# Patient Record
Sex: FEMALE | Race: WHITE | NOT HISPANIC OR LATINO | Employment: FULL TIME | URBAN - METROPOLITAN AREA
[De-identification: names, ages, dates, MRNs, and addresses within clinical notes are randomized per-mention and may not be internally consistent; named-entity substitution may affect disease eponyms.]

---

## 2017-02-14 ENCOUNTER — TELEPHONE (OUTPATIENT)
Dept: ORTHOPEDICS | Facility: CLINIC | Age: 57
End: 2017-02-14

## 2017-02-14 NOTE — TELEPHONE ENCOUNTER
Spoke to pt and she was given appointment with  and she was placed on the waiting list. The appointment slip was mailed to her home.

## 2017-02-14 NOTE — TELEPHONE ENCOUNTER
----- Message from Paige Mak sent at 2/14/2017 10:38 AM CST -----  Contact: self@ home   Pt is calling to get a sooner appt with dr. beard she stated that she has left knee pain with swelling pt do not want to see a PA. i called triage and [facility] staff no answer.

## 2017-03-31 ENCOUNTER — TELEPHONE (OUTPATIENT)
Dept: ORTHOPEDICS | Facility: CLINIC | Age: 57
End: 2017-03-31

## 2017-03-31 DIAGNOSIS — M25.562 LEFT KNEE PAIN, UNSPECIFIED CHRONICITY: Primary | ICD-10-CM

## 2017-04-03 ENCOUNTER — HOSPITAL ENCOUNTER (OUTPATIENT)
Dept: RADIOLOGY | Facility: HOSPITAL | Age: 57
Discharge: HOME | End: 2017-04-03
Attending: ORTHOPAEDIC SURGERY
Payer: COMMERCIAL

## 2017-04-03 ENCOUNTER — OFFICE VISIT (OUTPATIENT)
Dept: ORTHOPEDICS | Facility: CLINIC | Age: 57
End: 2017-04-03
Payer: COMMERCIAL

## 2017-04-03 VITALS — WEIGHT: 198.31 LBS | BODY MASS INDEX: 33.86 KG/M2 | HEIGHT: 64 IN

## 2017-04-03 DIAGNOSIS — M25.562 CHRONIC PAIN OF LEFT KNEE: Primary | ICD-10-CM

## 2017-04-03 DIAGNOSIS — T84.89XA POSTOPERATIVE STIFFNESS OF TOTAL KNEE REPLACEMENT, INITIAL ENCOUNTER: ICD-10-CM

## 2017-04-03 DIAGNOSIS — T84.84XA PAIN DUE TO TOTAL LEFT KNEE REPLACEMENT, INITIAL ENCOUNTER: ICD-10-CM

## 2017-04-03 DIAGNOSIS — M25.562 LEFT KNEE PAIN, UNSPECIFIED CHRONICITY: ICD-10-CM

## 2017-04-03 DIAGNOSIS — Z96.652 PAIN DUE TO TOTAL LEFT KNEE REPLACEMENT, INITIAL ENCOUNTER: ICD-10-CM

## 2017-04-03 DIAGNOSIS — G89.29 CHRONIC PAIN OF LEFT KNEE: Primary | ICD-10-CM

## 2017-04-03 DIAGNOSIS — M25.669 POSTOPERATIVE STIFFNESS OF TOTAL KNEE REPLACEMENT, INITIAL ENCOUNTER: ICD-10-CM

## 2017-04-03 DIAGNOSIS — Z96.659 POSTOPERATIVE STIFFNESS OF TOTAL KNEE REPLACEMENT, INITIAL ENCOUNTER: ICD-10-CM

## 2017-04-03 PROCEDURE — 73562 X-RAY EXAM OF KNEE 3: CPT | Mod: 26,LT,, | Performed by: RADIOLOGY

## 2017-04-03 PROCEDURE — 99214 OFFICE O/P EST MOD 30 MIN: CPT | Mod: S$GLB,,, | Performed by: ORTHOPAEDIC SURGERY

## 2017-04-03 PROCEDURE — 73560 X-RAY EXAM OF KNEE 1 OR 2: CPT | Mod: 26,59,RT, | Performed by: RADIOLOGY

## 2017-04-03 PROCEDURE — 73560 X-RAY EXAM OF KNEE 1 OR 2: CPT | Mod: TC,59,RT

## 2017-04-03 PROCEDURE — 99999 PR PBB SHADOW E&M-EST. PATIENT-LVL III: CPT | Mod: PBBFAC,,, | Performed by: ORTHOPAEDIC SURGERY

## 2017-04-03 PROCEDURE — 1160F RVW MEDS BY RX/DR IN RCRD: CPT | Mod: S$GLB,,, | Performed by: ORTHOPAEDIC SURGERY

## 2017-04-03 RX ORDER — ROSUVASTATIN CALCIUM 20 MG/1
20 TABLET, COATED ORAL DAILY
Status: ON HOLD | COMMUNITY
End: 2017-07-11 | Stop reason: SDUPTHER

## 2017-04-03 RX ORDER — FLUTICASONE PROPIONATE 50 MCG
SPRAY, SUSPENSION (ML) NASAL
COMMUNITY
Start: 2017-03-13 | End: 2017-11-27

## 2017-04-03 RX ORDER — FLUOXETINE HYDROCHLORIDE 20 MG/1
CAPSULE ORAL
COMMUNITY
Start: 2017-03-13 | End: 2017-04-03 | Stop reason: SDUPTHER

## 2017-04-03 NOTE — PROGRESS NOTES
Subjective:      Patient ID: Traci Freire is a 56 y.o. female.    Chief Complaint: Pain of the Left Knee    HPI   Traci Freire is a 56 year old female with h/o L TKA at [facility] 2013 with revision L TKA by Dr. Estevez 2014, breast cancer s/p B mastectomy  here with a 1.5 year history of left knee pain. 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  The pain is severe The pain is located in the global aspect of the knee. There is is not radiation.  The pain is described as severe. It is aggravated by sitting, standing and walking.  It is not alleviated by rest. There is not numbness or tingling of the lower extremity.  There is back pain.  She  has tried medications or injections. They help minimally.  She does have difficulty getting in or out of a car, getting dressed, or going up or down stairs.  The patient does not use an assistive device.    Pt did well after revision TKA in .  Pain started 1.5 years ago and worsening.    Past Medical History:   Diagnosis Date    Cancer breast    Right- Bilateral mastectomy- May 2005- had breast reconstruction- mother  of breast cancer    GERD (gastroesophageal reflux disease)     not troubled any more after the Nissen's fundoplication -     HLD (hyperlipidemia)     Hypertension     diagnosed age late 40's     IBS (irritable bowel syndrome)     Lyme disease     arthritis of hands. Felt like flu- age early 30's- got it  after going to [place]    Sleep apnea      Past Surgical History:   Procedure Laterality Date    ABDOMINAL SURGERY      3 c sections    baldder suspension-      BREAST SURGERY      CYSTOSCOPY      avoids greens . ? Interstitial cystitis    HYSTERECTOMY      followed by removal of ovaries  from scar tissue    JOINT REPLAC[place]      [place]     KNEE SURGERY Left 2014    TKR    tonsils      age 19 years     Social History      Occupational History    Not on file.     Social History Main Topics    Smoking status: Former Smoker     Quit date: 3/26/2004    Smokeless tobacco: Never Used      Comment: quit 2004- smoked for 10 years- 1 ppd    Alcohol use No    Drug use: No    Sexual activity: Not on file      Review of Systems   Constitution: Negative for fever.   HENT: Negative for headaches.    Eyes: Negative for vision loss in left eye and vision loss in right eye.   Cardiovascular: Negative for chest pain.   Respiratory: Negative for cough and shortness of breath.    Endocrine: Negative for polyuria.   Hematologic/Lymphatic: Does not bruise/bleed easily.   Skin: Negative for rash.   Musculoskeletal: Positive for joint pain and joint swelling.   Gastrointestinal: Negative for abdominal pain, nausea and vomiting.   Genitourinary: Negative for dysuria.   Neurological: Negative for numbness.   Psychiatric/Behavioral: Negative for hallucinations.   Allergic/Immunologic: Negative for hives.         Objective:    Right Knee Exam     Tenderness   The patient is experiencing tenderness in the medial joint line.    Range of Motion   Extension: 0   Flexion: 110     Tests   Lachman:  Anterior - negative    Posterior - negative  Drawer:       Anterior - negative    Posterior - negative  Varus: negative  Valgus: negative  Patellar Apprehension: negative    Other   Erythema: absent  Scars: absent  Sensation: normal  Pulse: present  Swelling: none  Other tests: no effusion present      Left Knee Exam     Tenderness   The patient is experiencing tenderness in the pes anserinus, medial joint line and lateral joint line.    Range of Motion   Extension: 0   Flexion: 30     Tests   Lachman:  Anterior - negative    Posterior - negative  Drawer:       Anterior - negative     Posterior - negative  Varus: negative  Valgus: negative  Patellar Apprehension: negative    Other   Erythema: absent  Scars: present  Sensation: normal  Pulse: present  Swelling:  moderate               Assessment:       No diagnosis found.      Plan:       ***

## 2017-04-03 NOTE — MR AVS SNAPSHOT
[facility]  [address]  [facility] [address]  Phone: [phone]                  Traci Freire   4/3/2017 9:00 AM   Office Visit    Description:  Female : 1960   Provider:  Chris Estevez MD   Department:  [facility]           Reason for Visit     Left Knee - Pain           Diagnoses this Visit        Comments    Chronic pain of left knee    -  Primary     Pain due to total left knee replacement, initial encounter         Postoperative stiffness of total knee replacement, initial encounter                To Do List           Future Appointments        Provider Department Dept Phone    4/3/2017 11:15 AM LAB, SAME DAY [facility] [phone]    2017 9:15 AM [facility] CT1 LIMIT 400 LBS [facility] [phone]      Goals (5 Years of Data)     None      [facility]     [facility] Nurse Care Line -  Assistance  Unless otherwise directed by your provider, please contact [facility], our nurse care line that is available for  assistance.     Registered nurses in the [facility] provide: appointment scheduling, clinical advisement, health education, and other advisory services.  Call: [phone] (toll free)               Medications           Message regarding Medications     Verify the changes and/or additions to your medication regime listed below are the same as discussed with your clinician today.  If any of these changes or additions are incorrect, please notify your healthcare provider.        STOP taking these medications     docusate sodium (COLACE) 100 MG capsule Take 1 capsule (100 mg total) by mouth 2 (two) times daily.    hydrocodone-acetaminophen 10-325mg (NORCO)  mg Tab Take 1 tablet by mouth daily as needed.    oxycodone-acetaminophen (PERCOCET) 5-325 mg per tablet Take 1 tablet by mouth every 4 (four) hours as needed for Pain.    fluoxetine (PROZAC) 20 MG capsule           "  Verify that the below list of medications is an accurate representation of the medications you are currently taking.  If none reported, the list may be blank. If incorrect, please contact your healthcare provider. Carry this list with you in case of emergency.           Current Medications     fluoxetine (PROZAC) 20 MG tablet Take 20 mg by mouth every evening.     fluticasone (FLONASE) 50 mcg/actuation nasal spray     ibuprofen (ADVIL,MOTRIN) 200 MG tablet Take 600 mg by mouth every evening. Takes 3 of the 200 mg otc tablets    irbesartan (AVAPRO) 300 MG tablet Take 300 mg by mouth every evening.    rosuvastatin (CRESTOR) 20 MG tablet Take 20 mg by mouth once daily.    zolpidem (AMBIEN) 10 mg Tab Take 5 mg by mouth every evening.     rosuvastatin (CRESTOR) 20 MG tablet Take 20 mg by mouth every evening.     warfarin (COUMADIN) 2.5 MG tablet Take 1 tablet (2.5 mg total) by mouth Daily.           Clinical Reference Information           Your Vitals Were     Height Weight Last Period BMI       5' 3.78" (1.62 m) 89.9 kg (198 lb 4.9 oz) 05/09/1992 34.27 kg/m2       Allergies as of 4/3/2017     No Known Allergies      Immunizations Administered on Date of Encounter - 4/3/2017     None      Orders Placed During Today's Visit     Future Labs/Procedures Expected by Expires    C-reactive protein  4/3/2017 4/3/2018    CT Lower Extremity Without Contrast Left  4/3/2017 4/3/2018    Sedimentation rate, manual  4/3/2017 4/3/2018      [facility] Sign-Up     Activating your [facility] account is as easy as 1-2-3!     1) Visit [email or web address], select Sign Up Now, enter this activation code and your date of birth, then select Next.  [email or web address]  Expires: 5/18/2017 10:56 AM      2) Create a username and password to use when you visit [facility] in the future and select a security question in case you lose your password and select Next.    3) Enter your e-mail address and click Sign Up!    Additional Information  If you have " questions, please e-mail [email or web address] or call [phone] to talk to our [facility] staff. Remember, [facility] is NOT to be used for urgent needs. For medical emergencies, dial 911.         Language Assistance Services     ATTENTION: Language assistance services are available, free of charge. Please call [phone].      ATENCIÓN: Si habla español, tiene a bowser disposición servicios gratuitos de asistencia lingüística. Llame al [phone].     CHÚ Ý: N?u b?n nói Ti?ng Vi?t, có các d?ch v? h? tr? ngôn ng? mi?n phí dành cho b?n. G?i s? [phone].         [facility] complies with applicable Federal civil rights laws and does not discriminate on the basis of race, color, national origin, age, disability, or sex.

## 2017-04-03 NOTE — LETTER
April 3, 2017      Ryan Luo MD  [address] [address]           [facility]  [address]  [place] [address]  Phone: [phone]          Patient: Traci Freire   MR Number: [id number]   YOB: 1960   Date of Visit: 4/3/2017       Dear Dr. Ryan Luo:    Thank you for referring Traci Freire to me for evaluation. Attached you will find relevant portions of my assessment and plan of care.    If you have questions, please do not hesitate to call me. I look forward to following Traci Freire along with you.    Sincerely,    Chris Estevez MD    Enclosure  CC:  No Recipients    If you would like to receive this communication electronically, please contact [email or web address] or [phone] to request more information on [employer] access.    For providers and/or their staff who would like to refer a patient to [facility], please contact us through our one-stop-shop provider referral line, [facility], at [phone].    If you feel you have received this communication in error or would no longer like to receive these types of communications, please e-mail [email or web address]

## 2017-04-03 NOTE — PROGRESS NOTES
Subjective:      Patient ID: Traci Freire is a 56 y.o. female.    Chief Complaint: Pain of the Left Knee    HPI  Traci Freire is a 56 year old female with h/o L TKA at OSH 2013 with revision L TKA by Dr. Estevez 2014, breast cancer s/p B mastectomy  here with a 1.5 year history of left knee pain. 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  The pain is severe The pain is located in the global aspect of the knee. There is not radiation.  The pain is described as severe. It is aggravated by sitting, standing and walking.  It is not alleviated by rest. There is not numbness or tingling of the lower extremity.  There is back pain.  She  has tried medications or injections. They help minimally.  She does have difficulty getting in or out of a car, getting dressed, or going up or down stairs.  The patient does not use an assistive device.    Pt did well after revision TKA in .  Pain started 1.5 years ago and worsening.      Past Medical History:   Diagnosis Date    Cancer breast    Right- Bilateral mastectomy- May 2005- had breast reconstruction- mother  of breast cancer    GERD (gastroesophageal reflux disease)     not troubled any more after the Nissen's fundoplication -     HLD (hyperlipidemia)     Hypertension     diagnosed age late 40's     IBS (irritable bowel syndrome)     Lyme disease     arthritis of hands. Felt like flu- age early 30's- got it  after going to [place]    Sleep apnea      Past Surgical History:   Procedure Laterality Date    ABDOMINAL SURGERY      3 c sections    baldder suspension-      BREAST SURGERY      CYSTOSCOPY      avoids greens . ? Interstitial cystitis    HYSTERECTOMY      followed by removal of ovaries  from scar tissue    JOINT REPLACEMENT      [facility] -     KNEE SURGERY Left 2014    TKR    tonsils      age 19 years     Family History   Problem  Relation Age of Onset    Cancer Mother       of breast cancer, also had gynecological cancer- had breast cancer that spread to the brain    Heart disease Father      in his 60's-  in his 70's     Social History     Social History    Marital status:      Spouse name: N/A    Number of children: N/A    Years of education: N/A     Occupational History    Not on file.     Social History Main Topics    Smoking status: Former Smoker     Quit date: 3/26/2004    Smokeless tobacco: Never Used      Comment: quit - smoked for 10 years- 1 ppd    Alcohol use No    Drug use: No    Sexual activity: Not on file     Other Topics Concern    Not on file     Social History Narrative     Current Outpatient Prescriptions on File Prior to Visit   Medication Sig Dispense Refill    fluoxetine (PROZAC) 20 MG tablet Take 20 mg by mouth every evening.       ibuprofen (ADVIL,MOTRIN) 200 MG tablet Take 600 mg by mouth every evening. Takes 3 of the 200 mg otc tablets      irbesartan (AVAPRO) 300 MG tablet Take 300 mg by mouth every evening.      zolpidem (AMBIEN) 10 mg Tab Take 5 mg by mouth every evening.       rosuvastatin (CRESTOR) 20 MG tablet Take 20 mg by mouth every evening.       warfarin (COUMADIN) 2.5 MG tablet Take 1 tablet (2.5 mg total) by mouth Daily. 90 tablet 0    [DISCONTINUED] docusate sodium (COLACE) 100 MG capsule Take 1 capsule (100 mg total) by mouth 2 (two) times daily. 60 capsule 0    [DISCONTINUED] hydrocodone-acetaminophen 10-325mg (NORCO)  mg Tab Take 1 tablet by mouth daily as needed. 90 tablet 0    [DISCONTINUED] oxycodone-acetaminophen (PERCOCET) 5-325 mg per tablet Take 1 tablet by mouth every 4 (four) hours as needed for Pain. 90 tablet 0     No current facility-administered medications on file prior to visit.      Review of patient's allergies indicates:  No Known Allergies    Review of Systems   Constitution: Negative for chills, fever and night sweats.   HENT:  "Negative for headaches and hearing loss.    Eyes: Negative for blurred vision and double vision.   Cardiovascular: Negative for chest pain, claudication and leg swelling.   Respiratory: Negative for shortness of breath.    Endocrine: Negative for polydipsia, polyphagia and polyuria.   Hematologic/Lymphatic: Negative for adenopathy and bleeding problem. Does not bruise/bleed easily.   Skin: Negative for poor wound healing.   Musculoskeletal: Positive for joint pain.   Gastrointestinal: Negative for diarrhea and heartburn.   Genitourinary: Negative for bladder incontinence.   Neurological: Negative for focal weakness, numbness, paresthesias and sensory change.   Psychiatric/Behavioral: The patient is not nervous/anxious.    Allergic/Immunologic: Negative for persistent infections.         Objective:      Body mass index is 34.27 kg/(m^2).  Vitals:    04/03/17 0931   Weight: 89.9 kg (198 lb 4.9 oz)   Height: 5' 3.78" (1.62 m)         General    Constitutional: She is oriented to person, place, and time. She appears well-developed and well-nourished.   HENT:   Head: Normocephalic and atraumatic.   Eyes: EOM are normal.   Cardiovascular: Normal rate.    Pulmonary/Chest: Effort normal.   Neurological: She is alert and oriented to person, place, and time.   Psychiatric: She has a normal mood and affect. Her behavior is normal.     General Musculoskeletal Exam   Gait: abnormal and antalgic       Right Knee Exam     Inspection   Erythema: absent  Scars: absent  Swelling: absent  Effusion: effusion  Deformity: deformity  Bruising: absent    Tenderness   The patient is experiencing no tenderness.         Range of Motion   Extension: 0   Flexion: 130     Tests   Ligament Examination Lachman: normal (-1 to 2mm)   MCL - Valgus: normal (0 to 2mm)  LCL - Varus: normal  Patella   Passive Patellar Tilt: neutral    Other   Sensation: normal    Left Knee Exam     Inspection   Erythema: absent  Scars: present  Swelling: " present  Effusion: absent  Deformity: deformity  Bruising: absent    Tenderness   The patient tender to palpation of the medial retinaculum and lateral retinaculum.    Range of Motion   Extension: 0   Flexion: 50     Tests   Stability Lachman: normal (-1 to 2mm)   MCL - Valgus: normal (0 to 2mm)  LCL - Varus: normal (0 to 2mm)  Patella   Passive Patellar Tilt: neutral    Other   Sensation: normal    Muscle Strength   Right Lower Extremity   Hip Abduction: 5/5   Quadriceps:  5/5   Hamstrin/5   Left Lower Extremity   Hip Abduction: 5/5   Quadriceps:  5/5   Hamstrin/5     Reflexes     Left Side  Quadriceps:  2+    Right Side   Quadriceps:  2+    Vascular Exam     Right Pulses  Dorsalis Pedis:      2+          Left Pulses  Dorsalis Pedis:      2+          Edema  Right Lower Leg: absent  Left Lower Leg: absent      Radiographs taken today were reviewed by me.  There is a prosthetic replacement of the left knee(s).  The prosthesis is well positioned.  Copmapred to prior in  there is heterotopic ossification and patella baja.         Assessment:       Encounter Diagnoses   Name Primary?    Chronic pain of left knee Yes    Pain due to total left knee replacement, initial encounter     Postoperative stiffness of total knee replacement, initial encounter           Plan:       Traci was seen today for pain.    Diagnoses and all orders for this visit:    Chronic pain of left knee  -     C-reactive protein; Future  -     Sedimentation rate, manual; Future  -     CT Lower Extremity Without Contrast Left; Future    Pain due to total left knee replacement, initial encounter  -     C-reactive protein; Future  -     Sedimentation rate, manual; Future  -     CT Lower Extremity Without Contrast Left; Future    Postoperative stiffness of total knee replacement, initial encounter  -     C-reactive protein; Future  -     Sedimentation rate, manual; Future  -     CT Lower Extremity Without Contrast Left; Future        She  had a bone scan done by Dr. Luo.  I would like to see the report for this.  +ESR/CRP today.  CT scan.  F/U after CT scan

## 2017-04-04 ENCOUNTER — TELEPHONE (OUTPATIENT)
Dept: ORTHOPEDICS | Facility: CLINIC | Age: 57
End: 2017-04-04

## 2017-04-04 NOTE — TELEPHONE ENCOUNTER
----- Message from Chris Estevez MD sent at 4/4/2017  9:15 AM CDT -----  Please call pt.  Labs are fine.  No infection.

## 2017-04-07 ENCOUNTER — TELEPHONE (OUTPATIENT)
Dept: ORTHOPEDICS | Facility: CLINIC | Age: 57
End: 2017-04-07

## 2017-04-07 ENCOUNTER — HOSPITAL ENCOUNTER (OUTPATIENT)
Dept: RADIOLOGY | Facility: HOSPITAL | Age: 57
Discharge: HOME | End: 2017-04-07
Attending: ORTHOPAEDIC SURGERY
Payer: COMMERCIAL

## 2017-04-07 DIAGNOSIS — M25.669 POSTOPERATIVE STIFFNESS OF TOTAL KNEE REPLACEMENT, INITIAL ENCOUNTER: ICD-10-CM

## 2017-04-07 DIAGNOSIS — T84.84XA PAIN DUE TO TOTAL LEFT KNEE REPLACEMENT, INITIAL ENCOUNTER: ICD-10-CM

## 2017-04-07 DIAGNOSIS — T84.89XA POSTOPERATIVE STIFFNESS OF TOTAL KNEE REPLACEMENT, INITIAL ENCOUNTER: ICD-10-CM

## 2017-04-07 DIAGNOSIS — G89.29 CHRONIC PAIN OF LEFT KNEE: ICD-10-CM

## 2017-04-07 DIAGNOSIS — Z96.659 POSTOPERATIVE STIFFNESS OF TOTAL KNEE REPLACEMENT, INITIAL ENCOUNTER: ICD-10-CM

## 2017-04-07 DIAGNOSIS — M25.562 CHRONIC PAIN OF LEFT KNEE: ICD-10-CM

## 2017-04-07 DIAGNOSIS — Z96.652 PAIN DUE TO TOTAL LEFT KNEE REPLACEMENT, INITIAL ENCOUNTER: ICD-10-CM

## 2017-04-07 PROCEDURE — 73700 CT LOWER EXTREMITY W/O DYE: CPT | Mod: TC,LT

## 2017-04-07 PROCEDURE — 73700 CT LOWER EXTREMITY W/O DYE: CPT | Mod: 26,LT,, | Performed by: RADIOLOGY

## 2017-04-12 ENCOUNTER — TELEPHONE (OUTPATIENT)
Dept: ORTHOPEDICS | Facility: CLINIC | Age: 57
End: 2017-04-12

## 2017-04-12 NOTE — TELEPHONE ENCOUNTER
Spoke to pt and she was notified of her results and she will  a copy of the bone scan results and she will bring it to her appointment.

## 2017-04-12 NOTE — TELEPHONE ENCOUNTER
----- Message from Chris Estevez MD sent at 4/7/2017 11:22 AM CDT -----  Please schedule f/u.  I reviewed the CT scan.  Did we get the results of the bone scan that she had done through Dr. Luo?

## 2017-05-01 ENCOUNTER — OFFICE VISIT (OUTPATIENT)
Dept: ORTHOPEDICS | Facility: CLINIC | Age: 57
End: 2017-05-01
Payer: COMMERCIAL

## 2017-05-01 VITALS — BODY MASS INDEX: 35.08 KG/M2 | WEIGHT: 198 LBS | HEIGHT: 63 IN

## 2017-05-01 DIAGNOSIS — G89.29 CHRONIC PAIN OF LEFT KNEE: Primary | ICD-10-CM

## 2017-05-01 DIAGNOSIS — M25.562 CHRONIC PAIN OF LEFT KNEE: Primary | ICD-10-CM

## 2017-05-01 DIAGNOSIS — T84.89XA POSTOPERATIVE STIFFNESS OF TOTAL KNEE REPLACEMENT, INITIAL ENCOUNTER: ICD-10-CM

## 2017-05-01 DIAGNOSIS — M25.669 POSTOPERATIVE STIFFNESS OF TOTAL KNEE REPLACEMENT, INITIAL ENCOUNTER: ICD-10-CM

## 2017-05-01 DIAGNOSIS — T84.84XA PAIN DUE TO TOTAL LEFT KNEE REPLACEMENT, INITIAL ENCOUNTER: ICD-10-CM

## 2017-05-01 DIAGNOSIS — Z96.652 PAIN DUE TO TOTAL LEFT KNEE REPLACEMENT, INITIAL ENCOUNTER: ICD-10-CM

## 2017-05-01 DIAGNOSIS — Z96.659 POSTOPERATIVE STIFFNESS OF TOTAL KNEE REPLACEMENT, INITIAL ENCOUNTER: ICD-10-CM

## 2017-05-01 PROCEDURE — 99213 OFFICE O/P EST LOW 20 MIN: CPT | Mod: S$GLB,,, | Performed by: ORTHOPAEDIC SURGERY

## 2017-05-01 PROCEDURE — 1160F RVW MEDS BY RX/DR IN RCRD: CPT | Mod: S$GLB,,, | Performed by: ORTHOPAEDIC SURGERY

## 2017-05-01 PROCEDURE — 99999 PR PBB SHADOW E&M-EST. PATIENT-LVL III: CPT | Mod: PBBFAC,,, | Performed by: ORTHOPAEDIC SURGERY

## 2017-05-01 NOTE — MR AVS SNAPSHOT
[facility]  [address]  [facility] [address]  Phone: [phone]                  Traci Freire   2017 1:30 PM   Office Visit    Description:  Female : 1960   Provider:  Chris Estevez MD   Department:  [facility]           Reason for Visit     Left Knee - Pain           Diagnoses this Visit        Comments    Chronic pain of left knee    -  Primary     Pain due to total left knee replacement, initial encounter         Postoperative stiffness of total knee replacement, initial encounter                To Do List           Goals (5 Years of Data)     None      Follow-Up and Disposition     Return in about 2 months (around 2017).      [facility]     [facility] On Call Nurse Care Line -  Assistance  Unless otherwise directed by your provider, please contact [facility], our nurse care line that is available for  assistance.     Registered nurses in the [facility] provide: appointment scheduling, clinical advisement, health education, and other advisory services.  Call: [phone] (toll free)               Medications           Message regarding Medications     Verify the changes and/or additions to your medication regime listed below are the same as discussed with your clinician today.  If any of these changes or additions are incorrect, please notify your healthcare provider.             Verify that the below list of medications is an accurate representation of the medications you are currently taking.  If none reported, the list may be blank. If incorrect, please contact your healthcare provider. Carry this list with you in case of emergency.           Current Medications     fluoxetine (PROZAC) 20 MG tablet Take 20 mg by mouth every evening.     fluticasone (FLONASE) 50 mcg/actuation nasal spray     ibuprofen (ADVIL,MOTRIN) 200 MG tablet Take 600 mg by mouth every evening. Takes 3 of the 200 mg otc tablets    irbesartan  "(AVAPRO) 300 MG tablet Take 300 mg by mouth every evening.    rosuvastatin (CRESTOR) 20 MG tablet Take 20 mg by mouth every evening.     rosuvastatin (CRESTOR) 20 MG tablet Take 20 mg by mouth once daily.    zolpidem (AMBIEN) 10 mg Tab Take 5 mg by mouth every evening.     warfarin (COUMADIN) 2.5 MG tablet Take 1 tablet (2.5 mg total) by mouth Daily.           Clinical Reference Information           Your Vitals Were     Height Weight Last Period BMI       5' 3" (1.6 m) 89.8 kg (198 lb) 05/09/1992 35.07 kg/m2       Allergies as of 5/1/2017     No Known Allergies      Immunizations Administered on Date of Encounter - 5/1/2017     None      Orders Placed During Today's Visit      Normal Orders This Visit    Ambulatory referral to Pain Clinic     Ambulatory Referral to Physical/Occupational Therapy       [facility] Sign-Up     Activating your [facility] account is as easy as 1-2-3!     1) Visit [email or web address], select Sign Up Now, enter this activation code and your date of birth, then select Next.  [email or web address]  Expires: 5/18/2017 10:56 AM      2) Create a username and password to use when you visit [facility] in the future and select a security question in case you lose your password and select Next.    3) Enter your e-mail address and click Sign Up!    Additional Information  If you have questions, please e-mail [email or web address] or call [phone] to talk to our [facility] staff. Remember, [facility] is NOT to be used for urgent needs. For medical emergencies, dial 911.         Language Assistance Services     ATTENTION: Language assistance services are available, free of charge. Please call [phone].      ATENCIÓN: Si habla edy, tiene a bowser disposición servicios gratuitos de asistencia lingüística. Llame al [phone].     CHÚ Ý: N?u b?n nói Ti?ng Vi?t, có các d?ch v? h? tr? ngôn ng? mi?n phí dành cho b?n. G?i s? [phone].         [facility] complies with applicable Federal " civil rights laws and does not discriminate on the basis of race, color, national origin, age, disability, or sex.

## 2017-05-01 NOTE — PROGRESS NOTES
"Subjective:      Patient ID: Traci Freire is a 56 y.o. female.    Chief Complaint: Pain of the Left Knee    HPI  Traci Freire has left knee pain.  The pain is unchanged. The pain is located in the global aspect of the knee.  There is not radiation.  SHe does have some tibial pain.  There is associated stiffness.   There is not catching and locking. The pain is described as achy. The pain is aggravated by standing and walking.  It is alleviated by rest.  There is not associated back pain.  Her history, medications and problem list were reviewed.    Review of Systems   Constitution: Negative for chills, fever and night sweats.   HENT: Negative for headaches and hearing loss.    Eyes: Negative for blurred vision and double vision.   Cardiovascular: Negative for chest pain, claudication and leg swelling.   Respiratory: Negative for shortness of breath.    Endocrine: Negative for polydipsia, polyphagia and polyuria.   Hematologic/Lymphatic: Negative for adenopathy and bleeding problem. Does not bruise/bleed easily.   Skin: Negative for poor wound healing.   Musculoskeletal: Positive for joint pain.   Gastrointestinal: Negative for diarrhea and heartburn.   Genitourinary: Negative for bladder incontinence.   Neurological: Negative for focal weakness, numbness, paresthesias and sensory change.   Psychiatric/Behavioral: The patient is not nervous/anxious.    Allergic/Immunologic: Negative for persistent infections.         Objective:      Body mass index is 35.07 kg/(m^2).  Vitals:    05/01/17 1342   Weight: 89.8 kg (198 lb)   Height: 5' 3" (1.6 m)           General    Constitutional: She is oriented to person, place, and time. She appears well-developed and well-nourished.   HENT:   Head: Normocephalic and atraumatic.   Eyes: EOM are normal.   Cardiovascular: Normal rate.    Pulmonary/Chest: Effort normal.   Neurological: She is alert and oriented to person, place, and time.   Psychiatric: She has a normal mood and affect. " Her behavior is normal.     General Musculoskeletal Exam   Gait: abnormal and antalgic       Right Knee Exam     Inspection   Erythema: absent  Scars: absent  Swelling: absent  Effusion: effusion  Deformity: deformity  Bruising: absent    Tenderness   The patient is experiencing no tenderness.         Range of Motion   Extension: 0   Flexion: 130     Tests   Ligament Examination Lachman: normal (-1 to 2mm)   MCL - Valgus: normal (0 to 2mm)  LCL - Varus: normal  Patella   Passive Patellar Tilt: neutral    Other   Sensation: normal    Left Knee Exam     Inspection   Erythema: absent  Scars: present  Swelling: absent  Effusion: absent  Deformity: deformity  Bruising: absent    Tenderness   The patient tender to palpation of the no tenderness, patella and patellar tendon (tibia at tip of stem).    Range of Motion   Extension: 0   Flexion: 60     Tests   Stability Lachman: normal (-1 to 2mm)   MCL - Valgus: normal (0 to 2mm)  LCL - Varus: normal (0 to 2mm)  Patella   Passive Patellar Tilt: neutral    Other   Sensation: normal    Muscle Strength   Right Lower Extremity   Hip Abduction: 5/5   Quadriceps:  5/5   Hamstrin/5   Left Lower Extremity   Hip Abduction: 5/5   Quadriceps:  5/5   Hamstrin/5     Reflexes     Left Side  Quadriceps:  2+    Right Side   Quadriceps:  2+    Vascular Exam     Right Pulses  Dorsalis Pedis:      2+          Left Pulses  Dorsalis Pedis:      2+          Edema  Right Lower Leg: absent  Left Lower Leg: absent    I reviewed CT--No rotational malalignment or lucency.    I reviewed one scan reprt and compared to plain xray,  Uptake at tip of stem likely a stress reaction.            Assessment:       Encounter Diagnoses   Name Primary?    Chronic pain of left knee Yes    Pain due to total left knee replacement, initial encounter     Postoperative stiffness of total knee replacement, initial encounter           Plan:       Traci was seen today for pain.    Diagnoses and all orders for  this visit:    Chronic pain of left knee  -     Ambulatory Referral to Physical/Occupational Therapy  -     Ambulatory referral to Pain Clinic    Pain due to total left knee replacement, initial encounter  -     Ambulatory Referral to Physical/Occupational Therapy  -     Ambulatory referral to Pain Clinic    Postoperative stiffness of total knee replacement, initial encounter  -     Ambulatory Referral to Physical/Occupational Therapy  -     Ambulatory referral to Pain Clinic      Options discussed.  I explained that revision surgery is not the best choice at present as the result will likely not be much better.  PT for quad strengthening.  RFA left knee

## 2017-05-08 ENCOUNTER — HOSPITAL ENCOUNTER (OUTPATIENT)
Dept: RADIOLOGY | Facility: OTHER | Age: 57
Discharge: HOME | End: 2017-05-08
Attending: ANESTHESIOLOGY
Payer: COMMERCIAL

## 2017-05-08 ENCOUNTER — OFFICE VISIT (OUTPATIENT)
Dept: PAIN MEDICINE | Facility: CLINIC | Age: 57
End: 2017-05-08
Attending: ANESTHESIOLOGY
Payer: COMMERCIAL

## 2017-05-08 VITALS
WEIGHT: 194 LBS | HEIGHT: 63 IN | BODY MASS INDEX: 34.38 KG/M2 | RESPIRATION RATE: 18 BRPM | DIASTOLIC BLOOD PRESSURE: 100 MMHG | HEART RATE: 85 BPM | SYSTOLIC BLOOD PRESSURE: 155 MMHG | TEMPERATURE: 99 F

## 2017-05-08 DIAGNOSIS — M47.816 FACET ARTHROPATHY, LUMBAR: ICD-10-CM

## 2017-05-08 DIAGNOSIS — M25.562 CHRONIC PAIN OF LEFT KNEE: Primary | ICD-10-CM

## 2017-05-08 DIAGNOSIS — G89.29 CHRONIC PAIN OF LEFT KNEE: Primary | ICD-10-CM

## 2017-05-08 PROCEDURE — 72114 X-RAY EXAM L-S SPINE BENDING: CPT | Mod: TC

## 2017-05-08 PROCEDURE — 72114 X-RAY EXAM L-S SPINE BENDING: CPT | Mod: 26,,, | Performed by: RADIOLOGY

## 2017-05-08 PROCEDURE — 99999 PR PBB SHADOW E&M-EST. PATIENT-LVL III: CPT | Mod: 25,PBBFAC,, | Performed by: ANESTHESIOLOGY

## 2017-05-08 PROCEDURE — 99244 OFF/OP CNSLTJ NEW/EST MOD 40: CPT | Mod: S$GLB,,, | Performed by: ANESTHESIOLOGY

## 2017-05-08 NOTE — MR AVS SNAPSHOT
[facility]  [address]  [place] [address]  Phone: [phone]  Fax: [phone]                  Traci Freire   2017 9:00 AM   Office Visit    Description:  Female : 1960   Provider:  Faith Castrejon MD   Department:  [facility]           Reason for Visit     Knee Pain           Diagnoses this Visit        Comments    Facet arthropathy, lumbar                To Do List           Future Appointments        Provider Department Dept Phone    2017 9:15 AM Faith Castrejon MD [facility] [phone]    7/10/2017 11:00 AM Chris Estevez MD [facility] [phone]      Goals (5 Years of Data)     None      [facility]     [facility] Nurse Care Line -  Assistance  Unless otherwise directed by your provider, please contact [facility], our nurse care line that is available for  assistance.     Registered nurses in the [facility] Center provide: appointment scheduling, clinical advisement, health education, and other advisory services.  Call: [phone] (toll free)               Medications           Message regarding Medications     Verify the changes and/or additions to your medication regime listed below are the same as discussed with your clinician today.  If any of these changes or additions are incorrect, please notify your healthcare provider.             Verify that the below list of medications is an accurate representation of the medications you are currently taking.  If none reported, the list may be blank. If incorrect, please contact your healthcare provider. Carry this list with you in case of emergency.           Current Medications     fluoxetine (PROZAC) 20 MG tablet Take 20 mg by mouth every evening.     fluticasone (FLONASE) 50 mcg/actuation nasal spray     ibuprofen (ADVIL,MOTRIN) 200 MG tablet Take 600 mg by mouth every evening. Takes 3 of the 200 mg otc tablets     "irbesartan (AVAPRO) 300 MG tablet Take 300 mg by mouth every evening.    rosuvastatin (CRESTOR) 20 MG tablet Take 20 mg by mouth every evening.     rosuvastatin (CRESTOR) 20 MG tablet Take 20 mg by mouth once daily.    warfarin (COUMADIN) 2.5 MG tablet Take 1 tablet (2.5 mg total) by mouth Daily.    zolpidem (AMBIEN) 10 mg Tab Take 5 mg by mouth every evening.            Clinical Reference Information           Your Vitals Were     BP Pulse Temp Resp Height Weight    155/100 (BP Location: Left arm) 85 98.5 °F (36.9 °C) (Oral) 18 5' 3" (1.6 m) 88 kg (194 lb 0.1 oz)    Last Period BMI             05/09/1992 34.37 kg/m2         Blood Pressure          Most Recent Value    BP  (!)  155/100      Allergies as of 5/8/2017     No Known Allergies      Immunizations Administered on Date of Encounter - 5/8/2017     None      Orders Placed During Today's Visit     Future Labs/Procedures Expected by Expires    X-Ray Lumbar Complete With Flex And Ext  5/8/2017 5/8/2018      [facility] Sign-Up     Activating your [facility] account is as easy as 1-2-3!     1) Visit [email or web address], select Sign Up Now, enter this activation code and your date of birth, then select Next.  [email or web address]  Expires: 5/18/2017 10:56 AM      2) Create a username and password to use when you visit [facility] in the future and select a security question in case you lose your password and select Next.    3) Enter your e-mail address and click Sign Up!    Additional Information  If you have questions, please e-mail [email or web address] or call [phone] to talk to our [facility] staff. Remember, [facility] is NOT to be used for urgent needs. For medical emergencies, dial 911.         Language Assistance Services     ATTENTION: Language assistance services are available, free of charge. Please call [phone].      ATENCIÓN: Si habla español, tiene a bowser disposición servicios gratuitos de asistencia lingüística. Llame al [phone].     KARINA Ý: N?u b?n " nói Ti?ng Vi?t, có các d?ch v? h? tr? ngôn ng? mi?n phí dành cho b?n. G?i s? [phone].         [facility] complies with applicable Federal civil rights laws and does not discriminate on the basis of race, color, national origin, age, disability, or sex.

## 2017-05-08 NOTE — LETTER
May 8, 2017      Chris Estevez MD  [address]  [facility] [address]           [facility]  [address]  [facility] [address]  Phone: [phone]  Fax: [phone]          Patient: Traci Freire   MR Number: [id number]   YOB: 1960   Date of Visit: 5/8/2017       Dear Dr. Chris Estevez:    Thank you for referring Traci Freire to me for evaluation. Attached you will find relevant portions of my assessment and plan of care.    If you have questions, please do not hesitate to call me. I look forward to following Traci Freire along with you.    Sincerely,    Faith Castrejon MD    Enclosure  CC:  No Recipients    If you would like to receive this communication electronically, please contact [email or web address] or [phone] to request more information on [employer] [facility] access.    For providers and/or their staff who would like to refer a patient to [facility], please contact us through our one-stop-shop provider referral line, [facility], at [phone].    If you feel you have received this communication in error or would no longer like to receive these types of communications, please e-mail [email or web address]

## 2017-05-08 NOTE — PROGRESS NOTES
Chronic Pain - New Consult    Referring Physician: Chris Estevez MD    Chief Complaint:   Chief Complaint   Patient presents with    Knee Pain     left        SUBJECTIVE: Disclaimer: This note has been generated using voice-recognition software. There may be typographical errors that have been missed during proof-reading    Initial encounter:    Traci Freire presents to the clinic for the evaluation of left knee pain. The pain started years ago insidiously and symptoms have been worsening.    Brief history: Patient has had left knee replacement with revision in the past and continues to have decreased range of motion occasional swelling and pain in the left knee.  Of note the patient has also had a history of lower back pain and radiculopathy associated with disc herniations.  She states that she has had epidural steroid injections in the lumbar spine years ago which helped her temporarily but denies any radicular symptoms.  She has been having some left leg weakness in the quadriceps on the left.  In addition to her knee pain she describes pain in the hips bilaterally and lower back    Pain Description:    The current worst pain is located in the left lateral knee area and radiates to the anterior shin.      At BEST  3/10     At WORST  10/10 on the WORST day.      On average pain is rated as 7/10.     Today the pain is rated as 6/10    The pain is described as aching, sharp, shooting and tingling      Symptoms interfere with daily activity, sleeping and work.     Exacerbating factors: Sitting, Standing, Laying, Bending, Walking, Night Time, Morning, Extension, Lifting and Getting out of bed/chair.      Mitigating factors medications.     Patient denies urinary incontinence and bowel incontinence.  Patient denies any suicidal or homicidal ideations    Pain Medications:  Current:  Ibuprofen occasionally with relief    Tried in Past:  NSAIDs -Never  TCA -Never  SNRI -Never  Anti-convulsants -Never  Muscle  Relaxants -Never  [id number] with relief    Physical Therapy/Home Exercise: no  -but she is scheduled to go soon     report:  Reviewed and consistent with medication use as prescribed.    Pain Procedures: epidurals in the past for lumbar radiculopathy, but no records available for review, no recent injections    Chiropractor -never  Acupuncture - never  TENS unit -never  Spinal decompression -never  Joint replacement -left knee replacement with revision.    Imaging:   Xray left knee 4/3/2017  3 views    Left knee arthroplasty identified.  The position alignment is satisfactory and unchanged as compared to the previous study.  Mild periosteal thickening noted adjacent to the distal tip of the prosthesis in the proximal tibia.  Mild DJD and joint space narrowing involving the right knee identified.  No fracture or bone destruction.    CT lumbar spine 2017  Axial images were obtained through the left knee and displayed at soft tissue and bone windows.  Sagittal and coronal reconstructions are provided.    The patient has a large primarily metallic knee joint prosthesis in place.  The positioning of the metallic femoral and tibial components and the plastic patellar components appear within normal limits.  Lucency surrounding the prosthesis consistent with osteomyelitis or loosening is not seen.  Other osseous abnormalities are not identified.  No free fragments in the joint are identified.    A joint effusion is not seen.  There is atrophy of the quadriceps musculature.  The calf and hamstring musculature appears within normal limits.   Impression    Status post left knee joint replacement with intact prosthesis identified.  Atrophy of the quadriceps musculature.               Past Medical History:   Diagnosis Date    Cancer breast    Right- Bilateral mastectomy- May 2005- had breast reconstruction- mother  of breast cancer    GERD (gastroesophageal reflux disease)     not troubled any more  after the Nissen's fundoplication -     HLD (hyperlipidemia)     Hypertension     diagnosed age late 40's     IBS (irritable bowel syndrome)     Lyme disease     arthritis of hands. Felt like flu- age early 30's- got it  after going to [place]    Sleep apnea      Past Surgical History:   Procedure Laterality Date    ABDOMINAL SURGERY      3 c sections    baldder suspension-      BREAST SURGERY      CYSTOSCOPY      avoids greens . ? Interstitial cystitis    HYSTERECTOMY      followed by removal of ovaries  from scar tissue    JOINT REPLACEMENT      Left total knee replacement-[facility] -     KNEE SURGERY Left 2014    TKR    tonsils      age 19 years     Social History     Social History    Marital status:      Spouse name: N/A    Number of children: N/A    Years of education: N/A     Occupational History    Not on file.     Social History Main Topics    Smoking status: Former Smoker     Quit date: 3/26/2004    Smokeless tobacco: Never Used      Comment: quit - smoked for 10 years- 1 ppd    Alcohol use No    Drug use: No    Sexual activity: Not on file     Other Topics Concern    Not on file     Social History Narrative     Family History   Problem Relation Age of Onset    Cancer Mother       of breast cancer, also had gynecological cancer- had breast cancer that spread to the brain    Heart disease Father      in his 60's-  in his 70's       Review of patient's allergies indicates:  No Known Allergies    Current Outpatient Prescriptions   Medication Sig    fluoxetine (PROZAC) 20 MG tablet Take 20 mg by mouth every evening.     fluticasone (FLONASE) 50 mcg/actuation nasal spray     ibuprofen (ADVIL,MOTRIN) 200 MG tablet Take 600 mg by mouth every evening. Takes 3 of the 200 mg otc tablets    irbesartan (AVAPRO) 300 MG tablet Take 300 mg by mouth every evening.    rosuvastatin (CRESTOR) 20 MG tablet Take 20 mg by mouth every  "evening.     rosuvastatin (CRESTOR) 20 MG tablet Take 20 mg by mouth once daily.    warfarin (COUMADIN) 2.5 MG tablet Take 1 tablet (2.5 mg total) by mouth Daily.    zolpidem (AMBIEN) 10 mg Tab Take 5 mg by mouth every evening.      No current facility-administered medications for this visit.        REVIEW OF SYSTEMS:    GENERAL:  No weight loss, malaise or fevers.  HEENT:   No recent changes in vision or hearing  NECK:  Negative for lumps, no difficulty with swallowing.  RESPIRATORY:  Negative for cough, wheezing or shortness of breath, patient denies any recent URI.  CARDIOVASCULAR:  Negative for chest pain, leg swelling or palpitations.  GI:  Negative for abdominal discomfort, blood in stools or black stools or change in bowel habits.  MUSCULOSKELETAL:  See HPI.  SKIN:  Negative for lesions, rash, and itching.  PSYCH:  No mood disorder or recent psychosocial stressors.  Patients sleep is disturbed secondary to pain.  HEMATOLOGY/LYMPHOLOGY:  Negative for prolonged bleeding, bruising easily or swollen nodes.  Patient is not currently taking any anti-coagulants  ENDO: No history of diabetes or thyroid dysfunction  NEURO:   No history of headaches, syncope, paralysis, seizures or tremors.  All other reviewed and negative other than HPI.    OBJECTIVE:    BP (!) 155/100 (BP Location: Left arm)  Pulse 85  Temp 98.5 °F (36.9 °C) (Oral)   Resp 18  Ht 5' 3" (1.6 m)  Wt 88 kg (194 lb 0.1 oz)  LMP 05/09/1992  BMI 34.37 kg/m2    PHYSICAL EXAMINATION:    GENERAL: Well appearing, in no acute distress, alert and oriented x3.  PSYCH:  Mood and affect appropriate.  SKIN: Skin color, texture, turgor normal, no rashes or lesions.  HEAD/FACE:  Normocephalic, atraumatic. Cranial nerves grossly intact.  CV: RRR with palpation of the radial artery.  PULM: No evidence of respiratory difficulty, symmetric chest rise.  BACK: Straight leg raising in the sitting and supine positions is negative to radicular pain. There is pain with " palpation over the facet joints of the lumbar spine bilaterally. There is decreased range of motion with extension to 15 degrees, and facet loading maneuvers cause reproducible pain.    EXTREMITIES: Peripheral joint ROM is full and pain free without obvious instability or laxity in all four extremities. No deformities, edema, or skin discoloration. Good capillary refill.  MUSCULOSKELETAL: Asymmetry between the left and right knee with well-healed scar over the left knee from previous surgery.  There is pain to palpation over the lateral joint line and pain with range of motion, no evidence of infection or fluid under the knee.  There is  pain with palpation over the sacroiliac joints bilaterally.  There is no pain to palpation over the greater trochanteric bursa bilaterally.  FABERs test is positive.  FADIRs test is negative.   Bilateral lower extremity strength is normal and symmetric.  No atrophy or tone abnormalities are noted.  NEURO: Bilateral lower extremity coordination and muscle stretch reflexes are physiologic and symmetric.  Plantar response are downgoing. No clonus.  No loss of sensation is noted.  GAIT: Antalgic, ambulates without assistance         ASSESSMENT: 56 y.o. year old female with pain, consistent with     Encounter Diagnoses   Name Primary?    Facet arthropathy, lumbar     Chronic pain of left knee Yes       PLAN:     I will prescribe Pennsaid sample to the patient, in the future if this is helpful we can give her a regular prescription for this medication.    Flexion extension x-rays of the lumbar spine to rule out significant degenerative disc change or neuroforaminal stenosis which may warrant obtaining an MRI and EMG secondary to her quadriceps atrophy.  There is a possibility that the quadriceps atrophy could be secondary to decreased use associated with knee pain, if there is no instability on the x-rays with flexion-extension core strengthening exercises for the lumbar spine along  with range of motion exercises for the knee strengthening exercises for the quadriceps would be warranted.    I will schedule the patient for a left knee geniculate nerve block under ultrasound depending on response the patient may benefit from a cold radiofrequency ablation of the knee.    In the future the patient may be a candidate for DRG stimulation versus spinal cord stimulation    Greater than 25 minutes spent in total in todays visit with the patient, with more than half that time direct face to face counseling and education with the patient today. We discussed the disease process, prognosis, treatment plan, and risks and benefits.     Frank Cat, CA-3   I was present and supervising all critical portions of the procedure     Faith Castrejon  05/08/2017

## 2017-05-10 ENCOUNTER — TELEPHONE (OUTPATIENT)
Dept: PAIN MEDICINE | Facility: CLINIC | Age: 57
End: 2017-05-10

## 2017-05-11 ENCOUNTER — TELEPHONE (OUTPATIENT)
Dept: PAIN MEDICINE | Facility: CLINIC | Age: 57
End: 2017-05-11

## 2017-05-11 NOTE — TELEPHONE ENCOUNTER
----- Message from Andra Nava sent at 5/11/2017 10:39 AM CDT -----  _ x 1st Request  _  2nd Request  _  3rd Request        Who: celeste parham. Daughter     Why: returning phone call to discuss test results    What Number to Call Back:[phone]    When to Expect a call back: (Before the end of the day)   -- if the call is after 12:00, the call back will be tomorrow.

## 2017-05-11 NOTE — TELEPHONE ENCOUNTER
Patient's daughter was contacted and informed that her mother's x ray showed arthritis in her joints in the low back. Patient's daughter state that she would inform her mother of this

## 2017-05-22 ENCOUNTER — PROCEDURE VISIT (OUTPATIENT)
Dept: PAIN MEDICINE | Facility: CLINIC | Age: 57
End: 2017-05-22
Attending: ANESTHESIOLOGY
Payer: COMMERCIAL

## 2017-05-22 VITALS — TEMPERATURE: 99 F | BODY MASS INDEX: 34.38 KG/M2 | WEIGHT: 194 LBS | RESPIRATION RATE: 18 BRPM | HEIGHT: 63 IN

## 2017-05-22 DIAGNOSIS — M47.816 FACET ARTHROPATHY, LUMBAR: ICD-10-CM

## 2017-05-22 DIAGNOSIS — M25.569 KNEE PAIN, UNSPECIFIED CHRONICITY, UNSPECIFIED LATERALITY: Primary | ICD-10-CM

## 2017-05-22 PROCEDURE — 76942 ECHO GUIDE FOR BIOPSY: CPT | Mod: S$GLB,,, | Performed by: ANESTHESIOLOGY

## 2017-05-22 PROCEDURE — 64450 NJX AA&/STRD OTHER PN/BRANCH: CPT | Mod: LT,S$GLB,, | Performed by: ANESTHESIOLOGY

## 2017-05-22 PROCEDURE — 99213 OFFICE O/P EST LOW 20 MIN: CPT | Mod: 25,S$GLB,, | Performed by: ANESTHESIOLOGY

## 2017-05-22 RX ORDER — BUPIVACAINE HYDROCHLORIDE 2.5 MG/ML
9 INJECTION, SOLUTION EPIDURAL; INFILTRATION; INTRACAUDAL ONCE
Status: COMPLETED | OUTPATIENT
Start: 2017-05-22 | End: 2017-05-22

## 2017-05-22 RX ORDER — METHYLPREDNISOLONE ACETATE 40 MG/ML
40 INJECTION, SUSPENSION INTRA-ARTICULAR; INTRALESIONAL; INTRAMUSCULAR; SOFT TISSUE ONCE
Status: COMPLETED | OUTPATIENT
Start: 2017-05-22 | End: 2017-05-22

## 2017-05-22 RX ADMIN — METHYLPREDNISOLONE ACETATE 40 MG: 40 INJECTION, SUSPENSION INTRA-ARTICULAR; INTRALESIONAL; INTRAMUSCULAR; SOFT TISSUE at 09:05

## 2017-05-22 RX ADMIN — BUPIVACAINE HYDROCHLORIDE 22.5 MG: 2.5 INJECTION, SOLUTION EPIDURAL; INFILTRATION; INTRACAUDAL at 09:05

## 2017-05-22 NOTE — PROGRESS NOTES
"HPI  Since previous encounter the patient had a x-ray of the lower back which does show significant facet arthropathy from L3-S1 bilaterally no evidence of instability.  The patient does continue to report lower back pain which appears to be facet mediated in nature.  She comes today without any other health changes for left knee geniculate nerve block for diagnostic/therapeutic purposes.    PMHx, PSHx, Allergies, Medications reviewed in epic    ROS negative except pain complaints in HPI    OBJECTIVE:    Temp 98.8 °F (37.1 °C) (Oral)   Resp 18   Ht 5' 3" (1.6 m)   Wt 88 kg (194 lb 0.1 oz)   LMP 05/09/1992   BMI 34.37 kg/m²     PHYSICAL EXAMINATION:    GENERAL: Well appearing, in no acute distress, alert and oriented x3.  PSYCH:  Mood and affect appropriate.  SKIN: Skin color, texture, turgor normal, no rashes or lesions.  CV: RRR with palpation of the radial artery.  PULM: No evidence of respiratory difficulty, symmetric chest rise. Clear to auscultation.  NEURO: Cranial nerves grossly intact.    Xray lumbar spine 5/8/2017  Procedure: Lumbar spine series.  This is interpreted without the benefit of similar prior studies for comparison.    Findings: AP, lateral, flexion and extension, obliqueand coned down lateral radiographs of the lumbar spine reveal 5 non-rib bearing lumbar vertebral bodies with normal vertebral body heights and pedicles.  There is no significant loss of disc height.  There is no malalignment, spondylolysis or spondylolisthesis.  No instability on flexion and extension views.  Facet arthropathy is identified from L3-S1.  This is most prominent at L4-L5 and L5-S1.  The surrounding soft tissues are normal.   Impression      The predominant finding on today's exam is lower lumbar facet arthropathy.          Plan:    05/22/2017  Procedure Note:  left Geniculate nerve block under ultrasound     1) suprapatellar geniculate nerve block  2) medial superior epicondyle geniculate nerve block  3) " lateral superior epicondyle geniculate nerve block  4) medial tibial metaphysis geniculate nerve block  5) ultrasound guidance for needle placement    Pre-Op Diagnosis:  left Knee pain     Post-Op Diagnosis: Same     Surgeon: Faith Castrejon M.D.     Assistant: None     EBL: None     Complications: None     Specimens: None     Description of procedure:   After written consent was obtained, patient placed in supine position and a time out was performed. The area over the medial and lateral aspect of the superior epi-condyle of the femur, the superior patellar portion of the femur, and the medial tibial metaphysis were prepped with chlorhexidine. A sterile ultrasound cover was applied, and the probe was placed over these 4 areas in order to identify the medial aspect of the bone at these 4 separate areas. A 22 gauge needle was then advanced out of plane under ultrasound until os was contacted, and then the needle was withdrawn approximately 1 mm and after negative aspiration and no paresthesias there was a mixture of 9 mL of 0.25% bupivacaine +1 mL of 40 mg per mL Depo-Medrol prepared. 2.5 mL of the mixture was injected into each of these 4 areas for a total volume of 9mL. Dispersion of the medication into the area of the nerve being visualized directly using the ultrasound probe, confirming that the medication was deposited in the correct area. Needle was withdrawn and a sterile band-aid applied to the skin.     Patient tolerated the procedure well, and was reporting improvement of pain symptoms after the injection. She was discharged from the clinic in stable condition.     Follow-up in 2 weeks depending on response the patient may require cooled radiofrequency ablation.    If her knees feeling better in her lower back is the issue we can schedule the patient for medial branch nerve blocks to be followed by radio frequency ablation if diagnostic    Faith Castrejon  05/22/2017

## 2017-06-05 ENCOUNTER — OFFICE VISIT (OUTPATIENT)
Dept: PAIN MEDICINE | Facility: CLINIC | Age: 57
End: 2017-06-05
Payer: COMMERCIAL

## 2017-06-05 VITALS
DIASTOLIC BLOOD PRESSURE: 87 MMHG | SYSTOLIC BLOOD PRESSURE: 146 MMHG | HEIGHT: 62 IN | TEMPERATURE: 98 F | BODY MASS INDEX: 35.62 KG/M2 | WEIGHT: 193.56 LBS | RESPIRATION RATE: 18 BRPM | HEART RATE: 79 BPM

## 2017-06-05 DIAGNOSIS — M25.562 CHRONIC PAIN OF LEFT KNEE: ICD-10-CM

## 2017-06-05 DIAGNOSIS — M47.816 FACET ARTHROPATHY, LUMBAR: ICD-10-CM

## 2017-06-05 DIAGNOSIS — M47.816 FACET DEGENERATION OF LUMBAR REGION: ICD-10-CM

## 2017-06-05 DIAGNOSIS — G89.29 CHRONIC PAIN OF LEFT KNEE: ICD-10-CM

## 2017-06-05 DIAGNOSIS — M25.569 KNEE PAIN, UNSPECIFIED CHRONICITY, UNSPECIFIED LATERALITY: Primary | ICD-10-CM

## 2017-06-05 PROCEDURE — 99999 PR PBB SHADOW E&M-EST. PATIENT-LVL III: CPT | Mod: PBBFAC,,, | Performed by: NURSE PRACTITIONER

## 2017-06-05 PROCEDURE — 99213 OFFICE O/P EST LOW 20 MIN: CPT | Mod: S$GLB,,, | Performed by: NURSE PRACTITIONER

## 2017-06-05 NOTE — PROGRESS NOTES
Chronic Pain - Established Visit    Referring Physician: No ref. provider found    Chief Complaint:   Chief Complaint   Patient presents with    Knee Pain     2 week follow up        SUBJECTIVE: Disclaimer: This note has been generated using voice-recognition software. There may be typographical errors that have been missed during proof-reading    Interval History 6/5/2017:  The patient returns today for left knee and lower back pain.  She is s/p left genicular nerve block under ultrasound on 5/22/17 with 75% relief for two days.  She reports that after the procedure she went home and cleaned her house and noticed that she had minimal pain.  After that time, her pain began to return.  She continues to report lower back pain.  The pain is worse with standing and bending.  She describes it as aching in nature.  She denies any radiation down her legs.  Her pain today is 5/10.  The patient denies any bowel or bladder incontinence or signs of saddle paresthesia.  The patient denies any major medical changes since last office visit.    Interval History 5/22/2017:  Since previous encounter the patient had a x-ray of the lower back which does show significant facet arthropathy from L3-S1 bilaterally no evidence of instability.  The patient does continue to report lower back pain which appears to be facet mediated in nature.  She comes today without any other health changes for left knee geniculate nerve block for diagnostic/therapeutic purposes.    Initial encounter:    Traci Freire presents to the clinic for the evaluation of left knee pain. The pain started years ago insidiously and symptoms have been worsening.    Brief history: Patient has had left knee replacement with revision in the past and continues to have decreased range of motion occasional swelling and pain in the left knee.  Of note the patient has also had a history of lower back pain and radiculopathy associated with disc herniations.  She states that she  has had epidural steroid injections in the lumbar spine years ago which helped her temporarily but denies any radicular symptoms.  She has been having some left leg weakness in the quadriceps on the left.  In addition to her knee pain she describes pain in the hips bilaterally and lower back    Pain Description:    The current worst pain is located in the left lateral knee area and radiates to the anterior shin.      At BEST  3/10     At WORST  10/10 on the WORST day.      On average pain is rated as 7/10.     Today the pain is rated as 6/10    The pain is described as aching, sharp, shooting and tingling      Symptoms interfere with daily activity, sleeping and work.     Exacerbating factors: Sitting, Standing, Laying, Bending, Walking, Night Time, Morning, Extension, Lifting and Getting out of bed/chair.      Mitigating factors medications.     Patient denies urinary incontinence and bowel incontinence.  Patient denies any suicidal or homicidal ideations    Pain Medications:  Current:  Ibuprofen occasionally with relief    Tried in Past:  NSAIDs - Ibuprofen  TCA -Never  SNRI -Never  Anti-convulsants -Never  Muscle Relaxants -Never  [id number] with relief    Physical Therapy/Home Exercise: yes     report:  Reviewed and consistent with medication use as prescribed.    Pain Procedures: epidurals in the past for lumbar radiculopathy, but no records available for review  5/2/17 4 point genicular nerve ninfa- significant benefit for 2 days    Chiropractor -never  Acupuncture - never  TENS unit -never  Spinal decompression -never  Joint replacement -left knee replacement with revision    Imaging:     Lumbar XRAYs 5/8/17    Xray left knee 4/3/2017  3 views    Left knee arthroplasty identified.  The position alignment is satisfactory and unchanged as compared to the previous study.  Mild periosteal thickening noted adjacent to the distal tip of the prosthesis in the proximal tibia.  Mild DJD and joint space  narrowing involving the right knee identified.  No fracture or bone destruction.    CT lumbar spine 2017  Axial images were obtained through the left knee and displayed at soft tissue and bone windows.  Sagittal and coronal reconstructions are provided.    The patient has a large primarily metallic knee joint prosthesis in place.  The positioning of the metallic femoral and tibial components and the plastic patellar components appear within normal limits.  Lucency surrounding the prosthesis consistent with osteomyelitis or loosening is not seen.  Other osseous abnormalities are not identified.  No free fragments in the joint are identified.    A joint effusion is not seen.  There is atrophy of the quadriceps musculature.  The calf and hamstring musculature appears within normal limits.   Impression    Status post left knee joint replacement with intact prosthesis identified.  Atrophy of the quadriceps musculature.               Past Medical History:   Diagnosis Date    Cancer breast    Right- Bilateral mastectomy- May 2005- had breast reconstruction- mother  of breast cancer    GERD (gastroesophageal reflux disease)     not troubled any more after the Nissen's fundoplication -     HLD (hyperlipidemia)     Hypertension     diagnosed age late 40's     IBS (irritable bowel syndrome)     Lyme disease     arthritis of hands. Felt like flu- age early 30's- got it  after going to [place]    Sleep apnea      Past Surgical History:   Procedure Laterality Date    ABDOMINAL SURGERY      3 c sections    baldder suspension-      BREAST SURGERY      CYSTOSCOPY      avoids greens . ? Interstitial cystitis    HYSTERECTOMY      followed by removal of ovaries  from scar tissue    JOINT REPLACEMENT      [facility] total knee [facility] -     KNEE SURGERY Left 2014    TKR    tonsils      age 19 years     Social History     Social History    Marital status:       Spouse name: N/A    Number of children: N/A    Years of education: N/A     Occupational History    Not on file.     Social History Main Topics    Smoking status: Former Smoker     Quit date: 3/26/2004    Smokeless tobacco: Never Used      Comment: quit - smoked for 10 years- 1 ppd    Alcohol use No    Drug use: No    Sexual activity: Not on file     Other Topics Concern    Not on file     Social History Narrative    No narrative on file     Family History   Problem Relation Age of Onset    Cancer Mother       of breast cancer, also had gynecological cancer- had breast cancer that spread to the brain    Heart disease Father      in his 60's-  in his 70's       Review of patient's allergies indicates:  No Known Allergies    Current Outpatient Prescriptions   Medication Sig    fluoxetine (PROZAC) 20 MG tablet Take 20 mg by mouth every evening.     fluticasone (FLONASE) 50 mcg/actuation nasal spray     ibuprofen (ADVIL,MOTRIN) 200 MG tablet Take 600 mg by mouth every evening. Takes 3 of the 200 mg otc tablets    irbesartan (AVAPRO) 300 MG tablet Take 300 mg by mouth every evening.    rosuvastatin (CRESTOR) 20 MG tablet Take 20 mg by mouth every evening.     rosuvastatin (CRESTOR) 20 MG tablet Take 20 mg by mouth once daily.    warfarin (COUMADIN) 2.5 MG tablet Take 1 tablet (2.5 mg total) by mouth Daily.    zolpidem (AMBIEN) 10 mg Tab Take 5 mg by mouth every evening.      No current facility-administered medications for this visit.        REVIEW OF SYSTEMS:    GENERAL:  No weight loss, malaise or fevers.  HEENT:   No recent changes in vision or hearing  NECK:  Negative for lumps, no difficulty with swallowing.  RESPIRATORY:  Negative for cough, wheezing or shortness of breath, patient denies any recent URI.  CARDIOVASCULAR:  Negative for chest pain, leg swelling or palpitations.  GI:  Negative for black stools or change in bowel habits.  IBS and GERD.  MUSCULOSKELETAL:  See  "HPI.  SKIN:  Negative for lesions, rash, and itching.  PSYCH:  No mood disorder or recent psychosocial stressors.  Patient's sleep is disturbed secondary to pain.  HEMATOLOGY/LYMPHOLOGY:  Negative for prolonged bleeding, bruising easily or swollen nodes.  Patient is not currently taking any anti-coagulants  ENDO: No history of diabetes or thyroid dysfunction  NEURO:   No history of headaches, syncope, paralysis, seizures or tremors.  All other reviewed and negative other than HPI.    OBJECTIVE:    BP (!) 146/87   Pulse 79   Temp 98.1 °F (36.7 °C) (Oral)   Resp 18   Ht 5' 2" (1.575 m)   Wt 87.8 kg (193 lb 9 oz)   LMP 05/09/1992   BMI 35.40 kg/m²     PHYSICAL EXAMINATION:    GENERAL: Well appearing, in no acute distress, alert and oriented x3.  PSYCH:  Mood and affect appropriate.  SKIN: Skin color, texture, turgor normal, no rashes or lesions.  HEAD/FACE:  Normocephalic, atraumatic. Cranial nerves grossly intact.  CV: RRR with palpation of the radial artery.  PULM: No evidence of respiratory difficulty, symmetric chest rise.  BACK: Straight leg raising in the sitting and supine positions is negative to radicular pain. There is pain with palpation over the facet joints of the lumbar spine bilaterally. There is decreased range of motion with extension to 15 degrees, and facet loading maneuvers cause reproducible pain.    EXTREMITIES: Peripheral joint ROM is full and pain free without obvious instability or laxity in all four extremities. No deformities, edema, or skin discoloration. Good capillary refill.  MUSCULOSKELETAL: Asymmetry between the left and right knee with well-healed scar over the left knee from previous surgery.  There is pain to palpation over the lateral joint line and pain with range of motion, no evidence of infection.  Limited flexion of left knee, full ROM on extension.  There is  pain with palpation over the sacroiliac joints bilaterally.  There is no pain to palpation over the greater " trochanteric bursa bilaterally.  FABERs test is positive bilaterally.  FADIRs test is negative.   Bilateral lower extremity strength is normal and symmetric.  No atrophy or tone abnormalities are noted.  NEURO: Bilateral lower extremity coordination and muscle stretch reflexes are physiologic and symmetric.  Plantar response are downgoing. No clonus.  No loss of sensation is noted.  GAIT: Antalgic, ambulates without assistance.      ASSESSMENT: 56 y.o. year old female with pain, consistent with     Encounter Diagnoses   Name Primary?    Knee pain, unspecified chronicity, unspecified laterality Yes    Facet arthropathy, lumbar     Chronic pain of left knee     Facet degeneration of lumbar region        PLAN:     - Previous imaging was reviewed and discussed with the patient today.    - She is s/p left genicular nerve blocks with significant benefit for 2 days.  Will schedule for cooled RFA of affected nerves.  The procedure, risks, benefits and options were discussed with patient. There are no contraindications to the procedure. The patient expressed understanding and agreed to proceed.  Consent obtained today.    - Will schedule 2 weeks later, bilateral L3,4,5 medial branch blocks.  The patient will call with relief and if diagnostic, we can schedule radiofrequency ablation of affected nerves, one side followed by the other 2 weeks apart.    - In the future the patient may be a candidate for DRG stimulation versus spinal cord stimulation.    - Can continue OTC Ibuprofen as needed.    - The patient will continue a home exercise routine to help with pain and strengthening.      - RTC after completion of procedures.    - Dr. Castrejon was consulted on the patient and agrees with this plan.      The above plan and management options were discussed at length with patient. Patient is in agreement with the above and verbalized understanding.     Jenna Romero  06/05/2017

## 2017-06-20 ENCOUNTER — HOSPITAL ENCOUNTER (OUTPATIENT)
Facility: OTHER | Age: 57
Discharge: HOME | End: 2017-06-20
Attending: ANESTHESIOLOGY | Admitting: ANESTHESIOLOGY
Payer: COMMERCIAL

## 2017-06-20 VITALS
RESPIRATION RATE: 18 BRPM | HEART RATE: 77 BPM | DIASTOLIC BLOOD PRESSURE: 66 MMHG | BODY MASS INDEX: 34.96 KG/M2 | OXYGEN SATURATION: 96 % | WEIGHT: 190 LBS | HEIGHT: 62 IN | TEMPERATURE: 98 F | SYSTOLIC BLOOD PRESSURE: 140 MMHG

## 2017-06-20 DIAGNOSIS — M25.562 CHRONIC PAIN OF LEFT KNEE: Primary | ICD-10-CM

## 2017-06-20 DIAGNOSIS — G89.29 CHRONIC PAIN OF LEFT KNEE: Primary | ICD-10-CM

## 2017-06-20 PROCEDURE — 25000003 PHARM REV CODE 250: Performed by: ANESTHESIOLOGY

## 2017-06-20 PROCEDURE — 64640 INJECTION TREATMENT OF NERVE: CPT | Performed by: ANESTHESIOLOGY

## 2017-06-20 PROCEDURE — A4649 SURGICAL SUPPLIES: HCPCS | Performed by: ANESTHESIOLOGY

## 2017-06-20 PROCEDURE — 63600175 PHARM REV CODE 636 W HCPCS: Performed by: ANESTHESIOLOGY

## 2017-06-20 RX ORDER — FENTANYL CITRATE 50 UG/ML
INJECTION, SOLUTION INTRAMUSCULAR; INTRAVENOUS
Status: DISCONTINUED | OUTPATIENT
Start: 2017-06-20 | End: 2017-06-20 | Stop reason: HOSPADM

## 2017-06-20 RX ORDER — SODIUM CHLORIDE 9 MG/ML
INJECTION, SOLUTION INTRAVENOUS CONTINUOUS
Status: DISCONTINUED | OUTPATIENT
Start: 2017-06-20 | End: 2017-06-20 | Stop reason: HOSPADM

## 2017-06-20 RX ORDER — BUPIVACAINE HYDROCHLORIDE 2.5 MG/ML
INJECTION, SOLUTION EPIDURAL; INFILTRATION; INTRACAUDAL
Status: DISCONTINUED | OUTPATIENT
Start: 2017-06-20 | End: 2017-06-20 | Stop reason: HOSPADM

## 2017-06-20 RX ORDER — MIDAZOLAM HYDROCHLORIDE 1 MG/ML
INJECTION INTRAMUSCULAR; INTRAVENOUS
Status: DISCONTINUED | OUTPATIENT
Start: 2017-06-20 | End: 2017-06-20 | Stop reason: HOSPADM

## 2017-06-20 RX ORDER — LIDOCAINE HYDROCHLORIDE 10 MG/ML
INJECTION INFILTRATION; PERINEURAL
Status: DISCONTINUED | OUTPATIENT
Start: 2017-06-20 | End: 2017-06-20 | Stop reason: HOSPADM

## 2017-06-20 NOTE — OP NOTE
Procedure Note:    Left  Geniculate nerve cooled radiofrequency ablation under fluoroscopy     1) suprapatellar geniculate nerve cooled radiofrequency ablation  2) medial superior epicondyle geniculate nerve cooled radiofrequency ablation  3) lateral superior epicondyle geniculate nerve cooled radiofrequency ablation  4) medial tibial metaphysis geniculate nerve cooled radiofrequency ablation  5) xray guidance for needle placement  6) Conscious sedation provided by MD                                Surgeon: Faith Castrejon M.D.    Assistant: None    Pre-Op Diagnosis:  Left  Knee pain, s/p left knee replacement    Post-Op Diagnosis: Same    EBL: None    Complications: None    Specimens: None    Description of procedure:    After written consent was obtained, patient placed in supine position.  The area over the medial and lateral aspect of the superior epi-condyle of the femur and the medial tibial metaphysis and superior patellar region were prepped with chlorhexidine.  The area was draped in the usual sterile fashion.  Approximately 4 mL total 1% lidocaine was infiltrated into the skin overlying the 4 predetermined entry points. A 22 gauge 10mm active tip needle was then advanced under fluoroscopy in the AP and lateral views into the positions of the geniculate nerves at these levels. This was followed by motor testing at each of the nerves to ensure that there was no dorsiflexion of the foot.  After negative aspiration and no paresthesias there was injection of 2.5 mL of 0.25% bupivacaine into each of these  4 areas for a total volume of 10  mL of 0.25% bupivacaine. This was followed by cooled thermal lesioning at 80 degrees celsius for 150 seconds at each site.   Needle was withdrawn and a sterile band-aid applied to the skin.    Conscious sedation provided by M.D    The patient was monitored with continuous pulse oximetry, EKG, and intermittent blood pressure monitors.  The patient was hemodynamically stable  throughout the entire process was responsive to voice, and breathing spontaneously.  Supplemental O2 was provided at 2L/min via nasal cannula.  Patient was comfortable for the duration of the procedure.    There was a total of 2mg IV Midazolam and 100mcg fentanyl given for the procedure    Patient tolerated the procedure well, and was reporting improvement of pain symptoms after the injection.  She was discharged from the clinic in stable condition.

## 2017-06-20 NOTE — H&P (VIEW-ONLY)
Chronic Pain - Established Visit    Referring Physician: No ref. provider found    Chief Complaint:   Chief Complaint   Patient presents with    Knee Pain     2 week follow up        SUBJECTIVE: Disclaimer: This note has been generated using voice-recognition software. There may be typographical errors that have been missed during proof-reading    Interval History 6/5/2017:  The patient returns today for left knee and lower back pain.  She is s/p left genicular nerve block under ultrasound on 5/22/17 with 75% relief for two days.  She reports that after the procedure she went home and cleaned her house and noticed that she had minimal pain.  After that time, her pain began to return.  She continues to report lower back pain.  The pain is worse with standing and bending.  She describes it as aching in nature.  She denies any radiation down her legs.  Her pain today is 5/10.  The patient denies any bowel or bladder incontinence or signs of saddle paresthesia.  The patient denies any major medical changes since last office visit.    Interval History 5/22/2017:  Since previous encounter the patient had a x-ray of the lower back which does show significant facet arthropathy from L3-S1 bilaterally no evidence of instability.  The patient does continue to report lower back pain which appears to be facet mediated in nature.  She comes today without any other health changes for left knee geniculate nerve block for diagnostic/therapeutic purposes.    Initial encounter:    Traci Freire presents to the clinic for the evaluation of left knee pain. The pain started years ago insidiously and symptoms have been worsening.    Brief history: Patient has had left knee replacement with revision in the past and continues to have decreased range of motion occasional swelling and pain in the left knee.  Of note the patient has also had a history of lower back pain and radiculopathy associated with disc herniations.  She states that she  has had epidural steroid injections in the lumbar spine years ago which helped her temporarily but denies any radicular symptoms.  She has been having some left leg weakness in the quadriceps on the left.  In addition to her knee pain she describes pain in the hips bilaterally and lower back    Pain Description:    The current worst pain is located in the left lateral knee area and radiates to the anterior shin.      At BEST  3/10     At WORST  10/10 on the WORST day.      On average pain is rated as 7/10.     Today the pain is rated as 6/10    The pain is described as aching, sharp, shooting and tingling      Symptoms interfere with daily activity, sleeping and work.     Exacerbating factors: Sitting, Standing, Laying, Bending, Walking, Night Time, Morning, Extension, Lifting and Getting out of bed/chair.      Mitigating factors medications.     Patient denies urinary incontinence and bowel incontinence.  Patient denies any suicidal or homicidal ideations    Pain Medications:  Current:  Ibuprofen occasionally with relief    Tried in Past:  NSAIDs - Ibuprofen  TCA -Never  SNRI -Never  Anti-convulsants -Never  Muscle Relaxants -Never  [id number] with relief    Physical Therapy/Home Exercise: yes     report:  Reviewed and consistent with medication use as prescribed.    Pain Procedures: epidurals in the past for lumbar radiculopathy, but no records available for review  5/2/17 4 point genicular nerve innfa- significant benefit for 2 days    Chiropractor -never  Acupuncture - never  TENS unit -never  Spinal decompression -never  Joint replacement -left knee replacement with revision    Imaging:     Lumbar XRAYs 5/8/17    Xray left knee 4/3/2017  3 views    Left knee arthroplasty identified.  The position alignment is satisfactory and unchanged as compared to the previous study.  Mild periosteal thickening noted adjacent to the distal tip of the prosthesis in the proximal tibia.  Mild DJD and joint space  narrowing involving the right knee identified.  No fracture or bone destruction.    CT lumbar spine 2017  Axial images were obtained through the left knee and displayed at soft tissue and bone windows.  Sagittal and coronal reconstructions are provided.    The patient has a large primarily metallic knee joint prosthesis in place.  The positioning of the metallic femoral and tibial components and the plastic patellar components appear within normal limits.  Lucency surrounding the prosthesis consistent with osteomyelitis or loosening is not seen.  Other osseous abnormalities are not identified.  No free fragments in the joint are identified.    A joint effusion is not seen.  There is atrophy of the quadriceps musculature.  The calf and hamstring musculature appears within normal limits.   Impression    Status post left knee joint replacement with intact prosthesis identified.  Atrophy of the quadriceps musculature.               Past Medical History:   Diagnosis Date    Cancer breast    Right- Bilateral mastectomy- May 2005- had breast reconstruction- mother  of breast cancer    GERD (gastroesophageal reflux disease)     not troubled any more after the Nissen's fundoplication -     HLD (hyperlipidemia)     Hypertension     diagnosed age late 40's     IBS (irritable bowel syndrome)     Lyme disease     arthritis of hands. Felt like flu- age early 30's- got it  after going to [place]    Sleep apnea      Past Surgical History:   Procedure Laterality Date    ABDOMINAL SURGERY      3 c sections    baldder suspension-      BREAST SURGERY      CYSTOSCOPY      avoids greens . ? Interstitial cystitis    HYSTERECTOMY      followed by removal of ovaries  from scar tissue    JOINT REPLACEMENT      [facility] total knee [facility] -     KNEE SURGERY Left 2014    TKR    tonsils      age 19 years     Social History     Social History    Marital status:       Spouse name: N/A    Number of children: N/A    Years of education: N/A     Occupational History    Not on file.     Social History Main Topics    Smoking status: Former Smoker     Quit date: 3/26/2004    Smokeless tobacco: Never Used      Comment: quit - smoked for 10 years- 1 ppd    Alcohol use No    Drug use: No    Sexual activity: Not on file     Other Topics Concern    Not on file     Social History Narrative    No narrative on file     Family History   Problem Relation Age of Onset    Cancer Mother       of breast cancer, also had gynecological cancer- had breast cancer that spread to the brain    Heart disease Father      in his 60's-  in his 70's       Review of patient's allergies indicates:  No Known Allergies    Current Outpatient Prescriptions   Medication Sig    fluoxetine (PROZAC) 20 MG tablet Take 20 mg by mouth every evening.     fluticasone (FLONASE) 50 mcg/actuation nasal spray     ibuprofen (ADVIL,MOTRIN) 200 MG tablet Take 600 mg by mouth every evening. Takes 3 of the 200 mg otc tablets    irbesartan (AVAPRO) 300 MG tablet Take 300 mg by mouth every evening.    rosuvastatin (CRESTOR) 20 MG tablet Take 20 mg by mouth every evening.     rosuvastatin (CRESTOR) 20 MG tablet Take 20 mg by mouth once daily.    warfarin (COUMADIN) 2.5 MG tablet Take 1 tablet (2.5 mg total) by mouth Daily.    zolpidem (AMBIEN) 10 mg Tab Take 5 mg by mouth every evening.      No current facility-administered medications for this visit.        REVIEW OF SYSTEMS:    GENERAL:  No weight loss, malaise or fevers.  HEENT:   No recent changes in vision or hearing  NECK:  Negative for lumps, no difficulty with swallowing.  RESPIRATORY:  Negative for cough, wheezing or shortness of breath, patient denies any recent URI.  CARDIOVASCULAR:  Negative for chest pain, leg swelling or palpitations.  GI:  Negative for black stools or change in bowel habits.  IBS and GERD.  MUSCULOSKELETAL:  See  "HPI.  SKIN:  Negative for lesions, rash, and itching.  PSYCH:  No mood disorder or recent psychosocial stressors.  Patient's sleep is disturbed secondary to pain.  HEMATOLOGY/LYMPHOLOGY:  Negative for prolonged bleeding, bruising easily or swollen nodes.  Patient is not currently taking any anti-coagulants  ENDO: No history of diabetes or thyroid dysfunction  NEURO:   No history of headaches, syncope, paralysis, seizures or tremors.  All other reviewed and negative other than HPI.    OBJECTIVE:    BP (!) 146/87   Pulse 79   Temp 98.1 °F (36.7 °C) (Oral)   Resp 18   Ht 5' 2" (1.575 m)   Wt 87.8 kg (193 lb 9 oz)   LMP 05/09/1992   BMI 35.40 kg/m²     PHYSICAL EXAMINATION:    GENERAL: Well appearing, in no acute distress, alert and oriented x3.  PSYCH:  Mood and affect appropriate.  SKIN: Skin color, texture, turgor normal, no rashes or lesions.  HEAD/FACE:  Normocephalic, atraumatic. Cranial nerves grossly intact.  CV: RRR with palpation of the radial artery.  PULM: No evidence of respiratory difficulty, symmetric chest rise.  BACK: Straight leg raising in the sitting and supine positions is negative to radicular pain. There is pain with palpation over the facet joints of the lumbar spine bilaterally. There is decreased range of motion with extension to 15 degrees, and facet loading maneuvers cause reproducible pain.    EXTREMITIES: Peripheral joint ROM is full and pain free without obvious instability or laxity in all four extremities. No deformities, edema, or skin discoloration. Good capillary refill.  MUSCULOSKELETAL: Asymmetry between the left and right knee with well-healed scar over the left knee from previous surgery.  There is pain to palpation over the lateral joint line and pain with range of motion, no evidence of infection.  Limited flexion of left knee, full ROM on extension.  There is  pain with palpation over the sacroiliac joints bilaterally.  There is no pain to palpation over the greater " trochanteric bursa bilaterally.  FABERs test is positive bilaterally.  FADIRs test is negative.   Bilateral lower extremity strength is normal and symmetric.  No atrophy or tone abnormalities are noted.  NEURO: Bilateral lower extremity coordination and muscle stretch reflexes are physiologic and symmetric.  Plantar response are downgoing. No clonus.  No loss of sensation is noted.  GAIT: Antalgic, ambulates without assistance.      ASSESSMENT: 56 y.o. year old female with pain, consistent with     Encounter Diagnoses   Name Primary?    Knee pain, unspecified chronicity, unspecified laterality Yes    Facet arthropathy, lumbar     Chronic pain of left knee     Facet degeneration of lumbar region        PLAN:     - Previous imaging was reviewed and discussed with the patient today.    - She is s/p left genicular nerve blocks with significant benefit for 2 days.  Will schedule for cooled RFA of affected nerves.  The procedure, risks, benefits and options were discussed with patient. There are no contraindications to the procedure. The patient expressed understanding and agreed to proceed.  Consent obtained today.    - Will schedule 2 weeks later, bilateral L3,4,5 medial branch blocks.  The patient will call with relief and if diagnostic, we can schedule radiofrequency ablation of affected nerves, one side followed by the other 2 weeks apart.    - In the future the patient may be a candidate for DRG stimulation versus spinal cord stimulation.    - Can continue OTC Ibuprofen as needed.    - The patient will continue a home exercise routine to help with pain and strengthening.      - RTC after completion of procedures.    - Dr. Castrejon was consulted on the patient and agrees with this plan.      The above plan and management options were discussed at length with patient. Patient is in agreement with the above and verbalized understanding.     Jenna Romero  06/05/2017

## 2017-06-20 NOTE — OP NOTE
Discharge Note  Short Stay      SUMMARY     Admit Date: 6/20/2017    Attending Physician: Faith Castrejon      Discharge Physician: Faith Castrejon      Discharge Date: 6/20/2017 8:53 AM     Procedure Note:    Left  Geniculate nerve cooled radiofrequency ablation under fluoroscopy     1) suprapatellar geniculate nerve cooled radiofrequency ablation  2) medial superior epicondyle geniculate nerve cooled radiofrequency ablation  3) lateral superior epicondyle geniculate nerve cooled radiofrequency ablation  4) medial tibial metaphysis geniculate nerve cooled radiofrequency ablation  5) xray guidance for needle placement  6) Conscious sedation provided by MD    Disposition: Home or self care    Patient Instructions:   Current Discharge Medication List      CONTINUE these medications which have NOT CHANGED    Details   fluoxetine (PROZAC) 20 MG tablet Take 20 mg by mouth every evening.       ibuprofen (ADVIL,MOTRIN) 200 MG tablet Take 600 mg by mouth every evening. Takes 3 of the 200 mg otc tablets      irbesartan (AVAPRO) 300 MG tablet Take 300 mg by mouth every evening.      !! rosuvastatin (CRESTOR) 20 MG tablet Take 20 mg by mouth every evening.       zolpidem (AMBIEN) 10 mg Tab Take 5 mg by mouth every evening.       fluticasone (FLONASE) 50 mcg/actuation nasal spray       !! rosuvastatin (CRESTOR) 20 MG tablet Take 20 mg by mouth once daily.       !! - Potential duplicate medications found. Please discuss with provider.          Resume home diet and activity

## 2017-06-20 NOTE — PLAN OF CARE
PATIENT TOLERATED PROCEDURE WELL. PT COMPLAINS OF 0 /10 PAIN. ASSISTED PATIENT UP FOR FIRST TIME. STEADY ON FEET AND DISCHARGE INSTRUCTIONS GIVEN.

## 2017-06-20 NOTE — DISCHARGE SUMMARY
Discharge Note  Short Stay      SUMMARY     Admit Date: 6/20/2017    Attending Physician: Faith Castrejon      Discharge Physician: Faith Castrejon      Discharge Date: 6/20/2017 8:52 AM     Procedure Note:    Left  Geniculate nerve cooled radiofrequency ablation under fluoroscopy     1) suprapatellar geniculate nerve cooled radiofrequency ablation  2) medial superior epicondyle geniculate nerve cooled radiofrequency ablation  3) lateral superior epicondyle geniculate nerve cooled radiofrequency ablation  4) medial tibial metaphysis geniculate nerve cooled radiofrequency ablation  5) xray guidance for needle placement  6) Conscious sedation provided by MD    Disposition: Home or self care    Patient Instructions:   Current Discharge Medication List      CONTINUE these medications which have NOT CHANGED    Details   fluoxetine (PROZAC) 20 MG tablet Take 20 mg by mouth every evening.       ibuprofen (ADVIL,MOTRIN) 200 MG tablet Take 600 mg by mouth every evening. Takes 3 of the 200 mg otc tablets      irbesartan (AVAPRO) 300 MG tablet Take 300 mg by mouth every evening.      !! rosuvastatin (CRESTOR) 20 MG tablet Take 20 mg by mouth every evening.       zolpidem (AMBIEN) 10 mg Tab Take 5 mg by mouth every evening.       fluticasone (FLONASE) 50 mcg/actuation nasal spray       !! rosuvastatin (CRESTOR) 20 MG tablet Take 20 mg by mouth once daily.       !! - Potential duplicate medications found. Please discuss with provider.          Resume home diet and activity

## 2017-06-20 NOTE — DISCHARGE INSTRUCTIONS

## 2017-06-29 ENCOUNTER — TELEPHONE (OUTPATIENT)
Dept: PAIN MEDICINE | Facility: CLINIC | Age: 57
End: 2017-06-29

## 2017-06-29 NOTE — TELEPHONE ENCOUNTER
----- Message from Romina Rivera sent at 6/29/2017 12:46 PM CDT -----  Contact: self [phone]  States that she needs to reschedule her procedure that has scheduled on 07/05/17. Please call back at [phone]//thank you acc

## 2017-07-11 ENCOUNTER — HOSPITAL ENCOUNTER (OUTPATIENT)
Facility: OTHER | Age: 57
Discharge: HOME | End: 2017-07-11
Attending: ANESTHESIOLOGY | Admitting: ANESTHESIOLOGY
Payer: COMMERCIAL

## 2017-07-11 VITALS
DIASTOLIC BLOOD PRESSURE: 72 MMHG | TEMPERATURE: 99 F | OXYGEN SATURATION: 98 % | RESPIRATION RATE: 18 BRPM | HEIGHT: 62 IN | HEART RATE: 78 BPM | SYSTOLIC BLOOD PRESSURE: 148 MMHG | WEIGHT: 185 LBS | BODY MASS INDEX: 34.04 KG/M2

## 2017-07-11 DIAGNOSIS — M47.816 FACET ARTHROPATHY, LUMBAR: Primary | ICD-10-CM

## 2017-07-11 PROCEDURE — 64493 INJ PARAVERT F JNT L/S 1 LEV: CPT | Mod: 50,,, | Performed by: ANESTHESIOLOGY

## 2017-07-11 PROCEDURE — 25000003 PHARM REV CODE 250: Performed by: ANESTHESIOLOGY

## 2017-07-11 PROCEDURE — 64494 INJ PARAVERT F JNT L/S 2 LEV: CPT | Mod: 50 | Performed by: ANESTHESIOLOGY

## 2017-07-11 PROCEDURE — 64494 INJ PARAVERT F JNT L/S 2 LEV: CPT | Mod: 50,,, | Performed by: ANESTHESIOLOGY

## 2017-07-11 PROCEDURE — 64495 INJ PARAVERT F JNT L/S 3 LEV: CPT | Mod: 50,,, | Performed by: ANESTHESIOLOGY

## 2017-07-11 PROCEDURE — 64493 INJ PARAVERT F JNT L/S 1 LEV: CPT | Mod: 50 | Performed by: ANESTHESIOLOGY

## 2017-07-11 PROCEDURE — 64495 INJ PARAVERT F JNT L/S 3 LEV: CPT | Mod: 50 | Performed by: ANESTHESIOLOGY

## 2017-07-11 PROCEDURE — S0020 INJECTION, BUPIVICAINE HYDRO: HCPCS | Performed by: ANESTHESIOLOGY

## 2017-07-11 RX ORDER — BUPIVACAINE HYDROCHLORIDE 5 MG/ML
INJECTION, SOLUTION EPIDURAL; INTRACAUDAL
Status: DISCONTINUED | OUTPATIENT
Start: 2017-07-11 | End: 2017-07-11 | Stop reason: HOSPADM

## 2017-07-11 RX ORDER — LIDOCAINE HYDROCHLORIDE 10 MG/ML
INJECTION INFILTRATION; PERINEURAL
Status: DISCONTINUED | OUTPATIENT
Start: 2017-07-11 | End: 2017-07-11 | Stop reason: HOSPADM

## 2017-07-11 NOTE — DISCHARGE SUMMARY
Discharge Note  Short Stay      SUMMARY     Admit Date: 7/11/2017    Attending Physician: Faith Castrejon      Discharge Physician: Faith Castrejon      Discharge Date: 7/11/2017 9:42 AM     PROCEDURE:  Bilateral medial branch block at the transverse processes at the level of L4, L5, Sacral ala    REASON FOR PROCEDURE: Facet arthropathy of the lumbar spine    Disposition: Home or self care    Patient Instructions:   Current Discharge Medication List      CONTINUE these medications which have NOT CHANGED    Details   fluoxetine (PROZAC) 20 MG tablet Take 20 mg by mouth every evening.       irbesartan (AVAPRO) 300 MG tablet Take 300 mg by mouth every evening.      rosuvastatin (CRESTOR) 20 MG tablet Take 20 mg by mouth every evening.       zolpidem (AMBIEN) 10 mg Tab Take 5 mg by mouth every evening.       fluticasone (FLONASE) 50 mcg/actuation nasal spray       ibuprofen (ADVIL,MOTRIN) 200 MG tablet Take 600 mg by mouth every evening. Takes 3 of the 200 mg otc tablets             Resume home diet and activity

## 2017-07-11 NOTE — DISCHARGE INSTRUCTIONS

## 2017-07-11 NOTE — OP NOTE
lUMBAR Medial Branch Block Under Fluoroscopy  Time-out taken to identify patient and procedure side prior to starting the procedure.            07/11/2017                                                         PROCEDURE:  Bilateral medial branch block at the transverse processes at the level of L4, L5, Sacral ala    REASON FOR PROCEDURE: Facet arthropathy of the lumbar spine    PHYSICIAN: Faith Castrejon MD  ASSISTANTS: None    MEDICATIONS INJECTED: 0.5% bupivicane, 1mL at each level    LOCAL ANESTHETIC USED: Xylocaine 1% 10ml    SEDATION MEDICATIONS: None    ESTIMATED BLOOD LOSS:  None.    COMPLICATIONS:  None.    TECHNIQUE: Laying in a prone position, the patient was prepped and draped in the usual sterile fashion using ChloraPrep and fenestrated drape.  The level was determined under fluoroscopic guidance.  Local anesthetic was given by going down to the hub of the 27-gauge 1.25in needle and raising a wheel.  A 22-gauge 3.5inch needle was introduced to the anatomic local of the medial branch at each of the above levels using fluoroscopy in the AP, oblique, and lateral views.  After negative aspiration, medication was injected slowly. The patient tolerated the procedure well.       The patient was monitored after the procedure.  Patient was given post procedure and discharge instructions to follow at home.  We will see the patient back in two weeks or the patient may call to inform of status. The patient was discharged in a stable condition

## 2017-07-12 ENCOUNTER — TELEPHONE (OUTPATIENT)
Dept: PAIN MEDICINE | Facility: CLINIC | Age: 57
End: 2017-07-12

## 2017-07-12 NOTE — TELEPHONE ENCOUNTER
----- Message from Ammy Best sent at 7/12/2017 10:54 AM CDT -----  Pain diary    10 am - 3 pm  2  Bed time   10  This morning 4      Please call pt [phone]      Spoke with pt, got 80% pain relief from MBB yesterday  message sent to schedulers for to setup RFA

## 2017-07-25 ENCOUNTER — HOSPITAL ENCOUNTER (OUTPATIENT)
Facility: OTHER | Age: 57
Discharge: HOME | End: 2017-07-25
Attending: ANESTHESIOLOGY | Admitting: ANESTHESIOLOGY
Payer: COMMERCIAL

## 2017-07-25 ENCOUNTER — SURGERY (OUTPATIENT)
Age: 57
End: 2017-07-25

## 2017-07-25 VITALS
TEMPERATURE: 99 F | WEIGHT: 190 LBS | HEIGHT: 62 IN | OXYGEN SATURATION: 95 % | HEART RATE: 89 BPM | DIASTOLIC BLOOD PRESSURE: 89 MMHG | SYSTOLIC BLOOD PRESSURE: 168 MMHG | BODY MASS INDEX: 34.96 KG/M2 | RESPIRATION RATE: 18 BRPM

## 2017-07-25 DIAGNOSIS — M47.816 FACET ARTHROPATHY, LUMBAR: Primary | ICD-10-CM

## 2017-07-25 PROCEDURE — 99152 MOD SED SAME PHYS/QHP 5/>YRS: CPT | Mod: ,,, | Performed by: ANESTHESIOLOGY

## 2017-07-25 PROCEDURE — 25000003 PHARM REV CODE 250: Performed by: ANESTHESIOLOGY

## 2017-07-25 PROCEDURE — 64636 DESTROY L/S FACET JNT ADDL: CPT | Mod: LT,,, | Performed by: ANESTHESIOLOGY

## 2017-07-25 PROCEDURE — 64636 DESTROY L/S FACET JNT ADDL: CPT | Performed by: ANESTHESIOLOGY

## 2017-07-25 PROCEDURE — 64635 DESTROY LUMB/SAC FACET JNT: CPT | Performed by: ANESTHESIOLOGY

## 2017-07-25 PROCEDURE — 63600175 PHARM REV CODE 636 W HCPCS: Performed by: ANESTHESIOLOGY

## 2017-07-25 PROCEDURE — 64635 DESTROY LUMB/SAC FACET JNT: CPT | Mod: LT,,, | Performed by: ANESTHESIOLOGY

## 2017-07-25 RX ORDER — SODIUM CHLORIDE 9 MG/ML
INJECTION, SOLUTION INTRAVENOUS CONTINUOUS
Status: DISCONTINUED | OUTPATIENT
Start: 2017-07-25 | End: 2017-07-25 | Stop reason: HOSPADM

## 2017-07-25 RX ORDER — LIDOCAINE HYDROCHLORIDE 10 MG/ML
INJECTION INFILTRATION; PERINEURAL
Status: DISCONTINUED | OUTPATIENT
Start: 2017-07-25 | End: 2017-07-25 | Stop reason: HOSPADM

## 2017-07-25 RX ORDER — MIDAZOLAM HYDROCHLORIDE 1 MG/ML
INJECTION INTRAMUSCULAR; INTRAVENOUS
Status: DISCONTINUED | OUTPATIENT
Start: 2017-07-25 | End: 2017-07-25 | Stop reason: HOSPADM

## 2017-07-25 RX ORDER — BUPIVACAINE HYDROCHLORIDE 2.5 MG/ML
INJECTION, SOLUTION EPIDURAL; INFILTRATION; INTRACAUDAL
Status: DISCONTINUED | OUTPATIENT
Start: 2017-07-25 | End: 2017-07-25 | Stop reason: HOSPADM

## 2017-07-25 RX ORDER — FENTANYL CITRATE 50 UG/ML
INJECTION, SOLUTION INTRAMUSCULAR; INTRAVENOUS
Status: DISCONTINUED | OUTPATIENT
Start: 2017-07-25 | End: 2017-07-25 | Stop reason: HOSPADM

## 2017-07-25 RX ADMIN — BUPIVACAINE HYDROCHLORIDE 10 ML: 2.5 INJECTION, SOLUTION EPIDURAL; INFILTRATION; INTRACAUDAL; PERINEURAL at 09:07

## 2017-07-25 RX ADMIN — FENTANYL CITRATE 50 MCG: 50 INJECTION, SOLUTION INTRAMUSCULAR; INTRAVENOUS at 10:07

## 2017-07-25 RX ADMIN — MIDAZOLAM HYDROCHLORIDE 1 MG: 1 INJECTION, SOLUTION INTRAMUSCULAR; INTRAVENOUS at 10:07

## 2017-07-25 RX ADMIN — LIDOCAINE HYDROCHLORIDE 10 ML: 10 INJECTION, SOLUTION INFILTRATION; PERINEURAL at 09:07

## 2017-07-25 RX ADMIN — SODIUM CHLORIDE: 0.9 INJECTION, SOLUTION INTRAVENOUS at 09:07

## 2017-07-25 NOTE — DISCHARGE INSTRUCTIONS
Home Care Instructions Pain Management:    1. DIET:   You may resume your normal diet today.   2. BATHING:   You may shower with luke warm water. No tub baths or anything that will soak injection sites under water for 24 hours.  3. DRESSING:   You may remove your bandage today.   4. ACTIVITY LEVEL:   You may resume your normal activities 24 hrs after your procedure. Nothing strenuous today.  5. MEDICATIONS:   You may resume your normal medications today. To restart blood thinners, ask your doctor.  6. SPECIAL INSTRUCTIONS:   No heat to the injection site for 24 hrs including, bath or shower, heating pad, moist heat, or hot tubs.    Use ice pack to injection site for any pain or discomfort.  Apply ice packs for 20 minute intervals as needed.     If you have received any sedatives by mouth today you may not drive for 12 hours.    If you have received any sedation through your IV, you may not drive for 24 hrs.     PLEASE CALL YOUR DOCTOR IF:  1. Redness or swelling around the injection site.  2. Fever of 101 degrees or more  3. Drainage (pus) from the injection site.  4. For any continuous bleeding (some dried blood over the incision is normal.)    FOR EMERGENCIES:   If any unusual problems or difficulties occur during clinic hours, call [phone] or [phone]. Procedural Sedation  Procedural sedation is medicine to ease discomfort, pain, and anxiety during a procedure. The medicine is often given through an intravenous (IV) line in your arm or hand. In some cases, the medicine may be taken by mouth or inhaled. While you are under sedation, you will likely be awake. But you may not remember anything afterward.  Why procedural sedation is used  Sedation is used for many types of procedures. The goal is to reduce pain, anxiety, and stressful memories of a procedure. It can also help your health care provider treat you. For example, having a broken bone fixed may be easier if you feel relaxed.  Procedural sedation is used  only for short, basic procedures. It is not used for complex surgeries. Some procedures that use this type of sedation include:  · Dental surgery  · Breast biopsy, to take a sample of breast tissue  · Endoscopy, to look at gastrointestinal problems  · Bronchoscopy, to check for lung problems  · Bone or joint realignment, to fix a broken bone or dislocated joint  · Minor foot or skin surgery  · Electrical cardioversion, to restore a normal heart rhythm  · Lumbar puncture, to assess neurological disease  Risks of procedural sedation  Procedural sedation has some risks and possible side effects, such as:  · Headache  · Nausea and vomiting  · Unpleasant memory of the procedure  · Lowered rate of breathing  · Changes in heart rate and blood pressure (rare)  · Inhalation of stomach contents into your lungs (rare)  Side effects will likely go away shortly after the procedure. Your health care team will watch your heart rate and breathing during your sedation. This is to help prevent problems.  Your own risks may vary based on your age and your overall health. They also depend on the type of sedation you are given. Talk with your health care provider about the risks that apply most to you.  Getting ready for procedural sedation  Talk with your health care provider how to get ready for your procedure. Tell him or her about all the medicines you take. This includes over-the-counter medicines such as ibuprofen. It also includes vitamins, herbs, and other supplements. You may need to stop taking some medicines before the procedure, such as blood thinners and aspirin. If you smoke, you may need to stop. Talk with your health care provider if you need help to stop smoking.  Tell your health care provider if you:  · Have had any problems in the past with sedation or anesthesia  · Have had any recent changes in your health, such as an infection or fever  · Are pregnant or think you may be pregnant  Also, make sure to:  · Ask a  family member or friend to take you home after the procedure. You cannot drive on the day you receive sedation.  · Not eat or drink after midnight the night before your procedure, if advised.  · Follow all other instructions from your health care provider.  During your procedural sedation  You may have your procedure in a hospital or a medical clinic. Sedation is done by a trained health care provider. In general, you can expect the following:  · You will be given medicine through an IV line in your arm or hand. Or you may receive a shot. The medicine may also be given by mouth. Or you may inhale it through a mask.  · If you receive medicine through an IV, you may feel the effects very quickly. You will start to feel relaxed and drowsy.  · During the procedure, your heart rate, breathing, and blood pressure will be closely watched. Your breathing and blood pressure may decrease a little. But you will likely not need help with your breathing. You may receive a little extra oxygen through a mask.  · You will probably be awake the entire time. If you do fall asleep, you should be easy to wake up, if needed. You should feel little or no pain.  · When your procedure is over, the sedative medicine will be stopped.  After your procedural sedation  You will begin to feel more awake and aware. But you will likely be drowsy for a while afterward. You will be closely watched as you become more alert. You may have a faint memory of the procedure. Or you may not remember it at all.  You should be able to return home within an hour or two after your procedure. Plan to have someone stay with you for a few hours. Side effects such as headache and nausea may go away quickly. Tell your health care provider if they continue.  Dont drive or make any important decisions for at least 24 hours. Be sure to follow all after-care instructions.      When to call your health care provider  Have someone call your health care provider right  away if you have any of these:  · Drowsiness that gets worse  · Weakness or dizziness that gets worse  · Repeated vomiting  · You cant be awakened   Date Last Reviewed: 2/6/2015  © 3924-5296 [employer] [address] [place] [address], [place] [address]. All rights reserved. This information is not intended as a substitute for professional medical care. Always follow your healthcare professional's instructions.

## 2017-07-25 NOTE — OP NOTE
Lumbar Medial nerve branch block radiofrequency ablation Under Fluoroscopy     Time-out taken to identify patient and procedure side prior to starting the procedure.     07/25/2017    PROCEDURE: Left radiofrequency ablation of the the medial branch nerves at the   transverse process of  L4, L5 and sacral ala    2)Conscious sedation provided by MD     REASON FOR PROCEDURE: Facet arthropathy, lumbar     PHYSICIAN: Faith Castrejon MD     Assistants:   Jeremi Bocanegra MD, PGY-5, Pain Fellow  I was present and supervising all critical portions of the procedure      MEDICATIONS INJECTED: 0.25% Bupivicaine total 6mL     LOCAL ANESTHETIC USED: Xylocaine 1% 1mL / site     ESTIMATED BLOOD LOSS: None.     COMPLICATIONS: None.     Interval history: Patient reports that he had complete relief of pain for the day of the procedure, we will proceed with the RFA     TECHNIQUE: Laying in a prone position, the patient was prepped and draped in the usual sterile fashion using ChloraPrep and fenestrated drape. The level was determined under fluoroscopic guidance. Local anesthetic was given by going down to the hub of the 27-gauge 1.25in needle and raising a wheel. A 18-gauge 10mm curved active tip needle was introduced to the anatomic local of the medial branch at each of the above levels using fluoroscopy. Then sensory and motor testing was performed to confirm that the needle tips were in the correct location. Then after negative aspiration, 1 mL of 0.25% bupivacaine was injected into each level. This was followed by thermal lesioning at 80 degrees celsius for 90 seconds.     Conscious sedation provided by M.D   The patient was monitored with continuous pulse oximetry, EKG, and intermittent blood pressure monitors. The patient was hemodynamically stable throughout the entire process was responsive to voice, and breathing spontaneously. Supplemental O2 was provided at 2L/min via nasal cannula. Patient was comfortable for the duration of  the procedure.     There was a total of 2mg IV Midazolam and 100mcg Fentanyl titrated for the procedure    The patient tolerated the procedure well. Was able to move their leg at the knee and ankle at the conclusion of the procedure    The patient was monitored after the procedure. Patient was given post procedure and discharge instructions to follow at home. We will see the patient back in two weeks or the patient may call to inform of status. The patient was discharged in a stable condition

## 2017-07-25 NOTE — H&P
"HPI    Mrs. Freire presents for scheduled Left  Lumbar RFA; Previously she had 80% relief of pain from diagnostic medial branch block    PMHx, PSHx, Allergies, Medications reviewed in epic    ROS negative except pain complaints in HPI    OBJECTIVE:    BP (!) 193/90   Pulse 85   Temp 98.7 °F (37.1 °C) (Oral)   Resp 18   Ht 5' 2" (1.575 m)   Wt 86.2 kg (190 lb)   LMP 05/09/1992   SpO2 97%   Breastfeeding? No   BMI 34.75 kg/m²     PHYSICAL EXAMINATION:    GENERAL: Well appearing, in no acute distress, alert and oriented x3.  PSYCH:  Mood and affect appropriate.  SKIN: Skin color, texture, turgor normal, no rashes or lesions.  CV: RRR with palpation of the radial artery.  PULM: No evidence of respiratory difficulty, symmetric chest rise. Clear to auscultation.  NEURO: Cranial nerves grossly intact.    Plan:    Proceed with procedure as planned    Jeremi Bocanegra  07/25/2017    "

## 2017-07-25 NOTE — DISCHARGE SUMMARY
Discharge Note  Short Stay      SUMMARY     Admit Date: 7/25/2017    Attending Physician: Faith Castrejon      Discharge Physician: Faith Castrejon      Discharge Date: 7/25/2017 11:14 AM     PROCEDURE: Left radiofrequency ablation of the the medial branch nerves at the   transverse process of  L4, L5 and sacral ala    2)Conscious sedation provided by MD     REASON FOR PROCEDURE: Facet arthropathy, lumbar     Disposition: Home or self care    Patient Instructions:   Discharge Medication List as of 7/25/2017 10:34 AM      CONTINUE these medications which have NOT CHANGED    Details   fluoxetine (PROZAC) 20 MG tablet Take 20 mg by mouth every evening. , Until Discontinued, Historical Med      fluticasone (FLONASE) 50 mcg/actuation nasal spray Starting 3/13/2017, Until Discontinued, Historical Med      ibuprofen (ADVIL,MOTRIN) 200 MG tablet Take 600 mg by mouth every evening. Takes 3 of the 200 mg otc tablets, Until Discontinued, Historical Med      irbesartan (AVAPRO) 300 MG tablet Take 300 mg by mouth every evening., Until Discontinued, Historical Med      rosuvastatin (CRESTOR) 20 MG tablet Take 20 mg by mouth every evening. , Until Discontinued, Historical Med      zolpidem (AMBIEN) 10 mg Tab Take 5 mg by mouth every evening. , Until Discontinued, Historical Med             Resume home diet and activity

## 2017-08-08 ENCOUNTER — SURGERY (OUTPATIENT)
Age: 57
End: 2017-08-08

## 2017-08-08 ENCOUNTER — HOSPITAL ENCOUNTER (OUTPATIENT)
Facility: OTHER | Age: 57
Discharge: HOME | End: 2017-08-08
Attending: ANESTHESIOLOGY | Admitting: ANESTHESIOLOGY
Payer: COMMERCIAL

## 2017-08-08 VITALS
BODY MASS INDEX: 34.96 KG/M2 | WEIGHT: 190 LBS | TEMPERATURE: 99 F | OXYGEN SATURATION: 99 % | DIASTOLIC BLOOD PRESSURE: 91 MMHG | HEART RATE: 87 BPM | HEIGHT: 62 IN | SYSTOLIC BLOOD PRESSURE: 195 MMHG | RESPIRATION RATE: 16 BRPM

## 2017-08-08 DIAGNOSIS — M47.816 FACET ARTHROPATHY, LUMBAR: Primary | ICD-10-CM

## 2017-08-08 PROCEDURE — 64635 DESTROY LUMB/SAC FACET JNT: CPT | Performed by: ANESTHESIOLOGY

## 2017-08-08 PROCEDURE — 25000003 PHARM REV CODE 250: Performed by: ANESTHESIOLOGY

## 2017-08-08 PROCEDURE — 64635 DESTROY LUMB/SAC FACET JNT: CPT | Mod: RT,,, | Performed by: ANESTHESIOLOGY

## 2017-08-08 PROCEDURE — 64636 DESTROY L/S FACET JNT ADDL: CPT | Mod: RT,,, | Performed by: ANESTHESIOLOGY

## 2017-08-08 PROCEDURE — 99152 MOD SED SAME PHYS/QHP 5/>YRS: CPT | Mod: ,,, | Performed by: ANESTHESIOLOGY

## 2017-08-08 PROCEDURE — 64636 DESTROY L/S FACET JNT ADDL: CPT | Performed by: ANESTHESIOLOGY

## 2017-08-08 PROCEDURE — 63600175 PHARM REV CODE 636 W HCPCS: Performed by: ANESTHESIOLOGY

## 2017-08-08 RX ORDER — BUPIVACAINE HYDROCHLORIDE 2.5 MG/ML
INJECTION, SOLUTION EPIDURAL; INFILTRATION; INTRACAUDAL
Status: DISCONTINUED | OUTPATIENT
Start: 2017-08-08 | End: 2017-08-08 | Stop reason: HOSPADM

## 2017-08-08 RX ORDER — SODIUM CHLORIDE 9 MG/ML
INJECTION, SOLUTION INTRAVENOUS CONTINUOUS
Status: DISCONTINUED | OUTPATIENT
Start: 2017-08-08 | End: 2017-08-08 | Stop reason: HOSPADM

## 2017-08-08 RX ORDER — FENTANYL CITRATE 50 UG/ML
INJECTION, SOLUTION INTRAMUSCULAR; INTRAVENOUS
Status: DISCONTINUED | OUTPATIENT
Start: 2017-08-08 | End: 2017-08-08 | Stop reason: HOSPADM

## 2017-08-08 RX ORDER — LIDOCAINE HYDROCHLORIDE 10 MG/ML
INJECTION INFILTRATION; PERINEURAL
Status: DISCONTINUED | OUTPATIENT
Start: 2017-08-08 | End: 2017-08-08 | Stop reason: HOSPADM

## 2017-08-08 RX ORDER — MIDAZOLAM HYDROCHLORIDE 1 MG/ML
INJECTION INTRAMUSCULAR; INTRAVENOUS
Status: DISCONTINUED | OUTPATIENT
Start: 2017-08-08 | End: 2017-08-08 | Stop reason: HOSPADM

## 2017-08-08 RX ORDER — SODIUM CHLORIDE 9 MG/ML
INJECTION, SOLUTION INTRAVENOUS CONTINUOUS
Status: CANCELLED | OUTPATIENT
Start: 2017-08-08

## 2017-08-08 RX ADMIN — FENTANYL CITRATE 50 MCG: 50 INJECTION, SOLUTION INTRAMUSCULAR; INTRAVENOUS at 11:08

## 2017-08-08 RX ADMIN — MIDAZOLAM HYDROCHLORIDE 1 MG: 1 INJECTION, SOLUTION INTRAMUSCULAR; INTRAVENOUS at 11:08

## 2017-08-08 RX ADMIN — LIDOCAINE HYDROCHLORIDE 10 ML: 10 INJECTION, SOLUTION INFILTRATION; PERINEURAL at 11:08

## 2017-08-08 RX ADMIN — SODIUM CHLORIDE: 0.9 INJECTION, SOLUTION INTRAVENOUS at 11:08

## 2017-08-08 RX ADMIN — BUPIVACAINE HYDROCHLORIDE 10 ML: 2.5 INJECTION, SOLUTION EPIDURAL; INFILTRATION; INTRACAUDAL; PERINEURAL at 11:08

## 2017-08-08 NOTE — DISCHARGE INSTRUCTIONS
Procedural Sedation  Procedural sedation is medicine to ease discomfort, pain, and anxiety during a procedure. The medicine is often given through an intravenous (IV) line in your arm or hand. In some cases, the medicine may be taken by mouth or inhaled. While you are under sedation, you will likely be awake. But you may not remember anything afterward.  Why procedural sedation is used  Sedation is used for many types of procedures. The goal is to reduce pain, anxiety, and stressful memories of a procedure. It can also help your health care provider treat you. For example, having a broken bone fixed may be easier if you feel relaxed.  Procedural sedation is used only for short, basic procedures. It is not used for complex surgeries. Some procedures that use this type of sedation include:  · Dental surgery  · Breast biopsy, to take a sample of breast tissue  · Endoscopy, to look at gastrointestinal problems  · Bronchoscopy, to check for lung problems  · Bone or joint realignment, to fix a broken bone or dislocated joint  · Minor foot or skin surgery  · Electrical cardioversion, to restore a normal heart rhythm  · Lumbar puncture, to assess neurological disease  Risks of procedural sedation  Procedural sedation has some risks and possible side effects, such as:  · Headache  · Nausea and vomiting  · Unpleasant memory of the procedure  · Lowered rate of breathing  · Changes in heart rate and blood pressure (rare)  · Inhalation of stomach contents into your lungs (rare)  Side effects will likely go away shortly after the procedure. Your health care team will watch your heart rate and breathing during your sedation. This is to help prevent problems.  Your own risks may vary based on your age and your overall health. They also depend on the type of sedation you are given. Talk with your health care provider about the risks that apply most to you.  Getting ready for procedural sedation  Talk with your health care provider  how to get ready for your procedure. Tell him or her about all the medicines you take. This includes over-the-counter medicines such as ibuprofen. It also includes vitamins, herbs, and other supplements. You may need to stop taking some medicines before the procedure, such as blood thinners and aspirin. If you smoke, you may need to stop. Talk with your health care provider if you need help to stop smoking.  Tell your health care provider if you:  · Have had any problems in the past with sedation or anesthesia  · Have had any recent changes in your health, such as an infection or fever  · Are pregnant or think you may be pregnant  Also, make sure to:  · Ask a family member or friend to take you home after the procedure. You cannot drive on the day you receive sedation.  · Not eat or drink after midnight the night before your procedure, if advised.  · Follow all other instructions from your health care provider.  During your procedural sedation  You may have your procedure in a hospital or a medical clinic. Sedation is done by a trained health care provider. In general, you can expect the following:  · You will be given medicine through an IV line in your arm or hand. Or you may receive a shot. The medicine may also be given by mouth. Or you may inhale it through a mask.  · If you receive medicine through an IV, you may feel the effects very quickly. You will start to feel relaxed and drowsy.  · During the procedure, your heart rate, breathing, and blood pressure will be closely watched. Your breathing and blood pressure may decrease a little. But you will likely not need help with your breathing. You may receive a little extra oxygen through a mask.  · You will probably be awake the entire time. If you do fall asleep, you should be easy to wake up, if needed. You should feel little or no pain.  · When your procedure is over, the sedative medicine will be stopped.  After your procedural sedation  You will begin to  feel more awake and aware. But you will likely be drowsy for a while afterward. You will be closely watched as you become more alert. You may have a faint memory of the procedure. Or you may not remember it at all.  You should be able to return home within an hour or two after your procedure. Plan to have someone stay with you for a few hours. Side effects such as headache and nausea may go away quickly. Tell your health care provider if they continue.  Dont drive or make any important decisions for at least 24 hours. Be sure to follow all after-care instructions.      When to call your health care provider  Have someone call your health care provider right away if you have any of these:  · Drowsiness that gets worse  · Weakness or dizziness that gets worse  · Repeated vomiting  · You cant be awakened   Date Last Reviewed: 2/6/2015  © 9709-7429 [employer]. [address] [place] [address]. All rights reserved. This information is not intended as a substitute for professional medical care. Always follow your healthcare professional's instructions.         Home Care Instructions Pain Management:    1. DIET:   You may resume your normal diet today.   2. BATHING:   You may shower with luke warm water.  3. DRESSING:   You may remove your bandage today.   4. ACTIVITY LEVEL:   You may resume your normal activities 24 hrs after your procedure.  5. MEDICATIONS:   You may resume your normal medications today.   6. SPECIAL INSTRUCTIONS:   No heat to the injection site for 24 hrs including, bath or shower, heating pad, moist heat, or hot tubs.    Use ice pack to injection site for any pain or discomfort.  Apply ice packs for 20 minute intervals as needed.   If you have received any sedatives by mouth today you may not drive for 12 hours.    If you have received any sedation through your IV, you may not drive for 24 hrs.     PLEASE CALL YOUR DOCTOR IF:  1. Redness or swelling around the injection  site.  2. Fever of 101 degrees  3. Drainage (pus) from the injection site.  4. For any continuous bleeding (some dried blood over the incision is normal.)    FOR EMERGENCIES:   If any unusual problems or difficulties occur during clinic hours, call [phone] or [phone].

## 2017-08-08 NOTE — OP NOTE
Lumbar Medial nerve branch block radiofrequency ablation Under Fluoroscopy     Time-out taken to identify patient and procedure side prior to starting the procedure.     08/08/2017    PROCEDURE: Right radiofrequency ablation of the the medial branch nerves at the   transverse process of  L4, L5 and sacral ala    2)Conscious sedation provided by MD     REASON FOR PROCEDURE: Facet arthritis, degenerative, lumbar spine [M47.896]     PHYSICIAN: Faith Castrejon MD     Assistants:  Arpan Posadas MD PGY-3  I was present and supervising all critical portions of the procedure      MEDICATIONS INJECTED: 0.25% Bupivicaine total 6mL     LOCAL ANESTHETIC USED: Xylocaine 1% 1mL / site     ESTIMATED BLOOD LOSS: None.     COMPLICATIONS: None.     Interval history: Patient reports that he had complete relief of pain for the day of the procedure, we will proceed with the RFA     TECHNIQUE: Laying in a prone position, the patient was prepped and draped in the usual sterile fashion using ChloraPrep and fenestrated drape. The level was determined under fluoroscopic guidance. Local anesthetic was given by going down to the hub of the 27-gauge 1.25in needle and raising a wheel. A 18-gauge 10mm curved active tip needle was introduced to the anatomic local of the medial branch at each of the above levels using fluoroscopy. Then sensory and motor testing was performed to confirm that the needle tips were in the correct location. Then after negative aspiration, 1 mL of 0.25% bupivacaine was injected into each level. This was followed by thermal lesioning at 80 degrees celsius for 90 seconds.     Conscious sedation provided by M.D   The patient was monitored with continuous pulse oximetry, EKG, and intermittent blood pressure monitors. The patient was hemodynamically stable throughout the entire process was responsive to voice, and breathing spontaneously. Supplemental O2 was provided at 2L/min via nasal cannula. Patient was comfortable  for the duration of the procedure. (See nurse documentation and case log for sedation time)    There was a total of 2mg IV Midazolam and 100mcg Fentanyl titrated for the procedure    The patient tolerated the procedure well. Was able to move their leg at the knee and ankle at the conclusion of the procedure    The patient was monitored after the procedure. Patient was given post procedure and discharge instructions to follow at home. We will see the patient back in two weeks or the patient may call to inform of status. The patient was discharged in a stable condition

## 2017-08-08 NOTE — DISCHARGE SUMMARY
Discharge Note  Short Stay      SUMMARY     Admit Date: 8/8/2017    Attending Physician: Faith Castrejon    Discharge Diagnosis: Facet arthritis, degenerative, lumbar spine [M47.896]    Discharge Physician: Faith Castrejon      Discharge Date: 8/8/2017 11:46 AM     PROCEDURE: Right radiofrequency ablation of the the medial branch nerves at the   transverse process of  L4, L5 and sacral ala    2)Conscious sedation provided by MD     REASON FOR PROCEDURE: Facet arthritis, degenerative, lumbar spine [M47.896]    Disposition: Home or self care    Patient Instructions:   Current Discharge Medication List      CONTINUE these medications which have NOT CHANGED    Details   fluoxetine (PROZAC) 20 MG tablet Take 20 mg by mouth every evening.       fluticasone (FLONASE) 50 mcg/actuation nasal spray       ibuprofen (ADVIL,MOTRIN) 200 MG tablet Take 600 mg by mouth every evening. Takes 3 of the 200 mg otc tablets      irbesartan (AVAPRO) 300 MG tablet Take 300 mg by mouth every evening.      rosuvastatin (CRESTOR) 20 MG tablet Take 20 mg by mouth every evening.       zolpidem (AMBIEN) 10 mg Tab Take 5 mg by mouth every evening.              Resume home diet and activity

## 2017-08-21 ENCOUNTER — OFFICE VISIT (OUTPATIENT)
Dept: PAIN MEDICINE | Facility: CLINIC | Age: 57
End: 2017-08-21
Payer: COMMERCIAL

## 2017-08-21 VITALS
TEMPERATURE: 99 F | HEIGHT: 62 IN | BODY MASS INDEX: 35.78 KG/M2 | DIASTOLIC BLOOD PRESSURE: 93 MMHG | WEIGHT: 194.44 LBS | HEART RATE: 90 BPM | SYSTOLIC BLOOD PRESSURE: 144 MMHG

## 2017-08-21 DIAGNOSIS — M47.816 FACET ARTHROPATHY, LUMBAR: ICD-10-CM

## 2017-08-21 DIAGNOSIS — M25.562 CHRONIC PAIN OF LEFT KNEE: ICD-10-CM

## 2017-08-21 DIAGNOSIS — M53.3 SACROILIAC JOINT PAIN: Primary | ICD-10-CM

## 2017-08-21 DIAGNOSIS — M25.569 KNEE PAIN, UNSPECIFIED CHRONICITY, UNSPECIFIED LATERALITY: ICD-10-CM

## 2017-08-21 DIAGNOSIS — G89.29 CHRONIC PAIN OF LEFT KNEE: ICD-10-CM

## 2017-08-21 PROCEDURE — 3077F SYST BP >= 140 MM HG: CPT | Mod: S$GLB,,, | Performed by: NURSE PRACTITIONER

## 2017-08-21 PROCEDURE — 99999 PR PBB SHADOW E&M-EST. PATIENT-LVL III: CPT | Mod: PBBFAC,,, | Performed by: NURSE PRACTITIONER

## 2017-08-21 PROCEDURE — 3080F DIAST BP >= 90 MM HG: CPT | Mod: S$GLB,,, | Performed by: NURSE PRACTITIONER

## 2017-08-21 PROCEDURE — 99213 OFFICE O/P EST LOW 20 MIN: CPT | Mod: S$GLB,,, | Performed by: NURSE PRACTITIONER

## 2017-08-21 PROCEDURE — 3008F BODY MASS INDEX DOCD: CPT | Mod: S$GLB,,, | Performed by: NURSE PRACTITIONER

## 2017-08-21 RX ORDER — FLUOXETINE HYDROCHLORIDE 20 MG/1
20 CAPSULE ORAL DAILY
COMMUNITY
Start: 2017-08-02 | End: 2018-08-11 | Stop reason: SDUPTHER

## 2017-08-21 NOTE — PROGRESS NOTES
Chronic Pain - Established Visit    Referring Physician: No ref. provider found    Chief Complaint:   Chief Complaint   Patient presents with    Low-back Pain        SUBJECTIVE: Disclaimer: This note has been generated using voice-recognition software. There may be typographical errors that have been missed during proof-reading    Interval History 8/21/2017:  The patient returns today for follow up.  She has a history of left knee and lower back pain.  She is s/p left genicular cooled RFA with 90% relief.  She is also s/p left then right L3,4,5 RFA on 8/8/17 with 75% relief.  Her biggest complaint today is back and buttock pain, lower down than previous procedures.  The pain is worse when she sits and stands for long periods of time.  She denies any radiation into her legs.  Her pain today is 2/10.    Interval History 6/5/2017:  The patient returns today for left knee and lower back pain.  She is s/p left genicular nerve block under ultrasound on 5/22/17 with 75% relief for two days.  She reports that after the procedure she went home and cleaned her house and noticed that she had minimal pain.  After that time, her pain began to return.  She continues to report lower back pain.  The pain is worse with standing and bending.  She describes it as aching in nature.  She denies any radiation down her legs.  Her pain today is 5/10.  The patient denies any bowel or bladder incontinence or signs of saddle paresthesia.  The patient denies any major medical changes since last office visit.    Interval History 5/22/2017:  Since previous encounter the patient had a x-ray of the lower back which does show significant facet arthropathy from L3-S1 bilaterally no evidence of instability.  The patient does continue to report lower back pain which appears to be facet mediated in nature.  She comes today without any other health changes for left knee geniculate nerve block for diagnostic/therapeutic purposes.    Initial  encounter:    Traci Freire presents to the clinic for the evaluation of left knee pain. The pain started years ago insidiously and symptoms have been worsening.    Brief history: Patient has had left knee replacement with revision in the past and continues to have decreased range of motion occasional swelling and pain in the left knee.  Of note the patient has also had a history of lower back pain and radiculopathy associated with disc herniations.  She states that she has had epidural steroid injections in the lumbar spine years ago which helped her temporarily but denies any radicular symptoms.  She has been having some left leg weakness in the quadriceps on the left.  In addition to her knee pain she describes pain in the hips bilaterally and lower back    Pain Description:    The current worst pain is located in the left lateral knee area and radiates to the anterior shin.      At BEST  3/10     At WORST  10/10 on the WORST day.      On average pain is rated as 7/10.     Today the pain is rated as 6/10    The pain is described as aching, sharp, shooting and tingling      Symptoms interfere with daily activity, sleeping and work.     Exacerbating factors: Sitting, Standing, Laying, Bending, Walking, Night Time, Morning, Extension, Lifting and Getting out of bed/chair.      Mitigating factors medications.     Patient denies urinary incontinence and bowel incontinence.  Patient denies any suicidal or homicidal ideations    Pain Medications:  Current:  Ibuprofen occasionally with relief    Tried in Past:  NSAIDs - Ibuprofen  TCA -Never  SNRI -Never  Anti-convulsants -Never  Muscle Relaxants -Never  [id number] with relief    Physical Therapy/Home Exercise: yes     report:  Reviewed and consistent with medication use as prescribed.    Pain Procedures: epidurals in the past for lumbar radiculopathy, but no records available for review  5/2/17 4 point genicular nerve ninfa- significant benefit for 2  days    Chiropractor -never  Acupuncture - never  TENS unit -never  Spinal decompression -never  Joint replacement -left knee replacement with revision    Imaging:     Lumbar XRAYs 17    Xray left knee 4/3/2017  3 views    Left knee arthroplasty identified.  The position alignment is satisfactory and unchanged as compared to the previous study.  Mild periosteal thickening noted adjacent to the distal tip of the prosthesis in the proximal tibia.  Mild DJD and joint space narrowing involving the right knee identified.  No fracture or bone destruction.    CT lumbar spine 2017  Axial images were obtained through the left knee and displayed at soft tissue and bone windows.  Sagittal and coronal reconstructions are provided.    The patient has a large primarily metallic knee joint prosthesis in place.  The positioning of the metallic femoral and tibial components and the plastic patellar components appear within normal limits.  Lucency surrounding the prosthesis consistent with osteomyelitis or loosening is not seen.  Other osseous abnormalities are not identified.  No free fragments in the joint are identified.    A joint effusion is not seen.  There is atrophy of the quadriceps musculature.  The calf and hamstring musculature appears within normal limits.   Impression    Status post left knee joint replacement with intact prosthesis identified.  Atrophy of the quadriceps musculature.               Past Medical History:   Diagnosis Date    Arthritis     Cancer breast    Right- Bilateral mastectomy- May 2005- had breast reconstruction- mother  of breast cancer    GERD (gastroesophageal reflux disease)     not troubled any more after the Nissen's fundoplication -     HLD (hyperlipidemia)     Hypertension     diagnosed age late 40's     IBS (irritable bowel syndrome)     Lyme disease     arthritis of hands. Felt like flu- age early 30's- got it  after going to [place]    Sleep apnea      Past  Surgical History:   Procedure Laterality Date    ABDOMINAL SURGERY      3 c sections    baldder suspension-      BREAST SURGERY      CYSTOSCOPY  2006    avoids greens . ? Interstitial cystitis    HYSTERECTOMY      followed by removal of ovaries  from scar tissue    JOINT REPLACEMENT      [facility] -     KNEE SURGERY Left 2014    TKR    tonsils      age 19 years     Social History     Social History    Marital status:      Spouse name: N/A    Number of children: N/A    Years of education: N/A     Occupational History    Not on file.     Social History Main Topics    Smoking status: Former Smoker     Quit date: 3/26/2004    Smokeless tobacco: Never Used      Comment: quit - smoked for 10 years- 1 ppd    Alcohol use No    Drug use: No    Sexual activity: Not on file     Other Topics Concern    Not on file     Social History Narrative    No narrative on file     Family History   Problem Relation Age of Onset    Cancer Mother       of breast cancer, also had gynecological cancer- had breast cancer that spread to the brain    Heart disease Father      in his 60's-  in his 70's       Review of patient's allergies indicates:  No Known Allergies    Current Outpatient Prescriptions   Medication Sig    fluoxetine (PROZAC) 20 MG capsule     fluoxetine (PROZAC) 20 MG tablet Take 20 mg by mouth every evening.     fluticasone (FLONASE) 50 mcg/actuation nasal spray     ibuprofen (ADVIL,MOTRIN) 200 MG tablet Take 600 mg by mouth every evening. Takes 3 of the 200 mg otc tablets    irbesartan (AVAPRO) 300 MG tablet Take 300 mg by mouth every evening.    rosuvastatin (CRESTOR) 20 MG tablet Take 20 mg by mouth every evening.     zolpidem (AMBIEN) 10 mg Tab Take 5 mg by mouth every evening.      No current facility-administered medications for this visit.        REVIEW OF SYSTEMS:    GENERAL:  No weight loss, malaise or  "fevers.  HEENT:   No recent changes in vision or hearing  NECK:  Negative for lumps, no difficulty with swallowing.  RESPIRATORY:  Negative for cough, wheezing or shortness of breath, patient denies any recent URI.  CARDIOVASCULAR:  Negative for chest pain, leg swelling or palpitations.  GI:  Negative for black stools or change in bowel habits.  IBS and GERD.  MUSCULOSKELETAL:  See HPI.  SKIN:  Negative for lesions, rash, and itching.  PSYCH:  No mood disorder or recent psychosocial stressors.  Patient's sleep is disturbed secondary to pain.  HEMATOLOGY/LYMPHOLOGY:  Negative for prolonged bleeding, bruising easily or swollen nodes.  Patient is not currently taking any anti-coagulants  ENDO: No history of diabetes or thyroid dysfunction  NEURO:   No history of headaches, syncope, paralysis, seizures or tremors.  All other reviewed and negative other than HPI.    OBJECTIVE:    BP (!) 144/93   Pulse 90   Temp 98.7 °F (37.1 °C)   Ht 5' 2" (1.575 m)   Wt 88.2 kg (194 lb 7.1 oz)   LMP 05/09/1992   BMI 35.56 kg/m²     PHYSICAL EXAMINATION:    GENERAL: Well appearing, in no acute distress, alert and oriented x3.  PSYCH:  Mood and affect appropriate.  SKIN: Skin color, texture, turgor normal, no rashes or lesions.  HEAD/FACE:  Normocephalic, atraumatic. Cranial nerves grossly intact.  CV: RRR with palpation of the radial artery.  PULM: No evidence of respiratory difficulty, symmetric chest rise.  BACK: Straight leg raising in the sitting and supine positions is negative to radicular pain. There is no pain with palpation over the facet joints of the lumbar spine.  There is decreased range of motion with extension to 15 degrees, and facet loading maneuvers cause reproducible pain.    EXTREMITIES: Peripheral joint ROM is full and pain free without obvious instability or laxity in all four extremities. No deformities, edema, or skin discoloration. Good capillary refill.  MUSCULOSKELETAL: Asymmetry between the left and " right knee with well-healed scar over the left knee from previous surgery.   Limited flexion of left knee, full ROM on extension.  There is  pain with palpation over the sacroiliac joints bilaterally.  There is no pain to palpation over the greater trochanteric bursa bilaterally.  FABERs test is positive bilaterally.  FADIRs test is negative.   Bilateral lower extremity strength is normal and symmetric.  No atrophy or tone abnormalities are noted.  NEURO: Bilateral lower extremity coordination and muscle stretch reflexes are physiologic and symmetric.  Plantar response are downgoing. No clonus.  No loss of sensation is noted.  GAIT: Antalgic, ambulates without assistance.      ASSESSMENT: 56 y.o. year old female with pain, consistent with     Encounter Diagnoses   Name Primary?    Sacroiliac joint pain Yes    Facet arthropathy, lumbar     Chronic pain of left knee     Knee pain, unspecified chronicity, unspecified laterality        PLAN:     - Previous imaging was reviewed and discussed with the patient today.    - Will schedule for bilateral SI joint injections.  The procedure, risks, benefits and options were discussed with patient. There are no contraindications to the procedure. The patient expressed understanding and agreed to proceed.  Consent obtained today.    - In the future the patient may be a candidate for DRG stimulation versus spinal cord stimulation.    - Can continue OTC Ibuprofen as needed.    - The patient will continue a home exercise routine to help with pain and strengthening.      - RTC 2 weeks after injection.    - Dr. Castrejon was consulted on the patient and agrees with this plan.      The above plan and management options were discussed at length with patient. Patient is in agreement with the above and verbalized understanding.     Jenna Romero  08/21/2017

## 2017-11-27 ENCOUNTER — OFFICE VISIT (OUTPATIENT)
Dept: SURGERY | Facility: CLINIC | Age: 57
End: 2017-11-27
Payer: COMMERCIAL

## 2017-11-27 VITALS
WEIGHT: 194 LBS | SYSTOLIC BLOOD PRESSURE: 126 MMHG | HEIGHT: 62 IN | BODY MASS INDEX: 35.7 KG/M2 | DIASTOLIC BLOOD PRESSURE: 90 MMHG

## 2017-11-27 DIAGNOSIS — E66.9 OBESITY, UNSPECIFIED CLASSIFICATION, UNSPECIFIED OBESITY TYPE, UNSPECIFIED WHETHER SERIOUS COMORBIDITY PRESENT: Primary | ICD-10-CM

## 2017-11-27 DIAGNOSIS — K43.2 RECURRENT INCISIONAL HERNIA: ICD-10-CM

## 2017-11-27 DIAGNOSIS — R10.84 GENERALIZED ABDOMINAL PAIN: ICD-10-CM

## 2017-11-27 PROCEDURE — 99204 OFFICE O/P NEW MOD 45 MIN: CPT | Mod: ,,, | Performed by: SURGERY

## 2017-11-27 RX ORDER — TERBINAFINE HYDROCHLORIDE 250 MG/1
TABLET ORAL
COMMUNITY
Start: 2017-11-25 | End: 2019-01-21

## 2017-11-27 NOTE — PROGRESS NOTES
Subjective:       Patient ID: Traci Freire is a 56 y.o. female.    Chief Complaint: Other (Eval poss recurrent Incisional Hernia)      HPI:  Patient presents with complaints of abdominal pain as well as a recurrent incisional hernia. Patient's abdominal complaints a fairly nonspecific they include crampy pains, occasional constipation, occasional loose stools. Occasional nausea vomiting. No loss of weight. Patient does have some discomfort around her recurrent hernia. Most of her complaints did not involve year the hernia. It is difficult to pin patient down exactly the nature of the complaints. Patient relates to me that these complains of been going on for years. She has seen the gastroenterologist in the past. Had a colonoscopy 3 years ago which was negative. She feels like she has a lot of intra-abdominal adhesions which is causing her problems.    Past Medical History:   Diagnosis Date    Arthritis     Cancer breast    Right- Bilateral mastectomy- May 2005- had breast reconstruction- mother  of breast cancer    HLD (hyperlipidemia)     Hypertension     diagnosed age late 40's     IBS (irritable bowel syndrome)     Lyme disease     arthritis of hands. Felt like flu- age early 30's- got it  after going to [place]    Sleep apnea      Past Surgical History:   Procedure Laterality Date    ABDOMINAL SURGERY      BLADDER SUSPENSION      BREAST SURGERY       SECTION      CYSTOSCOPY      avoids greens . ? Interstitial cystitis    HYSTERECTOMY      followed by removal of ovaries  from scar tissue    INCISIONAL HERNIA REPAIR Right 2008    RLQ    JOINT REPLACEMENT      [facility] -     KNEE SURGERY Left     TKR    MODIFIED RADICAL MASTECTOMY W/ AXILLARY LYMPH NODE DISSECTION Right 05/10/2005    w/free flap    NISSEN FUNDOPLICATION  2000    SIMPLE MASTECTOMY Left 05/10/2005    w/free flap    TONSILLECTOMY       Review  of patient's allergies indicates:  No Known Allergies  Medication List with Changes/Refills   Current Medications    FLUOXETINE (PROZAC) 20 MG CAPSULE        IRBESARTAN (AVAPRO) 300 MG TABLET    Take 300 mg by mouth every evening.    ROSUVASTATIN (CRESTOR) 20 MG TABLET    Take 20 mg by mouth every evening.     TERBINAFINE HCL (LAMISIL) 250 MG TABLET        ZOLPIDEM (AMBIEN) 10 MG TAB    Take 5 mg by mouth every evening.    Discontinued Medications    FLUOXETINE (PROZAC) 20 MG TABLET    Take 20 mg by mouth every evening.     FLUTICASONE (FLONASE) 50 MCG/ACTUATION NASAL SPRAY        IBUPROFEN (ADVIL,MOTRIN) 200 MG TABLET    Take 600 mg by mouth every evening. Takes 3 of the 200 mg otc tablets     Family History   Problem Relation Age of Onset    Cancer Mother       of breast cancer, also had gynecological cancer- had breast cancer that spread to the brain    Heart disease Father      in his 60's-  in his 70's     Social History     Social History    Marital status:      Spouse name: N/A    Number of children: N/A    Years of education: N/A     Social History Main Topics    Smoking status: Former Smoker     Quit date: 3/26/2004    Smokeless tobacco: Never Used      Comment: quit - smoked for 10 years- 1 ppd    Alcohol use No    Drug use: No    Sexual activity: Not Asked     Other Topics Concern    None     Social History Narrative    None         Review of Systems   Constitutional: Negative for appetite change, chills, fever and unexpected weight change.   HENT: Negative for hearing loss, rhinorrhea, sore throat and voice change.    Eyes: Negative for photophobia and visual disturbance.   Respiratory: Negative for cough, choking and shortness of breath.    Cardiovascular: Negative for chest pain, palpitations and leg swelling.   Gastrointestinal: Positive for abdominal pain and constipation. Negative for blood in stool, diarrhea, nausea and vomiting.   Endocrine: Negative for cold  intolerance, heat intolerance, polydipsia and polyuria.   Musculoskeletal: Negative for arthralgias, back pain, joint swelling and neck stiffness.   Skin: Negative for color change, pallor and rash.   Neurological: Negative for dizziness, seizures, syncope and headaches.   Hematological: Negative for adenopathy. Does not bruise/bleed easily.   Psychiatric/Behavioral: Negative for agitation, behavioral problems and confusion.       Objective:      Physical Exam   Constitutional: She appears well-developed and well-nourished.  Non-toxic appearance. No distress.   HENT:   Head: Normocephalic and atraumatic. Head is without abrasion and without laceration.   Right Ear: External ear normal.   Left Ear: External ear normal.   Nose: Nose normal.   Mouth/Throat: Oropharynx is clear and moist.   Eyes: EOM are normal. Pupils are equal, round, and reactive to light.   Neck: Trachea normal. No tracheal deviation and normal range of motion present. No thyroid mass and no thyromegaly present.   Cardiovascular: Normal rate and regular rhythm.    Pulmonary/Chest: Effort normal. No accessory muscle usage. No tachypnea. No respiratory distress.   Abdominal: Soft. Normal appearance and bowel sounds are normal. She exhibits no distension and no mass. There is no hepatosplenomegaly. There is no tenderness. There is no tenderness at McBurney's point and negative Pineda's sign. A hernia is present.       #1 rectus diathesis is present #2 recurrent chronically incarcerated nontender incisional hernia has shown #3 patient has significant truncal obesity with a large fairly tense abdomen.   Lymphadenopathy:     She has no cervical adenopathy.     She has no axillary adenopathy.        Right: No inguinal adenopathy present.        Left: No inguinal adenopathy present.   Neurological: She is alert. Coordination and gait normal.   Skin: Skin is warm and intact.   Psychiatric: She has a normal mood and affect. Her speech is normal and behavior  is normal.       Assessment/Plan:   Obesity, unspecified classification, unspecified obesity type, unspecified whether serious comorbidity present    Recurrent incisional hernia  -     CT Abdomen Pelvis W Wo Contrast    Generalized abdominal pain  -     CT Abdomen Pelvis W Wo Contrast      Although patient does have a recurrent hernia I'm not sure that all her symptoms are attributable to that. We can get a CT scan of the abdomen and pelvis to get a better idea where we  density patient back for further discussion. I'm not sure group patient's assessment that she has a lot of intra-abdominal adhesions she had fairly minimal intra-abdominal surgery. We'll see patient back after CT scan is done.

## 2017-12-11 ENCOUNTER — OFFICE VISIT (OUTPATIENT)
Dept: SURGERY | Facility: CLINIC | Age: 57
End: 2017-12-11
Payer: COMMERCIAL

## 2017-12-11 VITALS
DIASTOLIC BLOOD PRESSURE: 88 MMHG | HEIGHT: 62 IN | SYSTOLIC BLOOD PRESSURE: 126 MMHG | WEIGHT: 194 LBS | BODY MASS INDEX: 35.7 KG/M2

## 2017-12-11 DIAGNOSIS — K43.2 RECURRENT INCISIONAL HERNIA: ICD-10-CM

## 2017-12-11 DIAGNOSIS — E66.9 OBESITY, UNSPECIFIED CLASSIFICATION, UNSPECIFIED OBESITY TYPE, UNSPECIFIED WHETHER SERIOUS COMORBIDITY PRESENT: Primary | ICD-10-CM

## 2017-12-11 DIAGNOSIS — R10.84 GENERALIZED ABDOMINAL PAIN: ICD-10-CM

## 2017-12-11 PROCEDURE — 99213 OFFICE O/P EST LOW 20 MIN: CPT | Mod: ,,, | Performed by: SURGERY

## 2017-12-11 NOTE — PROGRESS NOTES
Patient comes in to discuss the results of the CT scan and overall plans. CT scan showed no abnormality except for known ventral hernia. Had a long discussion with the patient about the fact that I'm not quite sure that her symptoms are secondary to the hernia. I'm also very reluctant to operate on her due to the fact that her abdomen is so tight secondary to obesity and previous abdominal wall surgeries. I'm quite fearful that I may make her worse and she is right now. Patient is understands the situation and agrees to hold off on surgical intervention.    Follow-up when necessary

## 2018-08-11 PROBLEM — R07.81 RIB PAIN ON LEFT SIDE: Status: ACTIVE | Noted: 2018-08-11

## 2018-08-11 PROBLEM — Z90.13 H/O BILATERAL MASTECTOMY: Status: ACTIVE | Noted: 2018-08-11

## 2018-08-11 PROBLEM — E65 BUFFALO HUMP: Status: ACTIVE | Noted: 2018-08-11

## 2018-08-11 PROBLEM — T84.093A FAILED TOTAL LEFT KNEE REPLACEMENT: Status: ACTIVE | Noted: 2018-08-11

## 2018-08-11 PROBLEM — G47.00 INSOMNIA: Status: ACTIVE | Noted: 2018-08-11

## 2019-01-27 PROBLEM — G47.33 OSA (OBSTRUCTIVE SLEEP APNEA): Status: ACTIVE | Noted: 2019-01-27

## 2019-01-27 PROBLEM — F32.A DEPRESSION: Status: ACTIVE | Noted: 2019-01-27

## 2019-05-23 DIAGNOSIS — M25.562 LEFT KNEE PAIN, UNSPECIFIED CHRONICITY: Primary | ICD-10-CM

## 2019-05-29 ENCOUNTER — OFFICE VISIT (OUTPATIENT)
Dept: ORTHOPEDICS | Facility: CLINIC | Age: 59
End: 2019-05-29
Payer: COMMERCIAL

## 2019-05-29 ENCOUNTER — HOSPITAL ENCOUNTER (OUTPATIENT)
Dept: RADIOLOGY | Facility: HOSPITAL | Age: 59
Discharge: HOME | End: 2019-05-29
Attending: ORTHOPAEDIC SURGERY
Payer: COMMERCIAL

## 2019-05-29 VITALS — BODY MASS INDEX: 32.91 KG/M2 | WEIGHT: 185.75 LBS | HEIGHT: 63 IN

## 2019-05-29 DIAGNOSIS — M25.552 PAIN OF LEFT HIP JOINT: ICD-10-CM

## 2019-05-29 DIAGNOSIS — G89.29 CHRONIC PAIN OF LEFT KNEE: ICD-10-CM

## 2019-05-29 DIAGNOSIS — M25.562 LEFT KNEE PAIN, UNSPECIFIED CHRONICITY: ICD-10-CM

## 2019-05-29 DIAGNOSIS — M70.62 TROCHANTERIC BURSITIS OF LEFT HIP: Primary | ICD-10-CM

## 2019-05-29 DIAGNOSIS — M25.552 PAIN OF LEFT HIP JOINT: Primary | ICD-10-CM

## 2019-05-29 DIAGNOSIS — M25.562 CHRONIC PAIN OF LEFT KNEE: ICD-10-CM

## 2019-05-29 DIAGNOSIS — Z96.652 S/P REVISION OF TOTAL KNEE, LEFT: ICD-10-CM

## 2019-05-29 PROCEDURE — 73562 X-RAY EXAM OF KNEE 3: CPT | Mod: TC,LT

## 2019-05-29 PROCEDURE — 20610 PR DRAIN/INJECT LARGE JOINT/BURSA: ICD-10-PCS | Mod: LT,S$GLB,, | Performed by: ORTHOPAEDIC SURGERY

## 2019-05-29 PROCEDURE — 73560 X-RAY EXAM OF KNEE 1 OR 2: CPT | Mod: TC,LT

## 2019-05-29 PROCEDURE — 73502 X-RAY EXAM HIP UNI 2-3 VIEWS: CPT | Mod: 26,LT,, | Performed by: RADIOLOGY

## 2019-05-29 PROCEDURE — 99213 PR OFFICE/OUTPT VISIT, EST, LEVL III, 20-29 MIN: ICD-10-PCS | Mod: 25,S$GLB,, | Performed by: ORTHOPAEDIC SURGERY

## 2019-05-29 PROCEDURE — 20610 DRAIN/INJ JOINT/BURSA W/O US: CPT | Mod: LT,S$GLB,, | Performed by: ORTHOPAEDIC SURGERY

## 2019-05-29 PROCEDURE — 73560 XR KNEE ORTHO LEFT: ICD-10-PCS | Mod: 26,RT,, | Performed by: RADIOLOGY

## 2019-05-29 PROCEDURE — 73502 X-RAY EXAM HIP UNI 2-3 VIEWS: CPT | Mod: TC,LT

## 2019-05-29 PROCEDURE — 73562 X-RAY EXAM OF KNEE 3: CPT | Mod: 26,LT,, | Performed by: RADIOLOGY

## 2019-05-29 PROCEDURE — 99213 OFFICE O/P EST LOW 20 MIN: CPT | Mod: 25,S$GLB,, | Performed by: ORTHOPAEDIC SURGERY

## 2019-05-29 PROCEDURE — 73560 X-RAY EXAM OF KNEE 1 OR 2: CPT | Mod: 26,RT,, | Performed by: RADIOLOGY

## 2019-05-29 PROCEDURE — 73502 XR HIP 2 VIEW LEFT: ICD-10-PCS | Mod: 26,LT,, | Performed by: RADIOLOGY

## 2019-05-29 PROCEDURE — 99999 PR PBB SHADOW E&M-EST. PATIENT-LVL III: CPT | Mod: PBBFAC,,, | Performed by: ORTHOPAEDIC SURGERY

## 2019-05-29 PROCEDURE — 73562 XR KNEE ORTHO LEFT: ICD-10-PCS | Mod: 26,LT,, | Performed by: RADIOLOGY

## 2019-05-29 PROCEDURE — 99999 PR PBB SHADOW E&M-EST. PATIENT-LVL III: ICD-10-PCS | Mod: PBBFAC,,, | Performed by: ORTHOPAEDIC SURGERY

## 2019-05-29 RX ORDER — TRIAMCINOLONE ACETONIDE 40 MG/ML
40 INJECTION, SUSPENSION INTRA-ARTICULAR; INTRAMUSCULAR
Status: COMPLETED | OUTPATIENT
Start: 2019-05-29 | End: 2019-05-29

## 2019-05-29 RX ADMIN — TRIAMCINOLONE ACETONIDE 40 MG: 40 INJECTION, SUSPENSION INTRA-ARTICULAR; INTRAMUSCULAR at 11:05
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
## 2019-07-08 ENCOUNTER — OFFICE VISIT (OUTPATIENT)
Dept: ORTHOPEDICS | Facility: CLINIC | Age: 59
End: 2019-07-08
Payer: COMMERCIAL

## 2019-07-08 VITALS — BODY MASS INDEX: 33.42 KG/M2 | HEIGHT: 63 IN | WEIGHT: 188.63 LBS

## 2019-07-08 DIAGNOSIS — Z96.652 S/P REVISION OF TOTAL KNEE, LEFT: ICD-10-CM

## 2019-07-08 DIAGNOSIS — G89.29 CHRONIC PAIN OF LEFT KNEE: Primary | ICD-10-CM

## 2019-07-08 DIAGNOSIS — M25.562 CHRONIC PAIN OF LEFT KNEE: Primary | ICD-10-CM

## 2019-07-08 DIAGNOSIS — M70.62 TROCHANTERIC BURSITIS OF LEFT HIP: ICD-10-CM

## 2019-07-08 PROCEDURE — 99999 PR PBB SHADOW E&M-EST. PATIENT-LVL III: CPT | Mod: PBBFAC,,, | Performed by: ORTHOPAEDIC SURGERY

## 2019-07-08 PROCEDURE — 99213 PR OFFICE/OUTPT VISIT, EST, LEVL III, 20-29 MIN: ICD-10-PCS | Mod: S$GLB,,, | Performed by: ORTHOPAEDIC SURGERY

## 2019-07-08 PROCEDURE — 99999 PR PBB SHADOW E&M-EST. PATIENT-LVL III: ICD-10-PCS | Mod: PBBFAC,,, | Performed by: ORTHOPAEDIC SURGERY

## 2019-07-08 PROCEDURE — 99213 OFFICE O/P EST LOW 20 MIN: CPT | Mod: S$GLB,,, | Performed by: ORTHOPAEDIC SURGERY

## 2019-07-08 RX ORDER — MELOXICAM 15 MG/1
15 TABLET ORAL DAILY
Qty: 30 TABLET | Refills: 1 | Status: SHIPPED | OUTPATIENT
Start: 2019-07-08 | End: 2019-08-07

## 2019-07-08 NOTE — PROGRESS NOTES
"Subjective:      Patient ID: Traci Freire is a 58 y.o. female.    Chief Complaint: Pain of the Left Hip and Pain of the Left Knee  Hip doing well since injection  HPI  Traci Freire has left knee pain.  The pain is unchanged. The pain is located in the mid third of the leg, medially.  There  is not radiation.   There is not associated stiffness.   There is not catching and locking. The pain is described as achy. The pain is aggravated by activity.  It is alleviated by OTC NSAIDS.  There is not associated back pain.  Her history, medications and problem list were reviewed.    Review of Systems   Constitution: Negative for chills, fever and night sweats.   HENT: Negative for hearing loss.    Eyes: Negative for blurred vision and double vision.   Cardiovascular: Negative for chest pain, claudication and leg swelling.   Respiratory: Negative for shortness of breath.    Endocrine: Negative for polydipsia, polyphagia and polyuria.   Hematologic/Lymphatic: Negative for adenopathy and bleeding problem. Does not bruise/bleed easily.   Skin: Negative for poor wound healing.   Musculoskeletal: Positive for joint pain.   Gastrointestinal: Negative for diarrhea and heartburn.   Genitourinary: Negative for bladder incontinence.   Neurological: Negative for focal weakness, headaches, numbness, paresthesias and sensory change.   Psychiatric/Behavioral: The patient is not nervous/anxious.    Allergic/Immunologic: Negative for persistent infections.         Objective:      Body mass index is 33.41 kg/m².  Vitals:    07/08/19 0909   Weight: 85.6 kg (188 lb 9.7 oz)   Height: 5' 3" (1.6 m)           General    Constitutional: She is oriented to person, place, and time. She appears well-developed and well-nourished.   HENT:   Head: Normocephalic and atraumatic.   Eyes: EOM are normal.   Cardiovascular: Normal rate.    Pulmonary/Chest: Effort normal.   Neurological: She is alert and oriented to person, place, and time.   Psychiatric: She " has a normal mood and affect. Her behavior is normal.     General Musculoskeletal Exam   Gait: abnormal   Pelvic Obliquity: none      Right Knee Exam     Inspection   Alignment:  normal  Erythema: absent  Scars: absent  Swelling: absent  Effusion: absent  Deformity: absent  Bruising: absent    Tenderness   The patient is experiencing no tenderness.     Range of Motion   Extension: 0   Flexion: 130     Tests   Ligament Examination Lachman: normal (-1 to 2mm)   MCL - Valgus: normal (0 to 2mm)  LCL - Varus: normal  Patella   Passive Patellar Tilt: neutral    Other   Sensation: normal    Left Knee Exam     Inspection   Alignment:  normal  Erythema: absent  Scars: present  Swelling: absent  Effusion: absent  Deformity: absent  Bruising: absent    Tenderness   Left knee tenderness location: Mid medial leg.    Range of Motion   Extension: 0   Flexion: 80     Tests   Stability Lachman: normal (-1 to 2mm)   MCL - Valgus: normal (0 to 2mm)  LCL - Varus: normal (0 to 2mm)  Patella   Passive Patellar Tilt: neutral    Other   Sensation: normal    Right Hip Exam     Inspection   Scars: absent  Swelling: absent  Bruising: absent  No deformity of hip.  Quadriceps Atrophy:  Negative  Erythema: absent    Range of Motion   Abduction: 25   Adduction: 20   Extension: 0   Flexion: 100   External rotation: 30   Internal rotation: 25     Tests   Pain w/ forced internal rotation (ADELSO): absent  Stinchfield test: negative    Other   Sensation: normal  Left Hip Exam     Inspection   Scars: absent  Swelling: absent  No deformity of hip.  Quadriceps Atrophy:  negative  Erythema: absent  Bruising: absent    Range of Motion   Abduction: 25   Adduction: 20   Extension: 0   Flexion: 100   External rotation: 30   Internal rotation: 25     Tests   Pain w/ forced internal rotation (ADELSO): absent  Stinchfield test: negative    Other   Sensation: normal      Back (L-Spine & T-Spine) / Neck (C-Spine) Exam   Back exam is normal.      Muscle Strength    Right Lower Extremity   Hip Abduction: 5/5   Hip Adduction: 5/5   Hip Flexion: 5/5   Quadriceps:  5/5   Hamstrin/5 and 1/5   Ankle Dorsiflexion:  5/5   Left Lower Extremity   Hip Abduction: 5/5   Hip Adduction: 5/5   Hip Flexion: 5/5   Quadriceps:  5/5   Hamstrin/5   Ankle Dorsiflexion:  5/5     Reflexes     Left Side  Quadriceps:  2+    Right Side   Quadriceps:  2+    Vascular Exam     Right Pulses  Dorsalis Pedis:      2+          Left Pulses  Dorsalis Pedis:      2+          Capillary Refill  Right Hand: normal capillary refill  Left Hand: normal capillary refill    Edema  Right Upper Leg: absent  Right Lower Leg: absent  Left Upper Leg: absent  Left Lower Leg: absent              Assessment:       Encounter Diagnoses   Name Primary?    Chronic pain of left knee Yes    S/P revision of total knee, left     Trochanteric bursitis of left hip           Plan:       Traci was seen today for pain and pain.    Diagnoses and all orders for this visit:    Chronic pain of left knee    S/P revision of total knee, left    Trochanteric bursitis of left hip    Other orders  -     meloxicam (MOBIC) 15 MG tablet; Take 1 tablet (15 mg total) by mouth once daily.        Traci TAEMZ Freire was given a prescription for Meloxicam.  The patient was given instructions on how to take the medication and side effects were discussed.  She will stop OTC NSAIDS.  F/U in 6 weeks

## 2019-08-26 ENCOUNTER — OFFICE VISIT (OUTPATIENT)
Dept: ORTHOPEDICS | Facility: CLINIC | Age: 59
End: 2019-08-26
Payer: COMMERCIAL

## 2019-08-26 ENCOUNTER — LAB VISIT (OUTPATIENT)
Dept: LAB | Facility: HOSPITAL | Age: 59
End: 2019-08-26
Attending: ORTHOPAEDIC SURGERY
Payer: COMMERCIAL

## 2019-08-26 VITALS
BODY MASS INDEX: 33.38 KG/M2 | HEIGHT: 63 IN | DIASTOLIC BLOOD PRESSURE: 94 MMHG | HEART RATE: 83 BPM | WEIGHT: 188.38 LBS | SYSTOLIC BLOOD PRESSURE: 158 MMHG

## 2019-08-26 DIAGNOSIS — Z96.652 S/P REVISION OF TOTAL KNEE, LEFT: ICD-10-CM

## 2019-08-26 DIAGNOSIS — M25.562 CHRONIC PAIN OF LEFT KNEE: ICD-10-CM

## 2019-08-26 DIAGNOSIS — M25.562 CHRONIC PAIN OF LEFT KNEE: Primary | ICD-10-CM

## 2019-08-26 DIAGNOSIS — G89.29 CHRONIC PAIN OF LEFT KNEE: Primary | ICD-10-CM

## 2019-08-26 DIAGNOSIS — G89.29 CHRONIC PAIN OF LEFT KNEE: ICD-10-CM

## 2019-08-26 LAB
CRP SERPL-MCNC: 0.5 MG/L (ref 0–8.2)
ERYTHROCYTE [SEDIMENTATION RATE] IN BLOOD BY WESTERGREN METHOD: 18 MM/HR (ref 0–36)

## 2019-08-26 PROCEDURE — 85652 RBC SED RATE AUTOMATED: CPT

## 2019-08-26 PROCEDURE — 99213 PR OFFICE/OUTPT VISIT, EST, LEVL III, 20-29 MIN: ICD-10-PCS | Mod: S$GLB,,, | Performed by: ORTHOPAEDIC SURGERY

## 2019-08-26 PROCEDURE — 86140 C-REACTIVE PROTEIN: CPT

## 2019-08-26 PROCEDURE — 99999 PR PBB SHADOW E&M-EST. PATIENT-LVL III: CPT | Mod: PBBFAC,,, | Performed by: ORTHOPAEDIC SURGERY

## 2019-08-26 PROCEDURE — 99999 PR PBB SHADOW E&M-EST. PATIENT-LVL III: ICD-10-PCS | Mod: PBBFAC,,, | Performed by: ORTHOPAEDIC SURGERY

## 2019-08-26 PROCEDURE — 36415 COLL VENOUS BLD VENIPUNCTURE: CPT

## 2019-08-26 PROCEDURE — 99213 OFFICE O/P EST LOW 20 MIN: CPT | Mod: S$GLB,,, | Performed by: ORTHOPAEDIC SURGERY

## 2019-08-26 NOTE — PROGRESS NOTES
"Subjective:      Patient ID: Traci Freire is a 58 y.o. female.    Chief Complaint: Pain of the Left Knee    HPI  Traci Freire has left knee pain.  The pain is unchanged. No help with NSAID. The pain is located in the lateral aspect and shin.   There  is radiation.    There is associated stiffness.   There is not catching and locking. The pain is described as achy. The pain is aggravated by standing, walking, and prolonged sitting.  It is alleviated by nothing consistently.  There is associated back pain.  Her history, medications and problem list were reviewed.    Review of Systems   Constitution: Negative for chills, fever and night sweats.   HENT: Negative for hearing loss.    Eyes: Negative for blurred vision and double vision.   Cardiovascular: Negative for chest pain, claudication and leg swelling.   Respiratory: Negative for shortness of breath.    Endocrine: Negative for polydipsia, polyphagia and polyuria.   Hematologic/Lymphatic: Negative for adenopathy and bleeding problem. Does not bruise/bleed easily.   Skin: Negative for poor wound healing.   Musculoskeletal: Positive for joint pain.   Gastrointestinal: Negative for diarrhea and heartburn.   Genitourinary: Negative for bladder incontinence.   Neurological: Negative for focal weakness, headaches, numbness, paresthesias and sensory change.   Psychiatric/Behavioral: The patient is not nervous/anxious.    Allergic/Immunologic: Negative for persistent infections.         Objective:      Body mass index is 33.91 kg/m².  Vitals:    08/26/19 0940   BP: (!) 158/94   BP Location: Left arm   Patient Position: Sitting   BP Method: Medium (Automatic)   Pulse: 83   Weight: 85.5 kg (188 lb 6.1 oz)   Height: 5' 2.5" (1.588 m)           General    Constitutional: She is oriented to person, place, and time. She appears well-developed and well-nourished.   HENT:   Head: Normocephalic and atraumatic.   Eyes: EOM are normal.   Cardiovascular: Normal rate.  "   Pulmonary/Chest: Effort normal.   Neurological: She is alert and oriented to person, place, and time.   Psychiatric: She has a normal mood and affect. Her behavior is normal.     General Musculoskeletal Exam   Gait: normal       Right Knee Exam     Inspection   Erythema: absent  Scars: absent  Swelling: absent  Effusion: absent  Deformity: absent  Bruising: absent    Tenderness   The patient is experiencing no tenderness.     Range of Motion   Extension: 0   Flexion: 130     Tests   Ligament Examination Lachman: normal (-1 to 2mm)   MCL - Valgus: normal (0 to 2mm)  LCL - Varus: normal  Patella   Passive Patellar Tilt: neutral    Other   Sensation: normal    Left Knee Exam     Inspection   Erythema: absent  Scars: present  Swelling: absent  Effusion: absent  Deformity: absent  Bruising: absent    Tenderness   The patient tender to palpation of the medial retinaculum.    Range of Motion   Extension: 0   Flexion: 80     Tests   Stability Lachman: normal (-1 to 2mm)   MCL - Valgus: normal (0 to 2mm)  LCL - Varus: normal (0 to 2mm)  Patella   Passive Patellar Tilt: neutral    Other   Sensation: normal    Muscle Strength   Right Lower Extremity   Hip Abduction: 5/5   Quadriceps:  5/5   Hamstrin/5   Left Lower Extremity   Hip Abduction: 5/5   Quadriceps:  5/5   Hamstrin/5     Reflexes     Left Side  Quadriceps:  2+    Right Side   Quadriceps:  2+    Vascular Exam     Right Pulses  Dorsalis Pedis:      2+          Left Pulses  Dorsalis Pedis:      2+          Edema  Right Lower Leg: absent  Left Lower Leg: absent              Assessment:       Encounter Diagnoses   Name Primary?    Chronic pain of left knee Yes    S/P revision of total knee, left           Plan:       Traci was seen today for pain.    Diagnoses and all orders for this visit:    Chronic pain of left knee  -     C-reactive protein; Future  -     Sedimentation rate; Future  -     CT Knee Without Contrast Left; Future    S/P revision of total  knee, left  -     C-reactive protein; Future  -     Sedimentation rate; Future  -     CT Knee Without Contrast Left; Future        CRP/ESR.  Will call with results.  Possible CT if labs are ok.

## 2019-08-27 ENCOUNTER — TELEPHONE (OUTPATIENT)
Dept: ORTHOPEDICS | Facility: CLINIC | Age: 59
End: 2019-08-27

## 2019-08-27 NOTE — TELEPHONE ENCOUNTER
Lm for pt to call back in regards to her lab results.    ----- Message from Chris Estevez MD sent at 8/26/2019  4:21 PM CDT -----  Please call pt.  Labs are fine.  I ordered CT

## 2019-08-27 NOTE — TELEPHONE ENCOUNTER
Spoke with pt, notified her of her results and scheduled CT scan. Pt verbalized understanding and had no further questions.    ----- Message from Chris Estevez MD sent at 8/26/2019  4:21 PM CDT -----  Please call pt.  Labs are fine.  I ordered CT

## 2019-09-03 ENCOUNTER — HOSPITAL ENCOUNTER (OUTPATIENT)
Dept: RADIOLOGY | Facility: HOSPITAL | Age: 59
Discharge: HOME | End: 2019-09-03
Attending: ORTHOPAEDIC SURGERY
Payer: COMMERCIAL

## 2019-09-03 ENCOUNTER — TELEPHONE (OUTPATIENT)
Dept: ORTHOPEDICS | Facility: CLINIC | Age: 59
End: 2019-09-03

## 2019-09-03 DIAGNOSIS — M25.562 CHRONIC PAIN OF LEFT KNEE: ICD-10-CM

## 2019-09-03 DIAGNOSIS — Z96.652 S/P REVISION OF TOTAL KNEE, LEFT: ICD-10-CM

## 2019-09-03 DIAGNOSIS — G89.29 CHRONIC PAIN OF LEFT KNEE: ICD-10-CM

## 2019-09-03 PROCEDURE — 73700 CT KNEE WITHOUT CONTRAST LEFT: ICD-10-PCS | Mod: 26,LT,, | Performed by: RADIOLOGY

## 2019-09-03 PROCEDURE — 73700 CT LOWER EXTREMITY W/O DYE: CPT | Mod: TC,LT

## 2019-09-03 PROCEDURE — 73700 CT LOWER EXTREMITY W/O DYE: CPT | Mod: 26,LT,, | Performed by: RADIOLOGY

## 2019-09-03 NOTE — TELEPHONE ENCOUNTER
I spoke with patient I made her an appointment for 10- 16 at 2:16 but she wants an earlier appointment I told patient I will call her back first thing in the morning when Noemi gets back so she see if she can make her an earlier appointment. Pt. Was fine with that.               ----- Message from Charlee Nelson sent at 9/3/2019  4:09 PM CDT -----  Contact: Self  Pt returning missed call from Bibi regarding CT results @ [phone]

## 2019-09-03 NOTE — TELEPHONE ENCOUNTER
Pt. Didn't answer I left a message stating that her ct results are in and to give me a call back                 ----- Message from Chris Estevez MD sent at 9/3/2019 11:14 AM CDT -----  Please call pt.  CT looked OK.  Schedule to discuss fi=urther options.  No need to overbook.

## 2019-09-04 ENCOUNTER — TELEPHONE (OUTPATIENT)
Dept: ORTHOPEDICS | Facility: CLINIC | Age: 59
End: 2019-09-04

## 2019-09-04 NOTE — TELEPHONE ENCOUNTER
I called Pt. Back to discuss Pt. Appointment she didn't answer I left a message letting her know that the schedule is filled so her appointment is scheduled for 10-16 at 2:15 and I also put her on the waiting list.

## 2019-09-12 ENCOUNTER — SSC ENCOUNTER (OUTPATIENT)
Dept: ADMINISTRATIVE | Facility: OTHER | Age: 59
End: 2019-09-12

## 2019-09-12 NOTE — PROGRESS NOTES
Patient's information has been sent to the low/moderate [facility] team for assessment.     Reason for referral: Chronic pain; Pain in left knee      Please contact [facility] with any questions (ext. [phone]).     Thank you,    Tiffany Noble    Outpatient Case Management

## 2019-09-16 ENCOUNTER — OUTPATIENT CASE MANAGEMENT (OUTPATIENT)
Dept: ADMINISTRATIVE | Facility: OTHER | Age: 59
End: 2019-09-16

## 2019-09-16 NOTE — LETTER
September 17, 2019    Traci Freire  [address] [place] [id number]             [facility] [address] [place] [address] Dear:Traci Freire  I am writing from the Outpatient Complex Care Management Department at [facility].  I received a referral from [facility] to contact you  regarding any needs you may have. I have attempted to contact you by phone two times unsuccessfully.  Please contact the Outpatient Complex Care Management Department at [phone] you would like to discuss your needs.      Sincerely,         Ewa Lazo LMSW

## 2019-12-16 ENCOUNTER — OFFICE VISIT (OUTPATIENT)
Dept: FAMILY MEDICINE | Facility: CLINIC | Age: 59
End: 2019-12-16
Payer: COMMERCIAL

## 2019-12-16 VITALS
WEIGHT: 188 LBS | DIASTOLIC BLOOD PRESSURE: 82 MMHG | SYSTOLIC BLOOD PRESSURE: 148 MMHG | HEIGHT: 62 IN | HEART RATE: 84 BPM | BODY MASS INDEX: 34.6 KG/M2

## 2019-12-16 DIAGNOSIS — Z90.13 H/O BILATERAL MASTECTOMY: ICD-10-CM

## 2019-12-16 DIAGNOSIS — I10 HYPERTENSION, UNSPECIFIED TYPE: ICD-10-CM

## 2019-12-16 DIAGNOSIS — R60.9 EDEMA, UNSPECIFIED TYPE: ICD-10-CM

## 2019-12-16 DIAGNOSIS — G47.33 OSA (OBSTRUCTIVE SLEEP APNEA): ICD-10-CM

## 2019-12-16 DIAGNOSIS — R60.9 SOFT TISSUE SWELLING OF BACK: ICD-10-CM

## 2019-12-16 DIAGNOSIS — F32.A DEPRESSION, UNSPECIFIED DEPRESSION TYPE: ICD-10-CM

## 2019-12-16 DIAGNOSIS — F41.9 ANXIETY: Primary | ICD-10-CM

## 2019-12-16 DIAGNOSIS — E78.5 HYPERLIPIDEMIA, UNSPECIFIED HYPERLIPIDEMIA TYPE: ICD-10-CM

## 2019-12-16 PROCEDURE — 99204 OFFICE O/P NEW MOD 45 MIN: CPT | Mod: S$GLB,,, | Performed by: NURSE PRACTITIONER

## 2019-12-16 PROCEDURE — 99204 PR OFFICE/OUTPT VISIT, NEW, LEVL IV, 45-59 MIN: ICD-10-PCS | Mod: S$GLB,,, | Performed by: NURSE PRACTITIONER

## 2019-12-16 RX ORDER — ZOLPIDEM TARTRATE 10 MG/1
10 TABLET ORAL NIGHTLY
Qty: 30 TABLET | Refills: 5 | Status: SHIPPED | OUTPATIENT
Start: 2019-12-16 | End: 2020-06-23 | Stop reason: SDUPTHER

## 2019-12-16 NOTE — PROGRESS NOTES
SUBJECTIVE:    Patient ID: Traci Freire is a 59 y.o. female.    Chief Complaint: Establish Care (brought bottles// SW) and Injections (pt wants FLU shot// SW)    59 year old female presents to establish care. Treated for hypertension, hyperlidpidemia and insomnia. Followed by ortho dr. beard for arthritis primarily in knee. Had 2 knee replacements in the past but now feels like nothing else they can do. Has pain daily. 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 Had bilateral mastectomy. Uses abdominal tissue for reconstruction. Patient reports not being happy with outcome. Would like to see surgeon to discuss options. Has lump to back of neck that feels like it is growing. Occasionally has pain at site.       Lab Visit on 08/26/2019   Component Date Value Ref Range Status    CRP 08/26/2019 0.5  0.0 - 8.2 mg/L Final    Sed Rate 08/26/2019 18  0 - 36 mm/Hr Final   Orders Only on 07/11/2019   Component Date Value Ref Range Status    Cholesterol 07/11/2019 188  <200 mg/dL Final    HDL 07/11/2019 50* >50 mg/dL Final    Triglycerides 07/11/2019 171* <150 mg/dL Final    LDL Cholesterol 07/11/2019 109* mg/dL (calc) Final    Hdl/Cholesterol Ratio 07/11/2019 3.8  <5.0 (calc) Final    Non HDL Chol. (LDL+VLDL) 07/11/2019 138* <130 mg/dL (calc) Final    Glucose 07/11/2019 95  65 - 99 mg/dL Final    BUN, Bld 07/11/2019 19  7 - 25 mg/dL Final    Creatinine 07/11/2019 0.62  0.50 - 1.05 mg/dL Final    eGFR if non African American 07/11/2019 99  > OR = 60 mL/min/1.73m2 Final    eGFR if  07/11/2019 115  > OR = 60 mL/min/1.73m2 Final    BUN/Creatinine Ratio 07/11/2019 NOT APPLICABLE  6 - 22 (calc) Final    Sodium 07/11/2019 143  135 - 146 mmol/L Final    Potassium 07/11/2019 4.1  3.5 - 5.3 mmol/L Final    Chloride 07/11/2019 109  98 - 110 mmol/L Final    CO2 07/11/2019 26  20 - 32 mmol/L Final    Calcium 07/11/2019 9.6  8.6 - 10.4 mg/dL Final    Total Protein 07/11/2019 7.2   6.1 - 8.1 g/dL Final    Albumin 2019 4.3  3.6 - 5.1 g/dL Final    Globulin, Total 2019 2.9  1.9 - 3.7 g/dL (calc) Final    Albumin/Globulin Ratio 2019 1.5  1.0 - 2.5 (calc) Final    Total Bilirubin 2019 0.4  0.2 - 1.2 mg/dL Final    Alkaline Phosphatase 2019 81  33 - 130 U/L Final    AST 2019 20  10 - 35 U/L Final    ALT 2019 21  6 - 29 U/L Final    WBC 2019 9.5  3.8 - 10.8 Thousand/uL Final    RBC 2019 4.34  3.80 - 5.10 Million/uL Final    Hemoglobin 2019 12.9  11.7 - 15.5 g/dL Final    Hematocrit 2019 38.8  35.0 - 45.0 % Final    Mean Corpuscular Volume 2019 89.4  80.0 - 100.0 fL Final    Mean Corpuscular Hemoglobin 2019 29.7  27.0 - 33.0 pg Final    Mean Corpuscular Hemoglobin Conc 2019 33.2  32.0 - 36.0 g/dL Final    RDW 2019 12.9  11.0 - 15.0 % Final    Platelets 2019 309  140 - 400 Thousand/uL Final    MPV 2019 9.7  7.5 - 12.5 fL Final    Neutrophils Absolute 2019 4,114  1,500 - 7,800 cells/uL Final    Lymph # 2019 4,304* 850 - 3,900 cells/uL Final    Mono # 2019 675  200 - 950 cells/uL Final    Eos # 2019 361  15 - 500 cells/uL Final    Baso # 2019 48  0 - 200 cells/uL Final    Neutrophils Relative 2019 43.3  % Final    Lymph% 2019 45.3  % Final    Mono% 2019 7.1  % Final    Eosinophil% 2019 3.8  % Final    Basophil% 2019 0.5  % Final       Past Medical History:   Diagnosis Date    Anxiety     Arthritis     Cancer breast    Right- Bilateral mastectomy- May 2005- had breast reconstruction- mother  of breast cancer    HLD (hyperlipidemia)     Hypertension     diagnosed age late 40's     IBS (irritable bowel syndrome)     Lyme disease     arthritis of hands. Felt like flu- age early 30's- got it  after going to [place]    Sleep apnea      Past Surgical History:   Procedure Laterality Date    ABDOMINAL SURGERY       BLADDER SUSPENSION      BREAST SURGERY       SECTION      CYSTOSCOPY      avoids greens . ? Interstitial cystitis    HYSTERECTOMY      followed by removal of ovaries  from scar tissue    INCISIONAL HERNIA REPAIR Right 2008    RLQ    JOINT REPLACEMENT      [facility] -     KNEE SURGERY Left     TKR    MODIFIED RADICAL MASTECTOMY W/ AXILLARY LYMPH NODE DISSECTION Right 05/10/2005    w/free flap    NISSEN FUNDOPLICATION  2000    SIMPLE MASTECTOMY Left 05/10/2005    w/free flap    TONSILLECTOMY       Family History   Problem Relation Age of Onset    Cancer Mother          of breast cancer, also had gynecological cancer- had breast cancer that spread to the brain    Heart disease Father         in his 60's-  in his 70's       Marital Status:   Alcohol History:  reports that she does not drink alcohol.  Tobacco History:  reports that she quit smoking about 15 years ago. She has never used smokeless tobacco.  Drug History:  reports that she does not use drugs.    Review of patient's allergies indicates:   Allergen Reactions    Keflex [cephalexin]     Macrobid [nitrofurantoin monohyd/m-cryst]        Current Outpatient Medications:     FLUoxetine 20 MG capsule, Take 1 capsule (20 mg total) by mouth once daily., Disp: 30 capsule, Rfl: 5    fluticasone propionate (FLONASE) 50 mcg/actuation nasal spray, 1 spray (50 mcg total) by Each Nare route 2 (two) times daily., Disp: , Rfl:     irbesartan (AVAPRO) 300 MG tablet, Take 1 tablet (300 mg total) by mouth every evening., Disp: 30 tablet, Rfl: 5    rosuvastatin (CRESTOR) 20 MG tablet, Take 1 tablet (20 mg total) by mouth every evening., Disp: 30 tablet, Rfl: 5    zolpidem (AMBIEN) 10 mg Tab, Take 1 tablet (10 mg total) by mouth every evening., Disp: 30 tablet, Rfl: 5    Review of Systems   Constitutional: Negative for chills, fever and unexpected weight change.  "  HENT: Negative for ear pain, rhinorrhea and sore throat.    Eyes: Negative for pain and visual disturbance.   Respiratory: Negative for cough and shortness of breath.    Cardiovascular: Negative for chest pain, palpitations and leg swelling.   Gastrointestinal: Negative for abdominal pain, diarrhea, nausea and vomiting.   Genitourinary: Negative for difficulty urinating, hematuria and vaginal bleeding.   Musculoskeletal: Negative for arthralgias.        Knee pain r>l   Skin: Negative for rash.        Lump to back of neck   Neurological: Negative for dizziness, weakness and headaches.   Psychiatric/Behavioral: Negative for agitation and sleep disturbance. The patient is not nervous/anxious.           Objective:      Vitals:    12/16/19 1102 12/16/19 1103   BP: (!) 158/94 (!) 148/82   Pulse: 84    Weight: 85.3 kg (188 lb)    Height: 5' 1.75" (1.568 m)      Body mass index is 34.66 kg/m².  Physical Exam   Constitutional: She is oriented to person, place, and time. She appears well-developed and well-nourished.   HENT:   Left Ear: External ear normal.   Mouth/Throat: Oropharynx is clear and moist.   Neck: Normal range of motion. Neck supple. No JVD present.   Cardiovascular: Normal rate and regular rhythm.   No murmur heard.  Pulmonary/Chest: Effort normal and breath sounds normal.   Abdominal: Soft. Bowel sounds are normal.   Musculoskeletal: Normal range of motion. She exhibits no edema or deformity.   Lymphadenopathy:     She has no cervical adenopathy.   Neurological: She is alert and oriented to person, place, and time. Gait normal.   Skin: Skin is warm, dry and intact. No rash noted.        Psychiatric: She has a normal mood and affect. Her speech is normal and behavior is normal.         Assessment:       1. Anxiety    2. Depression, unspecified depression type    3. H/O bilateral mastectomy    4. Hyperlipidemia, unspecified hyperlipidemia type    5. Edema, unspecified type    6. FRANCIE (obstructive sleep apnea)  "   7. Soft tissue swelling of back    8. Hypertension, unspecified type         Plan:       Anxiety  -     zolpidem (AMBIEN) 10 mg Tab; Take 1 tablet (10 mg total) by mouth every evening.  Dispense: 30 tablet; Refill: 5  -     CBC auto differential; Future; Expected date: 12/16/2019  -     Comprehensive metabolic panel; Future; Expected date: 12/16/2019  -     Lipid panel; Future; Expected date: 12/16/2019  -     TSH w/reflex to FT4; Future; Expected date: 12/16/2019    Depression, unspecified depression type  -     CBC auto differential; Future; Expected date: 12/16/2019  -     TSH w/reflex to FT4; Future; Expected date: 12/16/2019    H/O bilateral mastectomy  -     Ambulatory Referral to Plastic Surgery  -     Comprehensive metabolic panel; Future; Expected date: 12/16/2019    Hyperlipidemia, unspecified hyperlipidemia type  -     CBC auto differential; Future; Expected date: 12/16/2019  -     Comprehensive metabolic panel; Future; Expected date: 12/16/2019  -     Lipid panel; Future; Expected date: 12/16/2019    Edema, unspecified type  -     CBC auto differential; Future; Expected date: 12/16/2019  -     Comprehensive metabolic panel; Future; Expected date: 12/16/2019    FRANCIE (obstructive sleep apnea)    Soft tissue swelling of back  -     US Soft Tissue Misc; Future; Expected date: 12/16/2019    Hypertension, unspecified type  -     Microalbumin/creatinine urine ratio; Future; Expected date: 12/16/2019  -     Urinalysis, Reflex to Urine Culture Urine, Clean Catch; Future; Expected date: 12/16/2019      Follow up in about 3 months (around 3/16/2020) for Follow up, medication management.

## 2020-01-06 ENCOUNTER — HOSPITAL ENCOUNTER (OUTPATIENT)
Dept: RADIOLOGY | Facility: HOSPITAL | Age: 60
Discharge: HOME | End: 2020-01-06
Attending: NURSE PRACTITIONER
Payer: COMMERCIAL

## 2020-01-06 DIAGNOSIS — R60.9 SOFT TISSUE SWELLING OF BACK: ICD-10-CM

## 2020-01-06 PROCEDURE — 76705 ECHO EXAM OF ABDOMEN: CPT | Mod: TC,PO

## 2020-01-16 ENCOUNTER — TELEPHONE (OUTPATIENT)
Dept: FAMILY MEDICINE | Facility: CLINIC | Age: 60
End: 2020-01-16

## 2020-01-16 NOTE — TELEPHONE ENCOUNTER
LMOR to call me back so that I can remind her that she is due for fasting lab. I already spoke to her once today so on voicemail I told her this was regarding something else.

## 2020-01-16 NOTE — TELEPHONE ENCOUNTER
LMOR again to call Cinthya NIMESH back and that it wasn't urgent, but I was off tomorrow and trying to reach her by the end of the day. Told her if she called tomorrow to ask for Evelyne.

## 2020-02-15 RX ORDER — FLUOXETINE HYDROCHLORIDE 20 MG/1
CAPSULE ORAL
Refills: 0 | OUTPATIENT
Start: 2020-02-15

## 2020-02-17 DIAGNOSIS — I10 ESSENTIAL HYPERTENSION: ICD-10-CM

## 2020-02-17 DIAGNOSIS — F41.9 ANXIETY: ICD-10-CM

## 2020-02-17 DIAGNOSIS — E78.5 HYPERLIPIDEMIA, UNSPECIFIED HYPERLIPIDEMIA TYPE: Primary | ICD-10-CM

## 2020-02-17 RX ORDER — IRBESARTAN 300 MG/1
300 TABLET ORAL NIGHTLY
Qty: 90 TABLET | Refills: 1 | Status: SHIPPED | OUTPATIENT
Start: 2020-02-17 | End: 2020-06-15 | Stop reason: SDUPTHER

## 2020-02-17 RX ORDER — FLUOXETINE HYDROCHLORIDE 20 MG/1
20 CAPSULE ORAL DAILY
Qty: 90 CAPSULE | Refills: 1 | Status: SHIPPED | OUTPATIENT
Start: 2020-02-17 | End: 2020-06-15 | Stop reason: SDUPTHER

## 2020-02-17 RX ORDER — ROSUVASTATIN CALCIUM 20 MG/1
20 TABLET, COATED ORAL NIGHTLY
Qty: 90 TABLET | Refills: 1 | Status: SHIPPED | OUTPATIENT
Start: 2020-02-17 | End: 2020-06-15 | Stop reason: SDUPTHER

## 2020-03-06 RX ORDER — LOSARTAN POTASSIUM 100 MG/1
TABLET ORAL
Refills: 0 | OUTPATIENT
Start: 2020-03-06

## 2020-03-16 NOTE — TELEPHONE ENCOUNTER
----- Message from Traci Rodriguez sent at 2/17/2020  9:27 AM CST -----  Refill for Crestor, Avapro and Fluoxetine. [facility] on [facility]. Pt #[phone]   1

## 2020-06-03 ENCOUNTER — OFFICE VISIT (OUTPATIENT)
Dept: PODIATRY | Facility: CLINIC | Age: 60
End: 2020-06-03
Payer: COMMERCIAL

## 2020-06-03 ENCOUNTER — LAB VISIT (OUTPATIENT)
Dept: LAB | Facility: HOSPITAL | Age: 60
End: 2020-06-03
Attending: PODIATRIST
Payer: COMMERCIAL

## 2020-06-03 VITALS
SYSTOLIC BLOOD PRESSURE: 148 MMHG | BODY MASS INDEX: 34.66 KG/M2 | HEIGHT: 62 IN | DIASTOLIC BLOOD PRESSURE: 84 MMHG | HEART RATE: 73 BPM | TEMPERATURE: 99 F

## 2020-06-03 DIAGNOSIS — M79.675 PAIN IN LEFT TOE(S): ICD-10-CM

## 2020-06-03 DIAGNOSIS — M79.671 FOOT PAIN, BILATERAL: ICD-10-CM

## 2020-06-03 DIAGNOSIS — M79.672 FOOT PAIN, BILATERAL: ICD-10-CM

## 2020-06-03 DIAGNOSIS — B35.1 ONYCHOMYCOSIS DUE TO DERMATOPHYTE: ICD-10-CM

## 2020-06-03 DIAGNOSIS — B35.3 TINEA PEDIS OF BOTH FEET: ICD-10-CM

## 2020-06-03 DIAGNOSIS — B35.1 ONYCHOMYCOSIS DUE TO DERMATOPHYTE: Primary | ICD-10-CM

## 2020-06-03 DIAGNOSIS — M79.674 PAIN IN RIGHT TOE(S): ICD-10-CM

## 2020-06-03 LAB
ALBUMIN SERPL BCP-MCNC: 3.9 G/DL (ref 3.5–5.2)
ALP SERPL-CCNC: 73 U/L (ref 55–135)
ALT SERPL W/O P-5'-P-CCNC: 24 U/L (ref 10–44)
AST SERPL-CCNC: 22 U/L (ref 10–40)
BILIRUB DIRECT SERPL-MCNC: <0.1 MG/DL (ref 0.1–0.3)
BILIRUB SERPL-MCNC: 0.6 MG/DL (ref 0.1–1)
PROT SERPL-MCNC: 7.1 G/DL (ref 6–8.4)

## 2020-06-03 PROCEDURE — 80076 HEPATIC FUNCTION PANEL: CPT

## 2020-06-03 PROCEDURE — 99203 OFFICE O/P NEW LOW 30 MIN: CPT | Mod: S$GLB,,, | Performed by: PODIATRIST

## 2020-06-03 PROCEDURE — 99203 PR OFFICE/OUTPT VISIT, NEW, LEVL III, 30-44 MIN: ICD-10-PCS | Mod: S$GLB,,, | Performed by: PODIATRIST

## 2020-06-03 PROCEDURE — 36415 COLL VENOUS BLD VENIPUNCTURE: CPT

## 2020-06-03 RX ORDER — TERBINAFINE HYDROCHLORIDE 250 MG/1
250 TABLET ORAL DAILY
Qty: 90 TABLET | Refills: 0 | Status: SHIPPED | OUTPATIENT
Start: 2020-06-03 | End: 2020-11-25

## 2020-06-03 NOTE — PROGRESS NOTES
[address] [address]  [place] [address]  Phone: [phone]   [phone]    Patient's PCP:Juliet Medina NP  Referring Provider: No ref. provider found    Subjective:      Chief Complaint:: Nail Problem (bilateral)    RADHA Freire is a 59 y.o. female who presents with a complaint of toenail fungus. Current symptoms include painful 1st toenails.  .Treatment to date have included peroxide soaks. Patients rates pain 3/10 on pain scale.  Patient also wishes to purchase a pair of over-the-counter inserts for her foot pain that is generalized to her entire feet bilateral.  Patient presents to the office with a high blood pressure. Patient takes BP medicine and has an appointment upcoming for this.    Vitals:    20 1452   BP: (!) 148/84   Pulse: 73   Temp: 98.5 °F (36.9 °C)     Shoe Size: 8.5    Past Surgical History:   Procedure Laterality Date    ABDOMINAL SURGERY      BLADDER SUSPENSION      BREAST SURGERY       SECTION      CYSTOSCOPY      avoids greens . ? Interstitial cystitis    HYSTERECTOMY      followed by removal of ovaries  from scar tissue    INCISIONAL HERNIA REPAIR Right 2008    RLQ    JOINT REPLACEMENT      [facility] knee replacement[facility] -     KNEE SURGERY Left     TKR    MODIFIED RADICAL MASTECTOMY W/ AXILLARY LYMPH NODE DISSECTION Right 05/10/2005    w/free flap    NISSEN FUNDOPLICATION  2000    SIMPLE MASTECTOMY Left 05/10/2005    w/free flap    TONSILLECTOMY       Past Medical History:   Diagnosis Date    Anxiety     Arthritis     Cancer breast    Right- Bilateral mastectomy- May 2005- had breast reconstruction- mother  of breast cancer    HLD (hyperlipidemia)     Hypertension     diagnosed age late 40's     IBS (irritable bowel syndrome)     Lyme disease     arthritis of hands. Felt like flu- age early 30's- got it  after going to [place]    Sleep apnea      Family History   Problem  Relation Age of Onset    Cancer Mother          of breast cancer, also had gynecological cancer- had breast cancer that spread to the brain    Heart disease Father         in his 60's-  in his 70's        Social History:   Marital Status:   Alcohol History:  reports that she does not drink alcohol.  Tobacco History:  reports that she quit smoking about 16 years ago. She has never used smokeless tobacco.  Drug History:  reports that she does not use drugs.    Review of patient's allergies indicates:   Allergen Reactions    Keflex [cephalexin]     Macrobid [nitrofurantoin monohyd/m-cryst]        Current Outpatient Medications   Medication Sig Dispense Refill    FLUoxetine 20 MG capsule Take 1 capsule (20 mg total) by mouth once daily. 90 capsule 1    fluticasone propionate (FLONASE) 50 mcg/actuation nasal spray 1 spray (50 mcg total) by Each Nare route 2 (two) times daily. (Patient taking differently: 1 spray by Each Nostril route as needed. )      irbesartan (AVAPRO) 300 MG tablet Take 1 tablet (300 mg total) by mouth every evening. 90 tablet 1    rosuvastatin (CRESTOR) 20 MG tablet Take 1 tablet (20 mg total) by mouth every evening. 90 tablet 1    zolpidem (AMBIEN) 10 mg Tab Take 1 tablet (10 mg total) by mouth every evening. 30 tablet 5    terbinafine HCL (LAMISIL) 250 mg tablet Take 1 tablet (250 mg total) by mouth once daily. 1 pill by mouth x 90 days. Avoid alcohol intake while taking medication 90 tablet 0     No current facility-administered medications for this visit.        Review of Systems      Objective:        Physical Exam:   Foot Exam  Physical Exam       Dry scale with superficial flakes over an erythematous base bilateral feet in moccasin distribution without ulceration, drainage, pus, tracking, fluctuance, malodor, or cardinal signs infection.    Toenails 1st, 2nd, 3rd, 4th, 5th  bilateral are hypertrophic, dystrophic, discolored tanish brown with tan, gray crumbly  subungual debris.  Tender to distal nail plate pressure, without periungual skin abnormality of each.    Otherwise, Skin is normal age and health appropriate color, turgor, texture, and temperature bilateral lower extremities without ulceration, hyperpigmentation, discoloration, masses nodules or cords palpated.  No ecchymosis, erythema, edema, or cardinal signs of infection bilateral lower extremities.    Adequate joint range of motion without pain, limitation, nor crepitation Bilateral feet and ankle joints. Muscle strength is 5/5 in all groups bilaterally.     Protective sensation intact by 10 g monofilament all ten toes/both feet.  Pain sensation intact bilateral feet and legs.    DP and PT pulses 2/4 bilateral, capillary refill 3 seconds all toes/distal feet, all toes/both feet warm to touch.   Negative lower extremity edema bilateral.      Imaging:            Assessment:       1. Onychomycosis due to dermatophyte    2. Foot pain, bilateral    3. Pain in right toe(s)    4. Pain in left toe(s)      Plan:   Onychomycosis due to dermatophyte  -     Hepatic function panel; Future; Expected date: 06/03/2020  -     Hepatic function panel; Future; Expected date: 07/03/2020    Foot pain, bilateral  -     CROSS  FOR HOME USE    Pain in right toe(s)    Pain in left toe(s)    Other orders  -     terbinafine HCL (LAMISIL) 250 mg tablet; Take 1 tablet (250 mg total) by mouth once daily. 1 pill by mouth x 90 days. Avoid alcohol intake while taking medication  Dispense: 90 tablet; Refill: 0      Follow up in about 4 months (around 10/3/2020), or if symptoms worsen or fail to improve.    Procedures - None    Counseling:     I provided patient education verbally regarding:   Patient diagnosis, treatment options, as well as alternatives, risks, and benefits.     This note was created using Dragon voice recognition software that occasionally misinterpreted phrases or words.       Fungal infection of toenails explained.  Treatment options including no treatment, periodic debridement, topical medications, oral medications, and removal of the nail were discussed, as well as success rates and risks of recurrence. We agreed on topical medication and oral medication  Prescribed patient professional [facility] pharmacy antifungal nail and foot cream.  Prescribed patient Lamisil.  Patient will get liver function tests to beginning Lamisil and again at 1 month into taking lamisil.

## 2020-06-03 NOTE — PATIENT INSTRUCTIONS
Nail Fungal Infection  A nail fungal infection changes the way fingernails and toenails look. They may thicken, discolor, change shape, or split. This condition is hard to treat because nails grow slowly and have limited blood supply. The infection often comes back after treatment.  There are 2 types of medicines used to treat this condition:  · Topical anti-fungal medicines. These are applied to the surface of the skin and nail area. These medicines are not very effective because they cant get deep into the nail.  · Oral antifungal medicines. These medicines work better because they go into the nail from the inside out. But the infection may still come back. It may take 9 to 12 months for your nail to look normal again. This means you are cured. You can repeat treatment if needed. Most people take these medicines without any problems. It is rare to stop therapy because of side effects. But your healthcare provider may give you some monitoring tests. Talk about possible side effects with your provider before starting treatment.  If medicines fail, the nail can be removed surgically or chemically. These methods physically remove the fungus from the body. This helps medical treatment be more effective.  Home care  · Use medicines exactly as directed for as long as directed. Treating a fungal infection can take longer than other kinds of infections.  · Smoking is a risk factor for fungal infection. This is one more reason to quit.  · Wear absorbent socks, and shoes that let your feet breathe. Sweaty feet increase your risk of fungal infection. They also make an existing infection harder to treat.  · Use footwear when in damp public places like swimming pools, gyms, and shower rooms. This will help you avoid the fungus that grows there.  · Don't share nail clippers or scissors with others.  Follow-up care  Follow up with your healthcare provider, or as advised.  When to seek medical advice  Call your healthcare  provider right away if any of these occur:  · Skin by the nail becomes red, swollen, painful, or drains pus (a creamy yellow or white liquid)  · Side effects from oral anti-fungal medicines  Date Last Reviewed: 8/1/2016  © 1294-4895 [employer]. [address] [place] [address]. All rights reserved. This information is not intended as a substitute for professional medical care. Always follow your healthcare professional's instructions.

## 2020-06-04 ENCOUNTER — TELEPHONE (OUTPATIENT)
Dept: FAMILY MEDICINE | Facility: CLINIC | Age: 60
End: 2020-06-04

## 2020-06-04 NOTE — TELEPHONE ENCOUNTER
Spoke to pt regarding her appt on 6/15. Offered virtual visit. Pt stated she would rather come into the office. Advised pt to wear mask/ call upon arrival

## 2020-06-08 ENCOUNTER — TELEPHONE (OUTPATIENT)
Dept: PODIATRY | Facility: CLINIC | Age: 60
End: 2020-06-08

## 2020-06-08 NOTE — TELEPHONE ENCOUNTER
----- Message from Lisa Goodwin DPM sent at 6/7/2020  1:32 PM CDT -----  Please call patient and let her know she can begin taking the lamisil. Remind her she needs to repeat the blood work in 1 month.   Thank you

## 2020-06-15 ENCOUNTER — OFFICE VISIT (OUTPATIENT)
Dept: FAMILY MEDICINE | Facility: CLINIC | Age: 60
End: 2020-06-15
Payer: COMMERCIAL

## 2020-06-15 VITALS
DIASTOLIC BLOOD PRESSURE: 88 MMHG | HEIGHT: 62 IN | WEIGHT: 186 LBS | BODY MASS INDEX: 34.23 KG/M2 | SYSTOLIC BLOOD PRESSURE: 152 MMHG | TEMPERATURE: 98 F | HEART RATE: 88 BPM

## 2020-06-15 DIAGNOSIS — M25.561 CHRONIC ARTHRALGIAS OF KNEES AND HIPS: ICD-10-CM

## 2020-06-15 DIAGNOSIS — R94.31 NONSPECIFIC ST-T CHANGES: ICD-10-CM

## 2020-06-15 DIAGNOSIS — G47.33 OSA (OBSTRUCTIVE SLEEP APNEA): ICD-10-CM

## 2020-06-15 DIAGNOSIS — M25.562 CHRONIC ARTHRALGIAS OF KNEES AND HIPS: ICD-10-CM

## 2020-06-15 DIAGNOSIS — M25.552 CHRONIC ARTHRALGIAS OF KNEES AND HIPS: ICD-10-CM

## 2020-06-15 DIAGNOSIS — R60.9 EDEMA, UNSPECIFIED TYPE: ICD-10-CM

## 2020-06-15 DIAGNOSIS — G89.29 CHRONIC ARTHRALGIAS OF KNEES AND HIPS: ICD-10-CM

## 2020-06-15 DIAGNOSIS — R06.00 DYSPNEA: ICD-10-CM

## 2020-06-15 DIAGNOSIS — F41.9 ANXIETY: ICD-10-CM

## 2020-06-15 DIAGNOSIS — Z79.899 HIGH RISK MEDICATION USE: Primary | ICD-10-CM

## 2020-06-15 DIAGNOSIS — R06.00 DYSPNEA, UNSPECIFIED TYPE: ICD-10-CM

## 2020-06-15 DIAGNOSIS — E78.5 HYPERLIPIDEMIA, UNSPECIFIED HYPERLIPIDEMIA TYPE: ICD-10-CM

## 2020-06-15 DIAGNOSIS — I10 ESSENTIAL HYPERTENSION: ICD-10-CM

## 2020-06-15 DIAGNOSIS — M25.551 CHRONIC ARTHRALGIAS OF KNEES AND HIPS: ICD-10-CM

## 2020-06-15 DIAGNOSIS — R53.83 FATIGUE, UNSPECIFIED TYPE: ICD-10-CM

## 2020-06-15 DIAGNOSIS — R07.9 CHEST PAIN, UNSPECIFIED TYPE: ICD-10-CM

## 2020-06-15 LAB — EKG 12-LEAD: NORMAL

## 2020-06-15 PROCEDURE — 93000 ELECTROCARDIOGRAM COMPLETE: CPT | Mod: S$GLB,,, | Performed by: NURSE PRACTITIONER

## 2020-06-15 PROCEDURE — 99214 OFFICE O/P EST MOD 30 MIN: CPT | Mod: 25,S$GLB,, | Performed by: NURSE PRACTITIONER

## 2020-06-15 PROCEDURE — 99214 PR OFFICE/OUTPT VISIT, EST, LEVL IV, 30-39 MIN: ICD-10-PCS | Mod: 25,S$GLB,, | Performed by: NURSE PRACTITIONER

## 2020-06-15 PROCEDURE — 93000 POCT EKG 12-LEAD: ICD-10-PCS | Mod: S$GLB,,, | Performed by: NURSE PRACTITIONER

## 2020-06-15 RX ORDER — FLUOXETINE HYDROCHLORIDE 20 MG/1
20 CAPSULE ORAL DAILY
Qty: 90 CAPSULE | Refills: 1 | Status: SHIPPED | OUTPATIENT
Start: 2020-06-15 | End: 2020-11-25 | Stop reason: SDUPTHER

## 2020-06-15 RX ORDER — AMLODIPINE BESYLATE 5 MG/1
5 TABLET ORAL DAILY
Qty: 30 TABLET | Refills: 11 | Status: SHIPPED | OUTPATIENT
Start: 2020-06-15 | End: 2021-05-26 | Stop reason: SDUPTHER

## 2020-06-15 RX ORDER — ZOLPIDEM TARTRATE 10 MG/1
10 TABLET ORAL NIGHTLY
Qty: 90 TABLET | Refills: 1 | Status: CANCELLED | OUTPATIENT
Start: 2020-06-15

## 2020-06-15 RX ORDER — IRBESARTAN 300 MG/1
300 TABLET ORAL NIGHTLY
Qty: 90 TABLET | Refills: 1 | Status: SHIPPED | OUTPATIENT
Start: 2020-06-15 | End: 2020-11-25 | Stop reason: SDUPTHER

## 2020-06-15 RX ORDER — ROSUVASTATIN CALCIUM 20 MG/1
20 TABLET, COATED ORAL NIGHTLY
Qty: 90 TABLET | Refills: 1 | Status: SHIPPED | OUTPATIENT
Start: 2020-06-15 | End: 2020-11-25 | Stop reason: SDUPTHER

## 2020-06-15 NOTE — PROGRESS NOTES
SUBJECTIVE:    Patient ID: Traci Freire is a 59 y.o. female.    Chief Complaint: Follow-up (6mth, no bottles went over meds verbally, has some things to go over// SW)    59  Year old female Presents for regular checkup. C/oing of generalized arthralgias especially to bilateral knees with left>right, admits to overusing ibuprofen- taking multiple times a day 5+ days a week. Hx of left knee replacement about 5-6yrs ago, was seeing Dr Estevez- reports that he does not want to go in and remove scar tissue. Does admit to stressors. Daughter recently diagnosed with breast cancer. Currently receiving chemo.   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 Reports that she saw a cardiologist and had a stress test several years ago >10 years. Denies chest pain, palpitations, and cough. Reports shortness of breath occurs without relation to activity. Resolves after several minutes. Does not sleep well. Has diagnosed sleep apnea but does not 'like machine'          Office Visit on 06/15/2020   Component Date Value Ref Range Status    EKG 12-Lead 06/15/2020 68bpm   Final   Lab Visit on 2020   Component Date Value Ref Range Status    Total Protein 2020 7.1  6.0 - 8.4 g/dL Final    Albumin 2020 3.9  3.5 - 5.2 g/dL Final    Total Bilirubin 2020 0.6  0.1 - 1.0 mg/dL Final    Bilirubin, Direct 2020 <0.1* 0.1 - 0.3 mg/dL Final    AST 2020 22  10 - 40 U/L Final    ALT 2020 24  10 - 44 U/L Final    Alkaline Phosphatase 2020 73  55 - 135 U/L Final       Past Medical History:   Diagnosis Date    Anxiety     Arthritis     Cancer breast    Right- Bilateral mastectomy- May 2005- had breast reconstruction- mother  of breast cancer    HLD (hyperlipidemia)     Hypertension     diagnosed age late 40's     IBS (irritable bowel syndrome)     Lyme disease     arthritis of hands. Felt like flu- age early 30's-  got it  after going to [place]    Sleep apnea      Past Surgical History:   Procedure Laterality Date    ABDOMINAL SURGERY      BLADDER SUSPENSION      BREAST SURGERY       SECTION      CYSTOSCOPY      avoids greens . ? Interstitial cystitis    HYSTERECTOMY      followed by removal of ovaries  from scar tissue    INCISIONAL HERNIA REPAIR Right 2008    RLQ    JOINT REPLACEMENT      [facility] -     KNEE SURGERY Left 2014    TKR    MODIFIED RADICAL MASTECTOMY W/ AXILLARY LYMPH NODE DISSECTION Right 05/10/2005    w/free flap    NISSEN FUNDOPLICATION  2000    SIMPLE MASTECTOMY Left 05/10/2005    w/free flap    TONSILLECTOMY       Family History   Problem Relation Age of Onset    Cancer Mother          of breast cancer, also had gynecological cancer- had breast cancer that spread to the brain    Heart disease Father         in his 60's-  in his 70's       Marital Status:   Alcohol History:  reports no history of alcohol use.  Tobacco History:  reports that she quit smoking about 16 years ago. She has never used smokeless tobacco.  Drug History:  reports no history of drug use.    Review of patient's allergies indicates:   Allergen Reactions    Keflex [cephalexin]     Macrobid [nitrofurantoin monohyd/m-cryst]        Current Outpatient Medications:     FLUoxetine 20 MG capsule, Take 1 capsule (20 mg total) by mouth once daily., Disp: 90 capsule, Rfl: 1    irbesartan (AVAPRO) 300 MG tablet, Take 1 tablet (300 mg total) by mouth every evening., Disp: 90 tablet, Rfl: 1    rosuvastatin (CRESTOR) 20 MG tablet, Take 1 tablet (20 mg total) by mouth every evening., Disp: 90 tablet, Rfl: 1    terbinafine HCL (LAMISIL) 250 mg tablet, Take 1 tablet (250 mg total) by mouth once daily. 1 pill by mouth x 90 days. Avoid alcohol intake while taking medication, Disp: 90 tablet, Rfl: 0    zolpidem (AMBIEN) 10 mg Tab, Take 1  "tablet (10 mg total) by mouth every evening., Disp: 30 tablet, Rfl: 5    amLODIPine (NORVASC) 5 MG tablet, Take 1 tablet (5 mg total) by mouth once daily., Disp: 30 tablet, Rfl: 11    Review of Systems   Constitutional: Negative for chills, fever and unexpected weight change.   HENT: Negative for ear pain, rhinorrhea and sore throat.    Eyes: Negative for pain and visual disturbance.   Respiratory: Positive for shortness of breath. Negative for cough and wheezing.    Cardiovascular: Negative for chest pain, palpitations and leg swelling.   Gastrointestinal: Positive for abdominal pain (with constipation) and constipation (occasional, resolves on its own). Negative for diarrhea, nausea and vomiting.   Genitourinary: Negative for difficulty urinating, hematuria and vaginal bleeding.   Musculoskeletal: Positive for arthralgias (multiple joints, especially bilat knees L>R).        Knee pain r>l   Skin: Negative for rash.        Lump to back of neck   Neurological: Positive for light-headedness. Negative for dizziness, weakness and headaches.   Psychiatric/Behavioral: Positive for sleep disturbance. Negative for agitation. The patient is not nervous/anxious.           Objective:      Vitals:    06/15/20 0909 06/15/20 0911   BP: (!) 160/80 (!) 152/88   Pulse: 88    Temp: 98.4 °F (36.9 °C)    Weight: 84.4 kg (186 lb)    Height: 5' 1.75" (1.568 m)      Body mass index is 34.3 kg/m².  Physical Exam  Constitutional:       Appearance: She is well-developed.      Comments: obese   HENT:      Head: Normocephalic.      Right Ear: External ear normal.      Left Ear: External ear normal.   Eyes:      Conjunctiva/sclera: Conjunctivae normal.      Pupils: Pupils are equal, round, and reactive to light.   Neck:      Musculoskeletal: Normal range of motion and neck supple.      Vascular: No JVD.   Cardiovascular:      Rate and Rhythm: Normal rate and regular rhythm.      Heart sounds: No murmur.   Pulmonary:      Effort: Pulmonary " effort is normal.      Breath sounds: Normal breath sounds.      Comments: No shortness of breath observed. Pulse ox 100  Abdominal:      General: Bowel sounds are normal.      Palpations: Abdomen is soft.      Tenderness: There is no abdominal tenderness.      Hernia: A hernia (ventral hernia, soft and reducible) is present. Hernia is present in the ventral area.   Musculoskeletal:         General: No deformity.      Comments: Decreased ROM to left knee, crepitus with flexion of right knee   Lymphadenopathy:      Cervical: No cervical adenopathy.   Skin:     General: Skin is warm and dry.      Findings: No rash.   Neurological:      Mental Status: She is alert and oriented to person, place, and time.      Gait: Gait normal.   Psychiatric:         Mood and Affect: Mood is anxious.         Speech: Speech normal.         Behavior: Behavior normal.           Assessment:       1. High risk medication use    2. Anxiety    3. Essential hypertension    4. Hyperlipidemia, unspecified hyperlipidemia type    5. Edema, unspecified type    6. FRANCIE (obstructive sleep apnea)    7. Chronic arthralgias of knees and hips    8. Dyspnea, unspecified type    9. Dyspnea    10. Fatigue, unspecified type    11. Chest pain, unspecified type    12. Nonspecific ST-T changes         Plan:       High risk medication use  -     Microalbumin/creatinine urine ratio; Future; Expected date: 06/15/2020  -     Urinalysis, Reflex to Urine Culture Urine, Clean Catch; Future; Expected date: 06/15/2020  -     ETHEL Screen w/Reflex; Future; Expected date: 06/15/2020  -     Rheumatoid factor; Future; Expected date: 06/15/2020  -     POCT EKG 12-LEAD (NOT FOR [facility] USE)    Anxiety  -     FLUoxetine 20 MG capsule; Take 1 capsule (20 mg total) by mouth once daily.  Dispense: 90 capsule; Refill: 1  -     TSH w/reflex to FT4; Future; Expected date: 06/15/2020  -     POCT EKG 12-LEAD (NOT FOR [facility] USE)    Essential hypertension  -     irbesartan (AVAPRO) 300  MG tablet; Take 1 tablet (300 mg total) by mouth every evening.  Dispense: 90 tablet; Refill: 1  -     CBC auto differential; Future; Expected date: 06/15/2020  -     Comprehensive metabolic panel; Future; Expected date: 06/15/2020  -     amLODIPine (NORVASC) 5 MG tablet; Take 1 tablet (5 mg total) by mouth once daily.  Dispense: 30 tablet; Refill: 11  -     Nuclear Stress Test; Future    Hyperlipidemia, unspecified hyperlipidemia type  -     rosuvastatin (CRESTOR) 20 MG tablet; Take 1 tablet (20 mg total) by mouth every evening.  Dispense: 90 tablet; Refill: 1  -     Lipid Panel; Future; Expected date: 06/15/2020    Edema, unspecified type    FRANCIE (obstructive sleep apnea)    Chronic arthralgias of knees and hips  -     ETHEL Screen w/Reflex; Future; Expected date: 06/15/2020  -     Rheumatoid factor; Future; Expected date: 06/15/2020  -     Sedimentation rate; Future; Expected date: 06/15/2020    Dyspnea, unspecified type  -     POCT EKG 12-LEAD (NOT FOR [facility] USE)    Dyspnea  -     POCT EKG 12-LEAD (NOT FOR [facility] USE)  -     NM Myocardial Perfusion Spect Multi Pharmacologic; Future; Expected date: 06/15/2020  -     Nuclear Stress Test; Future    Fatigue, unspecified type  -     Vitamin B12; Future; Expected date: 06/15/2020    Chest pain, unspecified type  -     NM Myocardial Perfusion Spect Multi Pharmacologic; Future; Expected date: 06/15/2020  -     Nuclear Stress Test; Future    Nonspecific ST-T changes  -     NM Myocardial Perfusion Spect Multi Pharmacologic; Future; Expected date: 06/15/2020  -     Nuclear Stress Test; Future      Follow up in about 2 weeks (around 6/29/2020) for Follow up, medication management.

## 2020-06-17 LAB
ALBUMIN SERPL-MCNC: 4.4 G/DL (ref 3.6–5.1)
ALBUMIN/CREAT UR: 39 MCG/MG CREAT
ALBUMIN/GLOB SERPL: 1.7 (CALC) (ref 1–2.5)
ALP SERPL-CCNC: 80 U/L (ref 37–153)
ALT SERPL-CCNC: 18 U/L (ref 6–29)
ANA PAT SER IF-IMP: ABNORMAL
ANA SER QL IF: POSITIVE
ANA TITR SER IF: ABNORMAL TITER
APPEARANCE UR: ABNORMAL
AST SERPL-CCNC: 18 U/L (ref 10–35)
BACTERIA #/AREA URNS HPF: ABNORMAL /HPF
BACTERIA UR CULT: ABNORMAL
BACTERIA UR CULT: NORMAL
BASOPHILS # BLD AUTO: 41 CELLS/UL (ref 0–200)
BASOPHILS NFR BLD AUTO: 0.6 %
BILIRUB SERPL-MCNC: 0.4 MG/DL (ref 0.2–1.2)
BILIRUB UR QL STRIP: NEGATIVE
BUN SERPL-MCNC: 12 MG/DL (ref 7–25)
BUN/CREAT SERPL: NORMAL (CALC) (ref 6–22)
CALCIUM SERPL-MCNC: 9.3 MG/DL (ref 8.6–10.4)
CHLORIDE SERPL-SCNC: 107 MMOL/L (ref 98–110)
CHOLEST SERPL-MCNC: 181 MG/DL
CHOLEST/HDLC SERPL: 3.3 (CALC)
CO2 SERPL-SCNC: 29 MMOL/L (ref 20–32)
COLOR UR: YELLOW
CREAT SERPL-MCNC: 0.6 MG/DL (ref 0.5–1.05)
CREAT UR-MCNC: 196 MG/DL (ref 20–275)
EOSINOPHIL # BLD AUTO: 231 CELLS/UL (ref 15–500)
EOSINOPHIL NFR BLD AUTO: 3.4 %
ERYTHROCYTE [DISTWIDTH] IN BLOOD BY AUTOMATED COUNT: 13 % (ref 11–15)
ERYTHROCYTE [SEDIMENTATION RATE] IN BLOOD BY WESTERGREN METHOD: 2 MM/H
GFRSERPLBLD MDRD-ARVRAT: 100 ML/MIN/1.73M2
GLOBULIN SER CALC-MCNC: 2.6 G/DL (CALC) (ref 1.9–3.7)
GLUCOSE SERPL-MCNC: 93 MG/DL (ref 65–99)
GLUCOSE UR QL STRIP: NEGATIVE
HCT VFR BLD AUTO: 42 % (ref 35–45)
HDLC SERPL-MCNC: 55 MG/DL
HGB BLD-MCNC: 13.4 G/DL (ref 11.7–15.5)
HGB UR QL STRIP: ABNORMAL
HYALINE CASTS #/AREA URNS LPF: ABNORMAL /LPF
KETONES UR QL STRIP: NEGATIVE
LDLC SERPL CALC-MCNC: 101 MG/DL (CALC)
LEUKOCYTE ESTERASE UR QL STRIP: ABNORMAL
LYMPHOCYTES # BLD AUTO: 2727 CELLS/UL (ref 850–3900)
LYMPHOCYTES NFR BLD AUTO: 40.1 %
MCH RBC QN AUTO: 29.2 PG (ref 27–33)
MCHC RBC AUTO-ENTMCNC: 31.9 G/DL (ref 32–36)
MCV RBC AUTO: 91.5 FL (ref 80–100)
MICROALBUMIN UR-MCNC: 7.6 MG/DL
MONOCYTES # BLD AUTO: 422 CELLS/UL (ref 200–950)
MONOCYTES NFR BLD AUTO: 6.2 %
NEUTROPHILS # BLD AUTO: 3380 CELLS/UL (ref 1500–7800)
NEUTROPHILS NFR BLD AUTO: 49.7 %
NITRITE UR QL STRIP: NEGATIVE
NONHDLC SERPL-MCNC: 126 MG/DL (CALC)
PH UR STRIP: 5.5 [PH] (ref 5–8)
PLATELET # BLD AUTO: 274 THOUSAND/UL (ref 140–400)
PMV BLD REES-ECKER: 9.9 FL (ref 7.5–12.5)
POTASSIUM SERPL-SCNC: 4.2 MMOL/L (ref 3.5–5.3)
PROT SERPL-MCNC: 7 G/DL (ref 6.1–8.1)
PROT UR QL STRIP: ABNORMAL
RBC # BLD AUTO: 4.59 MILLION/UL (ref 3.8–5.1)
RBC #/AREA URNS HPF: ABNORMAL /HPF
RHEUMATOID FACT SERPL-ACNC: <14 IU/ML
SERVICE CMNT-IMP: ABNORMAL
SODIUM SERPL-SCNC: 142 MMOL/L (ref 135–146)
SP GR UR STRIP: 1.03 (ref 1–1.03)
SQUAMOUS #/AREA URNS HPF: ABNORMAL /HPF
TRIGL SERPL-MCNC: 152 MG/DL
TSH SERPL-ACNC: 2.48 MIU/L (ref 0.4–4.5)
WBC # BLD AUTO: 6.8 THOUSAND/UL (ref 3.8–10.8)
WBC #/AREA URNS HPF: ABNORMAL /HPF

## 2020-06-18 ENCOUNTER — TELEPHONE (OUTPATIENT)
Dept: FAMILY MEDICINE | Facility: CLINIC | Age: 60
End: 2020-06-18

## 2020-06-18 DIAGNOSIS — R76.8 POSITIVE ANA (ANTINUCLEAR ANTIBODY): Primary | ICD-10-CM

## 2020-06-18 NOTE — TELEPHONE ENCOUNTER
Positive santos. Would like her to rheumatology for further eval. The rest of her labs are normal. We will discuss further at upcoming ov.

## 2020-06-19 ENCOUNTER — TELEPHONE (OUTPATIENT)
Dept: CARDIOLOGY | Facility: HOSPITAL | Age: 60
End: 2020-06-19

## 2020-06-22 ENCOUNTER — HOSPITAL ENCOUNTER (OUTPATIENT)
Dept: RADIOLOGY | Facility: HOSPITAL | Age: 60
Discharge: HOME | End: 2020-06-22
Attending: NURSE PRACTITIONER
Payer: COMMERCIAL

## 2020-06-22 ENCOUNTER — CLINICAL SUPPORT (OUTPATIENT)
Dept: CARDIOLOGY | Facility: HOSPITAL | Age: 60
End: 2020-06-22
Attending: NURSE PRACTITIONER
Payer: COMMERCIAL

## 2020-06-22 DIAGNOSIS — R94.31 NONSPECIFIC ST-T CHANGES: ICD-10-CM

## 2020-06-22 DIAGNOSIS — R07.9 CHEST PAIN, UNSPECIFIED TYPE: ICD-10-CM

## 2020-06-22 DIAGNOSIS — I10 ESSENTIAL HYPERTENSION: ICD-10-CM

## 2020-06-22 DIAGNOSIS — R06.00 DYSPNEA: ICD-10-CM

## 2020-06-22 LAB
CV PHARM DOSE: 0.4 MG
CV STRESS BASE HR: 83 BPM
DIASTOLIC BLOOD PRESSURE: 95 MMHG
OHS CV CPX 85 PERCENT MAX PREDICTED HEART RATE MALE: 131
OHS CV CPX MAX PREDICTED HEART RATE: 154
OHS CV CPX PATIENT IS FEMALE: 1
OHS CV CPX PATIENT IS MALE: 0
OHS CV CPX PEAK DIASTOLIC BLOOD PRESSURE: 85 MMHG
OHS CV CPX PEAK HEAR RATE: 110 BPM
OHS CV CPX PEAK RATE PRESSURE PRODUCT: NORMAL
OHS CV CPX PEAK SYSTOLIC BLOOD PRESSURE: 166 MMHG
OHS CV CPX PERCENT MAX PREDICTED HEART RATE ACHIEVED: 71
OHS CV CPX RATE PRESSURE PRODUCT PRESENTING: NORMAL
SYSTOLIC BLOOD PRESSURE: 157 MMHG

## 2020-06-22 PROCEDURE — 93017 CV STRESS TEST TRACING ONLY: CPT

## 2020-06-22 PROCEDURE — A9502 TC99M TETROFOSMIN: HCPCS

## 2020-06-22 PROCEDURE — 63600175 PHARM REV CODE 636 W HCPCS: Performed by: NURSE PRACTITIONER

## 2020-06-22 RX ORDER — REGADENOSON 0.08 MG/ML
0.4 INJECTION, SOLUTION INTRAVENOUS ONCE
Status: COMPLETED | OUTPATIENT
Start: 2020-06-22 | End: 2020-06-22

## 2020-06-22 RX ADMIN — REGADENOSON 0.4 MG: 0.08 INJECTION, SOLUTION INTRAVENOUS at 09:06

## 2020-06-23 DIAGNOSIS — F41.9 ANXIETY: ICD-10-CM

## 2020-06-23 RX ORDER — ZOLPIDEM TARTRATE 10 MG/1
10 TABLET ORAL NIGHTLY
Qty: 30 TABLET | Refills: 5 | Status: SHIPPED | OUTPATIENT
Start: 2020-06-25 | End: 2020-11-25 | Stop reason: SDUPTHER

## 2020-06-23 RX ORDER — ZOLPIDEM TARTRATE 10 MG/1
10 TABLET ORAL NIGHTLY
Qty: 30 TABLET | Refills: 5 | Status: CANCELLED | OUTPATIENT
Start: 2020-06-23

## 2020-06-23 NOTE — TELEPHONE ENCOUNTER
----- Message from Cinthya Becerra MA sent at 6/23/2020 11:08 AM CDT -----  VM @ 9:16a - Pt left a message stating she was recently in to see Juliet, however the Rx for her Ambien was not sent it.  She is requesting the Rx be sent to [facility] on [address].    Pt - [phone]

## 2020-07-01 ENCOUNTER — TELEPHONE (OUTPATIENT)
Dept: FAMILY MEDICINE | Facility: CLINIC | Age: 60
End: 2020-07-01

## 2020-07-09 ENCOUNTER — OFFICE VISIT (OUTPATIENT)
Dept: FAMILY MEDICINE | Facility: CLINIC | Age: 60
End: 2020-07-09
Payer: COMMERCIAL

## 2020-07-09 VITALS
SYSTOLIC BLOOD PRESSURE: 138 MMHG | HEART RATE: 80 BPM | DIASTOLIC BLOOD PRESSURE: 76 MMHG | TEMPERATURE: 99 F | BODY MASS INDEX: 34.41 KG/M2 | WEIGHT: 187 LBS | HEIGHT: 62 IN

## 2020-07-09 DIAGNOSIS — I10 ESSENTIAL HYPERTENSION: ICD-10-CM

## 2020-07-09 DIAGNOSIS — Z90.13 H/O BILATERAL MASTECTOMY: ICD-10-CM

## 2020-07-09 DIAGNOSIS — E78.5 HYPERLIPIDEMIA, UNSPECIFIED HYPERLIPIDEMIA TYPE: ICD-10-CM

## 2020-07-09 DIAGNOSIS — F32.A DEPRESSION, UNSPECIFIED DEPRESSION TYPE: ICD-10-CM

## 2020-07-09 DIAGNOSIS — Z85.3 HX OF BREAST CANCER: ICD-10-CM

## 2020-07-09 DIAGNOSIS — R76.8 POSITIVE ANA (ANTINUCLEAR ANTIBODY): Primary | ICD-10-CM

## 2020-07-09 PROCEDURE — 99214 PR OFFICE/OUTPT VISIT, EST, LEVL IV, 30-39 MIN: ICD-10-PCS | Mod: S$GLB,,, | Performed by: NURSE PRACTITIONER

## 2020-07-09 PROCEDURE — 99214 OFFICE O/P EST MOD 30 MIN: CPT | Mod: S$GLB,,, | Performed by: NURSE PRACTITIONER

## 2020-07-09 NOTE — PROGRESS NOTES
SUBJECTIVE:    Patient ID: Traci Freire is a 59 y.o. female.    Chief Complaint: Anxiety (no bottles, went over meds verbally// SW)    59 year old female presents for check up. Would like to discuss recent labs. Has upcoming appointment with rheumatology due to recently positive santos. Reports that still has all over joint aches. Under lots of stress. Sleeping better. Did have recent stress test which was negative for heart disease. Blood pressure has been <130/90      Clinical Support on 06/22/2020   Component Date Value Ref Range Status    Systolic blood pressure 06/22/2020 157.0  mmHg Final    Diastolic blood pressure 06/22/2020 95.0  mmHg Final    HR at rest 06/22/2020 83.0  bpm Final    dose 06/22/2020 0.4  mg Final    Peak Systolic BP 06/22/2020 166.0  mmHg Final    Peak Diatolic BP 06/22/2020 85.0  mmHg Final    Peak HR 06/22/2020 110.0  bpm Final    85% Max Predicted HR 06/22/2020 131   Final    % Max HR Achieved 06/22/2020 71   Final    RPP 06/22/2020 13,031   Final    Peak RPP 06/22/2020 18,260   Final    Max Predicted HR 06/22/2020 154   Final    OHS CV CPX PATIENT IS MALE 06/22/2020 0   Final    OHS CV CPX PATIENT IS FEMALE 06/22/2020 1   Final   Office Visit on 06/15/2020   Component Date Value Ref Range Status    EKG 12-Lead 06/15/2020 68bpm   Final    WBC 06/15/2020 6.8  3.8 - 10.8 Thousand/uL Final    RBC 06/15/2020 4.59  3.80 - 5.10 Million/uL Final    Hemoglobin 06/15/2020 13.4  11.7 - 15.5 g/dL Final    Hematocrit 06/15/2020 42.0  35.0 - 45.0 % Final    Mean Corpuscular Volume 06/15/2020 91.5  80.0 - 100.0 fL Final    Mean Corpuscular Hemoglobin 06/15/2020 29.2  27.0 - 33.0 pg Final    Mean Corpuscular Hemoglobin Conc 06/15/2020 31.9* 32.0 - 36.0 g/dL Final    RDW 06/15/2020 13.0  11.0 - 15.0 % Final    Platelets 06/15/2020 274  140 - 400 Thousand/uL Final    MPV 06/15/2020 9.9  7.5 - 12.5 fL Final    Neutrophils Absolute 06/15/2020 3,380  1,500 - 7,800 cells/uL Final     Lymph # 06/15/2020 2,727  850 - 3,900 cells/uL Final    Mono # 06/15/2020 422  200 - 950 cells/uL Final    Eos # 06/15/2020 231  15 - 500 cells/uL Final    Baso # 06/15/2020 41  0 - 200 cells/uL Final    Neutrophils Relative 06/15/2020 49.7  % Final    Lymph% 06/15/2020 40.1  % Final    Mono% 06/15/2020 6.2  % Final    Eosinophil% 06/15/2020 3.4  % Final    Basophil% 06/15/2020 0.6  % Final    Glucose 06/15/2020 93  65 - 99 mg/dL Final    BUN, Bld 06/15/2020 12  7 - 25 mg/dL Final    Creatinine 06/15/2020 0.60  0.50 - 1.05 mg/dL Final    eGFR if non African American 06/15/2020 100  > OR = 60 mL/min/1.73m2 Final    eGFR if  06/15/2020 116  > OR = 60 mL/min/1.73m2 Final    BUN/Creatinine Ratio 06/15/2020 NOT APPLICABLE  6 - 22 (calc) Final    Sodium 06/15/2020 142  135 - 146 mmol/L Final    Potassium 06/15/2020 4.2  3.5 - 5.3 mmol/L Final    Chloride 06/15/2020 107  98 - 110 mmol/L Final    CO2 06/15/2020 29  20 - 32 mmol/L Final    Calcium 06/15/2020 9.3  8.6 - 10.4 mg/dL Final    Total Protein 06/15/2020 7.0  6.1 - 8.1 g/dL Final    Albumin 06/15/2020 4.4  3.6 - 5.1 g/dL Final    Globulin, Total 06/15/2020 2.6  1.9 - 3.7 g/dL (calc) Final    Albumin/Globulin Ratio 06/15/2020 1.7  1.0 - 2.5 (calc) Final    Total Bilirubin 06/15/2020 0.4  0.2 - 1.2 mg/dL Final    Alkaline Phosphatase 06/15/2020 80  37 - 153 U/L Final    AST 06/15/2020 18  10 - 35 U/L Final    ALT 06/15/2020 18  6 - 29 U/L Final    Cholesterol 06/15/2020 181  <200 mg/dL Final    HDL 06/15/2020 55  > OR = 50 mg/dL Final    Triglycerides 06/15/2020 152* <150 mg/dL Final    LDL Cholesterol 06/15/2020 101* mg/dL (calc) Final    Hdl/Cholesterol Ratio 06/15/2020 3.3  <5.0 (calc) Final    Non HDL Chol. (LDL+VLDL) 06/15/2020 126  <130 mg/dL (calc) Final    TSH w/reflex to FT4 06/15/2020 2.48  0.40 - 4.50 mIU/L Final    ETHEL 06/15/2020 POSITIVE* NEGATIVE Final    ETHEL Titer 06/15/2020 1:80* titer Final    ETHEL  Pattern 06/15/2020 Nuclear, Dense Fine Speckled*  Final    Rheumatoid Factor 06/15/2020 <14  <14 IU/mL Final    Creatinine, Random Ur 06/15/2020 196  20 - 275 mg/dL Final    Microalb, Ur 06/15/2020 7.6  See Note: mg/dL Final    Microalb Creat Ratio 06/15/2020 39* <30 mcg/mg creat Final    Sed Rate 06/15/2020 2  < OR = 30 mm/h Final    Color, UA 06/15/2020 YELLOW  YELLOW Final    Appearance, UA 06/15/2020 CLOUDY* CLEAR Final    Specific [place], UA 06/15/2020 1.030  1.001 - 1.035 Final    pH, UA 06/15/2020 5.5  5.0 - 8.0 Final    Glucose, UA 06/15/2020 NEGATIVE  NEGATIVE Final    Bilirubin, UA 06/15/2020 NEGATIVE  NEGATIVE Final    Ketones, UA 06/15/2020 NEGATIVE  NEGATIVE Final    Occult Blood UA 06/15/2020 TRACE* NEGATIVE Final    Protein, UA 06/15/2020 1+* NEGATIVE Final    Nitrite, UA 06/15/2020 NEGATIVE  NEGATIVE Final    Leukocytes, UA 06/15/2020 1+* NEGATIVE Final    WBC Casts, UA 06/15/2020 40-60* < OR = 5 /HPF Final    RBC Casts, UA 06/15/2020 3-10* < OR = 2 /HPF Final    Squam Epithel, UA 06/15/2020 0-5  < OR = 5 /HPF Final    Bacteria, UA 06/15/2020 FEW* NONE SEEN /HPF Final    Hyaline Casts, UA 06/15/2020 0-1* NONE SEEN /LPF Final    Comments: 06/15/2020 FEW MUCOUS THREADS   Final    Reflexive Urine Culture 06/15/2020 CULTURE INDICATED - RESULTS TO FOLLOW   Final    Urine Culture, Routine 06/15/2020    Final   Lab Visit on 06/03/2020   Component Date Value Ref Range Status    Total Protein 06/03/2020 7.1  6.0 - 8.4 g/dL Final    Albumin 06/03/2020 3.9  3.5 - 5.2 g/dL Final    Total Bilirubin 06/03/2020 0.6  0.1 - 1.0 mg/dL Final    Bilirubin, Direct 06/03/2020 <0.1* 0.1 - 0.3 mg/dL Final    AST 06/03/2020 22  10 - 40 U/L Final    ALT 06/03/2020 24  10 - 44 U/L Final    Alkaline Phosphatase 06/03/2020 73  55 - 135 U/L Final       Past Medical History:   Diagnosis Date    Anxiety     Arthritis     Cancer breast    Right- Bilateral mastectomy- May 2005- had breast  reconstruction- mother  of breast cancer    HLD (hyperlipidemia)     Hypertension     diagnosed age late 40's     IBS (irritable bowel syndrome)     Lyme disease     arthritis of hands. Felt like flu- age early 30's- got it  after going to [place]    Sleep apnea      Past Surgical History:   Procedure Laterality Date    ABDOMINAL SURGERY      BLADDER SUSPENSION      BREAST SURGERY       SECTION      CYSTOSCOPY      avoids greens . ? Interstitial cystitis    HYSTERECTOMY      followed by removal of ovaries 1996 from scar tissue    INCISIONAL HERNIA REPAIR Right 2008    RLQ    JOINT REPLACEMENT      Left total knee replacement-[facility] -     KNEE SURGERY Left 2014    TKR    MODIFIED RADICAL MASTECTOMY W/ AXILLARY LYMPH NODE DISSECTION Right 05/10/2005    w/free flap    NISSEN FUNDOPLICATION  2000    SIMPLE MASTECTOMY Left 05/10/2005    w/free flap    TONSILLECTOMY       Family History   Problem Relation Age of Onset    Cancer Mother          of breast cancer, also had gynecological cancer- had breast cancer that spread to the brain    Heart disease Father         in his 60's-  in his 70's       Marital Status:   Alcohol History:  reports no history of alcohol use.  Tobacco History:  reports that she quit smoking about 16 years ago. She has never used smokeless tobacco.  Drug History:  reports no history of drug use.    Review of patient's allergies indicates:   Allergen Reactions    Keflex [cephalexin]     Macrobid [nitrofurantoin monohyd/m-cryst]        Current Outpatient Medications:     amLODIPine (NORVASC) 5 MG tablet, Take 1 tablet (5 mg total) by mouth once daily., Disp: 30 tablet, Rfl: 11    FLUoxetine 20 MG capsule, Take 1 capsule (20 mg total) by mouth once daily., Disp: 90 capsule, Rfl: 1    irbesartan (AVAPRO) 300 MG tablet, Take 1 tablet (300 mg total) by mouth every evening., Disp: 90 tablet, Rfl: 1     "rosuvastatin (CRESTOR) 20 MG tablet, Take 1 tablet (20 mg total) by mouth every evening., Disp: 90 tablet, Rfl: 1    terbinafine HCL (LAMISIL) 250 mg tablet, Take 1 tablet (250 mg total) by mouth once daily. 1 pill by mouth x 90 days. Avoid alcohol intake while taking medication, Disp: 90 tablet, Rfl: 0    zolpidem (AMBIEN) 10 mg Tab, Take 1 tablet (10 mg total) by mouth every evening., Disp: 30 tablet, Rfl: 5    Review of Systems   Constitutional: Negative for chills, fever and unexpected weight change.   HENT: Negative for ear pain, rhinorrhea and sore throat.    Respiratory: Negative for cough and shortness of breath.    Cardiovascular: Negative for chest pain, palpitations and leg swelling.   Gastrointestinal: Negative for abdominal pain, diarrhea, nausea and vomiting.   Genitourinary: Negative for difficulty urinating, hematuria and vaginal bleeding.   Musculoskeletal: Negative for arthralgias.   Skin: Negative for rash.   Neurological: Negative for dizziness, weakness and headaches.   Psychiatric/Behavioral: Negative for agitation and sleep disturbance. The patient is not nervous/anxious.           Objective:      Vitals:    07/09/20 1009   BP: 138/76   Pulse: 80   Temp: 99.3 °F (37.4 °C)   Weight: 84.8 kg (187 lb)   Height: 5' 1.75" (1.568 m)     Body mass index is 34.48 kg/m².  Physical Exam  Constitutional:       Appearance: She is well-developed.   HENT:      Right Ear: External ear normal.      Left Ear: External ear normal.   Neck:      Musculoskeletal: Normal range of motion and neck supple.      Vascular: No JVD.   Cardiovascular:      Rate and Rhythm: Normal rate and regular rhythm.      Heart sounds: No murmur.   Pulmonary:      Effort: Pulmonary effort is normal.      Breath sounds: Normal breath sounds.   Abdominal:      General: Bowel sounds are normal.      Palpations: Abdomen is soft.   Musculoskeletal: Normal range of motion.         General: No deformity.   Lymphadenopathy:      Cervical: No " cervical adenopathy.   Skin:     General: Skin is warm and dry.      Findings: No rash.   Neurological:      Mental Status: She is alert and oriented to person, place, and time.      Gait: Gait normal.   Psychiatric:         Speech: Speech normal.         Behavior: Behavior normal.           Assessment:       1. Positive ETHEL (antinuclear antibody)    2. Essential hypertension    3. Hyperlipidemia, unspecified hyperlipidemia type    4. Depression, unspecified depression type    5. H/O bilateral mastectomy    6. Hx of breast cancer         Plan:       Positive ETHEL (antinuclear antibody)    Essential hypertension    Hyperlipidemia, unspecified hyperlipidemia type    Depression, unspecified depression type    H/O bilateral mastectomy    Hx of breast cancer      Follow up in about 3 months (around 10/9/2020).

## 2020-07-15 ENCOUNTER — TELEPHONE (OUTPATIENT)
Dept: FAMILY MEDICINE | Facility: CLINIC | Age: 60
End: 2020-07-15

## 2020-07-15 NOTE — TELEPHONE ENCOUNTER
From overdue results-B12 wasn't done. Looks like the B12 order went over after patient had labs drawn on 6/15. Do you still want her to have it drawn?

## 2020-07-16 NOTE — TELEPHONE ENCOUNTER
Spoke to patient that lab is due to check B12 and she does not need to be fasting. She said her and Juliet discussed this at visit. She will come soon.

## 2020-07-21 LAB — VIT B12 SERPL-MCNC: 191 PG/ML (ref 200–1100)

## 2020-07-27 ENCOUNTER — PATIENT MESSAGE (OUTPATIENT)
Dept: FAMILY MEDICINE | Facility: CLINIC | Age: 60
End: 2020-07-27

## 2020-07-28 ENCOUNTER — PATIENT MESSAGE (OUTPATIENT)
Dept: FAMILY MEDICINE | Facility: CLINIC | Age: 60
End: 2020-07-28

## 2020-11-11 ENCOUNTER — OFFICE VISIT (OUTPATIENT)
Dept: RHEUMATOLOGY | Facility: CLINIC | Age: 60
End: 2020-11-11
Payer: COMMERCIAL

## 2020-11-11 VITALS
BODY MASS INDEX: 34.78 KG/M2 | DIASTOLIC BLOOD PRESSURE: 83 MMHG | SYSTOLIC BLOOD PRESSURE: 157 MMHG | WEIGHT: 188.63 LBS | TEMPERATURE: 98 F

## 2020-11-11 DIAGNOSIS — R76.8 POSITIVE ANA (ANTINUCLEAR ANTIBODY): ICD-10-CM

## 2020-11-11 PROCEDURE — 99203 OFFICE O/P NEW LOW 30 MIN: CPT | Mod: S$GLB,,, | Performed by: INTERNAL MEDICINE

## 2020-11-11 PROCEDURE — 99203 PR OFFICE/OUTPT VISIT, NEW, LEVL III, 30-44 MIN: ICD-10-PCS | Mod: S$GLB,,, | Performed by: INTERNAL MEDICINE

## 2020-11-11 RX ORDER — DIFLUNISAL 500 MG/1
TABLET, FILM COATED ORAL
Qty: 90 TABLET | Refills: 0 | Status: SHIPPED | OUTPATIENT
Start: 2020-11-11 | End: 2021-06-15

## 2020-11-11 NOTE — LETTER
November 11, 2020      Juliet Medina NP  [address] [facility]  [address]  [place] [address]           [facility] - Rheumatology  [address]  [address]  [place] [address]  Phone: [phone]  Fax: [phone]          Patient: Traci Freire   MR Number: [id number]   YOB: 1960   Date of Visit: 11/11/2020       Dear Juliet Medina:    Thank you for referring Traci Freire to me for evaluation. Attached you will find relevant portions of my assessment and plan of care.    If you have questions, please do not hesitate to call me. I look forward to following Traci Freire along with you.    Sincerely,    Mathieu Leonardo MD    Enclosure  CC:  No Recipients    If you would like to receive this communication electronically, please contact [email or web address] or [phone] to request more information on [employer] access.    For providers and/or their staff who would like to refer a patient to [facility], please contact us through our one-stop-shop provider referral line, [facility] , at [phone].    If you feel you have received this communication in error or would no longer like to receive these types of communications, please e-mail [email or web address]

## 2020-11-11 NOTE — PROGRESS NOTES
[facility] RHEUMATOLOGY        NEW PATIENT      Subjective:       Patient ID:   NAME: Traci Freire : 1960     59 y.o. female    Referring Doc: Juliet Medina NP  Other Physicians:    Chief Complaint:  Positive ETHEL      History of Present Illness:     New patient referred for pos ETHEL ( 1:80) done sec to generalized musculoskeletal pains for a few years  AM stiffness lasting 45 minutes  Neck pain,low back pain  ,r trochanter and knee pain  No history of serositis no history of renal or hepatic disorders.  No history of cytopenias.  No sicca symptoms mucosal ulcerations or Raynaud's.  No skin rashes        ROS:   GEN: no fevers night sweats or significant weight changes  +  Fatigue( works night shifts)  HEENT: no HA's, changes in vision , no mouth ulcers, no sicca symptoms, no scalp tenderness, jaw claudication  CV: no CP, SOB, PND, STERLING or orthopnea,no palpitations  PULM:no SOB, cough, hemoptysis, sputum or pleuritic pain  GI: no abdominal pain, nausea, vomiting, constipation, diarrhea, melanotic stools, BRBPR, or hematemesis, no dysphagia  : no hematuria, dysuria  NEURO:+ occ paresthesias in feet, No headaches, visual disturbances, muscle weakness  SKIN:  no rashes , erythema, bruising, or swelling, no Raynauds, no photosensitivity  MUSCULOSKELETAL:no joint swelling, no prolonged AM stiffness, + back pain relieved with rest  PSYCH:  +  Insomnia,   +depression, + anxiety    Medications:    Current Outpatient Medications:     amLODIPine (NORVASC) 5 MG tablet, Take 1 tablet (5 mg total) by mouth once daily., Disp: 30 tablet, Rfl: 11    FLUoxetine 20 MG capsule, Take 1 capsule (20 mg total) by mouth once daily., Disp: 90 capsule, Rfl: 1    irbesartan (AVAPRO) 300 MG tablet, Take 1 tablet (300 mg total) by mouth every evening., Disp: 90 tablet, Rfl: 1    rosuvastatin (CRESTOR) 20 MG tablet, Take 1 tablet (20 mg total) by mouth every evening., Disp: 90 tablet, Rfl: 1    terbinafine HCL (LAMISIL) 250 mg  tablet, Take 1 tablet (250 mg total) by mouth once daily. 1 pill by mouth x 90 days. Avoid alcohol intake while taking medication, Disp: 90 tablet, Rfl: 0    zolpidem (AMBIEN) 10 mg Tab, Take 1 tablet (10 mg total) by mouth every evening., Disp: 30 tablet, Rfl: 5  BC powders once a time  Ibuprofen 800 mg tid  FAMILY HISTORY: negative for Connective Tissue Disease      PAST MEDICAL HISTORY:  L CTS  HTN  Hypercholesterolemia  Breast Cancer 2005  PAST SURGICAL HISTORY:  Hysterectomy and oophorectomy  LTKR  Double mastectomy  R carpal tunnel release  Bladder suspension  Appendectomy  C/S X 3  Hiatal hernia repair  SOCIAL HISTORY:  Works night shifts at [employer]  Quit smoking 20 years ago ( 1/2 ppd for 5 years)  ETOH: no  ALLERGIES:  NKDA      Objective:     Vitals:  Blood pressure (!) 157/83, temperature 98.1 °F (36.7 °C), weight 85.5 kg (188 lb 9.6 oz), last menstrual period 05/09/1992.    Physical Examination:   GEN: no apparent distress, comfortable; AAOx3  SKIN: no rashes, no lesions, no sclerodactyly or induration, no Raynaud's, no periungual erythema  HEAD: normal  EYES: no pallor, no icterus,NECK: no masses, thyroid normal, trachea midline, no LAD/LN's, supple  CV:   S1 and S2 regular, no murmurs, gallop or rubs  CHEST: Normal respiratory effort;  normal breath sounds; no rubs, no wheezes, no crackles.   ABDOM: nontender and nondistended; soft; ; no rebound/guarding,no masses  MUSC/Skeletal: ROM normal; no crepitus; joints without synovitis, no deformities .  Slight tenderness 1st carpometacarpal joint and some DIP joints.  Widespread trigger points  EXTREM: no clubbing, cyanosis, edema, normal pulses.  NEURO: grossly intact; motorWNL; AAOx3; no tremors  PSYCH: normal mood, affect and behavior  LYMPH: normal cervical, supraclavicular            Labs:   @RESUFAST(WBC,HGB,HCT,MCV,PLT)  )@RESUFAST(NA,K,CL,CO2,GLU,BUN,Creatinine,Calcium,PROT,Albumin,Bilitot,Alkphos,AST,ALT,ETHEL,Sed  Rate,CRP,RF,CCP)      Radiology/Diagnostic Studies:    I have reviewed all available labs and XRay reports    Assessment/Plan:   59 y.o. female with osteoarthritis.  She is on ibuprofen and BC powders.  Advised her to stop both and try diflunisal 500 mg b.i.d. to t.i.d. p.c. p.r.n.  Symptoms of fibromyalgia as well.  Low titer positive ETHEL.  I do not think she has lupus or other connective tissue disease  PLAN:  Labs  Diflunisal as above.  Consider using Neurontin on her next visit if no significant relief just with diflunisal      Discussion:     I have explained all of the above in detail and the patient understands all of the current recommendation(s). I have answered all of their questions to the best of my ability and to their complete satisfaction.      I have reviewed the risks and benefits of the medication in detail with patient, who understands and wishes to proceed. Printed information regarding the disease and/or medication was also provided.        RTC        Electronically signed by Mathieu Leonardo MD          Answers for HPI/ROS submitted by the patient on 11/11/2020   fever: No  eye redness: No  mouth sores: No  headaches: No  shortness of breath: No  chest pain: No  trouble swallowing: No  diarrhea: No  constipation: No  unexpected weight change: No  genital sore: No  dysuria: No  During the last 3 days, have you had a skin rash?: No  Bruises or bleeds easily: No  cough: No

## 2020-11-12 ENCOUNTER — LAB VISIT (OUTPATIENT)
Dept: LAB | Facility: HOSPITAL | Age: 60
End: 2020-11-12
Attending: PODIATRIST
Payer: COMMERCIAL

## 2020-11-12 DIAGNOSIS — B35.1 ONYCHOMYCOSIS DUE TO DERMATOPHYTE: ICD-10-CM

## 2020-11-12 LAB
ALBUMIN SERPL BCP-MCNC: 3.8 G/DL (ref 3.5–5.2)
ALP SERPL-CCNC: 71 U/L (ref 55–135)
ALT SERPL W/O P-5'-P-CCNC: 22 U/L (ref 10–44)
AST SERPL-CCNC: 21 U/L (ref 10–40)
BILIRUB DIRECT SERPL-MCNC: <0.1 MG/DL (ref 0.1–0.3)
BILIRUB SERPL-MCNC: 0.6 MG/DL (ref 0.1–1)
PROT SERPL-MCNC: 6.5 G/DL (ref 6–8.4)

## 2020-11-12 PROCEDURE — 80076 HEPATIC FUNCTION PANEL: CPT

## 2020-11-12 PROCEDURE — 36415 COLL VENOUS BLD VENIPUNCTURE: CPT

## 2020-11-25 ENCOUNTER — OFFICE VISIT (OUTPATIENT)
Dept: FAMILY MEDICINE | Facility: CLINIC | Age: 60
End: 2020-11-25
Payer: COMMERCIAL

## 2020-11-25 VITALS
SYSTOLIC BLOOD PRESSURE: 138 MMHG | DIASTOLIC BLOOD PRESSURE: 88 MMHG | WEIGHT: 187 LBS | HEIGHT: 61 IN | HEART RATE: 72 BPM | BODY MASS INDEX: 35.3 KG/M2

## 2020-11-25 DIAGNOSIS — E78.5 HYPERLIPIDEMIA, UNSPECIFIED HYPERLIPIDEMIA TYPE: ICD-10-CM

## 2020-11-25 DIAGNOSIS — I10 ESSENTIAL HYPERTENSION: ICD-10-CM

## 2020-11-25 DIAGNOSIS — G89.29 CHRONIC ARTHRALGIAS OF KNEES AND HIPS: ICD-10-CM

## 2020-11-25 DIAGNOSIS — M25.561 CHRONIC ARTHRALGIAS OF KNEES AND HIPS: ICD-10-CM

## 2020-11-25 DIAGNOSIS — F32.A DEPRESSION, UNSPECIFIED DEPRESSION TYPE: ICD-10-CM

## 2020-11-25 DIAGNOSIS — Z79.899 HIGH RISK MEDICATION USE: Primary | ICD-10-CM

## 2020-11-25 DIAGNOSIS — M25.551 CHRONIC ARTHRALGIAS OF KNEES AND HIPS: ICD-10-CM

## 2020-11-25 DIAGNOSIS — F41.9 ANXIETY: ICD-10-CM

## 2020-11-25 DIAGNOSIS — Z85.3 HX OF BREAST CANCER: ICD-10-CM

## 2020-11-25 DIAGNOSIS — M17.0 PRIMARY OSTEOARTHRITIS OF BOTH KNEES: ICD-10-CM

## 2020-11-25 DIAGNOSIS — M25.562 CHRONIC ARTHRALGIAS OF KNEES AND HIPS: ICD-10-CM

## 2020-11-25 DIAGNOSIS — M25.552 CHRONIC ARTHRALGIAS OF KNEES AND HIPS: ICD-10-CM

## 2020-11-25 DIAGNOSIS — G47.33 OSA (OBSTRUCTIVE SLEEP APNEA): ICD-10-CM

## 2020-11-25 PROCEDURE — 99214 OFFICE O/P EST MOD 30 MIN: CPT | Mod: S$GLB,,, | Performed by: NURSE PRACTITIONER

## 2020-11-25 PROCEDURE — 99214 PR OFFICE/OUTPT VISIT, EST, LEVL IV, 30-39 MIN: ICD-10-PCS | Mod: S$GLB,,, | Performed by: NURSE PRACTITIONER

## 2020-11-25 RX ORDER — FLUOXETINE HYDROCHLORIDE 20 MG/1
20 CAPSULE ORAL DAILY
Qty: 90 CAPSULE | Refills: 1 | Status: SHIPPED | OUTPATIENT
Start: 2020-11-25 | End: 2021-05-26 | Stop reason: SDUPTHER

## 2020-11-25 RX ORDER — IRBESARTAN 300 MG/1
300 TABLET ORAL NIGHTLY
Qty: 90 TABLET | Refills: 1 | Status: SHIPPED | OUTPATIENT
Start: 2020-11-25 | End: 2021-05-26 | Stop reason: SDUPTHER

## 2020-11-25 RX ORDER — ROSUVASTATIN CALCIUM 20 MG/1
20 TABLET, COATED ORAL NIGHTLY
Qty: 90 TABLET | Refills: 1 | Status: SHIPPED | OUTPATIENT
Start: 2020-11-25 | End: 2021-05-26 | Stop reason: SDUPTHER

## 2020-11-25 RX ORDER — ZOLPIDEM TARTRATE 10 MG/1
10 TABLET ORAL NIGHTLY
Qty: 30 TABLET | Refills: 5 | Status: SHIPPED | OUTPATIENT
Start: 2020-11-25 | End: 2021-05-26 | Stop reason: SDUPTHER

## 2020-11-25 NOTE — PROGRESS NOTES
SUBJECTIVE:    Patient ID: Traci Freire is a 59 y.o. female.    Chief Complaint: Medication Refill (brought bottles, needs refills, DJ)    59 year old female presents for check up.  Still working at [employer].  Refills recently started on dolobid by Dr. reynolds.  This has helped with the pain tremendously. Due for labs. Taking meds as prescribed. Stress is manageable.       Lab Visit on 11/12/2020   Component Date Value Ref Range Status    Total Protein 11/12/2020 6.5  6.0 - 8.4 g/dL Final    Albumin 11/12/2020 3.8  3.5 - 5.2 g/dL Final    Total Bilirubin 11/12/2020 0.6  0.1 - 1.0 mg/dL Final    Bilirubin, Direct 11/12/2020 <0.1* 0.1 - 0.3 mg/dL Final    AST 11/12/2020 21  10 - 40 U/L Final    ALT 11/12/2020 22  10 - 44 U/L Final    Alkaline Phosphatase 11/12/2020 71  55 - 135 U/L Final   Clinical Support on 06/22/2020   Component Date Value Ref Range Status    Systolic blood pressure 06/22/2020 157.0  mmHg Final    Diastolic blood pressure 06/22/2020 95.0  mmHg Final    HR at rest 06/22/2020 83.0  bpm Final    dose 06/22/2020 0.4  mg Final    Peak Systolic BP 06/22/2020 166.0  mmHg Final    Peak Diatolic BP 06/22/2020 85.0  mmHg Final    Peak HR 06/22/2020 110.0  bpm Final    85% Max Predicted HR 06/22/2020 131   Final    % Max HR Achieved 06/22/2020 71   Final    RPP 06/22/2020 13,031   Final    Peak RPP 06/22/2020 18,260   Final    Max Predicted HR 06/22/2020 154   Final    OHS CV CPX PATIENT IS MALE 06/22/2020 0   Final    OHS CV CPX PATIENT IS FEMALE 06/22/2020 1   Final   Office Visit on 06/15/2020   Component Date Value Ref Range Status    EKG 12-Lead 06/15/2020 68bpm   Final    WBC 06/15/2020 6.8  3.8 - 10.8 Thousand/uL Final    RBC 06/15/2020 4.59  3.80 - 5.10 Million/uL Final    Hemoglobin 06/15/2020 13.4  11.7 - 15.5 g/dL Final    Hematocrit 06/15/2020 42.0  35.0 - 45.0 % Final    MCV 06/15/2020 91.5  80.0 - 100.0 fL Final    MCH 06/15/2020 29.2  27.0 - 33.0 pg Final    MCHC  06/15/2020 31.9* 32.0 - 36.0 g/dL Final    RDW 06/15/2020 13.0  11.0 - 15.0 % Final    Platelets 06/15/2020 274  140 - 400 Thousand/uL Final    MPV 06/15/2020 9.9  7.5 - 12.5 fL Final    Neutrophils Absolute 06/15/2020 3,380  1,500 - 7,800 cells/uL Final    Lymph # 06/15/2020 2,727  850 - 3,900 cells/uL Final    Mono # 06/15/2020 422  200 - 950 cells/uL Final    Eos # 06/15/2020 231  15 - 500 cells/uL Final    Baso # 06/15/2020 41  0 - 200 cells/uL Final    Neutrophils Relative 06/15/2020 49.7  % Final    Lymph % 06/15/2020 40.1  % Final    Mono % 06/15/2020 6.2  % Final    Eosinophil % 06/15/2020 3.4  % Final    Basophil % 06/15/2020 0.6  % Final    Glucose 06/15/2020 93  65 - 99 mg/dL Final    BUN 06/15/2020 12  7 - 25 mg/dL Final    Creatinine 06/15/2020 0.60  0.50 - 1.05 mg/dL Final    eGFR if non African American 06/15/2020 100  > OR = 60 mL/min/1.73m2 Final    eGFR if  06/15/2020 116  > OR = 60 mL/min/1.73m2 Final    BUN/Creatinine Ratio 06/15/2020 NOT APPLICABLE  6 - 22 (calc) Final    Sodium 06/15/2020 142  135 - 146 mmol/L Final    Potassium 06/15/2020 4.2  3.5 - 5.3 mmol/L Final    Chloride 06/15/2020 107  98 - 110 mmol/L Final    CO2 06/15/2020 29  20 - 32 mmol/L Final    Calcium 06/15/2020 9.3  8.6 - 10.4 mg/dL Final    Total Protein 06/15/2020 7.0  6.1 - 8.1 g/dL Final    Albumin 06/15/2020 4.4  3.6 - 5.1 g/dL Final    Globulin, Total 06/15/2020 2.6  1.9 - 3.7 g/dL (calc) Final    Albumin/Globulin Ratio 06/15/2020 1.7  1.0 - 2.5 (calc) Final    Total Bilirubin 06/15/2020 0.4  0.2 - 1.2 mg/dL Final    Alkaline Phosphatase 06/15/2020 80  37 - 153 U/L Final    AST 06/15/2020 18  10 - 35 U/L Final    ALT 06/15/2020 18  6 - 29 U/L Final    Cholesterol 06/15/2020 181  <200 mg/dL Final    HDL 06/15/2020 55  > OR = 50 mg/dL Final    Triglycerides 06/15/2020 152* <150 mg/dL Final    LDL Cholesterol 06/15/2020 101* mg/dL (calc) Final    HDL/Cholesterol Ratio  06/15/2020 3.3  <5.0 (calc) Final    Non HDL Chol. (LDL+VLDL) 06/15/2020 126  <130 mg/dL (calc) Final    TSH w/reflex to FT4 06/15/2020 2.48  0.40 - 4.50 mIU/L Final    ETHEL 06/15/2020 POSITIVE* NEGATIVE Final    ETHEL Titer 06/15/2020 1:80* titer Final    ETHEL Pattern 06/15/2020 Nuclear, Dense Fine Speckled*  Final    Rheumatoid Factor 06/15/2020 <14  <14 IU/mL Final    Vitamin B-12 07/20/2020 191* 200 - 1,100 pg/mL Final    Creatinine, Urine 06/15/2020 196  20 - 275 mg/dL Final    Microalb, Ur 06/15/2020 7.6  See Note: mg/dL Final    Microalb/Creat Ratio 06/15/2020 39* <30 mcg/mg creat Final    Sed Rate 06/15/2020 2  < OR = 30 mm/h Final    Color, UA 06/15/2020 YELLOW  YELLOW Final    Appearance, UA 06/15/2020 CLOUDY* CLEAR Final    Specific [place], UA 06/15/2020 1.030  1.001 - 1.035 Final    pH, UA 06/15/2020 5.5  5.0 - 8.0 Final    Glucose, UA 06/15/2020 NEGATIVE  NEGATIVE Final    Bilirubin, UA 06/15/2020 NEGATIVE  NEGATIVE Final    Ketones, UA 06/15/2020 NEGATIVE  NEGATIVE Final    Occult Blood UA 06/15/2020 TRACE* NEGATIVE Final    Protein, UA 06/15/2020 1+* NEGATIVE Final    Nitrite, UA 06/15/2020 NEGATIVE  NEGATIVE Final    Leukocytes, UA 06/15/2020 1+* NEGATIVE Final    WBC Casts, UA 06/15/2020 40-60* < OR = 5 /HPF Final    RBC Casts, UA 06/15/2020 3-10* < OR = 2 /HPF Final    Squam Epithel, UA 06/15/2020 0-5  < OR = 5 /HPF Final    Bacteria, UA 06/15/2020 FEW* NONE SEEN /HPF Final    Hyaline Casts, UA 06/15/2020 0-1* NONE SEEN /LPF Final    Comments: 06/15/2020 FEW MUCOUS THREADS   Final    Reflexive Urine Culture 06/15/2020 CULTURE INDICATED - RESULTS TO FOLLOW   Final    Urine Culture, Routine 06/15/2020    Final   Lab Visit on 06/03/2020   Component Date Value Ref Range Status    Total Protein 06/03/2020 7.1  6.0 - 8.4 g/dL Final    Albumin 06/03/2020 3.9  3.5 - 5.2 g/dL Final    Total Bilirubin 06/03/2020 0.6  0.1 - 1.0 mg/dL Final    Bilirubin, Direct 06/03/2020 <0.1*  0.1 - 0.3 mg/dL Final    AST 2020 22  10 - 40 U/L Final    ALT 2020 24  10 - 44 U/L Final    Alkaline Phosphatase 2020 73  55 - 135 U/L Final       Past Medical History:   Diagnosis Date    Anxiety     Arthritis     Cancer breast    Right- Bilateral mastectomy- May 2005- had breast reconstruction- mother  of breast cancer    HLD (hyperlipidemia)     Hypertension     diagnosed age late 40's     IBS (irritable bowel syndrome)     Lyme disease     arthritis of hands. Felt like flu- age early 30's- got it  after going to [place]    Sleep apnea      Social History     Socioeconomic History    Marital status:      Spouse name: Not on file    Number of children: Not on file    Years of education: Not on file    Highest education level: Not on file   Occupational History    Not on file   Social Needs    Financial resource strain: Not on file    Food insecurity     Worry: Not on file     Inability: Not on file    Transportation needs     Medical: Not on file     Non-medical: Not on file   Tobacco Use    Smoking status: Former Smoker     Quit date: 3/26/2004     Years since quittin.6    Smokeless tobacco: Never Used    Tobacco comment: quit - smoked for 10 years- 1 ppd   Substance and Sexual Activity    Alcohol use: No    Drug use: No    Sexual activity: Never   Lifestyle    Physical activity     Days per week: Not on file     Minutes per session: Not on file    Stress: Not at all   Relationships    Social connections     Talks on phone: Not on file     Gets together: Not on file     Attends Zoroastrianism service: Not on file     Active member of club or organization: Not on file     Attends meetings of clubs or organizations: Not on file     Relationship status: Not on file   Other Topics Concern    Not on file   Social History Narrative    Not on file     Past Surgical History:   Procedure Laterality Date    ABDOMINAL SURGERY      BLADDER SUSPENSION       BREAST SURGERY       SECTION      CYSTOSCOPY      avoids greens . ? Interstitial cystitis    HYSTERECTOMY      followed by removal of ovaries  from scar tissue    INCISIONAL HERNIA REPAIR Right 2008    RLQ    JOINT REPLACEMENT      [facility] -     KNEE SURGERY Left 2014    TKR    MODIFIED RADICAL MASTECTOMY W/ AXILLARY LYMPH NODE DISSECTION Right 05/10/2005    w/free flap    NISSEN FUNDOPLICATION  2000    SIMPLE MASTECTOMY Left 05/10/2005    w/free flap    TONSILLECTOMY       Family History   Problem Relation Age of Onset    Cancer Mother          of breast cancer, also had gynecological cancer- had breast cancer that spread to the brain    Heart disease Father         in his 60's-  in his 70's       Review of patient's allergies indicates:   Allergen Reactions    Keflex [cephalexin]     Macrobid [nitrofurantoin monohyd/m-cryst]        Current Outpatient Medications:     diflunisaL (DOLOBID) 500 mg Tab, One po bid-tid pc, Disp: 90 tablet, Rfl: 0    FLUoxetine 20 MG capsule, Take 1 capsule (20 mg total) by mouth once daily., Disp: 90 capsule, Rfl: 1    irbesartan (AVAPRO) 300 MG tablet, Take 1 tablet (300 mg total) by mouth every evening., Disp: 90 tablet, Rfl: 1    rosuvastatin (CRESTOR) 20 MG tablet, Take 1 tablet (20 mg total) by mouth every evening., Disp: 90 tablet, Rfl: 1    zolpidem (AMBIEN) 10 mg Tab, Take 1 tablet (10 mg total) by mouth every evening., Disp: 30 tablet, Rfl: 5    amLODIPine (NORVASC) 5 MG tablet, Take 1 tablet (5 mg total) by mouth once daily., Disp: 30 tablet, Rfl: 11    Review of Systems   Constitutional: Negative for chills, fever and unexpected weight change.   HENT: Negative for ear pain, rhinorrhea and sore throat.    Eyes: Negative for pain and visual disturbance.   Respiratory: Negative for cough and shortness of breath.    Cardiovascular: Negative for chest pain, palpitations  "and leg swelling.   Gastrointestinal: Negative for abdominal pain, diarrhea, nausea and vomiting.   Genitourinary: Negative for difficulty urinating, hematuria and vaginal bleeding.   Musculoskeletal: Negative for arthralgias.        Knee pain r>l   Skin: Negative for rash.        Lump to back of neck   Neurological: Negative for dizziness, weakness and headaches.   Psychiatric/Behavioral: Negative for agitation and sleep disturbance. The patient is not nervous/anxious.           Objective:      Vitals:    11/25/20 1137   BP: 138/88   Pulse: 72   Weight: 84.8 kg (187 lb)   Height: 5' 1" (1.549 m)     Physical Exam  Constitutional:       Appearance: She is well-developed.   HENT:      Left Ear: External ear normal.   Neck:      Musculoskeletal: Normal range of motion and neck supple.      Vascular: No JVD.   Cardiovascular:      Rate and Rhythm: Normal rate and regular rhythm.      Heart sounds: No murmur.   Pulmonary:      Effort: Pulmonary effort is normal.      Breath sounds: Normal breath sounds.   Abdominal:      General: Bowel sounds are normal.      Palpations: Abdomen is soft.   Musculoskeletal: Normal range of motion.         General: No deformity.      Comments: Crepitant to knees bilaterally   Lymphadenopathy:      Cervical: No cervical adenopathy.   Skin:     General: Skin is warm and dry.      Findings: No rash.          Neurological:      Mental Status: She is alert and oriented to person, place, and time.      Gait: Gait normal.   Psychiatric:         Speech: Speech normal.         Behavior: Behavior normal.           Assessment:       1. High risk medication use    2. Anxiety    3. Essential hypertension    4. Hyperlipidemia, unspecified hyperlipidemia type    5. Depression, unspecified depression type    6. Chronic arthralgias of knees and hips    7. Hx of breast cancer    8. FRANCIE (obstructive sleep apnea)    9. Primary osteoarthritis of both knees         Plan:       High risk medication use  -     " CBC Auto Differential; Future; Expected date: 11/25/2020  -     Comprehensive Metabolic Panel; Future; Expected date: 11/25/2020  -     Lipid Panel; Future; Expected date: 11/25/2020  -     TSH w/reflex to FT4; Future; Expected date: 11/25/2020  -     Microalbumin/Creatinine Ratio, Urine; Future; Expected date: 11/25/2020  -     Urinalysis, Reflex to Urine Culture Urine, Clean Catch; Future; Expected date: 11/25/2020    Anxiety  -     zolpidem (AMBIEN) 10 mg Tab; Take 1 tablet (10 mg total) by mouth every evening.  Dispense: 30 tablet; Refill: 5  -     FLUoxetine 20 MG capsule; Take 1 capsule (20 mg total) by mouth once daily.  Dispense: 90 capsule; Refill: 1    Essential hypertension  -     irbesartan (AVAPRO) 300 MG tablet; Take 1 tablet (300 mg total) by mouth every evening.  Dispense: 90 tablet; Refill: 1  -     CBC Auto Differential; Future; Expected date: 11/25/2020  -     Comprehensive Metabolic Panel; Future; Expected date: 11/25/2020  -     TSH w/reflex to FT4; Future; Expected date: 11/25/2020  -     Microalbumin/Creatinine Ratio, Urine; Future; Expected date: 11/25/2020  -     Urinalysis, Reflex to Urine Culture Urine, Clean Catch; Future; Expected date: 11/25/2020    Hyperlipidemia, unspecified hyperlipidemia type  -     rosuvastatin (CRESTOR) 20 MG tablet; Take 1 tablet (20 mg total) by mouth every evening.  Dispense: 90 tablet; Refill: 1  -     Lipid Panel; Future; Expected date: 11/25/2020    Depression, unspecified depression type    Chronic arthralgias of knees and hips  -     Comprehensive Metabolic Panel; Future; Expected date: 11/25/2020  -     TSH w/reflex to FT4; Future; Expected date: 11/25/2020  -     Microalbumin/Creatinine Ratio, Urine; Future; Expected date: 11/25/2020  -     Urinalysis, Reflex to Urine Culture Urine, Clean Catch; Future; Expected date: 11/25/2020    Hx of breast cancer    FRANCIE (obstructive sleep apnea)    Primary osteoarthritis of both knees      Follow up in about 3  months (around 2/25/2021) for medication management.        11/26/2020 Juliet Medina

## 2021-01-11 ENCOUNTER — OFFICE VISIT (OUTPATIENT)
Dept: FAMILY MEDICINE | Facility: CLINIC | Age: 61
End: 2021-01-11
Payer: COMMERCIAL

## 2021-01-11 VITALS
DIASTOLIC BLOOD PRESSURE: 84 MMHG | SYSTOLIC BLOOD PRESSURE: 136 MMHG | WEIGHT: 189 LBS | HEART RATE: 80 BPM | HEIGHT: 61 IN | BODY MASS INDEX: 35.68 KG/M2

## 2021-01-11 DIAGNOSIS — R39.9 UTI SYMPTOMS: Primary | ICD-10-CM

## 2021-01-11 LAB
BILIRUB UR QL STRIP: POSITIVE
GLUCOSE UR QL STRIP: NEGATIVE
KETONES UR QL STRIP: NEGATIVE
LEUKOCYTE ESTERASE UR QL STRIP: NEGATIVE
PH, POC UA: 6
POC BLOOD, URINE: POSITIVE
POC NITRATES, URINE: NEGATIVE
PROT UR QL STRIP: POSITIVE
SP GR UR STRIP: 1.03 (ref 1–1.03)
UROBILINOGEN UR STRIP-ACNC: ABNORMAL (ref 0.1–1.1)

## 2021-01-11 PROCEDURE — 81003 URINALYSIS AUTO W/O SCOPE: CPT | Mod: QW,S$GLB,, | Performed by: NURSE PRACTITIONER

## 2021-01-11 PROCEDURE — 99212 PR OFFICE/OUTPT VISIT, EST, LEVL II, 10-19 MIN: ICD-10-PCS | Mod: 25,S$GLB,, | Performed by: NURSE PRACTITIONER

## 2021-01-11 PROCEDURE — 99212 OFFICE O/P EST SF 10 MIN: CPT | Mod: 25,S$GLB,, | Performed by: NURSE PRACTITIONER

## 2021-01-11 PROCEDURE — 81003 POCT URINALYSIS, DIPSTICK, AUTOMATED, W/O SCOPE: ICD-10-PCS | Mod: QW,S$GLB,, | Performed by: NURSE PRACTITIONER

## 2021-01-11 RX ORDER — CIPROFLOXACIN 500 MG/1
500 TABLET ORAL EVERY 12 HOURS
Qty: 10 TABLET | Refills: 0 | Status: SHIPPED | OUTPATIENT
Start: 2021-01-11 | End: 2021-01-16

## 2021-01-12 LAB
ALBUMIN SERPL-MCNC: 4.1 G/DL (ref 3.6–5.1)
ALBUMIN/GLOB SERPL: 1.5 (CALC) (ref 1–2.5)
ALP SERPL-CCNC: 79 U/L (ref 37–153)
ALT SERPL-CCNC: 28 U/L (ref 6–29)
AST SERPL-CCNC: 30 U/L (ref 10–35)
BASOPHILS # BLD AUTO: 28 CELLS/UL (ref 0–200)
BASOPHILS NFR BLD AUTO: 0.5 %
BILIRUB SERPL-MCNC: 0.5 MG/DL (ref 0.2–1.2)
BUN SERPL-MCNC: 15 MG/DL (ref 7–25)
BUN/CREAT SERPL: ABNORMAL (CALC) (ref 6–22)
CALCIUM SERPL-MCNC: 9.3 MG/DL (ref 8.6–10.4)
CHLORIDE SERPL-SCNC: 106 MMOL/L (ref 98–110)
CHOLEST SERPL-MCNC: 170 MG/DL
CHOLEST/HDLC SERPL: 3.8 (CALC)
CO2 SERPL-SCNC: 24 MMOL/L (ref 20–32)
CREAT SERPL-MCNC: 0.63 MG/DL (ref 0.5–0.99)
EOSINOPHIL # BLD AUTO: 218 CELLS/UL (ref 15–500)
EOSINOPHIL NFR BLD AUTO: 3.9 %
ERYTHROCYTE [DISTWIDTH] IN BLOOD BY AUTOMATED COUNT: 12.5 % (ref 11–15)
GFRSERPLBLD MDRD-ARVRAT: 97 ML/MIN/1.73M2
GLOBULIN SER CALC-MCNC: 2.8 G/DL (CALC) (ref 1.9–3.7)
GLUCOSE SERPL-MCNC: 103 MG/DL (ref 65–99)
HCT VFR BLD AUTO: 40.6 % (ref 35–45)
HDLC SERPL-MCNC: 45 MG/DL
HGB BLD-MCNC: 13.2 G/DL (ref 11.7–15.5)
LDLC SERPL CALC-MCNC: 99 MG/DL (CALC)
LYMPHOCYTES # BLD AUTO: 980 CELLS/UL (ref 850–3900)
LYMPHOCYTES NFR BLD AUTO: 17.5 %
MCH RBC QN AUTO: 29 PG (ref 27–33)
MCHC RBC AUTO-ENTMCNC: 32.5 G/DL (ref 32–36)
MCV RBC AUTO: 89.2 FL (ref 80–100)
MONOCYTES # BLD AUTO: 633 CELLS/UL (ref 200–950)
MONOCYTES NFR BLD AUTO: 11.3 %
NEUTROPHILS # BLD AUTO: 3741 CELLS/UL (ref 1500–7800)
NEUTROPHILS NFR BLD AUTO: 66.8 %
NONHDLC SERPL-MCNC: 125 MG/DL (CALC)
PLATELET # BLD AUTO: 246 THOUSAND/UL (ref 140–400)
PMV BLD REES-ECKER: 10.2 FL (ref 7.5–12.5)
POTASSIUM SERPL-SCNC: 4.2 MMOL/L (ref 3.5–5.3)
PROT SERPL-MCNC: 6.9 G/DL (ref 6.1–8.1)
RBC # BLD AUTO: 4.55 MILLION/UL (ref 3.8–5.1)
SODIUM SERPL-SCNC: 141 MMOL/L (ref 135–146)
TRIGL SERPL-MCNC: 154 MG/DL
TSH SERPL-ACNC: 2.5 MIU/L (ref 0.4–4.5)
WBC # BLD AUTO: 5.6 THOUSAND/UL (ref 3.8–10.8)

## 2021-01-13 LAB
ALBUMIN/CREAT UR: 27 MCG/MG CREAT
BACTERIA UR CULT: NORMAL
CREAT UR-MCNC: 198 MG/DL (ref 20–275)
MICROALBUMIN UR-MCNC: 5.3 MG/DL

## 2021-01-14 ENCOUNTER — PATIENT MESSAGE (OUTPATIENT)
Dept: FAMILY MEDICINE | Facility: CLINIC | Age: 61
End: 2021-01-14

## 2021-01-20 ENCOUNTER — HOSPITAL ENCOUNTER (OUTPATIENT)
Facility: HOSPITAL | Age: 61
Discharge: HOME | End: 2021-01-22
Attending: EMERGENCY MEDICINE | Admitting: INTERNAL MEDICINE
Payer: COMMERCIAL

## 2021-01-20 ENCOUNTER — PATIENT MESSAGE (OUTPATIENT)
Dept: FAMILY MEDICINE | Facility: CLINIC | Age: 61
End: 2021-01-20

## 2021-01-20 DIAGNOSIS — U07.1 COVID-19: ICD-10-CM

## 2021-01-20 DIAGNOSIS — Z20.822 SUSPECTED COVID-19 VIRUS INFECTION: Primary | ICD-10-CM

## 2021-01-20 DIAGNOSIS — R11.2 NAUSEA & VOMITING: ICD-10-CM

## 2021-01-20 DIAGNOSIS — R07.9 CHEST PAIN: ICD-10-CM

## 2021-01-20 LAB
ALBUMIN SERPL BCP-MCNC: 3.3 G/DL (ref 3.5–5.2)
ALP SERPL-CCNC: 83 U/L (ref 55–135)
ALT SERPL W/O P-5'-P-CCNC: 29 U/L (ref 10–44)
ANION GAP SERPL CALC-SCNC: 13 MMOL/L (ref 8–16)
AST SERPL-CCNC: 36 U/L (ref 10–40)
BASOPHILS # BLD AUTO: 0.01 K/UL (ref 0–0.2)
BASOPHILS NFR BLD: 0.1 % (ref 0–1.9)
BILIRUB SERPL-MCNC: 0.8 MG/DL (ref 0.1–1)
BUN SERPL-MCNC: 13 MG/DL (ref 6–20)
CALCIUM SERPL-MCNC: 9 MG/DL (ref 8.7–10.5)
CHLORIDE SERPL-SCNC: 105 MMOL/L (ref 95–110)
CO2 SERPL-SCNC: 22 MMOL/L (ref 23–29)
CREAT SERPL-MCNC: 0.6 MG/DL (ref 0.5–1.4)
DIFFERENTIAL METHOD: ABNORMAL
EOSINOPHIL # BLD AUTO: 0 K/UL (ref 0–0.5)
EOSINOPHIL NFR BLD: 0.1 % (ref 0–8)
ERYTHROCYTE [DISTWIDTH] IN BLOOD BY AUTOMATED COUNT: 12.5 % (ref 11.5–14.5)
EST. GFR  (AFRICAN AMERICAN): >60 ML/MIN/1.73 M^2
EST. GFR  (NON AFRICAN AMERICAN): >60 ML/MIN/1.73 M^2
GLUCOSE SERPL-MCNC: 132 MG/DL (ref 70–110)
HCT VFR BLD AUTO: 39.1 % (ref 37–48.5)
HGB BLD-MCNC: 12.8 G/DL (ref 12–16)
IMM GRANULOCYTES # BLD AUTO: 0.04 K/UL (ref 0–0.04)
IMM GRANULOCYTES NFR BLD AUTO: 0.4 % (ref 0–0.5)
LACTATE SERPL-SCNC: 1.3 MMOL/L (ref 0.5–1.9)
LIPASE SERPL-CCNC: 19 U/L (ref 4–60)
LYMPHOCYTES # BLD AUTO: 1.2 K/UL (ref 1–4.8)
LYMPHOCYTES NFR BLD: 11.9 % (ref 18–48)
MCH RBC QN AUTO: 29.2 PG (ref 27–31)
MCHC RBC AUTO-ENTMCNC: 32.7 G/DL (ref 32–36)
MCV RBC AUTO: 89 FL (ref 82–98)
MONOCYTES # BLD AUTO: 0.5 K/UL (ref 0.3–1)
MONOCYTES NFR BLD: 5 % (ref 4–15)
NEUTROPHILS # BLD AUTO: 8.5 K/UL (ref 1.8–7.7)
NEUTROPHILS NFR BLD: 82.5 % (ref 38–73)
NRBC BLD-RTO: 0 /100 WBC
PLATELET # BLD AUTO: 301 K/UL (ref 150–350)
PMV BLD AUTO: 9.2 FL (ref 9.2–12.9)
POTASSIUM SERPL-SCNC: 3.3 MMOL/L (ref 3.5–5.1)
PROCALCITONIN SERPL IA-MCNC: <0.05 NG/ML (ref 0–0.5)
PROT SERPL-MCNC: 7.9 G/DL (ref 6–8.4)
RBC # BLD AUTO: 4.39 M/UL (ref 4–5.4)
SARS-COV-2 RDRP RESP QL NAA+PROBE: POSITIVE
SODIUM SERPL-SCNC: 140 MMOL/L (ref 136–145)
WBC # BLD AUTO: 10.35 K/UL (ref 3.9–12.7)

## 2021-01-20 PROCEDURE — 93005 ELECTROCARDIOGRAM TRACING: CPT | Performed by: INTERNAL MEDICINE

## 2021-01-20 PROCEDURE — 84145 PROCALCITONIN (PCT): CPT

## 2021-01-20 PROCEDURE — 96361 HYDRATE IV INFUSION ADD-ON: CPT

## 2021-01-20 PROCEDURE — 82728 ASSAY OF FERRITIN: CPT

## 2021-01-20 PROCEDURE — 25000003 PHARM REV CODE 250: Performed by: STUDENT IN AN ORGANIZED HEALTH CARE EDUCATION/TRAINING PROGRAM

## 2021-01-20 PROCEDURE — 63600175 PHARM REV CODE 636 W HCPCS: Performed by: STUDENT IN AN ORGANIZED HEALTH CARE EDUCATION/TRAINING PROGRAM

## 2021-01-20 PROCEDURE — 85379 FIBRIN DEGRADATION QUANT: CPT

## 2021-01-20 PROCEDURE — 83880 ASSAY OF NATRIURETIC PEPTIDE: CPT

## 2021-01-20 PROCEDURE — 83605 ASSAY OF LACTIC ACID: CPT

## 2021-01-20 PROCEDURE — 86140 C-REACTIVE PROTEIN: CPT

## 2021-01-20 PROCEDURE — 83690 ASSAY OF LIPASE: CPT

## 2021-01-20 PROCEDURE — 93010 ELECTROCARDIOGRAM REPORT: CPT | Mod: ,,, | Performed by: INTERNAL MEDICINE

## 2021-01-20 PROCEDURE — 80053 COMPREHEN METABOLIC PANEL: CPT

## 2021-01-20 PROCEDURE — 83615 LACTATE (LD) (LDH) ENZYME: CPT

## 2021-01-20 PROCEDURE — 82550 ASSAY OF CK (CPK): CPT

## 2021-01-20 PROCEDURE — 93010 EKG 12-LEAD: ICD-10-PCS | Mod: ,,, | Performed by: INTERNAL MEDICINE

## 2021-01-20 PROCEDURE — 85025 COMPLETE CBC W/AUTO DIFF WBC: CPT

## 2021-01-20 PROCEDURE — 63600175 PHARM REV CODE 636 W HCPCS: Performed by: EMERGENCY MEDICINE

## 2021-01-20 PROCEDURE — 99285 EMERGENCY DEPT VISIT HI MDM: CPT | Mod: 25

## 2021-01-20 PROCEDURE — 96375 TX/PRO/DX INJ NEW DRUG ADDON: CPT

## 2021-01-20 PROCEDURE — 84484 ASSAY OF TROPONIN QUANT: CPT

## 2021-01-20 PROCEDURE — U0002 COVID-19 LAB TEST NON-CDC: HCPCS

## 2021-01-20 PROCEDURE — 96374 THER/PROPH/DIAG INJ IV PUSH: CPT

## 2021-01-20 RX ORDER — ONDANSETRON 2 MG/ML
4 INJECTION INTRAMUSCULAR; INTRAVENOUS
Status: COMPLETED | OUTPATIENT
Start: 2021-01-20 | End: 2021-01-20

## 2021-01-20 RX ORDER — DEXAMETHASONE SODIUM PHOSPHATE 4 MG/ML
6 INJECTION, SOLUTION INTRA-ARTICULAR; INTRALESIONAL; INTRAMUSCULAR; INTRAVENOUS; SOFT TISSUE
Status: COMPLETED | OUTPATIENT
Start: 2021-01-20 | End: 2021-01-20

## 2021-01-20 RX ADMIN — ONDANSETRON 4 MG: 2 INJECTION INTRAMUSCULAR; INTRAVENOUS at 10:01

## 2021-01-20 RX ADMIN — DEXAMETHASONE SODIUM PHOSPHATE 6 MG: 4 INJECTION, SOLUTION INTRAMUSCULAR; INTRAVENOUS at 10:01

## 2021-01-20 RX ADMIN — SODIUM CHLORIDE 500 ML: 0.9 INJECTION, SOLUTION INTRAVENOUS at 10:01

## 2021-01-21 PROBLEM — Z20.822 SUSPECTED COVID-19 VIRUS INFECTION: Status: ACTIVE | Noted: 2021-01-21

## 2021-01-21 LAB
BACTERIA #/AREA URNS HPF: ABNORMAL /HPF
BILIRUB UR QL STRIP: NEGATIVE
BNP SERPL-MCNC: 165 PG/ML (ref 0–99)
CK SERPL-CCNC: 78 U/L (ref 20–180)
CLARITY UR: CLEAR
COLOR UR: YELLOW
CRP SERPL-MCNC: 4.9 MG/DL
D DIMER PPP IA.FEU-MCNC: 0.79 UG/ML FEU
FERRITIN SERPL-MCNC: 244 NG/ML (ref 20–300)
GLUCOSE UR QL STRIP: NEGATIVE
HGB UR QL STRIP: ABNORMAL
HYALINE CASTS #/AREA URNS LPF: 13 /LPF
KETONES UR QL STRIP: ABNORMAL
LDH SERPL L TO P-CCNC: 279 U/L (ref 110–260)
LEUKOCYTE ESTERASE UR QL STRIP: ABNORMAL
MICROSCOPIC COMMENT: ABNORMAL
NITRITE UR QL STRIP: NEGATIVE
PH UR STRIP: 7 [PH] (ref 5–8)
PROT UR QL STRIP: ABNORMAL
RBC #/AREA URNS HPF: 6 /HPF (ref 0–4)
SP GR UR STRIP: 1.02 (ref 1–1.03)
SQUAMOUS #/AREA URNS HPF: 6 /HPF
TROPONIN I SERPL DL<=0.01 NG/ML-MCNC: <0.03 NG/ML
TROPONIN I SERPL DL<=0.01 NG/ML-MCNC: <0.03 NG/ML
URN SPEC COLLECT METH UR: ABNORMAL
UROBILINOGEN UR STRIP-ACNC: NEGATIVE EU/DL
WBC #/AREA URNS HPF: 11 /HPF (ref 0–5)

## 2021-01-21 PROCEDURE — G0378 HOSPITAL OBSERVATION PER HR: HCPCS

## 2021-01-21 PROCEDURE — 25000242 PHARM REV CODE 250 ALT 637 W/ HCPCS: Performed by: INTERNAL MEDICINE

## 2021-01-21 PROCEDURE — 99900035 HC TECH TIME PER 15 MIN (STAT)

## 2021-01-21 PROCEDURE — 94761 N-INVAS EAR/PLS OXIMETRY MLT: CPT

## 2021-01-21 PROCEDURE — 96372 THER/PROPH/DIAG INJ SC/IM: CPT

## 2021-01-21 PROCEDURE — 99900031 HC PATIENT EDUCATION (STAT)

## 2021-01-21 PROCEDURE — 81001 URINALYSIS AUTO W/SCOPE: CPT

## 2021-01-21 PROCEDURE — 87186 SC STD MICRODIL/AGAR DIL: CPT

## 2021-01-21 PROCEDURE — 96375 TX/PRO/DX INJ NEW DRUG ADDON: CPT

## 2021-01-21 PROCEDURE — 96376 TX/PRO/DX INJ SAME DRUG ADON: CPT

## 2021-01-21 PROCEDURE — 36415 COLL VENOUS BLD VENIPUNCTURE: CPT

## 2021-01-21 PROCEDURE — 87077 CULTURE AEROBIC IDENTIFY: CPT

## 2021-01-21 PROCEDURE — 94640 AIRWAY INHALATION TREATMENT: CPT

## 2021-01-21 PROCEDURE — 27000221 HC OXYGEN, UP TO 24 HOURS

## 2021-01-21 PROCEDURE — 87086 URINE CULTURE/COLONY COUNT: CPT

## 2021-01-21 PROCEDURE — 84484 ASSAY OF TROPONIN QUANT: CPT

## 2021-01-21 PROCEDURE — C9113 INJ PANTOPRAZOLE SODIUM, VIA: HCPCS | Performed by: INTERNAL MEDICINE

## 2021-01-21 PROCEDURE — 25000003 PHARM REV CODE 250: Performed by: INTERNAL MEDICINE

## 2021-01-21 PROCEDURE — 94799 UNLISTED PULMONARY SVC/PX: CPT

## 2021-01-21 PROCEDURE — 63600175 PHARM REV CODE 636 W HCPCS: Performed by: STUDENT IN AN ORGANIZED HEALTH CARE EDUCATION/TRAINING PROGRAM

## 2021-01-21 PROCEDURE — 63600175 PHARM REV CODE 636 W HCPCS: Performed by: INTERNAL MEDICINE

## 2021-01-21 PROCEDURE — 25000003 PHARM REV CODE 250: Performed by: STUDENT IN AN ORGANIZED HEALTH CARE EDUCATION/TRAINING PROGRAM

## 2021-01-21 RX ORDER — ONDANSETRON 4 MG/1
8 TABLET, ORALLY DISINTEGRATING ORAL EVERY 8 HOURS PRN
Status: DISCONTINUED | OUTPATIENT
Start: 2021-01-21 | End: 2021-01-22 | Stop reason: HOSPADM

## 2021-01-21 RX ORDER — ZOLPIDEM TARTRATE 5 MG/1
10 TABLET ORAL NIGHTLY PRN
Status: DISCONTINUED | OUTPATIENT
Start: 2021-01-21 | End: 2021-01-22 | Stop reason: HOSPADM

## 2021-01-21 RX ORDER — ENOXAPARIN SODIUM 100 MG/ML
40 INJECTION SUBCUTANEOUS EVERY 24 HOURS
Status: DISCONTINUED | OUTPATIENT
Start: 2021-01-21 | End: 2021-01-22 | Stop reason: HOSPADM

## 2021-01-21 RX ORDER — DEXAMETHASONE SODIUM PHOSPHATE 4 MG/ML
4 INJECTION, SOLUTION INTRA-ARTICULAR; INTRALESIONAL; INTRAMUSCULAR; INTRAVENOUS; SOFT TISSUE EVERY 24 HOURS
Status: DISCONTINUED | OUTPATIENT
Start: 2021-01-21 | End: 2021-01-22 | Stop reason: HOSPADM

## 2021-01-21 RX ORDER — SODIUM CHLORIDE 0.9 % (FLUSH) 0.9 %
10 SYRINGE (ML) INJECTION
Status: DISCONTINUED | OUTPATIENT
Start: 2021-01-21 | End: 2021-01-22 | Stop reason: HOSPADM

## 2021-01-21 RX ORDER — ONDANSETRON 2 MG/ML
4 INJECTION INTRAMUSCULAR; INTRAVENOUS
Status: COMPLETED | OUTPATIENT
Start: 2021-01-21 | End: 2021-01-21

## 2021-01-21 RX ORDER — AMLODIPINE BESYLATE 5 MG/1
5 TABLET ORAL DAILY
Status: DISCONTINUED | OUTPATIENT
Start: 2021-01-22 | End: 2021-01-22 | Stop reason: HOSPADM

## 2021-01-21 RX ORDER — GLUCAGON 1 MG
1 KIT INJECTION
Status: DISCONTINUED | OUTPATIENT
Start: 2021-01-21 | End: 2021-01-22 | Stop reason: HOSPADM

## 2021-01-21 RX ORDER — ACETAMINOPHEN 325 MG/1
650 TABLET ORAL EVERY 4 HOURS PRN
Status: DISCONTINUED | OUTPATIENT
Start: 2021-01-21 | End: 2021-01-22 | Stop reason: HOSPADM

## 2021-01-21 RX ORDER — IRBESARTAN 150 MG/1
300 TABLET ORAL DAILY
Status: DISCONTINUED | OUTPATIENT
Start: 2021-01-22 | End: 2021-01-22 | Stop reason: HOSPADM

## 2021-01-21 RX ORDER — PANTOPRAZOLE SODIUM 40 MG/10ML
40 INJECTION, POWDER, LYOPHILIZED, FOR SOLUTION INTRAVENOUS DAILY
Status: DISCONTINUED | OUTPATIENT
Start: 2021-01-21 | End: 2021-01-22 | Stop reason: HOSPADM

## 2021-01-21 RX ORDER — ALBUTEROL SULFATE 90 UG/1
2 AEROSOL, METERED RESPIRATORY (INHALATION)
Status: DISCONTINUED | OUTPATIENT
Start: 2021-01-22 | End: 2021-01-22 | Stop reason: HOSPADM

## 2021-01-21 RX ORDER — IBUPROFEN 200 MG
24 TABLET ORAL
Status: DISCONTINUED | OUTPATIENT
Start: 2021-01-21 | End: 2021-01-22 | Stop reason: HOSPADM

## 2021-01-21 RX ORDER — ALBUTEROL SULFATE 90 UG/1
2 AEROSOL, METERED RESPIRATORY (INHALATION) EVERY 8 HOURS
Status: DISCONTINUED | OUTPATIENT
Start: 2021-01-21 | End: 2021-01-21

## 2021-01-21 RX ORDER — ROSUVASTATIN CALCIUM 20 MG/1
20 TABLET, COATED ORAL NIGHTLY
Status: DISCONTINUED | OUTPATIENT
Start: 2021-01-21 | End: 2021-01-22 | Stop reason: HOSPADM

## 2021-01-21 RX ORDER — FLUOXETINE HYDROCHLORIDE 20 MG/1
20 CAPSULE ORAL DAILY
Status: DISCONTINUED | OUTPATIENT
Start: 2021-01-22 | End: 2021-01-22 | Stop reason: HOSPADM

## 2021-01-21 RX ORDER — IBUPROFEN 200 MG
16 TABLET ORAL
Status: DISCONTINUED | OUTPATIENT
Start: 2021-01-21 | End: 2021-01-22 | Stop reason: HOSPADM

## 2021-01-21 RX ADMIN — DEXAMETHASONE SODIUM PHOSPHATE 4 MG: 4 INJECTION, SOLUTION INTRA-ARTICULAR; INTRALESIONAL; INTRAMUSCULAR; INTRAVENOUS; SOFT TISSUE at 08:01

## 2021-01-21 RX ADMIN — ENOXAPARIN SODIUM 40 MG: 40 INJECTION SUBCUTANEOUS at 04:01

## 2021-01-21 RX ADMIN — ACETAMINOPHEN 650 MG: 325 TABLET ORAL at 04:01

## 2021-01-21 RX ADMIN — ZOLPIDEM TARTRATE 10 MG: 5 TABLET, COATED ORAL at 08:01

## 2021-01-21 RX ADMIN — PANTOPRAZOLE SODIUM 40 MG: 40 INJECTION, POWDER, FOR SOLUTION INTRAVENOUS at 08:01

## 2021-01-21 RX ADMIN — ROSUVASTATIN CALCIUM 20 MG: 20 TABLET, FILM COATED ORAL at 08:01

## 2021-01-21 RX ADMIN — POTASSIUM BICARBONATE 20 MEQ: 391 TABLET, EFFERVESCENT ORAL at 12:01

## 2021-01-21 RX ADMIN — ALBUTEROL SULFATE 2 PUFF: 90 AEROSOL, METERED RESPIRATORY (INHALATION) at 07:01

## 2021-01-21 RX ADMIN — ONDANSETRON 4 MG: 2 INJECTION INTRAMUSCULAR; INTRAVENOUS at 12:01

## 2021-01-22 VITALS
HEIGHT: 61 IN | WEIGHT: 183.19 LBS | OXYGEN SATURATION: 95 % | BODY MASS INDEX: 34.58 KG/M2 | SYSTOLIC BLOOD PRESSURE: 131 MMHG | DIASTOLIC BLOOD PRESSURE: 76 MMHG | HEART RATE: 78 BPM | TEMPERATURE: 99 F | RESPIRATION RATE: 20 BRPM

## 2021-01-22 LAB
ALBUMIN SERPL BCP-MCNC: 2.7 G/DL (ref 3.5–5.2)
ALP SERPL-CCNC: 65 U/L (ref 55–135)
ALT SERPL W/O P-5'-P-CCNC: 33 U/L (ref 10–44)
ANION GAP SERPL CALC-SCNC: 9 MMOL/L (ref 8–16)
AST SERPL-CCNC: 32 U/L (ref 10–40)
BASOPHILS # BLD AUTO: 0.01 K/UL (ref 0–0.2)
BASOPHILS NFR BLD: 0.1 % (ref 0–1.9)
BILIRUB SERPL-MCNC: 0.6 MG/DL (ref 0.1–1)
BUN SERPL-MCNC: 21 MG/DL (ref 6–20)
CALCIUM SERPL-MCNC: 8.5 MG/DL (ref 8.7–10.5)
CHLORIDE SERPL-SCNC: 109 MMOL/L (ref 95–110)
CO2 SERPL-SCNC: 24 MMOL/L (ref 23–29)
CREAT SERPL-MCNC: 0.7 MG/DL (ref 0.5–1.4)
DIFFERENTIAL METHOD: ABNORMAL
EOSINOPHIL # BLD AUTO: 0 K/UL (ref 0–0.5)
EOSINOPHIL NFR BLD: 0.2 % (ref 0–8)
ERYTHROCYTE [DISTWIDTH] IN BLOOD BY AUTOMATED COUNT: 12.4 % (ref 11.5–14.5)
EST. GFR  (AFRICAN AMERICAN): >60 ML/MIN/1.73 M^2
EST. GFR  (NON AFRICAN AMERICAN): >60 ML/MIN/1.73 M^2
GLUCOSE SERPL-MCNC: 112 MG/DL (ref 70–110)
HCT VFR BLD AUTO: 37.1 % (ref 37–48.5)
HGB BLD-MCNC: 11.9 G/DL (ref 12–16)
IMM GRANULOCYTES # BLD AUTO: 0.06 K/UL (ref 0–0.04)
IMM GRANULOCYTES NFR BLD AUTO: 0.6 % (ref 0–0.5)
LYMPHOCYTES # BLD AUTO: 1.9 K/UL (ref 1–4.8)
LYMPHOCYTES NFR BLD: 17.5 % (ref 18–48)
MAGNESIUM SERPL-MCNC: 2.2 MG/DL (ref 1.6–2.6)
MCH RBC QN AUTO: 29.1 PG (ref 27–31)
MCHC RBC AUTO-ENTMCNC: 32.1 G/DL (ref 32–36)
MCV RBC AUTO: 91 FL (ref 82–98)
MONOCYTES # BLD AUTO: 0.7 K/UL (ref 0.3–1)
MONOCYTES NFR BLD: 6.2 % (ref 4–15)
NEUTROPHILS # BLD AUTO: 8.1 K/UL (ref 1.8–7.7)
NEUTROPHILS NFR BLD: 75.4 % (ref 38–73)
NRBC BLD-RTO: 0 /100 WBC
PLATELET # BLD AUTO: 350 K/UL (ref 150–350)
PMV BLD AUTO: 9.7 FL (ref 9.2–12.9)
POTASSIUM SERPL-SCNC: 3.3 MMOL/L (ref 3.5–5.1)
PROT SERPL-MCNC: 6.7 G/DL (ref 6–8.4)
RBC # BLD AUTO: 4.09 M/UL (ref 4–5.4)
SODIUM SERPL-SCNC: 142 MMOL/L (ref 136–145)
TROPONIN I SERPL DL<=0.01 NG/ML-MCNC: <0.03 NG/ML
WBC # BLD AUTO: 10.74 K/UL (ref 3.9–12.7)

## 2021-01-22 PROCEDURE — 36415 COLL VENOUS BLD VENIPUNCTURE: CPT

## 2021-01-22 PROCEDURE — 80053 COMPREHEN METABOLIC PANEL: CPT

## 2021-01-22 PROCEDURE — C9113 INJ PANTOPRAZOLE SODIUM, VIA: HCPCS | Performed by: INTERNAL MEDICINE

## 2021-01-22 PROCEDURE — 83735 ASSAY OF MAGNESIUM: CPT

## 2021-01-22 PROCEDURE — 99900035 HC TECH TIME PER 15 MIN (STAT)

## 2021-01-22 PROCEDURE — 85025 COMPLETE CBC W/AUTO DIFF WBC: CPT

## 2021-01-22 PROCEDURE — 96376 TX/PRO/DX INJ SAME DRUG ADON: CPT

## 2021-01-22 PROCEDURE — 25000003 PHARM REV CODE 250: Performed by: INTERNAL MEDICINE

## 2021-01-22 PROCEDURE — 94799 UNLISTED PULMONARY SVC/PX: CPT

## 2021-01-22 PROCEDURE — G0378 HOSPITAL OBSERVATION PER HR: HCPCS

## 2021-01-22 PROCEDURE — 63600175 PHARM REV CODE 636 W HCPCS: Performed by: INTERNAL MEDICINE

## 2021-01-22 PROCEDURE — 94640 AIRWAY INHALATION TREATMENT: CPT

## 2021-01-22 PROCEDURE — 94761 N-INVAS EAR/PLS OXIMETRY MLT: CPT

## 2021-01-22 RX ORDER — LANOLIN ALCOHOL/MO/W.PET/CERES
800 CREAM (GRAM) TOPICAL
Status: DISCONTINUED | OUTPATIENT
Start: 2021-01-22 | End: 2021-01-22 | Stop reason: HOSPADM

## 2021-01-22 RX ORDER — POTASSIUM CHLORIDE 20 MEQ/1
40 TABLET, EXTENDED RELEASE ORAL
Status: DISCONTINUED | OUTPATIENT
Start: 2021-01-22 | End: 2021-01-22 | Stop reason: HOSPADM

## 2021-01-22 RX ORDER — CALCIUM CHLORIDE IN 0.9 % NACL 1 G/100 ML
1 INTRAVENOUS SOLUTION, PIGGYBACK (ML) INTRAVENOUS
Status: DISCONTINUED | OUTPATIENT
Start: 2021-01-22 | End: 2021-01-22 | Stop reason: HOSPADM

## 2021-01-22 RX ORDER — MAGNESIUM SULFATE HEPTAHYDRATE 40 MG/ML
2 INJECTION, SOLUTION INTRAVENOUS
Status: DISCONTINUED | OUTPATIENT
Start: 2021-01-22 | End: 2021-01-22 | Stop reason: HOSPADM

## 2021-01-22 RX ORDER — DEXAMETHASONE 6 MG/1
6 TABLET ORAL
Qty: 6 TABLET | Refills: 0 | Status: SHIPPED | OUTPATIENT
Start: 2021-01-22 | End: 2021-02-01

## 2021-01-22 RX ORDER — POTASSIUM CHLORIDE 20 MEQ/1
20 TABLET, EXTENDED RELEASE ORAL
Status: DISCONTINUED | OUTPATIENT
Start: 2021-01-22 | End: 2021-01-22 | Stop reason: HOSPADM

## 2021-01-22 RX ORDER — POTASSIUM CHLORIDE 7.45 MG/ML
20 INJECTION INTRAVENOUS
Status: DISCONTINUED | OUTPATIENT
Start: 2021-01-22 | End: 2021-01-22 | Stop reason: HOSPADM

## 2021-01-22 RX ORDER — POTASSIUM CHLORIDE 7.45 MG/ML
40 INJECTION INTRAVENOUS
Status: DISCONTINUED | OUTPATIENT
Start: 2021-01-22 | End: 2021-01-22 | Stop reason: HOSPADM

## 2021-01-22 RX ORDER — MAGNESIUM SULFATE 1 G/100ML
1 INJECTION INTRAVENOUS
Status: DISCONTINUED | OUTPATIENT
Start: 2021-01-22 | End: 2021-01-22 | Stop reason: HOSPADM

## 2021-01-22 RX ORDER — MAGNESIUM SULFATE HEPTAHYDRATE 40 MG/ML
4 INJECTION, SOLUTION INTRAVENOUS
Status: DISCONTINUED | OUTPATIENT
Start: 2021-01-22 | End: 2021-01-22 | Stop reason: HOSPADM

## 2021-01-22 RX ADMIN — ALBUTEROL SULFATE 2 PUFF: 90 AEROSOL, METERED RESPIRATORY (INHALATION) at 01:01

## 2021-01-22 RX ADMIN — POTASSIUM CHLORIDE 40 MEQ: 20 TABLET, EXTENDED RELEASE ORAL at 09:01

## 2021-01-22 RX ADMIN — FLUOXETINE 20 MG: 20 CAPSULE ORAL at 09:01

## 2021-01-22 RX ADMIN — ALBUTEROL SULFATE 2 PUFF: 90 AEROSOL, METERED RESPIRATORY (INHALATION) at 08:01

## 2021-01-22 RX ADMIN — PANTOPRAZOLE SODIUM 40 MG: 40 INJECTION, POWDER, FOR SOLUTION INTRAVENOUS at 09:01

## 2021-01-22 RX ADMIN — IRBESARTAN 300 MG: 150 TABLET ORAL at 09:01

## 2021-01-22 RX ADMIN — DEXAMETHASONE SODIUM PHOSPHATE 4 MG: 4 INJECTION, SOLUTION INTRA-ARTICULAR; INTRALESIONAL; INTRAMUSCULAR; INTRAVENOUS; SOFT TISSUE at 09:01

## 2021-01-23 DIAGNOSIS — U07.1 COVID-19 VIRUS DETECTED: ICD-10-CM

## 2021-01-23 LAB — BACTERIA UR CULT: ABNORMAL

## 2021-05-26 ENCOUNTER — OFFICE VISIT (OUTPATIENT)
Dept: FAMILY MEDICINE | Facility: CLINIC | Age: 61
End: 2021-05-26
Payer: COMMERCIAL

## 2021-05-26 VITALS
WEIGHT: 186 LBS | OXYGEN SATURATION: 97 % | SYSTOLIC BLOOD PRESSURE: 134 MMHG | HEIGHT: 61 IN | DIASTOLIC BLOOD PRESSURE: 84 MMHG | BODY MASS INDEX: 35.12 KG/M2 | HEART RATE: 65 BPM

## 2021-05-26 DIAGNOSIS — Z90.13 H/O BILATERAL MASTECTOMY: ICD-10-CM

## 2021-05-26 DIAGNOSIS — Z20.822 EXPOSURE TO COVID-19 VIRUS: ICD-10-CM

## 2021-05-26 DIAGNOSIS — M25.551 CHRONIC ARTHRALGIAS OF KNEES AND HIPS: ICD-10-CM

## 2021-05-26 DIAGNOSIS — M25.562 CHRONIC ARTHRALGIAS OF KNEES AND HIPS: ICD-10-CM

## 2021-05-26 DIAGNOSIS — M25.552 CHRONIC ARTHRALGIAS OF KNEES AND HIPS: ICD-10-CM

## 2021-05-26 DIAGNOSIS — I10 ESSENTIAL HYPERTENSION: ICD-10-CM

## 2021-05-26 DIAGNOSIS — Z85.3 HX OF BREAST CANCER: ICD-10-CM

## 2021-05-26 DIAGNOSIS — F41.9 ANXIETY: ICD-10-CM

## 2021-05-26 DIAGNOSIS — M25.561 CHRONIC ARTHRALGIAS OF KNEES AND HIPS: ICD-10-CM

## 2021-05-26 DIAGNOSIS — F32.A DEPRESSION, UNSPECIFIED DEPRESSION TYPE: ICD-10-CM

## 2021-05-26 DIAGNOSIS — E78.5 HYPERLIPIDEMIA, UNSPECIFIED HYPERLIPIDEMIA TYPE: ICD-10-CM

## 2021-05-26 DIAGNOSIS — Z79.899 HIGH RISK MEDICATION USE: Primary | ICD-10-CM

## 2021-05-26 DIAGNOSIS — G89.29 CHRONIC ARTHRALGIAS OF KNEES AND HIPS: ICD-10-CM

## 2021-05-26 PROCEDURE — 99214 PR OFFICE/OUTPT VISIT, EST, LEVL IV, 30-39 MIN: ICD-10-PCS | Mod: S$GLB,,, | Performed by: NURSE PRACTITIONER

## 2021-05-26 PROCEDURE — 99214 OFFICE O/P EST MOD 30 MIN: CPT | Mod: S$GLB,,, | Performed by: NURSE PRACTITIONER

## 2021-05-26 RX ORDER — ZOLPIDEM TARTRATE 10 MG/1
10 TABLET ORAL NIGHTLY
Qty: 30 TABLET | Refills: 5 | Status: SHIPPED | OUTPATIENT
Start: 2021-05-26 | End: 2021-11-02 | Stop reason: SDUPTHER

## 2021-05-26 RX ORDER — IRBESARTAN 300 MG/1
300 TABLET ORAL NIGHTLY
Qty: 90 TABLET | Refills: 1 | Status: SHIPPED | OUTPATIENT
Start: 2021-05-26 | End: 2022-02-14 | Stop reason: SDUPTHER

## 2021-05-26 RX ORDER — FLUOXETINE HYDROCHLORIDE 20 MG/1
20 CAPSULE ORAL DAILY
Qty: 90 CAPSULE | Refills: 1 | Status: SHIPPED | OUTPATIENT
Start: 2021-05-26 | End: 2022-01-03 | Stop reason: SDUPTHER

## 2021-05-26 RX ORDER — AMLODIPINE BESYLATE 5 MG/1
5 TABLET ORAL DAILY
Qty: 30 TABLET | Refills: 11 | Status: SHIPPED | OUTPATIENT
Start: 2021-05-26 | End: 2021-06-15

## 2021-05-26 RX ORDER — ROSUVASTATIN CALCIUM 20 MG/1
20 TABLET, COATED ORAL NIGHTLY
Qty: 90 TABLET | Refills: 1 | Status: SHIPPED | OUTPATIENT
Start: 2021-05-26 | End: 2022-02-14 | Stop reason: SDUPTHER

## 2021-05-27 LAB
ALBUMIN SERPL-MCNC: 4.1 G/DL (ref 3.6–5.1)
ALBUMIN/GLOB SERPL: 1.6 (CALC) (ref 1–2.5)
ALP SERPL-CCNC: 77 U/L (ref 37–153)
ALT SERPL-CCNC: 16 U/L (ref 6–29)
AST SERPL-CCNC: 17 U/L (ref 10–35)
BILIRUB SERPL-MCNC: 0.5 MG/DL (ref 0.2–1.2)
BUN SERPL-MCNC: 21 MG/DL (ref 7–25)
BUN/CREAT SERPL: NORMAL (CALC) (ref 6–22)
CALCIUM SERPL-MCNC: 9.5 MG/DL (ref 8.6–10.4)
CHLORIDE SERPL-SCNC: 109 MMOL/L (ref 98–110)
CHOLEST SERPL-MCNC: 176 MG/DL
CHOLEST/HDLC SERPL: 3.3 (CALC)
CO2 SERPL-SCNC: 27 MMOL/L (ref 20–32)
CREAT SERPL-MCNC: 0.6 MG/DL (ref 0.5–0.99)
GLOBULIN SER CALC-MCNC: 2.5 G/DL (CALC) (ref 1.9–3.7)
GLUCOSE SERPL-MCNC: 97 MG/DL (ref 65–99)
HDLC SERPL-MCNC: 53 MG/DL
LDLC SERPL CALC-MCNC: 98 MG/DL (CALC)
NONHDLC SERPL-MCNC: 123 MG/DL (CALC)
POTASSIUM SERPL-SCNC: 4.3 MMOL/L (ref 3.5–5.3)
PROT SERPL-MCNC: 6.6 G/DL (ref 6.1–8.1)
SARS-COV-2 IGG SERPL IA-ACNC: 2.49 INDEX
SODIUM SERPL-SCNC: 143 MMOL/L (ref 135–146)
TRIGL SERPL-MCNC: 153 MG/DL

## 2021-06-03 ENCOUNTER — TELEPHONE (OUTPATIENT)
Dept: FAMILY MEDICINE | Facility: CLINIC | Age: 61
End: 2021-06-03

## 2021-06-15 ENCOUNTER — OFFICE VISIT (OUTPATIENT)
Dept: SURGERY | Facility: CLINIC | Age: 61
End: 2021-06-15
Payer: COMMERCIAL

## 2021-06-15 VITALS
DIASTOLIC BLOOD PRESSURE: 85 MMHG | TEMPERATURE: 97 F | SYSTOLIC BLOOD PRESSURE: 159 MMHG | HEIGHT: 61 IN | HEART RATE: 83 BPM | WEIGHT: 186 LBS | BODY MASS INDEX: 35.12 KG/M2

## 2021-06-15 DIAGNOSIS — K43.2 INCISIONAL HERNIA, WITHOUT OBSTRUCTION OR GANGRENE: Primary | ICD-10-CM

## 2021-06-15 DIAGNOSIS — R13.19 ESOPHAGEAL DYSPHAGIA: ICD-10-CM

## 2021-06-15 PROCEDURE — 99214 OFFICE O/P EST MOD 30 MIN: CPT | Mod: S$GLB,,, | Performed by: SURGERY

## 2021-06-15 PROCEDURE — 99214 PR OFFICE/OUTPT VISIT, EST, LEVL IV, 30-39 MIN: ICD-10-PCS | Mod: S$GLB,,, | Performed by: SURGERY

## 2021-06-16 ENCOUNTER — TELEPHONE (OUTPATIENT)
Dept: SURGERY | Facility: CLINIC | Age: 61
End: 2021-06-16

## 2021-06-21 ENCOUNTER — HOSPITAL ENCOUNTER (OUTPATIENT)
Dept: RADIOLOGY | Facility: HOSPITAL | Age: 61
Discharge: HOME | End: 2021-06-21
Attending: SURGERY
Payer: COMMERCIAL

## 2021-06-21 DIAGNOSIS — K43.2 INCISIONAL HERNIA, WITHOUT OBSTRUCTION OR GANGRENE: ICD-10-CM

## 2021-06-21 PROCEDURE — 74177 CT ABD & PELVIS W/CONTRAST: CPT | Mod: TC

## 2021-06-21 PROCEDURE — 25500020 PHARM REV CODE 255: Performed by: SURGERY

## 2021-06-21 RX ADMIN — IOHEXOL 100 ML: 350 INJECTION, SOLUTION INTRAVENOUS at 07:06

## 2021-08-12 ENCOUNTER — OFFICE VISIT (OUTPATIENT)
Dept: SURGERY | Facility: CLINIC | Age: 61
End: 2021-08-12
Payer: COMMERCIAL

## 2021-08-12 VITALS
WEIGHT: 186 LBS | BODY MASS INDEX: 35.12 KG/M2 | TEMPERATURE: 98 F | HEART RATE: 85 BPM | DIASTOLIC BLOOD PRESSURE: 91 MMHG | HEIGHT: 61 IN | SYSTOLIC BLOOD PRESSURE: 160 MMHG

## 2021-08-12 DIAGNOSIS — K43.2 INCISIONAL HERNIA, WITHOUT OBSTRUCTION OR GANGRENE: Primary | ICD-10-CM

## 2021-08-12 PROCEDURE — 99213 PR OFFICE/OUTPT VISIT, EST, LEVL III, 20-29 MIN: ICD-10-PCS | Mod: S$GLB,,, | Performed by: SURGERY

## 2021-08-12 PROCEDURE — 99213 OFFICE O/P EST LOW 20 MIN: CPT | Mod: S$GLB,,, | Performed by: SURGERY

## 2021-09-21 ENCOUNTER — TELEPHONE (OUTPATIENT)
Dept: SURGERY | Facility: CLINIC | Age: 61
End: 2021-09-21

## 2021-11-02 ENCOUNTER — HOSPITAL ENCOUNTER (OUTPATIENT)
Dept: PREADMISSION TESTING | Facility: HOSPITAL | Age: 61
Discharge: HOME | End: 2021-11-02
Attending: SURGERY
Payer: COMMERCIAL

## 2021-11-02 ENCOUNTER — HOSPITAL ENCOUNTER (OUTPATIENT)
Dept: RADIOLOGY | Facility: HOSPITAL | Age: 61
Discharge: HOME | End: 2021-11-02
Attending: SURGERY
Payer: COMMERCIAL

## 2021-11-02 ENCOUNTER — PATIENT MESSAGE (OUTPATIENT)
Dept: FAMILY MEDICINE | Facility: CLINIC | Age: 61
End: 2021-11-02
Payer: COMMERCIAL

## 2021-11-02 ENCOUNTER — LAB VISIT (OUTPATIENT)
Dept: PRIMARY CARE CLINIC | Facility: CLINIC | Age: 61
End: 2021-11-02
Payer: COMMERCIAL

## 2021-11-02 VITALS — HEIGHT: 61 IN | WEIGHT: 186 LBS | BODY MASS INDEX: 35.12 KG/M2

## 2021-11-02 DIAGNOSIS — F41.9 ANXIETY: ICD-10-CM

## 2021-11-02 DIAGNOSIS — K43.2 INCISIONAL HERNIA, WITHOUT OBSTRUCTION OR GANGRENE: ICD-10-CM

## 2021-11-02 DIAGNOSIS — Z01.818 PREOP TESTING: ICD-10-CM

## 2021-11-02 PROCEDURE — 71046 X-RAY EXAM CHEST 2 VIEWS: CPT | Mod: 26,,, | Performed by: RADIOLOGY

## 2021-11-02 PROCEDURE — 93010 ELECTROCARDIOGRAM REPORT: CPT | Mod: ,,, | Performed by: INTERNAL MEDICINE

## 2021-11-02 PROCEDURE — 99900104 DSU ONLY-NO CHARGE-EA ADD'L HR (STAT)

## 2021-11-02 PROCEDURE — U0005 INFEC AGEN DETEC AMPLI PROBE: HCPCS | Performed by: SURGERY

## 2021-11-02 PROCEDURE — U0003 INFECTIOUS AGENT DETECTION BY NUCLEIC ACID (DNA OR RNA); SEVERE ACUTE RESPIRATORY SYNDROME CORONAVIRUS 2 (SARS-COV-2) (CORONAVIRUS DISEASE [COVID-19]), AMPLIFIED PROBE TECHNIQUE, MAKING USE OF HIGH THROUGHPUT TECHNOLOGIES AS DESCRIBED BY CMS-2020-01-R: HCPCS | Performed by: SURGERY

## 2021-11-02 PROCEDURE — 93005 ELECTROCARDIOGRAM TRACING: CPT

## 2021-11-02 PROCEDURE — 71046 XR CHEST PA AND LATERAL: ICD-10-PCS | Mod: 26,,, | Performed by: RADIOLOGY

## 2021-11-02 PROCEDURE — 99900103 DSU ONLY-NO CHARGE-INITIAL HR (STAT)

## 2021-11-02 PROCEDURE — 93010 EKG 12-LEAD: ICD-10-PCS | Mod: ,,, | Performed by: INTERNAL MEDICINE

## 2021-11-02 PROCEDURE — 71046 X-RAY EXAM CHEST 2 VIEWS: CPT | Mod: TC,FY

## 2021-11-02 RX ORDER — ZOLPIDEM TARTRATE 10 MG/1
10 TABLET ORAL NIGHTLY
Qty: 30 TABLET | Refills: 5 | Status: SHIPPED | OUTPATIENT
Start: 2021-11-02 | End: 2022-02-14 | Stop reason: SDUPTHER

## 2021-11-02 RX ORDER — IBUPROFEN 200 MG
200 TABLET ORAL EVERY 6 HOURS PRN
Status: ON HOLD | COMMUNITY
End: 2023-08-10 | Stop reason: HOSPADM

## 2021-11-03 ENCOUNTER — ANESTHESIA EVENT (OUTPATIENT)
Dept: SURGERY | Facility: HOSPITAL | Age: 61
End: 2021-11-03
Payer: COMMERCIAL

## 2021-11-03 LAB
SARS-COV-2 RNA RESP QL NAA+PROBE: NOT DETECTED
SARS-COV-2- CYCLE NUMBER: NORMAL

## 2021-11-04 RX ORDER — SODIUM CHLORIDE 0.9 % (FLUSH) 0.9 %
10 SYRINGE (ML) INJECTION
Status: CANCELLED | OUTPATIENT
Start: 2021-11-04

## 2021-11-05 ENCOUNTER — ANESTHESIA (OUTPATIENT)
Dept: SURGERY | Facility: HOSPITAL | Age: 61
End: 2021-11-05
Payer: COMMERCIAL

## 2021-11-05 ENCOUNTER — HOSPITAL ENCOUNTER (OUTPATIENT)
Facility: HOSPITAL | Age: 61
Discharge: HOME | End: 2021-11-05
Attending: SURGERY | Admitting: SURGERY
Payer: COMMERCIAL

## 2021-11-05 DIAGNOSIS — K43.2 INCISIONAL HERNIA, WITHOUT OBSTRUCTION OR GANGRENE: Primary | ICD-10-CM

## 2021-11-05 PROCEDURE — 49655 PR LAP, INCISIONAL HERNIA REPAIR,INCARCERATED: CPT | Mod: ,,, | Performed by: SURGERY

## 2021-11-05 PROCEDURE — D9220A PRA ANESTHESIA: ICD-10-PCS | Mod: CRNA,,, | Performed by: ANESTHESIOLOGY

## 2021-11-05 PROCEDURE — 71000015 HC POSTOP RECOV 1ST HR: Performed by: SURGERY

## 2021-11-05 PROCEDURE — 49655 PR LAP, INCISIONAL HERNIA REPAIR,INCARCERATED: ICD-10-PCS | Mod: ,,, | Performed by: SURGERY

## 2021-11-05 PROCEDURE — 99900104 DSU ONLY-NO CHARGE-EA ADD'L HR (STAT): Performed by: SURGERY

## 2021-11-05 PROCEDURE — 27201423 OPTIME MED/SURG SUP & DEVICES STERILE SUPPLY: Performed by: SURGERY

## 2021-11-05 PROCEDURE — 36000710: Performed by: SURGERY

## 2021-11-05 PROCEDURE — C9290 INJ, BUPIVACAINE LIPOSOME: HCPCS | Performed by: SURGERY

## 2021-11-05 PROCEDURE — 37000009 HC ANESTHESIA EA ADD 15 MINS: Performed by: SURGERY

## 2021-11-05 PROCEDURE — 63600175 PHARM REV CODE 636 W HCPCS: Performed by: NURSE ANESTHETIST, CERTIFIED REGISTERED

## 2021-11-05 PROCEDURE — D9220A PRA ANESTHESIA: Mod: CRNA,,, | Performed by: ANESTHESIOLOGY

## 2021-11-05 PROCEDURE — 37000008 HC ANESTHESIA 1ST 15 MINUTES: Performed by: SURGERY

## 2021-11-05 PROCEDURE — 36000711: Performed by: SURGERY

## 2021-11-05 PROCEDURE — 71000016 HC POSTOP RECOV ADDL HR: Performed by: SURGERY

## 2021-11-05 PROCEDURE — 63600175 PHARM REV CODE 636 W HCPCS: Performed by: SURGERY

## 2021-11-05 PROCEDURE — 25000003 PHARM REV CODE 250: Performed by: NURSE ANESTHETIST, CERTIFIED REGISTERED

## 2021-11-05 PROCEDURE — 71000039 HC RECOVERY, EACH ADD'L HOUR: Performed by: SURGERY

## 2021-11-05 PROCEDURE — 25000003 PHARM REV CODE 250: Performed by: SURGERY

## 2021-11-05 PROCEDURE — D9220A PRA ANESTHESIA: Mod: ANES,,, | Performed by: NURSE ANESTHETIST, CERTIFIED REGISTERED

## 2021-11-05 PROCEDURE — D9220A PRA ANESTHESIA: ICD-10-PCS | Mod: ANES,,, | Performed by: NURSE ANESTHETIST, CERTIFIED REGISTERED

## 2021-11-05 PROCEDURE — 25000003 PHARM REV CODE 250: Performed by: ANESTHESIOLOGY

## 2021-11-05 PROCEDURE — C1781 MESH (IMPLANTABLE): HCPCS | Performed by: SURGERY

## 2021-11-05 PROCEDURE — 71000033 HC RECOVERY, INTIAL HOUR: Performed by: SURGERY

## 2021-11-05 PROCEDURE — 99900103 DSU ONLY-NO CHARGE-INITIAL HR (STAT): Performed by: SURGERY

## 2021-11-05 DEVICE — MESH PARIETENE COMP RND 12CM: Type: IMPLANTABLE DEVICE | Site: ABDOMEN | Status: FUNCTIONAL

## 2021-11-05 RX ORDER — DEXAMETHASONE SODIUM PHOSPHATE 4 MG/ML
INJECTION, SOLUTION INTRA-ARTICULAR; INTRALESIONAL; INTRAMUSCULAR; INTRAVENOUS; SOFT TISSUE
Status: DISCONTINUED | OUTPATIENT
Start: 2021-11-05 | End: 2021-11-05

## 2021-11-05 RX ORDER — SUCCINYLCHOLINE CHLORIDE 20 MG/ML
INJECTION INTRAMUSCULAR; INTRAVENOUS
Status: DISCONTINUED | OUTPATIENT
Start: 2021-11-05 | End: 2021-11-05

## 2021-11-05 RX ORDER — SCOLOPAMINE TRANSDERMAL SYSTEM 1 MG/1
1 PATCH, EXTENDED RELEASE TRANSDERMAL ONCE
Status: DISCONTINUED | OUTPATIENT
Start: 2021-11-05 | End: 2021-11-05 | Stop reason: HOSPADM

## 2021-11-05 RX ORDER — BUPIVACAINE HYDROCHLORIDE AND EPINEPHRINE 2.5; 5 MG/ML; UG/ML
INJECTION, SOLUTION EPIDURAL; INFILTRATION; INTRACAUDAL; PERINEURAL
Status: DISCONTINUED | OUTPATIENT
Start: 2021-11-05 | End: 2021-11-05 | Stop reason: HOSPADM

## 2021-11-05 RX ORDER — LIDOCAINE HYDROCHLORIDE 20 MG/ML
INJECTION INTRAVENOUS
Status: DISCONTINUED | OUTPATIENT
Start: 2021-11-05 | End: 2021-11-05

## 2021-11-05 RX ORDER — PHENYLEPHRINE HYDROCHLORIDE 10 MG/ML
INJECTION INTRAVENOUS
Status: DISCONTINUED | OUTPATIENT
Start: 2021-11-05 | End: 2021-11-05

## 2021-11-05 RX ORDER — FENTANYL CITRATE 50 UG/ML
INJECTION, SOLUTION INTRAMUSCULAR; INTRAVENOUS
Status: DISCONTINUED | OUTPATIENT
Start: 2021-11-05 | End: 2021-11-05

## 2021-11-05 RX ORDER — KETOROLAC TROMETHAMINE 30 MG/ML
INJECTION, SOLUTION INTRAMUSCULAR; INTRAVENOUS
Status: DISCONTINUED | OUTPATIENT
Start: 2021-11-05 | End: 2021-11-05

## 2021-11-05 RX ORDER — ACETAMINOPHEN 10 MG/ML
INJECTION, SOLUTION INTRAVENOUS
Status: DISCONTINUED | OUTPATIENT
Start: 2021-11-05 | End: 2021-11-05

## 2021-11-05 RX ORDER — DIPHENHYDRAMINE HYDROCHLORIDE 50 MG/ML
INJECTION INTRAMUSCULAR; INTRAVENOUS
Status: DISCONTINUED | OUTPATIENT
Start: 2021-11-05 | End: 2021-11-05

## 2021-11-05 RX ORDER — HYDROCODONE BITARTRATE AND ACETAMINOPHEN 5; 325 MG/1; MG/1
1 TABLET ORAL EVERY 6 HOURS PRN
Qty: 25 TABLET | Refills: 0 | Status: SHIPPED | OUTPATIENT
Start: 2021-11-05 | End: 2022-02-14

## 2021-11-05 RX ORDER — FENTANYL CITRATE 50 UG/ML
25 INJECTION, SOLUTION INTRAMUSCULAR; INTRAVENOUS EVERY 5 MIN PRN
Status: DISCONTINUED | OUTPATIENT
Start: 2021-11-05 | End: 2021-11-05 | Stop reason: HOSPADM

## 2021-11-05 RX ORDER — PROPOFOL 10 MG/ML
VIAL (ML) INTRAVENOUS
Status: DISCONTINUED | OUTPATIENT
Start: 2021-11-05 | End: 2021-11-05

## 2021-11-05 RX ORDER — MIDAZOLAM HYDROCHLORIDE 1 MG/ML
INJECTION, SOLUTION INTRAMUSCULAR; INTRAVENOUS
Status: DISCONTINUED | OUTPATIENT
Start: 2021-11-05 | End: 2021-11-05

## 2021-11-05 RX ORDER — LIDOCAINE HYDROCHLORIDE 10 MG/ML
1 INJECTION, SOLUTION EPIDURAL; INFILTRATION; INTRACAUDAL; PERINEURAL ONCE
Status: DISCONTINUED | OUTPATIENT
Start: 2021-11-05 | End: 2021-11-05 | Stop reason: HOSPADM

## 2021-11-05 RX ORDER — OXYCODONE HYDROCHLORIDE 5 MG/1
5 TABLET ORAL
Status: DISCONTINUED | OUTPATIENT
Start: 2021-11-05 | End: 2021-11-05 | Stop reason: HOSPADM

## 2021-11-05 RX ORDER — CLINDAMYCIN PHOSPHATE 900 MG/50ML
900 INJECTION, SOLUTION INTRAVENOUS
Status: COMPLETED | OUTPATIENT
Start: 2021-11-05 | End: 2021-11-05

## 2021-11-05 RX ORDER — EPHEDRINE SULFATE 50 MG/ML
INJECTION, SOLUTION INTRAVENOUS
Status: DISCONTINUED | OUTPATIENT
Start: 2021-11-05 | End: 2021-11-05

## 2021-11-05 RX ORDER — PROMETHAZINE HYDROCHLORIDE 25 MG/ML
INJECTION, SOLUTION INTRAMUSCULAR; INTRAVENOUS
Status: DISCONTINUED | OUTPATIENT
Start: 2021-11-05 | End: 2021-11-05

## 2021-11-05 RX ORDER — ONDANSETRON 2 MG/ML
INJECTION INTRAMUSCULAR; INTRAVENOUS
Status: DISCONTINUED | OUTPATIENT
Start: 2021-11-05 | End: 2021-11-05

## 2021-11-05 RX ORDER — METOCLOPRAMIDE HYDROCHLORIDE 5 MG/ML
10 INJECTION INTRAMUSCULAR; INTRAVENOUS EVERY 10 MIN PRN
Status: DISCONTINUED | OUTPATIENT
Start: 2021-11-05 | End: 2021-11-05 | Stop reason: HOSPADM

## 2021-11-05 RX ORDER — ROCURONIUM BROMIDE 10 MG/ML
INJECTION, SOLUTION INTRAVENOUS
Status: DISCONTINUED | OUTPATIENT
Start: 2021-11-05 | End: 2021-11-05

## 2021-11-05 RX ADMIN — DIPHENHYDRAMINE HYDROCHLORIDE 6.25 MG: 50 INJECTION INTRAMUSCULAR; INTRAVENOUS at 07:11

## 2021-11-05 RX ADMIN — PHENYLEPHRINE HYDROCHLORIDE 200 MCG: 10 INJECTION INTRAVENOUS at 09:11

## 2021-11-05 RX ADMIN — PHENYLEPHRINE HYDROCHLORIDE 100 MCG: 10 INJECTION INTRAVENOUS at 08:11

## 2021-11-05 RX ADMIN — ONDANSETRON 4 MG: 2 INJECTION, SOLUTION INTRAMUSCULAR; INTRAVENOUS at 10:11

## 2021-11-05 RX ADMIN — PROPOFOL 160 MG: 10 INJECTION, EMULSION INTRAVENOUS at 07:11

## 2021-11-05 RX ADMIN — ROCURONIUM BROMIDE 5 MG: 10 INJECTION, SOLUTION INTRAVENOUS at 07:11

## 2021-11-05 RX ADMIN — KETOROLAC TROMETHAMINE 30 MG: 30 INJECTION, SOLUTION INTRAMUSCULAR; INTRAVENOUS at 10:11

## 2021-11-05 RX ADMIN — PHENYLEPHRINE HYDROCHLORIDE 200 MCG: 10 INJECTION INTRAVENOUS at 07:11

## 2021-11-05 RX ADMIN — SODIUM CHLORIDE, SODIUM GLUCONATE, SODIUM ACETATE, POTASSIUM CHLORIDE, MAGNESIUM CHLORIDE, SODIUM PHOSPHATE, DIBASIC, AND POTASSIUM PHOSPHATE: .53; .5; .37; .037; .03; .012; .00082 INJECTION, SOLUTION INTRAVENOUS at 06:11

## 2021-11-05 RX ADMIN — SCOPALAMINE 1 PATCH: 1 PATCH, EXTENDED RELEASE TRANSDERMAL at 06:11

## 2021-11-05 RX ADMIN — CLINDAMYCIN PHOSPHATE 900 MG: 18 INJECTION, SOLUTION INTRAVENOUS at 07:11

## 2021-11-05 RX ADMIN — ACETAMINOPHEN 1000 MG: 10 INJECTION, SOLUTION INTRAVENOUS at 08:11

## 2021-11-05 RX ADMIN — ROCURONIUM BROMIDE 20 MG: 10 INJECTION, SOLUTION INTRAVENOUS at 09:11

## 2021-11-05 RX ADMIN — SUCCINYLCHOLINE CHLORIDE 120 MG: 20 INJECTION, SOLUTION INTRAMUSCULAR; INTRAVENOUS; PARENTERAL at 07:11

## 2021-11-05 RX ADMIN — PROMETHAZINE HYDROCHLORIDE 6.25 MG: 25 INJECTION INTRAMUSCULAR; INTRAVENOUS at 07:11

## 2021-11-05 RX ADMIN — SUGAMMADEX 200 MG: 100 INJECTION, SOLUTION INTRAVENOUS at 10:11

## 2021-11-05 RX ADMIN — ONDANSETRON 4 MG: 2 INJECTION, SOLUTION INTRAMUSCULAR; INTRAVENOUS at 07:11

## 2021-11-05 RX ADMIN — MIDAZOLAM 2 MG: 1 INJECTION INTRAMUSCULAR; INTRAVENOUS at 07:11

## 2021-11-05 RX ADMIN — FENTANYL CITRATE 100 MCG: 50 INJECTION, SOLUTION INTRAMUSCULAR; INTRAVENOUS at 07:11

## 2021-11-05 RX ADMIN — EPHEDRINE SULFATE 15 MG: 50 INJECTION, SOLUTION INTRAMUSCULAR; INTRAVENOUS; SUBCUTANEOUS at 09:11

## 2021-11-05 RX ADMIN — LIDOCAINE HYDROCHLORIDE 75 MG: 20 INJECTION, SOLUTION INTRAVENOUS at 07:11

## 2021-11-05 RX ADMIN — ROCURONIUM BROMIDE 45 MG: 10 INJECTION, SOLUTION INTRAVENOUS at 07:11

## 2021-11-05 RX ADMIN — DEXAMETHASONE SODIUM PHOSPHATE 4 MG: 4 INJECTION, SOLUTION INTRA-ARTICULAR; INTRALESIONAL; INTRAMUSCULAR; INTRAVENOUS; SOFT TISSUE at 07:11

## 2021-11-05 RX ADMIN — PHENYLEPHRINE HYDROCHLORIDE 200 MCG: 10 INJECTION INTRAVENOUS at 08:11

## 2021-11-05 RX ADMIN — OXYCODONE 5 MG: 5 TABLET ORAL at 11:11

## 2021-11-08 VITALS
RESPIRATION RATE: 18 BRPM | TEMPERATURE: 98 F | DIASTOLIC BLOOD PRESSURE: 84 MMHG | WEIGHT: 189.25 LBS | HEIGHT: 61 IN | SYSTOLIC BLOOD PRESSURE: 177 MMHG | OXYGEN SATURATION: 98 % | BODY MASS INDEX: 35.73 KG/M2 | HEART RATE: 96 BPM

## 2021-11-23 ENCOUNTER — OFFICE VISIT (OUTPATIENT)
Dept: SURGERY | Facility: CLINIC | Age: 61
End: 2021-11-23
Payer: COMMERCIAL

## 2021-11-23 VITALS — DIASTOLIC BLOOD PRESSURE: 93 MMHG | TEMPERATURE: 97 F | SYSTOLIC BLOOD PRESSURE: 176 MMHG

## 2021-11-23 DIAGNOSIS — K43.2 INCISIONAL HERNIA, WITHOUT OBSTRUCTION OR GANGRENE: Primary | ICD-10-CM

## 2021-11-23 PROCEDURE — 99024 POSTOP FOLLOW-UP VISIT: CPT | Mod: S$GLB,,, | Performed by: SURGERY

## 2021-11-23 PROCEDURE — 99024 PR POST-OP FOLLOW-UP VISIT: ICD-10-PCS | Mod: S$GLB,,, | Performed by: SURGERY

## 2021-12-15 DIAGNOSIS — M25.519 SHOULDER PAIN, UNSPECIFIED CHRONICITY, UNSPECIFIED LATERALITY: Primary | ICD-10-CM

## 2021-12-20 ENCOUNTER — HOSPITAL ENCOUNTER (OUTPATIENT)
Dept: RADIOLOGY | Facility: HOSPITAL | Age: 61
Discharge: HOME | End: 2021-12-20
Attending: ORTHOPAEDIC SURGERY
Payer: COMMERCIAL

## 2021-12-20 ENCOUNTER — OFFICE VISIT (OUTPATIENT)
Dept: ORTHOPEDICS | Facility: CLINIC | Age: 61
End: 2021-12-20
Payer: COMMERCIAL

## 2021-12-20 VITALS — WEIGHT: 189 LBS | BODY MASS INDEX: 35.68 KG/M2 | HEIGHT: 61 IN

## 2021-12-20 DIAGNOSIS — M25.519 SHOULDER PAIN, UNSPECIFIED CHRONICITY, UNSPECIFIED LATERALITY: ICD-10-CM

## 2021-12-20 DIAGNOSIS — M75.31 CALCIFIC TENDINITIS OF RIGHT SHOULDER REGION: Primary | ICD-10-CM

## 2021-12-20 PROCEDURE — 99203 OFFICE O/P NEW LOW 30 MIN: CPT | Mod: 25,S$GLB,, | Performed by: ORTHOPAEDIC SURGERY

## 2021-12-20 PROCEDURE — 73030 X-RAY EXAM OF SHOULDER: CPT | Mod: TC,PN,RT

## 2021-12-20 PROCEDURE — 73030 XR SHOULDER TRAUMA 3 VIEW RIGHT: ICD-10-PCS | Mod: 26,RT,, | Performed by: RADIOLOGY

## 2021-12-20 PROCEDURE — 99999 PR PBB SHADOW E&M-EST. PATIENT-LVL III: ICD-10-PCS | Mod: PBBFAC,,, | Performed by: ORTHOPAEDIC SURGERY

## 2021-12-20 PROCEDURE — 20610 DRAIN/INJ JOINT/BURSA W/O US: CPT | Mod: RT,S$GLB,, | Performed by: ORTHOPAEDIC SURGERY

## 2021-12-20 PROCEDURE — 99999 PR PBB SHADOW E&M-EST. PATIENT-LVL III: CPT | Mod: PBBFAC,,, | Performed by: ORTHOPAEDIC SURGERY

## 2021-12-20 PROCEDURE — 73030 X-RAY EXAM OF SHOULDER: CPT | Mod: 26,RT,, | Performed by: RADIOLOGY

## 2021-12-20 PROCEDURE — 20610 LARGE JOINT ASPIRATION/INJECTION: R SUBACROMIAL BURSA: ICD-10-PCS | Mod: RT,S$GLB,, | Performed by: ORTHOPAEDIC SURGERY

## 2021-12-20 PROCEDURE — 99203 PR OFFICE/OUTPT VISIT, NEW, LEVL III, 30-44 MIN: ICD-10-PCS | Mod: 25,S$GLB,, | Performed by: ORTHOPAEDIC SURGERY

## 2021-12-20 RX ORDER — TRIAMCINOLONE ACETONIDE 40 MG/ML
40 INJECTION, SUSPENSION INTRA-ARTICULAR; INTRAMUSCULAR
Status: DISCONTINUED | OUTPATIENT
Start: 2021-12-20 | End: 2021-12-20 | Stop reason: HOSPADM

## 2021-12-20 RX ADMIN — TRIAMCINOLONE ACETONIDE 40 MG: 40 INJECTION, SUSPENSION INTRA-ARTICULAR; INTRAMUSCULAR at 02:12

## 2022-01-03 ENCOUNTER — PATIENT MESSAGE (OUTPATIENT)
Dept: FAMILY MEDICINE | Facility: CLINIC | Age: 62
End: 2022-01-03
Payer: COMMERCIAL

## 2022-01-03 DIAGNOSIS — F41.9 ANXIETY: ICD-10-CM

## 2022-01-03 RX ORDER — FLUOXETINE HYDROCHLORIDE 20 MG/1
20 CAPSULE ORAL DAILY
Qty: 30 CAPSULE | Refills: 0 | Status: SHIPPED | OUTPATIENT
Start: 2022-01-03 | End: 2022-02-07 | Stop reason: SDUPTHER

## 2022-02-07 DIAGNOSIS — F41.9 ANXIETY: ICD-10-CM

## 2022-02-07 RX ORDER — FLUOXETINE HYDROCHLORIDE 20 MG/1
20 CAPSULE ORAL DAILY
Qty: 30 CAPSULE | Refills: 0 | Status: SHIPPED | OUTPATIENT
Start: 2022-02-07 | End: 2022-02-14 | Stop reason: SDUPTHER

## 2022-02-14 ENCOUNTER — OFFICE VISIT (OUTPATIENT)
Dept: FAMILY MEDICINE | Facility: CLINIC | Age: 62
End: 2022-02-14
Payer: COMMERCIAL

## 2022-02-14 VITALS
SYSTOLIC BLOOD PRESSURE: 138 MMHG | WEIGHT: 198 LBS | HEIGHT: 61 IN | BODY MASS INDEX: 37.38 KG/M2 | HEART RATE: 84 BPM | DIASTOLIC BLOOD PRESSURE: 86 MMHG

## 2022-02-14 DIAGNOSIS — Z85.3 HX OF BREAST CANCER: ICD-10-CM

## 2022-02-14 DIAGNOSIS — R76.8 POSITIVE ANA (ANTINUCLEAR ANTIBODY): ICD-10-CM

## 2022-02-14 DIAGNOSIS — D64.9 ANEMIA, UNSPECIFIED TYPE: ICD-10-CM

## 2022-02-14 DIAGNOSIS — E78.5 HYPERLIPIDEMIA, UNSPECIFIED HYPERLIPIDEMIA TYPE: ICD-10-CM

## 2022-02-14 DIAGNOSIS — G89.29 CHRONIC ARTHRALGIAS OF KNEES AND HIPS: ICD-10-CM

## 2022-02-14 DIAGNOSIS — M25.551 CHRONIC ARTHRALGIAS OF KNEES AND HIPS: ICD-10-CM

## 2022-02-14 DIAGNOSIS — Z79.899 HIGH RISK MEDICATION USE: Primary | ICD-10-CM

## 2022-02-14 DIAGNOSIS — G47.00 INSOMNIA, UNSPECIFIED TYPE: ICD-10-CM

## 2022-02-14 DIAGNOSIS — Z90.13 H/O BILATERAL MASTECTOMY: ICD-10-CM

## 2022-02-14 DIAGNOSIS — M25.562 CHRONIC ARTHRALGIAS OF KNEES AND HIPS: ICD-10-CM

## 2022-02-14 DIAGNOSIS — R53.83 FATIGUE, UNSPECIFIED TYPE: ICD-10-CM

## 2022-02-14 DIAGNOSIS — F41.9 ANXIETY: ICD-10-CM

## 2022-02-14 DIAGNOSIS — M25.552 CHRONIC ARTHRALGIAS OF KNEES AND HIPS: ICD-10-CM

## 2022-02-14 DIAGNOSIS — M25.561 CHRONIC ARTHRALGIAS OF KNEES AND HIPS: ICD-10-CM

## 2022-02-14 DIAGNOSIS — I10 ESSENTIAL HYPERTENSION: ICD-10-CM

## 2022-02-14 DIAGNOSIS — F32.A DEPRESSION, UNSPECIFIED DEPRESSION TYPE: ICD-10-CM

## 2022-02-14 DIAGNOSIS — G47.33 OSA (OBSTRUCTIVE SLEEP APNEA): ICD-10-CM

## 2022-02-14 PROCEDURE — 99214 PR OFFICE/OUTPT VISIT, EST, LEVL IV, 30-39 MIN: ICD-10-PCS | Mod: S$GLB,,, | Performed by: NURSE PRACTITIONER

## 2022-02-14 PROCEDURE — 99214 OFFICE O/P EST MOD 30 MIN: CPT | Mod: S$GLB,,, | Performed by: NURSE PRACTITIONER

## 2022-02-14 RX ORDER — ROSUVASTATIN CALCIUM 20 MG/1
20 TABLET, COATED ORAL NIGHTLY
Qty: 90 TABLET | Refills: 1 | Status: SHIPPED | OUTPATIENT
Start: 2022-02-14 | End: 2022-11-15 | Stop reason: SDUPTHER

## 2022-02-14 RX ORDER — FLUOXETINE HYDROCHLORIDE 20 MG/1
20 CAPSULE ORAL DAILY
Qty: 90 CAPSULE | Refills: 1 | Status: SHIPPED | OUTPATIENT
Start: 2022-02-14 | End: 2022-12-06 | Stop reason: SDUPTHER

## 2022-02-14 RX ORDER — IRBESARTAN 300 MG/1
300 TABLET ORAL NIGHTLY
Qty: 90 TABLET | Refills: 1 | Status: SHIPPED | OUTPATIENT
Start: 2022-02-14 | End: 2022-11-21 | Stop reason: SDUPTHER

## 2022-02-14 RX ORDER — ZOLPIDEM TARTRATE 10 MG/1
10 TABLET ORAL NIGHTLY
Qty: 30 TABLET | Refills: 5 | Status: SHIPPED | OUTPATIENT
Start: 2022-02-14 | End: 2022-05-02 | Stop reason: SDUPTHER

## 2022-02-15 LAB
ALBUMIN/CREAT UR: 16 MCG/MG CREAT
APPEARANCE UR: CLEAR
BACTERIA #/AREA URNS HPF: ABNORMAL /HPF
BACTERIA UR CULT: ABNORMAL
BILIRUB UR QL STRIP: NEGATIVE
COLOR UR: YELLOW
CREAT UR-MCNC: 238 MG/DL (ref 20–275)
GLUCOSE UR QL STRIP: NEGATIVE
HGB UR QL STRIP: NEGATIVE
HYALINE CASTS #/AREA URNS LPF: ABNORMAL /LPF
KETONES UR QL STRIP: NEGATIVE
LEUKOCYTE ESTERASE UR QL STRIP: NEGATIVE
MICROALBUMIN UR-MCNC: 3.7 MG/DL
NITRITE UR QL STRIP: NEGATIVE
PH UR STRIP: 5.5 [PH] (ref 5–8)
PROT UR QL STRIP: ABNORMAL
RBC #/AREA URNS HPF: ABNORMAL /HPF
SP GR UR STRIP: 1.03 (ref 1–1.03)
SQUAMOUS #/AREA URNS HPF: ABNORMAL /HPF
WBC #/AREA URNS HPF: ABNORMAL /HPF

## 2022-02-16 ENCOUNTER — TELEPHONE (OUTPATIENT)
Dept: FAMILY MEDICINE | Facility: CLINIC | Age: 62
End: 2022-02-16
Payer: COMMERCIAL

## 2022-02-16 DIAGNOSIS — R76.8 POSITIVE ANA (ANTINUCLEAR ANTIBODY): Primary | ICD-10-CM

## 2022-02-16 LAB
ALBUMIN SERPL-MCNC: 4 G/DL (ref 3.6–5.1)
ALBUMIN/GLOB SERPL: 1.6 (CALC) (ref 1–2.5)
ALP SERPL-CCNC: 81 U/L (ref 37–153)
ALT SERPL-CCNC: 21 U/L (ref 6–29)
ANA PAT SER IF-IMP: ABNORMAL
ANA SER QL IF: POSITIVE
ANA TITR SER IF: ABNORMAL TITER
AST SERPL-CCNC: 20 U/L (ref 10–35)
BASOPHILS # BLD AUTO: 53 CELLS/UL (ref 0–200)
BASOPHILS NFR BLD AUTO: 0.7 %
BILIRUB SERPL-MCNC: 0.6 MG/DL (ref 0.2–1.2)
BUN SERPL-MCNC: 18 MG/DL (ref 7–25)
BUN/CREAT SERPL: NORMAL (CALC) (ref 6–22)
CALCIUM SERPL-MCNC: 9.2 MG/DL (ref 8.6–10.4)
CHLORIDE SERPL-SCNC: 107 MMOL/L (ref 98–110)
CHOLEST SERPL-MCNC: 165 MG/DL
CHOLEST/HDLC SERPL: 3.2 (CALC)
CO2 SERPL-SCNC: 24 MMOL/L (ref 20–32)
CREAT SERPL-MCNC: 0.56 MG/DL (ref 0.5–0.99)
EOSINOPHIL # BLD AUTO: 293 CELLS/UL (ref 15–500)
EOSINOPHIL NFR BLD AUTO: 3.9 %
ERYTHROCYTE [DISTWIDTH] IN BLOOD BY AUTOMATED COUNT: 12.7 % (ref 11–15)
ERYTHROCYTE [SEDIMENTATION RATE] IN BLOOD BY WESTERGREN METHOD: 9 MM/H
FERRITIN SERPL-MCNC: 11 NG/ML (ref 16–288)
GLOBULIN SER CALC-MCNC: 2.5 G/DL (CALC) (ref 1.9–3.7)
GLUCOSE SERPL-MCNC: 79 MG/DL (ref 65–99)
HCT VFR BLD AUTO: 35 % (ref 35–45)
HDLC SERPL-MCNC: 52 MG/DL
HGB BLD-MCNC: 11.1 G/DL (ref 11.7–15.5)
LDLC SERPL CALC-MCNC: 86 MG/DL (CALC)
LYMPHOCYTES # BLD AUTO: 3458 CELLS/UL (ref 850–3900)
LYMPHOCYTES NFR BLD AUTO: 46.1 %
MCH RBC QN AUTO: 28.4 PG (ref 27–33)
MCHC RBC AUTO-ENTMCNC: 31.7 G/DL (ref 32–36)
MCV RBC AUTO: 89.5 FL (ref 80–100)
MONOCYTES # BLD AUTO: 488 CELLS/UL (ref 200–950)
MONOCYTES NFR BLD AUTO: 6.5 %
NEUTROPHILS # BLD AUTO: 3210 CELLS/UL (ref 1500–7800)
NEUTROPHILS NFR BLD AUTO: 42.8 %
NONHDLC SERPL-MCNC: 113 MG/DL (CALC)
PLATELET # BLD AUTO: 280 THOUSAND/UL (ref 140–400)
PMV BLD REES-ECKER: 10.1 FL (ref 7.5–12.5)
POTASSIUM SERPL-SCNC: 3.9 MMOL/L (ref 3.5–5.3)
PROT SERPL-MCNC: 6.5 G/DL (ref 6.1–8.1)
RBC # BLD AUTO: 3.91 MILLION/UL (ref 3.8–5.1)
RHEUMATOID FACT SERPL-ACNC: <14 IU/ML
SODIUM SERPL-SCNC: 143 MMOL/L (ref 135–146)
TRIGL SERPL-MCNC: 167 MG/DL
TSH SERPL-ACNC: 2.59 MIU/L (ref 0.4–4.5)
VIT B12 SERPL-MCNC: 185 PG/ML (ref 200–1100)
WBC # BLD AUTO: 7.5 THOUSAND/UL (ref 3.8–10.8)

## 2022-02-17 RX ORDER — ASCORBIC ACID 500 MG
500 TABLET ORAL DAILY
COMMUNITY
End: 2023-08-09

## 2022-02-17 NOTE — PROGRESS NOTES
SUBJECTIVE:    Patient ID: Traci Freire is a 61 y.o. female.    Chief Complaint: Follow-up (Did not bring bottles. ac)    61 year old female presents for check up. Treated for depression, htn, hyperlipidemia, oa, and insomnia. Taking meds as prescribed. Recently started back to work. Had hernia repair in 11/21. Saw dr. rush recently for shoulder pain. Received injection. Did get some relief.  Has not seen Dr. reynolds recently.   Due for labs. Not feeling great. Feels tired all the time. Sleeps ok at home. Had covid a the end of January. Fully recovered.       Office Visit on 02/14/2022   Component Date Value Ref Range Status    Ferritin 02/14/2022 11* 16 - 288 ng/mL Final    ETHEL 02/14/2022 POSITIVE* NEGATIVE Final    ETHEL Titer 02/14/2022 1:40* titer Final    ETHEL Pattern 02/14/2022 Nuclear, Homogeneous*  Final    Rheumatoid Factor 02/14/2022 <14  <14 IU/mL Final    WBC 02/14/2022 7.5  3.8 - 10.8 Thousand/uL Final    RBC 02/14/2022 3.91  3.80 - 5.10 Million/uL Final    Hemoglobin 02/14/2022 11.1* 11.7 - 15.5 g/dL Final    Hematocrit 02/14/2022 35.0  35.0 - 45.0 % Final    MCV 02/14/2022 89.5  80.0 - 100.0 fL Final    MCH 02/14/2022 28.4  27.0 - 33.0 pg Final    MCHC 02/14/2022 31.7* 32.0 - 36.0 g/dL Final    RDW 02/14/2022 12.7  11.0 - 15.0 % Final    Platelets 02/14/2022 280  140 - 400 Thousand/uL Final    MPV 02/14/2022 10.1  7.5 - 12.5 fL Final    Neutrophils, Abs 02/14/2022 3,210  1,500 - 7,800 cells/uL Final    Lymph # 02/14/2022 3,458  850 - 3,900 cells/uL Final    Mono # 02/14/2022 488  200 - 950 cells/uL Final    Eos # 02/14/2022 293  15 - 500 cells/uL Final    Baso # 02/14/2022 53  0 - 200 cells/uL Final    Neutrophils Relative 02/14/2022 42.8  % Final    Lymph % 02/14/2022 46.1  % Final    Mono % 02/14/2022 6.5  % Final    Eosinophil % 02/14/2022 3.9  % Final    Basophil % 02/14/2022 0.7  % Final    Glucose 02/14/2022 79  65 - 99 mg/dL Final    BUN 02/14/2022 18  7 - 25 mg/dL  Final    Creatinine 02/14/2022 0.56  0.50 - 0.99 mg/dL Final    eGFR if non African American 02/14/2022 101  > OR = 60 mL/min/1.73m2 Final    eGFR if  02/14/2022 117  > OR = 60 mL/min/1.73m2 Final    BUN/Creatinine Ratio 02/14/2022 NOT APPLICABLE  6 - 22 (calc) Final    Sodium 02/14/2022 143  135 - 146 mmol/L Final    Potassium 02/14/2022 3.9  3.5 - 5.3 mmol/L Final    Chloride 02/14/2022 107  98 - 110 mmol/L Final    CO2 02/14/2022 24  20 - 32 mmol/L Final    Calcium 02/14/2022 9.2  8.6 - 10.4 mg/dL Final    Total Protein 02/14/2022 6.5  6.1 - 8.1 g/dL Final    Albumin 02/14/2022 4.0  3.6 - 5.1 g/dL Final    Globulin, Total 02/14/2022 2.5  1.9 - 3.7 g/dL (calc) Final    Albumin/Globulin Ratio 02/14/2022 1.6  1.0 - 2.5 (calc) Final    Total Bilirubin 02/14/2022 0.6  0.2 - 1.2 mg/dL Final    Alkaline Phosphatase 02/14/2022 81  37 - 153 U/L Final    AST 02/14/2022 20  10 - 35 U/L Final    ALT 02/14/2022 21  6 - 29 U/L Final    Cholesterol 02/14/2022 165  <200 mg/dL Final    HDL 02/14/2022 52  > OR = 50 mg/dL Final    Triglycerides 02/14/2022 167* <150 mg/dL Final    LDL Cholesterol 02/14/2022 86  mg/dL (calc) Final    HDL/Cholesterol Ratio 02/14/2022 3.2  <5.0 (calc) Final    Non HDL Chol. (LDL+VLDL) 02/14/2022 113  <130 mg/dL (calc) Final    TSH w/reflex to FT4 02/14/2022 2.59  0.40 - 4.50 mIU/L Final    Creatinine, Urine 02/14/2022 238  20 - 275 mg/dL Final    Microalb, Ur 02/14/2022 3.7  See Note: mg/dL Final    Microalb/Creat Ratio 02/14/2022 16  <30 mcg/mg creat Final    Color, UA 02/14/2022 YELLOW  YELLOW Final    Appearance, UA 02/14/2022 CLEAR  CLEAR Final    Specific Gravity, UA 02/14/2022 1.031  1.001 - 1.035 Final    pH, UA 02/14/2022 5.5  5.0 - 8.0 Final    Glucose, UA 02/14/2022 NEGATIVE  NEGATIVE Final    Bilirubin, UA 02/14/2022 NEGATIVE  NEGATIVE Final    Ketones, UA 02/14/2022 NEGATIVE  NEGATIVE Final    Occult Blood UA 02/14/2022 NEGATIVE  NEGATIVE  Final    Protein, UA 02/14/2022 TRACE* NEGATIVE Final    Nitrite, UA 02/14/2022 NEGATIVE  NEGATIVE Final    Leukocytes, UA 02/14/2022 NEGATIVE  NEGATIVE Final    WBC Casts, UA 02/14/2022 NONE SEEN  < OR = 5 /HPF Final    RBC Casts, UA 02/14/2022 0-2  < OR = 2 /HPF Final    Squam Epithel, UA 02/14/2022 0-5  < OR = 5 /HPF Final    Bacteria, UA 02/14/2022 NONE SEEN  NONE SEEN /HPF Final    Hyaline Casts, UA 02/14/2022 NONE SEEN  NONE SEEN /LPF Final    Reflexive Urine Culture 02/14/2022    Final    Vitamin B-12 02/14/2022 185* 200 - 1,100 pg/mL Final    Sed Rate 02/14/2022 9  < OR = 30 mm/h Final   Lab Visit on 11/02/2021   Component Date Value Ref Range Status    SARS-CoV2 (COVID-19) Qualitative P* 11/02/2021 Not Detected  Not Detected Final    SARS-COV-2- Cycle Number 11/02/2021 N/A   Final   Lab Visit on 11/02/2021   Component Date Value Ref Range Status    Sodium 11/02/2021 142  136 - 145 mmol/L Final    Potassium 11/02/2021 4.0  3.5 - 5.1 mmol/L Final    Chloride 11/02/2021 109  95 - 110 mmol/L Final    CO2 11/02/2021 22* 23 - 29 mmol/L Final    Glucose 11/02/2021 110  70 - 110 mg/dL Final    BUN 11/02/2021 15  6 - 20 mg/dL Final    Creatinine 11/02/2021 0.7  0.5 - 1.4 mg/dL Final    Calcium 11/02/2021 9.1  8.7 - 10.5 mg/dL Final    Anion Gap 11/02/2021 11  8 - 16 mmol/L Final    eGFR if African American 11/02/2021 >60  >60 mL/min/1.73 m^2 Final    eGFR if non African American 11/02/2021 >60  >60 mL/min/1.73 m^2 Final    WBC 11/02/2021 6.19  3.90 - 12.70 K/uL Final    RBC 11/02/2021 4.33  4.00 - 5.40 M/uL Final    Hemoglobin 11/02/2021 12.9  12.0 - 16.0 g/dL Final    Hematocrit 11/02/2021 40.5  37.0 - 48.5 % Final    MCV 11/02/2021 94  82 - 98 fL Final    MCH 11/02/2021 29.8  27.0 - 31.0 pg Final    MCHC 11/02/2021 31.9* 32.0 - 36.0 g/dL Final    RDW 11/02/2021 12.6  11.5 - 14.5 % Final    Platelets 11/02/2021 256  150 - 450 K/uL Final    MPV 11/02/2021 9.7  9.2 - 12.9 fL Final     Immature Granulocytes 2021 0.2  0.0 - 0.5 % Final    Gran # (ANC) 2021 3.0  1.8 - 7.7 K/uL Final    Immature Grans (Abs) 2021 0.01  0.00 - 0.04 K/uL Final    Lymph # 2021 2.5  1.0 - 4.8 K/uL Final    Mono # 2021 0.5  0.3 - 1.0 K/uL Final    Eos # 2021 0.3  0.0 - 0.5 K/uL Final    Baso # 2021 0.04  0.00 - 0.20 K/uL Final    nRBC 2021 0  0 /100 WBC Final    Gran % 2021 47.9  38.0 - 73.0 % Final    Lymph % 2021 39.7  18.0 - 48.0 % Final    Mono % 2021 7.4  4.0 - 15.0 % Final    Eosinophil % 2021 4.2  0.0 - 8.0 % Final    Basophil % 2021 0.6  0.0 - 1.9 % Final    Differential Method 2021 Automated   Final       Past Medical History:   Diagnosis Date    Anxiety     Arthritis     Cancer breast    Right- Bilateral mastectomy- May 2005- had breast reconstruction- mother  of breast cancer    HLD (hyperlipidemia)     Hypertension     diagnosed age late 40's     IBS (irritable bowel syndrome)     Lyme disease     arthritis of hands. Felt like flu- age early 30's- got it  after going to [place]    Sleep apnea     NO MACHINE     Social History     Socioeconomic History    Marital status:    Tobacco Use    Smoking status: Former Smoker     Quit date: 3/26/2004     Years since quittin.9    Smokeless tobacco: Never Used    Tobacco comment: quit - smoked for 10 years- 1 ppd   Substance and Sexual Activity    Alcohol use: No    Drug use: No    Sexual activity: Never     Past Surgical History:   Procedure Laterality Date    ABDOMINAL SURGERY      BLADDER SUSPENSION      BREAST SURGERY       SECTION      CYSTOSCOPY      avoids greens . ? Interstitial cystitis    HYSTERECTOMY      followed by removal of ovaries  from scar tissue    INCISIONAL HERNIA REPAIR Right 2008    RLQ    JOINT REPLACEMENT      [facility] -      KNEE SURGERY Left     TKR    MODIFIED RADICAL MASTECTOMY W/ AXILLARY LYMPH NODE DISSECTION Right 05/10/2005    w/free flap    NISSEN FUNDOPLICATION      ROBOT-ASSISTED LAPAROSCOPIC REPAIR OF VENTRAL HERNIA N/A 2021    Procedure: ROBOTIC REPAIR, HERNIA, VENTRAL;  Surgeon: John Johnson III, MD;  Location: [facility];  Service: General;  Laterality: N/A;    SIMPLE MASTECTOMY Left 05/10/2005    w/free flap    TONSILLECTOMY       Family History   Problem Relation Age of Onset    Cancer Mother          of breast cancer, also had gynecological cancer- had breast cancer that spread to the brain    Heart disease Father         in his 60's-  in his 70's       Review of patient's allergies indicates:   Allergen Reactions    Keflex [cephalexin]     Macrobid [nitrofurantoin monohyd/m-cryst]        Current Outpatient Medications:     FLUoxetine 20 MG capsule, Take 1 capsule (20 mg total) by mouth once daily., Disp: 90 capsule, Rfl: 1    ibuprofen (ADVIL,MOTRIN) 200 MG tablet, Take 200 mg by mouth every 6 (six) hours as needed for Pain., Disp: , Rfl:     irbesartan (AVAPRO) 300 MG tablet, Take 1 tablet (300 mg total) by mouth every evening., Disp: 90 tablet, Rfl: 1    rosuvastatin (CRESTOR) 20 MG tablet, Take 1 tablet (20 mg total) by mouth every evening., Disp: 90 tablet, Rfl: 1    zolpidem (AMBIEN) 10 mg Tab, Take 1 tablet (10 mg total) by mouth every evening., Disp: 30 tablet, Rfl: 5    Review of Systems   Constitutional: Negative for chills, fever and unexpected weight change.   HENT: Negative for ear pain, rhinorrhea and sore throat.    Eyes: Negative for pain and visual disturbance.   Respiratory: Negative for cough and shortness of breath.    Cardiovascular: Negative for chest pain, palpitations and leg swelling.   Gastrointestinal: Negative for abdominal pain, diarrhea, nausea and vomiting.   Genitourinary: Negative for difficulty urinating, hematuria and vaginal bleeding.  "  Musculoskeletal: Positive for arthralgias.   Skin: Negative for rash.   Neurological: Negative for dizziness, weakness and headaches.   Psychiatric/Behavioral: Positive for sleep disturbance. Negative for agitation. The patient is not nervous/anxious.           Objective:      Vitals:    02/14/22 1221   BP: 138/86   Pulse: 84   Weight: 89.8 kg (198 lb)   Height: 5' 1" (1.549 m)     Physical Exam  Vitals and nursing note reviewed.   Constitutional:       General: She is not in acute distress.     Appearance: She is well-developed and well-nourished. She is obese. She is not ill-appearing.   HENT:      Head: Normocephalic.      Right Ear: External ear normal.      Left Ear: External ear normal.      Mouth/Throat:      Mouth: Oropharynx is clear and moist.   Eyes:      Conjunctiva/sclera: Conjunctivae normal.      Pupils: Pupils are equal, round, and reactive to light.   Neck:      Vascular: No JVD.   Cardiovascular:      Rate and Rhythm: Normal rate and regular rhythm.      Heart sounds: No murmur heard.      Pulmonary:      Effort: Pulmonary effort is normal.      Breath sounds: Normal breath sounds.   Abdominal:      General: Bowel sounds are normal.      Palpations: Abdomen is soft.   Musculoskeletal:         General: No deformity or edema.      Right shoulder: Decreased range of motion.      Cervical back: Normal range of motion and neck supple.   Lymphadenopathy:      Cervical: No cervical adenopathy.   Skin:     General: Skin is warm, dry and intact.      Findings: No rash.   Neurological:      Mental Status: She is alert and oriented to person, place, and time.      Gait: Gait normal.   Psychiatric:         Mood and Affect: Mood and affect normal.         Speech: Speech normal.         Behavior: Behavior normal.           Assessment:       1. High risk medication use    2. Anxiety    3. Essential hypertension    4. Hyperlipidemia, unspecified hyperlipidemia type    5. Depression, unspecified depression type  "   6. FRANCIE (obstructive sleep apnea)    7. Anemia, unspecified type    8. Hx of breast cancer    9. Insomnia, unspecified type    10. H/O bilateral mastectomy    11. Chronic arthralgias of knees and hips    12. Positive ETHEL (antinuclear antibody)    13. Fatigue, unspecified type         Plan:       High risk medication use  -     Ferritin; Future; Expected date: 02/14/2022  -     ETHEL Screen w/Reflex; Future; Expected date: 02/14/2022  -     Rheumatoid Factor; Future; Expected date: 02/14/2022  -     Sedimentation rate; Future; Expected date: 02/14/2022  -     CBC Auto Differential; Future; Expected date: 02/14/2022  -     Comprehensive Metabolic Panel; Future; Expected date: 02/14/2022  -     Lipid Panel; Future; Expected date: 02/14/2022  -     TSH w/reflex to FT4; Future; Expected date: 02/14/2022  -     Microalbumin/Creatinine Ratio, Urine; Future; Expected date: 02/14/2022  -     Urinalysis, Reflex to Urine Culture Urine, Clean Catch; Future; Expected date: 02/14/2022    Anxiety  -     FLUoxetine 20 MG capsule; Take 1 capsule (20 mg total) by mouth once daily.  Dispense: 90 capsule; Refill: 1  -     zolpidem (AMBIEN) 10 mg Tab; Take 1 tablet (10 mg total) by mouth every evening.  Dispense: 30 tablet; Refill: 5  -     CBC Auto Differential; Future; Expected date: 02/14/2022  -     Comprehensive Metabolic Panel; Future; Expected date: 02/14/2022  -     Lipid Panel; Future; Expected date: 02/14/2022  -     Microalbumin/Creatinine Ratio, Urine; Future; Expected date: 02/14/2022  -     Urinalysis, Reflex to Urine Culture Urine, Clean Catch; Future; Expected date: 02/14/2022    Essential hypertension  -     irbesartan (AVAPRO) 300 MG tablet; Take 1 tablet (300 mg total) by mouth every evening.  Dispense: 90 tablet; Refill: 1  -     CBC Auto Differential; Future; Expected date: 02/14/2022  -     Comprehensive Metabolic Panel; Future; Expected date: 02/14/2022  -     Lipid Panel; Future; Expected date: 02/14/2022  -      TSH w/reflex to FT4; Future; Expected date: 02/14/2022  -     Microalbumin/Creatinine Ratio, Urine; Future; Expected date: 02/14/2022  -     Urinalysis, Reflex to Urine Culture Urine, Clean Catch; Future; Expected date: 02/14/2022    Hyperlipidemia, unspecified hyperlipidemia type  -     rosuvastatin (CRESTOR) 20 MG tablet; Take 1 tablet (20 mg total) by mouth every evening.  Dispense: 90 tablet; Refill: 1  -     Lipid Panel; Future; Expected date: 02/14/2022    Depression, unspecified depression type    FRANCIE (obstructive sleep apnea)    Anemia, unspecified type  -     Ferritin; Future; Expected date: 02/14/2022  -     CBC Auto Differential; Future; Expected date: 02/14/2022  -     Vitamin B12; Future; Expected date: 02/14/2022    Hx of breast cancer  -     CBC Auto Differential; Future; Expected date: 02/14/2022  -     Comprehensive Metabolic Panel; Future; Expected date: 02/14/2022  -     Lipid Panel; Future; Expected date: 02/14/2022    Insomnia, unspecified type    H/O bilateral mastectomy    Chronic arthralgias of knees and hips    Positive ETHEL (antinuclear antibody)    Fatigue, unspecified type    Other orders  -     Sedimentation Rate      Follow up in about 3 months (around 5/14/2022), or if symptoms worsen or fail to improve, for medication management.        2/17/2022 Juliet Medina

## 2022-02-17 NOTE — TELEPHONE ENCOUNTER
Spoke to patient with results verbatim per Juliet.  Verbalized understanding on all, including to start the 3 below OTC supplements, added to med list. Gave her Dr Leonardo name and number. She will call for appt. Remind me created. Referral pended.

## 2022-02-17 NOTE — TELEPHONE ENCOUNTER
Juliet Medina, NP   2/17/2022  3:03 AM CST Back to Top        Iron level is low. Start slow fe 50mg. Take with vitamin c 500mg daily. Evie is positive. I would like her to folow up with dr. Tariq. B12 is low. Start otc b12 daily. Rest of labs are normal

## 2022-03-17 ENCOUNTER — HOSPITAL ENCOUNTER (EMERGENCY)
Facility: HOSPITAL | Age: 62
Discharge: HOME | End: 2022-03-17
Attending: EMERGENCY MEDICINE
Payer: COMMERCIAL

## 2022-03-17 VITALS
SYSTOLIC BLOOD PRESSURE: 179 MMHG | BODY MASS INDEX: 35.87 KG/M2 | HEIGHT: 61 IN | HEART RATE: 85 BPM | RESPIRATION RATE: 19 BRPM | OXYGEN SATURATION: 96 % | TEMPERATURE: 99 F | DIASTOLIC BLOOD PRESSURE: 81 MMHG | WEIGHT: 190 LBS

## 2022-03-17 DIAGNOSIS — N20.0 KIDNEY STONE: Primary | ICD-10-CM

## 2022-03-17 LAB
ALBUMIN SERPL BCP-MCNC: 4.5 G/DL (ref 3.5–5.2)
ALP SERPL-CCNC: 82 U/L (ref 55–135)
ALT SERPL W/O P-5'-P-CCNC: 29 U/L (ref 10–44)
ANION GAP SERPL CALC-SCNC: 9 MMOL/L (ref 8–16)
AST SERPL-CCNC: 31 U/L (ref 10–40)
BACTERIA #/AREA URNS HPF: NEGATIVE /HPF
BASOPHILS # BLD AUTO: 0.03 K/UL (ref 0–0.2)
BASOPHILS NFR BLD: 0.3 % (ref 0–1.9)
BILIRUB SERPL-MCNC: 0.9 MG/DL (ref 0.1–1)
BILIRUB UR QL STRIP: NEGATIVE
BUN SERPL-MCNC: 21 MG/DL (ref 8–23)
CALCIUM SERPL-MCNC: 9.4 MG/DL (ref 8.7–10.5)
CHLORIDE SERPL-SCNC: 106 MMOL/L (ref 95–110)
CLARITY UR: CLEAR
CO2 SERPL-SCNC: 23 MMOL/L (ref 23–29)
COLOR UR: YELLOW
CREAT SERPL-MCNC: 1 MG/DL (ref 0.5–1.4)
DIFFERENTIAL METHOD: ABNORMAL
EOSINOPHIL # BLD AUTO: 0 K/UL (ref 0–0.5)
EOSINOPHIL NFR BLD: 0.1 % (ref 0–8)
ERYTHROCYTE [DISTWIDTH] IN BLOOD BY AUTOMATED COUNT: 13.2 % (ref 11.5–14.5)
EST. GFR  (AFRICAN AMERICAN): >60 ML/MIN/1.73 M^2
EST. GFR  (NON AFRICAN AMERICAN): >60 ML/MIN/1.73 M^2
GLUCOSE SERPL-MCNC: 131 MG/DL (ref 70–110)
GLUCOSE UR QL STRIP: NEGATIVE
HCT VFR BLD AUTO: 42 % (ref 37–48.5)
HGB BLD-MCNC: 13.2 G/DL (ref 12–16)
HGB UR QL STRIP: ABNORMAL
HYALINE CASTS #/AREA URNS LPF: 1 /LPF
IMM GRANULOCYTES # BLD AUTO: 0.04 K/UL (ref 0–0.04)
IMM GRANULOCYTES NFR BLD AUTO: 0.3 % (ref 0–0.5)
KETONES UR QL STRIP: ABNORMAL
LEUKOCYTE ESTERASE UR QL STRIP: NEGATIVE
LYMPHOCYTES # BLD AUTO: 1.1 K/UL (ref 1–4.8)
LYMPHOCYTES NFR BLD: 9.2 % (ref 18–48)
MCH RBC QN AUTO: 28.3 PG (ref 27–31)
MCHC RBC AUTO-ENTMCNC: 31.4 G/DL (ref 32–36)
MCV RBC AUTO: 90 FL (ref 82–98)
MICROSCOPIC COMMENT: NORMAL
MONOCYTES # BLD AUTO: 0.6 K/UL (ref 0.3–1)
MONOCYTES NFR BLD: 5.2 % (ref 4–15)
NEUTROPHILS # BLD AUTO: 10 K/UL (ref 1.8–7.7)
NEUTROPHILS NFR BLD: 84.9 % (ref 38–73)
NITRITE UR QL STRIP: NEGATIVE
NRBC BLD-RTO: 0 /100 WBC
PH UR STRIP: 7 [PH] (ref 5–8)
PLATELET # BLD AUTO: 247 K/UL (ref 150–450)
PMV BLD AUTO: 9.9 FL (ref 9.2–12.9)
POTASSIUM SERPL-SCNC: 3.9 MMOL/L (ref 3.5–5.1)
PROT SERPL-MCNC: 8.1 G/DL (ref 6–8.4)
PROT UR QL STRIP: ABNORMAL
RBC # BLD AUTO: 4.66 M/UL (ref 4–5.4)
RBC #/AREA URNS HPF: 2 /HPF (ref 0–4)
SODIUM SERPL-SCNC: 138 MMOL/L (ref 136–145)
SP GR UR STRIP: 1.01 (ref 1–1.03)
SQUAMOUS #/AREA URNS HPF: 2 /HPF
URN SPEC COLLECT METH UR: ABNORMAL
UROBILINOGEN UR STRIP-ACNC: NEGATIVE EU/DL
WBC # BLD AUTO: 11.75 K/UL (ref 3.9–12.7)
WBC #/AREA URNS HPF: 1 /HPF (ref 0–5)

## 2022-03-17 PROCEDURE — 80053 COMPREHEN METABOLIC PANEL: CPT | Performed by: NURSE PRACTITIONER

## 2022-03-17 PROCEDURE — 85025 COMPLETE CBC W/AUTO DIFF WBC: CPT | Performed by: NURSE PRACTITIONER

## 2022-03-17 PROCEDURE — 81001 URINALYSIS AUTO W/SCOPE: CPT | Performed by: NURSE PRACTITIONER

## 2022-03-17 PROCEDURE — 63600175 PHARM REV CODE 636 W HCPCS: Performed by: EMERGENCY MEDICINE

## 2022-03-17 PROCEDURE — 25000003 PHARM REV CODE 250: Performed by: EMERGENCY MEDICINE

## 2022-03-17 PROCEDURE — 96374 THER/PROPH/DIAG INJ IV PUSH: CPT

## 2022-03-17 PROCEDURE — 96375 TX/PRO/DX INJ NEW DRUG ADDON: CPT

## 2022-03-17 PROCEDURE — 99284 EMERGENCY DEPT VISIT MOD MDM: CPT | Mod: 25

## 2022-03-17 RX ORDER — HYDROCODONE BITARTRATE AND ACETAMINOPHEN 5; 325 MG/1; MG/1
1 TABLET ORAL EVERY 8 HOURS PRN
Qty: 9 TABLET | Refills: 0 | Status: SHIPPED | OUTPATIENT
Start: 2022-03-17 | End: 2022-12-06

## 2022-03-17 RX ORDER — ONDANSETRON 4 MG/1
4 TABLET, ORALLY DISINTEGRATING ORAL EVERY 6 HOURS PRN
Qty: 12 TABLET | Refills: 0 | Status: SHIPPED | OUTPATIENT
Start: 2022-03-17 | End: 2023-08-09

## 2022-03-17 RX ORDER — HYDROCODONE BITARTRATE AND ACETAMINOPHEN 5; 325 MG/1; MG/1
1 TABLET ORAL
Status: COMPLETED | OUTPATIENT
Start: 2022-03-17 | End: 2022-03-17

## 2022-03-17 RX ORDER — MORPHINE SULFATE 2 MG/ML
2 INJECTION, SOLUTION INTRAMUSCULAR; INTRAVENOUS
Status: COMPLETED | OUTPATIENT
Start: 2022-03-17 | End: 2022-03-17

## 2022-03-17 RX ORDER — ONDANSETRON 2 MG/ML
4 INJECTION INTRAMUSCULAR; INTRAVENOUS ONCE
Status: COMPLETED | OUTPATIENT
Start: 2022-03-17 | End: 2022-03-17

## 2022-03-17 RX ADMIN — HYDROCODONE BITARTRATE AND ACETAMINOPHEN 1 TABLET: 5; 325 TABLET ORAL at 05:03

## 2022-03-17 RX ADMIN — ONDANSETRON 4 MG: 2 INJECTION INTRAMUSCULAR; INTRAVENOUS at 02:03

## 2022-03-17 RX ADMIN — MORPHINE SULFATE 2 MG: 2 INJECTION, SOLUTION INTRAMUSCULAR; INTRAVENOUS at 02:03

## 2022-03-17 NOTE — ED PROVIDER NOTES
Encounter Date: 3/17/2022       History     Chief Complaint   Patient presents with    Flank Pain     LEFT , SUDDEN ONSET THIS AM    Nausea     61-year-old female presents complaining of left flank pain patient reports pain started suddenly this morning patient denies trauma patient reports associated nausea patient describes pain as sharp and nonradiating patient has history of kidney stones in the past patient denies fever cough or shortness of breath patient denies any recent change in bowel movement or weakness or numbness        Review of patient's allergies indicates:   Allergen Reactions    Keflex [cephalexin]     Macrobid [nitrofurantoin monohyd/m-cryst]      Past Medical History:   Diagnosis Date    Anxiety     Arthritis     Cancer breast    Right- Bilateral mastectomy- May 2005- had breast reconstruction- mother  of breast cancer    HLD (hyperlipidemia)     Hypertension     diagnosed age late 40's     IBS (irritable bowel syndrome)     Kidney stone     Lyme disease     arthritis of hands. Felt like flu- age early 30's- got it  after going to [place]    Sleep apnea     NO MACHINE     Past Surgical History:   Procedure Laterality Date    ABDOMINAL SURGERY      BLADDER SUSPENSION      BREAST SURGERY       SECTION      CYSTOSCOPY      avoids greens . ? Interstitial cystitis    HYSTERECTOMY      followed by removal of ovaries 1996 from scar tissue    INCISIONAL HERNIA REPAIR Right 2008    RLQ    JOINT REPLACEMENT      [facility] total [facility] -     KNEE SURGERY Left     TKR    MODIFIED RADICAL MASTECTOMY W/ AXILLARY LYMPH NODE DISSECTION Right 05/10/2005    w/free flap    NISSEN FUNDOPLICATION      ROBOT-ASSISTED LAPAROSCOPIC REPAIR OF VENTRAL HERNIA N/A 2021    Procedure: ROBOTIC REPAIR, HERNIA, VENTRAL;  Surgeon: John Johnson III, MD;  Location: [facility];  Service: General;  Laterality: N/A;    SIMPLE  MASTECTOMY Left 05/10/2005    w/free flap    TONSILLECTOMY       Family History   Problem Relation Age of Onset    Cancer Mother          of breast cancer, also had gynecological cancer- had breast cancer that spread to the brain    Heart disease Father         in his 60's-  in his 70's     Social History     Tobacco Use    Smoking status: Former Smoker     Quit date: 3/26/2004     Years since quittin.9    Smokeless tobacco: Never Used    Tobacco comment: quit - smoked for 10 years- 1 ppd   Substance Use Topics    Alcohol use: No    Drug use: No     Review of Systems   Constitutional: Negative for fever.   HENT: Negative for congestion, rhinorrhea, sore throat and trouble swallowing.    Eyes: Negative for visual disturbance.   Respiratory: Negative for cough, chest tightness, shortness of breath and wheezing.    Cardiovascular: Negative for chest pain, palpitations and leg swelling.   Gastrointestinal: Negative for abdominal distention, abdominal pain, constipation, diarrhea, nausea and vomiting.   Genitourinary: Positive for flank pain. Negative for difficulty urinating, dysuria and frequency.   Musculoskeletal: Negative for arthralgias, back pain, joint swelling and neck pain.   Skin: Negative for color change and rash.   Neurological: Negative for dizziness, syncope, speech difficulty, weakness, numbness and headaches.   All other systems reviewed and are negative.      Physical Exam     Initial Vitals [22 1342]   BP Pulse Resp Temp SpO2   (!) 203/97 109 20 98.2 °F (36.8 °C) 97 %      MAP       --         Physical Exam    Nursing note and vitals reviewed.  Constitutional: She appears well-developed and well-nourished. She is not diaphoretic. No distress.   HENT:   Head: Normocephalic and atraumatic.   Right Ear: External ear normal.   Left Ear: External ear normal.   Nose: Nose normal.   Mouth/Throat: Oropharynx is clear and moist. No oropharyngeal exudate.   Eyes: Conjunctivae and  EOM are normal. Pupils are equal, round, and reactive to light. Right eye exhibits no discharge. Left eye exhibits no discharge. No scleral icterus.   Neck: Neck supple. No thyromegaly present. No tracheal deviation present. No JVD present.   Normal range of motion.  Cardiovascular: Normal rate, regular rhythm, normal heart sounds and intact distal pulses. Exam reveals no gallop and no friction rub.    No murmur heard.  Pulmonary/Chest: Breath sounds normal. No stridor. No respiratory distress. She has no wheezes. She has no rhonchi. She has no rales. She exhibits no tenderness.   Abdominal: Abdomen is soft. Bowel sounds are normal. She exhibits no distension and no mass. There is no abdominal tenderness. There is no rebound and no guarding.   Musculoskeletal:         General: No tenderness or edema. Normal range of motion.      Cervical back: Normal range of motion and neck supple.     Lymphadenopathy:     She has no cervical adenopathy.   Neurological: She is alert and oriented to person, place, and time. She has normal strength. No cranial nerve deficit or sensory deficit.   Skin: Skin is warm and dry. No rash and no abscess noted. No erythema. No pallor.         ED Course   Procedures  Labs Reviewed   CBC W/ AUTO DIFFERENTIAL - Abnormal; Notable for the following components:       Result Value    MCHC 31.4 (*)     Gran # (ANC) 10.0 (*)     Gran % 84.9 (*)     Lymph % 9.2 (*)     All other components within normal limits   COMPREHENSIVE METABOLIC PANEL - Abnormal; Notable for the following components:    Glucose 131 (*)     All other components within normal limits   URINALYSIS, REFLEX TO URINE CULTURE - Abnormal; Notable for the following components:    Protein, UA 1+ (*)     Ketones, UA 1+ (*)     Occult Blood UA Trace (*)     All other components within normal limits    Narrative:     In and Out Cath as needed it patient unable to void  Specimen Source->Urine   URINALYSIS MICROSCOPIC    Narrative:     In and  Out Cath as needed it patient unable to void  Specimen Source->Urine          Imaging Results          CT Renal Stone Study ABD Pelvis WO (Final result)  Result time 03/17/22 15:33:05    Final result by Nirmal Watson MD (03/17/22 15:33:05)                 Narrative:    CMS MANDATED QUALITY DATA-CT RADIATION DOSE-[id number]  All CT scans at this facility use dose modulation, iterative reconstruction, and or weight-based dosing when appropriate to reduce radiation dose to as low as reasonably achievable.    HISTORY: Flank pain, kidney stone suspected    FINDINGS: Noncontrast axial images were obtained. Nonenhanced study is tailored for the detection of urolithiasis, and is insensitive for abnormalities of the solid organs, vasculature and hollow viscera.  Comparison to multiple prior exams.    CT ABDOMEN: Linear subsegmental atelectasis involves the lung bases, which are otherwise clear. There is diffuse hepatic hypoattenuation reflecting hepatic steatosis, with the unenhanced liver and gallbladder otherwise unremarkable. No calcified gallstones. The unenhanced spleen, pancreas, and adrenal glands are unremarkable.    There is prominent left perinephric edema and sheetlike low-density fluid, with mild left-sided hydronephrosis, secondary to a 2 mm left ureteral calculus image 110 series 3. No additional renal or ureteral calculi, with no right-sided hydroureteronephrosis. Scattered calcified plaque involves normal caliber abdominal aorta and iliac arteries.    There is a small sliding-type hiatal hernia, with no bowel obstruction, ascites, or intraperitoneal free air. There are postoperative changes of left paramedian infraumbilical ventral abdominal hernia repair with hernia mesh, with stranding in the anterior abdominal wall subcutaneous fat, and two circumscribed mixed attenuation areas suggesting fat necrosis of fat-containing hernias seen on previous CT.    CT PELVIS: There are no distal ureteral or bladder calculi,  with several calcified pelvic phleboliths. The unenhanced sigmoid colon, rectum and urinary bladder are unremarkable, with the uterus surgically absent. There is right lower quadrant hernia repair mesh, with fat-containing bilateral inguinal hernias without complicating features.    The extraperitoneal soft tissues are unremarkable. There are no acute fractures or destructive osseous lesions, with anterolisthesis of L4 upon L5 secondary to facet arthropathy, and intervertebral disc space narrowing and facet arthropathy in the spine.    IMPRESSION:  1. A 2 mm mid left ureteral calculus, with mild left-sided hydronephrosis and perinephric edema.  2. Additional observations as described.    Electronically signed by:  Nirmal Watson MD  3/17/2022 3:33 PM CDT Workstation: [id number]                               Medications   ondansetron injection 4 mg (4 mg Intravenous Given 3/17/22 1801)   morphine injection 2 mg (2 mg Intravenous Given 3/17/22 1432)   HYDROcodone-acetaminophen 5-325 mg per tablet 1 tablet (1 tablet Oral Given 3/17/22 2012)     Medical Decision Making:   Initial Assessment:   Emergent evaluation of a 61-year-old female presenting with left flank pain differential diagnosis includes kidney stone, musculoskeletal pain, back pain, infection            Attending Attestation:             Attending ED Notes:   Patient found to have kidney stone, no sign of obstruction noted patient will be started on a course of pain medication and nausea medication patient is to follow up with Urology in the next 2-3 days and given urine strainer patient is advised to return immediately to the emergency department for any worsening or any further concerns.               Clinical Impression:   Final diagnoses:  [N20.0] Kidney stone (Primary)          ED Disposition Condition    Discharge Stable        ED Prescriptions     Medication Sig Dispense Start Date End Date Auth. Provider    HYDROcodone-acetaminophen (NORCO) 5-325 mg  per tablet Take 1 tablet by mouth every 8 (eight) hours as needed for Pain. 9 tablet 3/17/2022  Hardy García MD    ondansetron (ZOFRAN-ODT) 4 MG TbDL Take 1 tablet (4 mg total) by mouth every 6 (six) hours as needed. 12 tablet 3/17/2022  Hardy García MD        Follow-up Information     Follow up With Specialties Details Why Contact Info Additional Information    [facility] - Emergency Dept Emergency Medicine  If symptoms worsen [address] [place] [id number] [phone] 1st floor    Jarrod Mendoza MD Urology Schedule an appointment as soon as possible for a visit in 2 days  [address] [place] [address] DR  [address]  [facility] [address]  [phone]              Hardy García MD  03/17/22 8778

## 2022-04-19 ENCOUNTER — TELEPHONE (OUTPATIENT)
Dept: FAMILY MEDICINE | Facility: CLINIC | Age: 62
End: 2022-04-19

## 2022-04-19 NOTE — TELEPHONE ENCOUNTER
----- Message from [facility], RT sent at 2/17/2022 10:40 AM CST -----  Regarding: Did she see rheumatology  Juliet Medina NP   2/17/2022  3:03 AM CST Back to Top      Iron level is low. Start slow fe 50mg. Take with vitamin c 500mg daily. Evie is positive. I would like her to folow up with dr. Tariq. B12 is low. Start otc b12 daily. Rest of labs are normal

## 2022-04-21 ENCOUNTER — TELEPHONE (OUTPATIENT)
Dept: FAMILY MEDICINE | Facility: CLINIC | Age: 62
End: 2022-04-21

## 2022-04-21 NOTE — TELEPHONE ENCOUNTER
Left patient another message and sent portal message. This makes 3 attempts. Can I jose daniel reminder complete?

## 2022-04-21 NOTE — TELEPHONE ENCOUNTER
----- Message from [facility], RT sent at 2/17/2022 10:40 AM CST -----  Regarding: Did she see rheumatology  Juliet Medina NP   2/17/2022  3:03 AM CST Back to Top      Iron level is low. Start slow fe 50mg. Take with vitamin c 500mg daily. Evie is positive. I would like her to folow up with dr. Tariq. B12 is low. Start otc b12 daily. Rest of labs are normal

## 2022-04-28 ENCOUNTER — PATIENT MESSAGE (OUTPATIENT)
Dept: FAMILY MEDICINE | Facility: CLINIC | Age: 62
End: 2022-04-28

## 2022-05-01 ENCOUNTER — PATIENT MESSAGE (OUTPATIENT)
Dept: FAMILY MEDICINE | Facility: CLINIC | Age: 62
End: 2022-05-01

## 2022-05-01 DIAGNOSIS — F41.9 ANXIETY: ICD-10-CM

## 2022-05-02 RX ORDER — ZOLPIDEM TARTRATE 10 MG/1
10 TABLET ORAL NIGHTLY
Qty: 30 TABLET | Refills: 0 | Status: SHIPPED | OUTPATIENT
Start: 2022-05-02 | End: 2022-05-17 | Stop reason: SDUPTHER

## 2022-05-04 ENCOUNTER — PATIENT MESSAGE (OUTPATIENT)
Dept: FAMILY MEDICINE | Facility: CLINIC | Age: 62
End: 2022-05-04

## 2022-05-17 ENCOUNTER — TELEPHONE (OUTPATIENT)
Dept: ORTHOPEDICS | Facility: CLINIC | Age: 62
End: 2022-05-17
Payer: COMMERCIAL

## 2022-05-17 ENCOUNTER — OFFICE VISIT (OUTPATIENT)
Dept: FAMILY MEDICINE | Facility: CLINIC | Age: 62
End: 2022-05-17
Payer: COMMERCIAL

## 2022-05-17 VITALS
BODY MASS INDEX: 36.44 KG/M2 | WEIGHT: 193 LBS | HEIGHT: 61 IN | DIASTOLIC BLOOD PRESSURE: 82 MMHG | SYSTOLIC BLOOD PRESSURE: 138 MMHG | HEART RATE: 76 BPM

## 2022-05-17 DIAGNOSIS — G47.33 OSA (OBSTRUCTIVE SLEEP APNEA): ICD-10-CM

## 2022-05-17 DIAGNOSIS — F41.9 ANXIETY: ICD-10-CM

## 2022-05-17 DIAGNOSIS — Z90.13 H/O BILATERAL MASTECTOMY: ICD-10-CM

## 2022-05-17 DIAGNOSIS — I10 ESSENTIAL HYPERTENSION: ICD-10-CM

## 2022-05-17 DIAGNOSIS — I10 PRIMARY HYPERTENSION: Primary | ICD-10-CM

## 2022-05-17 DIAGNOSIS — E78.5 HYPERLIPIDEMIA, UNSPECIFIED HYPERLIPIDEMIA TYPE: ICD-10-CM

## 2022-05-17 DIAGNOSIS — Z85.3 HX OF BREAST CANCER: ICD-10-CM

## 2022-05-17 DIAGNOSIS — R60.9 EDEMA, UNSPECIFIED TYPE: ICD-10-CM

## 2022-05-17 DIAGNOSIS — M17.0 PRIMARY OSTEOARTHRITIS OF BOTH KNEES: ICD-10-CM

## 2022-05-17 DIAGNOSIS — F32.A DEPRESSION, UNSPECIFIED DEPRESSION TYPE: ICD-10-CM

## 2022-05-17 DIAGNOSIS — G47.00 INSOMNIA, UNSPECIFIED TYPE: ICD-10-CM

## 2022-05-17 DIAGNOSIS — R76.8 POSITIVE ANA (ANTINUCLEAR ANTIBODY): ICD-10-CM

## 2022-05-17 PROCEDURE — 99214 OFFICE O/P EST MOD 30 MIN: CPT | Mod: S$GLB,,, | Performed by: NURSE PRACTITIONER

## 2022-05-17 PROCEDURE — 99214 PR OFFICE/OUTPT VISIT, EST, LEVL IV, 30-39 MIN: ICD-10-PCS | Mod: S$GLB,,, | Performed by: NURSE PRACTITIONER

## 2022-05-17 RX ORDER — HYDROCHLOROTHIAZIDE 25 MG/1
25 TABLET ORAL DAILY
Qty: 30 TABLET | Refills: 11 | Status: SHIPPED | OUTPATIENT
Start: 2022-05-17 | End: 2022-11-15 | Stop reason: SDUPTHER

## 2022-05-17 RX ORDER — ZOLPIDEM TARTRATE 10 MG/1
10 TABLET ORAL NIGHTLY
Qty: 30 TABLET | Refills: 0 | Status: SHIPPED | OUTPATIENT
Start: 2022-05-17 | End: 2022-07-07 | Stop reason: SDUPTHER

## 2022-05-17 NOTE — PROGRESS NOTES
SUBJECTIVE:    Patient ID: Traci Freire is a 61 y.o. female.    Chief Complaint: Hypertension (Did not bring bottles//bs) and Hyperlipidemia    61 year old female presents for check up. Treated for depression, htn, hyperlipidemia, oa, and insomnia and history of breast cancer. Taking meds as prescribed. Recently started back to work.  Saw dr. rush last year  for shoulder pain. Received injection. Pain did resolve. Has been having increased knee pain. Plans to see dr. Rush.  Has not seen Dr. Tariq. Last santos was positive. Last labs were in march.       Admission on 03/17/2022, Discharged on 03/17/2022   Component Date Value Ref Range Status    WBC 03/17/2022 11.75  3.90 - 12.70 K/uL Final    RBC 03/17/2022 4.66  4.00 - 5.40 M/uL Final    Hemoglobin 03/17/2022 13.2  12.0 - 16.0 g/dL Final    Hematocrit 03/17/2022 42.0  37.0 - 48.5 % Final    MCV 03/17/2022 90  82 - 98 fL Final    MCH 03/17/2022 28.3  27.0 - 31.0 pg Final    MCHC 03/17/2022 31.4 (A) 32.0 - 36.0 g/dL Final    RDW 03/17/2022 13.2  11.5 - 14.5 % Final    Platelets 03/17/2022 247  150 - 450 K/uL Final    MPV 03/17/2022 9.9  9.2 - 12.9 fL Final    Immature Granulocytes 03/17/2022 0.3  0.0 - 0.5 % Final    Gran # (ANC) 03/17/2022 10.0 (A) 1.8 - 7.7 K/uL Final    Immature Grans (Abs) 03/17/2022 0.04  0.00 - 0.04 K/uL Final    Lymph # 03/17/2022 1.1  1.0 - 4.8 K/uL Final    Mono # 03/17/2022 0.6  0.3 - 1.0 K/uL Final    Eos # 03/17/2022 0.0  0.0 - 0.5 K/uL Final    Baso # 03/17/2022 0.03  0.00 - 0.20 K/uL Final    nRBC 03/17/2022 0  0 /100 WBC Final    Gran % 03/17/2022 84.9 (A) 38.0 - 73.0 % Final    Lymph % 03/17/2022 9.2 (A) 18.0 - 48.0 % Final    Mono % 03/17/2022 5.2  4.0 - 15.0 % Final    Eosinophil % 03/17/2022 0.1  0.0 - 8.0 % Final    Basophil % 03/17/2022 0.3  0.0 - 1.9 % Final    Differential Method 03/17/2022 Automated   Final    Sodium 03/17/2022 138  136 - 145 mmol/L Final    Potassium 03/17/2022 3.9  3.5 - 5.1  mmol/L Final    Chloride 03/17/2022 106  95 - 110 mmol/L Final    CO2 03/17/2022 23  23 - 29 mmol/L Final    Glucose 03/17/2022 131 (A) 70 - 110 mg/dL Final    BUN 03/17/2022 21  8 - 23 mg/dL Final    Creatinine 03/17/2022 1.0  0.5 - 1.4 mg/dL Final    Calcium 03/17/2022 9.4  8.7 - 10.5 mg/dL Final    Total Protein 03/17/2022 8.1  6.0 - 8.4 g/dL Final    Albumin 03/17/2022 4.5  3.5 - 5.2 g/dL Final    Total Bilirubin 03/17/2022 0.9  0.1 - 1.0 mg/dL Final    Alkaline Phosphatase 03/17/2022 82  55 - 135 U/L Final    AST 03/17/2022 31  10 - 40 U/L Final    ALT 03/17/2022 29  10 - 44 U/L Final    Anion Gap 03/17/2022 9  8 - 16 mmol/L Final    eGFR if African American 03/17/2022 >60.0  >60 mL/min/1.73 m^2 Final    eGFR if non African American 03/17/2022 >60.0  >60 mL/min/1.73 m^2 Final    Specimen UA 03/17/2022 Urine, Clean Catch   Final    Color, UA 03/17/2022 Yellow  Yellow, Straw, Raysa Final    Appearance, UA 03/17/2022 Clear  Clear Final    pH, UA 03/17/2022 7.0  5.0 - 8.0 Final    Specific Gravity, UA 03/17/2022 1.015  1.005 - 1.030 Final    Protein, UA 03/17/2022 1+ (A) Negative Final    Glucose, UA 03/17/2022 Negative  Negative Final    Ketones, UA 03/17/2022 1+ (A) Negative Final    Bilirubin (UA) 03/17/2022 Negative  Negative Final    Occult Blood UA 03/17/2022 Trace (A) Negative Final    Nitrite, UA 03/17/2022 Negative  Negative Final    Urobilinogen, UA 03/17/2022 Negative  Negative EU/dL Final    Leukocytes, UA 03/17/2022 Negative  Negative Final    RBC, UA 03/17/2022 2  0 - 4 /hpf Final    WBC, UA 03/17/2022 1  0 - 5 /hpf Final    Bacteria 03/17/2022 Negative  None-Occ /hpf Final    Squam Epithel, UA 03/17/2022 2  /hpf Final    Hyaline Casts, UA 03/17/2022 1  0-1/lpf /lpf Final    Microscopic Comment 03/17/2022 SEE COMMENT   Final   Office Visit on 02/14/2022   Component Date Value Ref Range Status    Ferritin 02/14/2022 11 (A) 16 - 288 ng/mL Final    ETHEL 02/14/2022  POSITIVE (A) NEGATIVE Final    ETHEL Titer 02/14/2022 1:40 (A) titer Final    ETHEL Pattern 02/14/2022 Nuclear, Homogeneous (A)  Final    Rheumatoid Factor 02/14/2022 <14  <14 IU/mL Final    WBC 02/14/2022 7.5  3.8 - 10.8 Thousand/uL Final    RBC 02/14/2022 3.91  3.80 - 5.10 Million/uL Final    Hemoglobin 02/14/2022 11.1 (A) 11.7 - 15.5 g/dL Final    Hematocrit 02/14/2022 35.0  35.0 - 45.0 % Final    MCV 02/14/2022 89.5  80.0 - 100.0 fL Final    MCH 02/14/2022 28.4  27.0 - 33.0 pg Final    MCHC 02/14/2022 31.7 (A) 32.0 - 36.0 g/dL Final    RDW 02/14/2022 12.7  11.0 - 15.0 % Final    Platelets 02/14/2022 280  140 - 400 Thousand/uL Final    MPV 02/14/2022 10.1  7.5 - 12.5 fL Final    Neutrophils, Abs 02/14/2022 3,210  1,500 - 7,800 cells/uL Final    Lymph # 02/14/2022 3,458  850 - 3,900 cells/uL Final    Mono # 02/14/2022 488  200 - 950 cells/uL Final    Eos # 02/14/2022 293  15 - 500 cells/uL Final    Baso # 02/14/2022 53  0 - 200 cells/uL Final    Neutrophils Relative 02/14/2022 42.8  % Final    Lymph % 02/14/2022 46.1  % Final    Mono % 02/14/2022 6.5  % Final    Eosinophil % 02/14/2022 3.9  % Final    Basophil % 02/14/2022 0.7  % Final    Glucose 02/14/2022 79  65 - 99 mg/dL Final    BUN 02/14/2022 18  7 - 25 mg/dL Final    Creatinine 02/14/2022 0.56  0.50 - 0.99 mg/dL Final    eGFR if non African American 02/14/2022 101  > OR = 60 mL/min/1.73m2 Final    eGFR if  02/14/2022 117  > OR = 60 mL/min/1.73m2 Final    BUN/Creatinine Ratio 02/14/2022 NOT APPLICABLE  6 - 22 (calc) Final    Sodium 02/14/2022 143  135 - 146 mmol/L Final    Potassium 02/14/2022 3.9  3.5 - 5.3 mmol/L Final    Chloride 02/14/2022 107  98 - 110 mmol/L Final    CO2 02/14/2022 24  20 - 32 mmol/L Final    Calcium 02/14/2022 9.2  8.6 - 10.4 mg/dL Final    Total Protein 02/14/2022 6.5  6.1 - 8.1 g/dL Final    Albumin 02/14/2022 4.0  3.6 - 5.1 g/dL Final    Globulin, Total 02/14/2022 2.5  1.9 - 3.7 g/dL  (calc) Final    Albumin/Globulin Ratio 02/14/2022 1.6  1.0 - 2.5 (calc) Final    Total Bilirubin 02/14/2022 0.6  0.2 - 1.2 mg/dL Final    Alkaline Phosphatase 02/14/2022 81  37 - 153 U/L Final    AST 02/14/2022 20  10 - 35 U/L Final    ALT 02/14/2022 21  6 - 29 U/L Final    Cholesterol 02/14/2022 165  <200 mg/dL Final    HDL 02/14/2022 52  > OR = 50 mg/dL Final    Triglycerides 02/14/2022 167 (A) <150 mg/dL Final    LDL Cholesterol 02/14/2022 86  mg/dL (calc) Final    HDL/Cholesterol Ratio 02/14/2022 3.2  <5.0 (calc) Final    Non HDL Chol. (LDL+VLDL) 02/14/2022 113  <130 mg/dL (calc) Final    TSH w/reflex to FT4 02/14/2022 2.59  0.40 - 4.50 mIU/L Final    Creatinine, Urine 02/14/2022 238  20 - 275 mg/dL Final    Microalb, Ur 02/14/2022 3.7  See Note: mg/dL Final    Microalb/Creat Ratio 02/14/2022 16  <30 mcg/mg creat Final    Color, UA 02/14/2022 YELLOW  YELLOW Final    Appearance, UA 02/14/2022 CLEAR  CLEAR Final    Specific Gravity, UA 02/14/2022 1.031  1.001 - 1.035 Final    pH, UA 02/14/2022 5.5  5.0 - 8.0 Final    Glucose, UA 02/14/2022 NEGATIVE  NEGATIVE Final    Bilirubin, UA 02/14/2022 NEGATIVE  NEGATIVE Final    Ketones, UA 02/14/2022 NEGATIVE  NEGATIVE Final    Occult Blood UA 02/14/2022 NEGATIVE  NEGATIVE Final    Protein, UA 02/14/2022 TRACE (A) NEGATIVE Final    Nitrite, UA 02/14/2022 NEGATIVE  NEGATIVE Final    Leukocytes, UA 02/14/2022 NEGATIVE  NEGATIVE Final    WBC Casts, UA 02/14/2022 NONE SEEN  < OR = 5 /HPF Final    RBC Casts, UA 02/14/2022 0-2  < OR = 2 /HPF Final    Squam Epithel, UA 02/14/2022 0-5  < OR = 5 /HPF Final    Bacteria, UA 02/14/2022 NONE SEEN  NONE SEEN /HPF Final    Hyaline Casts, UA 02/14/2022 NONE SEEN  NONE SEEN /LPF Final    Reflexive Urine Culture 02/14/2022    Final    Vitamin B-12 02/14/2022 185 (A) 200 - 1,100 pg/mL Final    Sed Rate 02/14/2022 9  < OR = 30 mm/h Final       Past Medical History:   Diagnosis Date    Anxiety     Arthritis      Cancer breast    Right- Bilateral mastectomy- May 2005- had breast reconstruction- mother  of breast cancer    HLD (hyperlipidemia)     Hypertension     diagnosed age late 40's     IBS (irritable bowel syndrome)     Kidney stone     Lyme disease     arthritis of hands. Felt like flu- age early 30's- got it  after going to [place]    Sleep apnea     NO MACHINE     Social History     Socioeconomic History    Marital status:    Tobacco Use    Smoking status: Former Smoker     Quit date: 3/26/2004     Years since quittin.1    Smokeless tobacco: Never Used    Tobacco comment: quit - smoked for 10 years- 1 ppd   Substance and Sexual Activity    Alcohol use: No    Drug use: No    Sexual activity: Never     Past Surgical History:   Procedure Laterality Date    ABDOMINAL SURGERY      BLADDER SUSPENSION      BREAST SURGERY       SECTION      CYSTOSCOPY      avoids greens . ? Interstitial cystitis    HYSTERECTOMY      followed by removal of ovaries  from scar tissue    INCISIONAL HERNIA REPAIR Right 2008    RLQ    JOINT REPLACEMENT      [facility] -     KNEE SURGERY Left 2014    TKR    MODIFIED RADICAL MASTECTOMY W/ AXILLARY LYMPH NODE DISSECTION Right 05/10/2005    w/free flap    NISSEN FUNDOPLICATION      ROBOT-ASSISTED LAPAROSCOPIC REPAIR OF VENTRAL HERNIA N/A 2021    Procedure: ROBOTIC REPAIR, HERNIA, VENTRAL;  Surgeon: John Johnson III, MD;  Location: [facility];  Service: General;  Laterality: N/A;    SIMPLE MASTECTOMY Left 05/10/2005    w/free flap    TONSILLECTOMY       Family History   Problem Relation Age of Onset    Cancer Mother          of breast cancer, also had gynecological cancer- had breast cancer that spread to the brain    Heart disease Father         in his 60's-  in his 70's       Review of patient's allergies indicates:   Allergen Reactions    Keflex  [cephalexin]     Macrobid [nitrofurantoin monohyd/m-cryst]        Current Outpatient Medications:     ascorbic acid, vitamin C, (VITAMIN C) 500 MG tablet, Take 500 mg by mouth once daily., Disp: , Rfl:     ferrous sulfate, dried (SLOW FE) 160 mg (50 mg iron) TbSR, Take 160 mg by mouth once daily., Disp: , Rfl:     FLUoxetine 20 MG capsule, Take 1 capsule (20 mg total) by mouth once daily., Disp: 90 capsule, Rfl: 1    hydroCHLOROthiazide (HYDRODIURIL) 25 MG tablet, Take 1 tablet (25 mg total) by mouth once daily., Disp: 30 tablet, Rfl: 11    HYDROcodone-acetaminophen (NORCO) 5-325 mg per tablet, Take 1 tablet by mouth every 8 (eight) hours as needed for Pain., Disp: 9 tablet, Rfl: 0    ibuprofen (ADVIL,MOTRIN) 200 MG tablet, Take 200 mg by mouth every 6 (six) hours as needed for Pain., Disp: , Rfl:     irbesartan (AVAPRO) 300 MG tablet, Take 1 tablet (300 mg total) by mouth every evening., Disp: 90 tablet, Rfl: 1    mecobalamin (B12 ACTIVE ORAL), Take 1 tablet by mouth Daily., Disp: , Rfl:     ondansetron (ZOFRAN-ODT) 4 MG TbDL, Take 1 tablet (4 mg total) by mouth every 6 (six) hours as needed., Disp: 12 tablet, Rfl: 0    rosuvastatin (CRESTOR) 20 MG tablet, Take 1 tablet (20 mg total) by mouth every evening., Disp: 90 tablet, Rfl: 1    zolpidem (AMBIEN) 10 mg Tab, Take 1 tablet (10 mg total) by mouth every evening., Disp: 30 tablet, Rfl: 0    Review of Systems   Constitutional: Negative for chills, fever and unexpected weight change.   HENT: Negative for ear pain, rhinorrhea and sore throat.    Eyes: Negative for pain and visual disturbance.   Respiratory: Negative for cough and shortness of breath.    Cardiovascular: Negative for chest pain, palpitations and leg swelling.   Gastrointestinal: Negative for abdominal pain, diarrhea, nausea and vomiting.   Genitourinary: Negative for difficulty urinating, hematuria and vaginal bleeding.   Musculoskeletal: Negative for arthralgias.   Skin: Negative for rash.  "  Neurological: Negative for dizziness, weakness and headaches.   Psychiatric/Behavioral: Negative for agitation and sleep disturbance. The patient is not nervous/anxious.           Objective:      Vitals:    22 0854   BP: 138/82   Pulse: 76   Weight: 87.5 kg (193 lb)   Height: 5' 1" (1.549 m)     Physical Exam  Constitutional:       General: She is not in acute distress.     Appearance: Normal appearance. She is well-developed. She is not ill-appearing.   HENT:      Right Ear: External ear normal.      Left Ear: External ear normal.   Eyes:      Conjunctiva/sclera: Conjunctivae normal.      Pupils: Pupils are equal, round, and reactive to light.   Neck:      Vascular: No JVD.   Cardiovascular:      Rate and Rhythm: Normal rate and regular rhythm.      Heart sounds: No murmur heard.     Comments: Varicose veins  Pulmonary:      Effort: Pulmonary effort is normal.      Breath sounds: Normal breath sounds.   Abdominal:      General: Bowel sounds are normal.      Palpations: Abdomen is soft.   Musculoskeletal:         General: No deformity. Normal range of motion.      Cervical back: Normal range of motion and neck supple.      Right knee: Effusion and crepitus present. No LCL laxity, MCL laxity, ACL laxity or PCL laxity.      Left knee: Crepitus present. PCL laxity present. No LCL laxity, MCL laxity or ACL laxity.     Right lower le+ Pitting Edema present.      Left lower le+ Pitting Edema present.   Lymphadenopathy:      Cervical: No cervical adenopathy.   Skin:     General: Skin is warm and dry.      Findings: No rash.   Neurological:      Mental Status: She is alert and oriented to person, place, and time.      Gait: Gait normal.   Psychiatric:         Speech: Speech normal.         Behavior: Behavior normal.           Assessment:       1. Primary hypertension    2. Anxiety    3. Hyperlipidemia, unspecified hyperlipidemia type    4. Hx of breast cancer    5. Edema, unspecified type    6. Insomnia, " unspecified type    7. H/O bilateral mastectomy    8. Depression, unspecified depression type    9. FRANCIE (obstructive sleep apnea)    10. Positive ETHEL (antinuclear antibody)    11. Essential hypertension    12. Primary osteoarthritis of both knees         Plan:       Primary hypertension    Anxiety  -     zolpidem (AMBIEN) 10 mg Tab; Take 1 tablet (10 mg total) by mouth every evening.  Dispense: 30 tablet; Refill: 0    Hyperlipidemia, unspecified hyperlipidemia type    Hx of breast cancer    Edema, unspecified type  -     hydroCHLOROthiazide (HYDRODIURIL) 25 MG tablet; Take 1 tablet (25 mg total) by mouth once daily.  Dispense: 30 tablet; Refill: 11    Insomnia, unspecified type    H/O bilateral mastectomy    Depression, unspecified depression type    FRANCIE (obstructive sleep apnea)    Positive ETHEL (antinuclear antibody)    Essential hypertension    Primary osteoarthritis of both knees  -     Ambulatory referral/consult to Orthopedics; Future; Expected date: 05/24/2022      Follow up in about 6 months (around 11/17/2022), or if symptoms worsen or fail to improve, for medication management.        5/17/2022 Juliet Medina

## 2022-06-15 DIAGNOSIS — M25.561 RIGHT KNEE PAIN, UNSPECIFIED CHRONICITY: Primary | ICD-10-CM

## 2022-07-07 ENCOUNTER — PATIENT MESSAGE (OUTPATIENT)
Dept: FAMILY MEDICINE | Facility: CLINIC | Age: 62
End: 2022-07-07

## 2022-07-07 DIAGNOSIS — F41.9 ANXIETY: ICD-10-CM

## 2022-07-07 RX ORDER — ZOLPIDEM TARTRATE 10 MG/1
10 TABLET ORAL NIGHTLY
Qty: 30 TABLET | Refills: 2 | Status: SHIPPED | OUTPATIENT
Start: 2022-07-07 | End: 2022-10-04 | Stop reason: SDUPTHER

## 2022-07-07 NOTE — LETTER
[facility]  [address]  [place] [address]  Phone: [phone]  Fax: [phone] July 7, 2022    Traci Freire  [address] [place] [address] [place] [id number]      To Whom It May Concern:    Traci Freire is unable to participate in jury duty due to her current medical conditions she is under my care for.    If you have any questions or concerns, please feel free to call my office.    Sincerely,      Electronically Signed By: Juliet Medina NP

## 2022-07-27 ENCOUNTER — PATIENT MESSAGE (OUTPATIENT)
Dept: FAMILY MEDICINE | Facility: CLINIC | Age: 62
End: 2022-07-27

## 2022-08-25 DIAGNOSIS — R52 PAIN: Primary | ICD-10-CM

## 2022-08-29 ENCOUNTER — OFFICE VISIT (OUTPATIENT)
Dept: ORTHOPEDICS | Facility: CLINIC | Age: 62
End: 2022-08-29
Payer: COMMERCIAL

## 2022-08-29 ENCOUNTER — HOSPITAL ENCOUNTER (OUTPATIENT)
Dept: RADIOLOGY | Facility: HOSPITAL | Age: 62
Discharge: HOME | End: 2022-08-29
Attending: ORTHOPAEDIC SURGERY
Payer: COMMERCIAL

## 2022-08-29 VITALS — WEIGHT: 186.38 LBS | HEIGHT: 61 IN | BODY MASS INDEX: 35.19 KG/M2

## 2022-08-29 DIAGNOSIS — M24.662 ARTHROFIBROSIS OF KNEE JOINT, LEFT: ICD-10-CM

## 2022-08-29 DIAGNOSIS — R52 PAIN: ICD-10-CM

## 2022-08-29 DIAGNOSIS — Z96.652 HISTORY OF REVISION OF TOTAL REPLACEMENT OF LEFT KNEE JOINT: ICD-10-CM

## 2022-08-29 DIAGNOSIS — M17.11 PRIMARY OSTEOARTHRITIS OF RIGHT KNEE: Primary | ICD-10-CM

## 2022-08-29 PROCEDURE — 73562 X-RAY EXAM OF KNEE 3: CPT | Mod: TC,RT

## 2022-08-29 PROCEDURE — 99999 PR PBB SHADOW E&M-EST. PATIENT-LVL III: CPT | Mod: PBBFAC,,, | Performed by: ORTHOPAEDIC SURGERY

## 2022-08-29 PROCEDURE — 99204 PR OFFICE/OUTPT VISIT, NEW, LEVL IV, 45-59 MIN: ICD-10-PCS | Mod: S$GLB,,, | Performed by: ORTHOPAEDIC SURGERY

## 2022-08-29 PROCEDURE — 99204 OFFICE O/P NEW MOD 45 MIN: CPT | Mod: S$GLB,,, | Performed by: ORTHOPAEDIC SURGERY

## 2022-08-29 PROCEDURE — 73562 X-RAY EXAM OF KNEE 3: CPT | Mod: 26,RT,, | Performed by: RADIOLOGY

## 2022-08-29 PROCEDURE — 99999 PR PBB SHADOW E&M-EST. PATIENT-LVL III: ICD-10-PCS | Mod: PBBFAC,,, | Performed by: ORTHOPAEDIC SURGERY

## 2022-08-29 PROCEDURE — 73562 XR KNEE ORTHO RIGHT: ICD-10-PCS | Mod: 26,RT,, | Performed by: RADIOLOGY

## 2022-08-29 PROCEDURE — 73560 X-RAY EXAM OF KNEE 1 OR 2: CPT | Mod: TC,LT

## 2022-08-29 PROCEDURE — 73560 X-RAY EXAM OF KNEE 1 OR 2: CPT | Mod: 26,LT,, | Performed by: RADIOLOGY

## 2022-08-29 PROCEDURE — 73560 XR KNEE ORTHO RIGHT: ICD-10-PCS | Mod: 26,LT,, | Performed by: RADIOLOGY

## 2022-08-29 RX ORDER — MELOXICAM 15 MG/1
15 TABLET ORAL DAILY
Qty: 30 TABLET | Refills: 1 | Status: SHIPPED | OUTPATIENT
Start: 2022-08-29 | End: 2022-09-28

## 2022-08-29 NOTE — PROGRESS NOTES
Subjective:      Patient ID: Traci Freire is a 61 y.o. female.    Chief Complaint: Pain of the Left Knee and Pain of the Right Knee    HPI  Traci Freire is a 61 year old female here with a several year history of bilateral knee pain. Right is worse than left.  Exactech revision by me 8 years ago. 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 There was not a history of recent trauma.  The pain is moderate to severe . The right knee pain is located in the medial, anterior aspect of the knee. There is not radiation . There is catching or locking.   The pain is described as achy. The patient has not had prior surgery. It is aggravated by sitting, standing, and walking.  It is alleviated by rest. There is not numbness or tingling of the lower extremity. The left pain is located in the global aspect of the knee. There is not radiation. tThere is not catching or locking.   The pain is described as achy.  It is aggravated by walking.  It is alleviated by rest. There is not numbness or tingling of the lower extremity. There is back pain.  She  has not tried medications or injections.  She does have difficulty getting in or out of a car, getting dressed, or going up or down stairs.  The patient does not use an assistive device.    Past Medical History:   Diagnosis Date    Anxiety     Arthritis     Cancer breast    Right- Bilateral mastectomy- May 2005- had breast reconstruction- mother  of breast cancer    HLD (hyperlipidemia)     Hypertension     diagnosed age late 40's     IBS (irritable bowel syndrome)     Kidney stone     Lyme disease     arthritis of hands. Felt like flu- age early 30's- got it  after going to [place]    Sleep apnea     NO MACHINE     Past Surgical History:   Procedure Laterality Date    ABDOMINAL SURGERY      BLADDER SUSPENSION      BREAST SURGERY       SECTION      CYSTOSCOPY      avoids greens . ? Interstitial cystitis    HYSTERECTOMY      followed by removal of ovaries  from scar  tissue    INCISIONAL HERNIA REPAIR Right 2008    RLQ    JOINT REPLACEMENT      Left total knee [facility] -     KNEE SURGERY Left 2014    TKR    MODIFIED RADICAL MASTECTOMY W/ AXILLARY LYMPH NODE DISSECTION Right 05/10/2005    w/free flap    NISSEN FUNDOPLICATION      ROBOT-ASSISTED LAPAROSCOPIC REPAIR OF VENTRAL HERNIA N/A 2021    Procedure: ROBOTIC REPAIR, HERNIA, VENTRAL;  Surgeon: John Johnson III, MD;  Location: [facility] OR;  Service: General;  Laterality: N/A;    SIMPLE MASTECTOMY Left 05/10/2005    w/free flap    TONSILLECTOMY       Family History   Problem Relation Age of Onset    Cancer Mother          of breast cancer, also had gynecological cancer- had breast cancer that spread to the brain    Heart disease Father         in his 60's-  in his 70's     Social History     Socioeconomic History    Marital status:    Tobacco Use    Smoking status: Former     Types: Cigarettes     Quit date: 3/26/2004     Years since quittin.4    Smokeless tobacco: Never    Tobacco comments:     quit - smoked for 10 years- 1 ppd   Substance and Sexual Activity    Alcohol use: No    Drug use: No    Sexual activity: Never     Current Outpatient Medications on File Prior to Visit   Medication Sig Dispense Refill    ascorbic acid, vitamin C, (VITAMIN C) 500 MG tablet Take 500 mg by mouth once daily.      ferrous sulfate, dried (SLOW FE) 160 mg (50 mg iron) TbSR Take 160 mg by mouth once daily.      FLUoxetine 20 MG capsule Take 1 capsule (20 mg total) by mouth once daily. 90 capsule 1    hydroCHLOROthiazide (HYDRODIURIL) 25 MG tablet Take 1 tablet (25 mg total) by mouth once daily. 30 tablet 11    HYDROcodone-acetaminophen (NORCO) 5-325 mg per tablet Take 1 tablet by mouth every 8 (eight) hours as needed for Pain. 9 tablet 0    ibuprofen (ADVIL,MOTRIN) 200 MG tablet Take 200 mg by mouth every 6 (six) hours as needed for Pain.      irbesartan (AVAPRO) 300  "MG tablet Take 1 tablet (300 mg total) by mouth every evening. 90 tablet 1    mecobalamin (B12 ACTIVE ORAL) Take 1 tablet by mouth Daily.      ondansetron (ZOFRAN-ODT) 4 MG TbDL Take 1 tablet (4 mg total) by mouth every 6 (six) hours as needed. 12 tablet 0    rosuvastatin (CRESTOR) 20 MG tablet Take 1 tablet (20 mg total) by mouth every evening. 90 tablet 1    zolpidem (AMBIEN) 10 mg Tab Take 1 tablet (10 mg total) by mouth every evening. 30 tablet 2     No current facility-administered medications on file prior to visit.     Review of patient's allergies indicates:   Allergen Reactions    Keflex [cephalexin]     Macrobid [nitrofurantoin monohyd/m-cryst]        Review of Systems   Constitutional: Negative for chills, fever and night sweats.   HENT:  Negative for hearing loss.    Eyes:  Negative for blurred vision and double vision.   Cardiovascular:  Negative for chest pain, claudication and leg swelling.   Respiratory:  Negative for shortness of breath.    Endocrine: Negative for polydipsia, polyphagia and polyuria.   Hematologic/Lymphatic: Negative for adenopathy and bleeding problem. Does not bruise/bleed easily.   Skin:  Negative for poor wound healing.   Gastrointestinal:  Negative for diarrhea and heartburn.   Genitourinary:  Negative for bladder incontinence.   Neurological:  Negative for focal weakness, headaches, numbness, paresthesias and sensory change.   Psychiatric/Behavioral:  The patient is not nervous/anxious.    Allergic/Immunologic: Negative for persistent infections.       Objective:      Body mass index is 35.22 kg/m².  Vitals:    08/29/22 1005   Weight: 84.5 kg (186 lb 6.4 oz)   Height: 5' 1" (1.549 m)         General    Constitutional: She is oriented to person, place, and time. She appears well-developed and well-nourished.   HENT:   Head: Normocephalic and atraumatic.   Eyes: EOM are normal.   Cardiovascular:  Normal rate.            Pulmonary/Chest: Effort normal.   Neurological: She is " alert and oriented to person, place, and time.   Psychiatric: She has a normal mood and affect. Her behavior is normal.     General Musculoskeletal Exam   Gait: normal       Right Knee Exam     Inspection   Erythema: absent  Scars: absent  Swelling: absent  Effusion: absent  Deformity: present (varus)  Bruising: absent    Tenderness   The patient is tender to palpation of the medial joint line and patella.    Range of Motion   Extension:  0   Flexion:  130     Tests   Ligament Examination   Lachman: normal (-1 to 2mm)   MCL - Valgus: normal (0 to 2mm)  LCL - Varus: normal  Patella   Passive Patellar Tilt: neutral    Other   Sensation: normal    Left Knee Exam     Inspection   Erythema: absent  Scars: present  Swelling: present  Effusion: absent  Deformity: absent  Bruising: absent    Tenderness   The patient tender to palpation of the medial retinaculum and lateral joint line.    Range of Motion   Extension:  0   Flexion:  30     Tests   Stability   Lachman: normal (-1 to 2mm)   MCL - Valgus: normal (0 to 2mm)  LCL - Varus: normal (0 to 2mm)  Patella   Passive Patellar Tilt: neutral    Other   Sensation: normal    Muscle Strength   Right Lower Extremity   Hip Abduction: 5/5   Quadriceps:  5/5   Hamstrin/5   Left Lower Extremity   Hip Abduction: 5/5   Quadriceps:  5/5   Hamstrin/5     Reflexes     Left Side  Quadriceps:  2+    Right Side   Quadriceps:  2+    Vascular Exam     Right Pulses  Dorsalis Pedis:      2+          Left Pulses  Dorsalis Pedis:      2+          Edema  Right Lower Leg: absent  Left Lower Leg: absent    Radiographs taken today and reviewed by me demonstrate moderate arthritic change of the right KNEE(s).There  is not bone destruction.  There is not a fracture. The medial compartment is most involved.  There is a varus deformity.  The changes are tricompartmental.    Radiographs taken today were reviewed by me.  There is a prosthetic replacement of the left knee(s).  The prosthesis is  well positioned.  There is not evidence of bone loss, osteolysis, or loosening. There is not a fracture.         Assessment:       Encounter Diagnoses   Name Primary?    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      I discussed the Exactech recall with the patient.  She is aware of the reasons behind the recall and the possible risks associated with it.      Traci Freire was given a prescription for Meloxicam.  The patient was given instructions on how to take the medication and side effects were discussed.      F/U in 6 weeks with xrays of the LEFT knee

## 2022-10-14 DIAGNOSIS — R52 PAIN: Primary | ICD-10-CM

## 2022-10-17 ENCOUNTER — OFFICE VISIT (OUTPATIENT)
Dept: ORTHOPEDICS | Facility: CLINIC | Age: 62
End: 2022-10-17
Payer: COMMERCIAL

## 2022-10-17 ENCOUNTER — HOSPITAL ENCOUNTER (OUTPATIENT)
Dept: RADIOLOGY | Facility: HOSPITAL | Age: 62
Discharge: HOME | End: 2022-10-17
Attending: ORTHOPAEDIC SURGERY
Payer: COMMERCIAL

## 2022-10-17 VITALS — HEIGHT: 61 IN | WEIGHT: 185.5 LBS | BODY MASS INDEX: 35.02 KG/M2

## 2022-10-17 DIAGNOSIS — Z96.652 PRESENCE OF LEFT ARTIFICIAL KNEE JOINT: ICD-10-CM

## 2022-10-17 DIAGNOSIS — Z96.652 STATUS POST TOTAL LEFT KNEE REPLACEMENT: ICD-10-CM

## 2022-10-17 DIAGNOSIS — M17.10 ARTHRITIS OF KNEE: Primary | ICD-10-CM

## 2022-10-17 DIAGNOSIS — R52 PAIN: ICD-10-CM

## 2022-10-17 DIAGNOSIS — M25.562 CHRONIC PAIN OF LEFT KNEE: ICD-10-CM

## 2022-10-17 DIAGNOSIS — G89.29 CHRONIC PAIN OF LEFT KNEE: ICD-10-CM

## 2022-10-17 PROCEDURE — 99214 PR OFFICE/OUTPT VISIT, EST, LEVL IV, 30-39 MIN: ICD-10-PCS | Mod: S$GLB,,, | Performed by: ORTHOPAEDIC SURGERY

## 2022-10-17 PROCEDURE — 73560 XR KNEE ORTHO LEFT: ICD-10-PCS | Mod: 26,RT,, | Performed by: RADIOLOGY

## 2022-10-17 PROCEDURE — 73560 X-RAY EXAM OF KNEE 1 OR 2: CPT | Mod: TC,RT

## 2022-10-17 PROCEDURE — 73562 X-RAY EXAM OF KNEE 3: CPT | Mod: 26,LT,, | Performed by: RADIOLOGY

## 2022-10-17 PROCEDURE — 73562 XR KNEE ORTHO LEFT: ICD-10-PCS | Mod: 26,LT,, | Performed by: RADIOLOGY

## 2022-10-17 PROCEDURE — 99999 PR PBB SHADOW E&M-EST. PATIENT-LVL III: ICD-10-PCS | Mod: PBBFAC,,, | Performed by: ORTHOPAEDIC SURGERY

## 2022-10-17 PROCEDURE — 99214 OFFICE O/P EST MOD 30 MIN: CPT | Mod: S$GLB,,, | Performed by: ORTHOPAEDIC SURGERY

## 2022-10-17 PROCEDURE — 99999 PR PBB SHADOW E&M-EST. PATIENT-LVL III: CPT | Mod: PBBFAC,,, | Performed by: ORTHOPAEDIC SURGERY

## 2022-10-17 PROCEDURE — 73560 X-RAY EXAM OF KNEE 1 OR 2: CPT | Mod: 26,RT,, | Performed by: RADIOLOGY

## 2022-10-17 NOTE — PROGRESS NOTES
"Subjective:      Patient ID: Traci Freire is a 61 y.o. female.    Chief Complaint: Pain of the Left Knee and Pain of the Right Knee    HPI  Traci Freire has bilateral knee pain.  Left worse than right today. The pain is unchanged. No help with the NSAIDS or activity modification.  Her history, medications and problem list were reviewed.    Review of Systems   Constitutional: Negative for chills, fever and night sweats.   HENT:  Negative for hearing loss.    Eyes:  Negative for blurred vision and double vision.   Cardiovascular:  Negative for chest pain, claudication and leg swelling.   Respiratory:  Negative for shortness of breath.    Endocrine: Negative for polydipsia, polyphagia and polyuria.   Hematologic/Lymphatic: Negative for adenopathy and bleeding problem. Does not bruise/bleed easily.   Skin:  Negative for poor wound healing.   Gastrointestinal:  Negative for diarrhea and heartburn.   Genitourinary:  Negative for bladder incontinence.   Neurological:  Negative for focal weakness, headaches, numbness, paresthesias and sensory change.   Psychiatric/Behavioral:  The patient is not nervous/anxious.    Allergic/Immunologic: Negative for persistent infections.       Objective:      Body mass index is 35.05 kg/m².  Vitals:    10/17/22 1054   Weight: 84.1 kg (185 lb 8.3 oz)   Height: 5' 1" (1.549 m)           General    Constitutional: She is oriented to person, place, and time. She appears well-developed and well-nourished.   HENT:   Head: Normocephalic and atraumatic.   Eyes: EOM are normal.   Cardiovascular:  Normal rate.            Pulmonary/Chest: Effort normal.   Neurological: She is alert and oriented to person, place, and time.   Psychiatric: She has a normal mood and affect. Her behavior is normal.     General Musculoskeletal Exam   Gait: normal       Right Knee Exam     Inspection   Erythema: absent  Scars: absent  Swelling: absent  Effusion: absent  Deformity: present (varus)  Bruising: " absent    Tenderness   The patient is tender to palpation of the medial joint line.    Crepitus   The patient has crepitus of the patella.    Range of Motion   Extension:  0   Flexion:  120     Tests   Ligament Examination   Lachman: normal (-1 to 2mm)   MCL - Valgus: normal (0 to 2mm)  LCL - Varus: normal  Patella   Passive Patellar Tilt: neutral    Other   Sensation: normal    Left Knee Exam     Inspection   Erythema: absent  Scars: present  Swelling: absent  Effusion: absent  Deformity: absent  Bruising: absent    Tenderness   The patient tender to palpation of the patella.    Range of Motion   Extension:  0   Flexion:  70     Tests   Stability   Lachman: normal (-1 to 2mm)   MCL - Valgus: normal (0 to 2mm)  LCL - Varus: normal (0 to 2mm)  Patella   Passive Patellar Tilt: neutral    Other   Sensation: normal    Muscle Strength   Right Lower Extremity   Hip Abduction: 5/5   Quadriceps:  5/5   Hamstrin/5   Left Lower Extremity   Hip Abduction: 5/5   Quadriceps:  5/5   Hamstrin/5     Vascular Exam     Right Pulses  Dorsalis Pedis:      2+          Left Pulses  Dorsalis Pedis:      2+          Edema  Right Lower Leg: absent  Left Lower Leg: absent    Radiographs taken today were reviewed by me.  There is a prosthetic replacement of the left knee(s).  The prosthesis is well positioned.  There is not evidence of bone loss, osteolysis, or loosening. There is not a fracture.  No change from prior    Kellgren-Kenneth 3 arthritis right knee      Assessment:       Encounter Diagnoses   Name Primary?    Arthritis of knee Yes    Chronic pain of left knee     Status post total left knee replacement     Presence of left artificial knee joint           Plan:       Traci was seen today for pain and pain.    Diagnoses and all orders for this visit:    Arthritis of knee  -     C-Reactive Protein; Future  -     Sedimentation rate; Future  -     Prior authorization Order    Chronic pain of left knee  -     C-Reactive  Protein; Future  -     Sedimentation rate; Future    Status post total left knee replacement  -     C-Reactive Protein; Future  -     Sedimentation rate; Future    Presence of left artificial knee joint  -     C-Reactive Protein; Future  -     Sedimentation rate; Future  -     CT Knee Without Contrast Left; Future    Other orders  -     sodium hyaluronate (EUFLEXXA) 10 mg/mL(mw 2.4 -3.6 million) injection 20 mg    ESR/CRP for left knee, along with CT scan    Treatment options have been discussed.  We have decided to proceed with right knee Euflexxa injections.  The risk of pseudoseptic reactions were discussed, as was the minimal risk of infection.  Traci Freire will return for her first injection.

## 2022-10-18 ENCOUNTER — TELEPHONE (OUTPATIENT)
Dept: ORTHOPEDICS | Facility: CLINIC | Age: 62
End: 2022-10-18
Payer: COMMERCIAL

## 2022-10-18 NOTE — TELEPHONE ENCOUNTER
Staff called the pt and received the vm staff left a detailed vm for pt to return staffs call to receive her lab results       ----- Message from Chris Estevez MD sent at 10/18/2022  7:12 AM CDT -----  Please call pt.  Labs are fine.  No infextion.  We will call her after the CT scan.

## 2022-10-20 ENCOUNTER — HOSPITAL ENCOUNTER (OUTPATIENT)
Dept: RADIOLOGY | Facility: HOSPITAL | Age: 62
Discharge: HOME | End: 2022-10-20
Attending: ORTHOPAEDIC SURGERY
Payer: COMMERCIAL

## 2022-10-20 DIAGNOSIS — Z96.652 PRESENCE OF LEFT ARTIFICIAL KNEE JOINT: ICD-10-CM

## 2022-10-20 PROCEDURE — 73700 CT LOWER EXTREMITY W/O DYE: CPT | Mod: TC,LT

## 2022-11-13 ENCOUNTER — PATIENT MESSAGE (OUTPATIENT)
Dept: ORTHOPEDICS | Facility: CLINIC | Age: 62
End: 2022-11-13
Payer: COMMERCIAL

## 2022-11-15 DIAGNOSIS — E78.5 HYPERLIPIDEMIA, UNSPECIFIED HYPERLIPIDEMIA TYPE: ICD-10-CM

## 2022-11-15 DIAGNOSIS — R60.9 EDEMA, UNSPECIFIED TYPE: ICD-10-CM

## 2022-11-15 RX ORDER — ROSUVASTATIN CALCIUM 20 MG/1
20 TABLET, COATED ORAL NIGHTLY
Qty: 30 TABLET | Refills: 0 | Status: SHIPPED | OUTPATIENT
Start: 2022-11-15 | End: 2022-12-06 | Stop reason: SDUPTHER

## 2022-11-15 RX ORDER — HYDROCHLOROTHIAZIDE 25 MG/1
25 TABLET ORAL DAILY
Qty: 30 TABLET | Refills: 0 | Status: SHIPPED | OUTPATIENT
Start: 2022-11-15 | End: 2022-12-06 | Stop reason: SDUPTHER

## 2022-11-21 ENCOUNTER — OFFICE VISIT (OUTPATIENT)
Dept: ORTHOPEDICS | Facility: CLINIC | Age: 62
End: 2022-11-21
Payer: COMMERCIAL

## 2022-11-21 DIAGNOSIS — M17.11 PRIMARY OSTEOARTHRITIS OF RIGHT KNEE: Primary | ICD-10-CM

## 2022-11-21 DIAGNOSIS — Z96.652 HISTORY OF REVISION OF TOTAL REPLACEMENT OF LEFT KNEE JOINT: Primary | ICD-10-CM

## 2022-11-21 DIAGNOSIS — I10 ESSENTIAL HYPERTENSION: ICD-10-CM

## 2022-11-21 DIAGNOSIS — Z96.652 PRESENCE OF LEFT ARTIFICIAL KNEE JOINT: ICD-10-CM

## 2022-11-21 DIAGNOSIS — M24.662 ARTHROFIBROSIS OF KNEE JOINT, LEFT: ICD-10-CM

## 2022-11-21 PROCEDURE — 99999 PR PBB SHADOW E&M-EST. PATIENT-LVL II: ICD-10-PCS | Mod: PBBFAC,,, | Performed by: ORTHOPAEDIC SURGERY

## 2022-11-21 PROCEDURE — 99999 PR PBB SHADOW E&M-EST. PATIENT-LVL III: ICD-10-PCS | Mod: PBBFAC,,, | Performed by: NURSE PRACTITIONER

## 2022-11-21 PROCEDURE — 99499 UNLISTED E&M SERVICE: CPT | Mod: S$GLB,,, | Performed by: NURSE PRACTITIONER

## 2022-11-21 PROCEDURE — 99499 NO LOS: ICD-10-PCS | Mod: S$GLB,,, | Performed by: NURSE PRACTITIONER

## 2022-11-21 PROCEDURE — 99999 PR PBB SHADOW E&M-EST. PATIENT-LVL II: CPT | Mod: PBBFAC,,, | Performed by: ORTHOPAEDIC SURGERY

## 2022-11-21 PROCEDURE — 99999 PR PBB SHADOW E&M-EST. PATIENT-LVL III: CPT | Mod: PBBFAC,,, | Performed by: NURSE PRACTITIONER

## 2022-11-21 PROCEDURE — 99213 PR OFFICE/OUTPT VISIT, EST, LEVL III, 20-29 MIN: ICD-10-PCS | Mod: 25,S$GLB,, | Performed by: ORTHOPAEDIC SURGERY

## 2022-11-21 PROCEDURE — 20610 DRAIN/INJ JOINT/BURSA W/O US: CPT | Mod: RT,S$GLB,, | Performed by: NURSE PRACTITIONER

## 2022-11-21 PROCEDURE — 20610 PR DRAIN/INJECT LARGE JOINT/BURSA: ICD-10-PCS | Mod: RT,S$GLB,, | Performed by: NURSE PRACTITIONER

## 2022-11-21 PROCEDURE — 99213 OFFICE O/P EST LOW 20 MIN: CPT | Mod: 25,S$GLB,, | Performed by: ORTHOPAEDIC SURGERY

## 2022-11-21 RX ORDER — IRBESARTAN 300 MG/1
300 TABLET ORAL NIGHTLY
Qty: 90 TABLET | Refills: 1 | Status: SHIPPED | OUTPATIENT
Start: 2022-11-21 | End: 2022-12-06 | Stop reason: SDUPTHER

## 2022-11-21 NOTE — PROGRESS NOTES
Subjective:      Patient ID: Traci Freire is a 61 y.o. female.    Chief Complaint: Pain of the Left Knee    HPI  Traci Freire has left knee pain.  The pain has worsened. The pain is located in the global aspect of the knee.  There  is  radiation.  The pain radiates to the hip.  There is associated stiffness.   There is not catching and locking. The pain is described as achy. The pain is aggravated by activity.  It is alleviated by nothing consistently.   Her history, medications and problem list were reviewed.    Review of Systems   Constitutional: Negative for chills, fever and night sweats.   HENT:  Negative for hearing loss.    Eyes:  Negative for blurred vision and double vision.   Cardiovascular:  Negative for chest pain, claudication and leg swelling.   Respiratory:  Negative for shortness of breath.    Endocrine: Negative for polydipsia, polyphagia and polyuria.   Hematologic/Lymphatic: Negative for adenopathy and bleeding problem. Does not bruise/bleed easily.   Skin:  Negative for poor wound healing.   Gastrointestinal:  Negative for diarrhea and heartburn.   Genitourinary:  Negative for bladder incontinence.   Neurological:  Negative for focal weakness, headaches, numbness, paresthesias and sensory change.   Psychiatric/Behavioral:  The patient is not nervous/anxious.    Allergic/Immunologic: Negative for persistent infections.       Objective:      There is no height or weight on file to calculate BMI.  There were no vitals filed for this visit.        General    Constitutional: She is oriented to person, place, and time. She appears well-developed and well-nourished.   HENT:   Head: Normocephalic and atraumatic.   Eyes: EOM are normal.   Cardiovascular:  Normal rate.            Pulmonary/Chest: Effort normal.   Neurological: She is alert and oriented to person, place, and time.   Psychiatric: She has a normal mood and affect. Her behavior is normal.     General Musculoskeletal Exam   Gait: abnormal          Left Knee Exam     Inspection   Erythema: absent  Scars: present  Swelling: present  Effusion: absent  Deformity: absent  Bruising: absent    Tenderness   The patient tender to palpation of the medial retinaculum and lateral retinaculum.    Range of Motion   Extension:  0   Flexion:  80     Tests   Stability   Lachman: normal (-1 to 2mm)   MCL - Valgus: normal (0 to 2mm)  LCL - Varus: normal (0 to 2mm)  Patella   Passive Patellar Tilt: neutral    Other   Sensation: normal    Muscle Strength   Left Lower Extremity   Hip Abduction: 5/5   Quadriceps:  5/5   Hamstrin/5     Vascular Exam       Edema  Left Lower Leg: absent    CT reviewed.  Chronic changes.  Not c/w acute process        Assessment:       Encounter Diagnoses   Name Primary?    History of revision of total replacement of left knee joint Yes    Arthrofibrosis of knee joint, left     Presence of left artificial knee joint           Plan:       Traci was seen today for pain.    Diagnoses and all orders for this visit:    History of revision of total replacement of left knee joint    Arthrofibrosis of knee joint, left    Presence of left artificial knee joint  -     MRI Knee Without Contrast Left; Future    Options discussed.  Will get MARS MRI to better evaluate soft tissue and look for a more acute process.

## 2022-11-21 NOTE — PROGRESS NOTES
62 y/o female with PSH left knee arthroplasty and revision presents for right knee injection of Euflexxa #1. Pt was previously seen by Dr. Estevez on 10/17/22 for bilateral knee pain and was approved for right knee Euflexxa injections. Pt will f/u with Dr. Estevez for CT scan results regarding her left knee pain.     Traci Freire presents to clinic today for the first right knee Euflexxa injection.      Exam demonstrates the no effusion in the  right knee, and the skin is intact.    Radiographs show degenerative changes of knee    Diagnosis: primary osteoarthritis right knee    After time out was performed and patient ID, side, and site were verified, the  right  knee was sterilly prepped in the standard fashion.  A 22-gauge needle was introduced into right knee joint from an mary kate-lateral site without complication and knee was then injected with 2 ml of Euflexxa.  Sterile dressing was applied.  The patient was instructed to resume activities as tolerated and to call with any problems.     Patient will return next week for the second injection.    JAYSHREE Paris

## 2022-11-21 NOTE — PROGRESS NOTES
Pt seen with PA student. Injection given by PA student Neri Nickerson. Dr. Estevez also came to the room to discuss CT scan results. Will get a MARS MRI for further evaluation.

## 2022-11-28 ENCOUNTER — OFFICE VISIT (OUTPATIENT)
Dept: ORTHOPEDICS | Facility: CLINIC | Age: 62
End: 2022-11-28
Payer: COMMERCIAL

## 2022-11-28 VITALS — WEIGHT: 185 LBS | BODY MASS INDEX: 34.93 KG/M2 | HEIGHT: 61 IN

## 2022-11-28 DIAGNOSIS — M17.11 PRIMARY OSTEOARTHRITIS OF RIGHT KNEE: Primary | ICD-10-CM

## 2022-11-28 PROCEDURE — 20610 DRAIN/INJ JOINT/BURSA W/O US: CPT | Mod: RT,S$GLB,, | Performed by: PHYSICIAN ASSISTANT

## 2022-11-28 PROCEDURE — 99499 UNLISTED E&M SERVICE: CPT | Mod: S$GLB,,, | Performed by: PHYSICIAN ASSISTANT

## 2022-11-28 PROCEDURE — 99999 PR PBB SHADOW E&M-EST. PATIENT-LVL III: ICD-10-PCS | Mod: PBBFAC,,, | Performed by: PHYSICIAN ASSISTANT

## 2022-11-28 PROCEDURE — 20610 PR DRAIN/INJECT LARGE JOINT/BURSA: ICD-10-PCS | Mod: RT,S$GLB,, | Performed by: PHYSICIAN ASSISTANT

## 2022-11-28 PROCEDURE — 99499 NO LOS: ICD-10-PCS | Mod: S$GLB,,, | Performed by: PHYSICIAN ASSISTANT

## 2022-11-28 PROCEDURE — 99999 PR PBB SHADOW E&M-EST. PATIENT-LVL III: CPT | Mod: PBBFAC,,, | Performed by: PHYSICIAN ASSISTANT

## 2022-11-28 NOTE — PROGRESS NOTES
Traci Freire presents to clinic today for the second right knee Euflexxa injection.      Exam demonstrates the no effusion in the  right knee, and the skin is intact.    Radiographs show degenerative changes of knee    Diagnosis: primary osteoarthritis right knee    After time out was performed and patient ID, side, and site were verified, the  right  knee was sterilly prepped in the standard fashion.  A 22-gauge needle was introduced into right knee joint from an mary kate-lateral site without complication and knee was then injected with 2 ml of Euflexxa.  Sterile dressing was applied.  The patient was instructed to resume activities as tolerated and to call with any problems.     Patient will return next week for the third injection.

## 2022-12-05 ENCOUNTER — OFFICE VISIT (OUTPATIENT)
Dept: ORTHOPEDICS | Facility: CLINIC | Age: 62
End: 2022-12-05
Payer: COMMERCIAL

## 2022-12-05 VITALS — HEIGHT: 61 IN | BODY MASS INDEX: 34.93 KG/M2 | WEIGHT: 185 LBS

## 2022-12-05 DIAGNOSIS — M17.11 PRIMARY OSTEOARTHRITIS OF RIGHT KNEE: Primary | ICD-10-CM

## 2022-12-05 PROCEDURE — 99999 PR PBB SHADOW E&M-EST. PATIENT-LVL III: CPT | Mod: PBBFAC,,, | Performed by: NURSE PRACTITIONER

## 2022-12-05 PROCEDURE — 99499 NO LOS: ICD-10-PCS | Mod: S$GLB,,, | Performed by: NURSE PRACTITIONER

## 2022-12-05 PROCEDURE — 99499 UNLISTED E&M SERVICE: CPT | Mod: S$GLB,,, | Performed by: NURSE PRACTITIONER

## 2022-12-05 PROCEDURE — 99999 PR PBB SHADOW E&M-EST. PATIENT-LVL III: ICD-10-PCS | Mod: PBBFAC,,, | Performed by: NURSE PRACTITIONER

## 2022-12-05 PROCEDURE — 20610 PR DRAIN/INJECT LARGE JOINT/BURSA: ICD-10-PCS | Mod: RT,S$GLB,, | Performed by: NURSE PRACTITIONER

## 2022-12-05 PROCEDURE — 20610 DRAIN/INJ JOINT/BURSA W/O US: CPT | Mod: RT,S$GLB,, | Performed by: NURSE PRACTITIONER

## 2022-12-05 NOTE — PROGRESS NOTES
Traci Freire presents to clinic today for the third right knee Euflexxa injection.      Exam demonstrates the no effusion in the  right knee, and the skin is intact.    Radiographs show degenerative changes of knee    Diagnosis: primary osteoarthritis right knee    After time out was performed and patient ID, side, and site were verified, the  right  knee was sterilly prepped in the standard fashion.  A 22-gauge needle was introduced into right knee joint from an mary kate-lateral site without complication and knee was then injected with 2 ml of Euflexxa.  Sterile dressing was applied.  The patient was instructed to resume activities as tolerated and to call with any problems.     Patient will return  as needed

## 2022-12-06 ENCOUNTER — HOSPITAL ENCOUNTER (OUTPATIENT)
Dept: RADIOLOGY | Facility: HOSPITAL | Age: 62
Discharge: HOME | End: 2022-12-06
Attending: ORTHOPAEDIC SURGERY
Payer: COMMERCIAL

## 2022-12-06 ENCOUNTER — TELEPHONE (OUTPATIENT)
Dept: ORTHOPEDICS | Facility: CLINIC | Age: 62
End: 2022-12-06
Payer: COMMERCIAL

## 2022-12-06 ENCOUNTER — OFFICE VISIT (OUTPATIENT)
Dept: FAMILY MEDICINE | Facility: CLINIC | Age: 62
End: 2022-12-06
Payer: COMMERCIAL

## 2022-12-06 VITALS
DIASTOLIC BLOOD PRESSURE: 64 MMHG | HEART RATE: 80 BPM | WEIGHT: 190 LBS | SYSTOLIC BLOOD PRESSURE: 126 MMHG | BODY MASS INDEX: 35.87 KG/M2 | HEIGHT: 61 IN

## 2022-12-06 DIAGNOSIS — Z90.13 H/O BILATERAL MASTECTOMY: ICD-10-CM

## 2022-12-06 DIAGNOSIS — E66.9 OBESITY, UNSPECIFIED CLASSIFICATION, UNSPECIFIED OBESITY TYPE, UNSPECIFIED WHETHER SERIOUS COMORBIDITY PRESENT: ICD-10-CM

## 2022-12-06 DIAGNOSIS — Z96.652 PRESENCE OF LEFT ARTIFICIAL KNEE JOINT: ICD-10-CM

## 2022-12-06 DIAGNOSIS — Z23 NEED FOR INFLUENZA VACCINATION: Primary | ICD-10-CM

## 2022-12-06 DIAGNOSIS — R60.9 EDEMA, UNSPECIFIED TYPE: ICD-10-CM

## 2022-12-06 DIAGNOSIS — F41.9 ANXIETY: ICD-10-CM

## 2022-12-06 DIAGNOSIS — R39.9 UTI SYMPTOMS: ICD-10-CM

## 2022-12-06 DIAGNOSIS — I10 ESSENTIAL HYPERTENSION: ICD-10-CM

## 2022-12-06 DIAGNOSIS — I10 PRIMARY HYPERTENSION: ICD-10-CM

## 2022-12-06 DIAGNOSIS — E78.5 HYPERLIPIDEMIA, UNSPECIFIED HYPERLIPIDEMIA TYPE: ICD-10-CM

## 2022-12-06 DIAGNOSIS — Z85.3 HX OF BREAST CANCER: ICD-10-CM

## 2022-12-06 DIAGNOSIS — G47.33 OSA (OBSTRUCTIVE SLEEP APNEA): ICD-10-CM

## 2022-12-06 LAB
BILIRUB UR QL STRIP: NEGATIVE
GLUCOSE UR QL STRIP: NEGATIVE
KETONES UR QL STRIP: NEGATIVE
LEUKOCYTE ESTERASE UR QL STRIP: NEGATIVE
PH, POC UA: 6
POC BLOOD, URINE: NEGATIVE
POC NITRATES, URINE: NEGATIVE
PROT UR QL STRIP: NEGATIVE
SP GR UR STRIP: 1.03 (ref 1–1.03)
UROBILINOGEN UR STRIP-ACNC: ABNORMAL (ref 0.1–1.1)

## 2022-12-06 PROCEDURE — 81003 URINALYSIS AUTO W/O SCOPE: CPT | Mod: QW,S$GLB,, | Performed by: NURSE PRACTITIONER

## 2022-12-06 PROCEDURE — 73721 MRI JNT OF LWR EXTRE W/O DYE: CPT | Mod: TC,PO,LT

## 2022-12-06 PROCEDURE — 90682 FLU VACCINE - QUADRIVALENT (RECOMBINANT) PRESERVATIVE FREE: ICD-10-PCS | Mod: S$GLB,,, | Performed by: NURSE PRACTITIONER

## 2022-12-06 PROCEDURE — 73721 MRI JNT OF LWR EXTRE W/O DYE: CPT | Mod: 26,LT,, | Performed by: RADIOLOGY

## 2022-12-06 PROCEDURE — 73721 MRI KNEE WITHOUT CONTRAST LEFT: ICD-10-PCS | Mod: 26,LT,, | Performed by: RADIOLOGY

## 2022-12-06 PROCEDURE — 99214 OFFICE O/P EST MOD 30 MIN: CPT | Mod: 25,S$GLB,, | Performed by: NURSE PRACTITIONER

## 2022-12-06 PROCEDURE — 81003 POCT URINALYSIS, DIPSTICK, AUTOMATED, W/O SCOPE: ICD-10-PCS | Mod: QW,S$GLB,, | Performed by: NURSE PRACTITIONER

## 2022-12-06 PROCEDURE — 90682 RIV4 VACC RECOMBINANT DNA IM: CPT | Mod: S$GLB,,, | Performed by: NURSE PRACTITIONER

## 2022-12-06 PROCEDURE — 99214 PR OFFICE/OUTPT VISIT, EST, LEVL IV, 30-39 MIN: ICD-10-PCS | Mod: 25,S$GLB,, | Performed by: NURSE PRACTITIONER

## 2022-12-06 PROCEDURE — 90471 IMMUNIZATION ADMIN: CPT | Mod: S$GLB,,, | Performed by: NURSE PRACTITIONER

## 2022-12-06 PROCEDURE — 90471 FLU VACCINE - QUADRIVALENT (RECOMBINANT) PRESERVATIVE FREE: ICD-10-PCS | Mod: S$GLB,,, | Performed by: NURSE PRACTITIONER

## 2022-12-06 RX ORDER — IRBESARTAN 300 MG/1
300 TABLET ORAL NIGHTLY
Qty: 90 TABLET | Refills: 1 | Status: SHIPPED | OUTPATIENT
Start: 2022-12-06 | End: 2023-06-15 | Stop reason: SDUPTHER

## 2022-12-06 RX ORDER — PHENTERMINE HYDROCHLORIDE 37.5 MG/1
37.5 TABLET ORAL
Qty: 30 TABLET | Refills: 0 | Status: SHIPPED | OUTPATIENT
Start: 2022-12-06 | End: 2023-01-05

## 2022-12-06 RX ORDER — ROSUVASTATIN CALCIUM 20 MG/1
20 TABLET, COATED ORAL NIGHTLY
Qty: 90 TABLET | Refills: 1 | Status: SHIPPED | OUTPATIENT
Start: 2022-12-06 | End: 2023-07-25 | Stop reason: SDUPTHER

## 2022-12-06 RX ORDER — HYDROCHLOROTHIAZIDE 25 MG/1
25 TABLET ORAL DAILY
Qty: 90 TABLET | Refills: 1 | Status: SHIPPED | OUTPATIENT
Start: 2022-12-06 | End: 2023-05-17 | Stop reason: SDUPTHER

## 2022-12-06 RX ORDER — DOXYCYCLINE HYCLATE 100 MG
100 TABLET ORAL 2 TIMES DAILY
Qty: 10 TABLET | Refills: 0 | Status: SHIPPED | OUTPATIENT
Start: 2022-12-06 | End: 2023-08-09

## 2022-12-06 RX ORDER — FLUOXETINE HYDROCHLORIDE 20 MG/1
20 CAPSULE ORAL DAILY
Qty: 90 CAPSULE | Refills: 1 | Status: SHIPPED | OUTPATIENT
Start: 2022-12-06 | End: 2023-07-09 | Stop reason: SDUPTHER

## 2022-12-06 RX ORDER — ZOLPIDEM TARTRATE 10 MG/1
10 TABLET ORAL NIGHTLY
Qty: 30 TABLET | Refills: 5 | Status: SHIPPED | OUTPATIENT
Start: 2022-12-06 | End: 2022-12-30 | Stop reason: SDUPTHER

## 2022-12-06 NOTE — TELEPHONE ENCOUNTER
Staff called and left a msg for the pt to call staff back in office         ----- Message from Chris Estevez MD sent at 12/6/2022 10:16 AM CST -----  Please call pt.  The MRI did not really show anything.  Would like to see her again in about 4-6 weeks to discuss these findings and see how much the Euflexxa helped her.  GC

## 2022-12-06 NOTE — PROGRESS NOTES
SUBJECTIVE:    Patient ID: Traci Freire is a 62 y.o. female.    Chief Complaint: Medication Management (Went over meds verbally, burning and pressure in vaginal area, Flu wants// SW)    62 year old female presents for check up. Treated for depression, htn, hyperlipidemia, oa, and insomnia and history of breast cancer. Taking meds as prescribed. Working at [employer]. Seeing ortho regularly. Receiving Euflexxa injections. Pain has improved some. Plans to travel with daughter soon. Sleeping ok. Last labs in October. Has been experiencing urinary frequency and burning. No fever. No back pain. Would like to discuss weight loss. Has tried cutting back with minimal improvement      Office Visit on 2022   Component Date Value Ref Range Status    POC Blood, Urine 2022 Negative  Negative Final    POC Bilirubin, Urine 2022 Negative  Negative Final    POC Urobilinogen, Urine 2022 -  0.1 - 1.1 Final    POC Ketones, Urine 2022 Negative  Negative Final    POC Protein, Urine 2022 Negative  Negative Final    POC Nitrates, Urine 2022 Negative  Negative Final    POC Glucose, Urine 2022 Negative  Negative Final    pH, UA 2022 6.0   Final    POC Specific Gravity, Urine 2022 1.030 (A)  1.003 - 1.029 Final    POC Leukocytes, Urine 2022 Negative  Negative Final    Urine Culture, Routine 2022  (A)   Final   Lab Visit on 10/17/2022   Component Date Value Ref Range Status    CRP 10/17/2022 0.5  0.0 - 8.2 mg/L Final    Sed Rate 10/17/2022 9  0 - 36 mm/Hr Final       Past Medical History:   Diagnosis Date    Anxiety     Arthritis     Cancer breast    Right- Bilateral mastectomy- May 2005- had breast reconstruction- mother  of breast cancer    HLD (hyperlipidemia)     Hypertension     diagnosed age late 40's     IBS (irritable bowel syndrome)     Kidney stone     Lyme disease     arthritis of hands. Felt like flu- age early 30's- got it  after going to [place]     Sleep apnea     NO MACHINE     Social History     Socioeconomic History    Marital status:    Tobacco Use    Smoking status: Former     Types: Cigarettes     Quit date: 3/26/2004     Years since quittin.7    Smokeless tobacco: Never    Tobacco comments:     quit 2004- smoked for 10 years- 1 ppd   Substance and Sexual Activity    Alcohol use: No    Drug use: No    Sexual activity: Never     Past Surgical History:   Procedure Laterality Date    ABDOMINAL SURGERY      BLADDER SUSPENSION      BREAST SURGERY       SECTION      CYSTOSCOPY      avoids greens . ? Interstitial cystitis    HYSTERECTOMY      followed by removal of ovaries  from scar tissue    INCISIONAL HERNIA REPAIR Right 2008    RLQ    JOINT REPLACEMENT      Left total knee replacement-[facility] -     KNEE SURGERY Left 2014    TKR    MODIFIED RADICAL MASTECTOMY W/ AXILLARY LYMPH NODE DISSECTION Right 05/10/2005    w/free flap    NISSEN FUNDOPLICATION      ROBOT-ASSISTED LAPAROSCOPIC REPAIR OF VENTRAL HERNIA N/A 2021    Procedure: ROBOTIC REPAIR, HERNIA, VENTRAL;  Surgeon: John Johnson III, MD;  Location: [facility];  Service: General;  Laterality: N/A;    SIMPLE MASTECTOMY Left 05/10/2005    w/free flap    TONSILLECTOMY       Family History   Problem Relation Age of Onset    Cancer Mother          of breast cancer, also had gynecological cancer- had breast cancer that spread to the brain    Heart disease Father         in his 60's-  in his 70's       Review of patient's allergies indicates:   Allergen Reactions    Keflex [cephalexin]     Macrobid [nitrofurantoin monohyd/m-cryst]     Bactrim [sulfamethoxazole-trimethoprim] Nausea And Vomiting       Current Outpatient Medications:     ascorbic acid, vitamin C, (VITAMIN C) 500 MG tablet, Take 500 mg by mouth once daily., Disp: , Rfl:     ferrous sulfate, dried (SLOW FE) 160 mg (50 mg iron) TbSR, Take 160 mg by mouth once daily.,  Disp: , Rfl:     ibuprofen (ADVIL,MOTRIN) 200 MG tablet, Take 200 mg by mouth every 6 (six) hours as needed for Pain., Disp: , Rfl:     mecobalamin (B12 ACTIVE ORAL), Take 1 tablet by mouth Daily., Disp: , Rfl:     ondansetron (ZOFRAN-ODT) 4 MG TbDL, Take 1 tablet (4 mg total) by mouth every 6 (six) hours as needed., Disp: 12 tablet, Rfl: 0    doxycycline (VIBRA-TABS) 100 MG tablet, Take 1 tablet (100 mg total) by mouth 2 (two) times daily., Disp: 10 tablet, Rfl: 0    FLUoxetine 20 MG capsule, Take 1 capsule (20 mg total) by mouth once daily., Disp: 90 capsule, Rfl: 1    hydroCHLOROthiazide (HYDRODIURIL) 25 MG tablet, Take 1 tablet (25 mg total) by mouth once daily., Disp: 90 tablet, Rfl: 1    irbesartan (AVAPRO) 300 MG tablet, Take 1 tablet (300 mg total) by mouth every evening., Disp: 90 tablet, Rfl: 1    phentermine (ADIPEX-P) 37.5 mg tablet, Take 1 tablet (37.5 mg total) by mouth before breakfast., Disp: 30 tablet, Rfl: 0    rosuvastatin (CRESTOR) 20 MG tablet, Take 1 tablet (20 mg total) by mouth every evening., Disp: 90 tablet, Rfl: 1    zolpidem (AMBIEN) 10 mg Tab, Take 1 tablet (10 mg total) by mouth every evening., Disp: 30 tablet, Rfl: 5    Current Facility-Administered Medications:     sodium hyaluronate (EUFLEXXA) 10 mg/mL(mw 2.4 -3.6 million) injection 20 mg, 20 mg, Intra-articular, Weekly, Chris Estevez MD, 20 mg at 12/05/22 1703    Review of Systems   Constitutional:  Negative for chills, fever and unexpected weight change.   HENT:  Negative for ear pain, rhinorrhea and sore throat.    Eyes:  Negative for pain and visual disturbance.   Respiratory:  Negative for cough and shortness of breath.    Cardiovascular:  Negative for chest pain, palpitations and leg swelling.   Gastrointestinal:  Negative for abdominal pain, diarrhea, nausea and vomiting.   Genitourinary:  Positive for flank pain. Negative for difficulty urinating, hematuria and vaginal bleeding.   Musculoskeletal:  Negative for  "arthralgias.   Skin:  Negative for rash.   Neurological:  Negative for dizziness, weakness and headaches.   Psychiatric/Behavioral:  Negative for agitation and sleep disturbance. The patient is not nervous/anxious.         Objective:      Vitals:    12/06/22 1043   BP: 126/64   Pulse: 80   Weight: 86.2 kg (190 lb)   Height: 5' 1" (1.549 m)     Physical Exam  Vitals and nursing note reviewed.   Constitutional:       General: She is not in acute distress.     Appearance: Normal appearance. She is well-developed.   HENT:      Head: Normocephalic and atraumatic.      Right Ear: External ear normal.      Left Ear: External ear normal.   Cardiovascular:      Rate and Rhythm: Normal rate and regular rhythm.      Pulses:           Dorsalis pedis pulses are 2+ on the right side.        Posterior tibial pulses are 2+ on the right side.      Heart sounds: Normal heart sounds.   Pulmonary:      Effort: Pulmonary effort is normal.      Breath sounds: Normal breath sounds.   Abdominal:      General: Bowel sounds are normal.      Palpations: Abdomen is soft.      Tenderness: There is no right CVA tenderness or left CVA tenderness.   Musculoskeletal:         General: No deformity. Normal range of motion.      Cervical back: Normal range of motion and neck supple.      Right foot: Normal range of motion. No deformity.   Feet:      Right foot:      Protective Sensation: 5 sites tested.  5 sites sensed.      Left foot:      Protective Sensation: 5 sites tested.  5 sites sensed.      Skin integrity: No skin breakdown.   Lymphadenopathy:      Cervical: No cervical adenopathy.   Skin:     General: Skin is warm and dry.   Neurological:      Mental Status: She is alert and oriented to person, place, and time.   Psychiatric:         Behavior: Behavior normal.         Assessment:       1. Need for influenza vaccination    2. Edema, unspecified type    3. Essential hypertension    4. Hyperlipidemia, unspecified hyperlipidemia type    5. " Anxiety    6. UTI symptoms    7. Hx of breast cancer    8. Primary hypertension    9. H/O bilateral mastectomy    10. FRANCIE (obstructive sleep apnea)    11. Obesity, unspecified classification, unspecified obesity type, unspecified whether serious comorbidity present           Plan:       Need for influenza vaccination  -     Influenza - Quadrivalent (Recombinant) (PF)    Edema, unspecified type  -     hydroCHLOROthiazide (HYDRODIURIL) 25 MG tablet; Take 1 tablet (25 mg total) by mouth once daily.  Dispense: 90 tablet; Refill: 1    Essential hypertension  -     irbesartan (AVAPRO) 300 MG tablet; Take 1 tablet (300 mg total) by mouth every evening.  Dispense: 90 tablet; Refill: 1    Hyperlipidemia, unspecified hyperlipidemia type  -     rosuvastatin (CRESTOR) 20 MG tablet; Take 1 tablet (20 mg total) by mouth every evening.  Dispense: 90 tablet; Refill: 1    Anxiety  -     FLUoxetine 20 MG capsule; Take 1 capsule (20 mg total) by mouth once daily.  Dispense: 90 capsule; Refill: 1  -     zolpidem (AMBIEN) 10 mg Tab; Take 1 tablet (10 mg total) by mouth every evening.  Dispense: 30 tablet; Refill: 5    UTI symptoms  -     POCT Urinalysis, Dipstick, Automated, W/O Scope  -     Urine culture; Future; Expected date: 12/06/2022    Hx of breast cancer    Primary hypertension    H/O bilateral mastectomy    FRANCIE (obstructive sleep apnea)    Obesity, unspecified classification, unspecified obesity type, unspecified whether serious comorbidity present  Comments:  phetermine per dr. moreira    Other orders  -     doxycycline (VIBRA-TABS) 100 MG tablet; Take 1 tablet (100 mg total) by mouth 2 (two) times daily.  Dispense: 10 tablet; Refill: 0      Follow up in about 4 weeks (around 1/3/2023), or if symptoms worsen or fail to improve, for medication management.        12/21/2022 Juliet Medina

## 2022-12-08 LAB — BACTERIA UR CULT: ABNORMAL

## 2022-12-09 ENCOUNTER — TELEPHONE (OUTPATIENT)
Dept: FAMILY MEDICINE | Facility: CLINIC | Age: 62
End: 2022-12-09

## 2022-12-09 NOTE — TELEPHONE ENCOUNTER
She was placed on doxycycline per Juliet.  Tetracycline was not tested on culture.  She has allergies to options of Bactrim and Macrobid.  If she is indeed still having urinary symptoms I would recommend starting Bactrim Ds 1 po BID for 5 days based on her reported side effect (not allergy) of nausea and vomiting since all of the other options are IV and would require hospital admission. She should take medication with food twice daily.

## 2022-12-12 ENCOUNTER — TELEPHONE (OUTPATIENT)
Dept: FAMILY MEDICINE | Facility: CLINIC | Age: 62
End: 2022-12-12

## 2022-12-12 NOTE — TELEPHONE ENCOUNTER
Spoke with pt in regards to message sent. Verbalized per Kamar that pt was placed on doxycycline per Juliet.  Tetracycline was not tested on culture.  She has allergies to options of Bactrim and Macrobid.  If she is indeed still having urinary symptoms Kamar would recommend starting Bactrim Ds 1 po BID for 5 days based on her reported side effect (not allergy) of nausea and vomiting since all of the other options are IV and would require hospital admission. She should take medication with food twice daily. Pt acknowledge understanding. Pt states that she is not having any symptoms of a UTI.

## 2022-12-12 NOTE — TELEPHONE ENCOUNTER
Spoke with pt in already in regards to recent Urine culture results. Please see telephone encounter for 12/09/2022.

## 2022-12-12 NOTE — TELEPHONE ENCOUNTER
----- Message from Jemima Cui MA sent at 12/12/2022  8:24 AM CST -----    ----- Message -----  From: Madeleine Mak  Sent: 12/12/2022   7:38 AM CST  To: Juliet Medina Staff     12/9 2:58 pm- pt is calling daryn back about her ua culture, she works [employer]   [phone]

## 2022-12-13 ENCOUNTER — PATIENT MESSAGE (OUTPATIENT)
Dept: ORTHOPEDICS | Facility: CLINIC | Age: 62
End: 2022-12-13
Payer: COMMERCIAL

## 2022-12-15 NOTE — TELEPHONE ENCOUNTER
----- Message from Juliet Medina NP sent at 1/15/2020 11:09 PM CST -----  No evidence of any masses.   Called to mother as Jamar Dash message not responded to but was read  Lvm for mother to call back for appt and respond to [facility] message if she has any other questions

## 2022-12-30 ENCOUNTER — PATIENT MESSAGE (OUTPATIENT)
Dept: FAMILY MEDICINE | Facility: CLINIC | Age: 62
End: 2022-12-30

## 2022-12-30 DIAGNOSIS — F41.9 ANXIETY: ICD-10-CM

## 2022-12-30 RX ORDER — ZOLPIDEM TARTRATE 5 MG/1
10 TABLET ORAL NIGHTLY
Qty: 60 TABLET | Refills: 0 | Status: SHIPPED | OUTPATIENT
Start: 2022-12-30 | End: 2023-01-26 | Stop reason: SDUPTHER

## 2023-01-27 ENCOUNTER — PATIENT MESSAGE (OUTPATIENT)
Dept: FAMILY MEDICINE | Facility: CLINIC | Age: 63
End: 2023-01-27

## 2023-01-27 ENCOUNTER — TELEPHONE (OUTPATIENT)
Dept: FAMILY MEDICINE | Facility: CLINIC | Age: 63
End: 2023-01-27

## 2023-01-27 DIAGNOSIS — Z79.899 HIGH RISK MEDICATION USE: ICD-10-CM

## 2023-01-27 DIAGNOSIS — Z79.899 ENCOUNTER FOR LONG-TERM (CURRENT) USE OF OTHER MEDICATIONS: Primary | ICD-10-CM

## 2023-01-27 DIAGNOSIS — I10 ESSENTIAL HYPERTENSION: ICD-10-CM

## 2023-01-27 DIAGNOSIS — E78.5 HYPERLIPIDEMIA, UNSPECIFIED HYPERLIPIDEMIA TYPE: ICD-10-CM

## 2023-02-06 ENCOUNTER — OFFICE VISIT (OUTPATIENT)
Dept: FAMILY MEDICINE | Facility: CLINIC | Age: 63
End: 2023-02-06
Payer: COMMERCIAL

## 2023-02-06 VITALS
BODY MASS INDEX: 33.23 KG/M2 | HEART RATE: 90 BPM | WEIGHT: 176 LBS | OXYGEN SATURATION: 96 % | DIASTOLIC BLOOD PRESSURE: 60 MMHG | SYSTOLIC BLOOD PRESSURE: 120 MMHG | HEIGHT: 61 IN

## 2023-02-06 DIAGNOSIS — I10 PRIMARY HYPERTENSION: ICD-10-CM

## 2023-02-06 DIAGNOSIS — F41.9 ANXIETY: ICD-10-CM

## 2023-02-06 DIAGNOSIS — E66.9 OBESITY, UNSPECIFIED CLASSIFICATION, UNSPECIFIED OBESITY TYPE, UNSPECIFIED WHETHER SERIOUS COMORBIDITY PRESENT: ICD-10-CM

## 2023-02-06 DIAGNOSIS — E78.5 HYPERLIPIDEMIA, UNSPECIFIED HYPERLIPIDEMIA TYPE: ICD-10-CM

## 2023-02-06 DIAGNOSIS — Z90.13 H/O BILATERAL MASTECTOMY: Primary | ICD-10-CM

## 2023-02-06 PROCEDURE — 1160F PR REVIEW ALL MEDS BY PRESCRIBER/CLIN PHARMACIST DOCUMENTED: ICD-10-PCS | Mod: CPTII,S$GLB,, | Performed by: NURSE PRACTITIONER

## 2023-02-06 PROCEDURE — 99214 PR OFFICE/OUTPT VISIT, EST, LEVL IV, 30-39 MIN: ICD-10-PCS | Mod: S$GLB,,, | Performed by: NURSE PRACTITIONER

## 2023-02-06 PROCEDURE — 1159F MED LIST DOCD IN RCRD: CPT | Mod: CPTII,S$GLB,, | Performed by: NURSE PRACTITIONER

## 2023-02-06 PROCEDURE — 3078F DIAST BP <80 MM HG: CPT | Mod: CPTII,S$GLB,, | Performed by: NURSE PRACTITIONER

## 2023-02-06 PROCEDURE — 1159F PR MEDICATION LIST DOCUMENTED IN MEDICAL RECORD: ICD-10-PCS | Mod: CPTII,S$GLB,, | Performed by: NURSE PRACTITIONER

## 2023-02-06 PROCEDURE — 3078F PR MOST RECENT DIASTOLIC BLOOD PRESSURE < 80 MM HG: ICD-10-PCS | Mod: CPTII,S$GLB,, | Performed by: NURSE PRACTITIONER

## 2023-02-06 PROCEDURE — 3074F PR MOST RECENT SYSTOLIC BLOOD PRESSURE < 130 MM HG: ICD-10-PCS | Mod: CPTII,S$GLB,, | Performed by: NURSE PRACTITIONER

## 2023-02-06 PROCEDURE — 3008F PR BODY MASS INDEX (BMI) DOCUMENTED: ICD-10-PCS | Mod: CPTII,S$GLB,, | Performed by: NURSE PRACTITIONER

## 2023-02-06 PROCEDURE — 99214 OFFICE O/P EST MOD 30 MIN: CPT | Mod: S$GLB,,, | Performed by: NURSE PRACTITIONER

## 2023-02-06 PROCEDURE — 1160F RVW MEDS BY RX/DR IN RCRD: CPT | Mod: CPTII,S$GLB,, | Performed by: NURSE PRACTITIONER

## 2023-02-06 PROCEDURE — 3074F SYST BP LT 130 MM HG: CPT | Mod: CPTII,S$GLB,, | Performed by: NURSE PRACTITIONER

## 2023-02-06 PROCEDURE — 3008F BODY MASS INDEX DOCD: CPT | Mod: CPTII,S$GLB,, | Performed by: NURSE PRACTITIONER

## 2023-02-06 RX ORDER — PHENTERMINE HYDROCHLORIDE 37.5 MG/1
37.5 TABLET ORAL
Qty: 30 TABLET | Refills: 0 | Status: SHIPPED | OUTPATIENT
Start: 2023-02-06 | End: 2023-03-06 | Stop reason: SDUPTHER

## 2023-02-06 RX ORDER — ZOLPIDEM TARTRATE 10 MG/1
10 TABLET ORAL NIGHTLY
Qty: 90 TABLET | Refills: 1 | Status: SHIPPED | OUTPATIENT
Start: 2023-02-06 | End: 2023-05-17 | Stop reason: SDUPTHER

## 2023-02-07 NOTE — PROGRESS NOTES
SUBJECTIVE:    Patient ID: Traci Freire is a 62 y.o. female.    Chief Complaint: Follow-up (No bottles/No complaints/BG)    62 year old female presents for follow up. Treated for depression, htn, hyperlipidemia, oa, and insomnia and history of breast cancer. Taking meds as prescribed. At last visit was started on phentermine. Since starting has lost 14lbs. Feels good. No side effects. Decreased sweets. Cut out soft drinks. No palpitations. Needs refill on ambien. Sleeps well when takes. Leaving to travel next week.       Office Visit on 2022   Component Date Value Ref Range Status    POC Blood, Urine 2022 Negative  Negative Final    POC Bilirubin, Urine 2022 Negative  Negative Final    POC Urobilinogen, Urine 2022 -  0.1 - 1.1 Final    POC Ketones, Urine 2022 Negative  Negative Final    POC Protein, Urine 2022 Negative  Negative Final    POC Nitrates, Urine 2022 Negative  Negative Final    POC Glucose, Urine 2022 Negative  Negative Final    pH, UA 2022 6.0   Final    POC Specific Gravity, Urine 2022 1.030 (A)  1.003 - 1.029 Final    POC Leukocytes, Urine 2022 Negative  Negative Final    Urine Culture, Routine 2022  (A)   Final   Lab Visit on 10/17/2022   Component Date Value Ref Range Status    CRP 10/17/2022 0.5  0.0 - 8.2 mg/L Final    Sed Rate 10/17/2022 9  0 - 36 mm/Hr Final       Past Medical History:   Diagnosis Date    Anxiety     Arthritis     Cancer breast    Right- Bilateral mastectomy- May 2005- had breast reconstruction- mother  of breast cancer    HLD (hyperlipidemia)     Hypertension     diagnosed age late 40's     IBS (irritable bowel syndrome)     Kidney stone     Lyme disease     arthritis of hands. Felt like flu- age early 30's- got it  after going to [place]    Sleep apnea     NO MACHINE     Social History     Socioeconomic History    Marital status:    Tobacco Use    Smoking status: Former     Types:  Cigarettes     Quit date: 3/26/2004     Years since quittin.8    Smokeless tobacco: Never    Tobacco comments:     quit 2004- smoked for 10 years- 1 ppd   Substance and Sexual Activity    Alcohol use: No    Drug use: No    Sexual activity: Never     Past Surgical History:   Procedure Laterality Date    ABDOMINAL SURGERY      BLADDER SUSPENSION      BREAST SURGERY       SECTION      CYSTOSCOPY  2006    avoids greens . ? Interstitial cystitis    HYSTERECTOMY      followed by removal of ovaries  from scar tissue    INCISIONAL HERNIA REPAIR Right 2008    RLQ    JOINT REPLACEMENT      Left total knee replacement-[facility] -     KNEE SURGERY Left 2014    TKR    MODIFIED RADICAL MASTECTOMY W/ AXILLARY LYMPH NODE DISSECTION Right 05/10/2005    w/free flap    NISSEN FUNDOPLICATION      ROBOT-ASSISTED LAPAROSCOPIC REPAIR OF VENTRAL HERNIA N/A 2021    Procedure: ROBOTIC REPAIR, HERNIA, VENTRAL;  Surgeon: John Johnson III, MD;  Location: [facility];  Service: General;  Laterality: N/A;    SIMPLE MASTECTOMY Left 05/10/2005    w/free flap    TONSILLECTOMY       Family History   Problem Relation Age of Onset    Cancer Mother          of breast cancer, also had gynecological cancer- had breast cancer that spread to the brain    Heart disease Father         in his 60's-  in his 70's       Review of patient's allergies indicates:   Allergen Reactions    Keflex [cephalexin]     Macrobid [nitrofurantoin monohyd/m-cryst]     Bactrim [sulfamethoxazole-trimethoprim] Nausea And Vomiting       Current Outpatient Medications:     ascorbic acid, vitamin C, (VITAMIN C) 500 MG tablet, Take 500 mg by mouth once daily., Disp: , Rfl:     doxycycline (VIBRA-TABS) 100 MG tablet, Take 1 tablet (100 mg total) by mouth 2 (two) times daily., Disp: 10 tablet, Rfl: 0    ferrous sulfate, dried (SLOW FE) 160 mg (50 mg iron) TbSR, Take 160 mg by mouth once daily., Disp: , Rfl:      FLUoxetine 20 MG capsule, Take 1 capsule (20 mg total) by mouth once daily., Disp: 90 capsule, Rfl: 1    hydroCHLOROthiazide (HYDRODIURIL) 25 MG tablet, Take 1 tablet (25 mg total) by mouth once daily., Disp: 90 tablet, Rfl: 1    ibuprofen (ADVIL,MOTRIN) 200 MG tablet, Take 200 mg by mouth every 6 (six) hours as needed for Pain., Disp: , Rfl:     irbesartan (AVAPRO) 300 MG tablet, Take 1 tablet (300 mg total) by mouth every evening., Disp: 90 tablet, Rfl: 1    mecobalamin (B12 ACTIVE ORAL), Take 1 tablet by mouth Daily., Disp: , Rfl:     ondansetron (ZOFRAN-ODT) 4 MG TbDL, Take 1 tablet (4 mg total) by mouth every 6 (six) hours as needed., Disp: 12 tablet, Rfl: 0    phentermine (ADIPEX-P) 37.5 mg tablet, Take 1 tablet (37.5 mg total) by mouth before breakfast., Disp: 30 tablet, Rfl: 0    rosuvastatin (CRESTOR) 20 MG tablet, Take 1 tablet (20 mg total) by mouth every evening., Disp: 90 tablet, Rfl: 1    zolpidem (AMBIEN) 10 mg Tab, Take 1 tablet (10 mg total) by mouth every evening., Disp: 90 tablet, Rfl: 1    Current Facility-Administered Medications:     sodium hyaluronate (EUFLEXXA) 10 mg/mL(mw 2.4 -3.6 million) injection 20 mg, 20 mg, Intra-articular, Weekly, Chris Estevez MD, 20 mg at 12/05/22 170    Review of Systems   Constitutional:  Positive for activity change and appetite change. Negative for chills, fever and unexpected weight change.   HENT:  Negative for ear pain, rhinorrhea and sore throat.    Eyes:  Negative for pain and visual disturbance.   Respiratory:  Negative for cough and shortness of breath.    Cardiovascular:  Negative for chest pain, palpitations and leg swelling.   Gastrointestinal:  Negative for abdominal pain, diarrhea, nausea and vomiting.   Genitourinary:  Negative for difficulty urinating, hematuria and vaginal bleeding.   Musculoskeletal:  Negative for arthralgias.   Skin:  Negative for rash.   Neurological:  Negative for dizziness, weakness and headaches.  "  Psychiatric/Behavioral:  Negative for agitation and sleep disturbance. The patient is not nervous/anxious.         Objective:      Vitals:    02/06/23 1019   BP: 120/60   Pulse: 90   SpO2: 96%   Weight: 79.8 kg (176 lb)   Height: 5' 1" (1.549 m)     Physical Exam  Vitals and nursing note reviewed.   Constitutional:       General: She is not in acute distress.     Appearance: Normal appearance. She is well-developed. She is obese.   HENT:      Right Ear: External ear normal.      Left Ear: External ear normal.   Eyes:      Conjunctiva/sclera: Conjunctivae normal.      Pupils: Pupils are equal, round, and reactive to light.   Neck:      Vascular: No JVD.   Cardiovascular:      Rate and Rhythm: Normal rate and regular rhythm.      Heart sounds: No murmur heard.  Pulmonary:      Effort: Pulmonary effort is normal.      Breath sounds: Normal breath sounds.   Abdominal:      General: Bowel sounds are normal.      Palpations: Abdomen is soft.   Musculoskeletal:         General: No deformity. Normal range of motion.      Cervical back: Normal range of motion and neck supple.   Lymphadenopathy:      Cervical: No cervical adenopathy.   Skin:     General: Skin is warm and dry.      Findings: No rash.   Neurological:      Mental Status: She is alert and oriented to person, place, and time.      Gait: Gait normal.   Psychiatric:         Speech: Speech normal.         Behavior: Behavior normal.         Assessment:       1. H/O bilateral mastectomy    2. Anxiety    3. Primary hypertension    4. Obesity, unspecified classification, unspecified obesity type, unspecified whether serious comorbidity present    5. Hyperlipidemia, unspecified hyperlipidemia type           Plan:       H/O bilateral mastectomy    Anxiety  -     zolpidem (AMBIEN) 10 mg Tab; Take 1 tablet (10 mg total) by mouth every evening.  Dispense: 90 tablet; Refill: 1    Primary hypertension    Obesity, unspecified classification, unspecified obesity type, " unspecified whether serious comorbidity present  Comments:  phentermine per dr. moreira    Hyperlipidemia, unspecified hyperlipidemia type      Follow up in about 4 weeks (around 3/6/2023), or if symptoms worsen or fail to improve, for medication management.        2/6/2023 Juliet Medina

## 2023-02-22 NOTE — TELEPHONE ENCOUNTER
Final Anesthesia Post-op Assessment    Patient: Emery Phillips  Procedure(s) Performed: REPAIR OF LEFT INGUINAL HERNIA WITH MESH - LEFT  Anesthesia type: General    Vitals Value Taken Time   Temp 36.5 °C (97.7 °F) 02/22/23 0835   Pulse 67 02/22/23 0905   Resp 13 02/22/23 0905   SpO2 98 % 02/22/23 0905   /77 02/22/23 0905         Patient Location: PACU Phase 1  Post-op Vital Signs:stable  Level of Consciousness: awake and alert  Respiratory Status: spontaneous ventilation  Cardiovascular stable  Hydration: euvolemic  Pain Management: adequately controlled  Handoff: Handoff to receiving clinician was performed and questions were answered  Vomiting: none  Nausea: None  Airway Patency:patent  Post-op Assessment: no complications and patient tolerated procedure well with no complications  #404 Anesthesiology Smoking Abstinence    The patient is a current smoker (e.g. cigarette, cigar, pipe, e-cigarette/vaping/marijuana)? No ()      #424 Perioperative Temperature Management    Anesthesia start to Anesthesia end time was 60 minutes or longer? Yes (4255F) Anesthesia administered was General ( Inhalational, or TIVA) or Neuraxial Block Yes At least one body temperature greater than 95.8°F/35.5°C achieved within the 30 mins immediately prior to or the 15 minutes immediately following anesthesia end time () Yes      #477 Multimodal Pain Management    The Case is Emergent - Exclusion No Patient was administered multimodal pain management (2 or more, non-opioid, between 6 hours prior to anesthesia and discharge from PACU) ()      #430 ADULT prevention of Post-Operative Nausea & Vomiting (PONV) - Combination Therapy (18 years and older)    Patient received an inhalational anesthetic? Yes (4554F) Patient DOES NOT exhibits three or more risk factors for PONV ()      #463 PEDIATRIC Prevention of Post-Operative Nausea & Vomiting (PONV) - Combination Therapy (17 years and younger)    The inhalational  Left mess that I was checking on status of appt with Dr Leonardo. She had one yesterday but it was canceled. Updated remind me   anesthetic was only used for induction? No Patient is NOT 3 - 17 years old ()        [employer] Number:       No notable events documented.

## 2023-03-06 ENCOUNTER — OFFICE VISIT (OUTPATIENT)
Dept: FAMILY MEDICINE | Facility: CLINIC | Age: 63
End: 2023-03-06
Payer: COMMERCIAL

## 2023-03-06 VITALS
HEIGHT: 61 IN | BODY MASS INDEX: 33.23 KG/M2 | SYSTOLIC BLOOD PRESSURE: 100 MMHG | DIASTOLIC BLOOD PRESSURE: 62 MMHG | HEART RATE: 80 BPM | WEIGHT: 176 LBS

## 2023-03-06 DIAGNOSIS — Z85.3 HX OF BREAST CANCER: ICD-10-CM

## 2023-03-06 DIAGNOSIS — I10 PRIMARY HYPERTENSION: Primary | ICD-10-CM

## 2023-03-06 DIAGNOSIS — E78.5 HYPERLIPIDEMIA, UNSPECIFIED HYPERLIPIDEMIA TYPE: ICD-10-CM

## 2023-03-06 DIAGNOSIS — Z90.13 H/O BILATERAL MASTECTOMY: ICD-10-CM

## 2023-03-06 DIAGNOSIS — F32.A DEPRESSION, UNSPECIFIED DEPRESSION TYPE: ICD-10-CM

## 2023-03-06 DIAGNOSIS — G47.33 OSA (OBSTRUCTIVE SLEEP APNEA): ICD-10-CM

## 2023-03-06 DIAGNOSIS — G47.00 INSOMNIA, UNSPECIFIED TYPE: ICD-10-CM

## 2023-03-06 DIAGNOSIS — E66.9 OBESITY, UNSPECIFIED CLASSIFICATION, UNSPECIFIED OBESITY TYPE, UNSPECIFIED WHETHER SERIOUS COMORBIDITY PRESENT: ICD-10-CM

## 2023-03-06 PROCEDURE — 3078F DIAST BP <80 MM HG: CPT | Mod: CPTII,S$GLB,, | Performed by: NURSE PRACTITIONER

## 2023-03-06 PROCEDURE — 3074F PR MOST RECENT SYSTOLIC BLOOD PRESSURE < 130 MM HG: ICD-10-PCS | Mod: CPTII,S$GLB,, | Performed by: NURSE PRACTITIONER

## 2023-03-06 PROCEDURE — 99214 OFFICE O/P EST MOD 30 MIN: CPT | Mod: S$GLB,,, | Performed by: NURSE PRACTITIONER

## 2023-03-06 PROCEDURE — 1159F MED LIST DOCD IN RCRD: CPT | Mod: CPTII,S$GLB,, | Performed by: NURSE PRACTITIONER

## 2023-03-06 PROCEDURE — 3078F PR MOST RECENT DIASTOLIC BLOOD PRESSURE < 80 MM HG: ICD-10-PCS | Mod: CPTII,S$GLB,, | Performed by: NURSE PRACTITIONER

## 2023-03-06 PROCEDURE — 3008F BODY MASS INDEX DOCD: CPT | Mod: CPTII,S$GLB,, | Performed by: NURSE PRACTITIONER

## 2023-03-06 PROCEDURE — 1160F RVW MEDS BY RX/DR IN RCRD: CPT | Mod: CPTII,S$GLB,, | Performed by: NURSE PRACTITIONER

## 2023-03-06 PROCEDURE — 3008F PR BODY MASS INDEX (BMI) DOCUMENTED: ICD-10-PCS | Mod: CPTII,S$GLB,, | Performed by: NURSE PRACTITIONER

## 2023-03-06 PROCEDURE — 1160F PR REVIEW ALL MEDS BY PRESCRIBER/CLIN PHARMACIST DOCUMENTED: ICD-10-PCS | Mod: CPTII,S$GLB,, | Performed by: NURSE PRACTITIONER

## 2023-03-06 PROCEDURE — 3074F SYST BP LT 130 MM HG: CPT | Mod: CPTII,S$GLB,, | Performed by: NURSE PRACTITIONER

## 2023-03-06 PROCEDURE — 99214 PR OFFICE/OUTPT VISIT, EST, LEVL IV, 30-39 MIN: ICD-10-PCS | Mod: S$GLB,,, | Performed by: NURSE PRACTITIONER

## 2023-03-06 PROCEDURE — 4010F ACE/ARB THERAPY RXD/TAKEN: CPT | Mod: CPTII,S$GLB,, | Performed by: NURSE PRACTITIONER

## 2023-03-06 PROCEDURE — 1159F PR MEDICATION LIST DOCUMENTED IN MEDICAL RECORD: ICD-10-PCS | Mod: CPTII,S$GLB,, | Performed by: NURSE PRACTITIONER

## 2023-03-06 PROCEDURE — 4010F PR ACE/ARB THEARPY RXD/TAKEN: ICD-10-PCS | Mod: CPTII,S$GLB,, | Performed by: NURSE PRACTITIONER

## 2023-03-06 RX ORDER — PHENTERMINE HYDROCHLORIDE 37.5 MG/1
37.5 TABLET ORAL
Qty: 30 TABLET | Refills: 0 | Status: SHIPPED | OUTPATIENT
Start: 2023-03-06 | End: 2023-04-05

## 2023-03-06 NOTE — PROGRESS NOTES
SUBJECTIVE:    Patient ID: Traci Freire is a 62 y.o. female.    Chief Complaint: Weight Check (Went over meds verbally// SW)    62 year old female presents for follow up. Treated for depression, htn, hyperlipidemia, oa, and insomnia and history of breast cancer. Taking meds as prescribed. A2 months ago was started on phentermine. Since starting has lost 14lbs. Feels good. No side effects. Decreased sweets. Cut out soft drinks. However has not lost much since last visit. Due to recently traveling to [facility]. Had a great time. Able to walk without difficulty.       Office Visit on 2022   Component Date Value Ref Range Status    POC Blood, Urine 2022 Negative  Negative Final    POC Bilirubin, Urine 2022 Negative  Negative Final    POC Urobilinogen, Urine 2022 -  0.1 - 1.1 Final    POC Ketones, Urine 2022 Negative  Negative Final    POC Protein, Urine 2022 Negative  Negative Final    POC Nitrates, Urine 2022 Negative  Negative Final    POC Glucose, Urine 2022 Negative  Negative Final    pH, UA 2022 6.0   Final    POC Specific Gravity, Urine 2022 1.030 (A)  1.003 - 1.029 Final    POC Leukocytes, Urine 2022 Negative  Negative Final    Urine Culture, Routine 2022  (A)   Final   Lab Visit on 10/17/2022   Component Date Value Ref Range Status    CRP 10/17/2022 0.5  0.0 - 8.2 mg/L Final    Sed Rate 10/17/2022 9  0 - 36 mm/Hr Final       Past Medical History:   Diagnosis Date    Anxiety     Arthritis     Cancer breast    Right- Bilateral mastectomy- May 2005- had breast reconstruction- mother  of breast cancer    HLD (hyperlipidemia)     Hypertension     diagnosed age late 40's     IBS (irritable bowel syndrome)     Kidney stone     Lyme disease     arthritis of hands. Felt like flu- age early 30's- got it  after going to [place]    Sleep apnea     NO MACHINE     Social History     Socioeconomic History    Marital status:    Tobacco Use     Smoking status: Former     Types: Cigarettes     Quit date: 3/26/2004     Years since quittin.9    Smokeless tobacco: Never    Tobacco comments:     quit 2004- smoked for 10 years- 1 ppd   Substance and Sexual Activity    Alcohol use: No    Drug use: No    Sexual activity: Never     Past Surgical History:   Procedure Laterality Date    ABDOMINAL SURGERY      BLADDER SUSPENSION      BREAST SURGERY       SECTION      CYSTOSCOPY      avoids greens . ? Interstitial cystitis    HYSTERECTOMY      followed by removal of ovaries  from scar tissue    INCISIONAL HERNIA REPAIR Right 2008    RLQ    JOINT REPLACEMENT      Left total knee replacement-[facility] -     KNEE SURGERY Left 2014    TKR    MODIFIED RADICAL MASTECTOMY W/ AXILLARY LYMPH NODE DISSECTION Right 05/10/2005    w/free flap    NISSEN FUNDOPLICATION      ROBOT-ASSISTED LAPAROSCOPIC REPAIR OF VENTRAL HERNIA N/A 2021    Procedure: ROBOTIC REPAIR, HERNIA, VENTRAL;  Surgeon: John Johnson III, MD;  [facility];  Service: General;  Laterality: N/A;    SIMPLE MASTECTOMY Left 05/10/2005    w/free flap    TONSILLECTOMY       Family History   Problem Relation Age of Onset    Cancer Mother          of breast cancer, also had gynecological cancer- had breast cancer that spread to the brain    Heart disease Father         in his 60's-  in his 70's       Review of patient's allergies indicates:   Allergen Reactions    Keflex [cephalexin]     Macrobid [nitrofurantoin monohyd/m-cryst]     Bactrim [sulfamethoxazole-trimethoprim] Nausea And Vomiting       Current Outpatient Medications:     ascorbic acid, vitamin C, (VITAMIN C) 500 MG tablet, Take 500 mg by mouth once daily., Disp: , Rfl:     doxycycline (VIBRA-TABS) 100 MG tablet, Take 1 tablet (100 mg total) by mouth 2 (two) times daily., Disp: 10 tablet, Rfl: 0    ferrous sulfate, dried (SLOW FE) 160 mg (50 mg iron) TbSR, Take 160 mg by mouth  once daily., Disp: , Rfl:     FLUoxetine 20 MG capsule, Take 1 capsule (20 mg total) by mouth once daily., Disp: 90 capsule, Rfl: 1    hydroCHLOROthiazide (HYDRODIURIL) 25 MG tablet, Take 1 tablet (25 mg total) by mouth once daily., Disp: 90 tablet, Rfl: 1    ibuprofen (ADVIL,MOTRIN) 200 MG tablet, Take 200 mg by mouth every 6 (six) hours as needed for Pain., Disp: , Rfl:     irbesartan (AVAPRO) 300 MG tablet, Take 1 tablet (300 mg total) by mouth every evening., Disp: 90 tablet, Rfl: 1    mecobalamin (B12 ACTIVE ORAL), Take 1 tablet by mouth Daily., Disp: , Rfl:     ondansetron (ZOFRAN-ODT) 4 MG TbDL, Take 1 tablet (4 mg total) by mouth every 6 (six) hours as needed., Disp: 12 tablet, Rfl: 0    rosuvastatin (CRESTOR) 20 MG tablet, Take 1 tablet (20 mg total) by mouth every evening., Disp: 90 tablet, Rfl: 1    zolpidem (AMBIEN) 10 mg Tab, Take 1 tablet (10 mg total) by mouth every evening., Disp: 90 tablet, Rfl: 1    phentermine (ADIPEX-P) 37.5 mg tablet, Take 1 tablet (37.5 mg total) by mouth before breakfast., Disp: 30 tablet, Rfl: 0    Current Facility-Administered Medications:     sodium hyaluronate (EUFLEXXA) 10 mg/mL(mw 2.4 -3.6 million) injection 20 mg, 20 mg, Intra-articular, Weekly, Chris Estevez MD, 20 mg at 12/05/22 1703    Review of Systems   Constitutional:  Negative for chills, fever and unexpected weight change.   HENT:  Negative for ear pain, rhinorrhea and sore throat.    Eyes:  Negative for pain and visual disturbance.   Respiratory:  Negative for cough and shortness of breath.    Cardiovascular:  Negative for chest pain, palpitations and leg swelling.   Gastrointestinal:  Negative for abdominal pain, diarrhea, nausea and vomiting.   Genitourinary:  Negative for difficulty urinating, hematuria and vaginal bleeding.   Musculoskeletal:  Negative for arthralgias.   Skin:  Negative for rash.   Neurological:  Negative for dizziness, weakness and headaches.   Psychiatric/Behavioral:  Negative for  "agitation and sleep disturbance. The patient is not nervous/anxious.         Objective:      Vitals:    03/06/23 1247   BP: 100/62   Pulse: 80   Weight: 79.8 kg (176 lb)   Height: 5' 1" (1.549 m)     Physical Exam  Vitals and nursing note reviewed.   Constitutional:       General: She is not in acute distress.     Appearance: Normal appearance. She is well-developed. She is obese.   HENT:      Head: Normocephalic.      Right Ear: External ear normal.      Left Ear: External ear normal.   Neck:      Vascular: No JVD.   Cardiovascular:      Rate and Rhythm: Normal rate and regular rhythm.      Heart sounds: No murmur heard.  Pulmonary:      Effort: Pulmonary effort is normal.      Breath sounds: Normal breath sounds.   Abdominal:      General: Bowel sounds are normal.      Palpations: Abdomen is soft.   Musculoskeletal:         General: No deformity. Normal range of motion.      Cervical back: Normal range of motion and neck supple.   Lymphadenopathy:      Cervical: No cervical adenopathy.   Skin:     General: Skin is warm and dry.      Findings: No rash.   Neurological:      Mental Status: She is alert and oriented to person, place, and time.      Gait: Gait normal.   Psychiatric:         Speech: Speech normal.         Behavior: Behavior normal.         Assessment:       1. Primary hypertension    2. Hyperlipidemia, unspecified hyperlipidemia type    3. Hx of breast cancer    4. FRANCIE (obstructive sleep apnea)    5. H/O bilateral mastectomy    6. Depression, unspecified depression type    7. Insomnia, unspecified type    8. Obesity, unspecified classification, unspecified obesity type, unspecified whether serious comorbidity present         Plan:       Primary hypertension    Hyperlipidemia, unspecified hyperlipidemia type    Hx of breast cancer    FRANCIE (obstructive sleep apnea)    H/O bilateral mastectomy    Depression, unspecified depression type    Insomnia, unspecified type    Obesity, unspecified classification, " unspecified obesity type, unspecified whether serious comorbidity present  Comments:  phentermine per dr. moreira      Follow up in about 6 months (around 9/6/2023), or if symptoms worsen or fail to improve, for medication management.        3/7/2023 Juliet Medina

## 2023-03-07 ENCOUNTER — TELEPHONE (OUTPATIENT)
Dept: FAMILY MEDICINE | Facility: CLINIC | Age: 63
End: 2023-03-07

## 2023-03-07 LAB
ALBUMIN SERPL-MCNC: 3.9 G/DL (ref 3.6–5.1)
ALBUMIN/GLOB SERPL: 1.6 (CALC) (ref 1–2.5)
ALP SERPL-CCNC: 66 U/L (ref 37–153)
ALT SERPL-CCNC: 13 U/L (ref 6–29)
AST SERPL-CCNC: 15 U/L (ref 10–35)
BASOPHILS # BLD AUTO: 43 CELLS/UL (ref 0–200)
BASOPHILS NFR BLD AUTO: 0.6 %
BILIRUB SERPL-MCNC: 0.5 MG/DL (ref 0.2–1.2)
BUN SERPL-MCNC: 23 MG/DL (ref 7–25)
BUN/CREAT SERPL: NORMAL (CALC) (ref 6–22)
CALCIUM SERPL-MCNC: 9.2 MG/DL (ref 8.6–10.4)
CHLORIDE SERPL-SCNC: 108 MMOL/L (ref 98–110)
CHOLEST SERPL-MCNC: 147 MG/DL
CHOLEST/HDLC SERPL: 3.1 (CALC)
CO2 SERPL-SCNC: 24 MMOL/L (ref 20–32)
CREAT SERPL-MCNC: 0.76 MG/DL (ref 0.5–1.05)
EGFR: 89 ML/MIN/1.73M2
EOSINOPHIL # BLD AUTO: 461 CELLS/UL (ref 15–500)
EOSINOPHIL NFR BLD AUTO: 6.4 %
ERYTHROCYTE [DISTWIDTH] IN BLOOD BY AUTOMATED COUNT: 13.1 % (ref 11–15)
GLOBULIN SER CALC-MCNC: 2.4 G/DL (CALC) (ref 1.9–3.7)
GLUCOSE SERPL-MCNC: 88 MG/DL (ref 65–99)
HCT VFR BLD AUTO: 34.1 % (ref 35–45)
HDLC SERPL-MCNC: 48 MG/DL
HGB BLD-MCNC: 11.6 G/DL (ref 11.7–15.5)
LDLC SERPL CALC-MCNC: 79 MG/DL (CALC)
LYMPHOCYTES # BLD AUTO: 2678 CELLS/UL (ref 850–3900)
LYMPHOCYTES NFR BLD AUTO: 37.2 %
MCH RBC QN AUTO: 30.9 PG (ref 27–33)
MCHC RBC AUTO-ENTMCNC: 34 G/DL (ref 32–36)
MCV RBC AUTO: 90.9 FL (ref 80–100)
MONOCYTES # BLD AUTO: 475 CELLS/UL (ref 200–950)
MONOCYTES NFR BLD AUTO: 6.6 %
NEUTROPHILS # BLD AUTO: 3542 CELLS/UL (ref 1500–7800)
NEUTROPHILS NFR BLD AUTO: 49.2 %
NONHDLC SERPL-MCNC: 99 MG/DL (CALC)
PLATELET # BLD AUTO: 266 THOUSAND/UL (ref 140–400)
PMV BLD REES-ECKER: 10.2 FL (ref 7.5–12.5)
POTASSIUM SERPL-SCNC: 4.1 MMOL/L (ref 3.5–5.3)
PROT SERPL-MCNC: 6.3 G/DL (ref 6.1–8.1)
RBC # BLD AUTO: 3.75 MILLION/UL (ref 3.8–5.1)
SODIUM SERPL-SCNC: 143 MMOL/L (ref 135–146)
TRIGL SERPL-MCNC: 115 MG/DL
TSH SERPL-ACNC: 2.05 MIU/L (ref 0.4–4.5)
WBC # BLD AUTO: 7.2 THOUSAND/UL (ref 3.8–10.8)

## 2023-03-07 NOTE — TELEPHONE ENCOUNTER
----- Message from Juliet Medina NP sent at 3/7/2023 12:13 AM CST -----  Can you set up phentermine 37.5mg for dr. Meraz to call in

## 2023-05-17 DIAGNOSIS — R60.9 EDEMA, UNSPECIFIED TYPE: ICD-10-CM

## 2023-05-17 DIAGNOSIS — F41.9 ANXIETY: ICD-10-CM

## 2023-05-17 RX ORDER — HYDROCHLOROTHIAZIDE 25 MG/1
25 TABLET ORAL DAILY
Qty: 90 TABLET | Refills: 1 | Status: SHIPPED | OUTPATIENT
Start: 2023-05-17 | End: 2024-01-26 | Stop reason: SDUPTHER

## 2023-05-17 RX ORDER — ZOLPIDEM TARTRATE 10 MG/1
10 TABLET ORAL NIGHTLY
Qty: 90 TABLET | Refills: 1 | Status: SHIPPED | OUTPATIENT
Start: 2023-05-17 | End: 2023-11-27 | Stop reason: SDUPTHER

## 2023-06-15 DIAGNOSIS — I10 ESSENTIAL HYPERTENSION: ICD-10-CM

## 2023-06-15 RX ORDER — IRBESARTAN 300 MG/1
300 TABLET ORAL NIGHTLY
Qty: 90 TABLET | Refills: 3 | Status: SHIPPED | OUTPATIENT
Start: 2023-06-15

## 2023-07-09 DIAGNOSIS — F41.9 ANXIETY: ICD-10-CM

## 2023-07-10 RX ORDER — FLUOXETINE HYDROCHLORIDE 20 MG/1
20 CAPSULE ORAL DAILY
Qty: 90 CAPSULE | Refills: 1 | Status: SHIPPED | OUTPATIENT
Start: 2023-07-10 | End: 2024-02-09 | Stop reason: SDUPTHER

## 2023-07-10 NOTE — TELEPHONE ENCOUNTER
Pt is needing a refill on her fluoxetine. Last office visit 03/06/2023. Next office visit 09/12/2023.

## 2023-07-25 DIAGNOSIS — E78.5 HYPERLIPIDEMIA, UNSPECIFIED HYPERLIPIDEMIA TYPE: ICD-10-CM

## 2023-07-26 RX ORDER — ROSUVASTATIN CALCIUM 20 MG/1
20 TABLET, COATED ORAL NIGHTLY
Qty: 90 TABLET | Refills: 1 | Status: SHIPPED | OUTPATIENT
Start: 2023-07-26 | End: 2024-03-11 | Stop reason: SDUPTHER

## 2023-08-01 ENCOUNTER — PATIENT MESSAGE (OUTPATIENT)
Dept: FAMILY MEDICINE | Facility: CLINIC | Age: 63
End: 2023-08-01

## 2023-08-01 NOTE — TELEPHONE ENCOUNTER
Spoke with patient and offered an appt for today - states she cannot because she has her grandkids. Scheduled for Thursday.

## 2023-08-03 ENCOUNTER — OFFICE VISIT (OUTPATIENT)
Dept: FAMILY MEDICINE | Facility: CLINIC | Age: 63
End: 2023-08-03
Payer: COMMERCIAL

## 2023-08-03 VITALS
SYSTOLIC BLOOD PRESSURE: 110 MMHG | HEART RATE: 70 BPM | HEIGHT: 61 IN | DIASTOLIC BLOOD PRESSURE: 76 MMHG | BODY MASS INDEX: 33.23 KG/M2 | WEIGHT: 176 LBS

## 2023-08-03 DIAGNOSIS — F41.9 ANXIETY: ICD-10-CM

## 2023-08-03 DIAGNOSIS — M54.40 CHRONIC MIDLINE LOW BACK PAIN WITH SCIATICA, SCIATICA LATERALITY UNSPECIFIED: Primary | ICD-10-CM

## 2023-08-03 DIAGNOSIS — E78.5 HYPERLIPIDEMIA, UNSPECIFIED HYPERLIPIDEMIA TYPE: ICD-10-CM

## 2023-08-03 DIAGNOSIS — G47.00 INSOMNIA, UNSPECIFIED TYPE: ICD-10-CM

## 2023-08-03 DIAGNOSIS — K21.9 GASTROESOPHAGEAL REFLUX DISEASE, UNSPECIFIED WHETHER ESOPHAGITIS PRESENT: ICD-10-CM

## 2023-08-03 DIAGNOSIS — G89.29 CHRONIC MIDLINE LOW BACK PAIN WITH SCIATICA, SCIATICA LATERALITY UNSPECIFIED: Primary | ICD-10-CM

## 2023-08-03 DIAGNOSIS — I10 PRIMARY HYPERTENSION: ICD-10-CM

## 2023-08-03 PROCEDURE — 3074F SYST BP LT 130 MM HG: CPT | Mod: CPTII,S$GLB,, | Performed by: NURSE PRACTITIONER

## 2023-08-03 PROCEDURE — 99214 OFFICE O/P EST MOD 30 MIN: CPT | Mod: 25,S$GLB,, | Performed by: NURSE PRACTITIONER

## 2023-08-03 PROCEDURE — 99214 PR OFFICE/OUTPT VISIT, EST, LEVL IV, 30-39 MIN: ICD-10-PCS | Mod: 25,S$GLB,, | Performed by: NURSE PRACTITIONER

## 2023-08-03 PROCEDURE — 1160F RVW MEDS BY RX/DR IN RCRD: CPT | Mod: CPTII,S$GLB,, | Performed by: NURSE PRACTITIONER

## 2023-08-03 PROCEDURE — 96372 THER/PROPH/DIAG INJ SC/IM: CPT | Mod: S$GLB,,, | Performed by: NURSE PRACTITIONER

## 2023-08-03 PROCEDURE — 3078F DIAST BP <80 MM HG: CPT | Mod: CPTII,S$GLB,, | Performed by: NURSE PRACTITIONER

## 2023-08-03 PROCEDURE — 3008F BODY MASS INDEX DOCD: CPT | Mod: CPTII,S$GLB,, | Performed by: NURSE PRACTITIONER

## 2023-08-03 PROCEDURE — 3008F PR BODY MASS INDEX (BMI) DOCUMENTED: ICD-10-PCS | Mod: CPTII,S$GLB,, | Performed by: NURSE PRACTITIONER

## 2023-08-03 PROCEDURE — 3074F PR MOST RECENT SYSTOLIC BLOOD PRESSURE < 130 MM HG: ICD-10-PCS | Mod: CPTII,S$GLB,, | Performed by: NURSE PRACTITIONER

## 2023-08-03 PROCEDURE — 4010F PR ACE/ARB THEARPY RXD/TAKEN: ICD-10-PCS | Mod: CPTII,S$GLB,, | Performed by: NURSE PRACTITIONER

## 2023-08-03 PROCEDURE — 1160F PR REVIEW ALL MEDS BY PRESCRIBER/CLIN PHARMACIST DOCUMENTED: ICD-10-PCS | Mod: CPTII,S$GLB,, | Performed by: NURSE PRACTITIONER

## 2023-08-03 PROCEDURE — 96372 PR INJECTION,THERAP/PROPH/DIAG2ST, IM OR SUBCUT: ICD-10-PCS | Mod: S$GLB,,, | Performed by: NURSE PRACTITIONER

## 2023-08-03 PROCEDURE — 4010F ACE/ARB THERAPY RXD/TAKEN: CPT | Mod: CPTII,S$GLB,, | Performed by: NURSE PRACTITIONER

## 2023-08-03 PROCEDURE — 3078F PR MOST RECENT DIASTOLIC BLOOD PRESSURE < 80 MM HG: ICD-10-PCS | Mod: CPTII,S$GLB,, | Performed by: NURSE PRACTITIONER

## 2023-08-03 PROCEDURE — 1159F PR MEDICATION LIST DOCUMENTED IN MEDICAL RECORD: ICD-10-PCS | Mod: CPTII,S$GLB,, | Performed by: NURSE PRACTITIONER

## 2023-08-03 PROCEDURE — 1159F MED LIST DOCD IN RCRD: CPT | Mod: CPTII,S$GLB,, | Performed by: NURSE PRACTITIONER

## 2023-08-03 RX ORDER — KETOROLAC TROMETHAMINE 30 MG/ML
30 INJECTION, SOLUTION INTRAMUSCULAR; INTRAVENOUS
Status: COMPLETED | OUTPATIENT
Start: 2023-08-03 | End: 2023-08-03

## 2023-08-03 RX ORDER — METHYLPREDNISOLONE 4 MG/1
TABLET ORAL
Qty: 21 EACH | Refills: 0 | Status: SHIPPED | OUTPATIENT
Start: 2023-08-03 | End: 2023-08-09

## 2023-08-03 RX ORDER — TIZANIDINE 4 MG/1
4 TABLET ORAL EVERY 8 HOURS
Qty: 30 TABLET | Refills: 0 | Status: SHIPPED | OUTPATIENT
Start: 2023-08-03 | End: 2023-08-09

## 2023-08-03 RX ADMIN — KETOROLAC TROMETHAMINE 30 MG: 30 INJECTION, SOLUTION INTRAMUSCULAR; INTRAVENOUS at 02:08

## 2023-08-03 NOTE — PROGRESS NOTES
SUBJECTIVE:    Patient ID: Traci Freire is a 62 y.o. female.    Chief Complaint: Leg Swelling (Bilateral leg swelling for 2 weeks//no med bottles//tc)    62 year old female presents for urgent visit. Treated for depression, htn, hyperlipidemia, oa, and insomnia and history of breast cancer. Has been having increasing back pain that radiates to lower extremity. Worse after being on feet all day. Denies injury or trauma. No loss of bowel or bladder function. Has not taken anything. Also has noticed swelling at the end of the day        No visits with results within 6 Month(s) from this visit.   Latest known visit with results is:   Telephone on 01/27/2023   Component Date Value Ref Range Status    Cholesterol 03/06/2023 147  <200 mg/dL Final    HDL 03/06/2023 48 (L)  > OR = 50 mg/dL Final    Triglycerides 03/06/2023 115  <150 mg/dL Final    LDL Cholesterol 03/06/2023 79  mg/dL (calc) Final    HDL/Cholesterol Ratio 03/06/2023 3.1  <5.0 (calc) Final    Non HDL Chol. (LDL+VLDL) 03/06/2023 99  <130 mg/dL (calc) Final    TSH w/reflex to FT4 03/06/2023 2.05  0.40 - 4.50 mIU/L Final    Glucose 03/06/2023 88  65 - 99 mg/dL Final    BUN 03/06/2023 23  7 - 25 mg/dL Final    Creatinine 03/06/2023 0.76  0.50 - 1.05 mg/dL Final    eGFR 03/06/2023 89  > OR = 60 mL/min/1.73m2 Final    BUN/Creatinine Ratio 03/06/2023 NOT APPLICABLE  6 - 22 (calc) Final    Sodium 03/06/2023 143  135 - 146 mmol/L Final    Potassium 03/06/2023 4.1  3.5 - 5.3 mmol/L Final    Chloride 03/06/2023 108  98 - 110 mmol/L Final    CO2 03/06/2023 24  20 - 32 mmol/L Final    Calcium 03/06/2023 9.2  8.6 - 10.4 mg/dL Final    Total Protein 03/06/2023 6.3  6.1 - 8.1 g/dL Final    Albumin 03/06/2023 3.9  3.6 - 5.1 g/dL Final    Globulin, Total 03/06/2023 2.4  1.9 - 3.7 g/dL (calc) Final    Albumin/Globulin Ratio 03/06/2023 1.6  1.0 - 2.5 (calc) Final    Total Bilirubin 03/06/2023 0.5  0.2 - 1.2 mg/dL Final    Alkaline Phosphatase 03/06/2023 66  37 - 153 U/L Final     AST 2023 15  10 - 35 U/L Final    ALT 2023 13  6 - 29 U/L Final    WBC 2023 7.2  3.8 - 10.8 Thousand/uL Final    RBC 2023 3.75 (L)  3.80 - 5.10 Million/uL Final    Hemoglobin 2023 11.6 (L)  11.7 - 15.5 g/dL Final    Hematocrit 2023 34.1 (L)  35.0 - 45.0 % Final    MCV 2023 90.9  80.0 - 100.0 fL Final    MCH 2023 30.9  27.0 - 33.0 pg Final    MCHC 2023 34.0  32.0 - 36.0 g/dL Final    RDW 2023 13.1  11.0 - 15.0 % Final    Platelets 2023 266  140 - 400 Thousand/uL Final    MPV 2023 10.2  7.5 - 12.5 fL Final    Neutrophils, Abs 2023 3,542  1,500 - 7,800 cells/uL Final    Lymph # 2023 2,678  850 - 3,900 cells/uL Final    Mono # 2023 475  200 - 950 cells/uL Final    Eos # 2023 461  15 - 500 cells/uL Final    Baso # 2023 43  0 - 200 cells/uL Final    Neutrophils Relative 2023 49.2  % Final    Lymph % 2023 37.2  % Final    Mono % 2023 6.6  % Final    Eosinophil % 2023 6.4  % Final    Basophil % 2023 0.6  % Final       Past Medical History:   Diagnosis Date    Anxiety     Arthritis     Cancer breast    Right- Bilateral mastectomy- May 2005- had breast reconstruction- mother  of breast cancer    HLD (hyperlipidemia)     Hypertension     diagnosed age late 40's     IBS (irritable bowel syndrome)     Kidney stone     Lyme disease     arthritis of hands. Felt like flu- age early 30's- got it  after going to [place]    Sleep apnea     NO MACHINE     Social History     Socioeconomic History    Marital status:    Tobacco Use    Smoking status: Former     Current packs/day: 0.00     Types: Cigarettes     Quit date: 3/26/2004     Years since quittin.3    Smokeless tobacco: Never    Tobacco comments:     quit 2004- smoked for 10 years- 1 ppd   Substance and Sexual Activity    Alcohol use: No    Drug use: No    Sexual activity: Never     Social Determinants of Health     Stress: No  Stress Concern Present (2019)    [employer] [employer] - Occupational Stress Questionnaire     Feeling of Stress : Not at all     Past Surgical History:   Procedure Laterality Date    ABDOMINAL SURGERY      BLADDER SUSPENSION      BREAST SURGERY       SECTION      CYSTOSCOPY      avoids greens . ? Interstitial cystitis    ESOPHAGOGASTRODUODENOSCOPY N/A 8/10/2023    Procedure: EGD (ESOPHAGOGASTRODUODENOSCOPY);  Surgeon: Gerard Hernandez MD;  Location: [facility];  Service: Endoscopy;  Laterality: N/A;    HYSTERECTOMY      followed by removal of ovaries  from scar tissue    INCISIONAL HERNIA REPAIR Right 2008    RLQ    JOINT REPLACEMENT      Left total knee replacement-[facility] -     KNEE SURGERY Left     TKR    MODIFIED RADICAL MASTECTOMY W/ AXILLARY LYMPH NODE DISSECTION Right 05/10/2005    w/free flap    NISSEN FUNDOPLICATION      ROBOT-ASSISTED LAPAROSCOPIC REPAIR OF VENTRAL HERNIA N/A 2021    Procedure: ROBOTIC REPAIR, HERNIA, VENTRAL;  Surgeon: John Johnson III, MD;  Location: [facility];  Service: General;  Laterality: N/A;    SIMPLE MASTECTOMY Left 05/10/2005    w/free flap    TONSILLECTOMY       Family History   Problem Relation Age of Onset    Cancer Mother          of breast cancer, also had gynecological cancer- had breast cancer that spread to the brain    Heart disease Father         in his 60's-  in his 70's       Review of patient's allergies indicates:   Allergen Reactions    Keflex [cephalexin]     Macrobid [nitrofurantoin monohyd/m-cryst]     Bactrim [sulfamethoxazole-trimethoprim] Nausea And Vomiting       Current Outpatient Medications:     FLUoxetine 20 MG capsule, Take 1 capsule (20 mg total) by mouth once daily., Disp: 90 capsule, Rfl: 1    hydroCHLOROthiazide (HYDRODIURIL) 25 MG tablet, Take 1 tablet (25 mg total) by mouth once daily., Disp: 90 tablet, Rfl: 1    irbesartan (AVAPRO) 300 MG tablet,  "Take 1 tablet (300 mg total) by mouth every evening., Disp: 90 tablet, Rfl: 3    mecobalamin (B12 ACTIVE ORAL), Take 1 tablet by mouth Daily., Disp: , Rfl:     pantoprazole (PROTONIX) 40 MG tablet, Take 1 tablet (40 mg total) by mouth 2 (two) times daily., Disp: 180 tablet, Rfl: 0    rosuvastatin (CRESTOR) 20 MG tablet, Take 1 tablet (20 mg total) by mouth every evening., Disp: 90 tablet, Rfl: 1    traMADoL (ULTRAM) 50 mg tablet, Take 1 tablet (50 mg total) by mouth every 12 (twelve) hours as needed for Pain., Disp: 6 tablet, Rfl: 0    zolpidem (AMBIEN) 10 mg Tab, Take 1 tablet (10 mg total) by mouth every evening., Disp: 90 tablet, Rfl: 1    Current Facility-Administered Medications:     sodium hyaluronate (EUFLEXXA) 10 mg/mL(mw 2.4 -3.6 million) injection 20 mg, 20 mg, Intra-articular, Weekly, Chris Estevez MD, 20 mg at 12/05/22 1703    Review of Systems   Constitutional:  Negative for activity change, chills and fever.   Respiratory:  Negative for cough and shortness of breath.    Cardiovascular:  Negative for chest pain.   Gastrointestinal:  Negative for abdominal pain, diarrhea, nausea and vomiting.   Genitourinary:  Negative for difficulty urinating and flank pain.   Musculoskeletal:  Positive for back pain. Negative for gait problem.   Neurological:  Negative for dizziness and weakness.          Objective:      Vitals:    08/03/23 1153   BP: 110/76   Pulse: 70   Weight: 79.8 kg (176 lb)   Height: 5' 1" (1.549 m)     Physical Exam  Vitals and nursing note reviewed.   Constitutional:       General: She is not in acute distress.     Appearance: Normal appearance. She is well-developed.   Cardiovascular:      Rate and Rhythm: Normal rate and regular rhythm.      Heart sounds: No murmur heard.     No friction rub. No gallop.   Pulmonary:      Breath sounds: Normal breath sounds. No wheezing or rales.   Musculoskeletal:      Lumbar back: No spasms, tenderness or bony tenderness. Decreased range of motion. " Positive right straight leg raise test and positive left straight leg raise test.   Skin:     Findings: No rash.   Neurological:      Mental Status: She is alert and oriented to person, place, and time.      Sensory: No sensory deficit.      Gait: Gait normal.           Assessment:       1. Chronic midline low back pain with sciatica, sciatica laterality unspecified    2. Primary hypertension    3. Anxiety    4. Hyperlipidemia, unspecified hyperlipidemia type    5. Gastroesophageal reflux disease, unspecified whether esophagitis present    6. Insomnia, unspecified type         Plan:       Chronic midline low back pain with sciatica, sciatica laterality unspecified  Comments:  xray  Orders:  -     X-Ray Lumbar Spine Ap And Lateral; Future    Primary hypertension  Comments:  bp is well controlled    Anxiety    Hyperlipidemia, unspecified hyperlipidemia type  Comments:  crestor daily    Gastroesophageal reflux disease, unspecified whether esophagitis present  Comments:  protonix daily    Insomnia, unspecified type  Comments:  ambien daily    Other orders  -     Discontinue: methylPREDNISolone (MEDROL DOSEPACK) 4 mg tablet; use as directed (Patient taking differently: Take 4 mg by mouth once daily. use as directed)  Dispense: 21 each; Refill: 0  -     Discontinue: tiZANidine (ZANAFLEX) 4 MG tablet; Take 1 tablet (4 mg total) by mouth every 8 (eight) hours. for 10 days  Dispense: 30 tablet; Refill: 0  -     ketorolac injection 30 mg      Follow up in about 3 months (around 11/3/2023), or if symptoms worsen or fail to improve.        8/14/2023 Juliet Medina

## 2023-08-04 ENCOUNTER — HOSPITAL ENCOUNTER (OUTPATIENT)
Dept: RADIOLOGY | Facility: HOSPITAL | Age: 63
Discharge: HOME | End: 2023-08-04
Attending: NURSE PRACTITIONER
Payer: COMMERCIAL

## 2023-08-04 DIAGNOSIS — M54.40 CHRONIC MIDLINE LOW BACK PAIN WITH SCIATICA, SCIATICA LATERALITY UNSPECIFIED: ICD-10-CM

## 2023-08-04 DIAGNOSIS — G89.29 CHRONIC MIDLINE LOW BACK PAIN WITH SCIATICA, SCIATICA LATERALITY UNSPECIFIED: ICD-10-CM

## 2023-08-04 PROCEDURE — 72100 X-RAY EXAM L-S SPINE 2/3 VWS: CPT | Mod: TC

## 2023-08-09 ENCOUNTER — HOSPITAL ENCOUNTER (OUTPATIENT)
Facility: HOSPITAL | Age: 63
Discharge: HOME | End: 2023-08-11
Attending: EMERGENCY MEDICINE | Admitting: STUDENT IN AN ORGANIZED HEALTH CARE EDUCATION/TRAINING PROGRAM
Payer: COMMERCIAL

## 2023-08-09 ENCOUNTER — PATIENT MESSAGE (OUTPATIENT)
Dept: FAMILY MEDICINE | Facility: CLINIC | Age: 63
End: 2023-08-09

## 2023-08-09 DIAGNOSIS — M54.40 CHRONIC MIDLINE LOW BACK PAIN WITH SCIATICA, SCIATICA LATERALITY UNSPECIFIED: Primary | ICD-10-CM

## 2023-08-09 DIAGNOSIS — G89.29 CHRONIC MIDLINE LOW BACK PAIN WITH SCIATICA, SCIATICA LATERALITY UNSPECIFIED: Primary | ICD-10-CM

## 2023-08-09 DIAGNOSIS — K92.2 GASTROINTESTINAL HEMORRHAGE, UNSPECIFIED GASTROINTESTINAL HEMORRHAGE TYPE: Primary | ICD-10-CM

## 2023-08-09 DIAGNOSIS — R11.2 NAUSEA AND VOMITING: ICD-10-CM

## 2023-08-09 PROBLEM — K52.9 GASTROENTERITIS: Status: ACTIVE | Noted: 2023-08-09

## 2023-08-09 LAB
ABO + RH BLD: NORMAL
ALBUMIN SERPL BCP-MCNC: 4.5 G/DL (ref 3.5–5.2)
ALP SERPL-CCNC: 76 U/L (ref 55–135)
ALT SERPL W/O P-5'-P-CCNC: 23 U/L (ref 10–44)
AMPHET+METHAMPHET UR QL: NEGATIVE
ANION GAP SERPL CALC-SCNC: 10 MMOL/L (ref 8–16)
APTT PPP: 21.5 SEC (ref 21–32)
AST SERPL-CCNC: 21 U/L (ref 10–40)
BACTERIA #/AREA URNS HPF: NEGATIVE /HPF
BARBITURATES UR QL SCN>200 NG/ML: NEGATIVE
BASOPHILS # BLD AUTO: 0.04 K/UL (ref 0–0.2)
BASOPHILS NFR BLD: 0.3 % (ref 0–1.9)
BENZODIAZ UR QL SCN>200 NG/ML: NEGATIVE
BILIRUB SERPL-MCNC: 0.8 MG/DL (ref 0.1–1)
BILIRUB UR QL STRIP: NEGATIVE
BILIRUB UR QL STRIP: NEGATIVE
BLD GP AB SCN CELLS X3 SERPL QL: NORMAL
BNP SERPL-MCNC: 17 PG/ML (ref 0–99)
BUN SERPL-MCNC: 38 MG/DL (ref 8–23)
BZE UR QL SCN: NEGATIVE
CALCIUM SERPL-MCNC: 9.8 MG/DL (ref 8.7–10.5)
CANNABINOIDS UR QL SCN: NEGATIVE
CHLORIDE SERPL-SCNC: 108 MMOL/L (ref 95–110)
CK SERPL-CCNC: 75 U/L (ref 20–180)
CLARITY UR: CLEAR
CLARITY UR: CLEAR
CO2 SERPL-SCNC: 24 MMOL/L (ref 23–29)
COLOR UR: YELLOW
COLOR UR: YELLOW
CREAT SERPL-MCNC: 0.7 MG/DL (ref 0.5–1.4)
CREAT SERPL-MCNC: 0.7 MG/DL (ref 0.5–1.4)
CREAT UR-MCNC: 135 MG/DL (ref 15–325)
DIFFERENTIAL METHOD: ABNORMAL
EOSINOPHIL # BLD AUTO: 0 K/UL (ref 0–0.5)
EOSINOPHIL NFR BLD: 0.2 % (ref 0–8)
ERYTHROCYTE [DISTWIDTH] IN BLOOD BY AUTOMATED COUNT: 13.2 % (ref 11.5–14.5)
EST. GFR  (NO RACE VARIABLE): >60 ML/MIN/1.73 M^2
GLUCOSE SERPL-MCNC: 130 MG/DL (ref 70–110)
GLUCOSE UR QL STRIP: NEGATIVE
GLUCOSE UR QL STRIP: NEGATIVE
HCT VFR BLD AUTO: 45 % (ref 37–48.5)
HGB BLD-MCNC: 14.7 G/DL (ref 12–16)
HGB UR QL STRIP: ABNORMAL
HGB UR QL STRIP: ABNORMAL
HYALINE CASTS #/AREA URNS LPF: 4 /LPF
IMM GRANULOCYTES # BLD AUTO: 0.09 K/UL (ref 0–0.04)
IMM GRANULOCYTES NFR BLD AUTO: 0.6 % (ref 0–0.5)
INR PPP: 0.9 (ref 0.8–1.2)
KETONES UR QL STRIP: NEGATIVE
KETONES UR QL STRIP: NEGATIVE
LEUKOCYTE ESTERASE UR QL STRIP: ABNORMAL
LEUKOCYTE ESTERASE UR QL STRIP: ABNORMAL
LIPASE SERPL-CCNC: 32 U/L (ref 4–60)
LYMPHOCYTES # BLD AUTO: 1.1 K/UL (ref 1–4.8)
LYMPHOCYTES NFR BLD: 6.7 % (ref 18–48)
MAGNESIUM SERPL-MCNC: 2.1 MG/DL (ref 1.6–2.6)
MCH RBC QN AUTO: 30 PG (ref 27–31)
MCHC RBC AUTO-ENTMCNC: 32.7 G/DL (ref 32–36)
MCV RBC AUTO: 92 FL (ref 82–98)
MICROSCOPIC COMMENT: ABNORMAL
MONOCYTES # BLD AUTO: 0.8 K/UL (ref 0.3–1)
MONOCYTES NFR BLD: 4.9 % (ref 4–15)
NEUTROPHILS # BLD AUTO: 13.6 K/UL (ref 1.8–7.7)
NEUTROPHILS NFR BLD: 87.3 % (ref 38–73)
NITRITE UR QL STRIP: NEGATIVE
NITRITE UR QL STRIP: NEGATIVE
NRBC BLD-RTO: 0 /100 WBC
OB PNL STL: POSITIVE
OPIATES UR QL SCN: NEGATIVE
PCP UR QL SCN>25 NG/ML: NEGATIVE
PH UR STRIP: 6 [PH] (ref 5–8)
PH UR STRIP: 6 [PH] (ref 5–8)
PLATELET # BLD AUTO: 318 K/UL (ref 150–450)
PMV BLD AUTO: 9.4 FL (ref 9.2–12.9)
POTASSIUM SERPL-SCNC: 3.8 MMOL/L (ref 3.5–5.1)
PROT SERPL-MCNC: 8.4 G/DL (ref 6–8.4)
PROT UR QL STRIP: ABNORMAL
PROT UR QL STRIP: ABNORMAL
PROTHROMBIN TIME: 10.6 SEC (ref 9–12.5)
RBC # BLD AUTO: 4.9 M/UL (ref 4–5.4)
RBC #/AREA URNS HPF: 3 /HPF (ref 0–4)
SAMPLE: NORMAL
SARS-COV-2 RDRP RESP QL NAA+PROBE: NEGATIVE
SODIUM SERPL-SCNC: 142 MMOL/L (ref 136–145)
SP GR UR STRIP: >1.03 (ref 1–1.03)
SP GR UR STRIP: >1.03 (ref 1–1.03)
SPECIMEN OUTDATE: NORMAL
SQUAMOUS #/AREA URNS HPF: 2 /HPF
TOXICOLOGY INFORMATION: NORMAL
TROPONIN I SERPL HS-MCNC: 10.1 PG/ML (ref 0–14.9)
TROPONIN I SERPL HS-MCNC: 7.9 PG/ML (ref 0–14.9)
TSH SERPL DL<=0.005 MIU/L-ACNC: 2.6 UIU/ML (ref 0.34–5.6)
URN SPEC COLLECT METH UR: ABNORMAL
URN SPEC COLLECT METH UR: ABNORMAL
UROBILINOGEN UR STRIP-ACNC: NEGATIVE EU/DL
UROBILINOGEN UR STRIP-ACNC: NEGATIVE EU/DL
WBC # BLD AUTO: 15.61 K/UL (ref 3.9–12.7)
WBC #/AREA URNS HPF: 9 /HPF (ref 0–5)

## 2023-08-09 PROCEDURE — 63600175 PHARM REV CODE 636 W HCPCS: Performed by: EMERGENCY MEDICINE

## 2023-08-09 PROCEDURE — 96376 TX/PRO/DX INJ SAME DRUG ADON: CPT

## 2023-08-09 PROCEDURE — 25000003 PHARM REV CODE 250: Performed by: STUDENT IN AN ORGANIZED HEALTH CARE EDUCATION/TRAINING PROGRAM

## 2023-08-09 PROCEDURE — 80053 COMPREHEN METABOLIC PANEL: CPT | Performed by: EMERGENCY MEDICINE

## 2023-08-09 PROCEDURE — 83735 ASSAY OF MAGNESIUM: CPT | Performed by: EMERGENCY MEDICINE

## 2023-08-09 PROCEDURE — G0378 HOSPITAL OBSERVATION PER HR: HCPCS

## 2023-08-09 PROCEDURE — 93005 ELECTROCARDIOGRAM TRACING: CPT | Performed by: INTERNAL MEDICINE

## 2023-08-09 PROCEDURE — 85730 THROMBOPLASTIN TIME PARTIAL: CPT | Performed by: EMERGENCY MEDICINE

## 2023-08-09 PROCEDURE — 85610 PROTHROMBIN TIME: CPT | Performed by: EMERGENCY MEDICINE

## 2023-08-09 PROCEDURE — 84443 ASSAY THYROID STIM HORMONE: CPT | Performed by: EMERGENCY MEDICINE

## 2023-08-09 PROCEDURE — 85025 COMPLETE CBC W/AUTO DIFF WBC: CPT | Performed by: EMERGENCY MEDICINE

## 2023-08-09 PROCEDURE — 94761 N-INVAS EAR/PLS OXIMETRY MLT: CPT

## 2023-08-09 PROCEDURE — 82550 ASSAY OF CK (CPK): CPT | Performed by: EMERGENCY MEDICINE

## 2023-08-09 PROCEDURE — 83880 ASSAY OF NATRIURETIC PEPTIDE: CPT | Performed by: EMERGENCY MEDICINE

## 2023-08-09 PROCEDURE — 25000003 PHARM REV CODE 250: Performed by: EMERGENCY MEDICINE

## 2023-08-09 PROCEDURE — 93010 EKG 12-LEAD: ICD-10-PCS | Mod: ,,, | Performed by: INTERNAL MEDICINE

## 2023-08-09 PROCEDURE — 82272 OCCULT BLD FECES 1-3 TESTS: CPT | Performed by: EMERGENCY MEDICINE

## 2023-08-09 PROCEDURE — C9113 INJ PANTOPRAZOLE SODIUM, VIA: HCPCS | Performed by: EMERGENCY MEDICINE

## 2023-08-09 PROCEDURE — 96375 TX/PRO/DX INJ NEW DRUG ADDON: CPT

## 2023-08-09 PROCEDURE — 80307 DRUG TEST PRSMV CHEM ANLYZR: CPT | Performed by: EMERGENCY MEDICINE

## 2023-08-09 PROCEDURE — 81001 URINALYSIS AUTO W/SCOPE: CPT | Mod: 59 | Performed by: EMERGENCY MEDICINE

## 2023-08-09 PROCEDURE — 93010 ELECTROCARDIOGRAM REPORT: CPT | Mod: ,,, | Performed by: INTERNAL MEDICINE

## 2023-08-09 PROCEDURE — 86900 BLOOD TYPING SEROLOGIC ABO: CPT | Performed by: EMERGENCY MEDICINE

## 2023-08-09 PROCEDURE — 99285 EMERGENCY DEPT VISIT HI MDM: CPT | Mod: 25

## 2023-08-09 PROCEDURE — U0002 COVID-19 LAB TEST NON-CDC: HCPCS | Performed by: EMERGENCY MEDICINE

## 2023-08-09 PROCEDURE — 84484 ASSAY OF TROPONIN QUANT: CPT | Mod: 91 | Performed by: EMERGENCY MEDICINE

## 2023-08-09 PROCEDURE — 83690 ASSAY OF LIPASE: CPT | Performed by: EMERGENCY MEDICINE

## 2023-08-09 PROCEDURE — 96361 HYDRATE IV INFUSION ADD-ON: CPT

## 2023-08-09 PROCEDURE — 25500020 PHARM REV CODE 255: Performed by: EMERGENCY MEDICINE

## 2023-08-09 RX ORDER — PANTOPRAZOLE SODIUM 40 MG/10ML
40 INJECTION, POWDER, LYOPHILIZED, FOR SOLUTION INTRAVENOUS DAILY
Status: DISCONTINUED | OUTPATIENT
Start: 2023-08-10 | End: 2023-08-09

## 2023-08-09 RX ORDER — ZOLPIDEM TARTRATE 5 MG/1
5 TABLET ORAL NIGHTLY PRN
Status: DISCONTINUED | OUTPATIENT
Start: 2023-08-09 | End: 2023-08-11 | Stop reason: HOSPADM

## 2023-08-09 RX ORDER — ONDANSETRON 2 MG/ML
4 INJECTION INTRAMUSCULAR; INTRAVENOUS
Status: COMPLETED | OUTPATIENT
Start: 2023-08-09 | End: 2023-08-09

## 2023-08-09 RX ORDER — PROCHLORPERAZINE EDISYLATE 5 MG/ML
5 INJECTION INTRAMUSCULAR; INTRAVENOUS EVERY 6 HOURS PRN
Status: DISCONTINUED | OUTPATIENT
Start: 2023-08-09 | End: 2023-08-11 | Stop reason: HOSPADM

## 2023-08-09 RX ORDER — ONDANSETRON 2 MG/ML
4 INJECTION INTRAMUSCULAR; INTRAVENOUS EVERY 8 HOURS PRN
Status: DISCONTINUED | OUTPATIENT
Start: 2023-08-09 | End: 2023-08-11 | Stop reason: HOSPADM

## 2023-08-09 RX ORDER — SODIUM CHLORIDE 0.9 % (FLUSH) 0.9 %
10 SYRINGE (ML) INJECTION
Status: DISCONTINUED | OUTPATIENT
Start: 2023-08-09 | End: 2023-08-11 | Stop reason: HOSPADM

## 2023-08-09 RX ORDER — ACETAMINOPHEN 325 MG/1
650 TABLET ORAL EVERY 8 HOURS PRN
Status: DISCONTINUED | OUTPATIENT
Start: 2023-08-09 | End: 2023-08-11 | Stop reason: HOSPADM

## 2023-08-09 RX ORDER — ATORVASTATIN CALCIUM 40 MG/1
80 TABLET, FILM COATED ORAL DAILY
Status: DISCONTINUED | OUTPATIENT
Start: 2023-08-09 | End: 2023-08-11 | Stop reason: HOSPADM

## 2023-08-09 RX ORDER — ZOLPIDEM TARTRATE 5 MG/1
10 TABLET ORAL NIGHTLY
Status: DISCONTINUED | OUTPATIENT
Start: 2023-08-09 | End: 2023-08-09

## 2023-08-09 RX ORDER — TALC
6 POWDER (GRAM) TOPICAL NIGHTLY PRN
Status: DISCONTINUED | OUTPATIENT
Start: 2023-08-09 | End: 2023-08-11 | Stop reason: HOSPADM

## 2023-08-09 RX ORDER — HYDROCHLOROTHIAZIDE 25 MG/1
25 TABLET ORAL DAILY
Status: DISCONTINUED | OUTPATIENT
Start: 2023-08-10 | End: 2023-08-10

## 2023-08-09 RX ORDER — FLUOXETINE HYDROCHLORIDE 20 MG/1
20 CAPSULE ORAL DAILY
Status: DISCONTINUED | OUTPATIENT
Start: 2023-08-10 | End: 2023-08-10

## 2023-08-09 RX ORDER — MORPHINE SULFATE 2 MG/ML
2 INJECTION, SOLUTION INTRAMUSCULAR; INTRAVENOUS
Status: COMPLETED | OUTPATIENT
Start: 2023-08-09 | End: 2023-08-09

## 2023-08-09 RX ORDER — LOSARTAN POTASSIUM 50 MG/1
100 TABLET ORAL DAILY
Status: DISCONTINUED | OUTPATIENT
Start: 2023-08-10 | End: 2023-08-10

## 2023-08-09 RX ORDER — PANTOPRAZOLE SODIUM 40 MG/10ML
40 INJECTION, POWDER, LYOPHILIZED, FOR SOLUTION INTRAVENOUS DAILY
Status: DISCONTINUED | OUTPATIENT
Start: 2023-08-09 | End: 2023-08-10

## 2023-08-09 RX ADMIN — PANTOPRAZOLE SODIUM 40 MG: 40 INJECTION, POWDER, LYOPHILIZED, FOR SOLUTION INTRAVENOUS at 01:08

## 2023-08-09 RX ADMIN — SODIUM CHLORIDE 1000 ML: 0.9 INJECTION, SOLUTION INTRAVENOUS at 02:08

## 2023-08-09 RX ADMIN — ONDANSETRON 4 MG: 2 INJECTION INTRAMUSCULAR; INTRAVENOUS at 04:08

## 2023-08-09 RX ADMIN — ATORVASTATIN CALCIUM 80 MG: 40 TABLET, FILM COATED ORAL at 10:08

## 2023-08-09 RX ADMIN — MORPHINE SULFATE 2 MG: 2 INJECTION, SOLUTION INTRAMUSCULAR; INTRAVENOUS at 04:08

## 2023-08-09 RX ADMIN — ONDANSETRON 4 MG: 2 INJECTION INTRAMUSCULAR; INTRAVENOUS at 12:08

## 2023-08-09 RX ADMIN — IOHEXOL 100 ML: 350 INJECTION, SOLUTION INTRAVENOUS at 03:08

## 2023-08-09 RX ADMIN — SODIUM CHLORIDE 500 ML: 0.9 INJECTION, SOLUTION INTRAVENOUS at 12:08

## 2023-08-09 RX ADMIN — ZOLPIDEM TARTRATE 5 MG: 5 TABLET, COATED ORAL at 10:08

## 2023-08-09 NOTE — H&P
Formerly [facility] - Emergency Dept  Hospital Medicine  History & Physical    Patient Name: Traci Freire  MRN: [id number]  Patient Class: OP- Observation  Admission Date: 2023  Attending Physician: Dharmesh Olvera MD  Primary Care Provider: Juliet Medina NP         Patient information was obtained from patient and ER records.     Subjective:     Principal Problem:Upper GI bleed    Chief Complaint:   Chief Complaint   Patient presents with    Vomiting    Hematemesis     Onset of vomiting this morning at 0500 with bright red blood with emesis. Pt also reports nausea.         HPI: 62-year-old  female with history of hypertension and IBS presents to the ED on account of hematochezia.  Patient stated that yesterday shortly after having home stir herrera and home made pizza, she started to have multiple episodes of vomiting alongside diarrhea.  She describe the diarrhea as black in nature and she stated that after having multiple episodes of vomiting, she had a bright red episode.  She denies any fevers, abdominal pain, recent travels or contact with sick persons, chest pain or any bleeding from any orifices.  She denies being on any blood thinners.  She does not smoke cigarettes, drink alcohol or use any illicit medications.  On presentation to the ED, WBC elevated at 15.61 and drug screen negative.  Occult positive and abdominal CT showed diverticulosis with no acute finding.  Hemoglobin was stable at 14.7      Past Medical History:   Diagnosis Date    Anxiety     Arthritis     Cancer breast    Right- Bilateral mastectomy- May 2005- had breast reconstruction- mother  of breast cancer    HLD (hyperlipidemia)     Hypertension     diagnosed age late 40's     IBS (irritable bowel syndrome)     Kidney stone     Lyme disease     arthritis of hands. Felt like flu- age early 30's- got it  after going to [place]    Sleep apnea     NO MACHINE       Past Surgical History:   Procedure Laterality  Date    ABDOMINAL SURGERY      BLADDER SUSPENSION      BREAST SURGERY       SECTION      CYSTOSCOPY      avoids greens . ? Interstitial cystitis    HYSTERECTOMY      followed by removal of ovaries  from scar tissue    INCISIONAL HERNIA REPAIR Right 2008    RLQ    JOINT REPLACEMENT      [facility] -     KNEE SURGERY Left 2014    TKR    MODIFIED RADICAL MASTECTOMY W/ AXILLARY LYMPH NODE DISSECTION Right 05/10/2005    w/free flap    NISSEN FUNDOPLICATION      ROBOT-ASSISTED LAPAROSCOPIC REPAIR OF VENTRAL HERNIA N/A 2021    Procedure: ROBOTIC REPAIR, HERNIA, VENTRAL;  Surgeon: John Johnson III, MD;  Location: [facility];  Service: General;  Laterality: N/A;    SIMPLE MASTECTOMY Left 05/10/2005    w/free flap    TONSILLECTOMY         Review of patient's allergies indicates:   Allergen Reactions    Keflex [cephalexin]     Macrobid [nitrofurantoin monohyd/m-cryst]     Bactrim [sulfamethoxazole-trimethoprim] Nausea And Vomiting       Current Facility-Administered Medications on File Prior to Encounter   Medication    sodium hyaluronate (EUFLEXXA) 10 mg/mL(mw 2.4 -3.6 million) injection 20 mg     Current Outpatient Medications on File Prior to Encounter   Medication Sig    FLUoxetine 20 MG capsule Take 1 capsule (20 mg total) by mouth once daily.    hydroCHLOROthiazide (HYDRODIURIL) 25 MG tablet Take 1 tablet (25 mg total) by mouth once daily.    ibuprofen (ADVIL,MOTRIN) 200 MG tablet Take 200 mg by mouth every 6 (six) hours as needed for Pain.    irbesartan (AVAPRO) 300 MG tablet Take 1 tablet (300 mg total) by mouth every evening.    mecobalamin (B12 ACTIVE ORAL) Take 1 tablet by mouth Daily.    rosuvastatin (CRESTOR) 20 MG tablet Take 1 tablet (20 mg total) by mouth every evening.    zolpidem (AMBIEN) 10 mg Tab Take 1 tablet (10 mg total) by mouth every evening.    [DISCONTINUED] ascorbic acid, vitamin C,  (VITAMIN C) 500 MG tablet Take 500 mg by mouth once daily.    [DISCONTINUED] doxycycline (VIBRA-TABS) 100 MG tablet Take 1 tablet (100 mg total) by mouth 2 (two) times daily.    [DISCONTINUED] ferrous sulfate, dried (SLOW FE) 160 mg (50 mg iron) TbSR Take 160 mg by mouth once daily.    [DISCONTINUED] methylPREDNISolone (MEDROL DOSEPACK) 4 mg tablet use as directed (Patient taking differently: Take 4 mg by mouth once daily. use as directed)    [DISCONTINUED] ondansetron (ZOFRAN-ODT) 4 MG TbDL Take 1 tablet (4 mg total) by mouth every 6 (six) hours as needed. (Patient not taking: Reported on 8/3/2023)    [DISCONTINUED] tiZANidine (ZANAFLEX) 4 MG tablet Take 1 tablet (4 mg total) by mouth every 8 (eight) hours. for 10 days     Family History       Problem Relation (Age of Onset)    Cancer Mother    Heart disease Father          Tobacco Use    Smoking status: Former     Current packs/day: 0.00     Types: Cigarettes     Quit date: 3/26/2004     Years since quittin.3    Smokeless tobacco: Never    Tobacco comments:     quit - smoked for 10 years- 1 ppd   Substance and Sexual Activity    Alcohol use: No    Drug use: No    Sexual activity: Never     Review of Systems   Constitutional:  Negative for fatigue and fever.   Respiratory:  Negative for shortness of breath.    Cardiovascular:  Negative for chest pain and palpitations.   Gastrointestinal:  Positive for blood in stool, diarrhea, nausea and vomiting. Negative for abdominal pain.   Genitourinary:  Negative for hematuria.   Neurological:  Negative for weakness.   All other systems reviewed and are negative.    Objective:     Vital Signs (Most Recent):  Temp: 98.8 °F (37.1 °C) (23 1115)  Pulse: 84 (23 1300)  Resp: 18 (23 1617)  BP: (!) 169/78 (23 1300)  SpO2: 98 % (23 1300) Vital Signs (24h Range):  Temp:  [98.8 °F (37.1 °C)] 98.8 °F (37.1 °C)  Pulse:  [84-98] 84  Resp:  [16-18] 18  SpO2:  [96 %-98 %] 98 %  BP:  "(167-199)/(78-91) 169/78     Weight: 79.4 kg (175 lb)  Body mass index is 33.07 kg/m².     Physical Exam  Vitals and nursing note reviewed.   HENT:      Head: Normocephalic.      Nose: Nose normal.      Mouth/Throat:      Mouth: Mucous membranes are moist.   Eyes:      Pupils: Pupils are equal, round, and reactive to light.   Cardiovascular:      Rate and Rhythm: Normal rate.   Abdominal:      Palpations: Abdomen is soft.   Musculoskeletal:         General: Normal range of motion.      Cervical back: Normal range of motion.   Neurological:      General: No focal deficit present.      Mental Status: She is alert and oriented to person, place, and time. Mental status is at baseline.   Psychiatric:         Mood and Affect: Mood normal.              CRANIAL NERVES     CN III, IV, VI   Pupils are equal, round, and reactive to light.       Significant Labs: All pertinent labs within the past 24 hours have been reviewed.  CBC:   Recent Labs   Lab 08/09/23  1254   WBC 15.61*   HGB 14.7   HCT 45.0        CMP:   Recent Labs   Lab 08/09/23  1254      K 3.8      CO2 24   *   BUN 38*   CREATININE 0.7   CALCIUM 9.8   PROT 8.4   ALBUMIN 4.5   BILITOT 0.8   ALKPHOS 76   AST 21   ALT 23   ANIONGAP 10     Coagulation:   Recent Labs   Lab 08/09/23  1254   INR 0.9   APTT 21.5     Lactic Acid: No results for input(s): "LACTATE" in the last 48 hours.  Troponin:   Recent Labs   Lab 08/09/23  1254   TROPONINIHS 7.9     Urine Studies:   Recent Labs   Lab 08/09/23  1416   COLORU Yellow  Yellow   APPEARANCEUA Clear  Clear   PHUR 6.0  6.0   SPECGRAV >1.030*  >1.030*   PROTEINUA 1+*  1+*   GLUCUA Negative  Negative   KETONESU Negative  Negative   BILIRUBINUA Negative  Negative   OCCULTUA 1+*  1+*   NITRITE Negative  Negative   UROBILINOGEN Negative  Negative   LEUKOCYTESUR 1+*  1+*   RBCUA 3   WBCUA 9*   BACTERIA Negative   SQUAMEPITHEL 2   HYALINECASTS 4*       Significant Imaging: I have reviewed all " pertinent imaging results/findings within the past 24 hours.  Imaging Results              CT Abdomen Pelvis With Contrast (Final result)  Result time 08/09/23 15:46:34      Final result by Jaciel Horan MD (08/09/23 15:46:34)                   Narrative:    CMS MANDATED QUALITY DATA - CT RADIATION - 436    All CT scans at this facility utilize dose modulation, iterative reconstruction, and/or weight based dosing when appropriate to reduce radiation dose to as low as reasonably achievable.        Reason: GI bleed    TECHNIQUE: CT abdomen and pelvis with 100 mL Omnipaque 350.    COMPARISON: 3/17/2022    CT ABDOMEN:  Visualized lung bases are clear.    Tiny hypodensity in right hepatic lobe suggests a small cyst, and liver is otherwise unremarkable. Gallbladder, pancreas, spleen, and adrenals, and kidneys are normal. Mild aortoiliac calcifications are present.    Postsurgical changes of the proximal stomach suggest prior fundoplication. Large and small intestines are otherwise unremarkable with a few colonic diverticula noted. Appendix not identified but no secondary signs of appendicitis are evident. No free intraperitoneal gas.    Degenerative changes affect the spine.    CT PELVIS:  Ventral hernia repair affects the infraumbilical anterior abdominal wall along with postsurgical change suggesting prior right herniorrhaphy. Small to moderate right and small left fat-containing hernias are present.    Uterus has been removed. No adnexal mass or free pelvic fluid. Bladder is normal.    IMPRESSION:    1. Diverticulosis.  2. No acute findings throughout the abdomen and pelvis.  3. Bilateral fat-containing inguinal hernias.    Electronically signed by:  Jaciel Horan MD  8/9/2023 3:46 PM CDT Workstation: [id number]                                     X-Ray Chest AP Portable (Final result)  Result time 08/09/23 13:31:06      Final result by Blayne Martell Jr., MD (08/09/23 13:31:06)                   Narrative:     CHEST X-RAY SINGLE VIEW    HISTORY: Vomiting. Hematemesis.    PREVIOUS STUDIES: None available    FINDINGS:  The heart size and pulmonary vascularity are within the range of normal.  There is no significant hilar nor mediastinal process.  The aerated lungs are well expanded and clear.  The right and left CP angles are rather sharp.  The osseous structures show nothing unusual.    There are surgical clips projecting over the bilateral breasts and right axilla.    There is evidence of an elongated calcification near the insertion point of the interspinalis tendon attributed towards calcific tendinitis.    IMPRESSION:   No evidence of acute cardiopulmonary process      Electronically signed by:  Blayne Martell MD  8/9/2023 1:31 PM CDT Workstation: [id number]                                      Assessment/Plan:     * Upper GI bleed  Probably secondary to multiple episodes of emesis  IV Protonix  Hemoglobin stable  Gastroenterology consulted      Gastroenteritis  Abdominal CT showed no acute findings  C diff, stool cultures  Continue to monitor closely      HTN (hypertension)  Resume home blood pressure antihypertensive        VTE Risk Mitigation (From admission, onward)    None             On 08/09/2023, patient should be placed in hospital observation services under my care.        Dharmesh Olvera MD  Department of Hospital Medicine  [facility] - Emergency Dept

## 2023-08-09 NOTE — HPI
62-year-old  female with history of hypertension and IBS presents to the ED on account of hematochezia.  Patient stated that yesterday shortly after having home stir herrera and home made pizza, she started to have multiple episodes of vomiting alongside diarrhea.  She describe the diarrhea as black in nature and she stated that after having multiple episodes of vomiting, she had a bright red episode.  She denies any fevers, abdominal pain, recent travels or contact with sick persons, chest pain or any bleeding from any orifices.  She denies being on any blood thinners.  She does not smoke cigarettes, drink alcohol or use any illicit medications.  On presentation to the ED, WBC elevated at 15.61 and drug screen negative.  Occult positive and abdominal CT showed diverticulosis with no acute finding.  Hemoglobin was stable at 14.7

## 2023-08-09 NOTE — TELEPHONE ENCOUNTER
1.  severe bilateral facet joint osteoarthrosis.  2. multilevel lumbar disc degeneration and facet joint osteoarthrosis. Refer to dr. Newell for further eval

## 2023-08-09 NOTE — SUBJECTIVE & OBJECTIVE
Past Medical History:   Diagnosis Date    Anxiety     Arthritis     Cancer breast    Right- Bilateral mastectomy- May 2005- had breast reconstruction- mother  of breast cancer    HLD (hyperlipidemia)     Hypertension     diagnosed age late 40's     IBS (irritable bowel syndrome)     Kidney stone     Lyme disease     arthritis of hands. Felt like flu- age early 30's- got it  after going to [place]    Sleep apnea     NO MACHINE       Past Surgical History:   Procedure Laterality Date    ABDOMINAL SURGERY      BLADDER SUSPENSION      BREAST SURGERY       SECTION      CYSTOSCOPY      avoids greens . ? Interstitial cystitis    HYSTERECTOMY      followed by removal of ovaries  from scar tissue    INCISIONAL HERNIA REPAIR Right 2008    RLQ    JOINT REPLACEMENT      Left [facility] knee replacement[facility] -     KNEE SURGERY Left     TKR    MODIFIED RADICAL MASTECTOMY W/ AXILLARY LYMPH NODE DISSECTION Right 05/10/2005    w/free flap    NISSEN FUNDOPLICATION      ROBOT-ASSISTED LAPAROSCOPIC REPAIR OF VENTRAL HERNIA N/A 2021    Procedure: ROBOTIC REPAIR, HERNIA, VENTRAL;  Surgeon: John Johnson III, MD;  Location: [facility];  Service: General;  Laterality: N/A;    SIMPLE MASTECTOMY Left 05/10/2005    w/free flap    TONSILLECTOMY         Review of patient's allergies indicates:   Allergen Reactions    Keflex [cephalexin]     Macrobid [nitrofurantoin monohyd/m-cryst]     Bactrim [sulfamethoxazole-trimethoprim] Nausea And Vomiting       Current Facility-Administered Medications on File Prior to Encounter   Medication    sodium hyaluronate (EUFLEXXA) 10 mg/mL(mw 2.4 -3.6 million) injection 20 mg     Current Outpatient Medications on File Prior to Encounter   Medication Sig    FLUoxetine 20 MG capsule Take 1 capsule (20 mg total) by mouth once daily.    hydroCHLOROthiazide (HYDRODIURIL) 25 MG tablet Take 1 tablet (25 mg total) by mouth once daily.    ibuprofen  (ADVIL,MOTRIN) 200 MG tablet Take 200 mg by mouth every 6 (six) hours as needed for Pain.    irbesartan (AVAPRO) 300 MG tablet Take 1 tablet (300 mg total) by mouth every evening.    mecobalamin (B12 ACTIVE ORAL) Take 1 tablet by mouth Daily.    rosuvastatin (CRESTOR) 20 MG tablet Take 1 tablet (20 mg total) by mouth every evening.    zolpidem (AMBIEN) 10 mg Tab Take 1 tablet (10 mg total) by mouth every evening.    [DISCONTINUED] ascorbic acid, vitamin C, (VITAMIN C) 500 MG tablet Take 500 mg by mouth once daily.    [DISCONTINUED] doxycycline (VIBRA-TABS) 100 MG tablet Take 1 tablet (100 mg total) by mouth 2 (two) times daily.    [DISCONTINUED] ferrous sulfate, dried (SLOW FE) 160 mg (50 mg iron) TbSR Take 160 mg by mouth once daily.    [DISCONTINUED] methylPREDNISolone (MEDROL DOSEPACK) 4 mg tablet use as directed (Patient taking differently: Take 4 mg by mouth once daily. use as directed)    [DISCONTINUED] ondansetron (ZOFRAN-ODT) 4 MG TbDL Take 1 tablet (4 mg total) by mouth every 6 (six) hours as needed. (Patient not taking: Reported on 8/3/2023)    [DISCONTINUED] tiZANidine (ZANAFLEX) 4 MG tablet Take 1 tablet (4 mg total) by mouth every 8 (eight) hours. for 10 days     Family History       Problem Relation (Age of Onset)    Cancer Mother    Heart disease Father          Tobacco Use    Smoking status: Former     Current packs/day: 0.00     Types: Cigarettes     Quit date: 3/26/2004     Years since quittin.3    Smokeless tobacco: Never    Tobacco comments:     quit - smoked for 10 years- 1 ppd   Substance and Sexual Activity    Alcohol use: No    Drug use: No    Sexual activity: Never     Review of Systems   Constitutional:  Negative for fatigue and fever.   Respiratory:  Negative for shortness of breath.    Cardiovascular:  Negative for chest pain and palpitations.   Gastrointestinal:  Positive for blood in stool, diarrhea, nausea and vomiting. Negative for abdominal pain.   Genitourinary:  Negative  "for hematuria.   Neurological:  Negative for weakness.   All other systems reviewed and are negative.    Objective:     Vital Signs (Most Recent):  Temp: 98.8 °F (37.1 °C) (08/09/23 1115)  Pulse: 84 (08/09/23 1300)  Resp: 18 (08/09/23 1617)  BP: (!) 169/78 (08/09/23 1300)  SpO2: 98 % (08/09/23 1300) Vital Signs (24h Range):  Temp:  [98.8 °F (37.1 °C)] 98.8 °F (37.1 °C)  Pulse:  [84-98] 84  Resp:  [16-18] 18  SpO2:  [96 %-98 %] 98 %  BP: (167-199)/(78-91) 169/78     Weight: 79.4 kg (175 lb)  Body mass index is 33.07 kg/m².     Physical Exam  Vitals and nursing note reviewed.   HENT:      Head: Normocephalic.      Nose: Nose normal.      Mouth/Throat:      Mouth: Mucous membranes are moist.   Eyes:      Pupils: Pupils are equal, round, and reactive to light.   Cardiovascular:      Rate and Rhythm: Normal rate.   Abdominal:      Palpations: Abdomen is soft.   Musculoskeletal:         General: Normal range of motion.      Cervical back: Normal range of motion.   Neurological:      General: No focal deficit present.      Mental Status: She is alert and oriented to person, place, and time. Mental status is at baseline.   Psychiatric:         Mood and Affect: Mood normal.              CRANIAL NERVES     CN III, IV, VI   Pupils are equal, round, and reactive to light.       Significant Labs: All pertinent labs within the past 24 hours have been reviewed.  CBC:   Recent Labs   Lab 08/09/23  1254   WBC 15.61*   HGB 14.7   HCT 45.0        CMP:   Recent Labs   Lab 08/09/23  1254      K 3.8      CO2 24   *   BUN 38*   CREATININE 0.7   CALCIUM 9.8   PROT 8.4   ALBUMIN 4.5   BILITOT 0.8   ALKPHOS 76   AST 21   ALT 23   ANIONGAP 10     Coagulation:   Recent Labs   Lab 08/09/23  1254   INR 0.9   APTT 21.5     Lactic Acid: No results for input(s): "LACTATE" in the last 48 hours.  Troponin:   Recent Labs   Lab 08/09/23  1254   TROPONINIHS 7.9     Urine Studies:   Recent Labs   Lab 08/09/23  1416   COLORU " Yellow  Yellow   APPEARANCEUA Clear  Clear   PHUR 6.0  6.0   SPECGRAV >1.030*  >1.030*   PROTEINUA 1+*  1+*   GLUCUA Negative  Negative   KETONESU Negative  Negative   BILIRUBINUA Negative  Negative   OCCULTUA 1+*  1+*   NITRITE Negative  Negative   UROBILINOGEN Negative  Negative   LEUKOCYTESUR 1+*  1+*   RBCUA 3   WBCUA 9*   BACTERIA Negative   SQUAMEPITHEL 2   HYALINECASTS 4*       Significant Imaging: I have reviewed all pertinent imaging results/findings within the past 24 hours.  Imaging Results              CT Abdomen Pelvis With Contrast (Final result)  Result time 08/09/23 15:46:34      Final result by Jaciel Horan MD (08/09/23 15:46:34)                   Narrative:    CMS MANDATED QUALITY DATA - CT RADIATION - 436    All CT scans at this facility utilize dose modulation, iterative reconstruction, and/or weight based dosing when appropriate to reduce radiation dose to as low as reasonably achievable.        Reason: GI bleed    TECHNIQUE: CT abdomen and pelvis with 100 mL Omnipaque 350.    COMPARISON: 3/17/2022    CT ABDOMEN:  Visualized lung bases are clear.    Tiny hypodensity in right hepatic lobe suggests a small cyst, and liver is otherwise unremarkable. Gallbladder, pancreas, spleen, and adrenals, and kidneys are normal. Mild aortoiliac calcifications are present.    Postsurgical changes of the proximal stomach suggest prior fundoplication. Large and small intestines are otherwise unremarkable with a few colonic diverticula noted. Appendix not identified but no secondary signs of appendicitis are evident. No free intraperitoneal gas.    Degenerative changes affect the spine.    CT PELVIS:  Ventral hernia repair affects the infraumbilical anterior abdominal wall along with postsurgical change suggesting prior right herniorrhaphy. Small to moderate right and small left fat-containing hernias are present.    Uterus has been removed. No adnexal mass or free pelvic fluid. Bladder is  normal.    IMPRESSION:    1. Diverticulosis.  2. No acute findings throughout the abdomen and pelvis.  3. Bilateral fat-containing inguinal hernias.    Electronically signed by:  Jaciel Horan MD  8/9/2023 3:46 PM CDT Workstation: [id number]                                     X-Ray Chest AP Portable (Final result)  Result time 08/09/23 13:31:06      Final result by Blayne Martell Jr., MD (08/09/23 13:31:06)                   Narrative:    CHEST X-RAY SINGLE VIEW    HISTORY: Vomiting. Hematemesis.    PREVIOUS STUDIES: None available    FINDINGS:  The heart size and pulmonary vascularity are within the range of normal.  There is no significant hilar nor mediastinal process.  The aerated lungs are well expanded and clear.  The right and left CP angles are rather sharp.  The osseous structures show nothing unusual.    There are surgical clips projecting over the bilateral breasts and right axilla.    There is evidence of an elongated calcification near the insertion point of the interspinalis tendon attributed towards calcific tendinitis.    IMPRESSION:   No evidence of acute cardiopulmonary process      Electronically signed by:  Blayne Martell MD  8/9/2023 1:31 PM CDT Workstation: [id number]

## 2023-08-09 NOTE — CONSULTS
GASTROENTEROLOGY INPATIENT CONSULT NOTE  Patient Name: Traci Freire  Patient MRN: [id number]  Patient : 1960    Admit Date: 2023  Service date: 2023    Reason for Consult: UGIB    PCP: Juliet Medina NP    Chief Complaint   Patient presents with    Vomiting    Hematemesis     Onset of vomiting this morning at 0500 with bright red blood with emesis. Pt also reports nausea.        HPI: Patient is a 62 y.o. female with PMHx HTN, HLD, mastectomy, csxn/hysterectomy, ventral hernia repair, past tobacco use, Nissen fundoplication, diverticulosis, abd/inguinal hernias presents for evaluation of n/v w/ hematemesis and reported melena. Acute onset, intermittent, progressive on admission. Daily NSAIDs.     CHART REVIEW;   CT Abd  - diverticulosis; inguinal hernias; mild vascular dz; prior fundoplication; colon tics  Colon  - Nml  EGD  - Nml s/p 54 fr; Bx: GERD    Past Medical History:  Past Medical History:   Diagnosis Date    Anxiety     Arthritis     Cancer breast    Right- Bilateral mastectomy- May 2005- had breast reconstruction- mother  of breast cancer    HLD (hyperlipidemia)     Hypertension     diagnosed age late 40's     IBS (irritable bowel syndrome)     Kidney stone     Lyme disease     arthritis of hands. Felt like flu- age early 30's- got it  after going to [place]    Sleep apnea     NO MACHINE        Past Surgical History:  Past Surgical History:   Procedure Laterality Date    ABDOMINAL SURGERY      BLADDER SUSPENSION      BREAST SURGERY       SECTION      CYSTOSCOPY      avoids greens . ? Interstitial cystitis    HYSTERECTOMY      followed by removal of ovaries  from scar tissue    INCISIONAL HERNIA REPAIR Right 2008    RLQ    JOINT REPLACEMENT      Left [facility] knee replacement[facility] -     KNEE SURGERY Left 2014    TKR    MODIFIED RADICAL MASTECTOMY W/ AXILLARY LYMPH NODE DISSECTION Right 05/10/2005    w/free flap    NISSEN  FUNDOPLICATION      ROBOT-ASSISTED LAPAROSCOPIC REPAIR OF VENTRAL HERNIA N/A 2021    Procedure: ROBOTIC REPAIR, HERNIA, VENTRAL;  Surgeon: John Johnson III, MD;  Location: [facility];  Service: General;  Laterality: N/A;    SIMPLE MASTECTOMY Left 05/10/2005    w/free flap    TONSILLECTOMY          Home Medications:  (Not in a hospital admission)      Inpatient Medications:   pantoprazole  40 mg Intravenous Daily    sodium hyaluronate (EUFLEXXA)  20 mg Intra-articular Weekly     iohexoL    Review of patient's allergies indicates:   Allergen Reactions    Keflex [cephalexin]     Macrobid [nitrofurantoin monohyd/m-cryst]     Bactrim [sulfamethoxazole-trimethoprim] Nausea And Vomiting       Social History:   Social History     Occupational History    Not on file   Tobacco Use    Smoking status: Former     Current packs/day: 0.00     Types: Cigarettes     Quit date: 3/26/2004     Years since quittin.3    Smokeless tobacco: Never    Tobacco comments:     quit - smoked for 10 years- 1 ppd   Substance and Sexual Activity    Alcohol use: No    Drug use: No    Sexual activity: Never       Family History:   Family History   Problem Relation Age of Onset    Cancer Mother          of breast cancer, also had gynecological cancer- had breast cancer that spread to the brain    Heart disease Father         in his 60's-  in his 70's       Review of Systems:  A 10 point review of systems was performed and was normal, except as mentioned in the HPI, including constitutional, HEENT, heme, lymph, cardiovascular, respiratory, gastrointestinal, genitourinary, neurologic, endocrine, psychiatric and musculoskeletal.      OBJECTIVE:    Physical Exam:  24 Hour Vital Sign Ranges: Temp:  [98.8 °F (37.1 °C)] 98.8 °F (37.1 °C)  Pulse:  [84-98] 84  Resp:  [16-18] 18  SpO2:  [96 %-98 %] 98 %  BP: (167-199)/(78-91) 169/78  Most recent vitals: BP (!) 169/78 (BP Location: Right arm, Patient Position: Lying)   Pulse 84    "Temp 98.8 °F (37.1 °C) (Oral)   Resp 18   Ht 5' 1" (1.549 m)   Wt 79.4 kg (175 lb)   LMP 05/09/1992   SpO2 98%   BMI 33.07 kg/m²    GEN: well-developed, well-nourished, awake and alert, non-toxic appearing adult  HEENT: PERRL, sclera anicteric, oral mucosa pink and moist without lesion  NECK: trachea midline; Good ROM  CV: regular rate and rhythm, no murmurs or gallops  RESP: clear to auscultation bilaterally, no wheezes, rhonci or rales  ABD: soft, non-tender, non-distended, normal bowel sounds  EXT: no swelling or edema, 2+ pulses distally  SKIN: no rashes or jaundice  PSYCH: normal affect    Labs:   Recent Labs     08/09/23  1254   WBC 15.61*   MCV 92        Recent Labs     08/09/23  1254      K 3.8      CO2 24   BUN 38*   *     No results for input(s): "ALB" in the last 72 hours.    Invalid input(s): "ALKP", "SGOT", "SGPT", "TBIL", "DBIL", "TPRO"  Recent Labs     08/09/23  1254   INR 0.9         Radiology Review:  X-Ray Chest AP Portable   Final Result      CT Abdomen Pelvis With Contrast    (Results Pending)         IMPRESSION / RECOMMENDATIONS:  62 y.o. female with PMHx HTN, HLD, mastectomy, csxn/hysterectomy, ventral hernia repair, past tobacco use, Nissen fundoplication, diverticulosis, abd/inguinal hernias presents for evaluation of n/v w/ hematemesis and reported melena. Will need EGD to r/o source of UGIB.     -EGD tomorrow  -PPI IV  -Consider daily low dose PPI indefinitely for prophylaxis for PUD w/ regular NSAID use for chronic joint issues.     Thank you for this consult.    Herrera Nesbitt III  8/9/2023  3:15 PM        "

## 2023-08-09 NOTE — ED PROVIDER NOTES
Encounter Date: 8/9/2023       History     Chief Complaint   Patient presents with    Vomiting    Hematemesis     Onset of vomiting this morning at 0500 with bright red blood with emesis. Pt also reports nausea.      This is a 62-year-old female who presents complaining of nausea and vomiting.  The nausea and vomiting was followed by hematemesis after several episodes of vomiting.  She reports that she vomited quite a bit of bright red blood during her 4th episode and 5th episode of vomiting.  Emesis since then has only been slightly blood 10.  She reports it was bright red blood without clots.  She denies any associated epigastric pain or discomfort.  She denies any tearing or ripping sensation.  She reports that she was at work has a [employer] harleen, started feeling overheated then nauseated followed by vomiting.  Two days ago she cared for her grandson who had sinus congestion with vomiting.  She has a history of previous Nissen fundoplication secondary to GERD.  She has not had any recent EGDs.  She habitually takes significant amounts of NSAIDs.  She reports taking 2 BC powders daily as well as 9 200 mg ibuprofen daily due to chronic knee and back pain.  She was seen by her primary care provider last week and was started on a course of steroid medications for a flare up of lower back pain.  She noted that she had some black appearing stool several days after the steroid medication.  She also reports that within the past 2 weeks she rode a roller coaster while on vacation and had some epigastric pain and discomfort to the area where the roller coaster bar was striking her upper abdomen during the ride.  She has been having intermittent epigastric pain since then.  She did not have any GI bleeding however until this morning.  She denies chest pain or shortness of breath.  She denies syncope or near-syncope.  She denies fever or chills.  She has had a slight cough which has been nonproductive over the past 2  days.  She denies any headache or visual changes.  She denies any other problems or complaints.        Review of patient's allergies indicates:   Allergen Reactions    Keflex [cephalexin]     Macrobid [nitrofurantoin monohyd/m-cryst]     Bactrim [sulfamethoxazole-trimethoprim] Nausea And Vomiting     Past Medical History:   Diagnosis Date    Anxiety     Arthritis     Cancer breast    Right- Bilateral mastectomy- May 2005- had breast reconstruction- mother  of breast cancer    HLD (hyperlipidemia)     Hypertension     diagnosed age late 40's     IBS (irritable bowel syndrome)     Kidney stone     Lyme disease     arthritis of hands. Felt like flu- age early 30's- got it  after going to [place]    Sleep apnea     NO MACHINE     Past Surgical History:   Procedure Laterality Date    ABDOMINAL SURGERY      BLADDER SUSPENSION      BREAST SURGERY       SECTION      CYSTOSCOPY      avoids greens . ? Interstitial cystitis    HYSTERECTOMY      followed by removal of ovaries  from scar tissue    INCISIONAL HERNIA REPAIR Right 2008    RLQ    JOINT REPLACEMENT      [place]     KNEE SURGERY Left     TKR    MODIFIED RADICAL MASTECTOMY W/ AXILLARY LYMPH NODE DISSECTION Right 05/10/2005    w/free flap    NISSEN FUNDOPLICATION      ROBOT-ASSISTED LAPAROSCOPIC REPAIR OF VENTRAL HERNIA N/A 2021    Procedure: ROBOTIC REPAIR, HERNIA, VENTRAL;  Surgeon: John Johnson III, MD;  Location: [facility] OR;  Service: General;  Laterality: N/A;    SIMPLE MASTECTOMY Left 05/10/2005    w/free flap    TONSILLECTOMY       Family History   Problem Relation Age of Onset    Cancer Mother          of breast cancer, also had gynecological cancer- had breast cancer that spread to the brain    Heart disease Father         in his 60's-  in his 70's     Social History     Tobacco Use    Smoking status: Former     Current packs/day: 0.00     Types:  Cigarettes     Quit date: 3/26/2004     Years since quittin.3    Smokeless tobacco: Never    Tobacco comments:     quit 2004- smoked for 10 years- 1 ppd   Substance Use Topics    Alcohol use: No    Drug use: No     Review of Systems   Constitutional:  Positive for activity change, appetite change and fatigue. Negative for chills and fever.   HENT: Negative.  Negative for congestion, ear pain, rhinorrhea, sore throat and trouble swallowing.    Eyes: Negative.  Negative for photophobia, pain, redness and visual disturbance.   Respiratory:  Positive for cough. Negative for chest tightness, shortness of breath and wheezing.    Cardiovascular: Negative.  Negative for chest pain, palpitations and leg swelling.   Gastrointestinal:  Positive for abdominal pain, blood in stool, nausea and vomiting. Negative for abdominal distention, constipation and diarrhea.   Endocrine: Negative.    Genitourinary: Negative.  Negative for decreased urine volume, difficulty urinating, dysuria, flank pain, frequency, pelvic pain and urgency.   Musculoskeletal: Negative.  Negative for arthralgias, back pain, gait problem, myalgias, neck pain and neck stiffness.   Skin: Negative.  Negative for rash.   Neurological:  Positive for light-headedness. Negative for dizziness, syncope, facial asymmetry, speech difficulty, weakness, numbness and headaches.   Hematological:  Does not bruise/bleed easily.   Psychiatric/Behavioral: Negative.  Negative for confusion.    All other systems reviewed and are negative.      Physical Exam     Initial Vitals [23 1115]   BP Pulse Resp Temp SpO2   (!) 199/91 98 16 98.8 °F (37.1 °C) 96 %      MAP       --         Physical Exam    Nursing note and vitals reviewed.  Constitutional: She is not diaphoretic. She is active and cooperative.  Non-toxic appearance. She does not have a sickly appearance. She does not appear ill. No distress.   HENT:   Head: Normocephalic and atraumatic.   Right Ear: Tympanic  membrane normal.   Left Ear: Tympanic membrane normal.   Nose: Nose normal.   Mouth/Throat: Uvula is midline, oropharynx is clear and moist and mucous membranes are normal. No oral lesions. No uvula swelling. No oropharyngeal exudate, posterior oropharyngeal edema or posterior oropharyngeal erythema.   Eyes: Conjunctivae, EOM and lids are normal. Pupils are equal, round, and reactive to light. No scleral icterus.   Neck: Trachea normal and phonation normal. Neck supple. No thyroid mass and no thyromegaly present. No stridor present. No JVD present.   Normal range of motion.   Full passive range of motion without pain.     Cardiovascular:  Normal rate, regular rhythm, normal heart sounds, intact distal pulses and normal pulses.     Exam reveals no gallop and no friction rub.       No murmur heard.  Pulmonary/Chest: Effort normal and breath sounds normal. No accessory muscle usage or stridor. No tachypnea. No respiratory distress. She has no wheezes. She has no rhonchi. She has no rales.   Abdominal: Abdomen is soft. Bowel sounds are normal. She exhibits no distension, no pulsatile midline mass and no mass. There is abdominal tenderness in the epigastric area. No hernia.   No right CVA tenderness.  No left CVA tenderness. There is no rigidity, no rebound and no guarding. negative psoas sign and negative Rovsing's sign  Genitourinary: Rectum:      No rectal mass, anal fissure, tenderness, external hemorrhoid, internal hemorrhoid or abnormal anal tone.   : Acceptable.  Musculoskeletal:         General: No tenderness or edema. Normal range of motion.      Right hand: Normal. Normal range of motion. Normal strength. Normal sensation. Normal capillary refill. Normal pulse.      Left hand: Normal. Normal range of motion. Normal strength. Normal sensation. Normal capillary refill. Normal pulse.      Cervical back: Normal, full passive range of motion without pain, normal range of motion and neck supple. No  edema, erythema, rigidity or bony tenderness. No spinous process tenderness or muscular tenderness. Normal range of motion.      Thoracic back: Normal. No bony tenderness. Normal range of motion.      Lumbar back: Normal. No bony tenderness. Normal range of motion.      Right foot: Normal. Normal range of motion and normal capillary refill. No tenderness or bony tenderness. Normal pulse.      Left foot: Normal. Normal range of motion and normal capillary refill. No tenderness or bony tenderness. Normal pulse.      Comments: Pulses are 2+ throughout, cap refill is less than 2 sec throughout, extremities are nontender throughout with full range of motion. There is no spinal tenderness to palpation.     Neurological: She is alert and oriented to person, place, and time. She has normal strength. She displays normal reflexes. No cranial nerve deficit or sensory deficit. GCS score is 15. GCS eye subscore is 4. GCS verbal subscore is 5. GCS motor subscore is 6.   No focal deficits.   Skin: Skin is warm, dry and intact. Capillary refill takes less than 2 seconds. No ecchymosis, no petechiae and no rash noted. No erythema. No pallor.   Psychiatric: She has a normal mood and affect. Her speech is normal and behavior is normal. Judgment and thought content normal. Cognition and memory are normal.         ED Course   Procedures  Labs Reviewed   CBC W/ AUTO DIFFERENTIAL - Abnormal; Notable for the following components:       Result Value    WBC 15.61 (*)     Immature Granulocytes 0.6 (*)     Gran # (ANC) 13.6 (*)     Immature Grans (Abs) 0.09 (*)     Gran % 87.3 (*)     Lymph % 6.7 (*)     All other components within normal limits   COMPREHENSIVE METABOLIC PANEL - Abnormal; Notable for the following components:    Glucose 130 (*)     BUN 38 (*)     All other components within normal limits   URINALYSIS, REFLEX TO URINE CULTURE - Abnormal; Notable for the following components:    Specific Gravity, UA >1.030 (*)     Protein, UA 1+  (*)     Occult Blood UA 1+ (*)     Leukocytes, UA 1+ (*)     All other components within normal limits    Narrative:     Specimen Source->Urine   URINALYSIS, REFLEX TO URINE CULTURE - Abnormal; Notable for the following components:    Specific Gravity, UA >1.030 (*)     Protein, UA 1+ (*)     Occult Blood UA 1+ (*)     Leukocytes, UA 1+ (*)     All other components within normal limits    Narrative:     Specimen Source->Urine   OCCULT BLOOD X 1, STOOL - Abnormal; Notable for the following components:    Occult Blood Positive (*)     All other components within normal limits   URINALYSIS MICROSCOPIC - Abnormal; Notable for the following components:    WBC, UA 9 (*)     Hyaline Casts, UA 4 (*)     All other components within normal limits    Narrative:     Specimen Source->Urine   APTT   PROTIME-INR   LIPASE   DRUG SCREEN PANEL, URINE EMERGENCY    Narrative:     Specimen Source->Urine   CK   MAGNESIUM   TSH   TROPONIN I HIGH SENSITIVITY   B-TYPE NATRIURETIC PEPTIDE   SARS-COV-2 RNA AMPLIFICATION, QUAL   TROPONIN I HIGH SENSITIVITY   TYPE & SCREEN   ISTAT CREATININE   POCT CREATININE        ECG Results              EKG 12-lead (In process)  Result time 08/09/23 14:07:12      In process by Interface, Lab In [facility] (08/09/23 14:07:12)                   Narrative:    Test Reason : R11.2,    Vent. Rate : 084 BPM     Atrial Rate : 084 BPM     P-R Int : 174 ms          QRS Dur : 074 ms      QT Int : 388 ms       P-R-T Axes : 022 -03 028 degrees     QTc Int : 458 ms    Normal sinus rhythm  Nonspecific ST abnormality  Abnormal ECG  When compared with ECG of 02-NOV-2021 09:17,  No significant change was found    Referred By: [email or web address]   SELF           Confirmed By:                                   Imaging Results              CT Abdomen Pelvis With Contrast (Final result)  Result time 08/09/23 15:46:34      Final result by Jaciel Horan MD (08/09/23 15:46:34)                   Narrative:    CMS MANDATED QUALITY DATA - CT  RADIATION - 436    All CT scans at this facility utilize dose modulation, iterative reconstruction, and/or weight based dosing when appropriate to reduce radiation dose to as low as reasonably achievable.        Reason: GI bleed    TECHNIQUE: CT abdomen and pelvis with 100 mL Omnipaque 350.    COMPARISON: 3/17/2022    CT ABDOMEN:  Visualized lung bases are clear.    Tiny hypodensity in right hepatic lobe suggests a small cyst, and liver is otherwise unremarkable. Gallbladder, pancreas, spleen, and adrenals, and kidneys are normal. Mild aortoiliac calcifications are present.    Postsurgical changes of the proximal stomach suggest prior fundoplication. Large and small intestines are otherwise unremarkable with a few colonic diverticula noted. Appendix not identified but no secondary signs of appendicitis are evident. No free intraperitoneal gas.    Degenerative changes affect the spine.    CT PELVIS:  Ventral hernia repair affects the infraumbilical anterior abdominal wall along with postsurgical change suggesting prior right herniorrhaphy. Small to moderate right and small left fat-containing hernias are present.    Uterus has been removed. No adnexal mass or free pelvic fluid. Bladder is normal.    IMPRESSION:    1. Diverticulosis.  2. No acute findings throughout the abdomen and pelvis.  3. Bilateral fat-containing inguinal hernias.    Electronically signed by:  Jaciel Horan MD  8/9/2023 3:46 PM CDT Workstation: [id number]                                     X-Ray Chest AP Portable (Final result)  Result time 08/09/23 13:31:06      Final result by Blayne Martell Jr., MD (08/09/23 13:31:06)                   Narrative:    CHEST X-RAY SINGLE VIEW    HISTORY: Vomiting. Hematemesis.    PREVIOUS STUDIES: None available    FINDINGS:  The heart size and pulmonary vascularity are within the range of normal.  There is no significant hilar nor mediastinal process.  The aerated lungs are well expanded and clear.  The  right and left CP angles are rather sharp.  The osseous structures show nothing unusual.    There are surgical clips projecting over the bilateral breasts and right axilla.    There is evidence of an elongated calcification near the insertion point of the interspinalis tendon attributed towards calcific tendinitis.    IMPRESSION:   No evidence of acute cardiopulmonary process      Electronically signed by:  Blayne Martell MD  8/9/2023 1:31 PM CDT Workstation: [id number]                                     Medications   pantoprazole injection 40 mg (40 mg Intravenous Given 8/9/23 1311)   morphine injection 2 mg (has no administration in time range)   ondansetron injection 4 mg (has no administration in time range)   ondansetron injection 4 mg (4 mg Intravenous Given 8/9/23 1253)   sodium chloride 0.9% bolus 500 mL 500 mL (0 mLs Intravenous Stopped 8/9/23 1354)   sodium chloride 0.9% bolus 1,000 mL 1,000 mL (0 mLs Intravenous Stopped 8/9/23 1515)   iohexoL (OMNIPAQUE 350) injection 100 mL (100 mLs Intravenous Given 8/9/23 1524)     Medical Decision Making:   Clinical Tests:   Lab Tests: Ordered and Reviewed  Radiological Study: Ordered and Reviewed  Medical Tests: Reviewed and Ordered  ED Management:  Emergent evaluation of a 62-year-old female presenting for nausea and vomiting followed by hematemesis.  Also with melanotic appearing stool over the past several days.  Heavy NSAID use.  Also with some mild epigastric discomfort and recent history of mild mid epigastric blunt trauma during roller coaster ride.  Currently hemodynamically stable.  Stool for occult blood is positive.  No repeat episodes of hematemesis in ED.  History of GERD and Nissen fundoplication.  Have discussed the case in detail with Dr. Nesbitt --GI--plan to scope in a.m..  Symptomatic supportive care given in the emergency room.  PPIs given, gentle IV fluids given.  Will discuss with hospitalist for admission.  Patient is hemodynamically stable  and in no acute distress.                          Clinical Impression:   Final diagnoses:  [R11.2] Nausea and vomiting  [K92.2] Gastrointestinal hemorrhage, unspecified gastrointestinal hemorrhage type (Primary)        ED Disposition Condition    Admit Stable                Mendy Goetz MD  08/09/23 7171

## 2023-08-09 NOTE — ASSESSMENT & PLAN NOTE
Probably secondary to multiple episodes of emesis  IV Protonix  Hemoglobin stable  Gastroenterology consulted

## 2023-08-10 ENCOUNTER — ANESTHESIA EVENT (OUTPATIENT)
Dept: SURGERY | Facility: HOSPITAL | Age: 63
End: 2023-08-10
Payer: COMMERCIAL

## 2023-08-10 ENCOUNTER — ANESTHESIA (OUTPATIENT)
Dept: SURGERY | Facility: HOSPITAL | Age: 63
End: 2023-08-10
Payer: COMMERCIAL

## 2023-08-10 LAB
ANION GAP SERPL CALC-SCNC: 8 MMOL/L (ref 8–16)
BUN SERPL-MCNC: 19 MG/DL (ref 8–23)
CALCIUM SERPL-MCNC: 8.4 MG/DL (ref 8.7–10.5)
CHLORIDE SERPL-SCNC: 107 MMOL/L (ref 95–110)
CO2 SERPL-SCNC: 24 MMOL/L (ref 23–29)
CREAT SERPL-MCNC: 0.5 MG/DL (ref 0.5–1.4)
ERYTHROCYTE [DISTWIDTH] IN BLOOD BY AUTOMATED COUNT: 13.5 % (ref 11.5–14.5)
EST. GFR  (NO RACE VARIABLE): >60 ML/MIN/1.73 M^2
GLUCOSE SERPL-MCNC: 95 MG/DL (ref 70–110)
HCT VFR BLD AUTO: 36 % (ref 37–48.5)
HGB BLD-MCNC: 11.6 G/DL (ref 12–16)
MAGNESIUM SERPL-MCNC: 1.9 MG/DL (ref 1.6–2.6)
MCH RBC QN AUTO: 30 PG (ref 27–31)
MCHC RBC AUTO-ENTMCNC: 32.2 G/DL (ref 32–36)
MCV RBC AUTO: 93 FL (ref 82–98)
PLATELET # BLD AUTO: 249 K/UL (ref 150–450)
PMV BLD AUTO: 9.4 FL (ref 9.2–12.9)
POTASSIUM SERPL-SCNC: 3.8 MMOL/L (ref 3.5–5.1)
RBC # BLD AUTO: 3.87 M/UL (ref 4–5.4)
SODIUM SERPL-SCNC: 139 MMOL/L (ref 136–145)
WBC # BLD AUTO: 9.21 K/UL (ref 3.9–12.7)

## 2023-08-10 PROCEDURE — 85027 COMPLETE CBC AUTOMATED: CPT | Performed by: STUDENT IN AN ORGANIZED HEALTH CARE EDUCATION/TRAINING PROGRAM

## 2023-08-10 PROCEDURE — D9220A PRA ANESTHESIA: Mod: CRNA,,, | Performed by: NURSE ANESTHETIST, CERTIFIED REGISTERED

## 2023-08-10 PROCEDURE — C9113 INJ PANTOPRAZOLE SODIUM, VIA: HCPCS | Performed by: STUDENT IN AN ORGANIZED HEALTH CARE EDUCATION/TRAINING PROGRAM

## 2023-08-10 PROCEDURE — 80048 BASIC METABOLIC PNL TOTAL CA: CPT | Performed by: STUDENT IN AN ORGANIZED HEALTH CARE EDUCATION/TRAINING PROGRAM

## 2023-08-10 PROCEDURE — 36415 COLL VENOUS BLD VENIPUNCTURE: CPT | Performed by: STUDENT IN AN ORGANIZED HEALTH CARE EDUCATION/TRAINING PROGRAM

## 2023-08-10 PROCEDURE — 25000003 PHARM REV CODE 250: Performed by: NURSE ANESTHETIST, CERTIFIED REGISTERED

## 2023-08-10 PROCEDURE — 37000008 HC ANESTHESIA 1ST 15 MINUTES: Performed by: INTERNAL MEDICINE

## 2023-08-10 PROCEDURE — 63600175 PHARM REV CODE 636 W HCPCS: Performed by: STUDENT IN AN ORGANIZED HEALTH CARE EDUCATION/TRAINING PROGRAM

## 2023-08-10 PROCEDURE — D9220A PRA ANESTHESIA: ICD-10-PCS | Mod: ANES,,, | Performed by: ANESTHESIOLOGY

## 2023-08-10 PROCEDURE — 96365 THER/PROPH/DIAG IV INF INIT: CPT | Mod: 59

## 2023-08-10 PROCEDURE — 96376 TX/PRO/DX INJ SAME DRUG ADON: CPT | Mod: 59

## 2023-08-10 PROCEDURE — 25000003 PHARM REV CODE 250: Performed by: STUDENT IN AN ORGANIZED HEALTH CARE EDUCATION/TRAINING PROGRAM

## 2023-08-10 PROCEDURE — 43255 EGD CONTROL BLEEDING ANY: CPT | Performed by: INTERNAL MEDICINE

## 2023-08-10 PROCEDURE — 25000003 PHARM REV CODE 250: Performed by: INTERNAL MEDICINE

## 2023-08-10 PROCEDURE — 96366 THER/PROPH/DIAG IV INF ADDON: CPT | Mod: 59

## 2023-08-10 PROCEDURE — 43239 EGD BIOPSY SINGLE/MULTIPLE: CPT | Performed by: INTERNAL MEDICINE

## 2023-08-10 PROCEDURE — 37000009 HC ANESTHESIA EA ADD 15 MINS: Performed by: INTERNAL MEDICINE

## 2023-08-10 PROCEDURE — G0378 HOSPITAL OBSERVATION PER HR: HCPCS

## 2023-08-10 PROCEDURE — D9220A PRA ANESTHESIA: ICD-10-PCS | Mod: CRNA,,, | Performed by: NURSE ANESTHETIST, CERTIFIED REGISTERED

## 2023-08-10 PROCEDURE — 63600175 PHARM REV CODE 636 W HCPCS: Performed by: NURSE ANESTHETIST, CERTIFIED REGISTERED

## 2023-08-10 PROCEDURE — 27200043 HC FORCEPS, BIOPSY: Performed by: INTERNAL MEDICINE

## 2023-08-10 PROCEDURE — 27201038 HC PROBE, BI-POLAR: Performed by: INTERNAL MEDICINE

## 2023-08-10 PROCEDURE — D9220A PRA ANESTHESIA: Mod: ANES,,, | Performed by: ANESTHESIOLOGY

## 2023-08-10 PROCEDURE — 83735 ASSAY OF MAGNESIUM: CPT | Performed by: STUDENT IN AN ORGANIZED HEALTH CARE EDUCATION/TRAINING PROGRAM

## 2023-08-10 RX ORDER — FLUOXETINE HYDROCHLORIDE 20 MG/1
20 CAPSULE ORAL NIGHTLY
Status: DISCONTINUED | OUTPATIENT
Start: 2023-08-10 | End: 2023-08-11 | Stop reason: HOSPADM

## 2023-08-10 RX ORDER — PANTOPRAZOLE SODIUM 40 MG/1
40 TABLET, DELAYED RELEASE ORAL DAILY
Qty: 90 TABLET | Refills: 0 | Status: SHIPPED | OUTPATIENT
Start: 2023-08-10 | End: 2023-08-11 | Stop reason: SDUPTHER

## 2023-08-10 RX ORDER — PROPOFOL 10 MG/ML
VIAL (ML) INTRAVENOUS
Status: DISCONTINUED | OUTPATIENT
Start: 2023-08-10 | End: 2023-08-10

## 2023-08-10 RX ORDER — TRAMADOL HYDROCHLORIDE 50 MG/1
50 TABLET ORAL EVERY 12 HOURS PRN
Status: DISCONTINUED | OUTPATIENT
Start: 2023-08-10 | End: 2023-08-11 | Stop reason: HOSPADM

## 2023-08-10 RX ORDER — PANTOPRAZOLE SODIUM 40 MG/10ML
40 INJECTION, POWDER, LYOPHILIZED, FOR SOLUTION INTRAVENOUS 2 TIMES DAILY
Status: DISCONTINUED | OUTPATIENT
Start: 2023-08-10 | End: 2023-08-10

## 2023-08-10 RX ORDER — HYDROCHLOROTHIAZIDE 25 MG/1
25 TABLET ORAL NIGHTLY
Status: DISCONTINUED | OUTPATIENT
Start: 2023-08-10 | End: 2023-08-11 | Stop reason: HOSPADM

## 2023-08-10 RX ORDER — ACETAMINOPHEN 325 MG/1
650 TABLET ORAL EVERY 8 HOURS PRN
Status: DISCONTINUED | OUTPATIENT
Start: 2023-08-10 | End: 2023-08-11 | Stop reason: HOSPADM

## 2023-08-10 RX ORDER — LOSARTAN POTASSIUM 50 MG/1
100 TABLET ORAL NIGHTLY
Status: DISCONTINUED | OUTPATIENT
Start: 2023-08-10 | End: 2023-08-11 | Stop reason: HOSPADM

## 2023-08-10 RX ADMIN — SODIUM CHLORIDE: 0.9 INJECTION, SOLUTION INTRAVENOUS at 04:08

## 2023-08-10 RX ADMIN — PROPOFOL 25 MG: 10 INJECTION, EMULSION INTRAVENOUS at 05:08

## 2023-08-10 RX ADMIN — PROPOFOL 100 MG: 10 INJECTION, EMULSION INTRAVENOUS at 05:08

## 2023-08-10 RX ADMIN — LOSARTAN POTASSIUM 100 MG: 50 TABLET, FILM COATED ORAL at 08:08

## 2023-08-10 RX ADMIN — PANTOPRAZOLE SODIUM 40 MG: 40 INJECTION, POWDER, LYOPHILIZED, FOR SOLUTION INTRAVENOUS at 10:08

## 2023-08-10 RX ADMIN — SODIUM CHLORIDE 8 MG/HR: 9 INJECTION, SOLUTION INTRAVENOUS at 08:08

## 2023-08-10 RX ADMIN — ZOLPIDEM TARTRATE 5 MG: 5 TABLET, COATED ORAL at 10:08

## 2023-08-10 RX ADMIN — PROPOFOL 100 MG: 10 INJECTION, EMULSION INTRAVENOUS at 04:08

## 2023-08-10 RX ADMIN — FLUOXETINE 20 MG: 20 CAPSULE ORAL at 08:08

## 2023-08-10 RX ADMIN — HYDROCHLOROTHIAZIDE 25 MG: 25 TABLET ORAL at 08:08

## 2023-08-10 RX ADMIN — ATORVASTATIN CALCIUM 80 MG: 40 TABLET, FILM COATED ORAL at 08:08

## 2023-08-10 RX ADMIN — SODIUM CHLORIDE 8 MG/HR: 9 INJECTION, SOLUTION INTRAVENOUS at 11:08

## 2023-08-10 RX ADMIN — TRAMADOL HYDROCHLORIDE 50 MG: 50 TABLET, COATED ORAL at 10:08

## 2023-08-10 NOTE — ANESTHESIA POSTPROCEDURE EVALUATION
Anesthesia Post Evaluation    Patient: Traci Freire    Procedure(s) Performed: Procedure(s) (LRB):  EGD (ESOPHAGOGASTRODUODENOSCOPY) (N/A)    Final Anesthesia Type: general      Patient location during evaluation: GI PACU  Patient participation: Yes- Able to Participate  Level of consciousness: awake and alert and oriented  Post-procedure vital signs: reviewed and stable  Pain management: adequate  Airway patency: patent    PONV status at discharge: No PONV  Anesthetic complications: no      Cardiovascular status: blood pressure returned to baseline  Respiratory status: unassisted and spontaneous ventilation  Hydration status: euvolemic  Follow-up not needed.          Vitals Value Taken Time   /72 08/10/23 1730   Temp 37.2 °C (99 °F) 08/10/23 1503   Pulse 74 08/10/23 1730   Resp 16 08/10/23 1730   SpO2 97 % 08/10/23 1730         No case tracking events are documented in the log.      Pain/Denise Score: Pain Rating Prior to Med Admin: 6 (8/9/2023  4:17 PM)

## 2023-08-10 NOTE — ANESTHESIA PREPROCEDURE EVALUATION
08/10/2023  Traci Freire is a 62 y.o., female.    Patient Active Problem List   Diagnosis    Arthralgia of cervical spine    HTN (hypertension)    HLD (hyperlipidemia)    Obesity    Edema    Abnormal EKG    Hx of breast cancer    Dysphagia    Prediabetes    Anxiety    Failed total knee arthroplasty    Pre-op exam    Facet arthropathy, lumbar    Rib pain on left side    [place] hump    Insomnia    Failed total left knee replacement    H/O bilateral mastectomy    FRANCIE (obstructive sleep apnea)    Depression    BMI 33.0-33.9,adult    Suspected COVID-19 virus infection    Incisional hernia, without obstruction or gangrene    Upper GI bleed    Gastroenteritis       Past Surgical History:   Procedure Laterality Date    ABDOMINAL SURGERY      BLADDER SUSPENSION      BREAST SURGERY       SECTION      CYSTOSCOPY      avoids greens . ? Interstitial cystitis    HYSTERECTOMY      followed by removal of ovaries  from scar tissue    INCISIONAL HERNIA REPAIR Right 2008    RLQ    JOINT REPLACEMENT      Left total knee replacement-[facility] -     KNEE SURGERY Left 2014    TKR    MODIFIED RADICAL MASTECTOMY W/ AXILLARY LYMPH NODE DISSECTION Right 05/10/2005    w/free flap    NISSEN FUNDOPLICATION      ROBOT-ASSISTED LAPAROSCOPIC REPAIR OF VENTRAL HERNIA N/A 2021    Procedure: ROBOTIC REPAIR, HERNIA, VENTRAL;  Surgeon: John Johnson III, MD;  Location: [facility];  Service: General;  Laterality: N/A;    SIMPLE MASTECTOMY Left 05/10/2005    w/free flap    TONSILLECTOMY          Tobacco Use:  The patient  reports that she quit smoking about 19 years ago. Her smoking use included cigarettes. She has never used smokeless tobacco.     Results for orders placed or performed during the hospital encounter of 23   EKG 12-lead     Collection Time: 08/09/23 12:53 PM    Narrative    Test Reason : R11.2,    Vent. Rate : 084 BPM     Atrial Rate : 084 BPM     P-R Int : 174 ms          QRS Dur : 074 ms      QT Int : 388 ms       P-R-T Axes : 022 -03 028 degrees     QTc Int : 458 ms    Normal sinus rhythm  Nonspecific ST abnormality  Abnormal ECG  When compared with ECG of 02-NOV-2021 09:17,  No significant change was found    Referred By: [email or web address]   SELF           Confirmed By:         Imaging Results          CT Abdomen Pelvis With Contrast (Final result)  Result time 08/09/23 15:46:34    Final result by Jaciel Horan MD (08/09/23 15:46:34)                 Narrative:    CMS MANDATED QUALITY DATA - CT RADIATION - 436    All CT scans at this facility utilize dose modulation, iterative reconstruction, and/or weight based dosing when appropriate to reduce radiation dose to as low as reasonably achievable.        Reason: GI bleed    TECHNIQUE: CT abdomen and pelvis with 100 mL Omnipaque 350.    COMPARISON: 3/17/2022    CT ABDOMEN:  Visualized lung bases are clear.    Tiny hypodensity in right hepatic lobe suggests a small cyst, and liver is otherwise unremarkable. Gallbladder, pancreas, spleen, and adrenals, and kidneys are normal. Mild aortoiliac calcifications are present.    Postsurgical changes of the proximal stomach suggest prior fundoplication. Large and small intestines are otherwise unremarkable with a few colonic diverticula noted. Appendix not identified but no secondary signs of appendicitis are evident. No free intraperitoneal gas.    Degenerative changes affect the spine.    CT PELVIS:  Ventral hernia repair affects the infraumbilical anterior abdominal wall along with postsurgical change suggesting prior right herniorrhaphy. Small to moderate right and small left fat-containing hernias are present.    Uterus has been removed. No adnexal mass or free pelvic fluid. Bladder is normal.    IMPRESSION:    1. Diverticulosis.  2. No acute  findings throughout the abdomen and pelvis.  3. Bilateral fat-containing inguinal hernias.    Electronically signed by:  Jaciel Horan MD  8/9/2023 3:46 PM CDT Workstation: [id number]                             X-Ray Chest AP Portable (Final result)  Result time 08/09/23 13:31:06    Final result by Blayne Martell Jr., MD (08/09/23 13:31:06)                 Narrative:    CHEST X-RAY SINGLE VIEW    HISTORY: Vomiting. Hematemesis.    PREVIOUS STUDIES: None available    FINDINGS:  The heart size and pulmonary vascularity are within the range of normal.  There is no significant hilar nor mediastinal process.  The aerated lungs are well expanded and clear.  The right and left CP angles are rather sharp.  The osseous structures show nothing unusual.    There are surgical clips projecting over the bilateral breasts and right axilla.    There is evidence of an elongated calcification near the insertion point of the interspinalis tendon attributed towards calcific tendinitis.    IMPRESSION:   No evidence of acute cardiopulmonary process      Electronically signed by:  Blayne Martell MD  8/9/2023 1:31 PM CDT Workstation: [id number]                               Lab Results   Component Value Date    WBC 9.21 08/10/2023    HGB 11.6 (L) 08/10/2023    HCT 36.0 (L) 08/10/2023    MCV 93 08/10/2023     08/10/2023     BMP  Lab Results   Component Value Date     08/10/2023    K 3.8 08/10/2023     08/10/2023    CO2 24 08/10/2023    BUN 19 08/10/2023    CREATININE 0.5 08/10/2023    CALCIUM 8.4 (L) 08/10/2023    ANIONGAP 8 08/10/2023    GLU 95 08/10/2023     (H) 08/09/2023    GLU 88 03/06/2023       No results found for this or any previous visit.            Pre-op Assessment    I have reviewed the Patient Summary Reports.     I have reviewed the Nursing Notes. I have reviewed the NPO Status.   I have reviewed the Medications.     Review of Systems  Anesthesia Hx:  No problems with previous Anesthesia   Denies Family Hx of Anesthesia complications.   Denies Personal Hx of Anesthesia complications.   Social:  Non-Smoker    Hematology/Oncology:  Hematology Normal        EENT/Dental:EENT/Dental Normal   Cardiovascular:   Hypertension, well controlled hyperlipidemia    Pulmonary:   Sleep Apnea (does not use CPAP)    Education provided regarding risk of obstructive sleep apnea     Renal/:   renal calculi    Hepatic/GI:  Hepatic/GI Normal IBS.  History of Nissen fundoplication.   Musculoskeletal:   Arthritis     Neurological:  Neurology Normal    Endocrine:  Endocrine Normal    Psych:   anxiety depression      History of Lyme disease    Physical Exam  General: Well nourished    Airway:  Mallampati: II   Mouth Opening: Normal  TM Distance: Normal  Tongue: Normal  Neck ROM: Normal ROM    Dental:  Intact    Chest/Lungs:  Clear to auscultation, Normal Respiratory Rate    Heart:  Rate: Normal  Rhythm: Regular Rhythm        Anesthesia Plan  Type of Anesthesia, risks & benefits discussed:    Anesthesia Type: Gen Natural Airway  Intra-op Monitoring Plan: Standard ASA Monitors  Post Op Pain Control Plan:   (medical reason for not using multimodal pain management)  Induction:  IV  Informed Consent: Informed consent signed with the Patient and all parties understand the risks and agree with anesthesia plan.  All questions answered.   ASA Score: 3  Anesthesia Plan Notes: General with natural airway.    Propofol  Sleep apnea precautions, POM    Ready For Surgery From Anesthesia Perspective.     .

## 2023-08-10 NOTE — [employer] PATIENT INSTRUCTIONS
Discharge Summary/Instructions after an Endoscopic Procedure  Patient Name: Traci Freire  Patient MRN: [id number]  Patient YOB: 1960  Thursday, August 10, 2023  Gerard Hernandez MD  RESTRICTIONS:  During your procedure today, you received medications for sedation.  These   medications may affect your judgment, balance and coordination.  Therefore,   for 24 hours, you have the following restrictions:   - DO NOT drive a car, operate machinery, make legal/financial decisions,   sign important papers or drink alcohol.    ACTIVITY:  Today: no heavy lifting, straining or running due to procedural   sedation/anesthesia.  The following day: return to full activity including work.  DIET:  Eat and drink normally unless instructed otherwise.     TREATMENT FOR COMMON SIDE EFFECTS:  - Mild abdominal pain, nausea, belching, bloating or excessive gas:  rest,   eat lightly and use a heating pad.  - Sore Throat: treat with throat lozenges and/or gargle with warm salt   water.  - Because air was used during the procedure, expelling large amounts of air   from your rectum or belching is normal.  - If a bowel prep was taken, you may not have a bowel movement for 1-3 days.    This is normal.  SYMPTOMS TO WATCH FOR AND REPORT TO YOUR PHYSICIAN:  1. Abdominal pain or bloating, other than gas cramps.  2. Chest pain.  3. Back pain.  4. Signs of infection such as: chills or fever occurring within 24 hours   after the procedure.  5. Rectal bleeding, which would show as bright red, maroon, or black stools.   (A tablespoon of blood from the rectum is not serious, especially if   hemorrhoids are present.)  6. Vomiting.  7. Weakness or dizziness.  GO DIRECTLY TO THE NEAREST EMERGENCY ROOM IF YOU HAVE ANY OF THE FOLLOWING:      Difficulty breathing              Chills and/or fever over 101 F   Persistent vomiting and/or vomiting blood   Severe abdominal pain   Severe chest pain   Black, tarry stools   Bleeding- more than one  tablespoon   Any other symptom or condition that you feel may need urgent attention  Your doctor recommends these additional instructions:  If any biopsies were taken, your doctors clinic will contact you in 1 to 2   weeks with any results.  - Patient has a contact number available for emergencies.  The signs and   symptoms of potential delayed complications were discussed with the   patient.  Return to normal activities tomorrow.  Written discharge   instructions were provided to the patient.   - Resume previous diet.   - Continue present medications.   - Await pathology results.   - Repeat upper endoscopy in 2 months for surveillance.   - Return to GI clinic in 2 weeks.   - [facility].  For questions, problems or results please call your physician - Gerard Hernandez MD at Work:  [phone].  Formerly [facility], [facility], EMERGENCY ROOM PHONE NUMBER: [phone]  IF A COMPLICATION OR EMERGENCY SITUATION ARISES AND YOU ARE UNABLE TO REACH   YOUR PHYSICIAN - GO DIRECTLY TO THE EMERGENCY ROOM.  MD Gerard Lawrence MD  8/10/2023 5:34:45 PM  This report has been verified and signed electronically.  Dear patient,  As a result of recent federal legislation (The Federal Cures Act), you may   receive lab or pathology results from your procedure in your [facility]   account before your physician is able to contact you. Your physician or   their representative will relay the results to you with their   recommendations at their soonest availability.  Thank you,  [employer]ATION

## 2023-08-10 NOTE — TRANSFER OF CARE
"Anesthesia Transfer of Care Note    Patient: Traci Frerie    Procedure(s) Performed: Procedure(s) (LRB):  EGD (ESOPHAGOGASTRODUODENOSCOPY) (N/A)    Patient location: GI    Anesthesia Type: MAC and general    Post pain: adequate analgesia    Post assessment: no apparent anesthetic complications    Post vital signs: stable    Level of consciousness: awake and responds to stimulation    Nausea/Vomiting: no nausea/vomiting    Complications: none    Transfer of care protocol was followedComments: AAOx3, NAD, resp=unlabored, zero complications noted, handoff given / all questions answered       Last vitals:   Visit Vitals  BP (!) 140/61   Pulse 74   Temp 37.2 °C (99 °F) (Oral)   Resp 16   Ht 5' 1" (1.549 m)   Wt 79.4 kg (175 lb)   LMP 05/09/1992   SpO2 98%   Breastfeeding No   BMI 33.07 kg/m²     "

## 2023-08-10 NOTE — PLAN OF CARE
[facility]  Initial Discharge Assessment     Assessment completed at bedside with patient.  Plan is to discharge home with no needs expressed or identified, patient is fully independent with ADL's.     Primary Care Provider: Juliet Medina NP    Admission Diagnosis: Gastrointestinal hemorrhage, unspecified gastrointestinal hemorrhage type [K92.2]    Admission Date: 8/9/2023  Expected Discharge Date:     Transition of Care Barriers: None    Payor: BLUE CROSS BLUE SHIELD / Plan: BCBS ALL OUT OF STATE / Product Type: PPO /     Extended Emergency Contact Information  Primary Emergency Contact: MargotMolly pratt  Address: [id number] [place] [address] [facility]  Home Phone: [phone]  Mobile Phone: [phone]  Relation: Daughter  Secondary Emergency Contact: Faina House   [place]  Mobile Phone: [phone]  Relation: Daughter    Discharge Plan A: Home with family  Discharge Plan B: Home with family      [facility] [address] [place], [place] - [address] [facility]  [address] [facility] [address]  Phone: [phone] Fax: [phone]      Initial Assessment (most recent)       Adult Discharge Assessment - 08/10/23 1017          Discharge Assessment    Assessment Type Discharge Planning Assessment     Confirmed/corrected address, phone number and insurance Yes     Confirmed Demographics Correct on Facesheet     Source of Information patient     Does patient/caregiver understand observation status Yes     People in Home child(renetta), adult     Facility Arrived From: Home     Do you expect to return to your current living situation? Yes     Do you have help at home or someone to help you manage your care at home? No     Prior to hospitilization cognitive status: Alert/Oriented;No Deficits     Current cognitive status: Alert/Oriented;No Deficits     Equipment Currently Used at Home none     Readmission within 30 days? No     Patient currently being  followed by outpatient case management? No     Do you currently have service(s) that help you manage your care at home? No     Do you take prescription medications? Yes     Do you have prescription coverage? Yes     Coverage BCBS     Do you have any problems affording any of your prescribed medications? No     Is the patient taking medications as prescribed? yes     How do you get to doctors appointments? car, drives self     Are you on dialysis? No     Do you take coumadin? No     Discharge Plan A Home with family     Discharge Plan B Home with family     DME Needed Upon Discharge  none     Discharge Plan discussed with: Patient     Transition of Care Barriers None

## 2023-08-11 VITALS
RESPIRATION RATE: 16 BRPM | TEMPERATURE: 98 F | HEART RATE: 70 BPM | WEIGHT: 175 LBS | SYSTOLIC BLOOD PRESSURE: 109 MMHG | DIASTOLIC BLOOD PRESSURE: 63 MMHG | OXYGEN SATURATION: 96 % | HEIGHT: 61 IN | BODY MASS INDEX: 33.04 KG/M2

## 2023-08-11 LAB
ANION GAP SERPL CALC-SCNC: 6 MMOL/L (ref 8–16)
BUN SERPL-MCNC: 17 MG/DL (ref 8–23)
CALCIUM SERPL-MCNC: 8.5 MG/DL (ref 8.7–10.5)
CHLORIDE SERPL-SCNC: 108 MMOL/L (ref 95–110)
CO2 SERPL-SCNC: 25 MMOL/L (ref 23–29)
CREAT SERPL-MCNC: 0.6 MG/DL (ref 0.5–1.4)
ERYTHROCYTE [DISTWIDTH] IN BLOOD BY AUTOMATED COUNT: 13.2 % (ref 11.5–14.5)
EST. GFR  (NO RACE VARIABLE): >60 ML/MIN/1.73 M^2
GLUCOSE SERPL-MCNC: 90 MG/DL (ref 70–110)
HCT VFR BLD AUTO: 34.5 % (ref 37–48.5)
HGB BLD-MCNC: 11 G/DL (ref 12–16)
MCH RBC QN AUTO: 29.6 PG (ref 27–31)
MCHC RBC AUTO-ENTMCNC: 31.9 G/DL (ref 32–36)
MCV RBC AUTO: 93 FL (ref 82–98)
PLATELET # BLD AUTO: 228 K/UL (ref 150–450)
PMV BLD AUTO: 9.7 FL (ref 9.2–12.9)
POTASSIUM SERPL-SCNC: 3.4 MMOL/L (ref 3.5–5.1)
RBC # BLD AUTO: 3.71 M/UL (ref 4–5.4)
SODIUM SERPL-SCNC: 139 MMOL/L (ref 136–145)
WBC # BLD AUTO: 6.27 K/UL (ref 3.9–12.7)

## 2023-08-11 PROCEDURE — G0378 HOSPITAL OBSERVATION PER HR: HCPCS

## 2023-08-11 PROCEDURE — 80048 BASIC METABOLIC PNL TOTAL CA: CPT | Performed by: STUDENT IN AN ORGANIZED HEALTH CARE EDUCATION/TRAINING PROGRAM

## 2023-08-11 PROCEDURE — 36415 COLL VENOUS BLD VENIPUNCTURE: CPT | Performed by: STUDENT IN AN ORGANIZED HEALTH CARE EDUCATION/TRAINING PROGRAM

## 2023-08-11 PROCEDURE — 96366 THER/PROPH/DIAG IV INF ADDON: CPT

## 2023-08-11 PROCEDURE — 25000003 PHARM REV CODE 250: Performed by: STUDENT IN AN ORGANIZED HEALTH CARE EDUCATION/TRAINING PROGRAM

## 2023-08-11 PROCEDURE — C9113 INJ PANTOPRAZOLE SODIUM, VIA: HCPCS | Performed by: STUDENT IN AN ORGANIZED HEALTH CARE EDUCATION/TRAINING PROGRAM

## 2023-08-11 PROCEDURE — 63600175 PHARM REV CODE 636 W HCPCS: Performed by: STUDENT IN AN ORGANIZED HEALTH CARE EDUCATION/TRAINING PROGRAM

## 2023-08-11 PROCEDURE — 85027 COMPLETE CBC AUTOMATED: CPT | Performed by: STUDENT IN AN ORGANIZED HEALTH CARE EDUCATION/TRAINING PROGRAM

## 2023-08-11 RX ORDER — PANTOPRAZOLE SODIUM 40 MG/1
40 TABLET, DELAYED RELEASE ORAL 2 TIMES DAILY
Qty: 180 TABLET | Refills: 0 | Status: SHIPPED | OUTPATIENT
Start: 2023-08-11 | End: 2023-12-18

## 2023-08-11 RX ORDER — TRAMADOL HYDROCHLORIDE 50 MG/1
50 TABLET ORAL EVERY 12 HOURS PRN
Qty: 6 TABLET | Refills: 0 | Status: SHIPPED | OUTPATIENT
Start: 2023-08-11

## 2023-08-11 RX ORDER — POTASSIUM CHLORIDE 20 MEQ/1
40 TABLET, EXTENDED RELEASE ORAL ONCE
Status: COMPLETED | OUTPATIENT
Start: 2023-08-11 | End: 2023-08-11

## 2023-08-11 RX ADMIN — SODIUM CHLORIDE 8 MG/HR: 9 INJECTION, SOLUTION INTRAVENOUS at 09:08

## 2023-08-11 RX ADMIN — POTASSIUM CHLORIDE 40 MEQ: 1500 TABLET, EXTENDED RELEASE ORAL at 09:08

## 2023-08-11 RX ADMIN — SODIUM CHLORIDE 8 MG/HR: 9 INJECTION, SOLUTION INTRAVENOUS at 04:08

## 2023-08-11 NOTE — PROGRESS NOTES
[facility] Medicine    Progress Note    Patient Name: Traci Freire  MRN: [id number]  Patient Class: OP- Observation   Admission Date: 8/9/2023 11:14 AM  Length of Stay: 0  Attending Physician: Dharmesh Olvera MD  Primary Care Provider: Juliet Medina NP  Face-to-Face encounter date: 08/11/2023  Code status:  Chief Complaint: Vomiting and Hematemesis (Onset of vomiting this morning at 0500 with bright red blood with emesis. Pt also reports nausea. )        Subjective:    HPI:   62-year-old  female with history of hypertension and IBS presents to the ED on account of hematochezia.  Patient stated that yesterday shortly after having home stir herrera and home made pizza, she started to have multiple episodes of vomiting alongside diarrhea.  She describe the diarrhea as black in nature and she stated that after having multiple episodes of vomiting, she had a bright red episode.  She denies any fevers, abdominal pain, recent travels or contact with sick persons, chest pain or any bleeding from any orifices.  She denies being on any blood thinners.  She does not smoke cigarettes, drink alcohol or use any illicit medications.  On presentation to the ED, WBC elevated at 15.61 and drug screen negative.  Occult positive and abdominal CT showed diverticulosis with no acute finding.  Hemoglobin was stable at 14.7     Interval History:   8/10: Patient is doing well, for EGD today..No concerns/issues overnight reported by the patient or the nursing staff.    Review of Systems All other Review of Systems were found to be negative expect for that mentioned already in HPI.     Objective:     Vitals:    08/10/23 2217 08/10/23 2311 08/11/23 0300 08/11/23 0647   BP:  116/64 109/67 109/63   BP Location:  Right arm Left arm Left arm   Patient Position:  Lying Lying Lying   Pulse:  75 69 70   Resp: 17 18 16 16   Temp:  98.8 °F (37.1 °C) 98.2 °F (36.8 °C) 98.1 °F (36.7 °C)   TempSrc:  Oral Oral Oral   SpO2:  95%  97% 96%   Weight:       Height:            Vitals reviewed.  Constitutional: No distress.   HENT: NC  Head: Atraumatic.   Cardiovascular: Normal rate, regular rhythm and normal heart sounds.   Pulmonary/Chest: Effort normal. No wheezes.   Abdominal: Soft. Bowel sounds are normal. No distension and no mass. No tenderness  Neurological: Alert.   Skin: Skin is warm and dry.   Psych: Appropriate mood and affect    Following labs were Reviewed   CBC:  Recent Labs   Lab 08/11/23  0353   WBC 6.27   HGB 11.0*   HCT 34.5*        CMP:  Recent Labs   Lab 08/11/23  0353   CALCIUM 8.5*      K 3.4*   CO2 25      BUN 17   CREATININE 0.6       Micro Results  Microbiology Results (last 7 days)       Procedure Component Value Units Date/Time    Clostridium difficile EIA [[id number]]     Order Status: Canceled Specimen: Stool     Stool culture [[id number]]     Order Status: No result Specimen: Stool              Radiology Reports  CT Abdomen Pelvis With Contrast    Result Date: 8/9/2023  CMS MANDATED QUALITY DATA - CT RADIATION - 436 All CT scans at this facility utilize dose modulation, iterative reconstruction, and/or weight based dosing when appropriate to reduce radiation dose to as low as reasonably achievable. Reason: GI bleed TECHNIQUE: CT abdomen and pelvis with 100 mL Omnipaque 350. COMPARISON: 3/17/2022 CT ABDOMEN: Visualized lung bases are clear. Tiny hypodensity in right hepatic lobe suggests a small cyst, and liver is otherwise unremarkable. Gallbladder, pancreas, spleen, and adrenals, and kidneys are normal. Mild aortoiliac calcifications are present. Postsurgical changes of the proximal stomach suggest prior fundoplication. Large and small intestines are otherwise unremarkable with a few colonic diverticula noted. Appendix not identified but no secondary signs of appendicitis are evident. No free intraperitoneal gas. Degenerative changes affect the spine. CT PELVIS: Ventral hernia repair affects the  infraumbilical anterior abdominal wall along with postsurgical change suggesting prior right herniorrhaphy. Small to moderate right and small left fat-containing hernias are present. Uterus has been removed. No adnexal mass or free pelvic fluid. Bladder is normal. IMPRESSION: 1. Diverticulosis. 2. No acute findings throughout the abdomen and pelvis. 3. Bilateral fat-containing inguinal hernias. Electronically signed by:  Jaciel Horan MD  8/9/2023 3:46 PM CDT Workstation: [id number]    X-Ray Chest AP Portable    Result Date: 8/9/2023  CHEST X-RAY SINGLE VIEW HISTORY: Vomiting. Hematemesis. PREVIOUS STUDIES: None available FINDINGS: The heart size and pulmonary vascularity are within the range of normal. There is no significant hilar nor mediastinal process. The aerated lungs are well expanded and clear. The right and left CP angles are rather sharp. The osseous structures show nothing unusual. There are surgical clips projecting over the bilateral breasts and right axilla. There is evidence of an elongated calcification near the insertion point of the interspinalis tendon attributed towards calcific tendinitis. IMPRESSION:   No evidence of acute cardiopulmonary process Electronically signed by:  Blayne Martell MD  8/9/2023 1:31 PM CDT Workstation: [id number]    X-Ray Lumbar Spine Ap And Lateral    Result Date: 8/4/2023  Reason: Chronic midline low back pain with sciatica, sciatica laterality unspecified FINDINGS: Three views of lumbar spine show minor convex left thoracolumbar spine curvature. 4 mm anterolisthesis of L5 on S1 is evident, likely related to severe bilateral facet joint osteoarthrosis. L4-5 and L3-L4 facet joint osteoarthrosis also noted. Moderate to severe disc degeneration occurs at T12-L1 and L1-L2, with mild disc degeneration at L3-L4, L4-L5, and L5-S1. Sacroiliac joints are normal. Surgical clips project in pelvis. Arterial vascular calcifications are evident. IMPRESSION: 1. Grade 1  anterolisthesis of L5 on S1 due to severe bilateral facet joint osteoarthrosis. 2. Additional multilevel lumbar disc degeneration and facet joint osteoarthrosis. Electronically signed by:  Jaciel Horan MD  8/4/2023 8:39 AM CDT Workstation: [id number]       Meds  Scheduled Meds:   atorvastatin  80 mg Oral Daily    FLUoxetine  20 mg Oral QHS    hydroCHLOROthiazide  25 mg Oral QHS    losartan  100 mg Oral QHS     Continuous Infusions:   pantoprazole (PROTONIX) IV infusion 8 mg/hr (08/11/23 0934)     PRN Meds:.acetaminophen, acetaminophen, melatonin, ondansetron, prochlorperazine, sodium chloride 0.9%, traMADoL, zolpidem.    Active PT: No  Active OT: No  Active SLP: No  Assessment & Plan:   * Upper GI bleed  For EGD today  Gastroenterology consulted        Gastroenteritis  Abdominal CT showed no acute findings  Resolved        HTN (hypertension)  Resume home blood pressure antihypertensive          Discharge Planning:   Is the patient medically ready for discharge?: no    Reason for patient still in hospital (select all that apply): Patient trending condition and Treatment    Above encounter included review of the medical records, interviewing and examining the patient face-to-face, discussion with family and other health care providers, ordering and interpreting lab/test results and formulating a plan of care.     Medical Decision Making:      [_] Low Complexity  [_] Moderate Complexity  [x] High Complexity      Dharmesh Olvera MD  Department of Hospital Medicine   Critical access hospital

## 2023-08-11 NOTE — DISCHARGE SUMMARY
[facility] Medicine  Discharge Summary      Patient Name: Traci Freire  MRN: [id number]  NOEMI: [id number]  Patient Class: OP- Observation  Admission Date: 8/9/2023  Hospital Length of Stay: 0 days  Discharge Date and Time:  08/11/2023 9:52 AM  Attending Physician: Dharmesh Olvera MD   Discharging Provider: Dharmesh Olvera MD  Primary Care Provider: Juliet Medina NP    Primary Care Team: Networked reference to record PCT     HPI:   62-year-old  female with history of hypertension and IBS presents to the ED on account of hematochezia.  Patient stated that yesterday shortly after having home stir herrera and home made pizza, she started to have multiple episodes of vomiting alongside diarrhea.  She describe the diarrhea as black in nature and she stated that after having multiple episodes of vomiting, she had a bright red episode.  She denies any fevers, abdominal pain, recent travels or contact with sick persons, chest pain or any bleeding from any orifices.  She denies being on any blood thinners.  She does not smoke cigarettes, drink alcohol or use any illicit medications.  On presentation to the ED, WBC elevated at 15.61 and drug screen negative.  Occult positive and abdominal CT showed diverticulosis with no acute finding.  Hemoglobin was stable at 14.7      Procedure(s) (LRB):  EGD (ESOPHAGOGASTRODUODENOSCOPY) (N/A)      Hospital Course:    Patient was placed on IV Protonix GTT, she subsequently had EGD showed stenosis of the pylorus associated with superficial ulcer which was cauterized.  Was recommended patient stop all nsaids and EGD in 2 months for possible dilation of pylorus and duodenal sweep as well as schatzki  ring.     Physical examination on discharge:  Constitutional: No distress.   HENT: NC  Head: Atraumatic.   Cardiovascular: Normal rate, regular rhythm and normal heart sounds.   Pulmonary/Chest: Effort normal. No wheezes.   Abdominal: Soft. Bowel sounds are normal.  No distension and no mass. No tenderness  Neurological: Alert.   Skin: Skin is warm and dry.   Psych: Appropriate mood and affect    I have seen the patient on the day of discharge and reviewed the discharge instructions as outlined.    Goals of Care Treatment Preferences:  Code Status: Full Code      Consults:   Consults (From admission, onward)        Status Ordering Provider     Inpatient consult to Gastroenterology  Once        Provider:  Herrera Nesbitt III, MD Completed RAGAS, ANGIE M.          No new Assessment & Plan notes have been filed under this hospital service since the last note was generated.  Service: Hospital Medicine    Final Active Diagnoses:    Diagnosis Date Noted POA    PRINCIPAL PROBLEM:  Upper GI bleed [K92.2] 08/09/2023 Yes    Gastroenteritis [K52.9] 08/09/2023 Yes    HTN (hypertension) [I10] 04/30/2014 Yes      Problems Resolved During this Admission:       Discharged Condition: good    Disposition: Home or Self Care    Follow Up:   Follow-up Information     Juliet Medina NP Follow up in 1 week(s).    Specialty: Family Medicine  Contact information:  [address] [place] [id number] [phone]             egd Follow up in 2 month(s).                     Patient Instructions:      Activity as tolerated       Significant Diagnostic Studies: Labs:   BMP:   Recent Labs   Lab 08/09/23  1254 08/10/23  0342 08/11/23  0353   * 95 90    139 139   K 3.8 3.8 3.4*    107 108   CO2 24 24 25   BUN 38* 19 17   CREATININE 0.7 0.5 0.6   CALCIUM 9.8 8.4* 8.5*   MG 2.1 1.9  --    , CMP   Recent Labs   Lab 08/09/23  1254 08/10/23  0342 08/11/23  0353    139 139   K 3.8 3.8 3.4*    107 108   CO2 24 24 25   * 95 90   BUN 38* 19 17   CREATININE 0.7 0.5 0.6   CALCIUM 9.8 8.4* 8.5*   PROT 8.4  --   --    ALBUMIN 4.5  --   --    BILITOT 0.8  --   --    ALKPHOS 76  --   --    AST 21  --   --    ALT 23  --   --    ANIONGAP 10 8 6*   , CBC   Recent Labs  "  Lab 08/09/23  1254 08/10/23  0342 08/11/23  0353   WBC 15.61* 9.21 6.27   HGB 14.7 11.6* 11.0*   HCT 45.0 36.0* 34.5*    249 228    and INR   Lab Results   Component Value Date    INR 0.9 08/09/2023    INR 2.2 06/03/2014    INR 1.8 05/29/2014     Microbiology: Blood Culture No results found for: "LABBLOO" and Sputum Culture No results found for: "GSRESP", "RESPIRATORYC"  Radiology: X-Ray: CXR: X-Ray Chest 1 View (CXR): No results found for this visit on 08/09/23.    Pending Diagnostic Studies:     Procedure Component Value Units Date/Time    Specimen to Pathology - Surgery [[id number]] Collected: 08/10/23 1728    Order Status: Sent Lab Status: No result     Specimen: Tissue          Medications:  Reconciled Home Medications:      Medication List      START taking these medications    pantoprazole 40 MG tablet  Commonly known as: PROTONIX  Take 1 tablet (40 mg total) by mouth 2 (two) times daily.        CONTINUE taking these medications    B12 ACTIVE ORAL  Take 1 tablet by mouth Daily.     FLUoxetine 20 MG capsule  Take 1 capsule (20 mg total) by mouth once daily.     hydroCHLOROthiazide 25 MG tablet  Commonly known as: HYDRODIURIL  Take 1 tablet (25 mg total) by mouth once daily.     irbesartan 300 MG tablet  Commonly known as: AVAPRO  Take 1 tablet (300 mg total) by mouth every evening.     rosuvastatin 20 MG tablet  Commonly known as: CRESTOR  Take 1 tablet (20 mg total) by mouth every evening.     zolpidem 10 mg Tab  Commonly known as: AMBIEN  Take 1 tablet (10 mg total) by mouth every evening.        STOP taking these medications    ibuprofen 200 MG tablet  Commonly known as: ADVIL,MOTRIN            Indwelling Lines/Drains at time of discharge:   Lines/Drains/Airways     None                 Time spent on the discharge of patient: 35 minutes         Dharmesh Olvera MD  Department of Hospital Medicine  [facility]  "

## 2023-08-11 NOTE — PLAN OF CARE
Discharge orders and chart reviewed with no further post-acute discharge needs identified at this time.  At this time, patient is cleared for discharge from Case Management standpoint.        08/11/23 0950   Final Note   Assessment Type Final Discharge Note   Anticipated Discharge Disposition Home   Post-Acute Status   Post-Acute Authorization Other   Other Status No Post-Acute Service Needs   Discharge Delays None known at this time

## 2023-08-11 NOTE — PLAN OF CARE
Problem: Adult Inpatient Plan of Care  Goal: Plan of Care Review  Outcome: Ongoing, Progressing  Goal: Patient-Specific Goal (Individualized)  Outcome: Ongoing, Progressing  Goal: Absence of Hospital-Acquired Illness or Injury  Outcome: Ongoing, Progressing  Goal: Optimal Comfort and Wellbeing  Outcome: Ongoing, Progressing  Goal: Readiness for Transition of Care  Outcome: Ongoing, Progressing     Problem: Infection  Goal: Absence of Infection Signs and Symptoms  Outcome: Ongoing, Progressing     Problem: Fall Injury Risk  Goal: Absence of Fall and Fall-Related Injury  Outcome: Ongoing, Progressing     Problem: Anemia  Goal: Anemia Symptom Improvement  Outcome: Ongoing, Progressing

## 2023-08-14 ENCOUNTER — TELEPHONE (OUTPATIENT)
Dept: FAMILY MEDICINE | Facility: CLINIC | Age: 63
End: 2023-08-14

## 2023-08-14 NOTE — TELEPHONE ENCOUNTER
Spoke with patients daughter, states she is currently sleeping but she is actually going back to work tomorrow. No issues, states she's scheduled with GI for a f/u, she will have her give us a call back.

## 2023-08-14 NOTE — TELEPHONE ENCOUNTER
----- Message from Remedios Campbell LPN sent at 8/11/2023 12:40 PM CDT -----  Regarding: HFU  Call patient - needs post-hospital phone call within 2 business days and hospital follow up visit scheduled within 7-14 days.    Expected discharge- 8/11/23

## 2023-08-17 ENCOUNTER — PATIENT OUTREACH (OUTPATIENT)
Dept: FAMILY MEDICINE | Facility: CLINIC | Age: 63
End: 2023-08-17

## 2023-08-17 NOTE — PROGRESS NOTES
Discharge Information     Discharge Date:   8/11/23    Primary Discharge Diagnosis:  gastrointestinal hemorrhage      Discharge Summary:  Reviewed      Medication & Order Review     Were medication changes made or new medications added?   No    If so, has the patient filled the prescriptions?  No     Was Home Health ordered? No    If so, has Home Health contacted patient and/or initiated services?  No    Name of Home Health Agency? N/A    Durable Medical Equipment ordered?  No     If so, has the DME provider contacted patient and delivered equipment?  N/A    Follow Up               Any problems since discharge? No    How is the patient feeling since returning home?      Have you set up recommended follow up appointments?  (cardiology, surgery, etc.)    Schedule Hospital Follow-up appointment within 7-14 days (preferably 7).      Notes:  Multiple attempts were made to contact patient within the few two days of d/c however she works nights so it took a few days to get in touch with her. She states she is doing okay since d/c but she would like something sent in for pain. States she is still having some pain. She was only able to come in first thing in the morning due to this. She is scheduled next week and Juliet got a message with the pain complaint.     Jemima Cui

## 2023-08-17 NOTE — TELEPHONE ENCOUNTER
Spoke with patient who refused OV today, states she worked all last night and is unable to come in. She would like to come in early in the morning next week.

## 2023-08-18 RX ORDER — HYDROCODONE BITARTRATE AND ACETAMINOPHEN 10; 325 MG/1; MG/1
1 TABLET ORAL EVERY 6 HOURS PRN
Qty: 30 TABLET | Refills: 0 | Status: SHIPPED | OUTPATIENT
Start: 2023-08-18 | End: 2023-09-05 | Stop reason: SDUPTHER

## 2023-08-24 ENCOUNTER — OFFICE VISIT (OUTPATIENT)
Dept: FAMILY MEDICINE | Facility: CLINIC | Age: 63
End: 2023-08-24
Payer: COMMERCIAL

## 2023-08-24 VITALS
WEIGHT: 180 LBS | HEIGHT: 61 IN | HEART RATE: 80 BPM | DIASTOLIC BLOOD PRESSURE: 72 MMHG | SYSTOLIC BLOOD PRESSURE: 120 MMHG | BODY MASS INDEX: 33.99 KG/M2

## 2023-08-24 DIAGNOSIS — G47.00 INSOMNIA, UNSPECIFIED TYPE: ICD-10-CM

## 2023-08-24 DIAGNOSIS — K21.9 GASTROESOPHAGEAL REFLUX DISEASE, UNSPECIFIED WHETHER ESOPHAGITIS PRESENT: ICD-10-CM

## 2023-08-24 DIAGNOSIS — K92.2 UPPER GI BLEED: ICD-10-CM

## 2023-08-24 DIAGNOSIS — M47.816 FACET ARTHROPATHY, LUMBAR: ICD-10-CM

## 2023-08-24 DIAGNOSIS — Z85.3 HX OF BREAST CANCER: Primary | ICD-10-CM

## 2023-08-24 DIAGNOSIS — Z09 HOSPITAL DISCHARGE FOLLOW-UP: ICD-10-CM

## 2023-08-24 DIAGNOSIS — I10 PRIMARY HYPERTENSION: ICD-10-CM

## 2023-08-24 PROCEDURE — 3078F PR MOST RECENT DIASTOLIC BLOOD PRESSURE < 80 MM HG: ICD-10-PCS | Mod: CPTII,S$GLB,, | Performed by: NURSE PRACTITIONER

## 2023-08-24 PROCEDURE — 1159F PR MEDICATION LIST DOCUMENTED IN MEDICAL RECORD: ICD-10-PCS | Mod: CPTII,S$GLB,, | Performed by: NURSE PRACTITIONER

## 2023-08-24 PROCEDURE — 3074F PR MOST RECENT SYSTOLIC BLOOD PRESSURE < 130 MM HG: ICD-10-PCS | Mod: CPTII,S$GLB,, | Performed by: NURSE PRACTITIONER

## 2023-08-24 PROCEDURE — 1159F MED LIST DOCD IN RCRD: CPT | Mod: CPTII,S$GLB,, | Performed by: NURSE PRACTITIONER

## 2023-08-24 PROCEDURE — 99495 TRANSJ CARE MGMT MOD F2F 14D: CPT | Mod: S$GLB,,, | Performed by: NURSE PRACTITIONER

## 2023-08-24 PROCEDURE — 4010F ACE/ARB THERAPY RXD/TAKEN: CPT | Mod: CPTII,S$GLB,, | Performed by: NURSE PRACTITIONER

## 2023-08-24 PROCEDURE — 3008F PR BODY MASS INDEX (BMI) DOCUMENTED: ICD-10-PCS | Mod: CPTII,S$GLB,, | Performed by: NURSE PRACTITIONER

## 2023-08-24 PROCEDURE — 3074F SYST BP LT 130 MM HG: CPT | Mod: CPTII,S$GLB,, | Performed by: NURSE PRACTITIONER

## 2023-08-24 PROCEDURE — 3078F DIAST BP <80 MM HG: CPT | Mod: CPTII,S$GLB,, | Performed by: NURSE PRACTITIONER

## 2023-08-24 PROCEDURE — 99495 TCM SERVICES (MODERATE COMPLEXITY): ICD-10-PCS | Mod: S$GLB,,, | Performed by: NURSE PRACTITIONER

## 2023-08-24 PROCEDURE — 1160F PR REVIEW ALL MEDS BY PRESCRIBER/CLIN PHARMACIST DOCUMENTED: ICD-10-PCS | Mod: CPTII,S$GLB,, | Performed by: NURSE PRACTITIONER

## 2023-08-24 PROCEDURE — 3008F BODY MASS INDEX DOCD: CPT | Mod: CPTII,S$GLB,, | Performed by: NURSE PRACTITIONER

## 2023-08-24 PROCEDURE — 1160F RVW MEDS BY RX/DR IN RCRD: CPT | Mod: CPTII,S$GLB,, | Performed by: NURSE PRACTITIONER

## 2023-08-24 PROCEDURE — 4010F PR ACE/ARB THEARPY RXD/TAKEN: ICD-10-PCS | Mod: CPTII,S$GLB,, | Performed by: NURSE PRACTITIONER

## 2023-08-24 NOTE — PROGRESS NOTES
SUBJECTIVE:    Patient ID: Traci Freire is a 62 y.o. female.    Chief Complaint: Follow-up ([facility] hospital f/u for bleeding ulcers//no med bottles//tc)    62-year-old  female presents for hospital follow up. She has a  history of hypertension depression and insomnia and IBS.  She presented  to the ED on account of hematochezia.  Patient stated that yesterday shortly after having home stir herrera and home made pizza, she started to have multiple episodes of vomiting alongside diarrhea.  She describe the diarrhea as black in nature and she stated that after having multiple episodes of vomiting, she had a bright red episode.  She denies any fevers, abdominal pain, recent travels or contact with sick persons, chest pain or any bleeding from any orifices.  She denies being on any blood thinners.  She does not smoke cigarettes, drink alcohol or use any illicit medications.  On presentation to the ED, WBC elevated at 15.61 and drug screen negative.  Occult positive and abdominal CT showed diverticulosis with no acute finding.  Hemoglobin was stable at 14.7     EGD (ESOPHAGOGASTRODUODENOSCOPY) (N/A)       Hospital Course:    Patient was placed on IV Protonix GTT, she subsequently had EGD showed stenosis of the pylorus associated with superficial ulcer which was cauterized.  Was recommended patient stop all nsaids and EGD in 2 months for possible dilation of pylorus and duodenal sweep as well as schatzki  ring.    [facility] stomach issues have improved. Taking protonix as prescribed. Has upcoming gi appointment. She is also planning to see pain management tomorrow. Back pain has progressed since being off of nsaids. Pain is radiating down left side        Admit Date: 8/9/23   Discharge Date: 8/11/23  Discharge Facility: Hospital    Medication Reconciliation:  Medications changed/added/deleted. Medication list reconciled  New Prescriptions filled after discharge: yes  Discharge summary reviewed:  yes  Pending test results  at discharge reviewed:   yes  Follow up appointments scheduled:  yes              with Gastroenterology   Follow up labs/tests ordered:   yes  Home Health ordered on discharge:   no      How patient is feeling since discharge from the hospital?still in pain  Patient follow up phone call documented on separate encounter.      Admission on 08/09/2023, Discharged on 08/11/2023   Component Date Value Ref Range Status    WBC 08/09/2023 15.61 (H)  3.90 - 12.70 K/uL Final    RBC 08/09/2023 4.90  4.00 - 5.40 M/uL Final    Hemoglobin 08/09/2023 14.7  12.0 - 16.0 g/dL Final    Hematocrit 08/09/2023 45.0  37.0 - 48.5 % Final    MCV 08/09/2023 92  82 - 98 fL Final    MCH 08/09/2023 30.0  27.0 - 31.0 pg Final    MCHC 08/09/2023 32.7  32.0 - 36.0 g/dL Final    RDW 08/09/2023 13.2  11.5 - 14.5 % Final    Platelets 08/09/2023 318  150 - 450 K/uL Final    MPV 08/09/2023 9.4  9.2 - 12.9 fL Final    Immature Granulocytes 08/09/2023 0.6 (H)  0.0 - 0.5 % Final    Gran # (ANC) 08/09/2023 13.6 (H)  1.8 - 7.7 K/uL Final    Immature Grans (Abs) 08/09/2023 0.09 (H)  0.00 - 0.04 K/uL Final    Lymph # 08/09/2023 1.1  1.0 - 4.8 K/uL Final    Mono # 08/09/2023 0.8  0.3 - 1.0 K/uL Final    Eos # 08/09/2023 0.0  0.0 - 0.5 K/uL Final    Baso # 08/09/2023 0.04  0.00 - 0.20 K/uL Final    nRBC 08/09/2023 0  0 /100 WBC Final    Gran % 08/09/2023 87.3 (H)  38.0 - 73.0 % Final    Lymph % 08/09/2023 6.7 (L)  18.0 - 48.0 % Final    Mono % 08/09/2023 4.9  4.0 - 15.0 % Final    Eosinophil % 08/09/2023 0.2  0.0 - 8.0 % Final    Basophil % 08/09/2023 0.3  0.0 - 1.9 % Final    Differential Method 08/09/2023 Automated   Final    Sodium 08/09/2023 142  136 - 145 mmol/L Final    Potassium 08/09/2023 3.8  3.5 - 5.1 mmol/L Final    Chloride 08/09/2023 108  95 - 110 mmol/L Final    CO2 08/09/2023 24  23 - 29 mmol/L Final    Glucose 08/09/2023 130 (H)  70 - 110 mg/dL Final    BUN 08/09/2023 38 (H)  8 - 23 mg/dL Final    Creatinine 08/09/2023 0.7  0.5 - 1.4 mg/dL Final     Calcium 08/09/2023 9.8  8.7 - 10.5 mg/dL Final    Total Protein 08/09/2023 8.4  6.0 - 8.4 g/dL Final    Albumin 08/09/2023 4.5  3.5 - 5.2 g/dL Final    Total Bilirubin 08/09/2023 0.8  0.1 - 1.0 mg/dL Final    Alkaline Phosphatase 08/09/2023 76  55 - 135 U/L Final    AST 08/09/2023 21  10 - 40 U/L Final    ALT 08/09/2023 23  10 - 44 U/L Final    eGFR 08/09/2023 >60.0  >60 mL/min/1.73 m^2 Final    Anion Gap 08/09/2023 10  8 - 16 mmol/L Final    Group & Rh 08/09/2023 A POS   Final    Indirect Rain 08/09/2023 NEG   Final    Specimen Outdate 08/09/2023 08/12/2023 23:59   Final    aPTT 08/09/2023 21.5  21.0 - 32.0 sec Final    Prothrombin Time 08/09/2023 10.6  9.0 - 12.5 sec Final    INR 08/09/2023 0.9  0.8 - 1.2 Final    Specimen UA 08/09/2023 Urine, Clean Catch   Final    Color, UA 08/09/2023 Yellow  Yellow, Straw, Raysa Final    Appearance, UA 08/09/2023 Clear  Clear Final    pH, UA 08/09/2023 6.0  5.0 - 8.0 Final    Specific Gravity, UA 08/09/2023 >1.030 (A)  1.005 - 1.030 Final    Protein, UA 08/09/2023 1+ (A)  Negative Final    Glucose, UA 08/09/2023 Negative  Negative Final    Ketones, UA 08/09/2023 Negative  Negative Final    Bilirubin (UA) 08/09/2023 Negative  Negative Final    Occult Blood UA 08/09/2023 1+ (A)  Negative Final    Nitrite, UA 08/09/2023 Negative  Negative Final    Urobilinogen, UA 08/09/2023 Negative  Negative EU/dL Final    Leukocytes, UA 08/09/2023 1+ (A)  Negative Final    Lipase 08/09/2023 32  4 - 60 U/L Final    Benzodiazepines 08/09/2023 Negative  Negative Final    Cocaine (Metab.) 08/09/2023 Negative  Negative Final    Opiate Scrn, Ur 08/09/2023 Negative  Negative Final    Barbiturate Screen, Ur 08/09/2023 Negative  Negative Final    Amphetamine Screen, Ur 08/09/2023 Negative  Negative Final    THC 08/09/2023 Negative  Negative Final    Phencyclidine 08/09/2023 Negative  Negative Final    Creatinine, Urine 08/09/2023 135.0  15.0 - 325.0 mg/dL Final    Toxicology Information 08/09/2023  SEE COMMENT   Final    Specimen UA 08/09/2023 Urine, Clean Catch   Final    Color, UA 08/09/2023 Yellow  Yellow, Straw, Raysa Final    Appearance, UA 08/09/2023 Clear  Clear Final    pH, UA 08/09/2023 6.0  5.0 - 8.0 Final    Specific Gravity, UA 08/09/2023 >1.030 (A)  1.005 - 1.030 Final    Protein, UA 08/09/2023 1+ (A)  Negative Final    Glucose, UA 08/09/2023 Negative  Negative Final    Ketones, UA 08/09/2023 Negative  Negative Final    Bilirubin (UA) 08/09/2023 Negative  Negative Final    Occult Blood UA 08/09/2023 1+ (A)  Negative Final    Nitrite, UA 08/09/2023 Negative  Negative Final    Urobilinogen, UA 08/09/2023 Negative  Negative EU/dL Final    Leukocytes, UA 08/09/2023 1+ (A)  Negative Final    CPK 08/09/2023 75  20 - 180 U/L Final    Magnesium 08/09/2023 2.1  1.6 - 2.6 mg/dL Final    TSH 08/09/2023 2.600  0.340 - 5.600 uIU/mL Final    Troponin I High Sensitivity 08/09/2023 7.9  0.0 - 14.9 pg/mL Final    Troponin I High Sensitivity 08/09/2023 10.1  0.0 - 14.9 pg/mL Final    BNP 08/09/2023 17  0 - 99 pg/mL Final    POC Creatinine 08/09/2023 0.7  0.5 - 1.4 mg/dL Final    Sample 08/09/2023 unknown   Final    Occult Blood 08/09/2023 Positive (A)  Negative Final    SARS-CoV-2 RNA, Amplification, Qual 08/09/2023 Negative  Negative Final    RBC, UA 08/09/2023 3  0 - 4 /hpf Final    WBC, UA 08/09/2023 9 (H)  0 - 5 /hpf Final    Bacteria 08/09/2023 Negative  None-Occ /hpf Final    Squam Epithel, UA 08/09/2023 2  /hpf Final    Hyaline Casts, UA 08/09/2023 4 (A)  0-1/lpf /lpf Final    Microscopic Comment 08/09/2023 SEE COMMENT   Final    WBC 08/10/2023 9.21  3.90 - 12.70 K/uL Final    RBC 08/10/2023 3.87 (L)  4.00 - 5.40 M/uL Final    Hemoglobin 08/10/2023 11.6 (L)  12.0 - 16.0 g/dL Final    Hematocrit 08/10/2023 36.0 (L)  37.0 - 48.5 % Final    MCV 08/10/2023 93  82 - 98 fL Final    MCH 08/10/2023 30.0  27.0 - 31.0 pg Final    MCHC 08/10/2023 32.2  32.0 - 36.0 g/dL Final    RDW 08/10/2023 13.5  11.5 - 14.5 % Final     Platelets 08/10/2023 249  150 - 450 K/uL Final    MPV 08/10/2023 9.4  9.2 - 12.9 fL Final    Sodium 08/10/2023 139  136 - 145 mmol/L Final    Potassium 08/10/2023 3.8  3.5 - 5.1 mmol/L Final    Chloride 08/10/2023 107  95 - 110 mmol/L Final    CO2 08/10/2023 24  23 - 29 mmol/L Final    Glucose 08/10/2023 95  70 - 110 mg/dL Final    BUN 08/10/2023 19  8 - 23 mg/dL Final    Creatinine 08/10/2023 0.5  0.5 - 1.4 mg/dL Final    Calcium 08/10/2023 8.4 (L)  8.7 - 10.5 mg/dL Final    Anion Gap 08/10/2023 8  8 - 16 mmol/L Final    eGFR 08/10/2023 >60.0  >60 mL/min/1.73 m^2 Final    Magnesium 08/10/2023 1.9  1.6 - 2.6 mg/dL Final    WBC 2023 6.27  3.90 - 12.70 K/uL Final    RBC 2023 3.71 (L)  4.00 - 5.40 M/uL Final    Hemoglobin 2023 11.0 (L)  12.0 - 16.0 g/dL Final    Hematocrit 2023 34.5 (L)  37.0 - 48.5 % Final    MCV 2023 93  82 - 98 fL Final    MCH 2023 29.6  27.0 - 31.0 pg Final    MCHC 2023 31.9 (L)  32.0 - 36.0 g/dL Final    RDW 2023 13.2  11.5 - 14.5 % Final    Platelets 2023 228  150 - 450 K/uL Final    MPV 2023 9.7  9.2 - 12.9 fL Final    Sodium 2023 139  136 - 145 mmol/L Final    Potassium 2023 3.4 (L)  3.5 - 5.1 mmol/L Final    Chloride 2023 108  95 - 110 mmol/L Final    CO2 2023 25  23 - 29 mmol/L Final    Glucose 2023 90  70 - 110 mg/dL Final    BUN 2023 17  8 - 23 mg/dL Final    Creatinine 2023 0.6  0.5 - 1.4 mg/dL Final    Calcium 2023 8.5 (L)  8.7 - 10.5 mg/dL Final    Anion Gap 2023 6 (L)  8 - 16 mmol/L Final    eGFR 2023 >60.0  >60 mL/min/1.73 m^2 Final       Past Medical History:   Diagnosis Date    Anxiety     Arthritis     Cancer breast    Right- Bilateral mastectomy- May 2005- had breast reconstruction- mother  of breast cancer    HLD (hyperlipidemia)     Hypertension     diagnosed age late 40's     IBS (irritable bowel syndrome)     Kidney stone     Lyme disease     arthritis  of hands. Felt like flu- age early 30's- got it  after going to [place]    Sleep apnea     NO MACHINE     Past Surgical History:   Procedure Laterality Date    ABDOMINAL SURGERY      BLADDER SUSPENSION      BREAST SURGERY       SECTION      CYSTOSCOPY      avoids greens . ? Interstitial cystitis    ESOPHAGOGASTRODUODENOSCOPY N/A 8/10/2023    Procedure: EGD (ESOPHAGOGASTRODUODENOSCOPY);  Surgeon: Gerard Hernandez MD;  [facility]: [facility];  Service: Endoscopy;  Laterality: N/A;    HYSTERECTOMY      followed by removal of ovaries  from scar tissue    INCISIONAL HERNIA REPAIR Right 2008    RLQ    JOINT REPLACEMENT      Left total knee replacement-[facility] -     KNEE SURGERY Left     TKR    MODIFIED RADICAL MASTECTOMY W/ AXILLARY LYMPH NODE DISSECTION Right 05/10/2005    w/free flap    NISSEN FUNDOPLICATION      ROBOT-ASSISTED LAPAROSCOPIC REPAIR OF VENTRAL HERNIA N/A 2021    Procedure: ROBOTIC REPAIR, HERNIA, VENTRAL;  Surgeon: John Johnson III, MD;  Location: [facility];  Service: General;  Laterality: N/A;    SIMPLE MASTECTOMY Left 05/10/2005    w/free flap    TONSILLECTOMY       Family History   Problem Relation Age of Onset    Cancer Mother          of breast cancer, also had gynecological cancer- had breast cancer that spread to the brain    Heart disease Father         in his 60's-  in his 70's       Marital Status:   Alcohol History:  reports no history of alcohol use.  Tobacco History:  reports that she quit smoking about 19 years ago. Her smoking use included cigarettes. She has never used smokeless tobacco.  Drug History:  reports no history of drug use.    Review of patient's allergies indicates:   Allergen Reactions    Keflex [cephalexin]     Macrobid [nitrofurantoin monohyd/m-cryst]     Bactrim [sulfamethoxazole-trimethoprim] Nausea And Vomiting       Current Outpatient Medications:     FLUoxetine 20 MG capsule, Take 1  capsule (20 mg total) by mouth once daily., Disp: 90 capsule, Rfl: 1    hydroCHLOROthiazide (HYDRODIURIL) 25 MG tablet, Take 1 tablet (25 mg total) by mouth once daily., Disp: 90 tablet, Rfl: 1    HYDROcodone-acetaminophen (NORCO)  mg per tablet, Take 1 tablet by mouth every 6 (six) hours as needed for Pain., Disp: 30 tablet, Rfl: 0    irbesartan (AVAPRO) 300 MG tablet, Take 1 tablet (300 mg total) by mouth every evening., Disp: 90 tablet, Rfl: 3    mecobalamin (B12 ACTIVE ORAL), Take 1 tablet by mouth Daily., Disp: , Rfl:     pantoprazole (PROTONIX) 40 MG tablet, Take 1 tablet (40 mg total) by mouth 2 (two) times daily., Disp: 180 tablet, Rfl: 0    rosuvastatin (CRESTOR) 20 MG tablet, Take 1 tablet (20 mg total) by mouth every evening., Disp: 90 tablet, Rfl: 1    traMADoL (ULTRAM) 50 mg tablet, Take 1 tablet (50 mg total) by mouth every 12 (twelve) hours as needed for Pain. (Patient not taking: Reported on 8/24/2023), Disp: 6 tablet, Rfl: 0    zolpidem (AMBIEN) 10 mg Tab, Take 1 tablet (10 mg total) by mouth every evening., Disp: 90 tablet, Rfl: 1  No current facility-administered medications for this visit.    Review of Systems   Constitutional:  Negative for chills, fever and unexpected weight change.   HENT:  Negative for ear pain, rhinorrhea and sore throat.    Eyes:  Negative for pain and visual disturbance.   Respiratory:  Negative for cough and shortness of breath.    Cardiovascular:  Negative for chest pain, palpitations and leg swelling.   Gastrointestinal:  Negative for abdominal pain, diarrhea, nausea and vomiting.   Genitourinary:  Negative for difficulty urinating.   Musculoskeletal:  Positive for back pain. Negative for arthralgias.   Skin:  Negative for rash.   Neurological:  Negative for dizziness, weakness and headaches.   Psychiatric/Behavioral:  Negative for agitation and sleep disturbance. The patient is not nervous/anxious.         Objective:      Vitals:    08/24/23 0757   BP: 120/72  "  Pulse: 80   Weight: 81.6 kg (180 lb)   Height: 5' 1" (1.549 m)     Physical Exam  Constitutional:       General: She is not in acute distress.     Appearance: Normal appearance. She is well-developed. She is obese. She is not ill-appearing.   HENT:      Right Ear: External ear normal.      Left Ear: External ear normal.   Neck:      Vascular: No JVD.   Cardiovascular:      Rate and Rhythm: Normal rate and regular rhythm.      Heart sounds: No murmur heard.  Pulmonary:      Effort: Pulmonary effort is normal.      Breath sounds: Normal breath sounds.   Abdominal:      General: Bowel sounds are normal. There is no distension.      Palpations: Abdomen is soft.      Tenderness: There is no abdominal tenderness.   Musculoskeletal:         General: No deformity.      Cervical back: Normal range of motion and neck supple.      Lumbar back: Decreased range of motion. Positive left straight leg raise test. Negative right straight leg raise test.   Lymphadenopathy:      Cervical: No cervical adenopathy.   Skin:     General: Skin is warm and dry.      Findings: No rash.   Neurological:      Mental Status: She is alert and oriented to person, place, and time.      Gait: Gait normal.   Psychiatric:         Speech: Speech normal.         Behavior: Behavior normal.         Assessment:       1. Hx of breast cancer    2. Primary hypertension    3. Insomnia, unspecified type    4. Gastroesophageal reflux disease, unspecified whether esophagitis present    5. Hospital discharge follow-up    6. Upper GI bleed    7. Facet arthropathy, lumbar         Plan:       Hx of breast cancer    Primary hypertension  Comments:  bp is well controlled.     Insomnia, unspecified type  Comments:  ambien    Gastroesophageal reflux disease, unspecified whether esophagitis present    Hospital discharge follow-up  Comments:  discharge reviewed    Upper GI bleed  Comments:  see gi as scheduled    Facet arthropathy, lumbar  Comments:  follow up with marialuisa " as discussed      Follow up in about 4 weeks (around 9/21/2023), or if symptoms worsen or fail to improve, for medication management.

## 2023-08-31 ENCOUNTER — PATIENT MESSAGE (OUTPATIENT)
Dept: FAMILY MEDICINE | Facility: CLINIC | Age: 63
End: 2023-08-31

## 2023-09-05 RX ORDER — HYDROCODONE BITARTRATE AND ACETAMINOPHEN 10; 325 MG/1; MG/1
1 TABLET ORAL EVERY 6 HOURS PRN
Qty: 30 TABLET | Refills: 0 | Status: SHIPPED | OUTPATIENT
Start: 2023-09-05

## 2023-10-27 ENCOUNTER — TELEPHONE (OUTPATIENT)
Dept: ORTHOPEDICS | Facility: CLINIC | Age: 63
End: 2023-10-27

## 2023-10-27 ENCOUNTER — OFFICE VISIT (OUTPATIENT)
Dept: ORTHOPEDICS | Facility: CLINIC | Age: 63
End: 2023-10-27
Payer: COMMERCIAL

## 2023-10-27 VITALS — BODY MASS INDEX: 33.99 KG/M2 | WEIGHT: 180 LBS | HEIGHT: 61 IN

## 2023-10-27 DIAGNOSIS — G56.02 CARPAL TUNNEL SYNDROME OF LEFT WRIST: Primary | ICD-10-CM

## 2023-10-27 PROCEDURE — 3008F BODY MASS INDEX DOCD: CPT | Mod: CPTII,S$GLB,, | Performed by: PHYSICIAN ASSISTANT

## 2023-10-27 PROCEDURE — 99999 PR PBB SHADOW E&M-EST. PATIENT-LVL III: CPT | Mod: PBBFAC,,, | Performed by: PHYSICIAN ASSISTANT

## 2023-10-27 PROCEDURE — 1159F MED LIST DOCD IN RCRD: CPT | Mod: CPTII,S$GLB,, | Performed by: PHYSICIAN ASSISTANT

## 2023-10-27 PROCEDURE — 1159F PR MEDICATION LIST DOCUMENTED IN MEDICAL RECORD: ICD-10-PCS | Mod: CPTII,S$GLB,, | Performed by: PHYSICIAN ASSISTANT

## 2023-10-27 PROCEDURE — 99213 PR OFFICE/OUTPT VISIT, EST, LEVL III, 20-29 MIN: ICD-10-PCS | Mod: S$GLB,,, | Performed by: PHYSICIAN ASSISTANT

## 2023-10-27 PROCEDURE — 99213 OFFICE O/P EST LOW 20 MIN: CPT | Mod: S$GLB,,, | Performed by: PHYSICIAN ASSISTANT

## 2023-10-27 PROCEDURE — 4010F ACE/ARB THERAPY RXD/TAKEN: CPT | Mod: CPTII,S$GLB,, | Performed by: PHYSICIAN ASSISTANT

## 2023-10-27 PROCEDURE — 1160F RVW MEDS BY RX/DR IN RCRD: CPT | Mod: CPTII,S$GLB,, | Performed by: PHYSICIAN ASSISTANT

## 2023-10-27 PROCEDURE — 99999 PR PBB SHADOW E&M-EST. PATIENT-LVL III: ICD-10-PCS | Mod: PBBFAC,,, | Performed by: PHYSICIAN ASSISTANT

## 2023-10-27 PROCEDURE — 3008F PR BODY MASS INDEX (BMI) DOCUMENTED: ICD-10-PCS | Mod: CPTII,S$GLB,, | Performed by: PHYSICIAN ASSISTANT

## 2023-10-27 PROCEDURE — 1160F PR REVIEW ALL MEDS BY PRESCRIBER/CLIN PHARMACIST DOCUMENTED: ICD-10-PCS | Mod: CPTII,S$GLB,, | Performed by: PHYSICIAN ASSISTANT

## 2023-10-27 PROCEDURE — 4010F PR ACE/ARB THEARPY RXD/TAKEN: ICD-10-PCS | Mod: CPTII,S$GLB,, | Performed by: PHYSICIAN ASSISTANT

## 2023-10-27 NOTE — PROGRESS NOTES
10/27/2023    HPI:  Traci Freire is a 62 y.o. female, who presents to clinic today for evaluation of her left hand numbness, tingling, and burning.  States symptoms have been present for the past few years.  States her symptoms have been progressively worsening.  States she had a carpal tunnel release on the right side a few years ago for similar symptoms on that side.  Denies any acute injury.  Denies any paresthesias.  Denies any other complaints at this time.    PMHX:  Past Medical History:   Diagnosis Date    Anxiety     Arthritis     Cancer breast    Right- Bilateral mastectomy- May 2005- had breast reconstruction- mother  of breast cancer    HLD (hyperlipidemia)     Hypertension     diagnosed age late 40's     IBS (irritable bowel syndrome)     Kidney stone     Lyme disease     arthritis of hands. Felt like flu- age early 30s- got it  after going to [place]    Sleep apnea     NO MACHINE       PSHX:  Past Surgical History:   Procedure Laterality Date    ABDOMINAL SURGERY      BLADDER SUSPENSION      BREAST SURGERY       SECTION      CYSTOSCOPY      avoids greens . ? Interstitial cystitis    ESOPHAGOGASTRODUODENOSCOPY N/A 8/10/2023    Procedure: EGD (ESOPHAGOGASTRODUODENOSCOPY);  Surgeon: Gerard Hernandez MD;  Location: [facility];  Service: Endoscopy;  Laterality: N/A;    HYSTERECTOMY      followed by removal of ovaries  from scar tissue    INCISIONAL HERNIA REPAIR Right 2008    RLQ    JOINT REPLACEMENT      Left total knee replacement-[facility] -     KNEE SURGERY Left     TKR    MODIFIED RADICAL MASTECTOMY W/ AXILLARY LYMPH NODE DISSECTION Right 05/10/2005    w/free flap    NISSEN FUNDOPLICATION      ROBOT-ASSISTED LAPAROSCOPIC REPAIR OF VENTRAL HERNIA N/A 2021    Procedure: ROBOTIC REPAIR, HERNIA, VENTRAL;  Surgeon: John Johnson III, MD;  Location: [facility];  Service: General;  Laterality: N/A;    SIMPLE MASTECTOMY Left 05/10/2005     w/free flap    TONSILLECTOMY         FMHX:  Family History   Problem Relation Age of Onset    Cancer Mother          of breast cancer, also had gynecological cancer- had breast cancer that spread to the brain    Heart disease Father         in his 60's-  in his 70's       SOCHX:  Social History     Tobacco Use    Smoking status: Former     Current packs/day: 0.00     Types: Cigarettes     Quit date: 3/26/2004     Years since quittin.6    Smokeless tobacco: Never    Tobacco comments:     quit - smoked for 10 years- 1 ppd   Substance Use Topics    Alcohol use: No       ALLERGIES:  Keflex [cephalexin], Macrobid [nitrofurantoin monohyd/m-cryst], and Bactrim [sulfamethoxazole-trimethoprim]    CURRENT MEDICATIONS:  Current Outpatient Medications on File Prior to Visit   Medication Sig Dispense Refill    FLUoxetine 20 MG capsule Take 1 capsule (20 mg total) by mouth once daily. 90 capsule 1    hydroCHLOROthiazide (HYDRODIURIL) 25 MG tablet Take 1 tablet (25 mg total) by mouth once daily. 90 tablet 1    HYDROcodone-acetaminophen (NORCO)  mg per tablet Take 1 tablet by mouth every 6 (six) hours as needed for Pain. 30 tablet 0    irbesartan (AVAPRO) 300 MG tablet Take 1 tablet (300 mg total) by mouth every evening. 90 tablet 3    mecobalamin (B12 ACTIVE ORAL) Take 1 tablet by mouth Daily.      pantoprazole (PROTONIX) 40 MG tablet Take 1 tablet (40 mg total) by mouth 2 (two) times daily. 180 tablet 0    rosuvastatin (CRESTOR) 20 MG tablet Take 1 tablet (20 mg total) by mouth every evening. 90 tablet 1    traMADoL (ULTRAM) 50 mg tablet Take 1 tablet (50 mg total) by mouth every 12 (twelve) hours as needed for Pain. 6 tablet 0    zolpidem (AMBIEN) 10 mg Tab Take 1 tablet (10 mg total) by mouth every evening. 90 tablet 1     No current facility-administered medications on file prior to visit.       REVIEW OF SYSTEMS:  Review of Systems Complete; Negative, unless noted above.    GENERAL PHYSICAL EXAM:   Ht  "5' 1" (1.549 m)   Wt 81.6 kg (180 lb)   LMP 05/09/1992   BMI 34.01 kg/m²    GEN: well developed, well nourished, no acute distress   PULM: No wheezing, no respiratory distress   CV: RRR    ORTHO EXAM:   Examination of the left hand/wrist reveals no edema, erythema, ecchymosis, or skin breakdown.  Able make composite fist and fully extend all fingers.  Full intact range of motion of the left wrist.  No significant tenderness to palpation throughout the entirety of the left hand/wrist.  5/5 /intrinsic strength.  5/5 thenar strength.  5/5 hypothenar strength.  5/5 thumb adduction strength.  Positive carpal Tinel's test.  Positive Durkan's test.  Negative cubital Tinel's test.  Reduced sensation in the median nerve distribution.  Normal sensation in the radial and ulnar nerve distributions.  Capillary refill less than 2 seconds in all fingers.    RADIOLOGY:   None.    ASSESSMENT:   Left carpal tunnel syndrome    PLAN:  1. I discussed with Traci Freire the carpal tunnel syndrome pathology and treatment options in detail during today's visit.  After treatment options were discussed, we decided the best course of action this time is to perform an EMG nerve conduction study of the left upper extremity to evaluate the severity of her carpal tunnel syndrome.  We discussed the meantime we will continue with nighttime splinting via her carpal tunnel splint.  She verbally agreed with the treatment plan.      2. She was scheduled for an EMG nerve conduction study of the left upper extremity in clinic today.      3. I would like to have her follow up in clinic after the EMG to discuss the results.  She was instructed to contact the clinic for any problems or concerns in the interim.        "

## 2023-11-07 ENCOUNTER — OFFICE VISIT (OUTPATIENT)
Dept: FAMILY MEDICINE | Facility: CLINIC | Age: 63
End: 2023-11-07
Payer: COMMERCIAL

## 2023-11-07 VITALS
DIASTOLIC BLOOD PRESSURE: 74 MMHG | WEIGHT: 175 LBS | HEART RATE: 78 BPM | SYSTOLIC BLOOD PRESSURE: 132 MMHG | BODY MASS INDEX: 33.04 KG/M2 | HEIGHT: 61 IN

## 2023-11-07 DIAGNOSIS — G89.29 CHRONIC MIDLINE LOW BACK PAIN WITH SCIATICA, SCIATICA LATERALITY UNSPECIFIED: ICD-10-CM

## 2023-11-07 DIAGNOSIS — E78.5 HYPERLIPIDEMIA, UNSPECIFIED HYPERLIPIDEMIA TYPE: ICD-10-CM

## 2023-11-07 DIAGNOSIS — M54.40 CHRONIC MIDLINE LOW BACK PAIN WITH SCIATICA, SCIATICA LATERALITY UNSPECIFIED: ICD-10-CM

## 2023-11-07 DIAGNOSIS — F41.9 ANXIETY: ICD-10-CM

## 2023-11-07 DIAGNOSIS — I10 PRIMARY HYPERTENSION: ICD-10-CM

## 2023-11-07 DIAGNOSIS — Z85.3 HX OF BREAST CANCER: ICD-10-CM

## 2023-11-07 DIAGNOSIS — Z23 NEED FOR VACCINATION: Primary | ICD-10-CM

## 2023-11-07 DIAGNOSIS — G47.00 INSOMNIA, UNSPECIFIED TYPE: ICD-10-CM

## 2023-11-07 DIAGNOSIS — K21.9 GASTROESOPHAGEAL REFLUX DISEASE, UNSPECIFIED WHETHER ESOPHAGITIS PRESENT: ICD-10-CM

## 2023-11-07 PROCEDURE — 3008F PR BODY MASS INDEX (BMI) DOCUMENTED: ICD-10-PCS | Mod: CPTII,S$GLB,, | Performed by: NURSE PRACTITIONER

## 2023-11-07 PROCEDURE — 1159F MED LIST DOCD IN RCRD: CPT | Mod: CPTII,S$GLB,, | Performed by: NURSE PRACTITIONER

## 2023-11-07 PROCEDURE — 3078F PR MOST RECENT DIASTOLIC BLOOD PRESSURE < 80 MM HG: ICD-10-PCS | Mod: CPTII,S$GLB,, | Performed by: NURSE PRACTITIONER

## 2023-11-07 PROCEDURE — 90686 FLU VACCINE (QUAD) GREATER THAN OR EQUAL TO 3YO PRESERVATIVE FREE IM: ICD-10-PCS | Mod: S$GLB,,, | Performed by: NURSE PRACTITIONER

## 2023-11-07 PROCEDURE — 1159F PR MEDICATION LIST DOCUMENTED IN MEDICAL RECORD: ICD-10-PCS | Mod: CPTII,S$GLB,, | Performed by: NURSE PRACTITIONER

## 2023-11-07 PROCEDURE — 3078F DIAST BP <80 MM HG: CPT | Mod: CPTII,S$GLB,, | Performed by: NURSE PRACTITIONER

## 2023-11-07 PROCEDURE — 90686 IIV4 VACC NO PRSV 0.5 ML IM: CPT | Mod: S$GLB,,, | Performed by: NURSE PRACTITIONER

## 2023-11-07 PROCEDURE — 4010F ACE/ARB THERAPY RXD/TAKEN: CPT | Mod: CPTII,S$GLB,, | Performed by: NURSE PRACTITIONER

## 2023-11-07 PROCEDURE — 90471 FLU VACCINE (QUAD) GREATER THAN OR EQUAL TO 3YO PRESERVATIVE FREE IM: ICD-10-PCS | Mod: S$GLB,,, | Performed by: NURSE PRACTITIONER

## 2023-11-07 PROCEDURE — 99214 OFFICE O/P EST MOD 30 MIN: CPT | Mod: 25,S$GLB,, | Performed by: NURSE PRACTITIONER

## 2023-11-07 PROCEDURE — 90471 IMMUNIZATION ADMIN: CPT | Mod: S$GLB,,, | Performed by: NURSE PRACTITIONER

## 2023-11-07 PROCEDURE — 3075F PR MOST RECENT SYSTOLIC BLOOD PRESS GE 130-139MM HG: ICD-10-PCS | Mod: CPTII,S$GLB,, | Performed by: NURSE PRACTITIONER

## 2023-11-07 PROCEDURE — 3008F BODY MASS INDEX DOCD: CPT | Mod: CPTII,S$GLB,, | Performed by: NURSE PRACTITIONER

## 2023-11-07 PROCEDURE — 3075F SYST BP GE 130 - 139MM HG: CPT | Mod: CPTII,S$GLB,, | Performed by: NURSE PRACTITIONER

## 2023-11-07 PROCEDURE — 1160F RVW MEDS BY RX/DR IN RCRD: CPT | Mod: CPTII,S$GLB,, | Performed by: NURSE PRACTITIONER

## 2023-11-07 PROCEDURE — 4010F PR ACE/ARB THEARPY RXD/TAKEN: ICD-10-PCS | Mod: CPTII,S$GLB,, | Performed by: NURSE PRACTITIONER

## 2023-11-07 PROCEDURE — 99214 PR OFFICE/OUTPT VISIT, EST, LEVL IV, 30-39 MIN: ICD-10-PCS | Mod: 25,S$GLB,, | Performed by: NURSE PRACTITIONER

## 2023-11-07 PROCEDURE — 1160F PR REVIEW ALL MEDS BY PRESCRIBER/CLIN PHARMACIST DOCUMENTED: ICD-10-PCS | Mod: CPTII,S$GLB,, | Performed by: NURSE PRACTITIONER

## 2023-11-13 ENCOUNTER — OFFICE VISIT (OUTPATIENT)
Dept: PHYSICAL MEDICINE AND REHAB | Facility: CLINIC | Age: 63
End: 2023-11-13
Payer: COMMERCIAL

## 2023-11-13 VITALS — HEIGHT: 61 IN | BODY MASS INDEX: 33.05 KG/M2 | WEIGHT: 175.06 LBS

## 2023-11-13 DIAGNOSIS — G56.02 CARPAL TUNNEL SYNDROME OF LEFT WRIST: ICD-10-CM

## 2023-11-13 PROBLEM — K92.2 UPPER GI BLEED: Status: RESOLVED | Noted: 2023-08-09 | Resolved: 2023-11-13

## 2023-11-13 PROCEDURE — 99999 PR PBB SHADOW E&M-EST. PATIENT-LVL II: ICD-10-PCS | Mod: PBBFAC,,, | Performed by: PHYSICAL MEDICINE & REHABILITATION

## 2023-11-13 PROCEDURE — 95908 PR NERVE CONDUCTION STUDY; 3-4 STUDIES: ICD-10-PCS | Mod: S$GLB,,, | Performed by: PHYSICAL MEDICINE & REHABILITATION

## 2023-11-13 PROCEDURE — 95886 PR EMG COMPLETE, W/ NERVE CONDUCTION STUDIES, 5+ MUSCLES: ICD-10-PCS | Mod: S$GLB,,, | Performed by: PHYSICAL MEDICINE & REHABILITATION

## 2023-11-13 PROCEDURE — 99499 NO LOS: ICD-10-PCS | Mod: S$GLB,,, | Performed by: PHYSICAL MEDICINE & REHABILITATION

## 2023-11-13 PROCEDURE — 95908 NRV CNDJ TST 3-4 STUDIES: CPT | Mod: S$GLB,,, | Performed by: PHYSICAL MEDICINE & REHABILITATION

## 2023-11-13 PROCEDURE — 99999 PR PBB SHADOW E&M-EST. PATIENT-LVL II: CPT | Mod: PBBFAC,,, | Performed by: PHYSICAL MEDICINE & REHABILITATION

## 2023-11-13 PROCEDURE — 99499 UNLISTED E&M SERVICE: CPT | Mod: S$GLB,,, | Performed by: PHYSICAL MEDICINE & REHABILITATION

## 2023-11-13 PROCEDURE — 95886 MUSC TEST DONE W/N TEST COMP: CPT | Mod: S$GLB,,, | Performed by: PHYSICAL MEDICINE & REHABILITATION

## 2023-11-13 NOTE — PROGRESS NOTES
[facility]  [address]  [place] [address]             Full Name: Traci Freire YOB: 1960  Patient ID: [id number]  History: Left hand numbness. No neck pain.       Visit Date: 11/13/2023 9:28 AM  Age: 62 Years  Examining Physician: Violet Castellano DO  Referring Physician: Jai Magaña      Sensory NCS      Nerve / Sites Rec. Site Onset Lat Peak Lat NP Amp PP Amp Segments Distance Velocity     ms ms µV µV  cm m/s   L Ulnar - Digit V (Antidromic)      Wrist Dig V 2.85 3.63 19.8 22.7 Wrist - Dig V 14 49   L Radial - Anatomical snuff box (Forearm)      Forearm Wrist 1.75 2.35 41.8 49.1 Forearm - Wrist 10 57       Motor NCS      Nerve / Sites Muscle Latency Amplitude Amp % Duration Segments Distance Lat Diff Velocity     ms mV % ms  cm ms m/s   L Median - APB      Wrist APB 8.38 3.2 100 6.83 Wrist - APB 8        Elbow APB 10.67 3.1 96.5 7.69 Elbow - Wrist 18 2.29 79   L Ulnar - ADM      Wrist ADM 3.40 13.0 100 4.65 Wrist - ADM 8        B.Elbow ADM 7.06 12.0 91.8 5.17 B.Elbow - Wrist 22 3.67 60      A.Elbow ADM 9.04 8.8 67.6 5.00 A.Elbow - B.Elbow 10 1.98 51       EMG Summary Table     Spontaneous MUAP Recruitment   Muscle IA Fib PSW Fasc CRD Amp Dur. Poly Pattern   L. Deltoid N None None None None N N None N   L. Biceps brachii N None None None None N N None N   L. Triceps brachii N None None None None N N None N   L. Pronator teres N None None None None N N None N   L. Abductor pollicis brevis N None None None None N N None N       Summary    The motor conduction test was performed on 2 nerve(s). The results were normal in 1 nerve(s): L Ulnar - ADM. Results outside the specified normal range were found in 1 nerve(s), as follows:  In the L Median - APB study  the take off latency result was increased for Wrist stimulation  the peak amplitude result was reduced for Wrist stimulation    The sensory conduction test was performed on 3 nerve(s). The results were normal in 2 nerve(s): L Ulnar -  Digit V (Antidromic), L Radial - Anatomical snuff box (Forearm). Findings were unremarkable in 1 nerve(s): L Median - Digit III (Antidromic). There were no results outside the specified normal range.      The needle EMG study was normal in all 5 tested muscles: L. Deltoid, L. Biceps brachii, L. Triceps brachii, L. Pronator teres, L. Abductor pollicis brevis.       Impression:  Abnormal study.   Moderate/severe left carpal tunnel syndrome, without active denervation.   No electrophysiologic evidence of left cervical radiculopathy/plexopathy, left ulnar mononeuropathy, or peripheral neuropathy in the left upper extremity.     ------------------------------  Violet Castellano, DO

## 2023-11-24 ENCOUNTER — PATIENT MESSAGE (OUTPATIENT)
Dept: FAMILY MEDICINE | Facility: CLINIC | Age: 63
End: 2023-11-24

## 2023-11-24 DIAGNOSIS — F41.9 ANXIETY: ICD-10-CM

## 2023-11-27 ENCOUNTER — TELEPHONE (OUTPATIENT)
Dept: FAMILY MEDICINE | Facility: CLINIC | Age: 63
End: 2023-11-27

## 2023-11-27 RX ORDER — ZOLPIDEM TARTRATE 10 MG/1
10 TABLET ORAL NIGHTLY
Qty: 90 TABLET | Refills: 1 | Status: SHIPPED | OUTPATIENT
Start: 2023-11-27

## 2023-11-27 NOTE — TELEPHONE ENCOUNTER
----- Message from Tierney Payan sent at 11/27/2023  8:40 AM CST -----  Patient came by for blood work.. Reminder now she is out of Ambien.

## 2023-12-05 NOTE — PROGRESS NOTES
SUBJECTIVE:    Patient ID: Traci Freire is a 62 y.o. female.    Chief Complaint: No chief complaint on file.    62 year old female presents for check up. Treated for depression, htn, hyperlipidemia, oa, and insomnia and history of breast cancer. Taking meds as prescribed. Currently seeing dr. Newell for back pain. Has received injection in spine . Did get some relief. Taking ambien for sleep. Sleeps ok. Currently working nights  Due for labs.         Admission on 08/09/2023, Discharged on 08/11/2023   Component Date Value Ref Range Status    WBC 08/09/2023 15.61 (H)  3.90 - 12.70 K/uL Final    RBC 08/09/2023 4.90  4.00 - 5.40 M/uL Final    Hemoglobin 08/09/2023 14.7  12.0 - 16.0 g/dL Final    Hematocrit 08/09/2023 45.0  37.0 - 48.5 % Final    MCV 08/09/2023 92  82 - 98 fL Final    MCH 08/09/2023 30.0  27.0 - 31.0 pg Final    MCHC 08/09/2023 32.7  32.0 - 36.0 g/dL Final    RDW 08/09/2023 13.2  11.5 - 14.5 % Final    Platelets 08/09/2023 318  150 - 450 K/uL Final    MPV 08/09/2023 9.4  9.2 - 12.9 fL Final    Immature Granulocytes 08/09/2023 0.6 (H)  0.0 - 0.5 % Final    Gran # (ANC) 08/09/2023 13.6 (H)  1.8 - 7.7 K/uL Final    Immature Grans (Abs) 08/09/2023 0.09 (H)  0.00 - 0.04 K/uL Final    Lymph # 08/09/2023 1.1  1.0 - 4.8 K/uL Final    Mono # 08/09/2023 0.8  0.3 - 1.0 K/uL Final    Eos # 08/09/2023 0.0  0.0 - 0.5 K/uL Final    Baso # 08/09/2023 0.04  0.00 - 0.20 K/uL Final    nRBC 08/09/2023 0  0 /100 WBC Final    Gran % 08/09/2023 87.3 (H)  38.0 - 73.0 % Final    Lymph % 08/09/2023 6.7 (L)  18.0 - 48.0 % Final    Mono % 08/09/2023 4.9  4.0 - 15.0 % Final    Eosinophil % 08/09/2023 0.2  0.0 - 8.0 % Final    Basophil % 08/09/2023 0.3  0.0 - 1.9 % Final    Differential Method 08/09/2023 Automated   Final    Sodium 08/09/2023 142  136 - 145 mmol/L Final    Potassium 08/09/2023 3.8  3.5 - 5.1 mmol/L Final    Chloride 08/09/2023 108  95 - 110 mmol/L Final    CO2 08/09/2023 24  23 - 29 mmol/L Final    Glucose  08/09/2023 130 (H)  70 - 110 mg/dL Final    BUN 08/09/2023 38 (H)  8 - 23 mg/dL Final    Creatinine 08/09/2023 0.7  0.5 - 1.4 mg/dL Final    Calcium 08/09/2023 9.8  8.7 - 10.5 mg/dL Final    Total Protein 08/09/2023 8.4  6.0 - 8.4 g/dL Final    Albumin 08/09/2023 4.5  3.5 - 5.2 g/dL Final    Total Bilirubin 08/09/2023 0.8  0.1 - 1.0 mg/dL Final    Alkaline Phosphatase 08/09/2023 76  55 - 135 U/L Final    AST 08/09/2023 21  10 - 40 U/L Final    ALT 08/09/2023 23  10 - 44 U/L Final    eGFR 08/09/2023 >60.0  >60 mL/min/1.73 m^2 Final    Anion Gap 08/09/2023 10  8 - 16 mmol/L Final    Group & Rh 08/09/2023 A POS   Final    Indirect Rain 08/09/2023 NEG   Final    Specimen Outdate 08/09/2023 08/12/2023 23:59   Final    aPTT 08/09/2023 21.5  21.0 - 32.0 sec Final    Prothrombin Time 08/09/2023 10.6  9.0 - 12.5 sec Final    INR 08/09/2023 0.9  0.8 - 1.2 Final    Specimen UA 08/09/2023 Urine, Clean Catch   Final    Color, UA 08/09/2023 Yellow  Yellow, Straw, Raysa Final    Appearance, UA 08/09/2023 Clear  Clear Final    pH, UA 08/09/2023 6.0  5.0 - 8.0 Final    Specific Gravity, UA 08/09/2023 >1.030 (A)  1.005 - 1.030 Final    Protein, UA 08/09/2023 1+ (A)  Negative Final    Glucose, UA 08/09/2023 Negative  Negative Final    Ketones, UA 08/09/2023 Negative  Negative Final    Bilirubin (UA) 08/09/2023 Negative  Negative Final    Occult Blood UA 08/09/2023 1+ (A)  Negative Final    Nitrite, UA 08/09/2023 Negative  Negative Final    Urobilinogen, UA 08/09/2023 Negative  Negative EU/dL Final    Leukocytes, UA 08/09/2023 1+ (A)  Negative Final    Lipase 08/09/2023 32  4 - 60 U/L Final    Benzodiazepines 08/09/2023 Negative  Negative Final    Cocaine (Metab.) 08/09/2023 Negative  Negative Final    Opiate Scrn, Ur 08/09/2023 Negative  Negative Final    Barbiturate Screen, Ur 08/09/2023 Negative  Negative Final    Amphetamine Screen, Ur 08/09/2023 Negative  Negative Final    THC 08/09/2023 Negative  Negative Final    Phencyclidine  08/09/2023 Negative  Negative Final    Creatinine, Urine 08/09/2023 135.0  15.0 - 325.0 mg/dL Final    Toxicology Information 08/09/2023 SEE COMMENT   Final    Specimen UA 08/09/2023 Urine, Clean Catch   Final    Color, UA 08/09/2023 Yellow  Yellow, Straw, Raysa Final    Appearance, UA 08/09/2023 Clear  Clear Final    pH, UA 08/09/2023 6.0  5.0 - 8.0 Final    Specific Gravity, UA 08/09/2023 >1.030 (A)  1.005 - 1.030 Final    Protein, UA 08/09/2023 1+ (A)  Negative Final    Glucose, UA 08/09/2023 Negative  Negative Final    Ketones, UA 08/09/2023 Negative  Negative Final    Bilirubin (UA) 08/09/2023 Negative  Negative Final    Occult Blood UA 08/09/2023 1+ (A)  Negative Final    Nitrite, UA 08/09/2023 Negative  Negative Final    Urobilinogen, UA 08/09/2023 Negative  Negative EU/dL Final    Leukocytes, UA 08/09/2023 1+ (A)  Negative Final    CPK 08/09/2023 75  20 - 180 U/L Final    Magnesium 08/09/2023 2.1  1.6 - 2.6 mg/dL Final    TSH 08/09/2023 2.600  0.340 - 5.600 uIU/mL Final    Troponin I High Sensitivity 08/09/2023 7.9  0.0 - 14.9 pg/mL Final    Troponin I High Sensitivity 08/09/2023 10.1  0.0 - 14.9 pg/mL Final    BNP 08/09/2023 17  0 - 99 pg/mL Final    POC Creatinine 08/09/2023 0.7  0.5 - 1.4 mg/dL Final    Sample 08/09/2023 unknown   Final    Occult Blood 08/09/2023 Positive (A)  Negative Final    SARS-CoV-2 RNA, Amplification, Qual 08/09/2023 Negative  Negative Final    RBC, UA 08/09/2023 3  0 - 4 /hpf Final    WBC, UA 08/09/2023 9 (H)  0 - 5 /hpf Final    Bacteria 08/09/2023 Negative  None-Occ /hpf Final    Squam Epithel, UA 08/09/2023 2  /hpf Final    Hyaline Casts, UA 08/09/2023 4 (A)  0-1/lpf /lpf Final    Microscopic Comment 08/09/2023 SEE COMMENT   Final    WBC 08/10/2023 9.21  3.90 - 12.70 K/uL Final    RBC 08/10/2023 3.87 (L)  4.00 - 5.40 M/uL Final    Hemoglobin 08/10/2023 11.6 (L)  12.0 - 16.0 g/dL Final    Hematocrit 08/10/2023 36.0 (L)  37.0 - 48.5 % Final    MCV 08/10/2023 93  82 - 98 fL Final     MCH 08/10/2023 30.0  27.0 - 31.0 pg Final    MCHC 08/10/2023 32.2  32.0 - 36.0 g/dL Final    RDW 08/10/2023 13.5  11.5 - 14.5 % Final    Platelets 08/10/2023 249  150 - 450 K/uL Final    MPV 08/10/2023 9.4  9.2 - 12.9 fL Final    Sodium 08/10/2023 139  136 - 145 mmol/L Final    Potassium 08/10/2023 3.8  3.5 - 5.1 mmol/L Final    Chloride 08/10/2023 107  95 - 110 mmol/L Final    CO2 08/10/2023 24  23 - 29 mmol/L Final    Glucose 08/10/2023 95  70 - 110 mg/dL Final    BUN 08/10/2023 19  8 - 23 mg/dL Final    Creatinine 08/10/2023 0.5  0.5 - 1.4 mg/dL Final    Calcium 08/10/2023 8.4 (L)  8.7 - 10.5 mg/dL Final    Anion Gap 08/10/2023 8  8 - 16 mmol/L Final    eGFR 08/10/2023 >60.0  >60 mL/min/1.73 m^2 Final    Magnesium 08/10/2023 1.9  1.6 - 2.6 mg/dL Final    WBC 2023 6.27  3.90 - 12.70 K/uL Final    RBC 2023 3.71 (L)  4.00 - 5.40 M/uL Final    Hemoglobin 2023 11.0 (L)  12.0 - 16.0 g/dL Final    Hematocrit 2023 34.5 (L)  37.0 - 48.5 % Final    MCV 2023 93  82 - 98 fL Final    MCH 2023 29.6  27.0 - 31.0 pg Final    MCHC 2023 31.9 (L)  32.0 - 36.0 g/dL Final    RDW 2023 13.2  11.5 - 14.5 % Final    Platelets 2023 228  150 - 450 K/uL Final    MPV 2023 9.7  9.2 - 12.9 fL Final    Sodium 2023 139  136 - 145 mmol/L Final    Potassium 2023 3.4 (L)  3.5 - 5.1 mmol/L Final    Chloride 2023 108  95 - 110 mmol/L Final    CO2 2023 25  23 - 29 mmol/L Final    Glucose 2023 90  70 - 110 mg/dL Final    BUN 2023 17  8 - 23 mg/dL Final    Creatinine 2023 0.6  0.5 - 1.4 mg/dL Final    Calcium 2023 8.5 (L)  8.7 - 10.5 mg/dL Final    Anion Gap 2023 6 (L)  8 - 16 mmol/L Final    eGFR 2023 >60.0  >60 mL/min/1.73 m^2 Final       Past Medical History:   Diagnosis Date    Anxiety     Arthritis     Cancer breast    Right- Bilateral mastectomy- May 2005- had breast reconstruction- mother  of breast cancer    HLD  (hyperlipidemia)     Hypertension     diagnosed age late 40's     IBS (irritable bowel syndrome)     Kidney stone     Lyme disease     arthritis of hands. Felt like flu- age early 30's- got it  after going to [place]    Sleep apnea     NO MACHINE     Social History     Socioeconomic History    Marital status:    Tobacco Use    Smoking status: Former     Current packs/day: 0.00     Types: Cigarettes     Quit date: 3/26/2004     Years since quittin.7    Smokeless tobacco: Never    Tobacco comments:     quit - smoked for 10 years- 1 ppd   Substance and Sexual Activity    Alcohol use: No    Drug use: No    Sexual activity: Never     Social Determinants of Health     Stress: No Stress Concern Present (2019)    [employer] [place] [employer] - Occupational Stress Questionnaire     Feeling of Stress : Not at all     Past Surgical History:   Procedure Laterality Date    ABDOMINAL SURGERY      BLADDER SUSPENSION      BREAST SURGERY       SECTION      CYSTOSCOPY      avoids greens . ? Interstitial cystitis    ESOPHAGOGASTRODUODENOSCOPY N/A 8/10/2023    Procedure: EGD (ESOPHAGOGASTRODUODENOSCOPY);  Surgeon: Gerard Hernandez MD;  Location: [facility];  Service: Endoscopy;  Laterality: N/A;    HYSTERECTOMY      followed by removal of ovaries  from scar tissue    INCISIONAL HERNIA REPAIR Right 2008    RLQ    JOINT REPLACEMENT      Left total knee replacement-[facility] -     KNEE SURGERY Left     TKR    MODIFIED RADICAL MASTECTOMY W/ AXILLARY LYMPH NODE DISSECTION Right 05/10/2005    w/free flap    NISSEN FUNDOPLICATION      ROBOT-ASSISTED LAPAROSCOPIC REPAIR OF VENTRAL HERNIA N/A 2021    Procedure: ROBOTIC REPAIR, HERNIA, VENTRAL;  Surgeon: John Johnson III, MD;  Location: [facility];  Service: General;  Laterality: N/A;    SIMPLE MASTECTOMY Left 05/10/2005    w/free flap    TONSILLECTOMY       Family History   Problem Relation Age  of Onset    Cancer Mother          of breast cancer, also had gynecological cancer- had breast cancer that spread to the brain    Heart disease Father         in his 60's-  in his 70's       All of your core healthy metrics are met.      Review of patient's allergies indicates:   Allergen Reactions    Keflex [cephalexin]     Macrobid [nitrofurantoin monohyd/m-cryst]     Bactrim [sulfamethoxazole-trimethoprim] Nausea And Vomiting       Current Outpatient Medications:     FLUoxetine 20 MG capsule, Take 1 capsule (20 mg total) by mouth once daily., Disp: 90 capsule, Rfl: 1    hydroCHLOROthiazide (HYDRODIURIL) 25 MG tablet, Take 1 tablet (25 mg total) by mouth once daily., Disp: 90 tablet, Rfl: 1    HYDROcodone-acetaminophen (NORCO)  mg per tablet, Take 1 tablet by mouth every 6 (six) hours as needed for Pain., Disp: 30 tablet, Rfl: 0    irbesartan (AVAPRO) 300 MG tablet, Take 1 tablet (300 mg total) by mouth every evening., Disp: 90 tablet, Rfl: 3    mecobalamin (B12 ACTIVE ORAL), Take 1 tablet by mouth Daily., Disp: , Rfl:     pantoprazole (PROTONIX) 40 MG tablet, Take 1 tablet (40 mg total) by mouth 2 (two) times daily., Disp: 180 tablet, Rfl: 0    rosuvastatin (CRESTOR) 20 MG tablet, Take 1 tablet (20 mg total) by mouth every evening., Disp: 90 tablet, Rfl: 1    traMADoL (ULTRAM) 50 mg tablet, Take 1 tablet (50 mg total) by mouth every 12 (twelve) hours as needed for Pain., Disp: 6 tablet, Rfl: 0    zolpidem (AMBIEN) 10 mg Tab, Take 1 tablet (10 mg total) by mouth every evening., Disp: 90 tablet, Rfl: 1    Review of Systems   Constitutional:  Negative for chills, fever and unexpected weight change.   HENT:  Negative for ear pain, rhinorrhea and sore throat.    Eyes:  Negative for pain and visual disturbance.   Respiratory:  Negative for cough and shortness of breath.    Cardiovascular:  Negative for chest pain, palpitations and leg swelling.   Gastrointestinal:  Negative for abdominal pain, diarrhea,  "nausea and vomiting.   Genitourinary:  Negative for difficulty urinating, hematuria and vaginal bleeding.   Musculoskeletal:  Positive for back pain. Negative for arthralgias.   Skin:  Negative for rash.   Neurological:  Negative for dizziness, weakness and headaches.   Psychiatric/Behavioral:  Positive for sleep disturbance. Negative for agitation. The patient is not nervous/anxious.           Objective:      Vitals:    11/07/23 0933   BP: 132/74   Pulse: 78   Weight: 79.4 kg (175 lb)   Height: 5' 1" (1.549 m)     Physical Exam  Vitals and nursing note reviewed.   Constitutional:       General: She is not in acute distress.     Appearance: Normal appearance. She is well-developed.   HENT:      Head: Normocephalic.      Right Ear: External ear normal.      Left Ear: External ear normal.   Eyes:      Conjunctiva/sclera: Conjunctivae normal.      Pupils: Pupils are equal, round, and reactive to light.   Neck:      Vascular: No JVD.   Cardiovascular:      Rate and Rhythm: Normal rate and regular rhythm.      Heart sounds: No murmur heard.  Pulmonary:      Effort: Pulmonary effort is normal.      Breath sounds: Normal breath sounds.   Abdominal:      General: Bowel sounds are normal.      Palpations: Abdomen is soft.   Musculoskeletal:         General: No deformity.      Cervical back: Normal range of motion and neck supple.      Lumbar back: Decreased range of motion.   Lymphadenopathy:      Cervical: No cervical adenopathy.   Skin:     General: Skin is warm and dry.      Findings: No rash.   Neurological:      Mental Status: She is alert and oriented to person, place, and time.      Gait: Gait normal.   Psychiatric:         Speech: Speech normal.         Behavior: Behavior normal.           Assessment:       1. Need for vaccination    2. Primary hypertension    3. Hyperlipidemia, unspecified hyperlipidemia type    4. Insomnia, unspecified type    5. Anxiety    6. Hx of breast cancer    7. Gastroesophageal reflux " disease, unspecified whether esophagitis present    8. Chronic midline low back pain with sciatica, sciatica laterality unspecified         Plan:       Need for vaccination  -     Influenza - Quadrivalent *Preferred* (6 months+) (PF)    Primary hypertension  Comments:  bp is controlled  Orders:  -     Comprehensive Metabolic Panel; Future; Expected date: 11/07/2023  -     Lipid Panel; Future; Expected date: 11/07/2023    Hyperlipidemia, unspecified hyperlipidemia type  Comments:  crestor  Orders:  -     Comprehensive Metabolic Panel; Future; Expected date: 11/07/2023  -     Lipid Panel; Future; Expected date: 11/07/2023    Insomnia, unspecified type  Comments:  ambien  Orders:  -     Cortisol; Future; Expected date: 11/07/2023    Anxiety  Comments:  prozac  Orders:  -     Cortisol; Future; Expected date: 11/07/2023    Hx of breast cancer    Gastroesophageal reflux disease, unspecified whether esophagitis present  Comments:  protonix    Chronic midline low back pain with sciatica, sciatica laterality unspecified  Comments:  see dr. elam      Follow up in 4 months (on 3/7/2024), or if symptoms worsen or fail to improve, for medication management.        12/5/2023 Juliet Medina

## 2023-12-06 ENCOUNTER — OFFICE VISIT (OUTPATIENT)
Dept: ORTHOPEDICS | Facility: CLINIC | Age: 63
End: 2023-12-06
Payer: COMMERCIAL

## 2023-12-06 DIAGNOSIS — G56.02 CARPAL TUNNEL SYNDROME ON LEFT: Primary | ICD-10-CM

## 2023-12-06 PROCEDURE — 1159F MED LIST DOCD IN RCRD: CPT | Mod: CPTII,S$GLB,, | Performed by: ORTHOPAEDIC SURGERY

## 2023-12-06 PROCEDURE — 4010F PR ACE/ARB THEARPY RXD/TAKEN: ICD-10-PCS | Mod: CPTII,S$GLB,, | Performed by: ORTHOPAEDIC SURGERY

## 2023-12-06 PROCEDURE — 99999 PR PBB SHADOW E&M-EST. PATIENT-LVL II: ICD-10-PCS | Mod: PBBFAC,,, | Performed by: ORTHOPAEDIC SURGERY

## 2023-12-06 PROCEDURE — 99999 PR PBB SHADOW E&M-EST. PATIENT-LVL II: CPT | Mod: PBBFAC,,, | Performed by: ORTHOPAEDIC SURGERY

## 2023-12-06 PROCEDURE — 99213 OFFICE O/P EST LOW 20 MIN: CPT | Mod: S$GLB,,, | Performed by: ORTHOPAEDIC SURGERY

## 2023-12-06 PROCEDURE — 1159F PR MEDICATION LIST DOCUMENTED IN MEDICAL RECORD: ICD-10-PCS | Mod: CPTII,S$GLB,, | Performed by: ORTHOPAEDIC SURGERY

## 2023-12-06 PROCEDURE — 99213 PR OFFICE/OUTPT VISIT, EST, LEVL III, 20-29 MIN: ICD-10-PCS | Mod: S$GLB,,, | Performed by: ORTHOPAEDIC SURGERY

## 2023-12-06 PROCEDURE — 4010F ACE/ARB THERAPY RXD/TAKEN: CPT | Mod: CPTII,S$GLB,, | Performed by: ORTHOPAEDIC SURGERY

## 2023-12-06 NOTE — H&P (VIEW-ONLY)
2023    Chief Complaint:  Chief Complaint   Patient presents with    Left Hand - Numbness     EMG results       HPI:  Traci Freire is a 62 y.o. female, who presents to clinic today history of left carpal tunnel syndrome.  She has tried conservative treatments.  Study.  She is here today to further treatment.  She continues have recurrence of symptoms.    PMHX:  Past Medical History:   Diagnosis Date    Anxiety     Arthritis     Cancer breast    Right- Bilateral mastectomy- May 2005- had breast reconstruction- mother  of breast cancer    HLD (hyperlipidemia)     Hypertension     diagnosed age late 40's     IBS (irritable bowel syndrome)     Kidney stone     Lyme disease     arthritis of hands. Felt like flu- age early 30's- got it  after going to [place]    Sleep apnea     NO MACHINE       PSHX:  Past Surgical History:   Procedure Laterality Date    ABDOMINAL SURGERY      BLADDER SUSPENSION      BREAST SURGERY       SECTION      CYSTOSCOPY      avoids greens . ? Interstitial cystitis    ESOPHAGOGASTRODUODENOSCOPY N/A 8/10/2023    Procedure: EGD (ESOPHAGOGASTRODUODENOSCOPY);  Surgeon: Gerard Hernandez MD;  Location: [facility];  Service: Endoscopy;  Laterality: N/A;    HYSTERECTOMY      followed by removal of ovaries  from scar tissue    INCISIONAL HERNIA REPAIR Right 2008    RLQ    JOINT REPLACEMENT      Left total knee replacement-[facility] -     KNEE SURGERY Left     TKR    MODIFIED RADICAL MASTECTOMY W/ AXILLARY LYMPH NODE DISSECTION Right 05/10/2005    w/free flap    NISSEN FUNDOPLICATION      ROBOT-ASSISTED LAPAROSCOPIC REPAIR OF VENTRAL HERNIA N/A 2021    Procedure: ROBOTIC REPAIR, HERNIA, VENTRAL;  Surgeon: John Johnson III, MD;  [facility]: [facility];  Service: General;  Laterality: N/A;    SIMPLE MASTECTOMY Left 05/10/2005    w/free flap    TONSILLECTOMY         FMHX:  Family History   Problem Relation Age of Onset    Cancer Mother           of breast cancer, also had gynecological cancer- had breast cancer that spread to the brain    Heart disease Father         in his 60's-  in his 70's       SOCHX:  Social History     Tobacco Use    Smoking status: Former     Current packs/day: 0.00     Types: Cigarettes     Quit date: 3/26/2004     Years since quittin.7    Smokeless tobacco: Never    Tobacco comments:     quit - smoked for 10 years- 1 ppd   Substance Use Topics    Alcohol use: No       ALLERGIES:  Keflex [cephalexin], Macrobid [nitrofurantoin monohyd/m-cryst], and Bactrim [sulfamethoxazole-trimethoprim]    CURRENT MEDICATIONS:  Current Outpatient Medications on File Prior to Visit   Medication Sig Dispense Refill    FLUoxetine 20 MG capsule Take 1 capsule (20 mg total) by mouth once daily. 90 capsule 1    hydroCHLOROthiazide (HYDRODIURIL) 25 MG tablet Take 1 tablet (25 mg total) by mouth once daily. 90 tablet 1    HYDROcodone-acetaminophen (NORCO)  mg per tablet Take 1 tablet by mouth every 6 (six) hours as needed for Pain. 30 tablet 0    irbesartan (AVAPRO) 300 MG tablet Take 1 tablet (300 mg total) by mouth every evening. 90 tablet 3    mecobalamin (B12 ACTIVE ORAL) Take 1 tablet by mouth Daily.      rosuvastatin (CRESTOR) 20 MG tablet Take 1 tablet (20 mg total) by mouth every evening. 90 tablet 1    traMADoL (ULTRAM) 50 mg tablet Take 1 tablet (50 mg total) by mouth every 12 (twelve) hours as needed for Pain. 6 tablet 0    zolpidem (AMBIEN) 10 mg Tab Take 1 tablet (10 mg total) by mouth every evening. 90 tablet 1    pantoprazole (PROTONIX) 40 MG tablet Take 1 tablet (40 mg total) by mouth 2 (two) times daily. 180 tablet 0     No current facility-administered medications on file prior to visit.       REVIEW OF SYSTEMS:  ROS    GENERAL PHYSICAL EXAM:   LMP 1992    GEN: well developed, well nourished, no acute distress   HENT: Normocephalic, atraumatic   EYES: No discharge, conjunctiva normal   NECK: Supple,  non-tender   PULM: No wheezing, no respiratory distress   CV: RRR   ABD: Soft, non-tender    ORTHO EXAM:   Examination of the left hand and wrist reveals that there is no edema.  There is no muscle atrophy.  Palpation produces no significant tenderness.  He does report changes in sensation in the median distribution with intact ulnar and radial sensation.  Has a positive Tinel's and a positive Durkan's test.  Has a 2+ radial pulse    RADIOLOGY:   EMG and nerve conduction study has been reviewed.  It is consistent with severe left carpal tunnel syndrome    ASSESSMENT:   Left carpal tunnel syndrome    PLAN:  1. I have discussed treatment options with the patient.  I have discussed the possibility of carpal tunnel release.  After discussion of the risks and benefits of the procedure informed consent has been obtained     2. Will proceed with left carpal tunnel release under general anesthesia    3.  She will follow up with me 2 weeks postoperatively

## 2023-12-06 NOTE — PATIENT INSTRUCTIONS
Surgery Instructions:     Your surgery is scheduled on 12/21/23 at [facility] [address] [facility] [place] 1st floor, second entrance.    The pre-op department will be in contact with you prior to your procedure to review medications and instructions.       Nothing to eat or drink after midnight prior to day of surgery.    The surgery center will contact you the day prior to surgery to advise you of your arrival time for surgery.     Your post op appointment is scheduled on 01/05/2024 @ 11:00am.

## 2023-12-06 NOTE — PROGRESS NOTES
2023    Chief Complaint:  Chief Complaint   Patient presents with    Left Hand - Numbness     EMG results       HPI:  Traci Freire is a 62 y.o. female, who presents to clinic today history of left carpal tunnel syndrome.  She has tried conservative treatments.  Study.  She is here today to further treatment.  She continues have recurrence of symptoms.    PMHX:  Past Medical History:   Diagnosis Date    Anxiety     Arthritis     Cancer breast    Right- Bilateral mastectomy- May 2005- had breast reconstruction- mother  of breast cancer    HLD (hyperlipidemia)     Hypertension     diagnosed age late 40's     IBS (irritable bowel syndrome)     Kidney stone     Lyme disease     arthritis of hands. Felt like flu- age early 30's- got it  after going to [place]    Sleep apnea     NO MACHINE       PSHX:  Past Surgical History:   Procedure Laterality Date    ABDOMINAL SURGERY      BLADDER SUSPENSION      BREAST SURGERY       SECTION      CYSTOSCOPY      avoids greens . ? Interstitial cystitis    ESOPHAGOGASTRODUODENOSCOPY N/A 8/10/2023    Procedure: EGD (ESOPHAGOGASTRODUODENOSCOPY);  Surgeon: Gerard Hernandez MD;  Location: [facility];  Service: Endoscopy;  Laterality: N/A;    HYSTERECTOMY      followed by removal of ovaries  from scar tissue    INCISIONAL HERNIA REPAIR Right 2008    RLQ    JOINT REPLACEMENT      Left total knee replacement-[facility] -     KNEE SURGERY Left     TKR    MODIFIED RADICAL MASTECTOMY W/ AXILLARY LYMPH NODE DISSECTION Right 05/10/2005    w/free flap    NISSEN FUNDOPLICATION      ROBOT-ASSISTED LAPAROSCOPIC REPAIR OF VENTRAL HERNIA N/A 2021    Procedure: ROBOTIC REPAIR, HERNIA, VENTRAL;  Surgeon: John Johnson III, MD;  Location: [facility];  Service: General;  Laterality: N/A;    SIMPLE MASTECTOMY Left 05/10/2005    w/free flap    TONSILLECTOMY         FMHX:  Family History   Problem Relation Age of Onset    Cancer Mother           of breast cancer, also had gynecological cancer- had breast cancer that spread to the brain    Heart disease Father         in his 60's-  in his 70's       SOCHX:  Social History     Tobacco Use    Smoking status: Former     Current packs/day: 0.00     Types: Cigarettes     Quit date: 3/26/2004     Years since quittin.7    Smokeless tobacco: Never    Tobacco comments:     quit - smoked for 10 years- 1 ppd   Substance Use Topics    Alcohol use: No       ALLERGIES:  Keflex [cephalexin], Macrobid [nitrofurantoin monohyd/m-cryst], and Bactrim [sulfamethoxazole-trimethoprim]    CURRENT MEDICATIONS:  Current Outpatient Medications on File Prior to Visit   Medication Sig Dispense Refill    FLUoxetine 20 MG capsule Take 1 capsule (20 mg total) by mouth once daily. 90 capsule 1    hydroCHLOROthiazide (HYDRODIURIL) 25 MG tablet Take 1 tablet (25 mg total) by mouth once daily. 90 tablet 1    HYDROcodone-acetaminophen (NORCO)  mg per tablet Take 1 tablet by mouth every 6 (six) hours as needed for Pain. 30 tablet 0    irbesartan (AVAPRO) 300 MG tablet Take 1 tablet (300 mg total) by mouth every evening. 90 tablet 3    mecobalamin (B12 ACTIVE ORAL) Take 1 tablet by mouth Daily.      rosuvastatin (CRESTOR) 20 MG tablet Take 1 tablet (20 mg total) by mouth every evening. 90 tablet 1    traMADoL (ULTRAM) 50 mg tablet Take 1 tablet (50 mg total) by mouth every 12 (twelve) hours as needed for Pain. 6 tablet 0    zolpidem (AMBIEN) 10 mg Tab Take 1 tablet (10 mg total) by mouth every evening. 90 tablet 1    pantoprazole (PROTONIX) 40 MG tablet Take 1 tablet (40 mg total) by mouth 2 (two) times daily. 180 tablet 0     No current facility-administered medications on file prior to visit.       REVIEW OF SYSTEMS:  ROS    GENERAL PHYSICAL EXAM:   LMP 1992    GEN: well developed, well nourished, no acute distress   HENT: Normocephalic, atraumatic   EYES: No discharge, conjunctiva normal   NECK: Supple,  non-tender   PULM: No wheezing, no respiratory distress   CV: RRR   ABD: Soft, non-tender    ORTHO EXAM:   Examination of the left hand and wrist reveals that there is no edema.  There is no muscle atrophy.  Palpation produces no significant tenderness.  He does report changes in sensation in the median distribution with intact ulnar and radial sensation.  Has a positive Tinel's and a positive Durkan's test.  Has a 2+ radial pulse    RADIOLOGY:   EMG and nerve conduction study has been reviewed.  It is consistent with severe left carpal tunnel syndrome    ASSESSMENT:   Left carpal tunnel syndrome    PLAN:  1. I have discussed treatment options with the patient.  I have discussed the possibility of carpal tunnel release.  After discussion of the risks and benefits of the procedure informed consent has been obtained     2. Will proceed with left carpal tunnel release under general anesthesia    3.  She will follow up with me 2 weeks postoperatively

## 2023-12-08 DIAGNOSIS — G56.02 CARPAL TUNNEL SYNDROME ON LEFT: Primary | ICD-10-CM

## 2023-12-08 DIAGNOSIS — G56.02 CARPAL TUNNEL SYNDROME OF LEFT WRIST: ICD-10-CM

## 2023-12-08 RX ORDER — CLINDAMYCIN PHOSPHATE 600 MG/50ML
600 INJECTION, SOLUTION INTRAVENOUS
Status: CANCELLED | OUTPATIENT
Start: 2023-12-08

## 2023-12-18 ENCOUNTER — ANESTHESIA EVENT (OUTPATIENT)
Dept: SURGERY | Facility: HOSPITAL | Age: 63
End: 2023-12-18
Payer: COMMERCIAL

## 2023-12-21 ENCOUNTER — ANESTHESIA (OUTPATIENT)
Dept: SURGERY | Facility: HOSPITAL | Age: 63
End: 2023-12-21
Payer: COMMERCIAL

## 2023-12-21 ENCOUNTER — HOSPITAL ENCOUNTER (OUTPATIENT)
Facility: HOSPITAL | Age: 63
Discharge: HOME | End: 2023-12-21
Attending: ORTHOPAEDIC SURGERY | Admitting: ORTHOPAEDIC SURGERY
Payer: COMMERCIAL

## 2023-12-21 VITALS
WEIGHT: 175 LBS | DIASTOLIC BLOOD PRESSURE: 55 MMHG | TEMPERATURE: 98 F | RESPIRATION RATE: 15 BRPM | HEART RATE: 72 BPM | HEIGHT: 61 IN | OXYGEN SATURATION: 99 % | SYSTOLIC BLOOD PRESSURE: 110 MMHG | BODY MASS INDEX: 33.04 KG/M2

## 2023-12-21 DIAGNOSIS — G56.02 CARPAL TUNNEL SYNDROME OF LEFT WRIST: ICD-10-CM

## 2023-12-21 DIAGNOSIS — G56.02 CARPAL TUNNEL SYNDROME ON LEFT: ICD-10-CM

## 2023-12-21 PROCEDURE — D9220A PRA ANESTHESIA: ICD-10-PCS | Mod: ANES,,, | Performed by: ANESTHESIOLOGY

## 2023-12-21 PROCEDURE — 64721 CARPAL TUNNEL SURGERY: CPT | Mod: LT,,, | Performed by: ORTHOPAEDIC SURGERY

## 2023-12-21 PROCEDURE — 37000009 HC ANESTHESIA EA ADD 15 MINS: Mod: PO | Performed by: ORTHOPAEDIC SURGERY

## 2023-12-21 PROCEDURE — 63600175 PHARM REV CODE 636 W HCPCS: Mod: PO | Performed by: NURSE ANESTHETIST, CERTIFIED REGISTERED

## 2023-12-21 PROCEDURE — 71000015 HC POSTOP RECOV 1ST HR: Mod: PO | Performed by: ORTHOPAEDIC SURGERY

## 2023-12-21 PROCEDURE — 25000003 PHARM REV CODE 250: Mod: PO | Performed by: ORTHOPAEDIC SURGERY

## 2023-12-21 PROCEDURE — D9220A PRA ANESTHESIA: ICD-10-PCS | Mod: CRNA,,, | Performed by: NURSE ANESTHETIST, CERTIFIED REGISTERED

## 2023-12-21 PROCEDURE — D9220A PRA ANESTHESIA: Mod: CRNA,,, | Performed by: NURSE ANESTHETIST, CERTIFIED REGISTERED

## 2023-12-21 PROCEDURE — 63600175 PHARM REV CODE 636 W HCPCS: Mod: PO | Performed by: ANESTHESIOLOGY

## 2023-12-21 PROCEDURE — 37000008 HC ANESTHESIA 1ST 15 MINUTES: Mod: PO | Performed by: ORTHOPAEDIC SURGERY

## 2023-12-21 PROCEDURE — 36000706: Mod: PO | Performed by: ORTHOPAEDIC SURGERY

## 2023-12-21 PROCEDURE — 36000707: Mod: PO | Performed by: ORTHOPAEDIC SURGERY

## 2023-12-21 PROCEDURE — 25000003 PHARM REV CODE 250: Mod: PO | Performed by: NURSE ANESTHETIST, CERTIFIED REGISTERED

## 2023-12-21 PROCEDURE — 63600175 PHARM REV CODE 636 W HCPCS: Mod: PO | Performed by: ORTHOPAEDIC SURGERY

## 2023-12-21 PROCEDURE — 71000033 HC RECOVERY, INTIAL HOUR: Mod: PO | Performed by: ORTHOPAEDIC SURGERY

## 2023-12-21 PROCEDURE — D9220A PRA ANESTHESIA: Mod: ANES,,, | Performed by: ANESTHESIOLOGY

## 2023-12-21 PROCEDURE — 27200651 HC AIRWAY, LMA: Mod: PO | Performed by: ANESTHESIOLOGY

## 2023-12-21 PROCEDURE — 63600175 PHARM REV CODE 636 W HCPCS: Mod: PO | Performed by: PHYSICIAN ASSISTANT

## 2023-12-21 PROCEDURE — 64721 PR REVISE MEDIAN N/CARPAL TUNNEL SURG: ICD-10-PCS | Mod: LT,,, | Performed by: ORTHOPAEDIC SURGERY

## 2023-12-21 RX ORDER — ACETAMINOPHEN 10 MG/ML
INJECTION, SOLUTION INTRAVENOUS
Status: DISCONTINUED | OUTPATIENT
Start: 2023-12-21 | End: 2023-12-21

## 2023-12-21 RX ORDER — FENTANYL CITRATE 50 UG/ML
INJECTION, SOLUTION INTRAMUSCULAR; INTRAVENOUS
Status: DISCONTINUED | OUTPATIENT
Start: 2023-12-21 | End: 2023-12-21

## 2023-12-21 RX ORDER — OXYCODONE HYDROCHLORIDE 5 MG/1
5 TABLET ORAL
Status: DISCONTINUED | OUTPATIENT
Start: 2023-12-21 | End: 2023-12-21 | Stop reason: HOSPADM

## 2023-12-21 RX ORDER — HYDROMORPHONE HYDROCHLORIDE 2 MG/ML
0.2 INJECTION, SOLUTION INTRAMUSCULAR; INTRAVENOUS; SUBCUTANEOUS EVERY 5 MIN PRN
Status: DISCONTINUED | OUTPATIENT
Start: 2023-12-21 | End: 2023-12-21 | Stop reason: HOSPADM

## 2023-12-21 RX ORDER — KETAMINE HCL IN 0.9 % NACL 50 MG/5 ML
SYRINGE (ML) INTRAVENOUS
Status: DISCONTINUED | OUTPATIENT
Start: 2023-12-21 | End: 2023-12-21

## 2023-12-21 RX ORDER — BUPIVACAINE HYDROCHLORIDE 2.5 MG/ML
INJECTION, SOLUTION EPIDURAL; INFILTRATION; INTRACAUDAL
Status: DISCONTINUED | OUTPATIENT
Start: 2023-12-21 | End: 2023-12-21 | Stop reason: HOSPADM

## 2023-12-21 RX ORDER — SODIUM CHLORIDE, SODIUM LACTATE, POTASSIUM CHLORIDE, CALCIUM CHLORIDE 600; 310; 30; 20 MG/100ML; MG/100ML; MG/100ML; MG/100ML
INJECTION, SOLUTION INTRAVENOUS CONTINUOUS
Status: DISCONTINUED | OUTPATIENT
Start: 2023-12-21 | End: 2023-12-21 | Stop reason: HOSPADM

## 2023-12-21 RX ORDER — LIDOCAINE HYDROCHLORIDE 10 MG/ML
INJECTION, SOLUTION EPIDURAL; INFILTRATION; INTRACAUDAL; PERINEURAL
Status: DISCONTINUED | OUTPATIENT
Start: 2023-12-21 | End: 2023-12-21 | Stop reason: HOSPADM

## 2023-12-21 RX ORDER — FENTANYL CITRATE 50 UG/ML
25 INJECTION, SOLUTION INTRAMUSCULAR; INTRAVENOUS EVERY 5 MIN PRN
Status: DISCONTINUED | OUTPATIENT
Start: 2023-12-21 | End: 2023-12-21 | Stop reason: HOSPADM

## 2023-12-21 RX ORDER — CLINDAMYCIN PHOSPHATE 600 MG/50ML
600 INJECTION, SOLUTION INTRAVENOUS
Status: COMPLETED | OUTPATIENT
Start: 2023-12-21 | End: 2023-12-21

## 2023-12-21 RX ORDER — DEXAMETHASONE SODIUM PHOSPHATE 4 MG/ML
INJECTION, SOLUTION INTRA-ARTICULAR; INTRALESIONAL; INTRAMUSCULAR; INTRAVENOUS; SOFT TISSUE
Status: DISCONTINUED | OUTPATIENT
Start: 2023-12-21 | End: 2023-12-21

## 2023-12-21 RX ORDER — DEXAMETHASONE SODIUM PHOSPHATE 4 MG/ML
8 INJECTION, SOLUTION INTRA-ARTICULAR; INTRALESIONAL; INTRAMUSCULAR; INTRAVENOUS; SOFT TISSUE
Status: DISCONTINUED | OUTPATIENT
Start: 2023-12-21 | End: 2023-12-21 | Stop reason: HOSPADM

## 2023-12-21 RX ORDER — DIPHENHYDRAMINE HYDROCHLORIDE 50 MG/ML
INJECTION INTRAMUSCULAR; INTRAVENOUS
Status: DISCONTINUED | OUTPATIENT
Start: 2023-12-21 | End: 2023-12-21

## 2023-12-21 RX ORDER — ONDANSETRON 2 MG/ML
INJECTION INTRAMUSCULAR; INTRAVENOUS
Status: DISCONTINUED | OUTPATIENT
Start: 2023-12-21 | End: 2023-12-21

## 2023-12-21 RX ORDER — MIDAZOLAM HYDROCHLORIDE 1 MG/ML
INJECTION, SOLUTION INTRAMUSCULAR; INTRAVENOUS
Status: DISCONTINUED | OUTPATIENT
Start: 2023-12-21 | End: 2023-12-21

## 2023-12-21 RX ORDER — PROPOFOL 10 MG/ML
VIAL (ML) INTRAVENOUS
Status: DISCONTINUED | OUTPATIENT
Start: 2023-12-21 | End: 2023-12-21

## 2023-12-21 RX ORDER — LIDOCAINE HYDROCHLORIDE 10 MG/ML
1 INJECTION, SOLUTION EPIDURAL; INFILTRATION; INTRACAUDAL; PERINEURAL ONCE
Status: DISCONTINUED | OUTPATIENT
Start: 2023-12-21 | End: 2023-12-21 | Stop reason: HOSPADM

## 2023-12-21 RX ORDER — LIDOCAINE HYDROCHLORIDE 20 MG/ML
INJECTION INTRAVENOUS
Status: DISCONTINUED | OUTPATIENT
Start: 2023-12-21 | End: 2023-12-21

## 2023-12-21 RX ADMIN — FENTANYL CITRATE 25 MCG: 50 INJECTION, SOLUTION INTRAMUSCULAR; INTRAVENOUS at 11:12

## 2023-12-21 RX ADMIN — ONDANSETRON 4 MG: 2 INJECTION, SOLUTION INTRAMUSCULAR; INTRAVENOUS at 11:12

## 2023-12-21 RX ADMIN — Medication 25 MG: at 11:12

## 2023-12-21 RX ADMIN — DIPHENHYDRAMINE HYDROCHLORIDE 6.25 MG: 50 INJECTION INTRAMUSCULAR; INTRAVENOUS at 11:12

## 2023-12-21 RX ADMIN — DEXAMETHASONE SODIUM PHOSPHATE 4 MG: 4 INJECTION, SOLUTION INTRAMUSCULAR; INTRAVENOUS at 11:12

## 2023-12-21 RX ADMIN — LIDOCAINE HYDROCHLORIDE 75 MG: 20 INJECTION INTRAVENOUS at 11:12

## 2023-12-21 RX ADMIN — ACETAMINOPHEN 1000 MG: 10 INJECTION, SOLUTION INTRAVENOUS at 11:12

## 2023-12-21 RX ADMIN — SODIUM CHLORIDE, POTASSIUM CHLORIDE, SODIUM LACTATE AND CALCIUM CHLORIDE: 600; 310; 30; 20 INJECTION, SOLUTION INTRAVENOUS at 09:12

## 2023-12-21 RX ADMIN — CLINDAMYCIN IN 5 PERCENT DEXTROSE 600 MG: 12 INJECTION, SOLUTION INTRAVENOUS at 09:12

## 2023-12-21 RX ADMIN — PROPOFOL 160 MG: 10 INJECTION, EMULSION INTRAVENOUS at 11:12

## 2023-12-21 RX ADMIN — FENTANYL CITRATE 50 MCG: 50 INJECTION, SOLUTION INTRAMUSCULAR; INTRAVENOUS at 11:12

## 2023-12-21 RX ADMIN — MIDAZOLAM HYDROCHLORIDE 2 MG: 1 INJECTION, SOLUTION INTRAMUSCULAR; INTRAVENOUS at 11:12

## 2023-12-21 NOTE — OP NOTE
Traci Freire  1960    DATE OF SURGERY: 12/21/2023     PRE-OPERATIVE DIAGNOSIS: left Carpal Tunnel Syndrome    POST-OPERATIVE DIAGNOSIS: left Carpal Tunnel Syndrome     ANESTHESIA TYPE: Local    BLOOD LOSS:  Less than 10 cc    TOURNIQUET TIME:  6 minutes    SURGEON: Dr. Cheung    ASSISTANT:  Rosaura Gonzalez    PROCEDURE: left Carpal Tunnel Release    IMPLANTS: None    SPECIMENS: None    INDICATION:     Ms. Freire presented to my clinic with a history of left carpal tunnel symptoms. Conservative treatments were initially tried. The patient did have relief with attempts at conservative treatment however has had a return of symptoms. An EMG and nerve conduction study has been performed. That study has shown compression of the median nerve at the wrist consistent with carpal tunnel syndrome. A discussion of the risks and benefits of carpal tunnel release has been performed, and informed consent for the procedure has been obtained.    PROCEDURE IN DETAIL:     Ms. Freire was transported to the operating room and was placed supine on the operating room table. All appropriate points were padded. The left hand and arm was prepped and draped in the normal sterile fashion. Time out was called. The correct patient, correct operative site, correct procedure, antibiotic administration which consisted of 600 mg of Cleocin, and allergies to medications which are to Keflex [cephalexin], Macrobid [nitrofurantoin monohyd/m-cryst], and Bactrim [sulfamethoxazole-trimethoprim]  were reviewed. Time in was then called.     Attention was turned to left hand where a 3 cm incision was made in the palm of the hand. This was carried through the skin and subcutaneous tissues were dissected bluntly. The superficial palmar fascia was identified and was split in line with its fibers. The transverse carpal ligament was then identified and was incised for a distance of approximately 1 cm sharply.The median nerve was visualized and a carpal tunnel  sled was placed below the transverse carpal ligament but above the median nerve. Blunt dissection proximally over the transverse carpal ligament was performed and elevator was placed just above the transverse carpal ligament proximally. The ligament was identified. There were noted to be no penetrating nerve branches and at that point the carpal tunnel knife was used to incise the proximal transverse carpal ligament. Attention was then turned to the distal portion of the transverse carpal ligament. The carpal tunnel sled was again placed underneath the transverse carpal ligament but above the median nerve distally. Blunt dissection above the transverse carpal ligament distally was performed and an elevator was placed. The distal portion of the transverse carpal ligament was visualized and there were noted to be no penetrating branches of the nerve. At that point, tenotomy scissors were used to transect the distal portion of the transverse carpal ligament under direct visualization. The median nerve was then visualized. There were noted to be no specific lesions of the nerve and all of its branches were noted to be intact. The wound was then irrigated copiously. The tourniquet was let down and meticulous hemostasis was obtained with bipolar cautery. The wound was closed with 4-0 nylon suture superficially. The wound was then dressed with Xeroform, 4 x 4's, cast padding and a 3 inch Ace wrap was placed.      The patient was stable in the operating room and was transported to the recovery room in stable condition. All lap, needle, sponge, and equipment counts were correct at the end of the case.    POST-OPERATIVE PLAN:     The patient will keep a soft dressing in place for two weeks at which time she will followup with me. The dressing will be taken down, and the sutures will be removed at that time. She is not to get the dressing wet or to take it off. She will have a 2 pound weight limit of the left upper extremity  for a total of 4 weeks.

## 2023-12-21 NOTE — DISCHARGE SUMMARY
[facility] - Surgery  Discharge Note  Short Stay    Procedure(s) (LRB):  Left carpal tunnel release (Left)      OUTCOME: Patient tolerated treatment/procedure well without complication and is now ready for discharge.    DISPOSITION: Home or Self Care    FINAL DIAGNOSIS:  Carpal tunnel syndrome on left    FOLLOWUP: In clinic    DISCHARGE INSTRUCTIONS:    Discharge Procedure Orders   Diet general     Activity as tolerated     Keep surgical extremity elevated     Lifting restrictions   Order Comments: With the left arm and hand to 2-3 lb     Leave dressing on - Keep it clean, dry, and intact until clinic visit     Call MD for:  temperature >100.4     Call MD for:  persistent nausea and vomiting     Call MD for:  severe uncontrolled pain     Call MD for:  difficulty breathing, headache or visual disturbances     Call MD for:  redness, tenderness, or signs of infection (pain, swelling, redness, odor or green/yellow discharge around incision site)     Call MD for:  hives     Call MD for:  persistent dizziness or light-headedness     Call MD for:  extreme fatigue        TIME SPENT ON DISCHARGE: 15 minutes

## 2023-12-21 NOTE — ANESTHESIA POSTPROCEDURE EVALUATION
Anesthesia Post Evaluation    Patient: Traci Freire    Procedure(s) Performed: Procedure(s) (LRB):  Left carpal tunnel release (Left)    Final Anesthesia Type: general      Patient location during evaluation: PACU  Patient participation: Yes- Able to Participate  Level of consciousness: awake and alert  Post-procedure vital signs: reviewed and stable  Pain management: adequate  Airway patency: patent    PONV status at discharge: No PONV  Anesthetic complications: no      Cardiovascular status: hemodynamically stable  Respiratory status: unassisted and room air  Hydration status: euvolemic  Follow-up not needed.              Vitals Value Taken Time   /55 12/21/23 1225   Temp 36.5 °C (97.7 °F) 12/21/23 1151   Pulse 72 12/21/23 1225   Resp 15 12/21/23 1225   SpO2 99 % 12/21/23 1225         Event Time   Out of Recovery 12:20:00         Pain/Denise Score: Pain Rating Post Med Admin: 1 (12/21/2023 12:10 PM)  Denise Score: 4 (12/21/2023 11:51 AM)

## 2023-12-21 NOTE — TRANSFER OF CARE
"Anesthesia Transfer of Care Note    Patient: Traci Freire    Procedure(s) Performed: Procedure(s) (LRB):  Left carpal tunnel release (Left)    Patient location: PACU    Anesthesia Type: general    Transport from OR: Transported from OR on room air with adequate spontaneous ventilation    Post pain: adequate analgesia    Post assessment: no apparent anesthetic complications and tolerated procedure well    Post vital signs: stable    Level of consciousness: awake    Nausea/Vomiting: no nausea/vomiting    Complications: none    Transfer of care protocol was followed      Last vitals: Visit Vitals  /60   Pulse 72   Temp 36.5 °C (97.7 °F) (Skin)   Resp 17   Ht 5' 1" (1.549 m)   Wt 79.4 kg (175 lb)   LMP 05/09/1992   SpO2 100%   Breastfeeding No   BMI 33.07 kg/m²     "

## 2023-12-21 NOTE — DISCHARGE INSTRUCTIONS
Procedure: left carpal tunnel release    1. Please keep the dressing clean, dry, and in place. Do not take it off and do not get it wet.    2. Please keep the left arm and hand elevated for the 1st 24-48 hours to prevent swelling    3. Flexion and extension of the exposed fingers is encouraged, but do not attempt to push off or lift more than 1-2 pounds with left arm or hand    4. Please limit weightbearing to the left hand to 1-2 pounds. Light activity such as brushing your teeth, using a fork, or lifting a small drink is allowed starting today.    5. You can take your previously prescribed hydrocodone for pain as needed.    6. If there are any questions or concerns please call Dr. Cheung's office at [phone]    7.  Follow up with Dr. Cheung in 2 weeks

## 2023-12-21 NOTE — ANESTHESIA PROCEDURE NOTES
Intubation    Date/Time: 12/21/2023 11:16 AM    Performed by: Rob Villegas CRNA  Authorized by: Samir Mohr MD    Intubation:     Induction:  Intravenous    Intubated:  Postinduction    Mask Ventilation:  Easy mask    Attempts:  1    Attempted By:  CRNA    Difficult Airway Encountered?: No      Complications:  None    Airway Device:  Supraglottic airway/LMA    Airway Device Size:  4.0    Style/Cuff Inflation:  Cuffed    Inflation Amount (mL):  28    Secured at:  The lips    Placement Verified By:  Capnometry    Complicating Factors:  None    Findings Post-Intubation:  BS equal bilateral and atraumatic/condition of teeth unchanged

## 2023-12-21 NOTE — ANESTHESIA PREPROCEDURE EVALUATION
12/21/2023  Traci Freire is a 63 y.o., female.      Pre-op Assessment    I have reviewed the Patient Summary Reports.     I have reviewed the Nursing Notes. I have reviewed the NPO Status.   I have reviewed the Medications.     Review of Systems  Anesthesia Hx:  No problems with previous Anesthesia                Social:  Former Smoker       Hematology/Oncology:                        --  Cancer in past history:                     Cardiovascular:     Hypertension           hyperlipidemia                       Hypertension         Pulmonary:        Sleep Apnea     Obstructive Sleep Apnea (FRANCIE).           Renal/:  Chronic Renal Disease        Kidney Function/Disease             Musculoskeletal:  Arthritis   Left hand carpal tunnel syndrome             Endocrine:        Obesity / BMI > 30  Psych:  Psychiatric History  depression                Physical Exam  General: Well nourished, Cooperative, Oriented and Alert    Airway:  Mallampati: III / II  Mouth Opening: Normal  TM Distance: Normal  Tongue: Normal    Dental:  Intact    Chest/Lungs:  Normal Respiratory Rate    Heart:  Rate: Normal        Anesthesia Plan  Type of Anesthesia, risks & benefits discussed:    Anesthesia Type: Gen Supraglottic Airway  Intra-op Monitoring Plan: Standard ASA Monitors  Post Op Pain Control Plan: multimodal analgesia and IV/PO Opioids PRN  Induction:  IV  Airway Plan: , Post-Induction  Informed Consent: Informed consent signed with the Patient and all parties understand the risks and agree with anesthesia plan.  All questions answered.   ASA Score: 3    Ready For Surgery From Anesthesia Perspective.     .

## 2023-12-21 NOTE — PLAN OF CARE
Plan of care discussed with patient. Procedure education provided. All questions and concerns answered. Patient verbalizes understanding.      Dr. Smith with Anesthesia made aware that patient had Breast CA and lymph node removal in 2005, pt states she has had BPs and IVs performed on the (R) arm since then and it is okay to use. MD in agreement, OK to place IV in (R) arm. MD VORB to HOLD Dexmethasone as well, as Dr. Cheung prefers to hold on his patient's.

## 2024-01-05 ENCOUNTER — OFFICE VISIT (OUTPATIENT)
Dept: ORTHOPEDICS | Facility: CLINIC | Age: 64
End: 2024-01-05
Payer: COMMERCIAL

## 2024-01-05 VITALS — WEIGHT: 175 LBS | HEIGHT: 61 IN | BODY MASS INDEX: 33.04 KG/M2

## 2024-01-05 DIAGNOSIS — Z98.890 STATUS POST CARPAL TUNNEL RELEASE: Primary | ICD-10-CM

## 2024-01-05 PROCEDURE — 99999 PR PBB SHADOW E&M-EST. PATIENT-LVL III: CPT | Mod: PBBFAC,,, | Performed by: ORTHOPAEDIC SURGERY

## 2024-01-05 PROCEDURE — 1159F MED LIST DOCD IN RCRD: CPT | Mod: CPTII,S$GLB,, | Performed by: ORTHOPAEDIC SURGERY

## 2024-01-05 PROCEDURE — 4010F ACE/ARB THERAPY RXD/TAKEN: CPT | Mod: CPTII,S$GLB,, | Performed by: ORTHOPAEDIC SURGERY

## 2024-01-05 PROCEDURE — 99024 POSTOP FOLLOW-UP VISIT: CPT | Mod: S$GLB,,, | Performed by: ORTHOPAEDIC SURGERY

## 2024-01-05 NOTE — PROGRESS NOTES
Traci Freire is a 63 y.o. female who is approximately 2 weeks status post left carpal tunnel release. She is doing very well. She states that the majority of carpal tunnel symptoms are improved or resolved.    Physical exam: Examination of the left hand reveals that the incision is healing well. There is no significant edema,  erythema or drainage. Sensation is grossly intact median radial and ulnar distributions. Motor function of the thenar musculature is intact. Capillary refill less than 2 seconds in all of the digits. The Patient is able to make a full composite fist and fully extend the fingers without significant pain.    Assessment: Status post left carpal tunnel release    Plan:    1. Sutures were removed today and Steri-Strips are placed    2. Can begin hand washing in running water tomorrow    3. Continue a 2-3 pound weight limit to the arm and hand    4. Follow up with me in 2 weeks for repeat evaluation

## 2024-01-17 ENCOUNTER — PATIENT MESSAGE (OUTPATIENT)
Dept: ORTHOPEDICS | Facility: CLINIC | Age: 64
End: 2024-01-17
Payer: COMMERCIAL

## 2024-01-26 ENCOUNTER — PATIENT MESSAGE (OUTPATIENT)
Dept: FAMILY MEDICINE | Facility: CLINIC | Age: 64
End: 2024-01-26
Payer: COMMERCIAL

## 2024-01-26 ENCOUNTER — TELEPHONE (OUTPATIENT)
Dept: ORTHOPEDICS | Facility: CLINIC | Age: 64
End: 2024-01-26
Payer: COMMERCIAL

## 2024-01-26 DIAGNOSIS — R60.9 EDEMA, UNSPECIFIED TYPE: ICD-10-CM

## 2024-01-26 RX ORDER — HYDROCHLOROTHIAZIDE 25 MG/1
25 TABLET ORAL DAILY
Qty: 90 TABLET | Refills: 1 | Status: SHIPPED | OUTPATIENT
Start: 2024-01-26

## 2024-01-26 NOTE — TELEPHONE ENCOUNTER
Called pt, informed return to work form was faxed back to [employer] at [phone] and will leave at the  entrance number 3 for .

## 2024-01-29 ENCOUNTER — OFFICE VISIT (OUTPATIENT)
Dept: ORTHOPEDICS | Facility: CLINIC | Age: 64
End: 2024-01-29
Payer: COMMERCIAL

## 2024-01-29 DIAGNOSIS — Z98.890 STATUS POST CARPAL TUNNEL RELEASE: Primary | ICD-10-CM

## 2024-01-29 PROCEDURE — 4010F ACE/ARB THERAPY RXD/TAKEN: CPT | Mod: CPTII,S$GLB,, | Performed by: PHYSICIAN ASSISTANT

## 2024-01-29 PROCEDURE — 99024 POSTOP FOLLOW-UP VISIT: CPT | Mod: S$GLB,,, | Performed by: PHYSICIAN ASSISTANT

## 2024-01-29 PROCEDURE — 1160F RVW MEDS BY RX/DR IN RCRD: CPT | Mod: CPTII,S$GLB,, | Performed by: PHYSICIAN ASSISTANT

## 2024-01-29 PROCEDURE — 1159F MED LIST DOCD IN RCRD: CPT | Mod: CPTII,S$GLB,, | Performed by: PHYSICIAN ASSISTANT

## 2024-01-29 PROCEDURE — 99999 PR PBB SHADOW E&M-EST. PATIENT-LVL III: CPT | Mod: PBBFAC,,, | Performed by: PHYSICIAN ASSISTANT

## 2024-01-29 NOTE — PROGRESS NOTES
Traci Freire is a 63 y.o. female who presents to clinic for follow-up status post left carpal tunnel release.  She is approximately 5 point 5 weeks status post surgery.  States overall she is doing very well.  States her carpal tunnel symptoms have nearly completely resolved.  States he does have some hypersensitivity/soreness around the surgical site.  Denies any acute injuries since last visit.  Denies any other complaints at this time.    Physical Exam:  Examination of the left hand reveals a well-healed surgical site.  No edema, erythema, ecchymosis, or skin breakdown.  Presence of significant scar tissue formation noted around the surgical site.  Presence of mild hypersensitivity of the surgical site.  5/5 /intrinsic strength.  5/5 thenar strength.  Sensation is grossly intact in the radial, ulnar, median nerve distributions.  Capillary refill less than 2 seconds.    Radiology:  None.    Assessment:  Status post left carpal tunnel release    Plan:  1. I discussed with Traci Freire that he is progressing appropriately in the postoperative course we discussed the best course of action this time is to begin scar massage with vitamin-E lotion/cocoa butter daily until seen again in clinic.  We discussed she may gradually return to normal activity as tolerated with no restrictions.  She verbally agreed with the treatment plan     2. I would like her follow up in clinic in 3 weeks for repeat evaluation.  She was instructed to contact clinic for any problems or concerns in the interim.

## 2024-02-09 DIAGNOSIS — F41.9 ANXIETY: ICD-10-CM

## 2024-02-09 RX ORDER — FLUOXETINE HYDROCHLORIDE 20 MG/1
20 CAPSULE ORAL DAILY
Qty: 90 CAPSULE | Refills: 1 | Status: SHIPPED | OUTPATIENT
Start: 2024-02-09

## 2024-02-19 NOTE — PHARMACY MED REC
"              .        Admission Medication History     The home medication history was taken by Kmi Nava.    You may go to "Admission" then "Reconcile Home Medications" tabs to review and/or act upon these items.     The home medication list has been updated by the Pharmacy department.   Please read ALL comments highlighted in yellow.   Please address this information as you see fit.    Feel free to contact us if you have any questions or require assistance.      The medications listed below were removed from the home medication list. Please reorder if appropriate:  Patient reports no longer taking the following medication(s):  Vitamin C  Doxycycline  Iron  Medrol Dosepak  Zofran  Tizanidine    Medications listed below were obtained from: Patient/family and Analytic software- [employer]  Current Facility-Administered Medications on File Prior to Encounter   Medication Dose Route Frequency Provider Last Rate Last Admin    sodium hyaluronate (EUFLEXXA) 10 mg/mL(mw 2.4 -3.6 million) injection 20 mg  20 mg Intra-articular Weekly Chris Estevez MD   20 mg at 12/05/22 1702     Current Outpatient Medications on File Prior to Encounter   Medication Sig Dispense Refill    FLUoxetine 20 MG capsule Take 1 capsule (20 mg total) by mouth once daily. 90 capsule 1    hydroCHLOROthiazide (HYDRODIURIL) 25 MG tablet Take 1 tablet (25 mg total) by mouth once daily. 90 tablet 1    ibuprofen (ADVIL,MOTRIN) 200 MG tablet Take 200 mg by mouth every 6 (six) hours as needed for Pain.      irbesartan (AVAPRO) 300 MG tablet Take 1 tablet (300 mg total) by mouth every evening. 90 tablet 3    mecobalamin (B12 ACTIVE ORAL) Take 1 tablet by mouth Daily.      rosuvastatin (CRESTOR) 20 MG tablet Take 1 tablet (20 mg total) by mouth every evening. 90 tablet 1    zolpidem (AMBIEN) 10 mg Tab Take 1 tablet (10 mg total) by mouth every evening. 90 tablet 1    [DISCONTINUED] ascorbic acid, vitamin C, (VITAMIN C) 500 MG tablet Take 500 mg by " Please call tomorrow morning to make an appointment with an OBGYN for further evaluation.   mouth once daily.      [DISCONTINUED] doxycycline (VIBRA-TABS) 100 MG tablet Take 1 tablet (100 mg total) by mouth 2 (two) times daily. 10 tablet 0    [DISCONTINUED] ferrous sulfate, dried (SLOW FE) 160 mg (50 mg iron) TbSR Take 160 mg by mouth once daily.      [DISCONTINUED] methylPREDNISolone (MEDROL DOSEPACK) 4 mg tablet use as directed (Patient taking differently: Take 4 mg by mouth once daily. use as directed) 21 each 0    [DISCONTINUED] ondansetron (ZOFRAN-ODT) 4 MG TbDL Take 1 tablet (4 mg total) by mouth every 6 (six) hours as needed. (Patient not taking: Reported on 8/3/2023) 12 tablet 0    [DISCONTINUED] tiZANidine (ZANAFLEX) 4 MG tablet Take 1 tablet (4 mg total) by mouth every 8 (eight) hours. for 10 days 30 tablet 0           Kim Nava  EXT 1924

## 2024-03-11 DIAGNOSIS — E78.5 HYPERLIPIDEMIA, UNSPECIFIED HYPERLIPIDEMIA TYPE: ICD-10-CM

## 2024-03-12 RX ORDER — PANTOPRAZOLE SODIUM 40 MG/1
40 TABLET, DELAYED RELEASE ORAL 2 TIMES DAILY
Qty: 60 TABLET | Refills: 11 | Status: SHIPPED | OUTPATIENT
Start: 2024-03-12 | End: 2025-03-12

## 2024-03-12 RX ORDER — ROSUVASTATIN CALCIUM 20 MG/1
20 TABLET, COATED ORAL NIGHTLY
Qty: 90 TABLET | Refills: 1 | Status: SHIPPED | OUTPATIENT
Start: 2024-03-12

## 2024-04-08 ENCOUNTER — OFFICE VISIT (OUTPATIENT)
Dept: FAMILY MEDICINE | Facility: CLINIC | Age: 64
End: 2024-04-08
Payer: COMMERCIAL

## 2024-04-08 VITALS
WEIGHT: 186 LBS | BODY MASS INDEX: 35.12 KG/M2 | HEART RATE: 80 BPM | HEIGHT: 61 IN | OXYGEN SATURATION: 96 % | DIASTOLIC BLOOD PRESSURE: 78 MMHG | SYSTOLIC BLOOD PRESSURE: 124 MMHG

## 2024-04-08 DIAGNOSIS — E78.5 HYPERLIPIDEMIA, UNSPECIFIED HYPERLIPIDEMIA TYPE: Primary | ICD-10-CM

## 2024-04-08 DIAGNOSIS — G47.00 INSOMNIA, UNSPECIFIED TYPE: ICD-10-CM

## 2024-04-08 DIAGNOSIS — M54.40 CHRONIC LOW BACK PAIN WITH SCIATICA, SCIATICA LATERALITY UNSPECIFIED, UNSPECIFIED BACK PAIN LATERALITY: ICD-10-CM

## 2024-04-08 DIAGNOSIS — G89.29 CHRONIC LOW BACK PAIN WITH SCIATICA, SCIATICA LATERALITY UNSPECIFIED, UNSPECIFIED BACK PAIN LATERALITY: ICD-10-CM

## 2024-04-08 DIAGNOSIS — Z79.899 HIGH RISK MEDICATION USE: ICD-10-CM

## 2024-04-08 DIAGNOSIS — I10 PRIMARY HYPERTENSION: ICD-10-CM

## 2024-04-08 DIAGNOSIS — M25.50 ARTHRALGIA, UNSPECIFIED JOINT: ICD-10-CM

## 2024-04-08 DIAGNOSIS — R73.03 PREDIABETES: ICD-10-CM

## 2024-04-08 PROCEDURE — 4010F ACE/ARB THERAPY RXD/TAKEN: CPT | Mod: CPTII,S$GLB,, | Performed by: NURSE PRACTITIONER

## 2024-04-08 PROCEDURE — 3078F DIAST BP <80 MM HG: CPT | Mod: CPTII,S$GLB,, | Performed by: NURSE PRACTITIONER

## 2024-04-08 PROCEDURE — 99214 OFFICE O/P EST MOD 30 MIN: CPT | Mod: S$GLB,,, | Performed by: NURSE PRACTITIONER

## 2024-04-08 PROCEDURE — 3074F SYST BP LT 130 MM HG: CPT | Mod: CPTII,S$GLB,, | Performed by: NURSE PRACTITIONER

## 2024-04-08 PROCEDURE — 1159F MED LIST DOCD IN RCRD: CPT | Mod: CPTII,S$GLB,, | Performed by: NURSE PRACTITIONER

## 2024-04-08 PROCEDURE — 3008F BODY MASS INDEX DOCD: CPT | Mod: CPTII,S$GLB,, | Performed by: NURSE PRACTITIONER

## 2024-04-08 PROCEDURE — 1160F RVW MEDS BY RX/DR IN RCRD: CPT | Mod: CPTII,S$GLB,, | Performed by: NURSE PRACTITIONER

## 2024-04-09 NOTE — PROGRESS NOTES
SUBJECTIVE:    Patient ID: Traci Freire is a 63 y.o. female.    Chief Complaint: Follow-up (Med list brought//Pt here for follow up and blood work//JL)    63 year old female presents for check up. Treated for depression, htn, hyperlipidemia, oa, and insomnia and history of breast cancer. Taking meds as prescribed. Currently seeing dr. Newell for back pain. Has received injection in spine as well as ablation. Did get some relief.  Still has pain. Worse after being on feet considering back surgery. Taking ambien for sleep. Sleeps ok. Currently working nights  Due for labs.     Follow-up  Pertinent negatives include no abdominal pain, arthralgias, chest pain, chills, coughing, fever, headaches, nausea, rash, sore throat, vomiting or weakness.       No visits with results within 6 Month(s) from this visit.   Latest known visit with results is:   Admission on 08/09/2023, Discharged on 08/11/2023   Component Date Value Ref Range Status    WBC 08/09/2023 15.61 (H)  3.90 - 12.70 K/uL Final    RBC 08/09/2023 4.90  4.00 - 5.40 M/uL Final    Hemoglobin 08/09/2023 14.7  12.0 - 16.0 g/dL Final    Hematocrit 08/09/2023 45.0  37.0 - 48.5 % Final    MCV 08/09/2023 92  82 - 98 fL Final    MCH 08/09/2023 30.0  27.0 - 31.0 pg Final    MCHC 08/09/2023 32.7  32.0 - 36.0 g/dL Final    RDW 08/09/2023 13.2  11.5 - 14.5 % Final    Platelets 08/09/2023 318  150 - 450 K/uL Final    MPV 08/09/2023 9.4  9.2 - 12.9 fL Final    Immature Granulocytes 08/09/2023 0.6 (H)  0.0 - 0.5 % Final    Gran # (ANC) 08/09/2023 13.6 (H)  1.8 - 7.7 K/uL Final    Immature Grans (Abs) 08/09/2023 0.09 (H)  0.00 - 0.04 K/uL Final    Lymph # 08/09/2023 1.1  1.0 - 4.8 K/uL Final    Mono # 08/09/2023 0.8  0.3 - 1.0 K/uL Final    Eos # 08/09/2023 0.0  0.0 - 0.5 K/uL Final    Baso # 08/09/2023 0.04  0.00 - 0.20 K/uL Final    nRBC 08/09/2023 0  0 /100 WBC Final    Gran % 08/09/2023 87.3 (H)  38.0 - 73.0 % Final    Lymph % 08/09/2023 6.7 (L)  18.0 - 48.0 % Final    Mono %  08/09/2023 4.9  4.0 - 15.0 % Final    Eosinophil % 08/09/2023 0.2  0.0 - 8.0 % Final    Basophil % 08/09/2023 0.3  0.0 - 1.9 % Final    Differential Method 08/09/2023 Automated   Final    Sodium 08/09/2023 142  136 - 145 mmol/L Final    Potassium 08/09/2023 3.8  3.5 - 5.1 mmol/L Final    Chloride 08/09/2023 108  95 - 110 mmol/L Final    CO2 08/09/2023 24  23 - 29 mmol/L Final    Glucose 08/09/2023 130 (H)  70 - 110 mg/dL Final    BUN 08/09/2023 38 (H)  8 - 23 mg/dL Final    Creatinine 08/09/2023 0.7  0.5 - 1.4 mg/dL Final    Calcium 08/09/2023 9.8  8.7 - 10.5 mg/dL Final    Total Protein 08/09/2023 8.4  6.0 - 8.4 g/dL Final    Albumin 08/09/2023 4.5  3.5 - 5.2 g/dL Final    Total Bilirubin 08/09/2023 0.8  0.1 - 1.0 mg/dL Final    Alkaline Phosphatase 08/09/2023 76  55 - 135 U/L Final    AST 08/09/2023 21  10 - 40 U/L Final    ALT 08/09/2023 23  10 - 44 U/L Final    eGFR 08/09/2023 >60.0  >60 mL/min/1.73 m^2 Final    Anion Gap 08/09/2023 10  8 - 16 mmol/L Final    Group & Rh 08/09/2023 A POS   Final    Indirect Rain 08/09/2023 NEG   Final    Specimen Outdate 08/09/2023 08/12/2023 23:59   Final    aPTT 08/09/2023 21.5  21.0 - 32.0 sec Final    Prothrombin Time 08/09/2023 10.6  9.0 - 12.5 sec Final    INR 08/09/2023 0.9  0.8 - 1.2 Final    Specimen UA 08/09/2023 Urine, Clean Catch   Final    Color, UA 08/09/2023 Yellow  Yellow, Straw, Raysa Final    Appearance, UA 08/09/2023 Clear  Clear Final    pH, UA 08/09/2023 6.0  5.0 - 8.0 Final    Specific Gravity, UA 08/09/2023 >1.030 (A)  1.005 - 1.030 Final    Protein, UA 08/09/2023 1+ (A)  Negative Final    Glucose, UA 08/09/2023 Negative  Negative Final    Ketones, UA 08/09/2023 Negative  Negative Final    Bilirubin (UA) 08/09/2023 Negative  Negative Final    Occult Blood UA 08/09/2023 1+ (A)  Negative Final    Nitrite, UA 08/09/2023 Negative  Negative Final    Urobilinogen, UA 08/09/2023 Negative  Negative EU/dL Final    Leukocytes, UA 08/09/2023 1+ (A)  Negative Final     Lipase 08/09/2023 32  4 - 60 U/L Final    Benzodiazepines 08/09/2023 Negative  Negative Final    Cocaine (Metab.) 08/09/2023 Negative  Negative Final    Opiate Scrn, Ur 08/09/2023 Negative  Negative Final    Barbiturate Screen, Ur 08/09/2023 Negative  Negative Final    Amphetamine Screen, Ur 08/09/2023 Negative  Negative Final    THC 08/09/2023 Negative  Negative Final    Phencyclidine 08/09/2023 Negative  Negative Final    Creatinine, Urine 08/09/2023 135.0  15.0 - 325.0 mg/dL Final    Toxicology Information 08/09/2023 SEE COMMENT   Final    Specimen UA 08/09/2023 Urine, Clean Catch   Final    Color, UA 08/09/2023 Yellow  Yellow, Straw, Raysa Final    Appearance, UA 08/09/2023 Clear  Clear Final    pH, UA 08/09/2023 6.0  5.0 - 8.0 Final    Specific Gravity, UA 08/09/2023 >1.030 (A)  1.005 - 1.030 Final    Protein, UA 08/09/2023 1+ (A)  Negative Final    Glucose, UA 08/09/2023 Negative  Negative Final    Ketones, UA 08/09/2023 Negative  Negative Final    Bilirubin (UA) 08/09/2023 Negative  Negative Final    Occult Blood UA 08/09/2023 1+ (A)  Negative Final    Nitrite, UA 08/09/2023 Negative  Negative Final    Urobilinogen, UA 08/09/2023 Negative  Negative EU/dL Final    Leukocytes, UA 08/09/2023 1+ (A)  Negative Final    CPK 08/09/2023 75  20 - 180 U/L Final    Magnesium 08/09/2023 2.1  1.6 - 2.6 mg/dL Final    TSH 08/09/2023 2.600  0.340 - 5.600 uIU/mL Final    Troponin I High Sensitivity 08/09/2023 7.9  0.0 - 14.9 pg/mL Final    Troponin I High Sensitivity 08/09/2023 10.1  0.0 - 14.9 pg/mL Final    BNP 08/09/2023 17  0 - 99 pg/mL Final    POC Creatinine 08/09/2023 0.7  0.5 - 1.4 mg/dL Final    Sample 08/09/2023 unknown   Final    Occult Blood 08/09/2023 Positive (A)  Negative Final    SARS-CoV-2 RNA, Amplification, Qual 08/09/2023 Negative  Negative Final    RBC, UA 08/09/2023 3  0 - 4 /hpf Final    WBC, UA 08/09/2023 9 (H)  0 - 5 /hpf Final    Bacteria 08/09/2023 Negative  None-Occ /hpf Final    Squam Epithel,  UA 08/09/2023 2  /hpf Final    Hyaline Casts, UA 08/09/2023 4 (A)  0-1/lpf /lpf Final    Microscopic Comment 08/09/2023 SEE COMMENT   Final    WBC 08/10/2023 9.21  3.90 - 12.70 K/uL Final    RBC 08/10/2023 3.87 (L)  4.00 - 5.40 M/uL Final    Hemoglobin 08/10/2023 11.6 (L)  12.0 - 16.0 g/dL Final    Hematocrit 08/10/2023 36.0 (L)  37.0 - 48.5 % Final    MCV 08/10/2023 93  82 - 98 fL Final    MCH 08/10/2023 30.0  27.0 - 31.0 pg Final    MCHC 08/10/2023 32.2  32.0 - 36.0 g/dL Final    RDW 08/10/2023 13.5  11.5 - 14.5 % Final    Platelets 08/10/2023 249  150 - 450 K/uL Final    MPV 08/10/2023 9.4  9.2 - 12.9 fL Final    Sodium 08/10/2023 139  136 - 145 mmol/L Final    Potassium 08/10/2023 3.8  3.5 - 5.1 mmol/L Final    Chloride 08/10/2023 107  95 - 110 mmol/L Final    CO2 08/10/2023 24  23 - 29 mmol/L Final    Glucose 08/10/2023 95  70 - 110 mg/dL Final    BUN 08/10/2023 19  8 - 23 mg/dL Final    Creatinine 08/10/2023 0.5  0.5 - 1.4 mg/dL Final    Calcium 08/10/2023 8.4 (L)  8.7 - 10.5 mg/dL Final    Anion Gap 08/10/2023 8  8 - 16 mmol/L Final    eGFR 08/10/2023 >60.0  >60 mL/min/1.73 m^2 Final    Magnesium 08/10/2023 1.9  1.6 - 2.6 mg/dL Final    WBC 08/11/2023 6.27  3.90 - 12.70 K/uL Final    RBC 08/11/2023 3.71 (L)  4.00 - 5.40 M/uL Final    Hemoglobin 08/11/2023 11.0 (L)  12.0 - 16.0 g/dL Final    Hematocrit 08/11/2023 34.5 (L)  37.0 - 48.5 % Final    MCV 08/11/2023 93  82 - 98 fL Final    MCH 08/11/2023 29.6  27.0 - 31.0 pg Final    MCHC 08/11/2023 31.9 (L)  32.0 - 36.0 g/dL Final    RDW 08/11/2023 13.2  11.5 - 14.5 % Final    Platelets 08/11/2023 228  150 - 450 K/uL Final    MPV 08/11/2023 9.7  9.2 - 12.9 fL Final    Sodium 08/11/2023 139  136 - 145 mmol/L Final    Potassium 08/11/2023 3.4 (L)  3.5 - 5.1 mmol/L Final    Chloride 08/11/2023 108  95 - 110 mmol/L Final    CO2 08/11/2023 25  23 - 29 mmol/L Final    Glucose 08/11/2023 90  70 - 110 mg/dL Final    BUN 08/11/2023 17  8 - 23 mg/dL Final    Creatinine  2023 0.6  0.5 - 1.4 mg/dL Final    Calcium 2023 8.5 (L)  8.7 - 10.5 mg/dL Final    Anion Gap 2023 6 (L)  8 - 16 mmol/L Final    eGFR 2023 >60.0  >60 mL/min/1.73 m^2 Final       Past Medical History:   Diagnosis Date    Anxiety     Arthritis     Cancer breast    Right- Bilateral mastectomy- May 2005- had breast reconstruction- mother  of breast cancer    HLD (hyperlipidemia)     Hypertension     diagnosed age late 40's     IBS (irritable bowel syndrome)     Kidney stone     Lyme disease     arthritis of hands. Felt like flu- age early 30s- got it  after going to [place]    Sleep apnea     NO MACHINE     Social History     Socioeconomic History    Marital status:    Tobacco Use    Smoking status: Former     Current packs/day: 0.00     Types: Cigarettes     Quit date: 3/26/2004     Years since quittin.0    Smokeless tobacco: Never    Tobacco comments:     quit - smoked for 10 years- 1 ppd   Substance and Sexual Activity    Alcohol use: No    Drug use: No    Sexual activity: Never     Social Determinants of Health     Financial Resource Strain: Medium Risk (2024)    Overall Financial Resource Strain (CARDIA)     Difficulty of Paying Living Expenses: Somewhat hard   Food Insecurity: Patient Declined (2024)    Hunger Vital Sign     Worried About Running Out of Food in the Last Year: Patient declined     Ran Out of Food in the Last Year: Patient declined   Transportation Needs: No Transportation Needs (2024)    PRAPARE - Transportation     Lack of Transportation (Medical): No     Lack of Transportation (Non-Medical): No   Physical Activity: Unknown (2024)    Exercise Vital Sign     Days of Exercise per Week: 5 days   Stress: No Stress Concern Present (2024)    [employer] - Occupational Stress Questionnaire     Feeling of Stress : Only a little   Social Connections: Unknown (2024)    Social Connection and Isolation Panel  [NHANES]     Frequency of Communication with Friends and Family: More than three times a week     Frequency of Social Gatherings with Friends and Family: Twice a week     Active Member of Clubs or Organizations: No     Attends Club or Organization Meetings: Patient declined     Marital Status:    Housing Stability: High Risk (2024)    Housing Stability Vital Sign     Unable to Pay for Housing in the Last Year: Yes     Number of Places Lived in the Last Year: 1     Unstable Housing in the Last Year: No     Past Surgical History:   Procedure Laterality Date    ABDOMINAL SURGERY      BLADDER SUSPENSION      BREAST SURGERY      CARPAL TUNNEL RELEASE Left 2023    Procedure: Left carpal tunnel release;  Surgeon: Roberto Cheung MD;  [facility];  Service: Orthopedics;  Laterality: Left;     SECTION      CYSTOSCOPY      avoids greens . ? Interstitial cystitis    ESOPHAGOGASTRODUODENOSCOPY N/A 8/10/2023    Procedure: EGD (ESOPHAGOGASTRODUODENOSCOPY);  Surgeon: Gerard Hernandez MD;  Location: [facility];  Service: Endoscopy;  Laterality: N/A;    HYSTERECTOMY      followed by removal of ovaries  from scar tissue    INCISIONAL HERNIA REPAIR Right 2008    RLQ    JOINT REPLACEMENT      Left total knee replacement-[facility] -     KNEE SURGERY Left     TKR    MODIFIED RADICAL MASTECTOMY W/ AXILLARY LYMPH NODE DISSECTION Right 05/10/2005    w/free flap    NISSEN FUNDOPLICATION      ROBOT-ASSISTED LAPAROSCOPIC REPAIR OF VENTRAL HERNIA N/A 2021    Procedure: ROBOTIC REPAIR, HERNIA, VENTRAL;  Surgeon: John Johnson III, MD;  Location: [facility];  Service: General;  Laterality: N/A;    SIMPLE MASTECTOMY Left 05/10/2005    w/free flap    TONSILLECTOMY       Family History   Problem Relation Age of Onset    Cancer Mother          of breast cancer, also had gynecological cancer- had breast cancer that spread to the brain    Heart disease  Father         in his 60's-  in his 70's       All of your core healthy metrics are met.      Review of patient's allergies indicates:   Allergen Reactions    Keflex [cephalexin]     Macrobid [nitrofurantoin monohyd/m-cryst]     Bactrim [sulfamethoxazole-trimethoprim] Nausea And Vomiting       Current Outpatient Medications:     FLUoxetine 20 MG capsule, Take 1 capsule (20 mg total) by mouth once daily., Disp: 90 capsule, Rfl: 1    hydroCHLOROthiazide (HYDRODIURIL) 25 MG tablet, Take 1 tablet (25 mg total) by mouth once daily., Disp: 90 tablet, Rfl: 1    HYDROcodone-acetaminophen (NORCO)  mg per tablet, Take 1 tablet by mouth every 6 (six) hours as needed for Pain., Disp: 30 tablet, Rfl: 0    irbesartan (AVAPRO) 300 MG tablet, Take 1 tablet (300 mg total) by mouth every evening., Disp: 90 tablet, Rfl: 3    mecobalamin (B12 ACTIVE ORAL), Take 1 tablet by mouth Daily., Disp: , Rfl:     pantoprazole (PROTONIX) 40 MG tablet, Take 1 tablet (40 mg total) by mouth 2 (two) times daily., Disp: 60 tablet, Rfl: 11    rosuvastatin (CRESTOR) 20 MG tablet, Take 1 tablet (20 mg total) by mouth every evening., Disp: 90 tablet, Rfl: 1    zolpidem (AMBIEN) 10 mg Tab, Take 1 tablet (10 mg total) by mouth every evening., Disp: 90 tablet, Rfl: 1    traMADoL (ULTRAM) 50 mg tablet, Take 1 tablet (50 mg total) by mouth every 12 (twelve) hours as needed for Pain. (Patient not taking: Reported on 2024), Disp: 6 tablet, Rfl: 0    Review of Systems   Constitutional:  Negative for chills, fever and unexpected weight change.   HENT:  Negative for ear pain, rhinorrhea and sore throat.    Eyes:  Negative for pain and visual disturbance.   Respiratory:  Negative for cough and shortness of breath.    Cardiovascular:  Negative for chest pain, palpitations and leg swelling.   Gastrointestinal:  Negative for abdominal pain, diarrhea, nausea and vomiting.   Genitourinary:  Negative for difficulty urinating, hematuria and vaginal bleeding.  "  Musculoskeletal:  Positive for back pain. Negative for arthralgias.   Skin:  Negative for rash.   Neurological:  Negative for dizziness, weakness and headaches.   Psychiatric/Behavioral:  Negative for agitation and sleep disturbance. The patient is not nervous/anxious.           Objective:      Vitals:    04/08/24 1115   BP: 124/78   Pulse: 80   SpO2: 96%   Weight: 84.4 kg (186 lb)   Height: 5' 1" (1.549 m)     Physical Exam  Vitals and nursing note reviewed.   Constitutional:       General: She is not in acute distress.     Appearance: Normal appearance. She is obese. She is not ill-appearing.   Musculoskeletal:      Lumbar back: Decreased range of motion. Negative right straight leg raise test and negative left straight leg raise test.   Neurological:      Mental Status: She is alert.      Gait: Gait abnormal.           Assessment:       1. Hyperlipidemia, unspecified hyperlipidemia type    2. Insomnia, unspecified type    3. Primary hypertension    4. Prediabetes    5. High risk medication use    6. Arthralgia, unspecified joint    7. Chronic low back pain with sciatica, sciatica laterality unspecified, unspecified back pain laterality         Plan:       Hyperlipidemia, unspecified hyperlipidemia type  Comments:  crestor  Orders:  -     CBC Auto Differential; Future; Expected date: 04/08/2024  -     Comprehensive Metabolic Panel; Future; Expected date: 04/08/2024  -     Lipid Panel; Future; Expected date: 04/08/2024  -     TSH w/reflex to FT4; Future; Expected date: 04/08/2024  -     Microalbumin/Creatinine Ratio, Urine; Future; Expected date: 04/08/2024  -     Urinalysis, Reflex to Urine Culture Urine, Clean Catch; Future; Expected date: 04/08/2024  -     Calcitriol; Future; Expected date: 04/08/2024    Insomnia, unspecified type  Comments:  ambien  Orders:  -     CBC Auto Differential; Future; Expected date: 04/08/2024  -     Comprehensive Metabolic Panel; Future; Expected date: 04/08/2024  -     Lipid " Panel; Future; Expected date: 04/08/2024  -     TSH w/reflex to FT4; Future; Expected date: 04/08/2024  -     Microalbumin/Creatinine Ratio, Urine; Future; Expected date: 04/08/2024  -     Urinalysis, Reflex to Urine Culture Urine, Clean Catch; Future; Expected date: 04/08/2024  -     Calcitriol; Future; Expected date: 04/08/2024    Primary hypertension  Comments:  bp is controlled  Orders:  -     CBC Auto Differential; Future; Expected date: 04/08/2024  -     Comprehensive Metabolic Panel; Future; Expected date: 04/08/2024  -     Lipid Panel; Future; Expected date: 04/08/2024  -     TSH w/reflex to FT4; Future; Expected date: 04/08/2024  -     Microalbumin/Creatinine Ratio, Urine; Future; Expected date: 04/08/2024  -     Urinalysis, Reflex to Urine Culture Urine, Clean Catch; Future; Expected date: 04/08/2024  -     Calcitriol; Future; Expected date: 04/08/2024    Prediabetes  -     CBC Auto Differential; Future; Expected date: 04/08/2024  -     Comprehensive Metabolic Panel; Future; Expected date: 04/08/2024  -     Lipid Panel; Future; Expected date: 04/08/2024  -     TSH w/reflex to FT4; Future; Expected date: 04/08/2024  -     Microalbumin/Creatinine Ratio, Urine; Future; Expected date: 04/08/2024  -     Urinalysis, Reflex to Urine Culture Urine, Clean Catch; Future; Expected date: 04/08/2024  -     Calcitriol; Future; Expected date: 04/08/2024    High risk medication use  -     CBC Auto Differential; Future; Expected date: 04/08/2024  -     Comprehensive Metabolic Panel; Future; Expected date: 04/08/2024  -     Lipid Panel; Future; Expected date: 04/08/2024  -     TSH w/reflex to FT4; Future; Expected date: 04/08/2024  -     Microalbumin/Creatinine Ratio, Urine; Future; Expected date: 04/08/2024  -     Urinalysis, Reflex to Urine Culture Urine, Clean Catch; Future; Expected date: 04/08/2024  -     Calcitriol; Future; Expected date: 04/08/2024  -     ETHEL Screen w/Reflex; Future; Expected date: 04/08/2024  -      Rheumatoid Factor; Future; Expected date: 04/08/2024  -     Sedimentation rate; Future; Expected date: 04/08/2024    Arthralgia, unspecified joint  -     ETHEL Screen w/Reflex; Future; Expected date: 04/08/2024  -     Rheumatoid Factor; Future; Expected date: 04/08/2024  -     Sedimentation rate; Future; Expected date: 04/08/2024    Chronic low back pain with sciatica, sciatica laterality unspecified, unspecified back pain laterality  Comments:  follow up with pain management as planned.      Follow up in about 6 months (around 10/8/2024), or if symptoms worsen or fail to improve, for medication management.        4/9/2024 Juliet Medina

## 2024-04-10 DIAGNOSIS — R76.8 POSITIVE ANA (ANTINUCLEAR ANTIBODY): Primary | ICD-10-CM

## 2024-04-11 ENCOUNTER — TELEPHONE (OUTPATIENT)
Dept: FAMILY MEDICINE | Facility: CLINIC | Age: 64
End: 2024-04-11
Payer: COMMERCIAL

## 2024-04-11 LAB
1,25(OH)2D SERPL-MCNC: 42 PG/ML (ref 18–72)
1,25(OH)2D2 SERPL-MCNC: <8 PG/ML
1,25(OH)2D3 SERPL-MCNC: 42 PG/ML
ALBUMIN SERPL-MCNC: 4 G/DL (ref 3.6–5.1)
ALBUMIN/CREAT UR: 5 MG/G CREAT
ALBUMIN/GLOB SERPL: 1.5 (CALC) (ref 1–2.5)
ALP SERPL-CCNC: 93 U/L (ref 37–153)
ALT SERPL-CCNC: 15 U/L (ref 6–29)
ANA PAT SER IF-IMP: ABNORMAL
ANA PAT SER IF-IMP: ABNORMAL
ANA SER QL IF: POSITIVE
ANA TITR SER IF: ABNORMAL TITER
ANA TITR SER IF: ABNORMAL TITER
APPEARANCE UR: CLEAR
AST SERPL-CCNC: 16 U/L (ref 10–35)
BACTERIA #/AREA URNS HPF: ABNORMAL /HPF
BACTERIA UR CULT: ABNORMAL
BACTERIA UR CULT: ABNORMAL
BASOPHILS # BLD AUTO: 38 CELLS/UL (ref 0–200)
BASOPHILS NFR BLD AUTO: 0.6 %
BILIRUB SERPL-MCNC: 0.4 MG/DL (ref 0.2–1.2)
BILIRUB UR QL STRIP: NEGATIVE
BUN SERPL-MCNC: 24 MG/DL (ref 7–25)
BUN/CREAT SERPL: NORMAL (CALC) (ref 6–22)
CALCIUM SERPL-MCNC: 9.3 MG/DL (ref 8.6–10.4)
CHLORIDE SERPL-SCNC: 107 MMOL/L (ref 98–110)
CHOLEST SERPL-MCNC: 189 MG/DL
CHOLEST/HDLC SERPL: 4.3 (CALC)
CO2 SERPL-SCNC: 27 MMOL/L (ref 20–32)
COLOR UR: YELLOW
CREAT SERPL-MCNC: 0.68 MG/DL (ref 0.5–1.05)
CREAT UR-MCNC: 235 MG/DL (ref 20–275)
EGFR: 98 ML/MIN/1.73M2
EOSINOPHIL # BLD AUTO: 239 CELLS/UL (ref 15–500)
EOSINOPHIL NFR BLD AUTO: 3.8 %
ERYTHROCYTE [DISTWIDTH] IN BLOOD BY AUTOMATED COUNT: 12.8 % (ref 11–15)
ERYTHROCYTE [SEDIMENTATION RATE] IN BLOOD BY WESTERGREN METHOD: 9 MM/H
GLOBULIN SER CALC-MCNC: 2.7 G/DL (CALC) (ref 1.9–3.7)
GLUCOSE SERPL-MCNC: 99 MG/DL (ref 65–99)
GLUCOSE UR QL STRIP: NEGATIVE
HCT VFR BLD AUTO: 36.3 % (ref 35–45)
HDLC SERPL-MCNC: 44 MG/DL
HGB BLD-MCNC: 11.8 G/DL (ref 11.7–15.5)
HGB UR QL STRIP: NEGATIVE
HYALINE CASTS #/AREA URNS LPF: ABNORMAL /LPF
KETONES UR QL STRIP: NEGATIVE
LDLC SERPL CALC-MCNC: 114 MG/DL (CALC)
LEUKOCYTE ESTERASE UR QL STRIP: ABNORMAL
LYMPHOCYTES # BLD AUTO: 2703 CELLS/UL (ref 850–3900)
LYMPHOCYTES NFR BLD AUTO: 42.9 %
MCH RBC QN AUTO: 29.4 PG (ref 27–33)
MCHC RBC AUTO-ENTMCNC: 32.5 G/DL (ref 32–36)
MCV RBC AUTO: 90.5 FL (ref 80–100)
MICROALBUMIN UR-MCNC: 1.2 MG/DL
MONOCYTES # BLD AUTO: 548 CELLS/UL (ref 200–950)
MONOCYTES NFR BLD AUTO: 8.7 %
NEUTROPHILS # BLD AUTO: 2772 CELLS/UL (ref 1500–7800)
NEUTROPHILS NFR BLD AUTO: 44 %
NITRITE UR QL STRIP: NEGATIVE
NONHDLC SERPL-MCNC: 145 MG/DL (CALC)
PH UR STRIP: 6 [PH] (ref 5–8)
PLATELET # BLD AUTO: 244 THOUSAND/UL (ref 140–400)
PMV BLD REES-ECKER: 10.5 FL (ref 7.5–12.5)
POTASSIUM SERPL-SCNC: 4.2 MMOL/L (ref 3.5–5.3)
PROT SERPL-MCNC: 6.7 G/DL (ref 6.1–8.1)
PROT UR QL STRIP: ABNORMAL
RBC # BLD AUTO: 4.01 MILLION/UL (ref 3.8–5.1)
RBC #/AREA URNS HPF: ABNORMAL /HPF
RHEUMATOID FACT SERPL-ACNC: <14 IU/ML
SERVICE CMNT-IMP: ABNORMAL
SODIUM SERPL-SCNC: 143 MMOL/L (ref 135–146)
SP GR UR STRIP: 1.03 (ref 1–1.03)
SQUAMOUS #/AREA URNS HPF: ABNORMAL /HPF
TRIGL SERPL-MCNC: 193 MG/DL
TSH SERPL-ACNC: 2.01 MIU/L (ref 0.4–4.5)
WBC # BLD AUTO: 6.3 THOUSAND/UL (ref 3.8–10.8)
WBC #/AREA URNS HPF: ABNORMAL /HPF

## 2024-04-11 NOTE — TELEPHONE ENCOUNTER
Spoke with pt verbatim per Juliet. Pt voiced understanding. Information for Dr. Almaguer provided.

## 2024-04-11 NOTE — TELEPHONE ENCOUNTER
----- Message from Juliet Medina NP sent at 4/10/2024  1:53 PM CDT -----  Cholesterol has increased since last lab draw. Needs to start low cholesterol diet. Evie is positive . Would like her to see rheumatology. Please provide number.

## 2024-04-12 ENCOUNTER — TELEPHONE (OUTPATIENT)
Dept: FAMILY MEDICINE | Facility: CLINIC | Age: 64
End: 2024-04-12
Payer: COMMERCIAL

## 2024-04-12 DIAGNOSIS — N30.00 ACUTE CYSTITIS WITHOUT HEMATURIA: Primary | ICD-10-CM

## 2024-04-12 RX ORDER — AMOXICILLIN AND CLAVULANATE POTASSIUM 875; 125 MG/1; MG/1
1 TABLET, FILM COATED ORAL EVERY 12 HOURS
Qty: 20 TABLET | Refills: 0 | Status: SHIPPED | OUTPATIENT
Start: 2024-04-12 | End: 2024-04-22

## 2024-04-12 NOTE — TELEPHONE ENCOUNTER
"Spoke with patient who states she does have a "slight irritation" and agrees to an abx. Allergic to Bactrim, Macrobid, Keflex.   "

## 2024-04-12 NOTE — TELEPHONE ENCOUNTER
UTI on culture. Not on abx. I don't believe this had resulted when Juliet reviewed labs yesterday.

## 2024-04-17 ENCOUNTER — PATIENT MESSAGE (OUTPATIENT)
Dept: FAMILY MEDICINE | Facility: CLINIC | Age: 64
End: 2024-04-17
Payer: COMMERCIAL

## 2024-04-17 RX ORDER — CIPROFLOXACIN 500 MG/1
500 TABLET ORAL EVERY 12 HOURS
Qty: 10 TABLET | Refills: 0 | Status: SHIPPED | OUTPATIENT
Start: 2024-04-17

## 2024-05-06 ENCOUNTER — PATIENT MESSAGE (OUTPATIENT)
Dept: FAMILY MEDICINE | Facility: CLINIC | Age: 64
End: 2024-05-06
Payer: COMMERCIAL

## 2024-05-15 DIAGNOSIS — F41.9 ANXIETY: ICD-10-CM

## 2024-05-15 RX ORDER — ZOLPIDEM TARTRATE 10 MG/1
10 TABLET ORAL NIGHTLY
Qty: 90 TABLET | Refills: 1 | Status: SHIPPED | OUTPATIENT
Start: 2024-05-15

## 2024-07-06 NOTE — TELEPHONE ENCOUNTER
Spoke to pt and she was notified of her lab results and she understood. Pt will have the ct scan.    no

## 2024-07-25 DIAGNOSIS — M25.562 LEFT KNEE PAIN, UNSPECIFIED CHRONICITY: Primary | ICD-10-CM

## 2024-07-29 ENCOUNTER — OFFICE VISIT (OUTPATIENT)
Dept: ORTHOPEDICS | Facility: CLINIC | Age: 64
End: 2024-07-29
Payer: COMMERCIAL

## 2024-07-29 ENCOUNTER — HOSPITAL ENCOUNTER (OUTPATIENT)
Dept: RADIOLOGY | Facility: HOSPITAL | Age: 64
Discharge: HOME | End: 2024-07-29
Attending: NURSE PRACTITIONER
Payer: COMMERCIAL

## 2024-07-29 VITALS — WEIGHT: 185.19 LBS | BODY MASS INDEX: 34.96 KG/M2 | HEIGHT: 61 IN

## 2024-07-29 DIAGNOSIS — M25.562 LEFT KNEE PAIN, UNSPECIFIED CHRONICITY: ICD-10-CM

## 2024-07-29 DIAGNOSIS — Z96.652 PRESENCE OF LEFT ARTIFICIAL KNEE JOINT: Primary | ICD-10-CM

## 2024-07-29 PROCEDURE — 3066F NEPHROPATHY DOC TX: CPT | Mod: CPTII,S$GLB,, | Performed by: NURSE PRACTITIONER

## 2024-07-29 PROCEDURE — 73562 X-RAY EXAM OF KNEE 3: CPT | Mod: TC,LT

## 2024-07-29 PROCEDURE — 99214 OFFICE O/P EST MOD 30 MIN: CPT | Mod: S$GLB,,, | Performed by: NURSE PRACTITIONER

## 2024-07-29 PROCEDURE — 73562 X-RAY EXAM OF KNEE 3: CPT | Mod: 26,LT,, | Performed by: RADIOLOGY

## 2024-07-29 PROCEDURE — 1159F MED LIST DOCD IN RCRD: CPT | Mod: CPTII,S$GLB,, | Performed by: NURSE PRACTITIONER

## 2024-07-29 PROCEDURE — 3061F NEG MICROALBUMINURIA REV: CPT | Mod: CPTII,S$GLB,, | Performed by: NURSE PRACTITIONER

## 2024-07-29 PROCEDURE — 73560 X-RAY EXAM OF KNEE 1 OR 2: CPT | Mod: TC,RT

## 2024-07-29 PROCEDURE — 99999 PR PBB SHADOW E&M-EST. PATIENT-LVL III: CPT | Mod: PBBFAC,,, | Performed by: NURSE PRACTITIONER

## 2024-07-29 PROCEDURE — 4010F ACE/ARB THERAPY RXD/TAKEN: CPT | Mod: CPTII,S$GLB,, | Performed by: NURSE PRACTITIONER

## 2024-07-29 PROCEDURE — 1160F RVW MEDS BY RX/DR IN RCRD: CPT | Mod: CPTII,S$GLB,, | Performed by: NURSE PRACTITIONER

## 2024-07-29 PROCEDURE — 3008F BODY MASS INDEX DOCD: CPT | Mod: CPTII,S$GLB,, | Performed by: NURSE PRACTITIONER

## 2024-07-29 PROCEDURE — 73560 X-RAY EXAM OF KNEE 1 OR 2: CPT | Mod: 26,59,RT, | Performed by: RADIOLOGY

## 2024-07-29 NOTE — PROGRESS NOTES
CC: Pain of the Left Knee      HPI: Pt with c/o left knee pain for the past 2 weeks. The pain was actually intermittent prior to her 125lb dog running into the back of her knee while going out the door. She was able to grab the barbecue pit and didn't actually fall, but she started having more pain over the tibia. The pain is aching and there is occasional swelling over the anterior knee. She is a pain management patient and takes hydrocodone as needed. She has had limited motion in the knee for a while. Last time she was seen in 2022 she had 0-80 degrees. She had the knee revised with an "[employer] knee revision system in 2014. She works overnight stocking the pharmacy. She is ambulating without assistive device. There is a limp.    ROS  General: denies fever and chills  Resp: no c/o sob  CVS: no c/o cp  MSK: c/o left knee pain    PE  General: AAOx3, pleasant and cooperative  Resp: respirations even and unlabored  MSK: left knee exam  0 degrees extension  70 degrees flexion  No warmth or erythema   - effusion  4/5 quad and hamstring strength    Xray:  Reviewed by myself and Dr. Estevez: there is questionable poly wear medially and possibly tibial stem loosening    Assessment:  Left knee pain  H/o left knee revision arthroplasty    Plan:  Discussed with Dr. Estevez and will get a MARS MRI for further evaluation  F/u results by phone

## 2024-08-03 ENCOUNTER — HOSPITAL ENCOUNTER (OUTPATIENT)
Dept: RADIOLOGY | Facility: HOSPITAL | Age: 64
Discharge: HOME | End: 2024-08-03
Attending: NURSE PRACTITIONER
Payer: COMMERCIAL

## 2024-08-03 DIAGNOSIS — Z96.652 PRESENCE OF LEFT ARTIFICIAL KNEE JOINT: ICD-10-CM

## 2024-08-03 PROCEDURE — 73721 MRI JNT OF LWR EXTRE W/O DYE: CPT | Mod: 26,LT,, | Performed by: RADIOLOGY

## 2024-08-03 PROCEDURE — 73721 MRI JNT OF LWR EXTRE W/O DYE: CPT | Mod: TC,LT

## 2024-08-05 ENCOUNTER — PATIENT MESSAGE (OUTPATIENT)
Dept: ORTHOPEDICS | Facility: CLINIC | Age: 64
End: 2024-08-05
Payer: COMMERCIAL

## 2024-08-06 ENCOUNTER — TELEPHONE (OUTPATIENT)
Dept: ORTHOPEDICS | Facility: CLINIC | Age: 64
End: 2024-08-06
Payer: COMMERCIAL

## 2024-08-12 ENCOUNTER — OFFICE VISIT (OUTPATIENT)
Dept: ORTHOPEDICS | Facility: CLINIC | Age: 64
End: 2024-08-12
Payer: COMMERCIAL

## 2024-08-12 VITALS — BODY MASS INDEX: 35.01 KG/M2 | WEIGHT: 185.44 LBS | HEIGHT: 61 IN

## 2024-08-12 DIAGNOSIS — T84.018A FAILURE OF TOTAL KNEE REPLACEMENT, INITIAL ENCOUNTER: Primary | ICD-10-CM

## 2024-08-12 DIAGNOSIS — Z96.659 FAILURE OF TOTAL KNEE REPLACEMENT, INITIAL ENCOUNTER: Primary | ICD-10-CM

## 2024-08-12 PROCEDURE — 99999 PR PBB SHADOW E&M-EST. PATIENT-LVL III: CPT | Mod: PBBFAC,,, | Performed by: ORTHOPAEDIC SURGERY

## 2024-08-12 PROCEDURE — 3008F BODY MASS INDEX DOCD: CPT | Mod: CPTII,S$GLB,, | Performed by: ORTHOPAEDIC SURGERY

## 2024-08-12 PROCEDURE — 1159F MED LIST DOCD IN RCRD: CPT | Mod: CPTII,S$GLB,, | Performed by: ORTHOPAEDIC SURGERY

## 2024-08-12 PROCEDURE — 99214 OFFICE O/P EST MOD 30 MIN: CPT | Mod: S$GLB,,, | Performed by: ORTHOPAEDIC SURGERY

## 2024-08-12 PROCEDURE — 4010F ACE/ARB THERAPY RXD/TAKEN: CPT | Mod: CPTII,S$GLB,, | Performed by: ORTHOPAEDIC SURGERY

## 2024-08-12 PROCEDURE — 3066F NEPHROPATHY DOC TX: CPT | Mod: CPTII,S$GLB,, | Performed by: ORTHOPAEDIC SURGERY

## 2024-08-12 PROCEDURE — 3061F NEG MICROALBUMINURIA REV: CPT | Mod: CPTII,S$GLB,, | Performed by: ORTHOPAEDIC SURGERY

## 2024-08-12 NOTE — PROGRESS NOTES
"Subjective:      Patient ID: Traci Freire is a 63 y.o. female.    Chief Complaint: Pain of the Left Knee    HPI  Traci Freire has left knee pain.  It has progressed where it is severely impacting her activities of daily living.  It is fairly constant.  It is diffuse about the knee    Review of Systems   Constitutional: Negative for chills, fever and night sweats.   HENT:  Negative for hearing loss.    Eyes:  Negative for blurred vision and double vision.   Cardiovascular:  Negative for chest pain, claudication and leg swelling.   Respiratory:  Negative for shortness of breath.    Endocrine: Negative for polydipsia, polyphagia and polyuria.   Hematologic/Lymphatic: Negative for adenopathy and bleeding problem. Does not bruise/bleed easily.   Skin:  Negative for poor wound healing.   Gastrointestinal:  Negative for diarrhea and heartburn.   Genitourinary:  Negative for bladder incontinence.   Neurological:  Negative for focal weakness, headaches, numbness, paresthesias and sensory change.   Psychiatric/Behavioral:  The patient is not nervous/anxious.    Allergic/Immunologic: Negative for persistent infections.         Objective:      Body mass index is 35.03 kg/m².  Vitals:    08/12/24 1311   Weight: 84.1 kg (185 lb 6.5 oz)   Height: 5' 1" (1.549 m)           General    Constitutional: She is oriented to person, place, and time. She appears well-developed and well-nourished.   HENT:   Head: Normocephalic and atraumatic.   Eyes: EOM are normal.   Cardiovascular:  Normal rate.            Pulmonary/Chest: Effort normal.   Neurological: She is alert and oriented to person, place, and time.   Psychiatric: She has a normal mood and affect. Her behavior is normal.             Left Knee Exam     Inspection   Erythema: absent  Scars: present  Swelling: present  Effusion: present  Deformity: absent  Bruising: absent    Tenderness   The patient tender to palpation of the medial joint line, medial retinaculum, lateral joint line " and lateral retinaculum.    Range of Motion   Extension:  0   Flexion:  90     Tests   Stability   Lachman: normal (-1 to 2mm)   MCL - Valgus: normal (0 to 2mm)  LCL - Varus: normal (0 to 2mm)    Muscle Strength   Left Lower Extremity   Hip Abduction: 5/5   Quadriceps:  5/5   Hamstrin/5     Vascular Exam       Edema  Left Lower Leg: absent      X-rays and MRI previously taken were reviewed.  She has revision left total knee arthroplasty in place.  The femoral and tibial stems were very well fixed.  There appears to be some osteolysis about the femoral component and tibial tray.  There is asymmetrical wear of the polyethylene insert.        Assessment:       Encounter Diagnosis   Name Primary?    Failure of total knee replacement, initial encounter Yes          Plan:       Traci was seen today for pain.    Diagnoses and all orders for this visit:    Failure of total knee replacement, initial encounter      She had a normal sedimentation rate.  We will check a CRP.  The plan would be to try to do a polyethylene exchange.  If we find the components to be grossly loose then we will do a complete revision.  The reason for the polyethylene exchange only is how well fixed the femoral and tibial stems are.  The components do not appear grossly loose although there are changes consistent with osteolysis.    Treatment options were discussed. The surgical process of revision left  knee replacement was discussed in detail with the patient including a detailed discussion of the procedure itself (including visual model, x-ray review, and literature review). The typical perioperative and post-operative course was discussed and perioperative risks were discussed to the patient's satisfaction.  We discussed  implant type and why I believe the implant selected is a good match for the patient's needs. Risks and complications discussed included but were not limited to the risks of anesthetic complications, infection, bleeding,  wound healing complications, stiffness, aseptic loosening, instability, limb length inequality, neurologic dysfunction including numbness and weakness,additional surgery,  DVT, pulmonary embolism, perioperative medical risks (cardiac, pulmonary, renal, neurologic), and death and the patient elects to proceed.  The patient should get medically cleared.    ASA for DVT prophylaxis    Lucina Rivas back up  Chloé back up

## 2024-08-13 ENCOUNTER — TELEPHONE (OUTPATIENT)
Dept: PREADMISSION TESTING | Facility: HOSPITAL | Age: 64
End: 2024-08-13
Payer: COMMERCIAL

## 2024-08-13 DIAGNOSIS — Z96.659 FAILURE OF TOTAL KNEE REPLACEMENT, INITIAL ENCOUNTER: Primary | ICD-10-CM

## 2024-08-13 DIAGNOSIS — R73.03 PREDIABETES: Primary | ICD-10-CM

## 2024-08-13 DIAGNOSIS — T84.018A FAILURE OF TOTAL KNEE REPLACEMENT, INITIAL ENCOUNTER: Primary | ICD-10-CM

## 2024-08-13 DIAGNOSIS — Z01.818 PRE-OP TESTING: Primary | ICD-10-CM

## 2024-08-13 DIAGNOSIS — M79.609 PAIN IN EXTREMITY, UNSPECIFIED EXTREMITY: ICD-10-CM

## 2024-08-13 DIAGNOSIS — Z01.818 PRE-OP TESTING: ICD-10-CM

## 2024-08-13 NOTE — ANESTHESIA PAT ROS NOTE
08/13/2024  Traci Freire is a 63 y.o., female.      Pre-op Assessment          Review of Systems           Anesthesia Assessment: Preoperative EQUATION    Planned Procedure: Procedure(s) (LRB):  REVISION, ARTHROPLASTY, KNEE: LEFT: Poly Exchange vs. Complete Revision (Left)  Requested Anesthesia Type:Regional  Surgeon: Chris Estevez MD  Service: Orthopedics  Known or anticipated Date of Surgery:9/19/2024    Surgeon notes: reviewed    Electronic QUestionnaire Assessment completed via nurse interview with patient.        Triage considerations:     The patient has no apparent active cardiac condition (No unstable coronary Syndrome such as severe unstable angina or recent [<1 month] myocardial infarction, decompensated CHF, severe valvular   disease or significant arrhythmia)    Previous anesthesia records:GETA, LMA General, CSE, and No problems  12/21/23 Left carpal tunnel release (Left: Hand)   Airway:  Mallampati: III / II  Mouth Opening: Normal  TM Distance: Normal  Tongue: Normal    Airway Device: LMA Mask Ventilation: Easy Intubated: Postinduction Airway Device Size: 4.0 Placement Verified By: Capnometry Complicating Factors: None Findings Post-Intubation: Bilateral breath sounds;Atraumatic/Condition of teeth unchanged Secured at: Lips Complications: None      11/5/21   ROBOTIC REPAIR, HERNIA, VENTRAL (Abdomen   Method of Intubation: Video Laryngoscopy Mask Ventilation: Easy - oral Intubated: Postinduction Blade: Ching #3 Airway Device Size: 7.0 Cuff Inflation: Minimal occlusive pressure Placement Verified By: Capnometry Complicating Factors: Oropharyngeal edema or fat;Obesity;Short neck Findings Post-Intubation: Bilateral breath sounds;Atraumatic/Condition of teeth unchanged Secured at: Lips Complications: None      Last PCP note: 3-6 months ago , within [facility]   Subspecialty notes: Ortho, Outside  Pain Management     Other important co-morbidities: HLD, HTN, Obesity, and Back Pain, Depression, Breast CA, Pre DM, Former Smoker, LTKA 2013 outside [facility], [facility] Revision 2014 Dr. Estevez       Tests already available:  Available tests,  within [facility] .   4/8/24 CBC, CMP, TSH   8/9/23 EKG  8/4/23 Lumbar XR            Instructions given. (See in Nurse's note)    Optimization:  Anesthesia Preop Clinic Assessment  Indicated POC    Medical Opinion Indicated       Sub-specialist consult indicated:  [facility] NP          Plan:    Testing:  A1C, CMP, EKG, Hematology Profile, PT/INR, and T&S   Pre-anesthesia  visit       Visit focus: possible regional anesthesia and/or nerve block      Consultation:[facility] NP for medical and anesthesia optimization       Patient  has previously scheduled Medical Appointment: 8/26    Navigation: Tests Scheduled.              Consults scheduled.             Results will be tracked by Preop Clinic.

## 2024-08-14 ENCOUNTER — LAB VISIT (OUTPATIENT)
Dept: LAB | Facility: HOSPITAL | Age: 64
End: 2024-08-14
Attending: ORTHOPAEDIC SURGERY
Payer: COMMERCIAL

## 2024-08-14 DIAGNOSIS — Z96.659 FAILURE OF TOTAL KNEE REPLACEMENT, INITIAL ENCOUNTER: ICD-10-CM

## 2024-08-14 DIAGNOSIS — T84.018A FAILURE OF TOTAL KNEE REPLACEMENT, INITIAL ENCOUNTER: ICD-10-CM

## 2024-08-14 LAB — CRP SERPL-MCNC: 0.1 MG/DL

## 2024-08-14 PROCEDURE — 86140 C-REACTIVE PROTEIN: CPT | Performed by: ORTHOPAEDIC SURGERY

## 2024-08-14 PROCEDURE — 36415 COLL VENOUS BLD VENIPUNCTURE: CPT | Performed by: ORTHOPAEDIC SURGERY

## 2024-08-23 ENCOUNTER — TELEPHONE (OUTPATIENT)
Dept: INTERNAL MEDICINE | Facility: CLINIC | Age: 64
End: 2024-08-23
Payer: COMMERCIAL

## 2024-08-25 NOTE — ASSESSMENT & PLAN NOTE
A1c is 5.7  I recommend monitoring patient's glucose level during the perioperative period. Glucose may be elevated from stress hyperglycemia and may require insulin.

## 2024-08-25 NOTE — ASSESSMENT & PLAN NOTE
Dx'd in 2005 to right breast  S/P bilateral mastectomy; right axillary lymph node dissection  No longer followed by Oncology

## 2024-08-25 NOTE — ASSESSMENT & PLAN NOTE
Current BP  not at goal today; 166/87 and 166/79 at the end of visit.    Taking: irbesartan- did not take this AM   Will call on Thursday 8/29 with home BP readings      Lifestyle changes to reduce systolic BP:   exercise 30 minutes per day,  5 days per week or 150 minutes weekly; sodium reduction and avoidance of high salt foods such as processed meats, frozen meals and  fast foods.   Keeping a healthy weight/BMI can help with better BP control    I recommend monitoring BP during perioperative period as uncontrolled pain can elevate blood pressure.

## 2024-08-25 NOTE — ASSESSMENT & PLAN NOTE
No loss of bladder or bowel control    Denies N/T to buttocks, perineum and inner surfaces of the thighs (saddle anesthesia)    Followed per outside  Pain Management - Dr. [facility]  Treated with Hydrocodone  and ESIs  Reports will be having back surgery after left knee surgery    Recent imaging: Xray L-Spine 8/4/23  FINDINGS:  Three views of lumbar spine show minor convex left thoracolumbar spine curvature.     4 mm anterolisthesis of L5 on S1 is evident, likely related to severe bilateral facet joint osteoarthrosis. L4-5 and L3-L4 facet joint osteoarthrosis also noted.     Moderate to severe disc degeneration occurs at T12-L1 and L1-L2, with mild disc degeneration at L3-L4, L4-L5, and L5-S1.     Sacroiliac joints are normal. Surgical clips project in pelvis. Arterial vascular calcifications are evident.     IMPRESSION:     1. Grade 1 anterolisthesis of L5 on S1 due to severe bilateral facet joint osteoarthrosis.  2. Additional multilevel lumbar disc degeneration and facet joint osteoarthrosis.     Electronically signed by:  Jaciel Horan MD  8/4/2023 8:39 AM CDT Workstation: [id number]

## 2024-08-25 NOTE — PROGRESS NOTES
[facility]  Progress Note    Patient Name: Traci Freire  MRN: [id number]  Date of Evaluation- 2024  PCP- Juliet Medina NP    Future cases for Traci Freire [[id number]]       Case ID Status Date Time Sher Procedure Provider Location    [id number] [facility] 2024 11:00  REVISION, ARTHROPLASTY, KNEE: LEFT: Poly Exchange vs. Complete Revision Chris Estevez MD [[id number]] [facility] OR 2ND FLR            HPI:  This is a 63 y.o. female  who presents today for a preoperative evaluation in preparation for revision of left knee arthroplasty.  Surgery is indicated for failure of left TKA .   Patient is new to me.  The history has been obtained by speaking with the patient and reviewing the electronic medical record and/or outside health information. Significant health conditions for the perioperative period are discussed below in assessment and plan.   Patient reports current health status to be Good.  Denies any new symptoms before surgery.       Subjective/ Objective:     Chief Complaint: Preoperative evaulation, perioperative medical management, and complication reduction plan.     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       Anesthesia issues: PONV- occasional     Difficulty mouth opening: No    Steroid use in the last 12 months:  DAISY     Dental Issues: None    Family anesthesia difficulty: None       Family Hx of Thrombosis: None    Past Medical History:   Diagnosis Date    Anxiety     Arthritis     Cancer breast    Right- Bilateral mastectomy- May 2005- had breast reconstruction- mother  of breast cancer    HLD (hyperlipidemia)     Hypertension     diagnosed age late 40's     IBS (irritable bowel syndrome)     Lyme disease     arthritis of hands. Felt like flu- age early 30's- got it  after going to [place]    Sleep apnea     NO MACHINE         Past Medical History Pertinent Negatives:   Diagnosis Date Noted    Asthma  2024    COPD (chronic obstructive pulmonary disease) 2024    Coronary artery disease 2024    Deep vein thrombosis 2024    Diabetes mellitus, type 2 2024    Disorder of kidney and ureter 2024    Encounter for blood transfusion 2021    Myocardial infarction 2024    Pulmonary embolism 2024    Stroke 2024    Thyroid disease 2024         Past Surgical History:   Procedure Laterality Date    ABDOMINAL SURGERY      BLADDER SUSPENSION      BREAST SURGERY      CARPAL TUNNEL RELEASE Left 2023    Procedure: Left carpal tunnel release;  Surgeon: Roberto Cheung MD;  Location: [facility] OR;  Service: Orthopedics;  Laterality: Left;     SECTION      CYSTOSCOPY      avoids greens . ? Interstitial cystitis    ESOPHAGOGASTRODUODENOSCOPY N/A 8/10/2023    Procedure: EGD (ESOPHAGOGASTRODUODENOSCOPY);  Surgeon: Gerard Hernandez MD;  Location: [facility];  Service: Endoscopy;  Laterality: N/A;    HYSTERECTOMY      followed by removal of ovaries  from scar tissue    INCISIONAL HERNIA REPAIR Right 2008    RLQ    JOINT REPLACEMENT      Left total knee replacement-[facility] -     KNEE SURGERY Left     TKR    MODIFIED RADICAL MASTECTOMY W/ AXILLARY LYMPH NODE DISSECTION Right 05/10/2005    w/free flap    NISSEN FUNDOPLICATION      ROBOT-ASSISTED LAPAROSCOPIC REPAIR OF VENTRAL HERNIA N/A 2021    Procedure: ROBOTIC REPAIR, HERNIA, VENTRAL;  Surgeon: John Johnson III, MD;  Location: [facility] OR;  Service: General;  Laterality: N/A;    SIMPLE MASTECTOMY Left 05/10/2005    w/free flap    TONSILLECTOMY         Review of Systems   Constitutional:  Negative for chills, fatigue, fever and unexpected weight change.   HENT:  Negative for congestion, hearing loss, rhinorrhea, sore throat, tinnitus and trouble swallowing.    Eyes:  Negative for visual disturbance.   Respiratory:  Negative for cough, chest tightness,  "shortness of breath and wheezing.    Cardiovascular:  Positive for leg swelling (bilateral). Negative for chest pain and palpitations.   Gastrointestinal:  Negative for constipation and diarrhea.        Denies Fatty liver, Hepatitis   Genitourinary:  Negative for decreased urine volume, difficulty urinating, dysuria, frequency, hematuria and urgency.   Musculoskeletal:  Positive for back pain (lumbar). Negative for arthralgias, neck pain and neck stiffness.   Skin:  Negative for rash and wound.   Neurological:  Positive for numbness (right hand; mainly in ring and little fingers). Negative for dizziness, syncope, weakness and headaches.   Hematological:  Does not bruise/bleed easily.   Psychiatric/Behavioral:  Negative for sleep disturbance and suicidal ideas.               VITALS  Visit Vitals  BP (!) 166/87 (BP Location: Left arm, Patient Position: Sitting)   Pulse 74   Temp 98.6 °F (37 °C) (Oral)   Ht 5' 1" (1.549 m)   Wt 84 kg (185 lb 3 oz)   LMP 05/09/1992   SpO2 98%   BMI 34.99 kg/m²          Physical Exam  Vitals reviewed.   Constitutional:       General: She is not in acute distress.     Appearance: She is well-developed. She is obese.   HENT:      Head: Normocephalic.      Nose: Nose normal.      Mouth/Throat:      Pharynx: No oropharyngeal exudate.   Eyes:      General:         Right eye: No discharge.         Left eye: No discharge.      Conjunctiva/sclera: Conjunctivae normal.      Pupils: Pupils are equal, round, and reactive to light.   Neck:      Thyroid: No thyromegaly.      Vascular: No carotid bruit or JVD.      Trachea: No tracheal deviation.   Cardiovascular:      Rate and Rhythm: Normal rate and regular rhythm.      Pulses:           Carotid pulses are 2+ on the right side and 2+ on the left side.       Dorsalis pedis pulses are 2+ on the right side and 2+ on the left side.        Posterior tibial pulses are 2+ on the right side and 2+ on the left side.      Heart sounds: Murmur (heard best at " the apex; radiates to carotids) heard.      Systolic murmur is present with a grade of 2/6.      Comments: BLE varicose veins  Pulmonary:      Effort: Pulmonary effort is normal. No respiratory distress.      Breath sounds: Normal breath sounds. No stridor. No wheezing, rhonchi or rales.   Abdominal:      General: Bowel sounds are normal. There is no distension.      Palpations: Abdomen is soft.      Tenderness: There is no abdominal tenderness. There is no guarding.      Comments: central obesity   Musculoskeletal:      Cervical back: Normal range of motion. No pain with movement.      Right lower leg: Edema (trace) present.      Left lower leg: Edema (trace) present.   Lymphadenopathy:      Cervical: No cervical adenopathy.   Skin:     General: Skin is warm and dry.      Capillary Refill: Capillary refill takes less than 2 seconds.      Findings: No erythema or rash.   Neurological:      Mental Status: She is alert and oriented to person, place, and time.   Psychiatric:         Behavior: Behavior normal.          Significant Labs:  Lab Results   Component Value Date    WBC 7.04 08/26/2024    HGB 12.3 08/26/2024    HCT 39.6 08/26/2024     08/26/2024    CHOL 189 04/08/2024    TRIG 193 (H) 04/08/2024    HDL 44 (L) 04/08/2024    ALT 16 08/26/2024    AST 17 08/26/2024     08/26/2024    K 4.1 08/26/2024     08/26/2024    CREATININE 0.7 08/26/2024    BUN 20 08/26/2024    CO2 25 08/26/2024    TSH 2.600 08/09/2023    INR 0.9 08/26/2024    HGBA1C 5.7 (H) 08/26/2024    MICROALBUR 1.2 04/08/2024           EKG:   Results for orders placed or performed during the hospital encounter of 08/26/24   EKG 12-lead    Collection Time: 08/26/24 10:25 AM   Result Value Ref Range    QRS Duration 78 ms    OHS QTC Calculation 414 ms    Narrative    Test Reason : Z01.818,    Vent. Rate : 061 BPM     Atrial Rate : 061 BPM     P-R Int : 178 ms          QRS Dur : 078 ms      QT Int : 412 ms       P-R-T Axes : 050 -07 014  degrees     QTc Int : 414 ms    Normal sinus rhythm  Normal ECG  When compared with ECG of 09-AUG-2023 12:53,  No significant change was found  Confirmed by Dina Schuster MD ([id number]) on 8/26/2024 11:51:23 AM    Referred By: AVEL HURST           Confirmed By:Dina Schuster MD       Nuclear Stress Test 6/22/20  The EKG portion of this study is negative for ischemia.    The patient reported no chest pain during the stress test.    There were no arrhythmias during stress.    ECG Stress Nuclear portion of this study will be reported separately.    IMPRESSION:  1. Normal exam. No scintigraphic evidence of left ventricular  myocardial ischemia.  2. Normal left ventricular wall motion.  3. Calculated left ventricular ejection fraction of 70 %.     Electronically Signed by Jaciel Horan M.D. on 6/22/2020 11:06 AM          Active Cardiac Conditions: None      Revised Cardiac Risk Index   High -Risk Surgery  Intraperitoneal; Intrathoracic; suprainguinal vascular Yes- + 1 No- 0   History of Ischemic Heart Disease   (Hx of MI/positive exercise test/current chest pain due to ischemia/use of nitrate therapy/EKG with pathological Q waves) Yes- + 1 No- 0   History of CHF  (Pulmonary edema/bilateral rales or S3 gallop/PND/CXR showing pulmonary vascular redistribution) Yes- + 1 No- 0   History of CVA   (Prior stroke or TIA) Yes- + 1 No- 0   Pre-operative treatment with insulin Yes- + 1 No- 0   Pre-operative creatinine > 2mg/dl Yes- + 1 No- 0   Total: 0      Risk Status:  Estimated risk of cardiac complications after non-cardiac surgery using the Revised Cardiac Risk Index for Preoperative risk is 3.9 %      ARISCAT (Canet) risk index: Intermediate: 13.3% risk of post-op pulmonary complications.    American Society of Anesthesiologists Physical Status classification (ASA): 3                   Orders Placed This Encounter    Echo           Assessment/Plan:     Failed total knee arthroplasty  Scheduled with Dr. Estevez on 9/19/24  for revision of left knee arthroplasty.     Facet arthropathy, lumbar  No loss of bladder or bowel control    Denies N/T to buttocks, perineum and inner surfaces of the thighs (saddle anesthesia)    Followed per outside  Pain Management - Dr. Toney- [facility]  Treated with Hydrocodone  and ESIs  Reports will be having back surgery after left knee surgery    Recent imaging: Xray L-Spine 8/4/23  FINDINGS:  Three views of lumbar spine show minor convex left thoracolumbar spine curvature.     4 mm anterolisthesis of L5 on S1 is evident, likely related to severe bilateral facet joint osteoarthrosis. L4-5 and L3-L4 facet joint osteoarthrosis also noted.     Moderate to severe disc degeneration occurs at T12-L1 and L1-L2, with mild disc degeneration at L3-L4, L4-L5, and L5-S1.     Sacroiliac joints are normal. Surgical clips project in pelvis. Arterial vascular calcifications are evident.     IMPRESSION:     1. Grade 1 anterolisthesis of L5 on S1 due to severe bilateral facet joint osteoarthrosis.  2. Additional multilevel lumbar disc degeneration and facet joint osteoarthrosis.     Electronically signed by:  Jaciel Horan MD  8/4/2023 8:39 AM CDT Workstation: [id number]    Anxiety  Treated with:Prozac; controlled symptoms.     Depression  Treated with:   Prozac; controlled  Followed per PCP.  Denies suicidal/homicidal ideations    HTN (hypertension)  Current BP  not at goal today; 166/87 and 166/79 at the end of visit.    Taking: irbesartan- did not take this AM   Will call on Thursday 8/29 with home BP readings      Lifestyle changes to reduce systolic BP:   exercise 30 minutes per day,  5 days per week or 150 minutes weekly; sodium reduction and avoidance of high salt foods such as processed meats, frozen meals and  fast foods.   Keeping a healthy weight/BMI can help with better BP control    I recommend monitoring BP during perioperative period as uncontrolled pain can elevate blood pressure.         HLD  "(hyperlipidemia)  Recommend weight loss, healthy diet (DASH/Mediterranean) and exercise.   Patient should exercise 30 minutes at least five times weekly once recovered from surgery.   Treated with: Crestor    Hx of breast cancer  Dx'd in 2005 to right breast  S/P bilateral mastectomy; right axillary lymph node dissection  No longer followed by Oncology      Obesity  Patient would benefit from weight loss and has not set goals to achieve success.   Lifestyle changes should be made by eating healthy, exercising at least 150 minutes weekly, and avoiding sedentary behavior.     Prediabetes  A1c is 5.7  I recommend monitoring patient's glucose level during the perioperative period. Glucose may be elevated from stress hyperglycemia and may require insulin.    Dysphagia  Currently being treated with Protonix- controlled; S/P Nissen repair  S/P rectal bleeding and hematemesis in August 2023; EGD done revealing pyloric stenosis with superficial ulcer. No repeat EGD done    EGD 8/10/23  STOMACH, ANTRUM AND BODY, BIOPSY:   - GASTRIC ANTRAL AND OXYNTIC MUCOSA WITH NO SIGNIFICANT HISTOPATHOLOGIC    FINDINGS   - NEGATIVE FOR HELICOBACTER PYLORI TYPE ORGANISMS     FRANCIE (obstructive sleep apnea)  CPAP compliance: No. Reason for non-compliance: " I fight the machine."  Suggest weight loss, increased exercise.  Recommend caution with sedating medication that can cause respiratory depression.   Patients with untreated FRANCIE have an increased risk of stroke, HTN, and heart disease.      Varicose veins of both lower extremities  Encouraged compression stockings to reduce leg fatigue, swelling, or pain.   Increased risk of thrombosis.        Discussion/Management of Perioperative Care    Thromboembolic prophylaxis (VTE) Care: Risk factors for thrombosis include: obesity, surgical procedure, and varicose veins.  I recommend prophylaxis of thromboembolism with the use of compression stockings/pneumatic devices, and/or pharmacologic agents. " The benefits should outweigh the risks for pharmacologic prophylaxis in the perioperative period. I also encourage early ambulation if not contraindicated during the post-operative period.    Risk factors for post-operative pulmonary complications include:HTN and surgery > 3 hours. To reduce the risk of pulmonary complications, prophylactic recommendations include: early ambulation and pain control.    I recommend monitoring sodium during the perioperative period. Pt. is currently on an SSRI which can be associated with SIADH. Surgical procedures are associated with hypersecretion of ADH as well.    Risk factors for renal complications include: HTN. To reduce the risk of postoperative renal complications, I recommend the patient maintain adequate hydration.  Avoid/reduce NSAIDS and MOSS-2 inhibitors use as well as IV contrast for renal protection.    I recommend the use of appropriate prophylactic antibiotics to reduce the risk of surgical site infections.    Delirium risk factors include opioid use. I recommend to avoid/reduce use of benzodiazepine use (not for patients who take on a regular basis), anticholinergics, Benadryl,  and agents that may cause postoperative serotonin syndrome.  Controlled pain can decrease the risk for postop delirium and since opioids are used for postoperative pain control, I suggest using the lowest dose for the shortest amount of time necessary for pain management.     The patient is at an increased risk for urinary retention due to : possible regional anesthesia. I recommend to avoid/decrease the use of benzodiazepines, anticholinergics, and Benadryl in the perioperative period. I also recommend using opioids for the shortest period of time if possible.          This visit was focused on Preoperative evaluation, Perioperative Medical management, complication reduction plans. I suggest that the patient follows up with primary care or relevant sub specialists for ongoing health  care.    I appreciate the opportunity to be involved in this patients care. Please feel free to contact me if there were any questions about this consultation.        I spent a total of 58 minutes on the day of the visit.This includes face to face time and non-face to face time preparing to see the patient (e.g., review of tests), obtaining and/or reviewing separately obtained history, documenting clinical information in the electronic or other health record, independently interpreting results and communicating results to the patient/family/caregiver, or care coordinator.       Patient is pending optimization Echo for murmur (8/28 @ 1130)      Jose Momin NP  Perioperative Medicine  [facility]        Complex Repair And Dermal Autograft Text: The defect edges were debeveled with a #15 scalpel blade.  The primary defect was closed partially with a complex linear closure.  Given the location of the defect, shape of the defect and the proximity to free margins an dermal autograft was deemed most appropriate to repair the remaining defect.  The graft was trimmed to fit the size of the remaining defect.  The graft was then placed in the primary defect, oriented appropriately, and sutured into place.

## 2024-08-25 NOTE — OUTPATIENT SUBJECTIVE & OBJECTIVE
Outpatient Subjective & Objective      Chief Complaint: Preoperative evaulation, perioperative medical management, and complication reduction plan.     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       Anesthesia issues: PONV- occasional     Difficulty mouth opening: No    Steroid use in the last 12 months:  DAISY     Dental Issues: None    Family anesthesia difficulty: None       Family Hx of Thrombosis: None    Past Medical History:   Diagnosis Date    Anxiety     Arthritis     Cancer breast    Right- Bilateral mastectomy- May 2005- had breast reconstruction- mother  of breast cancer    HLD (hyperlipidemia)     Hypertension     diagnosed age late 40's     IBS (irritable bowel syndrome)     Lyme disease     arthritis of hands. Felt like flu- age early 30s- got it  after going to [place]    Sleep apnea     NO MACHINE         Past Medical History Pertinent Negatives:   Diagnosis Date Noted    Asthma 2024    COPD (chronic obstructive pulmonary disease) 2024    Coronary artery disease 2024    Deep vein thrombosis 2024    Diabetes mellitus, type 2 2024    Disorder of kidney and ureter 2024    Encounter for blood transfusion 2021    Myocardial infarction 2024    Pulmonary embolism 2024    Stroke 2024    Thyroid disease 2024         Past Surgical History:   Procedure Laterality Date    ABDOMINAL SURGERY      BLADDER SUSPENSION      BREAST SURGERY      CARPAL TUNNEL RELEASE Left 2023    Procedure: Left carpal tunnel release;  Surgeon: Roberto Cheung MD;  Location: [facility];  Service: Orthopedics;  Laterality: Left;     SECTION      CYSTOSCOPY      avoids greens . ? Interstitial cystitis    ESOPHAGOGASTRODUODENOSCOPY N/A 8/10/2023    Procedure: EGD (ESOPHAGOGASTRODUODENOSCOPY);  Surgeon: Gerard Hernandez MD;  Location: [facility];  Service: Endoscopy;   "Laterality: N/A;    HYSTERECTOMY  1995    followed by removal of ovaries 1996 from scar tissue    INCISIONAL HERNIA REPAIR Right 03/31/2008    RLQ    JOINT REPLACEMENT      Left total knee replacement-[facility] - July 17 th 2013    KNEE SURGERY Left 2014    TKR    MODIFIED RADICAL MASTECTOMY W/ AXILLARY LYMPH NODE DISSECTION Right 05/10/2005    w/free flap    NISSEN FUNDOPLICATION  2000    ROBOT-ASSISTED LAPAROSCOPIC REPAIR OF VENTRAL HERNIA N/A 11/5/2021    Procedure: ROBOTIC REPAIR, HERNIA, VENTRAL;  Surgeon: John Johnson III, MD;  Location: [facility];  Service: General;  Laterality: N/A;    SIMPLE MASTECTOMY Left 05/10/2005    w/free flap    TONSILLECTOMY         Review of Systems   Constitutional:  Negative for chills, fatigue, fever and unexpected weight change.   HENT:  Negative for congestion, hearing loss, rhinorrhea, sore throat, tinnitus and trouble swallowing.    Eyes:  Negative for visual disturbance.   Respiratory:  Negative for cough, chest tightness, shortness of breath and wheezing.    Cardiovascular:  Positive for leg swelling (bilateral). Negative for chest pain and palpitations.   Gastrointestinal:  Negative for constipation and diarrhea.        Denies Fatty liver, Hepatitis   Genitourinary:  Negative for decreased urine volume, difficulty urinating, dysuria, frequency, hematuria and urgency.   Musculoskeletal:  Positive for back pain (lumbar). Negative for arthralgias, neck pain and neck stiffness.   Skin:  Negative for rash and wound.   Neurological:  Positive for numbness (right hand; mainly in ring and little fingers). Negative for dizziness, syncope, weakness and headaches.   Hematological:  Does not bruise/bleed easily.   Psychiatric/Behavioral:  Negative for sleep disturbance and suicidal ideas.               VITALS  Visit Vitals  BP (!) 166/87 (BP Location: Left arm, Patient Position: Sitting)   Pulse 74   Temp 98.6 °F (37 °C) (Oral)   Ht 5' 1" (1.549 m)   Wt 84 kg (185 lb 3 " oz)   LMP 05/09/1992   SpO2 98%   BMI 34.99 kg/m²          Physical Exam  Vitals reviewed.   Constitutional:       General: She is not in acute distress.     Appearance: She is well-developed. She is obese.   HENT:      Head: Normocephalic.      Nose: Nose normal.      Mouth/Throat:      Pharynx: No oropharyngeal exudate.   Eyes:      General:         Right eye: No discharge.         Left eye: No discharge.      Conjunctiva/sclera: Conjunctivae normal.      Pupils: Pupils are equal, round, and reactive to light.   Neck:      Thyroid: No thyromegaly.      Vascular: No carotid bruit or JVD.      Trachea: No tracheal deviation.   Cardiovascular:      Rate and Rhythm: Normal rate and regular rhythm.      Pulses:           Carotid pulses are 2+ on the right side and 2+ on the left side.       Dorsalis pedis pulses are 2+ on the right side and 2+ on the left side.        Posterior tibial pulses are 2+ on the right side and 2+ on the left side.      Heart sounds: Murmur (heard best at the apex; radiates to carotids) heard.      Systolic murmur is present with a grade of 2/6.      Comments: BLE varicose veins  Pulmonary:      Effort: Pulmonary effort is normal. No respiratory distress.      Breath sounds: Normal breath sounds. No stridor. No wheezing, rhonchi or rales.   Abdominal:      General: Bowel sounds are normal. There is no distension.      Palpations: Abdomen is soft.      Tenderness: There is no abdominal tenderness. There is no guarding.      Comments: central obesity   Musculoskeletal:      Cervical back: Normal range of motion. No pain with movement.      Right lower leg: Edema (trace) present.      Left lower leg: Edema (trace) present.   Lymphadenopathy:      Cervical: No cervical adenopathy.   Skin:     General: Skin is warm and dry.      Capillary Refill: Capillary refill takes less than 2 seconds.      Findings: No erythema or rash.   Neurological:      Mental Status: She is alert and oriented to person,  place, and time.   Psychiatric:         Behavior: Behavior normal.          Significant Labs:  Lab Results   Component Value Date    WBC 7.04 08/26/2024    HGB 12.3 08/26/2024    HCT 39.6 08/26/2024     08/26/2024    CHOL 189 04/08/2024    TRIG 193 (H) 04/08/2024    HDL 44 (L) 04/08/2024    ALT 16 08/26/2024    AST 17 08/26/2024     08/26/2024    K 4.1 08/26/2024     08/26/2024    CREATININE 0.7 08/26/2024    BUN 20 08/26/2024    CO2 25 08/26/2024    TSH 2.600 08/09/2023    INR 0.9 08/26/2024    HGBA1C 5.7 (H) 08/26/2024    MICROALBUR 1.2 04/08/2024           EKG:   Results for orders placed or performed during the hospital encounter of 08/26/24   EKG 12-lead    Collection Time: 08/26/24 10:25 AM   Result Value Ref Range    QRS Duration 78 ms    OHS QTC Calculation 414 ms    Narrative    Test Reason : Z01.818,    Vent. Rate : 061 BPM     Atrial Rate : 061 BPM     P-R Int : 178 ms          QRS Dur : 078 ms      QT Int : 412 ms       P-R-T Axes : 050 -07 014 degrees     QTc Int : 414 ms    Normal sinus rhythm  Normal ECG  When compared with ECG of 09-AUG-2023 12:53,  No significant change was found  Confirmed by Dina Schuster MD ([id number]) on 8/26/2024 11:51:23 AM    Referred By: AVEL HURST           Confirmed By:Dina Schuster MD       Nuclear Stress Test 6/22/20  The EKG portion of this study is negative for ischemia.    The patient reported no chest pain during the stress test.    There were no arrhythmias during stress.    ECG Stress Nuclear portion of this study will be reported separately.    IMPRESSION:  1. Normal exam. No scintigraphic evidence of left ventricular  myocardial ischemia.  2. Normal left ventricular wall motion.  3. Calculated left ventricular ejection fraction of 70 %.     Electronically Signed by Jaciel Horan M.D. on 6/22/2020 11:06 AM          Active Cardiac Conditions: None      Revised Cardiac Risk Index   High -Risk Surgery  Intraperitoneal; Intrathoracic;  suprainguinal vascular Yes- + 1 No- 0   History of Ischemic Heart Disease   (Hx of MI/positive exercise test/current chest pain due to ischemia/use of nitrate therapy/EKG with pathological Q waves) Yes- + 1 No- 0   History of CHF  (Pulmonary edema/bilateral rales or S3 gallop/PND/CXR showing pulmonary vascular redistribution) Yes- + 1 No- 0   History of CVA   (Prior stroke or TIA) Yes- + 1 No- 0   Pre-operative treatment with insulin Yes- + 1 No- 0   Pre-operative creatinine > 2mg/dl Yes- + 1 No- 0   Total: 0      Risk Status:  Estimated risk of cardiac complications after non-cardiac surgery using the Revised Cardiac Risk Index for Preoperative risk is 3.9 %      ARISCAT (Canet) risk index: Intermediate: 13.3% risk of post-op pulmonary complications.    American Society of Anesthesiologists Physical Status classification (ASA): 3             Outpatient Subjective & Objective

## 2024-08-25 NOTE — HPI
This is a 63 y.o. female  who presents today for a preoperative evaluation in preparation for revision of left knee arthroplasty.  Surgery is indicated for failure of left TKA .   Patient is new to me.  The history has been obtained by speaking with the patient and reviewing the electronic medical record and/or outside health information. Significant health conditions for the perioperative period are discussed below in assessment and plan.   Patient reports current health status to be Good.  Denies any new symptoms before surgery.

## 2024-08-25 NOTE — ASSESSMENT & PLAN NOTE
Recommend weight loss, healthy diet (DASH/Mediterranean) and exercise.   Patient should exercise 30 minutes at least five times weekly once recovered from surgery.   Treated with: Crestor

## 2024-08-25 NOTE — ASSESSMENT & PLAN NOTE
Currently being treated with Protonix- controlled; S/P Nissen repair  S/P rectal bleeding and hematemesis in August 2023; EGD done revealing pyloric stenosis with superficial ulcer. No repeat EGD done    EGD 8/10/23  STOMACH, ANTRUM AND BODY, BIOPSY:   - GASTRIC ANTRAL AND OXYNTIC MUCOSA WITH NO SIGNIFICANT HISTOPATHOLOGIC    FINDINGS   - NEGATIVE FOR HELICOBACTER PYLORI TYPE ORGANISMS

## 2024-08-25 NOTE — ASSESSMENT & PLAN NOTE
"CPAP compliance: No. Reason for non-compliance: " I fight the machine."  Suggest weight loss, increased exercise.  Recommend caution with sedating medication that can cause respiratory depression.   Patients with untreated FRANCIE have an increased risk of stroke, HTN, and heart disease.    "

## 2024-08-26 ENCOUNTER — HOSPITAL ENCOUNTER (OUTPATIENT)
Dept: CARDIOLOGY | Facility: CLINIC | Age: 64
Discharge: HOME | End: 2024-08-26
Payer: COMMERCIAL

## 2024-08-26 ENCOUNTER — OFFICE VISIT (OUTPATIENT)
Dept: ORTHOPEDICS | Facility: CLINIC | Age: 64
End: 2024-08-26
Payer: COMMERCIAL

## 2024-08-26 ENCOUNTER — OFFICE VISIT (OUTPATIENT)
Dept: INTERNAL MEDICINE | Facility: CLINIC | Age: 64
End: 2024-08-26
Payer: COMMERCIAL

## 2024-08-26 ENCOUNTER — LAB VISIT (OUTPATIENT)
Dept: LAB | Facility: HOSPITAL | Age: 64
End: 2024-08-26
Attending: ANESTHESIOLOGY
Payer: COMMERCIAL

## 2024-08-26 VITALS
HEART RATE: 74 BPM | DIASTOLIC BLOOD PRESSURE: 87 MMHG | WEIGHT: 185.19 LBS | SYSTOLIC BLOOD PRESSURE: 166 MMHG | BODY MASS INDEX: 34.96 KG/M2 | HEIGHT: 61 IN | OXYGEN SATURATION: 98 % | TEMPERATURE: 99 F

## 2024-08-26 VITALS — BODY MASS INDEX: 35.2 KG/M2 | WEIGHT: 186.31 LBS

## 2024-08-26 DIAGNOSIS — Z01.818 PRE-OP TESTING: ICD-10-CM

## 2024-08-26 DIAGNOSIS — Z96.659 FAILURE OF TOTAL KNEE REPLACEMENT, SEQUELA: ICD-10-CM

## 2024-08-26 DIAGNOSIS — T84.013A MECHANICAL FAILURE OF PROSTHETIC LEFT KNEE JOINT: ICD-10-CM

## 2024-08-26 DIAGNOSIS — I10 PRIMARY HYPERTENSION: ICD-10-CM

## 2024-08-26 DIAGNOSIS — F41.9 ANXIETY: ICD-10-CM

## 2024-08-26 DIAGNOSIS — R13.10 DYSPHAGIA, UNSPECIFIED TYPE: ICD-10-CM

## 2024-08-26 DIAGNOSIS — R73.03 PREDIABETES: ICD-10-CM

## 2024-08-26 DIAGNOSIS — R60.9 EDEMA, UNSPECIFIED TYPE: ICD-10-CM

## 2024-08-26 DIAGNOSIS — F32.A DEPRESSION, UNSPECIFIED DEPRESSION TYPE: ICD-10-CM

## 2024-08-26 DIAGNOSIS — Z01.818 PREOPERATIVE EXAMINATION: Primary | ICD-10-CM

## 2024-08-26 DIAGNOSIS — I83.93 VARICOSE VEINS OF BOTH LOWER EXTREMITIES, UNSPECIFIED WHETHER COMPLICATED: ICD-10-CM

## 2024-08-26 DIAGNOSIS — E78.5 HYPERLIPIDEMIA, UNSPECIFIED HYPERLIPIDEMIA TYPE: ICD-10-CM

## 2024-08-26 DIAGNOSIS — I10 ESSENTIAL HYPERTENSION: ICD-10-CM

## 2024-08-26 DIAGNOSIS — E66.9 OBESITY, UNSPECIFIED CLASSIFICATION, UNSPECIFIED OBESITY TYPE, UNSPECIFIED WHETHER SERIOUS COMORBIDITY PRESENT: ICD-10-CM

## 2024-08-26 DIAGNOSIS — Z85.3 HX OF BREAST CANCER: ICD-10-CM

## 2024-08-26 DIAGNOSIS — T84.018S FAILURE OF TOTAL KNEE REPLACEMENT, SEQUELA: ICD-10-CM

## 2024-08-26 DIAGNOSIS — T84.018A FAILURE OF TOTAL KNEE REPLACEMENT, INITIAL ENCOUNTER: Primary | ICD-10-CM

## 2024-08-26 DIAGNOSIS — G47.33 OSA (OBSTRUCTIVE SLEEP APNEA): ICD-10-CM

## 2024-08-26 DIAGNOSIS — M79.609 PAIN IN EXTREMITY, UNSPECIFIED EXTREMITY: ICD-10-CM

## 2024-08-26 DIAGNOSIS — Z96.659 FAILURE OF TOTAL KNEE REPLACEMENT, INITIAL ENCOUNTER: Primary | ICD-10-CM

## 2024-08-26 DIAGNOSIS — R01.1 MURMUR: ICD-10-CM

## 2024-08-26 DIAGNOSIS — M47.816 FACET ARTHROPATHY, LUMBAR: ICD-10-CM

## 2024-08-26 LAB
ALBUMIN SERPL BCP-MCNC: 3.7 G/DL (ref 3.5–5.2)
ALP SERPL-CCNC: 92 U/L (ref 55–135)
ALT SERPL W/O P-5'-P-CCNC: 16 U/L (ref 10–44)
ANION GAP SERPL CALC-SCNC: 8 MMOL/L (ref 8–16)
AST SERPL-CCNC: 17 U/L (ref 10–40)
BASOPHILS # BLD AUTO: 0.03 K/UL (ref 0–0.2)
BASOPHILS NFR BLD: 0.4 % (ref 0–1.9)
BILIRUB SERPL-MCNC: 0.4 MG/DL (ref 0.1–1)
BUN SERPL-MCNC: 20 MG/DL (ref 8–23)
CALCIUM SERPL-MCNC: 9.2 MG/DL (ref 8.7–10.5)
CHLORIDE SERPL-SCNC: 108 MMOL/L (ref 95–110)
CO2 SERPL-SCNC: 25 MMOL/L (ref 23–29)
CREAT SERPL-MCNC: 0.7 MG/DL (ref 0.5–1.4)
DIFFERENTIAL METHOD BLD: ABNORMAL
EOSINOPHIL # BLD AUTO: 0.3 K/UL (ref 0–0.5)
EOSINOPHIL NFR BLD: 3.7 % (ref 0–8)
ERYTHROCYTE [DISTWIDTH] IN BLOOD BY AUTOMATED COUNT: 13.1 % (ref 11.5–14.5)
EST. GFR  (NO RACE VARIABLE): >60 ML/MIN/1.73 M^2
ESTIMATED AVG GLUCOSE: 117 MG/DL (ref 68–131)
GLUCOSE SERPL-MCNC: 96 MG/DL (ref 70–110)
HBA1C MFR BLD: 5.7 % (ref 4–5.6)
HCT VFR BLD AUTO: 39.6 % (ref 37–48.5)
HGB BLD-MCNC: 12.3 G/DL (ref 12–16)
IMM GRANULOCYTES # BLD AUTO: 0.01 K/UL (ref 0–0.04)
IMM GRANULOCYTES NFR BLD AUTO: 0.1 % (ref 0–0.5)
INR PPP: 0.9 (ref 0.8–1.2)
LYMPHOCYTES # BLD AUTO: 2.6 K/UL (ref 1–4.8)
LYMPHOCYTES NFR BLD: 37.4 % (ref 18–48)
MCH RBC QN AUTO: 28.9 PG (ref 27–31)
MCHC RBC AUTO-ENTMCNC: 31.1 G/DL (ref 32–36)
MCV RBC AUTO: 93 FL (ref 82–98)
MONOCYTES # BLD AUTO: 0.4 K/UL (ref 0.3–1)
MONOCYTES NFR BLD: 6.3 % (ref 4–15)
NEUTROPHILS # BLD AUTO: 3.7 K/UL (ref 1.8–7.7)
NEUTROPHILS NFR BLD: 52.1 % (ref 38–73)
NRBC BLD-RTO: 0 /100 WBC
OHS QRS DURATION: 78 MS
OHS QTC CALCULATION: 414 MS
PLATELET # BLD AUTO: 245 K/UL (ref 150–450)
PMV BLD AUTO: 9.7 FL (ref 9.2–12.9)
POTASSIUM SERPL-SCNC: 4.1 MMOL/L (ref 3.5–5.1)
PROT SERPL-MCNC: 7 G/DL (ref 6–8.4)
PROTHROMBIN TIME: 10 SEC (ref 9–12.5)
RBC # BLD AUTO: 4.26 M/UL (ref 4–5.4)
SODIUM SERPL-SCNC: 141 MMOL/L (ref 136–145)
WBC # BLD AUTO: 7.04 K/UL (ref 3.9–12.7)

## 2024-08-26 PROCEDURE — 3008F BODY MASS INDEX DOCD: CPT | Mod: CPTII,S$GLB,,

## 2024-08-26 PROCEDURE — 85025 COMPLETE CBC W/AUTO DIFF WBC: CPT | Performed by: ANESTHESIOLOGY

## 2024-08-26 PROCEDURE — 1160F RVW MEDS BY RX/DR IN RCRD: CPT | Mod: CPTII,S$GLB,,

## 2024-08-26 PROCEDURE — 1159F MED LIST DOCD IN RCRD: CPT | Mod: CPTII,S$GLB,,

## 2024-08-26 PROCEDURE — 3079F DIAST BP 80-89 MM HG: CPT | Mod: CPTII,S$GLB,, | Performed by: NURSE PRACTITIONER

## 2024-08-26 PROCEDURE — 4010F ACE/ARB THERAPY RXD/TAKEN: CPT | Mod: CPTII,S$GLB,, | Performed by: NURSE PRACTITIONER

## 2024-08-26 PROCEDURE — 3061F NEG MICROALBUMINURIA REV: CPT | Mod: CPTII,S$GLB,, | Performed by: NURSE PRACTITIONER

## 2024-08-26 PROCEDURE — 3077F SYST BP >= 140 MM HG: CPT | Mod: CPTII,S$GLB,, | Performed by: NURSE PRACTITIONER

## 2024-08-26 PROCEDURE — 3066F NEPHROPATHY DOC TX: CPT | Mod: CPTII,S$GLB,,

## 2024-08-26 PROCEDURE — 93010 ELECTROCARDIOGRAM REPORT: CPT | Mod: S$GLB,,, | Performed by: INTERNAL MEDICINE

## 2024-08-26 PROCEDURE — 4010F ACE/ARB THERAPY RXD/TAKEN: CPT | Mod: CPTII,S$GLB,,

## 2024-08-26 PROCEDURE — 99999 PR PBB SHADOW E&M-EST. PATIENT-LVL III: CPT | Mod: PBBFAC,,,

## 2024-08-26 PROCEDURE — 99999 PR PBB SHADOW E&M-EST. PATIENT-LVL IV: CPT | Mod: PBBFAC,,, | Performed by: NURSE PRACTITIONER

## 2024-08-26 PROCEDURE — 3008F BODY MASS INDEX DOCD: CPT | Mod: CPTII,S$GLB,, | Performed by: NURSE PRACTITIONER

## 2024-08-26 PROCEDURE — 99215 OFFICE O/P EST HI 40 MIN: CPT | Mod: S$GLB,,, | Performed by: NURSE PRACTITIONER

## 2024-08-26 PROCEDURE — 83036 HEMOGLOBIN GLYCOSYLATED A1C: CPT | Performed by: ANESTHESIOLOGY

## 2024-08-26 PROCEDURE — 93005 ELECTROCARDIOGRAM TRACING: CPT | Mod: S$GLB,,, | Performed by: ANESTHESIOLOGY

## 2024-08-26 PROCEDURE — 3061F NEG MICROALBUMINURIA REV: CPT | Mod: CPTII,S$GLB,,

## 2024-08-26 PROCEDURE — 99214 OFFICE O/P EST MOD 30 MIN: CPT | Mod: S$GLB,,,

## 2024-08-26 PROCEDURE — 85610 PROTHROMBIN TIME: CPT | Performed by: ANESTHESIOLOGY

## 2024-08-26 PROCEDURE — 80053 COMPREHEN METABOLIC PANEL: CPT | Performed by: ANESTHESIOLOGY

## 2024-08-26 PROCEDURE — 3066F NEPHROPATHY DOC TX: CPT | Mod: CPTII,S$GLB,, | Performed by: NURSE PRACTITIONER

## 2024-08-26 PROCEDURE — 36415 COLL VENOUS BLD VENIPUNCTURE: CPT | Performed by: ANESTHESIOLOGY

## 2024-08-26 RX ORDER — MUPIROCIN 20 MG/G
1 OINTMENT TOPICAL
OUTPATIENT
Start: 2024-08-26

## 2024-08-26 RX ORDER — FENTANYL CITRATE 50 UG/ML
25 INJECTION, SOLUTION INTRAMUSCULAR; INTRAVENOUS EVERY 5 MIN PRN
OUTPATIENT
Start: 2024-08-26

## 2024-08-26 RX ORDER — POLYETHYLENE GLYCOL 3350 17 G/17G
17 POWDER, FOR SOLUTION ORAL DAILY
OUTPATIENT
Start: 2024-08-26

## 2024-08-26 RX ORDER — OXYCODONE HYDROCHLORIDE 5 MG/1
10 TABLET ORAL
OUTPATIENT
Start: 2024-08-26

## 2024-08-26 RX ORDER — ONDANSETRON HYDROCHLORIDE 2 MG/ML
4 INJECTION, SOLUTION INTRAVENOUS EVERY 8 HOURS PRN
OUTPATIENT
Start: 2024-08-26

## 2024-08-26 RX ORDER — AMOXICILLIN 250 MG
1 CAPSULE ORAL 2 TIMES DAILY
OUTPATIENT
Start: 2024-08-26

## 2024-08-26 RX ORDER — FAMOTIDINE 20 MG/1
20 TABLET, FILM COATED ORAL 2 TIMES DAILY
OUTPATIENT
Start: 2024-08-26

## 2024-08-26 RX ORDER — TALC
6 POWDER (GRAM) TOPICAL NIGHTLY PRN
OUTPATIENT
Start: 2024-08-26

## 2024-08-26 RX ORDER — IRBESARTAN 300 MG/1
300 TABLET ORAL NIGHTLY
Qty: 90 TABLET | Refills: 3 | Status: SHIPPED | OUTPATIENT
Start: 2024-08-26

## 2024-08-26 RX ORDER — CELECOXIB 200 MG/1
400 CAPSULE ORAL ONCE
OUTPATIENT
Start: 2024-08-26 | End: 2024-08-26

## 2024-08-26 RX ORDER — LIDOCAINE HYDROCHLORIDE 10 MG/ML
1 INJECTION, SOLUTION EPIDURAL; INFILTRATION; INTRACAUDAL; PERINEURAL
OUTPATIENT
Start: 2024-08-26

## 2024-08-26 RX ORDER — MUPIROCIN 20 MG/G
1 OINTMENT TOPICAL 2 TIMES DAILY
OUTPATIENT
Start: 2024-08-26 | End: 2024-08-31

## 2024-08-26 RX ORDER — ACETAMINOPHEN 500 MG
1000 TABLET ORAL
OUTPATIENT
Start: 2024-08-26

## 2024-08-26 RX ORDER — PROCHLORPERAZINE EDISYLATE 5 MG/ML
5 INJECTION INTRAMUSCULAR; INTRAVENOUS EVERY 6 HOURS PRN
OUTPATIENT
Start: 2024-08-26

## 2024-08-26 RX ORDER — SODIUM CHLORIDE 9 MG/ML
INJECTION, SOLUTION INTRAVENOUS
OUTPATIENT
Start: 2024-08-26

## 2024-08-26 RX ORDER — METHOCARBAMOL 750 MG/1
750 TABLET, FILM COATED ORAL 3 TIMES DAILY
OUTPATIENT
Start: 2024-08-26

## 2024-08-26 RX ORDER — CEFAZOLIN SODIUM 2 G/50ML
2 SOLUTION INTRAVENOUS
OUTPATIENT
Start: 2024-08-26 | End: 2024-08-27

## 2024-08-26 RX ORDER — PREGABALIN 75 MG/1
75 CAPSULE ORAL NIGHTLY
OUTPATIENT
Start: 2024-08-26

## 2024-08-26 RX ORDER — FENTANYL CITRATE 50 UG/ML
100 INJECTION, SOLUTION INTRAMUSCULAR; INTRAVENOUS
OUTPATIENT
Start: 2024-08-26 | End: 2024-08-27

## 2024-08-26 RX ORDER — SODIUM CHLORIDE 0.9 % (FLUSH) 0.9 %
10 SYRINGE (ML) INJECTION
OUTPATIENT
Start: 2024-08-26

## 2024-08-26 RX ORDER — SODIUM CHLORIDE 9 MG/ML
INJECTION, SOLUTION INTRAVENOUS CONTINUOUS
OUTPATIENT
Start: 2024-08-26 | End: 2024-08-27

## 2024-08-26 RX ORDER — PREGABALIN 75 MG/1
75 CAPSULE ORAL
OUTPATIENT
Start: 2024-08-26

## 2024-08-26 RX ORDER — MIDAZOLAM HYDROCHLORIDE 1 MG/ML
4 INJECTION, SOLUTION INTRAMUSCULAR; INTRAVENOUS
OUTPATIENT
Start: 2024-08-26 | End: 2024-08-27

## 2024-08-26 RX ORDER — MORPHINE SULFATE 2 MG/ML
2 INJECTION, SOLUTION INTRAMUSCULAR; INTRAVENOUS
OUTPATIENT
Start: 2024-08-26

## 2024-08-26 RX ORDER — NALOXONE HCL 0.4 MG/ML
0.02 VIAL (ML) INJECTION
OUTPATIENT
Start: 2024-08-26

## 2024-08-26 RX ORDER — ASPIRIN 81 MG/1
81 TABLET ORAL 2 TIMES DAILY
OUTPATIENT
Start: 2024-08-26

## 2024-08-26 RX ORDER — CEFAZOLIN SODIUM 2 G/50ML
2 SOLUTION INTRAVENOUS
OUTPATIENT
Start: 2024-08-26

## 2024-08-26 RX ORDER — OXYCODONE HYDROCHLORIDE 5 MG/1
5 TABLET ORAL
OUTPATIENT
Start: 2024-08-26

## 2024-08-26 RX ORDER — HYDROCHLOROTHIAZIDE 25 MG/1
25 TABLET ORAL DAILY
Qty: 90 TABLET | Refills: 1 | Status: SHIPPED | OUTPATIENT
Start: 2024-08-26

## 2024-08-26 RX ORDER — CELECOXIB 200 MG/1
200 CAPSULE ORAL DAILY
OUTPATIENT
Start: 2024-08-26

## 2024-08-26 RX ORDER — BISACODYL 10 MG/1
10 SUPPOSITORY RECTAL EVERY 12 HOURS PRN
OUTPATIENT
Start: 2024-08-26

## 2024-08-26 RX ORDER — ACETAMINOPHEN 500 MG
1000 TABLET ORAL EVERY 6 HOURS
OUTPATIENT
Start: 2024-08-26

## 2024-08-26 NOTE — DISCHARGE INSTRUCTIONS
Your surgery has been scheduled for:_______9/19/24___________________________________    You should report to:  ____[facility], located on the [place] side of the first floor of the           [facility] ([phone])  __X__[facility], located on the [facility] side of the            Second floor of the [facility] ([phone])  ____3rd Floor [facility] located on the [facility] side of the [facility] [phone]  ____[facility]: located at [address] [place] [address]. [address].     Please Note   Tell your doctor if you take Aspirin, products containing Aspirin, herbal medications  or blood thinners, such as Coumadin, Ticlid, or Plavix.  (Consult your provider regarding holding or stopping before surgery).  Arrange for someone to drive you home following surgery.  You will not be allowed to leave the surgical facility alone or drive yourself home following sedation and anesthesia.    Before Surgery  Stop taking all herbal medications, vitamins, and supplements 7 days prior to surgery  No Motrin/Advil (Ibuprofen) 7 days before surgery  No Aleve (Naproxen) 7 days before surgery   No Goody's/BC Powder 7 days before surgery  Refrain from drinking alcoholic beverages for 24 hours before and after surgery  Stop or limit smoking at least 24 hours prior to surgery  You may take Tylenol for pain    Night before Surgery  Do not eat or drink after midnight  Take a shower or bath (shower is recommended).  Bathe with Hibiclens soap or an antibacterial soap from the neck down.  If not supplied by your surgeon, hibiclens soap will need to be purchased over the counter in pharmacy.  Rinse soap off thoroughly.  Shampoo your hair with your regular shampoo    The Day of Surgery  Take another bath or shower with hibiclens or any antibacterial soap, to reduce the chance of infection.  Take heart  and blood pressure medications with a small sip of water, as advised by the perioperative team.  Do not take fluid pills  You may brush your teeth and rinse your mouth, but do not swallow any additional water.   Do not apply perfumes, powder, body lotions or deodorant on the day of surgery.  Nail polish should be removed.  Do not wear makeup or moisturizer  Wear comfortable clothes, such as a button front shirt and loose fitting pants.  Leave all jewelry, including body piercings, and valuables at home.    Bring any devices you will need after surgery such as crutches or canes.  If you have sleep apnea, please bring your CPAP machine  In the event that your physical condition changes including the onset of a cold or respiratory illness, or if you have to delay or cancel your surgery, please notify your surgeon.

## 2024-08-26 NOTE — PROGRESS NOTES
Traci Freire is a 63 y.o. year old here today for pre surgery optimization visit  in preparation for a Left total knee arthroplasty revision to be performed by Dr. Estevez on 9/19/24.  she was last seen and treated in the clinic on 8/12/2024. she will be medically optimized by the pre op center. There has been no significant change in medical status since last visit. No fever, chills, malaise, or unexplained weight change.      Allergies, Medications, past medical and surgical history reviewed.    Focused examination performed.    Patient declined to see surgeon today. All questions answered. Patient encouraged to call with questions. Contact information given.     Pre, winnie, and post operative procedures and expectations discussed. Goals of successful surgery reviewed and include manageable pain levels, surgical site free of infection, medication management, and ambulation with PT/OT assistance. Healthy weight management discussed with patient and caregiver who were receptive to eduction of healthy diet and activity. No other necessary lifestyle changes identified. Educated patient about signs and symptoms of infection, medication management, anticoagulation therapy, risk of tobacco and alcohol use, and self-care to promote healing. Surgical guide given for future reference. Hibiclens given to patient with instructions. All questions were answered.     Traci Freire verbalized an understanding to the education and goals. Patient has displayed readiness to engage in care and is ready to proceed with surgery.  Patient reports daughter is able and ready to provide assistance at home after discharge.    Surgical and blood consents signed.    DME will be arranged. The mobility limitation cannot be sufficiently resolved by the use of a cane. Patient's functional mobility deficit can be sufficiently resolved with the use of a (Rolling Walker or Walker). Patient's mobility limitation significantly impairs their ability to  "participate in one of more activities of daily living. The use of a (Rolling Walker or Walker) will significantly improve the patient's ability to participate in MRADLS and the patient will use it on regular basis in the home."     Traci Freire will contact us if there are any questions, concerns, or changes in medical status prior to surgery.       Joint class:  Left TKA on 05/08/2014    Patient has discussed discharge planning with surgeon. Patient will be discharged to home following surgery.   patient will be scheduled with [facility]. PT will be done at the [facility] location.    30 minutes of time was spent on patient education, review of records, templating, H&P, , appointment scheduling and optimizing patient for surgery.      "

## 2024-08-26 NOTE — H&P (VIEW-ONLY)
CC:  Left knee pain    Traci Freire is a 63 y.o. female with history of Left knee pain. Pain is worse with activity and weight bearing.  Patient has experienced interference of activities of daily living due to decreased range of motion and an increase in joint pain and swelling.  Patient has failed non-operative treatment including NSAIDs, corticosteroid injections, viscosupplement injections, and activity modification.  Traci Freire currently ambulates independently.     Relevant medical conditions of significance in perioperative period:  HTN- on medication managed by PCP  HLD- on medication managed by PCP  FRANCIE- monitored by PCP       Given documented medical comorbidities including those listed above and based off of CMS criteria patient would meet inpatient admission status guidelines. This case has been approved as inpatient.     Past Medical History:   Diagnosis Date    Anxiety     Arthritis     Cancer breast    Right- Bilateral mastectomy- May 2005- had breast reconstruction- mother  of breast cancer    HLD (hyperlipidemia)     Hypertension     diagnosed age late 40's     IBS (irritable bowel syndrome)     Lyme disease     arthritis of hands. Felt like flu- age early 30's- got it  after going to [place]    Sleep apnea     NO MACHINE       Past Surgical History:   Procedure Laterality Date    ABDOMINAL SURGERY      BLADDER SUSPENSION      BREAST SURGERY      CARPAL TUNNEL RELEASE Left 2023    Procedure: Left carpal tunnel release;  Surgeon: Roberto Cheung MD;  Location: [facility];  Service: Orthopedics;  Laterality: Left;     SECTION      CYSTOSCOPY      avoids greens . ? Interstitial cystitis    ESOPHAGOGASTRODUODENOSCOPY N/A 8/10/2023    Procedure: EGD (ESOPHAGOGASTRODUODENOSCOPY);  Surgeon: Gerard Hernandez MD;  Location: [facility];  Service: Endoscopy;  Laterality: N/A;    HYSTERECTOMY      followed by removal of ovaries  from scar tissue    INCISIONAL HERNIA REPAIR  Right 2008    RLQ    JOINT REPLACEMENT      Left total knee replacement-[facility] -     KNEE SURGERY Left     TKR    MODIFIED RADICAL MASTECTOMY W/ AXILLARY LYMPH NODE DISSECTION Right 05/10/2005    w/free flap    NISSEN FUNDOPLICATION      ROBOT-ASSISTED LAPAROSCOPIC REPAIR OF VENTRAL HERNIA N/A 2021    Procedure: ROBOTIC REPAIR, HERNIA, VENTRAL;  Surgeon: John Johnson III, MD;  Location: [facility] OR;  Service: General;  Laterality: N/A;    SIMPLE MASTECTOMY Left 05/10/2005    w/free flap    TONSILLECTOMY         Family History   Problem Relation Name Age of Onset    Cancer Mother           of breast cancer, also had gynecological cancer- had breast cancer that spread to the brain    Heart disease Father          in his 60's-  in his 70's    Breast cancer Daughter         Review of patient's allergies indicates:   Allergen Reactions    Keflex [cephalexin]     Macrobid [nitrofurantoin monohyd/m-cryst]     Bactrim [sulfamethoxazole-trimethoprim] Nausea And Vomiting         Current Outpatient Medications:     FLUoxetine 20 MG capsule, Take 1 capsule (20 mg total) by mouth once daily., Disp: 90 capsule, Rfl: 1    hydroCHLOROthiazide (HYDRODIURIL) 25 MG tablet, Take 1 tablet (25 mg total) by mouth once daily., Disp: 90 tablet, Rfl: 1    HYDROcodone-acetaminophen (NORCO)  mg per tablet, Take 1 tablet by mouth every 6 (six) hours as needed for Pain., Disp: 30 tablet, Rfl: 0    irbesartan (AVAPRO) 300 MG tablet, Take 1 tablet (300 mg total) by mouth every evening., Disp: 90 tablet, Rfl: 3    mecobalamin (B12 ACTIVE ORAL), Take 1 tablet by mouth Daily., Disp: , Rfl:     pantoprazole (PROTONIX) 40 MG tablet, Take 1 tablet (40 mg total) by mouth 2 (two) times daily., Disp: 60 tablet, Rfl: 11    rosuvastatin (CRESTOR) 20 MG tablet, Take 1 tablet (20 mg total) by mouth every evening., Disp: 90 tablet, Rfl: 1    zolpidem (AMBIEN) 10 mg Tab, Take 1 tablet (10 mg total) by  mouth every evening., Disp: 90 tablet, Rfl: 1    Review of Systems:  Constitutional: no fever or chills  Eyes: no visual changes  ENT: no nasal congestion or sore throat  Respiratory: no cough or shortness of breath  Cardiovascular: no chest pain or palpitations  Gastrointestinal: no nausea or vomiting, tolerating diet  Genitourinary: no hematuria or dysuria  Integument/Breast: no rash or pruritis  Hematologic/Lymphatic: no easy bruising or lymphadenopathy  Musculoskeletal: positive for knee pain  Neurological: no seizures or tremors  Behavioral/Psych: no auditory or visual hallucinations  Endocrine: no heat or cold intolerance    PE:  Wt 84.5 kg (186 lb 4.6 oz)   LMP 05/09/1992   BMI 35.20 kg/m²   General: Pleasant, cooperative, NAD   Gait: antalgic  HEENT: NCAT, sclera nonicteric   Lungs: Respirations clear bilaterally; equal and unlabored.   CV: S1S2; 2+ bilateral upper and lower extremity pulses.   Skin: Intact throughout with no rashes, erythema, or lesions  Extremities: No LE edema,  no erythema or warmth of the skin in either lower extremity.    Left knee exam:  Knee Range of Motion:  0-40, pain with passive range of motion  Effusion:none  Condition of skin:intact  Location of tenderness:Medial joint line   Strength:normal  Stability: stable to testing    Hip Examination: painless PROM of hip     Radiographs: Radiographs reveal advanced degenerative changes including subchondral cyst formation, subchondral sclerosis, osteophyte formation, joint space narrowing.     Knee Alignment: normal    Diagnosis: Primary osteoarthritis Left knee    Plan: Left total knee arthroplasty    Due to the serious nature of total joint infection and the high prevalence of community acquired MRSA, vancomycin will be used perioperatively.

## 2024-08-26 NOTE — H&P
CC:  Left knee pain    Traci Freire is a 63 y.o. female with history of Left knee pain. Pain is worse with activity and weight bearing.  Patient has experienced interference of activities of daily living due to decreased range of motion and an increase in joint pain and swelling.  Patient has failed non-operative treatment including NSAIDs, corticosteroid injections, viscosupplement injections, and activity modification.  Traci Freire currently ambulates independently.     Relevant medical conditions of significance in perioperative period:  HTN- on medication managed by PCP  HLD- on medication managed by PCP  FRANCIE- monitored by PCP       Given documented medical comorbidities including those listed above and based off of CMS criteria patient would meet inpatient admission status guidelines. This case has been approved as inpatient.     Past Medical History:   Diagnosis Date    Anxiety     Arthritis     Cancer breast    Right- Bilateral mastectomy- May 2005- had breast reconstruction- mother  of breast cancer    HLD (hyperlipidemia)     Hypertension     diagnosed age late 40's     IBS (irritable bowel syndrome)     Lyme disease     arthritis of hands. Felt like flu- age early 30's- got it  after going to [place]    Sleep apnea     NO MACHINE       Past Surgical History:   Procedure Laterality Date    ABDOMINAL SURGERY      BLADDER SUSPENSION      BREAST SURGERY      CARPAL TUNNEL RELEASE Left 2023    Procedure: Left carpal tunnel release;  Surgeon: Roberto Cheung MD;  Location: [facility];  Service: Orthopedics;  Laterality: Left;     SECTION      CYSTOSCOPY      avoids greens . ? Interstitial cystitis    ESOPHAGOGASTRODUODENOSCOPY N/A 8/10/2023    Procedure: EGD (ESOPHAGOGASTRODUODENOSCOPY);  Surgeon: Gerard Hernandez MD;  Location: [facility];  Service: Endoscopy;  Laterality: N/A;    HYSTERECTOMY      followed by removal of ovaries  from scar tissue    INCISIONAL HERNIA REPAIR  Right 2008    RLQ    JOINT REPLACEMENT      Left total knee replacement-[facility] -     KNEE SURGERY Left     TKR    MODIFIED RADICAL MASTECTOMY W/ AXILLARY LYMPH NODE DISSECTION Right 05/10/2005    w/free flap    NISSEN FUNDOPLICATION      ROBOT-ASSISTED LAPAROSCOPIC REPAIR OF VENTRAL HERNIA N/A 2021    Procedure: ROBOTIC REPAIR, HERNIA, VENTRAL;  Surgeon: John Johnson III, MD;  Location: [facility] OR;  Service: General;  Laterality: N/A;    SIMPLE MASTECTOMY Left 05/10/2005    w/free flap    TONSILLECTOMY         Family History   Problem Relation Name Age of Onset    Cancer Mother           of breast cancer, also had gynecological cancer- had breast cancer that spread to the brain    Heart disease Father          in his 60's-  in his 70's    Breast cancer Daughter         Review of patient's allergies indicates:   Allergen Reactions    Keflex [cephalexin]     Macrobid [nitrofurantoin monohyd/m-cryst]     Bactrim [sulfamethoxazole-trimethoprim] Nausea And Vomiting         Current Outpatient Medications:     FLUoxetine 20 MG capsule, Take 1 capsule (20 mg total) by mouth once daily., Disp: 90 capsule, Rfl: 1    hydroCHLOROthiazide (HYDRODIURIL) 25 MG tablet, Take 1 tablet (25 mg total) by mouth once daily., Disp: 90 tablet, Rfl: 1    HYDROcodone-acetaminophen (NORCO)  mg per tablet, Take 1 tablet by mouth every 6 (six) hours as needed for Pain., Disp: 30 tablet, Rfl: 0    irbesartan (AVAPRO) 300 MG tablet, Take 1 tablet (300 mg total) by mouth every evening., Disp: 90 tablet, Rfl: 3    mecobalamin (B12 ACTIVE ORAL), Take 1 tablet by mouth Daily., Disp: , Rfl:     pantoprazole (PROTONIX) 40 MG tablet, Take 1 tablet (40 mg total) by mouth 2 (two) times daily., Disp: 60 tablet, Rfl: 11    rosuvastatin (CRESTOR) 20 MG tablet, Take 1 tablet (20 mg total) by mouth every evening., Disp: 90 tablet, Rfl: 1    zolpidem (AMBIEN) 10 mg Tab, Take 1 tablet (10 mg total) by  mouth every evening., Disp: 90 tablet, Rfl: 1    Review of Systems:  Constitutional: no fever or chills  Eyes: no visual changes  ENT: no nasal congestion or sore throat  Respiratory: no cough or shortness of breath  Cardiovascular: no chest pain or palpitations  Gastrointestinal: no nausea or vomiting, tolerating diet  Genitourinary: no hematuria or dysuria  Integument/Breast: no rash or pruritis  Hematologic/Lymphatic: no easy bruising or lymphadenopathy  Musculoskeletal: positive for knee pain  Neurological: no seizures or tremors  Behavioral/Psych: no auditory or visual hallucinations  Endocrine: no heat or cold intolerance    PE:  Wt 84.5 kg (186 lb 4.6 oz)   LMP 05/09/1992   BMI 35.20 kg/m²   General: Pleasant, cooperative, NAD   Gait: antalgic  HEENT: NCAT, sclera nonicteric   Lungs: Respirations clear bilaterally; equal and unlabored.   CV: S1S2; 2+ bilateral upper and lower extremity pulses.   Skin: Intact throughout with no rashes, erythema, or lesions  Extremities: No LE edema,  no erythema or warmth of the skin in either lower extremity.    Left knee exam:  Knee Range of Motion:  0-40, pain with passive range of motion  Effusion:none  Condition of skin:intact  Location of tenderness:Medial joint line   Strength:normal  Stability: stable to testing    Hip Examination: painless PROM of hip     Radiographs: Radiographs reveal advanced degenerative changes including subchondral cyst formation, subchondral sclerosis, osteophyte formation, joint space narrowing.     Knee Alignment: normal    Diagnosis: Primary osteoarthritis Left knee    Plan: Left total knee arthroplasty    Due to the serious nature of total joint infection and the high prevalence of community acquired MRSA, vancomycin will be used perioperatively.

## 2024-08-26 NOTE — ASSESSMENT & PLAN NOTE
Encouraged compression stockings to reduce leg fatigue, swelling, or pain.   Increased risk of thrombosis.

## 2024-08-27 ENCOUNTER — TELEPHONE (OUTPATIENT)
Dept: CARDIOLOGY | Facility: HOSPITAL | Age: 64
End: 2024-08-27

## 2024-09-02 ENCOUNTER — PATIENT MESSAGE (OUTPATIENT)
Dept: FAMILY MEDICINE | Facility: CLINIC | Age: 64
End: 2024-09-02
Payer: COMMERCIAL

## 2024-09-03 ENCOUNTER — OFFICE VISIT (OUTPATIENT)
Dept: FAMILY MEDICINE | Facility: CLINIC | Age: 64
End: 2024-09-03
Payer: COMMERCIAL

## 2024-09-03 ENCOUNTER — HOSPITAL ENCOUNTER (OUTPATIENT)
Dept: CARDIOLOGY | Facility: HOSPITAL | Age: 64
Discharge: HOME | End: 2024-09-03
Attending: NURSE PRACTITIONER
Payer: COMMERCIAL

## 2024-09-03 ENCOUNTER — HOSPITAL ENCOUNTER (OUTPATIENT)
Dept: RADIOLOGY | Facility: HOSPITAL | Age: 64
Discharge: HOME | End: 2024-09-03
Attending: NURSE PRACTITIONER
Payer: COMMERCIAL

## 2024-09-03 VITALS
OXYGEN SATURATION: 97 % | BODY MASS INDEX: 33.79 KG/M2 | HEART RATE: 81 BPM | RESPIRATION RATE: 18 BRPM | WEIGHT: 179 LBS | SYSTOLIC BLOOD PRESSURE: 122 MMHG | HEIGHT: 61 IN | DIASTOLIC BLOOD PRESSURE: 62 MMHG

## 2024-09-03 VITALS — WEIGHT: 179 LBS | HEIGHT: 61 IN | BODY MASS INDEX: 33.79 KG/M2

## 2024-09-03 DIAGNOSIS — Z85.3 HX OF BREAST CANCER: ICD-10-CM

## 2024-09-03 DIAGNOSIS — R01.1 MURMUR: ICD-10-CM

## 2024-09-03 DIAGNOSIS — R10.9 ABDOMINAL PAIN, UNSPECIFIED ABDOMINAL LOCATION: ICD-10-CM

## 2024-09-03 DIAGNOSIS — R10.32 LEFT LOWER QUADRANT PAIN: Primary | ICD-10-CM

## 2024-09-03 DIAGNOSIS — R10.32 LEFT LOWER QUADRANT PAIN: ICD-10-CM

## 2024-09-03 DIAGNOSIS — R11.2 NAUSEA AND VOMITING, UNSPECIFIED VOMITING TYPE: ICD-10-CM

## 2024-09-03 DIAGNOSIS — F41.9 ANXIETY: ICD-10-CM

## 2024-09-03 DIAGNOSIS — I10 PRIMARY HYPERTENSION: ICD-10-CM

## 2024-09-03 DIAGNOSIS — G47.00 INSOMNIA, UNSPECIFIED TYPE: ICD-10-CM

## 2024-09-03 PROCEDURE — 93306 TTE W/DOPPLER COMPLETE: CPT

## 2024-09-03 PROCEDURE — A9698 NON-RAD CONTRAST MATERIALNOC: HCPCS

## 2024-09-03 PROCEDURE — 25500020 PHARM REV CODE 255

## 2024-09-03 PROCEDURE — 99214 OFFICE O/P EST MOD 30 MIN: CPT | Mod: S$GLB,,, | Performed by: NURSE PRACTITIONER

## 2024-09-03 PROCEDURE — 3008F BODY MASS INDEX DOCD: CPT | Mod: CPTII,S$GLB,, | Performed by: NURSE PRACTITIONER

## 2024-09-03 PROCEDURE — 3074F SYST BP LT 130 MM HG: CPT | Mod: CPTII,S$GLB,, | Performed by: NURSE PRACTITIONER

## 2024-09-03 PROCEDURE — 3078F DIAST BP <80 MM HG: CPT | Mod: CPTII,S$GLB,, | Performed by: NURSE PRACTITIONER

## 2024-09-03 PROCEDURE — 93306 TTE W/DOPPLER COMPLETE: CPT | Mod: 26,,, | Performed by: INTERNAL MEDICINE

## 2024-09-03 PROCEDURE — 74177 CT ABD & PELVIS W/CONTRAST: CPT | Mod: TC

## 2024-09-03 PROCEDURE — 3061F NEG MICROALBUMINURIA REV: CPT | Mod: CPTII,S$GLB,, | Performed by: NURSE PRACTITIONER

## 2024-09-03 PROCEDURE — 3066F NEPHROPATHY DOC TX: CPT | Mod: CPTII,S$GLB,, | Performed by: NURSE PRACTITIONER

## 2024-09-03 PROCEDURE — 1160F RVW MEDS BY RX/DR IN RCRD: CPT | Mod: CPTII,S$GLB,, | Performed by: NURSE PRACTITIONER

## 2024-09-03 PROCEDURE — 74177 CT ABD & PELVIS W/CONTRAST: CPT | Mod: 26,,, | Performed by: RADIOLOGY

## 2024-09-03 PROCEDURE — 3044F HG A1C LEVEL LT 7.0%: CPT | Mod: CPTII,S$GLB,, | Performed by: NURSE PRACTITIONER

## 2024-09-03 PROCEDURE — 1159F MED LIST DOCD IN RCRD: CPT | Mod: CPTII,S$GLB,, | Performed by: NURSE PRACTITIONER

## 2024-09-03 PROCEDURE — 4010F ACE/ARB THERAPY RXD/TAKEN: CPT | Mod: CPTII,S$GLB,, | Performed by: NURSE PRACTITIONER

## 2024-09-03 RX ADMIN — IOHEXOL 75 ML: 350 INJECTION, SOLUTION INTRAVENOUS at 01:09

## 2024-09-03 RX ADMIN — IOHEXOL 1000 ML: 9 SOLUTION ORAL at 01:09

## 2024-09-04 ENCOUNTER — PATIENT MESSAGE (OUTPATIENT)
Dept: FAMILY MEDICINE | Facility: CLINIC | Age: 64
End: 2024-09-04
Payer: COMMERCIAL

## 2024-09-04 DIAGNOSIS — Z85.3 HISTORY OF BREAST CANCER: ICD-10-CM

## 2024-09-04 DIAGNOSIS — K76.9 LIVER DISEASE, UNSPECIFIED: Primary | ICD-10-CM

## 2024-09-04 DIAGNOSIS — R16.0 LIVER MASS: ICD-10-CM

## 2024-09-05 ENCOUNTER — PATIENT MESSAGE (OUTPATIENT)
Dept: FAMILY MEDICINE | Facility: CLINIC | Age: 64
End: 2024-09-05
Payer: COMMERCIAL

## 2024-09-05 LAB
AORTIC ROOT ANNULUS: 2.76 CM
AORTIC VALVE CUSP SEPERATION: 0.52 CM
AV INDEX (PROSTH): 0.46
AV MEAN GRADIENT: 25 MMHG
AV PEAK GRADIENT: 44 MMHG
AV VALVE AREA BY VELOCITY RATIO: 1.55 CM²
AV VALVE AREA: 1.51 CM²
AV VELOCITY RATIO: 0.47
BSA FOR ECHO PROCEDURE: 1.87 M2
CV ECHO LV RWT: 0.63 CM
DOP CALC AO PEAK VEL: 3.3 M/S
DOP CALC AO VTI: 67.5 CM
DOP CALC LVOT AREA: 3.3 CM2
DOP CALC LVOT DIAMETER: 2.05 CM
DOP CALC LVOT PEAK VEL: 1.55 M/S
DOP CALC LVOT STROKE VOLUME: 101.61 CM3
DOP CALC MV VTI: 24.5 CM
DOP CALCLVOT PEAK VEL VTI: 30.8 CM
E WAVE DECELERATION TIME: 259.75 MSEC
E/A RATIO: 0.63
E/E' RATIO: 11.82 M/S
ECHO LV POSTERIOR WALL: 1.19 CM (ref 0.6–1.1)
FRACTIONAL SHORTENING: 34 % (ref 28–44)
INTERVENTRICULAR SEPTUM: 1.23 CM (ref 0.6–1.1)
IVC DIAMETER: 1.77 CM
IVRT: 71.36 MSEC
LA MAJOR: 4.8 CM
LA MINOR: 4.64 CM
LEFT ATRIUM SIZE: 3.49 CM
LEFT INTERNAL DIMENSION IN SYSTOLE: 2.5 CM (ref 2.1–4)
LEFT VENTRICLE DIASTOLIC VOLUME INDEX: 34 ML/M2
LEFT VENTRICLE DIASTOLIC VOLUME: 61.2 ML
LEFT VENTRICLE MASS INDEX: 86 G/M2
LEFT VENTRICLE SYSTOLIC VOLUME INDEX: 12.4 ML/M2
LEFT VENTRICLE SYSTOLIC VOLUME: 22.33 ML
LEFT VENTRICULAR INTERNAL DIMENSION IN DIASTOLE: 3.78 CM (ref 3.5–6)
LEFT VENTRICULAR MASS: 153.95 G
LV LATERAL E/E' RATIO: 10.83 M/S
LV SEPTAL E/E' RATIO: 13 M/S
LVED V (TEICH): 61.2 ML
LVES V (TEICH): 22.33 ML
LVOT MG: 5.92 MMHG
LVOT MV: 1.17 CM/S
MV A" WAVE DURATION": 111.32 MSEC
MV MEAN GRADIENT: 2 MMHG
MV PEAK A VEL: 1.03 M/S
MV PEAK E VEL: 0.65 M/S
MV PEAK GRADIENT: 4 MMHG
MV STENOSIS PRESSURE HALF TIME: 86.9 MS
MV VALVE AREA BY CONTINUITY EQUATION: 4.15 CM2
MV VALVE AREA P 1/2 METHOD: 2.53 CM2
OHS CV RV/LV RATIO: 0.56 CM
PISA TR MAX VEL: 2.37 M/S
PV MV: 0.87 M/S
PV PEAK GRADIENT: 6 MMHG
PV PEAK VELOCITY: 1.22 M/S
RA MAJOR: 4.52 CM
RA PRESSURE ESTIMATED: 3 MMHG
RIGHT VENTRICULAR END-DIASTOLIC DIMENSION: 2.13 CM
RV TB RVSP: 5 MMHG
RV TISSUE DOPPLER FREE WALL SYSTOLIC VELOCITY 1 (APICAL 4 CHAMBER VIEW): 9.83 CM/S
TDI LATERAL: 0.06 M/S
TDI SEPTAL: 0.05 M/S
TDI: 0.06 M/S
TR MAX PG: 22 MMHG
TRICUSPID ANNULAR PLANE SYSTOLIC EXCURSION: 1.47 CM
TV REST PULMONARY ARTERY PRESSURE: 25 MMHG
Z-SCORE OF LEFT VENTRICULAR DIMENSION IN END DIASTOLE: -2.75
Z-SCORE OF LEFT VENTRICULAR DIMENSION IN END SYSTOLE: -1.64

## 2024-09-05 NOTE — PROGRESS NOTES
SUBJECTIVE:    Patient ID: Traic Freire is a 63 y.o. female.    Chief Complaint: Follow-up (No bottles// no refills needed// pt states last week she experienced vomiting and diarrhea  this week she's having abdominal pain,bloated feeling and constant burping )    63 year old female presents for urgent visit. Treated for depression, htn, hyperlipidemia, oa, and insomnia and history of breast cancer. Taking meds as prescribed. Planning knee surgery. Has echo planned for later today. Today is presenting with complaints of not feeling well. Abdominal pain started last Thursday. Did initially have vomiting. No fever. Decreased appetite. Vomiting resolved. However pain has worsened.  No fever. No bloody stool    Follow-up  Associated symptoms include abdominal pain and nausea. Pertinent negatives include no arthralgias, chest pain, chills, coughing, fever, headaches, rash, sore throat, vomiting or weakness.       Hospital Outpatient Visit on 09/03/2024   Component Date Value Ref Range Status    BSA 09/03/2024 1.87  m2 In process    LVOT stroke volume 09/03/2024 101.61  cm3 In process    LVIDd 09/03/2024 3.78  3.5 - 6.0 cm In process    LV Systolic Volume 09/03/2024 22.33  mL In process    LV Systolic Volume Index 09/03/2024 12.4  mL/m2 In process    LVIDs 09/03/2024 2.50  2.1 - 4.0 cm In process    LV Diastolic Volume 09/03/2024 61.20  mL In process    LV Diastolic Volume Index 09/03/2024 34.00  mL/m2 In process    Left Ventricular End Systolic Volu* 09/03/2024 22.33  mL In process    Left Ventricular End Diastolic Vol* 09/03/2024 61.20  mL In process    IVS 09/03/2024 1.23 (A)  0.6 - 1.1 cm In process    LVOT diameter 09/03/2024 2.05  cm In process    LVOT area 09/03/2024 3.3  cm2 In process    FS 09/03/2024 34  28 - 44 % In process    Left Ventricle Relative Wall Thick* 09/03/2024 0.63  cm In process    Posterior Wall 09/03/2024 1.19 (A)  0.6 - 1.1 cm In process    LV mass 09/03/2024 153.95  g In process    LV Mass  "Index 09/03/2024 86  g/m2 In process    MV Peak E Cedrick 09/03/2024 0.65  m/s In process    TDI LATERAL 09/03/2024 0.06  m/s In process    TDI SEPTAL 09/03/2024 0.05  m/s In process    E/E' ratio 09/03/2024 11.82  m/s In process    MV Peak A Cedrick 09/03/2024 1.03  m/s In process    TR Max Cedrick 09/03/2024 2.37  m/s In process    E/A ratio 09/03/2024 0.63   In process    IVRT 09/03/2024 71.36  msec In process    E wave deceleration time 09/03/2024 259.75  msec In process    MV "A" wave duration 09/03/2024 [id number]  msec In process    LV SEPTAL E/E' RATIO 09/03/2024 13.00  m/s In process    LV LATERAL E/E' RATIO 09/03/2024 10.83  m/s In process    LVOT peak cedrick 09/03/2024 1.55  m/s In process    Left Ventricular Outflow Tract Jolene* 09/03/2024 1.17  cm/s In process    Left Ventricular Outflow Tract Jolene* 09/03/2024 5.92  mmHg In process    RV S' 09/03/2024 9.83  cm/s In process    TAPSE 09/03/2024 1.47  cm In process    RV/LV Ratio 09/03/2024 0.56  cm In process    LA size 09/03/2024 3.49  cm In process    Left Atrium Minor Axis 09/03/2024 4.64  cm In process    Left Atrium Major Axis 09/03/2024 4.80  cm In process    RA Major Axis 09/03/2024 4.52  cm In process    AV mean gradient 09/03/2024 25  mmHg In process    AV peak gradient 09/03/2024 44  mmHg In process    Ao peak cedrick 09/03/2024 3.30  m/s In process    Ao VTI 09/03/2024 67.50  cm In process    LVOT peak VTI 09/03/2024 30.80  cm In process    AV valve area 09/03/2024 1.51  cm² In process    AV Velocity Ratio 09/03/2024 0.47   In process    AV index (prosthetic) 09/03/2024 0.46   In process    BECCA by Velocity Ratio 09/03/2024 1.55  cm² In process    MV mean gradient 09/03/2024 2  mmHg In process    MV peak gradient 09/03/2024 4  mmHg In process    MV stenosis pressure 1/2 time 09/03/2024 86.90  ms In process    MV valve area p 1/2 method 09/03/2024 2.53  cm2 In process    MV valve area by continuity eq 09/03/2024 4.15  cm2 In process    MV VTI 09/03/2024 24.5  " cm In process    Triscuspid Valve Regurgitation Pea* 09/03/2024 22  mmHg In process    PV PEAK VELOCITY 09/03/2024 1.22  m/s In process    PV peak gradient 09/03/2024 6  mmHg In process    Pulmonary Valve Mean Velocity 09/03/2024 0.87  m/s In process    Ao root annulus 09/03/2024 2.76  cm In process    IVC diameter 09/03/2024 1.77  cm In process    Mean e' 09/03/2024 0.06  m/s In process    ZLVIDS 09/03/2024 -1.64   In process    ZLVIDD 09/03/2024 -2.75   In process    RVDD 09/03/2024 2.13  cm In process    AORTIC VALVE CUSP SEPERATION 09/03/2024 0.52  cm In process   Hospital Outpatient Visit on 08/26/2024   Component Date Value Ref Range Status    QRS Duration 08/26/2024 78  ms Final    [facility] QTC Calculation 08/26/2024 414  ms Final   Lab Visit on 08/26/2024   Component Date Value Ref Range Status    WBC 08/26/2024 7.04  3.90 - 12.70 K/uL Final    RBC 08/26/2024 4.26  4.00 - 5.40 M/uL Final    Hemoglobin 08/26/2024 12.3  12.0 - 16.0 g/dL Final    Hematocrit 08/26/2024 39.6  37.0 - 48.5 % Final    MCV 08/26/2024 93  82 - 98 fL Final    MCH 08/26/2024 28.9  27.0 - 31.0 pg Final    MCHC 08/26/2024 31.1 (L)  32.0 - 36.0 g/dL Final    RDW 08/26/2024 13.1  11.5 - 14.5 % Final    Platelets 08/26/2024 245  150 - 450 K/uL Final    MPV 08/26/2024 9.7  9.2 - 12.9 fL Final    Immature Granulocytes 08/26/2024 0.1  0.0 - 0.5 % Final    Gran # (ANC) 08/26/2024 3.7  1.8 - 7.7 K/uL Final    Immature Grans (Abs) 08/26/2024 0.01  0.00 - 0.04 K/uL Final    Lymph # 08/26/2024 2.6  1.0 - 4.8 K/uL Final    Mono # 08/26/2024 0.4  0.3 - 1.0 K/uL Final    Eos # 08/26/2024 0.3  0.0 - 0.5 K/uL Final    Baso # 08/26/2024 0.03  0.00 - 0.20 K/uL Final    nRBC 08/26/2024 0  0 /100 WBC Final    Gran % 08/26/2024 52.1  38.0 - 73.0 % Final    Lymph % 08/26/2024 37.4  18.0 - 48.0 % Final    Mono % 08/26/2024 6.3  4.0 - 15.0 % Final    Eosinophil % 08/26/2024 3.7  0.0 - 8.0 % Final    Basophil % 08/26/2024 0.4  0.0 - 1.9 % Final    Differential Method  08/26/2024 Automated   Final    Sodium 08/26/2024 141  136 - 145 mmol/L Final    Potassium 08/26/2024 4.1  3.5 - 5.1 mmol/L Final    Chloride 08/26/2024 108  95 - 110 mmol/L Final    CO2 08/26/2024 25  23 - 29 mmol/L Final    Glucose 08/26/2024 96  70 - 110 mg/dL Final    BUN 08/26/2024 20  8 - 23 mg/dL Final    Creatinine 08/26/2024 0.7  0.5 - 1.4 mg/dL Final    Calcium 08/26/2024 9.2  8.7 - 10.5 mg/dL Final    Total Protein 08/26/2024 7.0  6.0 - 8.4 g/dL Final    Albumin 08/26/2024 3.7  3.5 - 5.2 g/dL Final    Total Bilirubin 08/26/2024 0.4  0.1 - 1.0 mg/dL Final    Alkaline Phosphatase 08/26/2024 92  55 - 135 U/L Final    AST 08/26/2024 17  10 - 40 U/L Final    ALT 08/26/2024 16  10 - 44 U/L Final    eGFR 08/26/2024 >60.0  >60 mL/min/1.73 m^2 Final    Anion Gap 08/26/2024 8  8 - 16 mmol/L Final    Hemoglobin A1C 08/26/2024 5.7 (H)  4.0 - 5.6 % Final    Estimated Avg Glucose 08/26/2024 117  68 - 131 mg/dL Final    Prothrombin Time 08/26/2024 10.0  9.0 - 12.5 sec Final    INR 08/26/2024 0.9  0.8 - 1.2 Final   Lab Visit on 08/14/2024   Component Date Value Ref Range Status    CRP 08/14/2024 0.10  <1.00 mg/dL Final   Office Visit on 04/08/2024   Component Date Value Ref Range Status    WBC 04/08/2024 6.3  3.8 - 10.8 Thousand/uL Final    RBC 04/08/2024 4.01  3.80 - 5.10 Million/uL Final    Hemoglobin 04/08/2024 11.8  11.7 - 15.5 g/dL Final    Hematocrit 04/08/2024 36.3  35.0 - 45.0 % Final    MCV 04/08/2024 90.5  80.0 - 100.0 fL Final    MCH 04/08/2024 29.4  27.0 - 33.0 pg Final    MCHC 04/08/2024 32.5  32.0 - 36.0 g/dL Final    RDW 04/08/2024 12.8  11.0 - 15.0 % Final    Platelets 04/08/2024 244  140 - 400 Thousand/uL Final    MPV 04/08/2024 10.5  7.5 - 12.5 fL Final    Neutrophils, Abs 04/08/2024 2,772  1,500 - 7,800 cells/uL Final    Lymph # 04/08/2024 2,703  850 - 3,900 cells/uL Final    Mono # 04/08/2024 548  200 - 950 cells/uL Final    Eos # 04/08/2024 239  15 - 500 cells/uL Final    Baso # 04/08/2024 38  0 -  200 cells/uL Final    Neutrophils Relative 04/08/2024 44  % Final    Lymph % 04/08/2024 42.9  % Final    Mono % 04/08/2024 8.7  % Final    Eosinophil % 04/08/2024 3.8  % Final    Basophil % 04/08/2024 0.6  % Final    Glucose 04/08/2024 99  65 - 99 mg/dL Final    BUN 04/08/2024 24  7 - 25 mg/dL Final    Creatinine 04/08/2024 0.68  0.50 - 1.05 mg/dL Final    eGFR 04/08/2024 98  > OR = 60 mL/min/1.73m2 Final    BUN/Creatinine Ratio 04/08/2024 SEE NOTE:  6 - 22 (calc) Final    Sodium 04/08/2024 143  135 - 146 mmol/L Final    Potassium 04/08/2024 4.2  3.5 - 5.3 mmol/L Final    Chloride 04/08/2024 107  98 - 110 mmol/L Final    CO2 04/08/2024 27  20 - 32 mmol/L Final    Calcium 04/08/2024 9.3  8.6 - 10.4 mg/dL Final    Total Protein 04/08/2024 6.7  6.1 - 8.1 g/dL Final    Albumin 04/08/2024 4.0  3.6 - 5.1 g/dL Final    Globulin, Total 04/08/2024 2.7  1.9 - 3.7 g/dL (calc) Final    Albumin/Globulin Ratio 04/08/2024 1.5  1.0 - 2.5 (calc) Final    Total Bilirubin 04/08/2024 0.4  0.2 - 1.2 mg/dL Final    Alkaline Phosphatase 04/08/2024 93  37 - 153 U/L Final    AST 04/08/2024 16  10 - 35 U/L Final    ALT 04/08/2024 15  6 - 29 U/L Final    Cholesterol 04/08/2024 189  <200 mg/dL Final    HDL 04/08/2024 44 (L)  > OR = 50 mg/dL Final    Triglycerides 04/08/2024 193 (H)  <150 mg/dL Final    LDL Cholesterol 04/08/2024 114 (H)  mg/dL (calc) Final    HDL/Cholesterol Ratio 04/08/2024 4.3  <5.0 (calc) Final    Non HDL Chol. (LDL+VLDL) 04/08/2024 145 (H)  <130 mg/dL (calc) Final    TSH w/reflex to FT4 04/08/2024 2.01  0.40 - 4.50 mIU/L Final    Creatinine, Urine 04/08/2024 235  20 - 275 mg/dL Final    Microalb, Ur 04/08/2024 1.2  See Note: mg/dL Final    Microalb/Creat Ratio 04/08/2024 5  <30 mg/g creat Final    Color, UA 04/08/2024 YELLOW  YELLOW Final    Appearance, UA 04/08/2024 CLEAR  CLEAR Final    Specific Gravity, UA 04/08/2024 1.034  1.001 - 1.035 Final    pH, UA 04/08/2024 6.0  5.0 - 8.0 Final    Glucose, UA 04/08/2024 NEGATIVE   NEGATIVE Final    Bilirubin, UA 2024 NEGATIVE  NEGATIVE Final    Ketones, UA 2024 NEGATIVE  NEGATIVE Final    Occult Blood UA 2024 NEGATIVE  NEGATIVE Final    Protein, UA 2024 TRACE (A)  NEGATIVE Final    Nitrite, UA 2024 NEGATIVE  NEGATIVE Final    Leukocytes, UA 2024 TRACE (A)  NEGATIVE Final    WBC Casts, UA 2024 0-5  < OR = 5 /HPF Final    RBC Casts, UA 2024 0-2  < OR = 2 /HPF Final    Squam Epithel, UA 2024 0-5  < OR = 5 /HPF Final    Bacteria, UA 2024 NONE SEEN  NONE SEEN /HPF Final    Hyaline Casts, UA 2024 NONE SEEN  NONE SEEN /LPF Final    Service Cmt: 2024    Final    Reflexive Urine Culture 2024    Final    Urine Culture, Routine 2024  (A)   Final    Vitamin D, 1,25 (OH)2 2024 42  18 - 72 pg/mL Final    Vitamin D3, 1,25 (OH)2 2024 42  pg/mL Final    Vitamin D2, 1,25 (OH)2 2024 <8  pg/mL Final    Sed Rate 2024 9  < OR = 30 mm/h Final    ETHEL 2024 POSITIVE (A)  NEGATIVE Final    ETHEL Titer 2024 1:80 (H)  titer Final    ETHEL Pattern 2024 Nuclear, Dense Fine Speckled (A)   Final    ETHEL Titer 2024 1:80 (H)  titer Final    ETHEL Pattern 2024 Cytoplasmic (A)   Final    Rheumatoid Factor 2024 <14  <14 IU/mL Final       Past Medical History:   Diagnosis Date    Anxiety     Arthritis     Cancer breast    Right- Bilateral mastectomy- May 2005- had breast reconstruction- mother  of breast cancer    HLD (hyperlipidemia)     Hypertension     diagnosed age late 40's     IBS (irritable bowel syndrome)     Lyme disease     arthritis of hands. Felt like flu- age early 30's- got it  after going to [place]    Sleep apnea     NO MACHINE     Social History     Socioeconomic History    Marital status:    Tobacco Use    Smoking status: Former     Current packs/day: 0.00     Types: Cigarettes     Quit date: 3/26/2004     Years since quittin.4    Smokeless tobacco: Never     Tobacco comments:     quit 2004- smoked for 10 years- 1 ppd   Substance and Sexual Activity    Alcohol use: No    Drug use: No    Sexual activity: Never     Social Determinants of Health     Financial Resource Strain: Medium Risk (2024)    Overall Financial Resource Strain (CARDIA)     Difficulty of Paying Living Expenses: Somewhat hard   Food Insecurity: Patient Declined (2024)    Hunger Vital Sign     Worried About Running Out of Food in the Last Year: Patient declined     Ran Out of Food in the Last Year: Patient declined   Transportation Needs: No Transportation Needs (2024)    PRAPARE - Transportation     Lack of Transportation (Medical): No     Lack of Transportation (Non-Medical): No   Physical Activity: Unknown (2024)    Exercise Vital Sign     Days of Exercise per Week: 5 days   Stress: No Stress Concern Present (2024)    [employer] [place] [employer] - Occupational Stress Questionnaire     Feeling of Stress : Only a little   Housing Stability: High Risk (2024)    Housing Stability Vital Sign     Unable to Pay for Housing in the Last Year: Yes     Number of Places Lived in the Last Year: 1     Unstable Housing in the Last Year: No     Past Surgical History:   Procedure Laterality Date    ABDOMINAL SURGERY      BLADDER SUSPENSION      BREAST SURGERY      CARPAL TUNNEL RELEASE Left 2023    Procedure: Left carpal tunnel release;  Surgeon: Roberto Cheung MD;  Location: [facility];  Service: Orthopedics;  Laterality: Left;     SECTION      CYSTOSCOPY      avoids greens . ? Interstitial cystitis    ESOPHAGOGASTRODUODENOSCOPY N/A 8/10/2023    Procedure: EGD (ESOPHAGOGASTRODUODENOSCOPY);  Surgeon: Gerard Hernandez MD;  Location[facility];  Service: Endoscopy;  Laterality: N/A;    HYSTERECTOMY      followed by removal of ovaries  from scar tissue    INCISIONAL HERNIA REPAIR Right 2008    RLQ    JOINT REPLACEMENT      Left total knee  replacement-[facility] -     KNEE SURGERY Left     TKR    MODIFIED RADICAL MASTECTOMY W/ AXILLARY LYMPH NODE DISSECTION Right 05/10/2005    w/free flap    NISSEN FUNDOPLICATION      ROBOT-ASSISTED LAPAROSCOPIC REPAIR OF VENTRAL HERNIA N/A 2021    Procedure: ROBOTIC REPAIR, HERNIA, VENTRAL;  Surgeon: John Johnson III, MD;  Location: [facility];  Service: General;  Laterality: N/A;    SIMPLE MASTECTOMY Left 05/10/2005    w/free flap    TONSILLECTOMY       Family History   Problem Relation Name Age of Onset    Cancer Mother           of breast cancer, also had gynecological cancer- had breast cancer that spread to the brain    Heart disease Father          in his 60's-  in his 70's    Breast cancer Daughter         All of your core healthy metrics are met.      Review of patient's allergies indicates:   Allergen Reactions    Keflex [cephalexin]     Macrobid [nitrofurantoin monohyd/m-cryst]     Bactrim [sulfamethoxazole-trimethoprim] Nausea And Vomiting       Current Outpatient Medications:     FLUoxetine 20 MG capsule, Take 1 capsule (20 mg total) by mouth once daily., Disp: 90 capsule, Rfl: 1    hydroCHLOROthiazide (HYDRODIURIL) 25 MG tablet, Take 1 tablet (25 mg total) by mouth once daily., Disp: 90 tablet, Rfl: 1    HYDROcodone-acetaminophen (NORCO)  mg per tablet, Take 1 tablet by mouth every 6 (six) hours as needed for Pain., Disp: 30 tablet, Rfl: 0    irbesartan (AVAPRO) 300 MG tablet, Take 1 tablet (300 mg total) by mouth every evening., Disp: 90 tablet, Rfl: 3    mecobalamin (B12 ACTIVE ORAL), Take 1 tablet by mouth Daily., Disp: , Rfl:     pantoprazole (PROTONIX) 40 MG tablet, Take 1 tablet (40 mg total) by mouth 2 (two) times daily., Disp: 60 tablet, Rfl: 11    rosuvastatin (CRESTOR) 20 MG tablet, Take 1 tablet (20 mg total) by mouth every evening., Disp: 90 tablet, Rfl: 1    zolpidem (AMBIEN) 10 mg Tab, Take 1 tablet (10 mg total) by mouth every evening.,  "Disp: 90 tablet, Rfl: 1    Review of Systems   Constitutional:  Negative for chills, fever and unexpected weight change.   HENT:  Negative for sore throat.    Respiratory:  Negative for cough and shortness of breath.    Cardiovascular:  Negative for chest pain, palpitations and leg swelling.   Gastrointestinal:  Positive for abdominal pain, diarrhea and nausea. Negative for vomiting.   Genitourinary:  Negative for difficulty urinating.   Musculoskeletal:  Negative for arthralgias.   Skin:  Negative for rash.   Neurological:  Negative for dizziness, weakness and headaches.   Psychiatric/Behavioral:  Negative for agitation.           Objective:      Vitals:    09/03/24 1028   BP: 122/62   Pulse: 81   Resp: 18   SpO2: 97%   Weight: 81.2 kg (179 lb)   Height: 5' 1" (1.549 m)     Physical Exam  Vitals and nursing note reviewed.   Constitutional:       General: She is not in acute distress.     Appearance: Normal appearance. She is well-developed. She is obese.   Neck:      Vascular: No JVD.   Cardiovascular:      Rate and Rhythm: Normal rate and regular rhythm.      Heart sounds: No murmur heard.  Pulmonary:      Effort: Pulmonary effort is normal.      Breath sounds: Normal breath sounds.   Abdominal:      General: Bowel sounds are normal. There is distension.      Palpations: Abdomen is soft.      Tenderness: There is abdominal tenderness in the right upper quadrant and periumbilical area. There is no right CVA tenderness or left CVA tenderness.   Musculoskeletal:         General: No deformity. Normal range of motion.      Cervical back: Normal range of motion and neck supple.   Lymphadenopathy:      Cervical: No cervical adenopathy.   Skin:     General: Skin is warm and dry.      Findings: No rash.   Neurological:      Mental Status: She is alert and oriented to person, place, and time.      Gait: Gait normal.   Psychiatric:         Speech: Speech normal.         Behavior: Behavior normal.           Assessment:     "   1. Left lower quadrant pain    2. Nausea and vomiting, unspecified vomiting type    3. Abdominal pain, unspecified abdominal location    4. Primary hypertension    5. Hx of breast cancer    6. Insomnia, unspecified type    7. Anxiety         Plan:       Left lower quadrant pain  Comments:  ct  Orders:  -     Cancel: CT Abdomen Pelvis W Wo Contrast; Future; Expected date: 09/03/2024  -     CT Abdomen Pelvis With IV Contrast Routine Oral Contrast; Future; Expected date: 09/03/2024    Nausea and vomiting, unspecified vomiting type  -     Cancel: CT Abdomen Pelvis W Wo Contrast; Future; Expected date: 09/03/2024  -     CT Abdomen Pelvis With IV Contrast Routine Oral Contrast; Future; Expected date: 09/03/2024    Abdominal pain, unspecified abdominal location  -     Cancel: CT Abdomen Pelvis W Wo Contrast; Future; Expected date: 09/03/2024  -     CT Abdomen Pelvis With IV Contrast Routine Oral Contrast; Future; Expected date: 09/03/2024    Primary hypertension  Comments:  bp is controlled    Hx of breast cancer    Insomnia, unspecified type  Comments:  ambien    Anxiety  Comments:  ambien      Follow up in about 3 months (around 12/3/2024), or if symptoms worsen or fail to improve, for medication management.        9/4/2024 Juliet Medina

## 2024-09-08 ENCOUNTER — HOSPITAL ENCOUNTER (OUTPATIENT)
Dept: RADIOLOGY | Facility: HOSPITAL | Age: 64
Discharge: HOME | End: 2024-09-08
Attending: NURSE PRACTITIONER
Payer: COMMERCIAL

## 2024-09-08 DIAGNOSIS — Z85.3 HISTORY OF BREAST CANCER: ICD-10-CM

## 2024-09-08 DIAGNOSIS — R16.0 LIVER MASS: ICD-10-CM

## 2024-09-08 DIAGNOSIS — K76.9 LIVER DISEASE, UNSPECIFIED: ICD-10-CM

## 2024-09-08 PROCEDURE — 25500020 PHARM REV CODE 255: Performed by: NURSE PRACTITIONER

## 2024-09-08 PROCEDURE — 74183 MRI ABD W/O CNTR FLWD CNTR: CPT | Mod: TC

## 2024-09-08 PROCEDURE — A9585 GADOBUTROL INJECTION: HCPCS | Performed by: NURSE PRACTITIONER

## 2024-09-08 PROCEDURE — 74183 MRI ABD W/O CNTR FLWD CNTR: CPT | Mod: 26,,, | Performed by: RADIOLOGY

## 2024-09-08 RX ORDER — GADOBUTROL 604.72 MG/ML
8 INJECTION INTRAVENOUS
Status: COMPLETED | OUTPATIENT
Start: 2024-09-08 | End: 2024-09-08

## 2024-09-08 RX ADMIN — GADOBUTROL 8 ML: 604.72 INJECTION INTRAVENOUS at 02:09

## 2024-09-09 ENCOUNTER — CLINICAL SUPPORT (OUTPATIENT)
Dept: REHABILITATION | Facility: HOSPITAL | Age: 64
End: 2024-09-09
Attending: ORTHOPAEDIC SURGERY
Payer: COMMERCIAL

## 2024-09-09 DIAGNOSIS — M25.562 CHRONIC PAIN OF LEFT KNEE: Primary | ICD-10-CM

## 2024-09-09 DIAGNOSIS — G89.29 CHRONIC PAIN OF LEFT KNEE: Primary | ICD-10-CM

## 2024-09-09 DIAGNOSIS — Z74.09 IMPAIRED FUNCTIONAL MOBILITY AND ACTIVITY TOLERANCE: ICD-10-CM

## 2024-09-09 DIAGNOSIS — M25.662 IMPAIRED RANGE OF MOTION OF LEFT KNEE: ICD-10-CM

## 2024-09-09 PROBLEM — K76.0 FATTY LIVER: Status: ACTIVE | Noted: 2024-09-09

## 2024-09-09 PROCEDURE — 97530 THERAPEUTIC ACTIVITIES: CPT | Mod: PN

## 2024-09-09 PROCEDURE — 97161 PT EVAL LOW COMPLEX 20 MIN: CPT | Mod: PN

## 2024-09-09 NOTE — PLAN OF CARE
[facility]  Physical Therapy Initial Evaluation    Name: Traci Freire  Clinic Number: [id number]    Therapy Diagnosis:   Encounter Diagnoses   Name Primary?    Chronic pain of left knee Yes    Impaired range of motion of left knee     Impaired functional mobility and activity tolerance      Physician: Chris Estevez MD     Physician Orders: PT Eval and Treat  Medical Diagnosis from Referral: T84.018A,Z96.659 (ICD-10-CM) - Failure of total knee replacement, initial encounter   Evaluation Date: 9/9/2024  Authorization Period Expiration: 9/9/25  Plan of Care Expiration: 11/15/24    Progress Update: 10/19/24    Visit # / Visits authorized: 1 / 1     FOTO: Visit #1 - 1 / 3     PRECAUTIONS: Standard Precautions     MD Visit: /2022    Date of Surgery 9/19/24   Surgery Performed    Post-Op Percautions      Time In: 900  Time Out: 945  Total Appointment Time (timed & untimed codes): 45 minutes    SUBJECTIVE     Date of onset: 10 years since revision    History of current condition - Traci is a 63 y.o. female whom reports she will have Knee surgery in a week, this will be 3rd surgery on this knee, she has already had a Revision. Revision has been hurting her for years.  Very poor knee flexion for years.     SHAHRAM: Gradual   Falls: none   Physician Instructions (per patient): none   Other concerns: none     Imaging, No updated imaging for this episode of care:     Prior Therapy: Yes  Social History: Pt lives with their family  Living Environment: 1 story   ADLs unable to complete: none  Gym/Home Equipment: none   Occupation: Pt is [employer]   Prior Level of Function: Independent with all ADLs  Current Level of Function: Independent with pain     Pain:  Current 7 /10, worst 9 /10, best 7 /10   Location: Left  Knee  Description: Aching, Throbbing, Tight, and Sharp  Aggravating Factors: Motion, walking   Easing Factors: nothing     Pts goals: Pt reported goals are Less pain and better motion      _______________________________________________________  Medical History:   Past Medical History:   Diagnosis Date    Anxiety     Arthritis     Cancer breast    Right- Bilateral mastectomy- May 2005- had breast reconstruction- mother  of breast cancer    HLD (hyperlipidemia)     Hypertension     diagnosed age late 40's     IBS (irritable bowel syndrome)     Lyme disease     arthritis of hands. Felt like flu- age early 30's- got it  after going to [place]    Sleep apnea     NO MACHINE       Surgical History:   Traci Freire  has a past surgical history that includes Breast surgery; Hysterectomy (); Cystoscopy (); Abdominal surgery; Knee surgery (Left, ); Joint replacement;  section; Nissen fundoplication (); Modified radical mastectomy w/ axillary lymph node dissection (Right, 05/10/2005); Tonsillectomy; Simple mastectomy (Left, 05/10/2005); Incisional hernia repair (Right, 2008); Bladder suspension; Robot-assisted laparoscopic repair of ventral hernia (N/A, 2021); Esophagogastroduodenoscopy (N/A, 8/10/2023); and Carpal tunnel release (Left, 2023).    Medications:   Traci has a current medication list which includes the following prescription(s): fluoxetine, hydrochlorothiazide, hydrocodone-acetaminophen, irbesartan, mecobalamin, pantoprazole, rosuvastatin, and zolpidem.    Allergies:   Review of patient's allergies indicates:   Allergen Reactions    Keflex [cephalexin]     Macrobid [nitrofurantoin monohyd/m-cryst]     Bactrim [sulfamethoxazole-trimethoprim] Nausea And Vomiting          OBJECTIVE     GIRTH/EDEMA:     KNEE   3 in Superior (cm) Mid Patella  (cm) 6 in Inferior  (cm) GOALS  (cm)   RIGHT       LEFT         RANGE OF MOTION:    Knee AROM/PROM Right   Left   Pain/Dysfunction with Movement Goal   Hyper - Zero - Flexion (PRE)      -8 to 40   0-0-135  Initial:     Hyper - Zero - Flexion (POST)    N/A     Ankle/Foot AROM/PROM Right   Left   Pain/Dysfunction  with Movement Goal   Dorsiflexion (20)  5  15  Initial:    Plantarflexion (50)  40  40  Initial:      STRENGTH:     Right   Left   Pain/Dysfunction with Movement Goal   QUAD LAG (deg)  -10  0*     L/E MMT Right Goal   Hip Flexion  5/5 5/5 B    Hip Extension  5/5 5/5 B   Hip Abduction  5/5 5/5 B   Hip IR 5/5 5/5 B   Hip ER 5/5 5/5 B   Knee Flexion 5/5 5/5 B   Knee Extension 5/5 5/5 B   Ankle DF 5/5 5/5 B   Ankle PF 5/5 5/5 B   Ankle Inversion 5/5 5/5 B   Ankle Eversion 5/5 5/5 B     L/E MMT Left Goal   Hip Flexion  4-/5 5/5 B    Hip Extension  3/5 5/5 B   Hip Abduction  3+/5 5/5 B   Hip IR 3+/5 5/5 B   Hip ER 3+/5 5/5 B   Knee Flexion 3/5 5/5 B   Knee Extension 3+/5 5/5 B   Ankle DF 4/5 5/5 B   Ankle PF 4/5 5/5 B   Ankle Inversion 4/5 5/5 B   Ankle Eversion 4/5 5/5 B       MUSCLE LENGTH:     Muscle Tested  Right Left  Goal   Hip Flexors  normal decreased Normal B   Quadriceps normal decreased Normal B   Hamstrings  normal decreased Normal B   Piriformis  normal normal Normal B   Gastrocnemius  decreased decreased Normal B   Soleus  decreased decreased Normal B     JOINT MOBILITY:     Joint Motion Tested Right   Left    Goal   Patellar Glides: Superior Normal Hypomobile Normal B   Patellar Glides: Inferior Normal Hypomobile Normal B   Patellar Glides: Medical Normal Hypomobile Normal B   Patellar Tilts Normal Hypomobile Normal B       SPECIAL TESTS: not tested due to post-operative state.    Sensation:  Sensation is intact to light touch    Palpation: Increased tone and tenderness noted with palpation to: Left knee    Posture:  Pt presents with postural abnormalities which include: forward head, rounded shoulders , genu valgum , ankle/foot pronation , Left knee flexed, and rounded shoulders    Gait Analysis: The patient ambulated with the following assistive device: none; the pt presents with the following gait abnormalities: decreased step length, stance time ; increased hip motion    Movement Analysis: no functional  testing performed due to post-operative state    Balance: not tested due to post-operative state      FUNCTION:     CMS Impairment/Limitation/Restriction for FOTO KNEE Survey    Therapist reviewed FOTO scores for Traci Freire on 9/9/2024.   FOTO documents entered into [employer] - see Media section.    Limitation Score: 78%         TREATMENT     Total Treatment time separate from Evaluation: (15) minutes    SURGICAL DATE: 9/19/24    MANUAL THERAPY techniques: Joint mobilizations, Soft tissue Mobilization, and/or mobilization with movement were applied to the areas listed below for () minutes , including: x = exercises performed     Manual Intervention 9/9/2024    Soft Tissue Mobilization   Scar tissue mob + cross friction   Joint Mobilizations   Patellar Glides: sup/inf/med/lat/tilts   Mobilization + movement   Flexion/Extension          Plan for Next Visit:      THERAPEUTIC EXERCISES to develop strength, endurance, ROM, flexibility, and posture for () minutes including: x = exercises performed     TherEx 9/9/2024    Knee Flexion Work  - Seated Knee Flexion at EOM     Knee Extension Work  - Gastroc Stretch - towel  - Hamstring Stretch - propped     Quad Facilitation  - Quad Sets propped on towel     Low Load Long Duration Extension Hang            Plan for Next Visit:      NEUROMUSCULAR RE-EDUCATION activities to improve: Coordination, Kinesthetic, Sense, and Proprioception for () minutes. The following activities were included: x = exercises performed     Neuro Re-Ed 9/9/2024                          Plan for Next Visit:        PATIENT EDUCATION AND HOME EXERCISES     Education/Self-Care provided:   Patient educated on the impairments noted above and the effects of physical therapy intervention to improve overall condition and QOL.   Patient was educated on all the above exercise prior/during/after for proper posture, positioning, and execution for safe performance with home exercise program.   Self Care:   Edema  Education: provided to patient and/or family to lay supine with leg elevated above the heart while icing and outside of performing exercise (75% of day) for the first week.  Icin minutes per hour, 30 min on 30 min off, outside of exercises.   Heigene: Released to shower POD#3, no bath/emersion in water  Brace full time for the first week, can stop wearing at night after week 1  Unlock while sitting, lock while ambulating  After week 1: brace off with sitting, on with ambulating  Compression sleeve /stockinet : Size (), must be worn during the day, can be removed at night  Dressings changes and new dressing (abdominal dressing size) provided to wear under the sleeve- daily dressing changes encouraged.   Exercise Prescription:   2024: EVAL post-op: flexion stretching, extension stretching, quad sets, propped extension    Written Home Exercises Provided: yes. Prefers: Printed Copies  Exercises were reviewed and Traci was able to demonstrate them prior to the end of the session.  Traci demonstrated good understanding of the education provided. See EMR under Patient Instructions for exercises provided during therapy sessions.     ASSESSMENT     Traci is a 63 y.o. female referred to outpatient physical therapy with a medical diagnosis of Left knee failed revision  s/p  by Dr. Estevez  on 24.       Traci is currently ambulating without assistive device, demonstrating antalgic gait with a slow aicha . Signs and symptoms are consistent with this stage of recovery, with physical exam findings that support decreased knee and hip ROM, decreased hip girdle, quad, and hamstring Strength, patellar joint hypomobility, increased joint and soft tissue edema, and impaired functional mobility. The above impairments will be addressed through manual therapy techniques, therapeutic exercises, functional training, and modalities as necessary. Patient was treated and educated on exercises for home program, progression of  therapy, and benefits of therapy to achieve full functional mobility.     Seen today for Pre Surgical visit, went over surgery printed Home Exercise Program and made sure she can do all.  Answered her questions and discussed expectations after surgery     Pt prognosis is Fair.   Pt will benefit from skilled outpatient Physical Therapy to address the deficits stated above and in the chart below, provide pt/family education, and to maximize pt's level of independence.     Plan of care discussed with patient: Yes  Pt's spiritual, cultural and educational needs considered and patient is agreeable to the plan of care and goals as stated below:     Anticipated Barriers for therapy: chronicity of condition  Medical Necessity is demonstrated by the following  History  Co-morbidities and personal factors that may impact the plan of care [x] LOW: no personal factors / co-morbidities  [] MODERATE: 1-2 personal factors / co-morbidities  [] HIGH: 3+ personal factors / co-morbidities    Moderate / High Support Documentation:      Examination  Body Structures and Functions, activity limitations and participation restrictions that may impact the plan of care [x] LOW: addressing 1-2 elements  [] MODERATE: 3+ elements  [] HIGH: 4+ elements (please support below)    Moderate / High Support Documentation:      Clinical Presentation [x] LOW: stable  [] MODERATE: Evolving  [] HIGH: Unstable     Decision Making/ Complexity Score: low         GOALS:  SHORT TERM GOALS:  3 weeks  Progress Date met   Recent signs and systems trend is improving in order to progress towards LTG's. [] Met  [] Not Met  [x] Progressing     Patient will improve Knee AROM to 0- 0- 50 in order to progress towards functional activities. [] Met  [] Not Met  [] Progressing     Patient will Demonstrate 0* quad lag with Straight leg raise in long sit. [] Met  [] Not Met  [x] Progressing     Patient will be independent with HEP in order to further progress and return to  maximal function. [] Met  [] Not Met  [x] Progressing     Pain rating at Worst: 6/10 in order to progress towards increased independence with activity. [] Met  [] Not Met  [x] Progressing        LONG TERM GOALS: 6 weeks  Progress Date met   Patient will return to normal ADL, recreational, and work related activities with less pain and limitation.  [] Met  [] Not Met  [x] Progressing     Patient will improve AROM 0- 0- 80 to return to maximal functional potential.  [] Met  [] Not Met  [x] Progressing     Patient will improve Strength to 4 /5 in order to improve functional independence. [] Met  [] Not Met  [x] Progressing     Patient will improve post-op edema girth to sound leg - for overall mechanics of the joint for muscle activation.. [] Met  [] Not Met  [x] Progressing     Pain Rating at Best: 1/10 to improve Quality of Life. [] Met  [] Not Met  [x] Progressing     Patient will meet predicted functional outcome (FOTO) score: 56% to increase self-worth & perceived functional ability. [] Met  [] Not Met  [x] Progressing     Patient will have met/partially met personal goal of: walk with less pain  [] Met  [] Not Met  [x] Progressing          PLAN   Plan of care Certification: 9/9/2024 to 11/15/24    Outpatient Physical Therapy 2 times weekly for 6 weeks to include any combination of the following interventions: virtual visits, dry needling, modalities, electrical stimulation (IFC, Pre-Mod, Attended with Functional Dry Needling), Manual Therapy, Moist Heat/ Ice, Neuromuscular Re-ed, Patient Education, Self Care, Therapeutic Activities, Therapeutic Exercise, Functional Training, and Therapeutic Activites     Thank you for this referral.    Jovon Degroot, PT,       I CERTIFY THE NEED FOR THESE SERVICES FURNISHED UNDER THIS PLAN OF TREATMENT AND WHILE UNDER MY CARE  Physician's comments:    Physician's Signature: ___________________________________________________

## 2024-09-12 ENCOUNTER — PATIENT MESSAGE (OUTPATIENT)
Dept: FAMILY MEDICINE | Facility: CLINIC | Age: 64
End: 2024-09-12
Payer: COMMERCIAL

## 2024-09-13 ENCOUNTER — PATIENT MESSAGE (OUTPATIENT)
Dept: FAMILY MEDICINE | Facility: CLINIC | Age: 64
End: 2024-09-13
Payer: COMMERCIAL

## 2024-09-13 DIAGNOSIS — F41.9 ANXIETY: ICD-10-CM

## 2024-09-13 RX ORDER — FLUOXETINE HYDROCHLORIDE 20 MG/1
20 CAPSULE ORAL DAILY
Qty: 90 CAPSULE | Refills: 1 | Status: SHIPPED | OUTPATIENT
Start: 2024-09-13

## 2024-09-16 ENCOUNTER — PATIENT MESSAGE (OUTPATIENT)
Dept: FAMILY MEDICINE | Facility: CLINIC | Age: 64
End: 2024-09-16
Payer: COMMERCIAL

## 2024-09-17 DIAGNOSIS — Z96.652 HISTORY OF REVISION OF TOTAL REPLACEMENT OF LEFT KNEE JOINT: Primary | ICD-10-CM

## 2024-09-17 RX ORDER — PANTOPRAZOLE SODIUM 40 MG/1
40 TABLET, DELAYED RELEASE ORAL DAILY
Qty: 30 TABLET | Refills: 0 | Status: SHIPPED | OUTPATIENT
Start: 2024-09-17

## 2024-09-17 RX ORDER — DEXTROMETHORPHAN HYDROBROMIDE, GUAIFENESIN 5; 100 MG/5ML; MG/5ML
650 LIQUID ORAL EVERY 8 HOURS
Qty: 120 TABLET | Refills: 0 | Status: SHIPPED | OUTPATIENT
Start: 2024-09-17

## 2024-09-17 RX ORDER — METHOCARBAMOL 750 MG/1
750 TABLET, FILM COATED ORAL 4 TIMES DAILY PRN
Qty: 40 TABLET | Refills: 0 | Status: SHIPPED | OUTPATIENT
Start: 2024-09-17

## 2024-09-17 RX ORDER — OXYCODONE HYDROCHLORIDE 5 MG/1
TABLET ORAL
Qty: 50 TABLET | Refills: 0 | Status: SHIPPED | OUTPATIENT
Start: 2024-09-17

## 2024-09-17 RX ORDER — ASPIRIN 81 MG/1
81 TABLET ORAL 2 TIMES DAILY
Qty: 60 TABLET | Refills: 0 | Status: SHIPPED | OUTPATIENT
Start: 2024-09-17 | End: 2024-10-21

## 2024-09-17 RX ORDER — CELECOXIB 200 MG/1
200 CAPSULE ORAL DAILY
Qty: 30 CAPSULE | Refills: 0 | Status: SHIPPED | OUTPATIENT
Start: 2024-09-17

## 2024-09-17 RX ORDER — AMOXICILLIN 250 MG
1 CAPSULE ORAL DAILY
Qty: 30 TABLET | Refills: 0 | Status: SHIPPED | OUTPATIENT
Start: 2024-09-17

## 2024-09-18 ENCOUNTER — ANESTHESIA EVENT (OUTPATIENT)
Dept: SURGERY | Facility: HOSPITAL | Age: 64
End: 2024-09-18
Payer: COMMERCIAL

## 2024-09-18 ENCOUNTER — TELEPHONE (OUTPATIENT)
Dept: ORTHOPEDICS | Facility: CLINIC | Age: 64
End: 2024-09-18
Payer: COMMERCIAL

## 2024-09-18 NOTE — TELEPHONE ENCOUNTER
I called the patient today regarding surgery on 09/19/2024 with Dr. Chris Estevez. I informed the patient that her surgery will be at  [facility] ([address] [place] [id number]). I informed the patient they must arrive at 11:30am and their surgery will start at  1330 .     Per the [facility] COVID-19 Pre-Procedural Testing Policy (administered 10/26/2022), patients do not need tested for COVID-19 regardless of vaccination status.    I reminded the patient that they cannot eat or drink after midnight, the night before surgery.      I reminded the patient to be careful of their skin over the next few days to make sure they do not get any cuts, scratches or scrapes.    The patient verbalized that they have received their walker, bedside commode and shower chair from [facility].    The patient verbalized understanding and has no further questions.

## 2024-09-19 ENCOUNTER — TELEPHONE (OUTPATIENT)
Dept: FAMILY MEDICINE | Facility: CLINIC | Age: 64
End: 2024-09-19
Payer: COMMERCIAL

## 2024-09-19 ENCOUNTER — HOSPITAL ENCOUNTER (INPATIENT)
Facility: HOSPITAL | Age: 64
LOS: 2 days | Discharge: HOME HEALTH | DRG: 467 | End: 2024-09-21
Attending: ORTHOPAEDIC SURGERY | Admitting: ORTHOPAEDIC SURGERY
Payer: COMMERCIAL

## 2024-09-19 ENCOUNTER — TELEPHONE (OUTPATIENT)
Dept: ORTHOPEDICS | Facility: CLINIC | Age: 64
End: 2024-09-19
Payer: COMMERCIAL

## 2024-09-19 ENCOUNTER — ANESTHESIA (OUTPATIENT)
Dept: SURGERY | Facility: HOSPITAL | Age: 64
End: 2024-09-19
Payer: COMMERCIAL

## 2024-09-19 DIAGNOSIS — T84.018A FAILED TOTAL KNEE ARTHROPLASTY: Primary | ICD-10-CM

## 2024-09-19 DIAGNOSIS — T84.018A FAILURE OF TOTAL KNEE REPLACEMENT, INITIAL ENCOUNTER: ICD-10-CM

## 2024-09-19 DIAGNOSIS — T84.013A MECHANICAL FAILURE OF PROSTHETIC LEFT KNEE JOINT: ICD-10-CM

## 2024-09-19 DIAGNOSIS — Z01.818 PRE-OP TESTING: ICD-10-CM

## 2024-09-19 DIAGNOSIS — Z96.659 FAILURE OF TOTAL KNEE REPLACEMENT, INITIAL ENCOUNTER: ICD-10-CM

## 2024-09-19 DIAGNOSIS — Z96.659 FAILED TOTAL KNEE ARTHROPLASTY: Primary | ICD-10-CM

## 2024-09-19 LAB
ABO + RH BLD: NORMAL
APPEARANCE FLD: NORMAL
BLD GP AB SCN CELLS X3 SERPL QL: NORMAL
BODY FLD TYPE: NORMAL
COLOR FLD: NORMAL
LYMPHOCYTES NFR FLD MANUAL: 100 %
SPECIMEN OUTDATE: NORMAL
WBC # FLD: 2 /CU MM

## 2024-09-19 PROCEDURE — C1776 JOINT DEVICE (IMPLANTABLE): HCPCS | Performed by: ORTHOPAEDIC SURGERY

## 2024-09-19 PROCEDURE — 63600175 PHARM REV CODE 636 W HCPCS

## 2024-09-19 PROCEDURE — 63600175 PHARM REV CODE 636 W HCPCS: Performed by: ANESTHESIOLOGY

## 2024-09-19 PROCEDURE — 87070 CULTURE OTHR SPECIMN AEROBIC: CPT | Mod: 59 | Performed by: ORTHOPAEDIC SURGERY

## 2024-09-19 PROCEDURE — 87075 CULTR BACTERIA EXCEPT BLOOD: CPT | Performed by: ORTHOPAEDIC SURGERY

## 2024-09-19 PROCEDURE — 86900 BLOOD TYPING SEROLOGIC ABO: CPT | Performed by: ANESTHESIOLOGY

## 2024-09-19 PROCEDURE — 86901 BLOOD TYPING SEROLOGIC RH(D): CPT | Performed by: ANESTHESIOLOGY

## 2024-09-19 PROCEDURE — 25000003 PHARM REV CODE 250: Performed by: STUDENT IN AN ORGANIZED HEALTH CARE EDUCATION/TRAINING PROGRAM

## 2024-09-19 PROCEDURE — 25000003 PHARM REV CODE 250

## 2024-09-19 PROCEDURE — 63600175 PHARM REV CODE 636 W HCPCS: Performed by: NURSE ANESTHETIST, CERTIFIED REGISTERED

## 2024-09-19 PROCEDURE — C1713 ANCHOR/SCREW BN/BN,TIS/BN: HCPCS | Performed by: ORTHOPAEDIC SURGERY

## 2024-09-19 PROCEDURE — 88300 SURGICAL PATH GROSS: CPT | Performed by: PATHOLOGY

## 2024-09-19 PROCEDURE — 64448 NJX AA&/STRD FEM NRV NFS IMG: CPT

## 2024-09-19 PROCEDURE — 87116 MYCOBACTERIA CULTURE: CPT | Mod: 59 | Performed by: ORTHOPAEDIC SURGERY

## 2024-09-19 PROCEDURE — 36000711: Performed by: ORTHOPAEDIC SURGERY

## 2024-09-19 PROCEDURE — 27201423 OPTIME MED/SURG SUP & DEVICES STERILE SUPPLY: Performed by: ORTHOPAEDIC SURGERY

## 2024-09-19 PROCEDURE — 27488 REMOVAL OF KNEE PROSTHESIS: CPT | Mod: 22,LT,, | Performed by: ORTHOPAEDIC SURGERY

## 2024-09-19 PROCEDURE — 89051 BODY FLUID CELL COUNT: CPT | Performed by: ORTHOPAEDIC SURGERY

## 2024-09-19 PROCEDURE — 87176 TISSUE HOMOGENIZATION CULTR: CPT | Mod: 91 | Performed by: ORTHOPAEDIC SURGERY

## 2024-09-19 PROCEDURE — 88332 PATH CONSLTJ SURG EA ADD BLK: CPT | Mod: 59 | Performed by: PATHOLOGY

## 2024-09-19 PROCEDURE — 21400001 HC TELEMETRY ROOM

## 2024-09-19 PROCEDURE — 63600175 PHARM REV CODE 636 W HCPCS: Performed by: STUDENT IN AN ORGANIZED HEALTH CARE EDUCATION/TRAINING PROGRAM

## 2024-09-19 PROCEDURE — 71000015 HC POSTOP RECOV 1ST HR: Performed by: ORTHOPAEDIC SURGERY

## 2024-09-19 PROCEDURE — 71000033 HC RECOVERY, INTIAL HOUR: Performed by: ORTHOPAEDIC SURGERY

## 2024-09-19 PROCEDURE — 88331 PATH CONSLTJ SURG 1 BLK 1SPC: CPT | Performed by: PATHOLOGY

## 2024-09-19 PROCEDURE — 71000016 HC POSTOP RECOV ADDL HR: Performed by: ORTHOPAEDIC SURGERY

## 2024-09-19 PROCEDURE — 87206 SMEAR FLUORESCENT/ACID STAI: CPT | Mod: 91 | Performed by: ORTHOPAEDIC SURGERY

## 2024-09-19 PROCEDURE — 37000009 HC ANESTHESIA EA ADD 15 MINS: Performed by: ORTHOPAEDIC SURGERY

## 2024-09-19 PROCEDURE — 37000008 HC ANESTHESIA 1ST 15 MINUTES: Performed by: ORTHOPAEDIC SURGERY

## 2024-09-19 PROCEDURE — 88305 TISSUE EXAM BY PATHOLOGIST: CPT | Performed by: PATHOLOGY

## 2024-09-19 PROCEDURE — 87102 FUNGUS ISOLATION CULTURE: CPT | Performed by: ORTHOPAEDIC SURGERY

## 2024-09-19 PROCEDURE — 87205 SMEAR GRAM STAIN: CPT | Performed by: ORTHOPAEDIC SURGERY

## 2024-09-19 PROCEDURE — 63600175 PHARM REV CODE 636 W HCPCS: Performed by: ORTHOPAEDIC SURGERY

## 2024-09-19 PROCEDURE — 0SPD0JZ REMOVAL OF SYNTHETIC SUBSTITUTE FROM LEFT KNEE JOINT, OPEN APPROACH: ICD-10-PCS | Performed by: ORTHOPAEDIC SURGERY

## 2024-09-19 PROCEDURE — 0SRD0EZ REPLACEMENT OF LEFT KNEE JOINT WITH ARTICULATING SPACER, OPEN APPROACH: ICD-10-PCS | Performed by: ORTHOPAEDIC SURGERY

## 2024-09-19 PROCEDURE — 86850 RBC ANTIBODY SCREEN: CPT | Performed by: ANESTHESIOLOGY

## 2024-09-19 PROCEDURE — 36000710: Performed by: ORTHOPAEDIC SURGERY

## 2024-09-19 DEVICE — IMPLANTABLE DEVICE: Type: IMPLANTABLE DEVICE | Site: KNEE | Status: FUNCTIONAL

## 2024-09-19 DEVICE — PALACOS® R+G IS A FAST SETTING POLYMER CONTAINING GENTAMICIN, FOR USE IN BONE SURGERY. MIXING OF THE TWO COMPONENT SYSTEM, CONSISTING OF A POWDER AND A LIQUID, INITIALLY PRODUCES A LIQUID AND THEN A PASTE, WHICH IS USED TO ANCHOR THE PROSTHESIS TO THE BONE. THE HARDENED BONE CEMENT ALLOWS STABLE FIXATION OF THE PROSTHESIS AND TRANSFERS ALL STRESSES PRODUCED IN A MOVEMENT TO THE BONE VIA THE LARGE INTERFACE. INSOLUBLE ZIRCONIUM DIOXIDE IS INCLUDED IN THE CEMENT POWDER AS AN X RAY CONTRAST MEDIUM. THE CHLOROPHYLL ADDITIVE SERVES AS OPTICAL MARKING OF THE BONE CEMENT AT THE SITE OF THE OPERATION.
Type: IMPLANTABLE DEVICE | Site: KNEE | Status: FUNCTIONAL
Brand: PALACOS®

## 2024-09-19 RX ORDER — PREGABALIN 75 MG/1
75 CAPSULE ORAL NIGHTLY
Status: DISCONTINUED | OUTPATIENT
Start: 2024-09-19 | End: 2024-09-21 | Stop reason: HOSPADM

## 2024-09-19 RX ORDER — ZOLPIDEM TARTRATE 5 MG/1
5 TABLET ORAL NIGHTLY PRN
Status: DISCONTINUED | OUTPATIENT
Start: 2024-09-19 | End: 2024-09-21 | Stop reason: HOSPADM

## 2024-09-19 RX ORDER — ONDANSETRON HYDROCHLORIDE 2 MG/ML
INJECTION, SOLUTION INTRAVENOUS
Status: DISCONTINUED | OUTPATIENT
Start: 2024-09-19 | End: 2024-09-19

## 2024-09-19 RX ORDER — LOSARTAN POTASSIUM 50 MG/1
100 TABLET ORAL NIGHTLY
Status: DISCONTINUED | OUTPATIENT
Start: 2024-09-20 | End: 2024-09-21 | Stop reason: HOSPADM

## 2024-09-19 RX ORDER — LIDOCAINE HYDROCHLORIDE 20 MG/ML
INJECTION INTRAVENOUS
Status: DISCONTINUED | OUTPATIENT
Start: 2024-09-19 | End: 2024-09-19

## 2024-09-19 RX ORDER — PROPOFOL 10 MG/ML
VIAL (ML) INTRAVENOUS CONTINUOUS PRN
Status: DISCONTINUED | OUTPATIENT
Start: 2024-09-19 | End: 2024-09-19

## 2024-09-19 RX ORDER — METHOCARBAMOL 750 MG/1
750 TABLET, FILM COATED ORAL 3 TIMES DAILY
Status: DISCONTINUED | OUTPATIENT
Start: 2024-09-19 | End: 2024-09-21 | Stop reason: HOSPADM

## 2024-09-19 RX ORDER — MUPIROCIN 20 MG/G
1 OINTMENT TOPICAL
Status: COMPLETED | OUTPATIENT
Start: 2024-09-19 | End: 2024-09-19

## 2024-09-19 RX ORDER — LIDOCAINE HYDROCHLORIDE 10 MG/ML
1 INJECTION, SOLUTION EPIDURAL; INFILTRATION; INTRACAUDAL; PERINEURAL
Status: DISCONTINUED | OUTPATIENT
Start: 2024-09-19 | End: 2024-09-19 | Stop reason: HOSPADM

## 2024-09-19 RX ORDER — ASPIRIN 81 MG/1
81 TABLET ORAL 2 TIMES DAILY
Status: DISCONTINUED | OUTPATIENT
Start: 2024-09-19 | End: 2024-09-21 | Stop reason: HOSPADM

## 2024-09-19 RX ORDER — OXYCODONE HYDROCHLORIDE 5 MG/1
5 TABLET ORAL
Status: DISCONTINUED | OUTPATIENT
Start: 2024-09-19 | End: 2024-09-20

## 2024-09-19 RX ORDER — HYDROCHLOROTHIAZIDE 25 MG/1
25 TABLET ORAL DAILY
Status: DISCONTINUED | OUTPATIENT
Start: 2024-09-20 | End: 2024-09-21 | Stop reason: HOSPADM

## 2024-09-19 RX ORDER — SODIUM CHLORIDE 0.9 % (FLUSH) 0.9 %
10 SYRINGE (ML) INJECTION
Status: DISCONTINUED | OUTPATIENT
Start: 2024-09-19 | End: 2024-09-19 | Stop reason: HOSPADM

## 2024-09-19 RX ORDER — DEXMEDETOMIDINE HYDROCHLORIDE 100 UG/ML
INJECTION, SOLUTION INTRAVENOUS
Status: DISCONTINUED | OUTPATIENT
Start: 2024-09-19 | End: 2024-09-19

## 2024-09-19 RX ORDER — SODIUM CHLORIDE 9 MG/ML
INJECTION, SOLUTION INTRAVENOUS
Status: DISCONTINUED | OUTPATIENT
Start: 2024-09-19 | End: 2024-09-19 | Stop reason: HOSPADM

## 2024-09-19 RX ORDER — PROCHLORPERAZINE EDISYLATE 5 MG/ML
5 INJECTION INTRAMUSCULAR; INTRAVENOUS EVERY 6 HOURS PRN
Status: DISCONTINUED | OUTPATIENT
Start: 2024-09-19 | End: 2024-09-21 | Stop reason: HOSPADM

## 2024-09-19 RX ORDER — AMOXICILLIN 250 MG
1 CAPSULE ORAL 2 TIMES DAILY
Status: DISCONTINUED | OUTPATIENT
Start: 2024-09-19 | End: 2024-09-21 | Stop reason: HOSPADM

## 2024-09-19 RX ORDER — BISACODYL 10 MG/1
10 SUPPOSITORY RECTAL EVERY 12 HOURS PRN
Status: DISCONTINUED | OUTPATIENT
Start: 2024-09-19 | End: 2024-09-21 | Stop reason: HOSPADM

## 2024-09-19 RX ORDER — FENTANYL CITRATE 50 UG/ML
100 INJECTION, SOLUTION INTRAMUSCULAR; INTRAVENOUS
Status: DISCONTINUED | OUTPATIENT
Start: 2024-09-19 | End: 2024-09-19

## 2024-09-19 RX ORDER — CELECOXIB 200 MG/1
400 CAPSULE ORAL ONCE
Status: COMPLETED | OUTPATIENT
Start: 2024-09-19 | End: 2024-09-19

## 2024-09-19 RX ORDER — PREGABALIN 75 MG/1
75 CAPSULE ORAL
Status: COMPLETED | OUTPATIENT
Start: 2024-09-19 | End: 2024-09-19

## 2024-09-19 RX ORDER — ACETAMINOPHEN 500 MG
1000 TABLET ORAL
Status: COMPLETED | OUTPATIENT
Start: 2024-09-19 | End: 2024-09-19

## 2024-09-19 RX ORDER — LIDOCAINE HYDROCHLORIDE AND EPINEPHRINE 15; 5 MG/ML; UG/ML
INJECTION, SOLUTION EPIDURAL
Status: DISCONTINUED | OUTPATIENT
Start: 2024-09-19 | End: 2024-09-19

## 2024-09-19 RX ORDER — FENTANYL CITRATE 50 UG/ML
25-200 INJECTION, SOLUTION INTRAMUSCULAR; INTRAVENOUS
Status: DISCONTINUED | OUTPATIENT
Start: 2024-09-19 | End: 2024-09-19 | Stop reason: HOSPADM

## 2024-09-19 RX ORDER — MORPHINE SULFATE 2 MG/ML
2 INJECTION, SOLUTION INTRAMUSCULAR; INTRAVENOUS
Status: DISCONTINUED | OUTPATIENT
Start: 2024-09-19 | End: 2024-09-20

## 2024-09-19 RX ORDER — ACETAMINOPHEN 500 MG
1000 TABLET ORAL EVERY 8 HOURS
Status: DISCONTINUED | OUTPATIENT
Start: 2024-09-19 | End: 2024-09-19

## 2024-09-19 RX ORDER — OXYCODONE HYDROCHLORIDE 5 MG/1
5 TABLET ORAL EVERY 6 HOURS PRN
Status: DISCONTINUED | OUTPATIENT
Start: 2024-09-19 | End: 2024-09-19

## 2024-09-19 RX ORDER — TALC
6 POWDER (GRAM) TOPICAL NIGHTLY PRN
Status: DISCONTINUED | OUTPATIENT
Start: 2024-09-19 | End: 2024-09-19

## 2024-09-19 RX ORDER — MIDAZOLAM HYDROCHLORIDE 1 MG/ML
4 INJECTION, SOLUTION INTRAMUSCULAR; INTRAVENOUS
Status: DISCONTINUED | OUTPATIENT
Start: 2024-09-19 | End: 2024-09-19

## 2024-09-19 RX ORDER — ROPIVACAINE HYDROCHLORIDE 2 MG/ML
INJECTION, SOLUTION EPIDURAL; INFILTRATION; PERINEURAL CONTINUOUS
Status: DISCONTINUED | OUTPATIENT
Start: 2024-09-19 | End: 2024-09-20

## 2024-09-19 RX ORDER — METHOCARBAMOL 500 MG/1
500 TABLET, FILM COATED ORAL 4 TIMES DAILY
Status: DISCONTINUED | OUTPATIENT
Start: 2024-09-19 | End: 2024-09-19

## 2024-09-19 RX ORDER — FLUOXETINE HYDROCHLORIDE 20 MG/1
20 CAPSULE ORAL DAILY
Status: DISCONTINUED | OUTPATIENT
Start: 2024-09-20 | End: 2024-09-21 | Stop reason: HOSPADM

## 2024-09-19 RX ORDER — FENTANYL CITRATE 50 UG/ML
25 INJECTION, SOLUTION INTRAMUSCULAR; INTRAVENOUS EVERY 5 MIN PRN
Status: COMPLETED | OUTPATIENT
Start: 2024-09-19 | End: 2024-09-19

## 2024-09-19 RX ORDER — MIDAZOLAM HYDROCHLORIDE 1 MG/ML
.5-4 INJECTION, SOLUTION INTRAMUSCULAR; INTRAVENOUS
Status: DISCONTINUED | OUTPATIENT
Start: 2024-09-19 | End: 2024-09-19 | Stop reason: HOSPADM

## 2024-09-19 RX ORDER — KETAMINE HCL IN 0.9 % NACL 50 MG/5 ML
SYRINGE (ML) INTRAVENOUS
Status: DISCONTINUED | OUTPATIENT
Start: 2024-09-19 | End: 2024-09-19

## 2024-09-19 RX ORDER — TRANEXAMIC ACID 100 MG/ML
INJECTION, SOLUTION INTRAVENOUS
Status: DISCONTINUED | OUTPATIENT
Start: 2024-09-19 | End: 2024-09-19

## 2024-09-19 RX ORDER — FAMOTIDINE 20 MG/1
20 TABLET, FILM COATED ORAL 2 TIMES DAILY
Status: DISCONTINUED | OUTPATIENT
Start: 2024-09-19 | End: 2024-09-21 | Stop reason: HOSPADM

## 2024-09-19 RX ORDER — NALOXONE HCL 0.4 MG/ML
0.02 VIAL (ML) INJECTION
Status: DISCONTINUED | OUTPATIENT
Start: 2024-09-19 | End: 2024-09-21 | Stop reason: HOSPADM

## 2024-09-19 RX ORDER — ACETAMINOPHEN 500 MG
1000 TABLET ORAL EVERY 6 HOURS
Status: DISCONTINUED | OUTPATIENT
Start: 2024-09-19 | End: 2024-09-21 | Stop reason: HOSPADM

## 2024-09-19 RX ORDER — OXYCODONE HYDROCHLORIDE 10 MG/1
10 TABLET ORAL EVERY 6 HOURS PRN
Status: DISCONTINUED | OUTPATIENT
Start: 2024-09-19 | End: 2024-09-19

## 2024-09-19 RX ORDER — ONDANSETRON HYDROCHLORIDE 2 MG/ML
4 INJECTION, SOLUTION INTRAVENOUS EVERY 8 HOURS PRN
Status: DISCONTINUED | OUTPATIENT
Start: 2024-09-19 | End: 2024-09-21 | Stop reason: HOSPADM

## 2024-09-19 RX ORDER — SODIUM CHLORIDE 9 MG/ML
INJECTION, SOLUTION INTRAVENOUS CONTINUOUS
Status: ACTIVE | OUTPATIENT
Start: 2024-09-19 | End: 2024-09-20

## 2024-09-19 RX ORDER — DEXAMETHASONE SODIUM PHOSPHATE 4 MG/ML
INJECTION, SOLUTION INTRA-ARTICULAR; INTRALESIONAL; INTRAMUSCULAR; INTRAVENOUS; SOFT TISSUE
Status: DISCONTINUED | OUTPATIENT
Start: 2024-09-19 | End: 2024-09-19

## 2024-09-19 RX ORDER — ROPIVACAINE HYDROCHLORIDE 5 MG/ML
INJECTION, SOLUTION EPIDURAL; INFILTRATION; PERINEURAL
Status: COMPLETED | OUTPATIENT
Start: 2024-09-19 | End: 2024-09-19

## 2024-09-19 RX ORDER — METHOCARBAMOL 500 MG/1
500 TABLET, FILM COATED ORAL 4 TIMES DAILY PRN
Status: DISCONTINUED | OUTPATIENT
Start: 2024-09-19 | End: 2024-09-20

## 2024-09-19 RX ORDER — POLYETHYLENE GLYCOL 3350 17 G/17G
17 POWDER, FOR SOLUTION ORAL DAILY
Status: DISCONTINUED | OUTPATIENT
Start: 2024-09-20 | End: 2024-09-21 | Stop reason: HOSPADM

## 2024-09-19 RX ORDER — PHENYLEPHRINE HYDROCHLORIDE 10 MG/ML
INJECTION INTRAVENOUS
Status: DISCONTINUED | OUTPATIENT
Start: 2024-09-19 | End: 2024-09-19

## 2024-09-19 RX ORDER — VANCOMYCIN HYDROCHLORIDE 1 G/20ML
INJECTION, POWDER, LYOPHILIZED, FOR SOLUTION INTRAVENOUS
Status: DISCONTINUED | OUTPATIENT
Start: 2024-09-19 | End: 2024-09-19 | Stop reason: HOSPADM

## 2024-09-19 RX ORDER — OXYCODONE HYDROCHLORIDE 10 MG/1
10 TABLET ORAL
Status: DISCONTINUED | OUTPATIENT
Start: 2024-09-19 | End: 2024-09-20

## 2024-09-19 RX ORDER — MUPIROCIN 20 MG/G
1 OINTMENT TOPICAL 2 TIMES DAILY
Status: DISCONTINUED | OUTPATIENT
Start: 2024-09-19 | End: 2024-09-21 | Stop reason: HOSPADM

## 2024-09-19 RX ADMIN — DEXMEDETOMIDINE 4 MCG: 100 INJECTION, SOLUTION, CONCENTRATE INTRAVENOUS at 05:09

## 2024-09-19 RX ADMIN — SODIUM CHLORIDE: 9 INJECTION, SOLUTION INTRAVENOUS at 01:09

## 2024-09-19 RX ADMIN — LIDOCAINE HYDROCHLORIDE 40 MG: 20 INJECTION INTRAVENOUS at 01:09

## 2024-09-19 RX ADMIN — VANCOMYCIN HYDROCHLORIDE 1500 MG: 1.5 INJECTION, POWDER, LYOPHILIZED, FOR SOLUTION INTRAVENOUS at 12:09

## 2024-09-19 RX ADMIN — MEPIVACAINE HYDROCHLORIDE 3 ML: 15 INJECTION, SOLUTION EPIDURAL; INFILTRATION at 01:09

## 2024-09-19 RX ADMIN — PREGABALIN 75 MG: 75 CAPSULE ORAL at 08:09

## 2024-09-19 RX ADMIN — ASPIRIN 81 MG: 81 TABLET, COATED ORAL at 08:09

## 2024-09-19 RX ADMIN — FENTANYL CITRATE 50 MCG: 50 INJECTION, SOLUTION INTRAMUSCULAR; INTRAVENOUS at 12:09

## 2024-09-19 RX ADMIN — FENTANYL CITRATE 25 MCG: 50 INJECTION INTRAMUSCULAR; INTRAVENOUS at 08:09

## 2024-09-19 RX ADMIN — ZOLPIDEM TARTRATE 5 MG: 5 TABLET ORAL at 11:09

## 2024-09-19 RX ADMIN — ACETAMINOPHEN 1000 MG: 500 TABLET ORAL at 12:09

## 2024-09-19 RX ADMIN — PHENYLEPHRINE HYDROCHLORIDE 100 MCG: 10 INJECTION INTRAVENOUS at 05:09

## 2024-09-19 RX ADMIN — CEFAZOLIN 2 G: 2 INJECTION, POWDER, FOR SOLUTION INTRAMUSCULAR; INTRAVENOUS at 02:09

## 2024-09-19 RX ADMIN — DEXMEDETOMIDINE 4 MCG: 100 INJECTION, SOLUTION, CONCENTRATE INTRAVENOUS at 01:09

## 2024-09-19 RX ADMIN — Medication 25 MG: at 02:09

## 2024-09-19 RX ADMIN — PREGABALIN 75 MG: 75 CAPSULE ORAL at 12:09

## 2024-09-19 RX ADMIN — CELECOXIB 400 MG: 200 CAPSULE ORAL at 12:09

## 2024-09-19 RX ADMIN — MIDAZOLAM 1 MG: 1 INJECTION INTRAMUSCULAR; INTRAVENOUS at 12:09

## 2024-09-19 RX ADMIN — SODIUM CHLORIDE, SODIUM GLUCONATE, SODIUM ACETATE, POTASSIUM CHLORIDE, MAGNESIUM CHLORIDE, SODIUM PHOSPHATE, DIBASIC, AND POTASSIUM PHOSPHATE: .53; .5; .37; .037; .03; .012; .00082 INJECTION, SOLUTION INTRAVENOUS at 04:09

## 2024-09-19 RX ADMIN — TRANEXAMIC ACID 1000 MG: 100 INJECTION, SOLUTION INTRAVENOUS at 02:09

## 2024-09-19 RX ADMIN — PHENYLEPHRINE HYDROCHLORIDE 100 MCG: 10 INJECTION INTRAVENOUS at 04:09

## 2024-09-19 RX ADMIN — MUPIROCIN 1 G: 20 OINTMENT TOPICAL at 09:09

## 2024-09-19 RX ADMIN — METHOCARBAMOL 750 MG: 750 TABLET ORAL at 08:09

## 2024-09-19 RX ADMIN — Medication: at 06:09

## 2024-09-19 RX ADMIN — FAMOTIDINE 20 MG: 20 TABLET ORAL at 08:09

## 2024-09-19 RX ADMIN — PHENYLEPHRINE HYDROCHLORIDE 0.5 MCG/KG/MIN: 10 INJECTION INTRAVENOUS at 02:09

## 2024-09-19 RX ADMIN — METHOCARBAMOL 500 MG: 500 TABLET ORAL at 11:09

## 2024-09-19 RX ADMIN — ACETAMINOPHEN 1000 MG: 325 TABLET ORAL at 11:09

## 2024-09-19 RX ADMIN — DEXMEDETOMIDINE 8 MCG: 100 INJECTION, SOLUTION, CONCENTRATE INTRAVENOUS at 01:09

## 2024-09-19 RX ADMIN — ACETAMINOPHEN 1000 MG: 325 TABLET ORAL at 08:09

## 2024-09-19 RX ADMIN — CEFAZOLIN 2 G: 2 INJECTION, POWDER, FOR SOLUTION INTRAMUSCULAR; INTRAVENOUS at 10:09

## 2024-09-19 RX ADMIN — DEXAMETHASONE SODIUM PHOSPHATE 8 MG: 4 INJECTION INTRA-ARTICULAR; INTRALESIONAL; INTRAMUSCULAR; INTRAVENOUS; SOFT TISSUE at 02:09

## 2024-09-19 RX ADMIN — PROPOFOL 100 MCG/KG/MIN: 10 INJECTION, EMULSION INTRAVENOUS at 01:09

## 2024-09-19 RX ADMIN — MORPHINE SULFATE 2 MG: 2 INJECTION, SOLUTION INTRAMUSCULAR; INTRAVENOUS at 11:09

## 2024-09-19 RX ADMIN — LIDOCAINE HYDROCHLORIDE AND EPINEPHRINE 3 ML: 15; 5 INJECTION, SOLUTION EPIDURAL at 03:09

## 2024-09-19 RX ADMIN — SODIUM CHLORIDE: 9 INJECTION, SOLUTION INTRAVENOUS at 10:09

## 2024-09-19 RX ADMIN — TRANEXAMIC ACID 1000 MG: 100 INJECTION, SOLUTION INTRAVENOUS at 04:09

## 2024-09-19 RX ADMIN — OXYCODONE HYDROCHLORIDE 10 MG: 10 TABLET ORAL at 08:09

## 2024-09-19 RX ADMIN — ONDANSETRON 4 MG: 2 INJECTION INTRAMUSCULAR; INTRAVENOUS at 01:09

## 2024-09-19 RX ADMIN — SODIUM CHLORIDE, SODIUM GLUCONATE, SODIUM ACETATE, POTASSIUM CHLORIDE, MAGNESIUM CHLORIDE, SODIUM PHOSPHATE, DIBASIC, AND POTASSIUM PHOSPHATE: .53; .5; .37; .037; .03; .012; .00082 INJECTION, SOLUTION INTRAVENOUS at 02:09

## 2024-09-19 RX ADMIN — ROPIVACAINE HYDROCHLORIDE 10 ML: 5 INJECTION EPIDURAL; INFILTRATION; PERINEURAL at 12:09

## 2024-09-19 RX ADMIN — MUPIROCIN 1 G: 20 OINTMENT TOPICAL at 12:09

## 2024-09-19 RX ADMIN — Medication 10 MG: at 04:09

## 2024-09-19 RX ADMIN — MEPIVACAINE HYDROCHLORIDE 6 ML/HR: 15 INJECTION, SOLUTION EPIDURAL; INFILTRATION at 03:09

## 2024-09-19 RX ADMIN — SENNOSIDES AND DOCUSATE SODIUM 1 TABLET: 50; 8.6 TABLET ORAL at 08:09

## 2024-09-19 RX ADMIN — GLYCOPYRROLATE 0.2 MG: 0.2 INJECTION, SOLUTION INTRAMUSCULAR; INTRAVENOUS at 02:09

## 2024-09-19 NOTE — ANESTHESIA PROCEDURE NOTES
CSE    Patient location during procedure: OR  Start time: 9/19/2024 1:45 PM  Timeout: 9/19/2024 1:45 PM  End time: 9/19/2024 1:55 PM      Staffing  Authorizing Provider: Noemi Osman MD  Performing Provider: Noemi Osman MD    Staffing  Performed by: Noemi Osman MD  Authorized by: Noemi Osman MD    Preanesthetic Checklist  Completed: patient identified, IV checked, site marked, risks and benefits discussed, surgical consent, monitors and equipment checked, pre-op evaluation and timeout performed  CSE  Patient position: sitting  Prep: ChloraPrep  Patient monitoring: heart rate, continuous pulse ox and frequent blood pressure checks  Approach: midline  Spinal Needle  Needle type: Darron   Needle gauge: 25 G  Needle length: 5 in  Epidural Needle  Injection technique: ESPINOZA air  Needle type: Tuohy   Needle gauge: 17 G  Needle length: 3.5 in  Needle insertion depth: 5.5 cm  Location: L3-4  Needle localization: anatomical landmarks   Catheter  Catheter type: springwound  Catheter size: 19 G  Catheter at skin depth: 10 cm  Assessment  Intrathecal Medications:   administered: primary anesthetic mcg of    Medications:    Medications: Intrathecal - mepivacaine 15 mg/mL (1.5 %) injection - Intrathecal   3 mL - 9/19/2024 1:55:00 PM

## 2024-09-19 NOTE — ANESTHESIA PREPROCEDURE EVALUATION
[facility]  Anesthesia Pre-Operative Evaluation         Patient Name: Traci Freire  YOB: 1960  MRN: [id number]    SUBJECTIVE:     Pre-operative evaluation for Procedure(s) (LRB):  REVISION, ARTHROPLASTY, KNEE: LEFT: Poly Exchange vs. Complete Revision (Left)     09/19/2024    Traci Freire is a 63 y.o. female w/ a significant PMHx of HTN< anxiety, HLD, depression, prediabetes, and failed knee arthoplasty who now presents for the above procedure(s).      LDA:        Peripheral IV - Single Lumen 09/19/24 1229 18 G Posterior;Right Forearm (Active)   Number of days: 0       Prev airway:   Performed by: Rob Villegas CRNA  Authorized by: Samir Mohr MD    Intubation:     Induction:  Intravenous    Intubated:  Postinduction    Mask Ventilation:  Easy mask    Attempts:  1    Attempted By:  CRNA    Difficult Airway Encountered?: No      Complications:  None    Airway Device:  Supraglottic airway/LMA    Airway Device Size:  4.0    Style/Cuff Inflation:  Cuffed    Inflation Amount (mL):  28    Secured at:  The lips    Placement Verified By:  Capnometry    Complicating Factors:  None    Findings Post-Intubation:  BS equal bilateral and atraumatic/condition of teeth unchanged    Drips: None documented.    Patient Active Problem List   Diagnosis    Arthralgia of cervical spine    HTN (hypertension)    HLD (hyperlipidemia)    Obesity    Edema    Abnormal EKG    Hx of breast cancer    Dysphagia    Prediabetes    Anxiety    Failed total knee arthroplasty    Pre-op exam    Facet arthropathy, lumbar    Rib pain on left side    Hope hump    Insomnia    Failed total left knee replacement    H/O bilateral mastectomy    FRANCIE (obstructive sleep apnea)    Depression    BMI 33.0-33.9,adult    Suspected COVID-19 virus infection    Incisional hernia, without obstruction or gangrene    Gastroenteritis    Carpal tunnel syndrome on left    Varicose veins of both lower extremities    Chronic pain of  left knee    Impaired range of motion of left knee    Impaired functional mobility and activity tolerance    Fatty liver       Review of patient's allergies indicates:   Allergen Reactions    Keflex [cephalexin]     Macrobid [nitrofurantoin monohyd/m-cryst]     Bactrim [sulfamethoxazole-trimethoprim] Nausea And Vomiting       Current Outpatient Medications:    Current Facility-Administered Medications:     0.9%  NaCl infusion, , Intravenous, On Call Procedure, Gregor Marino PA-C    ceFAZolin 2 g in D5W 50 mL IVPB (MB+), 2 g, Intravenous, On Call Procedure, Gregor Marino PA-C    fentaNYL 50 mcg/mL injection  mcg,  mcg, Intravenous, PRN, Violet Means MD, 50 mcg at 24 1239    LIDOcaine (PF) 10 mg/ml (1%) injection 10 mg, 1 mL, Intradermal, On Call Procedure, Gregor Marino PA-C    midazolam injection 0.5-4 mg, 0.5-4 mg, Intravenous, PRN, Violet Means MD, 1 mg at 24 1239    ropivacaine 0.2% Perineural Pump infusion 500 ML, , Perineural, Continuous, Violet Means MD    tranexamic acid (CYKLOKAPRON) 1,000 mg in 0.9% NaCl 100 mL IVPB (MB+), 1,000 mg, Intravenous, On Call Procedure, Gregor Marino PA-C    tranexamic acid (CYKLOKAPRON) 1,000 mg in 0.9% NaCl 100 mL IVPB (MB+), 1,000 mg, Intravenous, On Call Procedure, Gregor Marino PA-C    vancomycin 1,500 mg in D5W 250 mL IVPB (admixture device), 1,500 mg, Intravenous, On Call Procedure, Gregor Marino PA-C, Last Rate: 166.7 mL/hr at 24 1214, 1,500 mg at 24 1214    Past Surgical History:   Procedure Laterality Date    ABDOMINAL SURGERY      BLADDER SUSPENSION      BREAST SURGERY      CARPAL TUNNEL RELEASE Left 2023    Procedure: Left carpal tunnel release;  Surgeon: Roberto Cheung MD;  Location: [facility];  Service: Orthopedics;  Laterality: Left;     SECTION      CYSTOSCOPY      avoids greens . ? Interstitial cystitis    ESOPHAGOGASTRODUODENOSCOPY N/A 8/10/2023    Procedure: EGD  (ESOPHAGOGASTRODUODENOSCOPY);  Surgeon: Gerard Hernandez MD;  Location: [facility];  Service: Endoscopy;  Laterality: N/A;    HYSTERECTOMY      followed by removal of ovaries  from scar tissue    INCISIONAL HERNIA REPAIR Right 2008    RLQ    JOINT REPLACEMENT      Left total knee replacement-[facility] -     KNEE SURGERY Left 2014    TKR    MODIFIED RADICAL MASTECTOMY W/ AXILLARY LYMPH NODE DISSECTION Right 05/10/2005    w/free flap    NISSEN FUNDOPLICATION      ROBOT-ASSISTED LAPAROSCOPIC REPAIR OF VENTRAL HERNIA N/A 2021    Procedure: ROBOTIC REPAIR, HERNIA, VENTRAL;  Surgeon: John Johnson III, MD;  Location: [facility];  Service: General;  Laterality: N/A;    SIMPLE MASTECTOMY Left 05/10/2005    w/free flap    TONSILLECTOMY         Social History     Socioeconomic History    Marital status:    Tobacco Use    Smoking status: Former     Current packs/day: 0.00     Types: Cigarettes     Quit date: 3/26/2004     Years since quittin.4    Smokeless tobacco: Never    Tobacco comments:     quit - smoked for 10 years- 1 ppd   Substance and Sexual Activity    Alcohol use: No    Drug use: No    Sexual activity: Never     Social Determinants of Health     Financial Resource Strain: Medium Risk (2024)    Overall Financial Resource Strain (CARDIA)     Difficulty of Paying Living Expenses: Somewhat hard   Food Insecurity: Patient Declined (2024)    Hunger Vital Sign     Worried About Running Out of Food in the Last Year: Patient declined     Ran Out of Food in the Last Year: Patient declined   Transportation Needs: No Transportation Needs (2024)    PRAPARE - Transportation     Lack of Transportation (Medical): No     Lack of Transportation (Non-Medical): No   Physical Activity: Unknown (2024)    Exercise Vital Sign     Days of Exercise per Week: 5 days   Stress: No Stress Concern Present (2024)    [employer] -  Occupational Stress Questionnaire     Feeling of Stress : Only a little   Housing Stability: High Risk (1/5/2024)    Housing Stability Vital Sign     Unable to Pay for Housing in the Last Year: Yes     Number of Places Lived in the Last Year: 1     Unstable Housing in the Last Year: No       OBJECTIVE:     Vital Signs Range (Last 24H):  Temp:  [36.5 °C (97.7 °F)]   Pulse:  [79-88]   Resp:  [16-22]   BP: (125-160)/(64-82)   SpO2:  [97 %-99 %]       Significant Labs:  Lab Results   Component Value Date    WBC 7.04 08/26/2024    HGB 12.3 08/26/2024    HCT 39.6 08/26/2024     08/26/2024    CHOL 189 04/08/2024    TRIG 193 (H) 04/08/2024    HDL 44 (L) 04/08/2024    ALT 16 08/26/2024    AST 17 08/26/2024     08/26/2024    K 4.1 08/26/2024     08/26/2024    CREATININE 0.7 08/26/2024    BUN 20 08/26/2024    CO2 25 08/26/2024    TSH 2.600 08/09/2023    INR 0.9 08/26/2024    HGBA1C 5.7 (H) 08/26/2024    MICROALBUR 1.2 04/08/2024       Diagnostic Studies: No relevant studies.    EKG:   Results for orders placed or performed during the hospital encounter of 08/26/24   EKG 12-lead    Collection Time: 08/26/24 10:25 AM   Result Value Ref Range    QRS Duration 78 ms    OHS QTC Calculation 414 ms    Narrative    Test Reason : Z01.818,    Vent. Rate : 061 BPM     Atrial Rate : 061 BPM     P-R Int : 178 ms          QRS Dur : 078 ms      QT Int : 412 ms       P-R-T Axes : 050 -07 014 degrees     QTc Int : 414 ms    Normal sinus rhythm  Normal ECG  When compared with ECG of 09-AUG-2023 12:53,  No significant change was found  Confirmed by Dina Schuster MD ([id number]) on 8/26/2024 11:51:23 AM    Referred By: AVEL HURST           Confirmed By:Dina Schuster MD       2D ECHO:  TTE:  Results for orders placed or performed during the hospital encounter of 09/03/24   Echo   Result Value Ref Range    BSA 1.87 m2    LVOT stroke volume 101.61 cm3    LVIDd 3.78 3.5 - 6.0 cm    LV Systolic Volume 22.33 mL    LV Systolic  "Volume Index 12.4 mL/m2    LVIDs 2.50 2.1 - 4.0 cm    LV Diastolic Volume 61.20 mL    LV Diastolic Volume Index 34.00 mL/m2    Left Ventricular End Systolic Volume by Teichholz Method 22.33 mL    Left Ventricular End Diastolic Volume by Teichholz Method 61.20 mL    IVS 1.23 (A) 0.6 - 1.1 cm    LVOT diameter 2.05 cm    LVOT area 3.3 cm2    FS 34 28 - 44 %    Left Ventricle Relative Wall Thickness 0.63 cm    Posterior Wall 1.19 (A) 0.6 - 1.1 cm    LV mass 153.95 g    LV Mass Index 86 g/m2    MV Peak E Cedrick 0.65 m/s    TDI LATERAL 0.06 m/s    TDI SEPTAL 0.05 m/s    E/E' ratio 11.82 m/s    MV Peak A Cedrick 1.03 m/s    TR Max Cedrick 2.37 m/s    E/A ratio 0.63     IVRT 71.36 msec    E wave deceleration time 259.75 msec    MV "A" wave duration [id number] msec    LV SEPTAL E/E' RATIO 13.00 m/s    LV LATERAL E/E' RATIO 10.83 m/s    LVOT peak cedrick 1.55 m/s    Left Ventricular Outflow Tract Mean Velocity 1.17 cm/s    Left Ventricular Outflow Tract Mean Gradient 5.92 mmHg    RV S' 9.83 cm/s    TAPSE 1.47 cm    RV/LV Ratio 0.56 cm    LA size 3.49 cm    Left Atrium Minor Axis 4.64 cm    Left Atrium Major Axis 4.80 cm    RA Major Axis 4.52 cm    AV mean gradient 25 mmHg    AV peak gradient 44 mmHg    Ao peak cedrick 3.30 m/s    Ao VTI 67.50 cm    LVOT peak VTI 30.80 cm    AV valve area 1.51 cm²    AV Velocity Ratio 0.47     AV index (prosthetic) 0.46     BECCA by Velocity Ratio 1.55 cm²    MV mean gradient 2 mmHg    MV peak gradient 4 mmHg    MV stenosis pressure 1/2 time 86.90 ms    MV valve area p 1/2 method 2.53 cm2    MV valve area by continuity eq 4.15 cm2    MV VTI 24.5 cm    Triscuspid Valve Regurgitation Peak Gradient 22 mmHg    PV PEAK VELOCITY 1.22 m/s    PV peak gradient 6 mmHg    Pulmonary Valve Mean Velocity 0.87 m/s    Ao root annulus 2.76 cm    IVC diameter 1.77 cm    Mean e' 0.06 m/s    ZLVIDS -1.64     ZLVIDD -2.75     RVDD 2.13 cm    AORTIC VALVE CUSP SEPERATION 0.52 cm    TV resting pulmonary artery pressure 25 mmHg "    RV TB RVSP 5 mmHg    Est. RA pres 3 mmHg    Narrative      Left Ventricle: The left ventricle is normal in size. Mildly increased   wall thickness. There is mild concentric hypertrophy. There is normal   systolic function with a visually estimated ejection fraction of 60 - 65%.   There is normal diastolic function.    Right Ventricle: Normal right ventricular cavity size. Wall thickness   is normal. Systolic function is normal.    Aortic Valve: There is mild aortic valve sclerosis.    IVC/SVC: Normal venous pressure at 3 mmHg.         VELIA:  No results found for this or any previous visit.    ASSESSMENT/PLAN:                                                                                                                  09/19/2024  Traci Freire is a 63 y.o., female.      Pre-op Assessment    I have reviewed the Patient Summary Reports.     I have reviewed the Nursing Notes. I have reviewed the NPO Status.   I have reviewed the Medications.     Review of Systems  Anesthesia Hx:  No problems with previous Anesthesia   History of prior surgery of interest to airway management or planning:          Denies Family Hx of Anesthesia complications.    Denies Personal Hx of Anesthesia complications.                    Cardiovascular:     Hypertension   Denies MI.  Denies CAD.                                        Pulmonary:        Sleep Apnea                Neurological:    Neuromuscular Disease,                                   Endocrine:        Obesity / BMI > 30  Psych:  Psychiatric History anxiety depression                Physical Exam  General: Well nourished, Cooperative, Alert and Oriented    Airway:  Mallampati: II   Mouth Opening: Normal  TM Distance: Normal  Tongue: Normal  Neck ROM: Normal ROM    Chest/Lungs:  Clear to auscultation, Normal Respiratory Rate    Heart:  Rate: Normal  Rhythm: Regular Rhythm        Anesthesia Plan  Type of Anesthesia, risks & benefits discussed:    Anesthesia Type: Regional,  CSE  Intra-op Monitoring Plan: Standard ASA Monitors  Post Op Pain Control Plan: multimodal analgesia, IV/PO Opioids PRN and peripheral nerve block  Induction:  IV  Informed Consent: Informed consent signed with the Patient and all parties understand the risks and agree with anesthesia plan.  All questions answered.   ASA Score: 3  Day of Surgery Review of History & Physical: H&P Update referred to the surgeon/provider.    Ready For Surgery From Anesthesia Perspective.     .

## 2024-09-19 NOTE — PT/OT/SLP PROGRESS
Physical Therapy      Patient Name:  Traci Freire   MRN:  [id number]    Patient not seen today secondary to  (late sx). Will follow-up 9/20.

## 2024-09-19 NOTE — HPI
Traci Freire is a 63 y.o. female with history of Left knee pain. Pain is worse with activity and weight bearing.  Patient has experienced interference of activities of daily living due to decreased range of motion and an increase in joint pain and swelling.  Patient has failed non-operative treatment including NSAIDs, corticosteroid injections, viscosupplement injections, and activity modification.  Traci Freire currently ambulates independently.      Relevant medical conditions of significance in perioperative period:  HTN- on medication managed by PCP  HLD- on medication managed by PCP  FRANCIE- monitored by PCP

## 2024-09-19 NOTE — INTERVAL H&P NOTE
Traci JOI Freire was interviewed, examined and the H and P reviewed.  There has been no interval change in her History and Physical.

## 2024-09-19 NOTE — ANESTHESIA PROCEDURE NOTES
Left Adductor Canal Catheter    Patient location during procedure: pre-op   Block not for primary anesthetic.  Reason for block: at surgeon's request and post-op pain management   Post-op Pain Location: Left leg pain   Start time: 9/19/2024 12:40 PM  Timeout: 9/19/2024 12:39 PM   End time: 9/19/2024 12:55 PM    Staffing  Authorizing Provider: Violet Means MD  Performing Provider: Alta Bhakta MD    Staffing  Performed by: Alta Bhakta MD  Authorized by: Violet Means MD    Preanesthetic Checklist  Completed: patient identified, IV checked, site marked, risks and benefits discussed, surgical consent, monitors and equipment checked, pre-op evaluation and timeout performed  Peripheral Block  Patient position: supine  Prep: ChloraPrep and site prepped and draped  Patient monitoring: heart rate, cardiac monitor, continuous pulse ox, continuous capnometry and frequent blood pressure checks  Block type: adductor canal  Laterality: left  Injection technique: continuous  Needle  Needle type: Tuohy   Needle gauge: 17 G  Needle length: 3.5 in  Needle localization: anatomical landmarks and ultrasound guidance  Catheter type: spring wound  Catheter size: 19 G  Test dose: lidocaine 1.5% with Epi 1-to-200,000 and negative   -ultrasound image captured on disc.  Assessment  Injection assessment: negative aspiration, negative parasthesia and local visualized surrounding nerve  Paresthesia pain: none  Heart rate change: no  Slow fractionated injection: yes  Pain Tolerance: comfortable throughout block  Medications:    Medications: ropivacaine (NAROPIN) injection 0.5% - Perineural, Other   10 mL - 9/19/2024 12:55:00 PM    Additional Notes  VSS.  [facility] RN monitoring vitals throughout procedure.  Patient tolerated procedure well. Administered 20cc of 0.25% bupivacaine with 1:300k epi as local anesthetic.

## 2024-09-19 NOTE — ASSESSMENT & PLAN NOTE
Traci Freire is a 63 y.o. female is s/p L TKA explant and spacer on 9/19/2024    Surgical dressing C/D/I, ice in place  Pain control: multimodal, Anesthesia Surgical Home following  PT/OT: TTWB LLE, HKW locked 10 degrees flexion   DVT PPx: ASA 81 BID, FCDs at all times when not ambulating  Abx: postop Ancef  Drain: none  Cao: none    Dispo: plan to dc pending pain control and PT progress

## 2024-09-19 NOTE — ANESTHESIA POSTPROCEDURE EVALUATION
Anesthesia Post Evaluation    Patient: Traci Freire    Procedure(s) Performed: Procedure(s) (LRB):  REVISION, ARTHROPLASTY, KNEE: LEFT (Left)    Final Anesthesia Type: CSE      Patient location during evaluation: PACU  Patient participation: Yes- Able to Participate  Level of consciousness: awake and alert and oriented  Post-procedure vital signs: reviewed and stable  Pain management: adequate  Airway patency: patent    PONV status at discharge: No PONV  Anesthetic complications: no      Cardiovascular status: blood pressure returned to baseline  Respiratory status: unassisted, room air and spontaneous ventilation  Hydration status: euvolemic  Follow-up not needed.              Vitals Value Taken Time   /59 09/19/24 1817   Temp 36.7 °C (98 °F) 09/19/24 1759   Pulse 87 09/19/24 1819   Resp 14 09/19/24 1819   SpO2 99 % 09/19/24 1819   Vitals shown include unfiled device data.      No case tracking events are documented in the log.      Pain/Denise Score: Pain Rating Prior to Med Admin: 0 (9/19/2024  6:05 PM)  Pain Rating Post Med Admin: 2 (9/19/2024  1:30 PM)  Denise Score: 5 (9/19/2024  6:00 PM)

## 2024-09-19 NOTE — OP NOTE
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   PROCEDURES PERFORMED:  Revision left total knee arthroplasty with placement of articulating antibiotic spacer modifier 22 (due to the fact that the tibial and femoral stems were well fixed this case required complex intraoperative decision making and was technically very difficult.  It took twice as long as a typical revision of this nature and therefore qualifies as a modifier 22)  SURGEON: Chris Estevez M.D.   ASSISTANT:  Franko Anaya M.D. (RES).   ANESTHESIA:  Regional/neuraxial.   SPECIMEN:  Explant and soft tissue  FINDINGS:  Purulent fluid, Caseous  soft tissue, where than 10 white cells per high-powered field on frozen section  FLUID:  2 L  BLOOD LOSS:  200 cc  COMPLICATIONS: None.   COUNTS: Correct.   DISPOSITION: Recovery Room, stable.   IMPLANTS:  Medium-sized interspacer  INDICATIONS FOR PROCEDURE:  Ms. Freire is a 63-year-old female who had a longstanding revision left total knee arthroplasty she was having increasing pain radiographic examination was consistent with polyethylene wear she had well-fixed component secondary to their stems.  She had a normal CRP and sedimentation rate.  She had an arc of motion of 0-50 degrees.  Plan was for revision total knee arthroplasty with polyethylene exchange with the possibility of complete revision if necessary.  PROCEDURE IN DETAIL:  After appropriate consent was obtained the patient was brought to the operating room she received a block in the preoperative holding area neuraxial anesthesia was administered she received antibiotic prophylaxis.  She received a g of tranexamic acid.  She also received a g at closure.  Cast padding and tourniquet were applied to the proximal left thigh the left lower extremity was then prepped and draped in usual sterile fashion a  time-out was called.  All team members participated.  No concerns were expressed prior to the case and team members were encouraged to express concerns if they arose during the case.  The limb was elevated tourniquet was inflated.  Her prior incision was utilized.  This is taken down through the skin and retinacular layer.  A medial parapatellar patellar arthrotomy was performed followed by quadriceps snip.  As soon as the joint was incised a milky purulent appearing fluid appeared.  We aspirated this and sent it for culture and cell count.  Presently found count is still pending.  We completed the arthrotomy and the quadriceps snip.  We then sent tissue for frozen section which came back at greater than 10 white cells per high-powered field.  Given the appearance of the fluid in the joint and the frozen section despite a normal CRP I made the determination that the knee was likely infected and opted to place an articulating antibiotic spacer.  We reestablished the medial and lateral gutters.  She had heterotopic bone in the patellar tendon and we carefully excised this.  We were then able to flex the knee in order to remove the tibial insert.  There was asymmetrical wear with more of the wear being medial.  There was caseous appearing tissue throughout the joint and this was debrided.  This was also sent for pathology and culture.  Attention was then turned to removing the components.  Osteotome was used to separate the femoral component from the distal femur we then applied extraction device removed this unfortunately the femoral component dissociated from the stem.  We used a curved osteotome around the stem to free it however based on preoperative radiographs the stem was very well fixed proximally.  We then turned attention to tibia.  Posterior retractor was placed.  With the use of osteotomes and a saw the  the tibial component from the cement mantle and bone.  With the use of the Chloé extraction  device we eventually remove the tibial component in the stem.  We debrided the tibia and the canal.  Attention was then turned back to the femur.  A cortical window was made distally in order to gain better access to the stem we freed this up more from this position and then by applying a broken screw device into the end of the stem we eventually with the use of a vice  and a slap hammer removed the femoral stem.  We debrided the femur.  We then performed our usual debridement protocol if 3 L of normal saline a dilute hydrogen peroxide solution 3 more L of normal saline a dilute Betadine solution followed by 3 more L of normal saline.  During the course of this we periodically debrided soft tissue.  The patella button had been removed along with the patella cement and polyethylene plugs.  After changing we began to cement the spacer.  I fashion dowels over threaded K-wires placed them in the femur and tibia.  We then cemented the into spacer into place.  We used a medium.  She did have some bone loss and there was a small amount of hyperextension however she was stable medially and laterally.  Once the cement was dry we debrided again with the Betadine and normal saline.  We then closed the incision.  The arthrotomy was closed with antibiotic impregnated 1. Vicryl.  The remaining incision was closed 0 Vicryl 2-0 Vicryl Monocryl and Dermabond.  A sterile dressing and a knee brace were applied.  She is to be toe-touch weight-bearing and locked in 10° of flexion.

## 2024-09-19 NOTE — TELEPHONE ENCOUNTER
----- Message from Gayathri Holloway sent at 9/19/2024  2:25 PM CDT -----  Ituaena from [facility] calling to receive Office visit notes from when the pt was recently seen.  Fax#[phone]

## 2024-09-19 NOTE — HOSPITAL COURSE
On 9/19/2024, the patient arrived to the [facility] OR for proper pre-operative management.  Upon completion of pre-operative preparation, the patient was taken back to the operative theatre. Explant L TKA with spacer placement was performed without complication and the patient was transported to the post anesthesia care unit in stable condition.  After appropriate recovery from the anaesthetic agents used during the surgery, the patient was then transported to the hospital inpatient floor.  The interim of the hospital stay from arrival on the floor up to discharge has been uncomplicated. The patient has tolerated regular diet.  The patient's pain has been controlled using a multimodal approach. Currently, the patient's pain is well controlled on an oral regimen.  The patient has been voiding without difficulty.  The patient began participation in physical therapy after surgery and has progressed throughout the entire hospital stay.  Currently, the patient's progress is sufficient to allow the them to be discharged to *** safely.  The patient agrees with this assessment and desires a discharge today.

## 2024-09-19 NOTE — TRANSFER OF CARE
Anesthesia Transfer of Care Note    Patient: Traci Freire    Procedure(s) Performed: Procedure(s) (LRB):  REVISION, ARTHROPLASTY, KNEE: LEFT (Left)    Patient location: PACU    Anesthesia Type: general    Transport from OR: Transported from OR on 6-10 L/min O2 by face mask with adequate spontaneous ventilation    Post pain: adequate analgesia    Post assessment: no apparent anesthetic complications    Post vital signs: stable    Level of consciousness: awake, alert and oriented    Nausea/Vomiting: no nausea/vomiting    Complications: none    Transfer of care protocol was followed      Last vitals: Visit Vitals  /69 (BP Location: Left arm, Patient Position: Lying)   Pulse 84   Temp 36.5 °C (97.7 °F) (Temporal)   Resp 20   LMP 05/09/1992   SpO2 99%   Breastfeeding No

## 2024-09-20 PROBLEM — M00.9: Status: ACTIVE | Noted: 2024-09-20

## 2024-09-20 LAB
ACID FAST MOD KINY STN SPEC: NORMAL
CK SERPL-CCNC: 294 U/L (ref 20–180)
GRAM STN SPEC: NORMAL
GRAM STN SPEC: NORMAL
MYCOBACTERIUM SPEC QL CULT: NORMAL

## 2024-09-20 PROCEDURE — 99223 1ST HOSP IP/OBS HIGH 75: CPT | Mod: ,,, | Performed by: INTERNAL MEDICINE

## 2024-09-20 PROCEDURE — 25000003 PHARM REV CODE 250: Performed by: ORTHOPAEDIC SURGERY

## 2024-09-20 PROCEDURE — 25000003 PHARM REV CODE 250: Performed by: STUDENT IN AN ORGANIZED HEALTH CARE EDUCATION/TRAINING PROGRAM

## 2024-09-20 PROCEDURE — 94761 N-INVAS EAR/PLS OXIMETRY MLT: CPT

## 2024-09-20 PROCEDURE — 36415 COLL VENOUS BLD VENIPUNCTURE: CPT | Performed by: STUDENT IN AN ORGANIZED HEALTH CARE EDUCATION/TRAINING PROGRAM

## 2024-09-20 PROCEDURE — 94660 CPAP INITIATION&MGMT: CPT

## 2024-09-20 PROCEDURE — 82550 ASSAY OF CK (CPK): CPT | Performed by: STUDENT IN AN ORGANIZED HEALTH CARE EDUCATION/TRAINING PROGRAM

## 2024-09-20 PROCEDURE — 63600175 PHARM REV CODE 636 W HCPCS

## 2024-09-20 PROCEDURE — C1751 CATH, INF, PER/CENT/MIDLINE: HCPCS

## 2024-09-20 PROCEDURE — 76937 US GUIDE VASCULAR ACCESS: CPT

## 2024-09-20 PROCEDURE — 97112 NEUROMUSCULAR REEDUCATION: CPT

## 2024-09-20 PROCEDURE — 25000003 PHARM REV CODE 250

## 2024-09-20 PROCEDURE — 25000003 PHARM REV CODE 250: Performed by: INTERNAL MEDICINE

## 2024-09-20 PROCEDURE — A4216 STERILE WATER/SALINE, 10 ML: HCPCS | Performed by: ORTHOPAEDIC SURGERY

## 2024-09-20 PROCEDURE — 97165 OT EVAL LOW COMPLEX 30 MIN: CPT

## 2024-09-20 PROCEDURE — 97535 SELF CARE MNGMENT TRAINING: CPT

## 2024-09-20 PROCEDURE — 36573 INSJ PICC RS&I 5 YR+: CPT

## 2024-09-20 PROCEDURE — 97161 PT EVAL LOW COMPLEX 20 MIN: CPT

## 2024-09-20 PROCEDURE — 02HV33Z INSERTION OF INFUSION DEVICE INTO SUPERIOR VENA CAVA, PERCUTANEOUS APPROACH: ICD-10-PCS | Performed by: ORTHOPAEDIC SURGERY

## 2024-09-20 PROCEDURE — 63600175 PHARM REV CODE 636 W HCPCS: Performed by: INTERNAL MEDICINE

## 2024-09-20 PROCEDURE — 21400001 HC TELEMETRY ROOM

## 2024-09-20 RX ORDER — SODIUM CHLORIDE 0.9 % (FLUSH) 0.9 %
10 SYRINGE (ML) INJECTION EVERY 6 HOURS
Status: DISCONTINUED | OUTPATIENT
Start: 2024-09-20 | End: 2024-09-21 | Stop reason: HOSPADM

## 2024-09-20 RX ORDER — SODIUM CHLORIDE 0.9 % (FLUSH) 0.9 %
10 SYRINGE (ML) INJECTION
Status: DISCONTINUED | OUTPATIENT
Start: 2024-09-20 | End: 2024-09-21 | Stop reason: HOSPADM

## 2024-09-20 RX ORDER — OXYCODONE HYDROCHLORIDE 10 MG/1
10 TABLET ORAL
Status: DISCONTINUED | OUTPATIENT
Start: 2024-09-20 | End: 2024-09-21 | Stop reason: HOSPADM

## 2024-09-20 RX ORDER — OXYCODONE HYDROCHLORIDE 5 MG/1
5 TABLET ORAL EVERY 6 HOURS PRN
Status: DISCONTINUED | OUTPATIENT
Start: 2024-09-20 | End: 2024-09-21 | Stop reason: HOSPADM

## 2024-09-20 RX ORDER — OXYCODONE HYDROCHLORIDE 5 MG/1
5 TABLET ORAL
Status: DISCONTINUED | OUTPATIENT
Start: 2024-09-20 | End: 2024-09-20

## 2024-09-20 RX ORDER — OXYCODONE HYDROCHLORIDE 10 MG/1
10 TABLET ORAL
Status: DISCONTINUED | OUTPATIENT
Start: 2024-09-20 | End: 2024-09-20

## 2024-09-20 RX ORDER — CELECOXIB 200 MG/1
200 CAPSULE ORAL DAILY
Status: DISCONTINUED | OUTPATIENT
Start: 2024-09-20 | End: 2024-09-21 | Stop reason: HOSPADM

## 2024-09-20 RX ADMIN — CEFAZOLIN 2 G: 2 INJECTION, POWDER, FOR SOLUTION INTRAMUSCULAR; INTRAVENOUS at 05:09

## 2024-09-20 RX ADMIN — MUPIROCIN 1 G: 20 OINTMENT TOPICAL at 09:09

## 2024-09-20 RX ADMIN — OXYCODONE HYDROCHLORIDE 10 MG: 10 TABLET ORAL at 12:09

## 2024-09-20 RX ADMIN — ASPIRIN 81 MG: 81 TABLET, COATED ORAL at 08:09

## 2024-09-20 RX ADMIN — POLYETHYLENE GLYCOL 3350 17 G: 17 POWDER, FOR SOLUTION ORAL at 10:09

## 2024-09-20 RX ADMIN — MUPIROCIN 1 G: 20 OINTMENT TOPICAL at 08:09

## 2024-09-20 RX ADMIN — ZOLPIDEM TARTRATE 5 MG: 5 TABLET ORAL at 11:09

## 2024-09-20 RX ADMIN — METHOCARBAMOL 750 MG: 750 TABLET ORAL at 08:09

## 2024-09-20 RX ADMIN — OXYCODONE HYDROCHLORIDE 10 MG: 10 TABLET ORAL at 02:09

## 2024-09-20 RX ADMIN — OXYCODONE HYDROCHLORIDE 10 MG: 10 TABLET ORAL at 05:09

## 2024-09-20 RX ADMIN — SENNOSIDES AND DOCUSATE SODIUM 1 TABLET: 50; 8.6 TABLET ORAL at 08:09

## 2024-09-20 RX ADMIN — Medication 10 ML: at 06:09

## 2024-09-20 RX ADMIN — Medication 10 ML: at 11:09

## 2024-09-20 RX ADMIN — LOSARTAN POTASSIUM 100 MG: 50 TABLET, FILM COATED ORAL at 08:09

## 2024-09-20 RX ADMIN — CEFTRIAXONE SODIUM 2 G: 2 INJECTION, POWDER, FOR SOLUTION INTRAMUSCULAR; INTRAVENOUS at 06:09

## 2024-09-20 RX ADMIN — PREGABALIN 75 MG: 75 CAPSULE ORAL at 08:09

## 2024-09-20 RX ADMIN — ACETAMINOPHEN 1000 MG: 325 TABLET ORAL at 12:09

## 2024-09-20 RX ADMIN — CELECOXIB 200 MG: 200 CAPSULE ORAL at 09:09

## 2024-09-20 RX ADMIN — OXYCODONE HYDROCHLORIDE 10 MG: 10 TABLET ORAL at 08:09

## 2024-09-20 RX ADMIN — DAPTOMYCIN 700 MG: 350 INJECTION, POWDER, LYOPHILIZED, FOR SOLUTION INTRAVENOUS at 07:09

## 2024-09-20 RX ADMIN — HYDROCHLOROTHIAZIDE 25 MG: 25 TABLET ORAL at 08:09

## 2024-09-20 RX ADMIN — ACETAMINOPHEN 1000 MG: 325 TABLET ORAL at 11:09

## 2024-09-20 RX ADMIN — ACETAMINOPHEN 1000 MG: 325 TABLET ORAL at 05:09

## 2024-09-20 RX ADMIN — FLUOXETINE HYDROCHLORIDE 20 MG: 20 CAPSULE ORAL at 08:09

## 2024-09-20 RX ADMIN — METHOCARBAMOL 750 MG: 750 TABLET ORAL at 03:09

## 2024-09-20 RX ADMIN — FAMOTIDINE 20 MG: 20 TABLET ORAL at 08:09

## 2024-09-20 RX ADMIN — ACETAMINOPHEN 1000 MG: 325 TABLET ORAL at 06:09

## 2024-09-20 NOTE — SUBJECTIVE & OBJECTIVE
Past Medical History:   Diagnosis Date    Anxiety     Arthritis     Cancer breast    Right- Bilateral mastectomy- May 2005- had breast reconstruction- mother  of breast cancer    HLD (hyperlipidemia)     Hypertension     diagnosed age late 40's     IBS (irritable bowel syndrome)     Lyme disease     arthritis of hands. Felt like flu- age early 30's- got it  after going to [place]    Sleep apnea     NO MACHINE       Past Surgical History:   Procedure Laterality Date    ABDOMINAL SURGERY      BLADDER SUSPENSION      BREAST SURGERY      CARPAL TUNNEL RELEASE Left 2023    Procedure: Left carpal tunnel release;  Surgeon: Roberto Cheung MD;  Location: [facility];  Service: Orthopedics;  Laterality: Left;     SECTION      CYSTOSCOPY      avoids greens . ? Interstitial cystitis    ESOPHAGOGASTRODUODENOSCOPY N/A 8/10/2023    Procedure: EGD (ESOPHAGOGASTRODUODENOSCOPY);  Surgeon: Gerard Hernandez MD;  Location: [facility];  Service: Endoscopy;  Laterality: N/A;    HYSTERECTOMY      followed by removal of ovaries  from scar tissue    INCISIONAL HERNIA REPAIR Right 2008    RLQ    JOINT REPLACEMENT      Left total knee replacement-[facility] -     KNEE SURGERY Left     TKR    MODIFIED RADICAL MASTECTOMY W/ AXILLARY LYMPH NODE DISSECTION Right 05/10/2005    w/free flap    NISSEN FUNDOPLICATION      REVISION OF KNEE ARTHROPLASTY Left 2024    Procedure: REVISION, ARTHROPLASTY, KNEE: LEFT;  Surgeon: Chris Estevez MD;  Location: [address];  Service: Orthopedics;  Laterality: Left;    ROBOT-ASSISTED LAPAROSCOPIC REPAIR OF VENTRAL HERNIA N/A 2021    Procedure: ROBOTIC REPAIR, HERNIA, VENTRAL;  Surgeon: John Johnson III, MD;  Location: [facility];  Service: General;  Laterality: N/A;    SIMPLE MASTECTOMY Left 05/10/2005    w/free flap    TONSILLECTOMY         Review of patient's allergies indicates:   Allergen Reactions    Keflex  [cephalexin]     Macrobid [nitrofurantoin monohyd/m-cryst]     Bactrim [sulfamethoxazole-trimethoprim] Nausea And Vomiting       Medications:  Medications Prior to Admission   Medication Sig    FLUoxetine 20 MG capsule Take 1 capsule (20 mg total) by mouth once daily.    hydroCHLOROthiazide (HYDRODIURIL) 25 MG tablet Take 1 tablet (25 mg total) by mouth once daily.    irbesartan (AVAPRO) 300 MG tablet Take 1 tablet (300 mg total) by mouth every evening.    oxyCODONE (ROXICODONE) 5 MG immediate release tablet Take 1-2 tablets by mouth every 4-6 hours as needed for pain    pantoprazole (PROTONIX) 40 MG tablet Take 1 tablet (40 mg total) by mouth 2 (two) times daily.    pantoprazole (PROTONIX) 40 MG tablet Take 1 tablet (40 mg total) by mouth once daily.    rosuvastatin (CRESTOR) 20 MG tablet Take 1 tablet (20 mg total) by mouth every evening.    zolpidem (AMBIEN) 10 mg Tab Take 1 tablet (10 mg total) by mouth every evening.    [DISCONTINUED] HYDROcodone-acetaminophen (NORCO)  mg per tablet Take 1 tablet by mouth every 6 (six) hours as needed for Pain.    acetaminophen (TYLENOL) 650 MG TbSR Take 1 tablet (650 mg total) by mouth every 8 (eight) hours.    aspirin (ECOTRIN) 81 MG EC tablet Take 1 tablet (81 mg total) by mouth 2 (two) times a day.    celecoxib (CELEBREX) 200 MG capsule Take 1 capsule (200 mg total) by mouth once daily.    mecobalamin (B12 ACTIVE ORAL) Take 1 tablet by mouth Daily.    methocarbamoL (ROBAXIN) 750 MG Tab Take 1 tablet (750 mg total) by mouth 4 (four) times daily as needed (for muscle spasms).    senna-docusate 8.6-50 mg (SENNA WITH DOCUSATE SODIUM) 8.6-50 mg per tablet Take 1 tablet by mouth once daily.     Antibiotics (From admission, onward)      Start     Stop Route Frequency Ordered    09/19/24 2100  mupirocin 2 % ointment 1 g         09/24/24 2059 Nasl 2 times daily 09/19/24 1754          Antifungals (From admission, onward)      None          Antivirals (From admission, onward)       None             Immunization History   Administered Date(s) Administered    COVID-19, MRNA, LN-S, PF (Pfizer) (Purple Cap) 2021, 08/15/2021    Influenza (FLUBLOK) - Quadrivalent - Recombinant - PF *Preferred* (egg allergy) 2020, 2022    Influenza - Quadrivalent - PF *Preferred* (6 months and older) 2020, 2023       Family History       Problem Relation (Age of Onset)    Breast cancer Daughter    Cancer Mother    Heart disease Father          Social History     Socioeconomic History    Marital status:    Tobacco Use    Smoking status: Former     Current packs/day: 0.00     Types: Cigarettes     Quit date: 3/26/2004     Years since quittin.5    Smokeless tobacco: Never    Tobacco comments:     quit - smoked for 10 years- 1 ppd   Substance and Sexual Activity    Alcohol use: No    Drug use: No    Sexual activity: Never     Social Determinants of Health     Financial Resource Strain: Low Risk  (2024)    Overall Financial Resource Strain (CARDIA)     Difficulty of Paying Living Expenses: Not hard at all   Food Insecurity: No Food Insecurity (2024)    Hunger Vital Sign     Worried About Running Out of Food in the Last Year: Never true     Ran Out of Food in the Last Year: Never true   Transportation Needs: No Transportation Needs (2024)    TRANSPORTATION NEEDS     Transportation : No   Physical Activity: Inactive (2024)    Exercise Vital Sign     Days of Exercise per Week: 0 days     Minutes of Exercise per Session: 0 min   Stress: No Stress Concern Present (2024)    [employer] - Occupational Stress Questionnaire     Feeling of Stress : Not at all   Housing Stability: Low Risk  (2024)    Housing Stability Vital Sign     Unable to Pay for Housing in the Last Year: No     Homeless in the Last Year: No     Review of Systems   Constitutional:  Negative for chills and fever.   Respiratory:  Negative for cough.     Cardiovascular:  Negative for chest pain.   Gastrointestinal:  Negative for abdominal distention.   Genitourinary:  Negative for frequency and urgency.   Musculoskeletal:  Positive for arthralgias. Negative for back pain.        Surgical dressings on knee   Skin:  Positive for wound.   Neurological:  Negative for weakness.     Objective:     Vital Signs (Most Recent):  Temp: 98.2 °F (36.8 °C) (09/20/24 1227)  Pulse: 82 (09/20/24 1227)  Resp: 18 (09/20/24 1227)  BP: (!) 142/82 (09/20/24 1227)  SpO2: 96 % (09/20/24 1227) Vital Signs (24h Range):  Temp:  [97.8 °F (36.6 °C)-98.6 °F (37 °C)] 98.2 °F (36.8 °C)  Pulse:  [] 82  Resp:  [12-24] 18  SpO2:  [91 %-100 %] 96 %  BP: (111-157)/(56-82) 142/82     Weight: 81.8 kg (180 lb 5.2 oz)  Body mass index is 34.07 kg/m².    CrCl cannot be calculated (Patient's most recent lab result is older than the maximum 7 days allowed.).     Physical Exam  Vitals and nursing note reviewed.   Constitutional:       Appearance: Normal appearance.   HENT:      Mouth/Throat:      Pharynx: No oropharyngeal exudate.   Cardiovascular:      Rate and Rhythm: Normal rate.      Heart sounds: Murmur heard.      Comments: Soft systolic murmur  Pulmonary:      Effort: Pulmonary effort is normal. No respiratory distress.      Breath sounds: Normal breath sounds.   Abdominal:      General: Bowel sounds are normal. There is no distension.      Palpations: Abdomen is soft.   Musculoskeletal:      Comments: Surgical dressings on L knee. Dressings c/d/i   Neurological:      General: No focal deficit present.      Mental Status: She is alert and oriented to person, place, and time.          Significant Labs: All pertinent labs within the past 24 hours have been reviewed.    Significant Imaging: I have reviewed all pertinent imaging results/findings within the past 24 hours.

## 2024-09-20 NOTE — ADDENDUM NOTE
Addendum  created 09/20/24 1246 by Suzanne Modi MD    Charge Capture section accepted, Cosign clinical note with attestation

## 2024-09-20 NOTE — HPI
"63F with h/o prior knee replacement ~ 10  years ago admitted 9/19 for revision of L TKA. Pt reports over past 3-4 years her knee pain has progressed and has had trouble bending knee. Over past 6-12 months the pain has become a lot worse despite pain medications and steroid injections. Thought it was scar tissue from prior surgery. Says L knee has always seemed a little more swollen than R but denies any acute worsening of swelling or redness. Denies h/o bloodstream infections or uti or boils. Says she received antibiotics for lyme disease about 30 years ago. Generally healthy, denies immunosuppressive medications. Denies recent abx prior to admission. Reports some nausea with keflex and bactrim in the past, but currently tolerating cefazolin without issue. Works at [employer], stocking shelves    Admitted 9/19 for   S/p surgery 9/20- Revision left total knee arthroplasty with placement of articulating antibiotic spacer.    Purulent fluid encountered and sent for culture    Aerobe, anaerobe, fungal cultures pending      Afebrile, on vanc/cefazolin    ID consulted for "Left tka in infection so explant "     "

## 2024-09-20 NOTE — PT/OT/SLP EVAL
Physical Therapy  Co-Evaluation (OT) and Treatment    Traci Freire   [id number]    Time Tracking:     PT Received On: 09/20/24   PT Start Time: 1136   PT Stop Time: 1217   PT Total Time (min): 41 min    Billable Minutes: Evaluation 11 and Neuromuscular Re-education 30  minutes    *This session was completed as a co-evaluation with OT secondary to patient's first time mobilizing out of bed post-op*    Recommendations:     Therapy Intensity Recommendations at Discharge: High Intensity Therapy     Equipment Needed After Discharge: walker, rolling, wheelchair, bedside commode    Barriers to Discharge: Decreased caregiver support (no family home during day to provide assist/support)    Justification for Walker HME:  Patient demonstrates a mobility limitation that significantly impairs their ability to participate in one or more mobility related activities of daily living. Patient's mobility limitation cannot be sufficiently resolved with the use of a cane, but can be sufficiently resolved with the use of a rolling walker.The use of a rolling walker will considerably improve their ability to participate in MRADLs. Patient will use the walker on a regular basis at home.     Justification for Wheelchair HME:  Patient has a mobility limitation that significantly impairs their ability to participate in one or more mobility related activities of daily living in customary locations in the home. The mobility limitation cannot be sufficiently resolved by the use of a cane or walker. The use of a manual wheelchair will greatly improve the patient's ability to participate in MRADLs. The patient will use the wheelchair on a regular basis at home. They have expressed their willingness to use a manual wheelchair in the home, and have a caregiver who is available and willing to assist with the wheelchair if needed.     Justification for Bedside Commode HME:  Patient has a mobility limitation that significantly impairs their ability to  participate in one or more mobility related activities of daily living, including toileting. This deficit can be resolved by using a bedside commode. Patient demonstrates mobility limitations that will cause them to be confined to one room at home without bathroom access for up to 30 days. Using a bedside commode will greatly improve the patient's ability to participate in MRADLs.     Patient Information:     Recent Surgery: Procedure(s) (LRB):  REVISION, ARTHROPLASTY, KNEE: LEFT (Left) 1 Day Post-Op    Diagnosis: Failed total knee arthroplasty    Length of Stay: 1 days    General Precautions: Standard, fall  Orthopedic Precautions: LLE toe touch weight bearing  Brace: Hinged knee brace (locked in 10 deg flexion)    Assessment:     Traci Freire is a 63 y.o. female [place] [facility] [place] for Failed total knee arthroplasty, underwent L TKA revision (space placement) with Dr. Estevez on 9/19. Traci Freire tolerated evaluation fair today. Updated patient on her post-op orthopedic precautions (LLE TTWB, HKB locked in 10 deg flex at all times), verbalized understanding. Pt initially flustered at difficulty mobilizing, urinated on her gown and brace in bed (purewick ineffective). Bulk of session spent assisting patient in/out of bed over to bedside commode, providing min (A) stand pivot with pt's hands on therapist's shoulders for UE support, pt pivoting on her RLE. Successful void (no BM) on commode and assisted back to edge of bed. Provided some demonstration of how to stand and pivot using a rolling walker. She was able to stand from bed to rolling walker with CGA; however, very difficult to lift her own L foot from floor so PT at floor level holding L foot off ground when taking steps, OT at standing height providing CGA. Up in recliner chair comfortable with purewick re-applied at end of session. Patient presents with good participation and motivation to return to prior level of function with high intensity  "therapy. The patient demonstrates appropriate endurance, strength, and pain control to participate in up to 3 hours or 15 hrs of combined therapy post acute. Discussed PT role, POC, and goals with patient; verbalized understanding. Traci Freire would benefit from acute PT services to promote mobility during this admission and improve return to PLOF.    Problem List: weakness, impaired endurance, impaired self care skills, impaired functional mobility, gait instability, impaired balance, pain, decreased lower extremity function, decreased ROM, orthopedic precautions    Rehab Prognosis: Good; patient would benefit from acute skilled PT services to address these deficits and reach maximum level of function.    Plan:     Patient to be seen daily to address the above listed problems via gait training, therapeutic activities, therapeutic exercises, neuromuscular re-education    Plan of Care Expires: 10/20/24  Plan of Care reviewed with: patient    Subjective:     Communicated with RN prior to evaluation, appropriate to see for evaluation.    Pt found supine in bed (HOB elevated) (on bed pan) upon PT entry to room, agreeable to evaluation.    Patient commenting: "I don't know how I'm going to move around at home. I also don't want to go to a rehab though, how long would I have to do that if I did it? When can I decide?"    Does this patient have any cultural, spiritual, Cheondoism conflicts given the current situation? Patient has no barriers to learning. Patient verbalizes understanding of his/her program and goals and demonstrates them correctly. No cultural, spiritual, or educational needs identified.    Past Medical History:   Diagnosis Date    Anxiety     Arthritis     Cancer breast    Right- Bilateral mastectomy- May 2005- had breast reconstruction- mother  of breast cancer    HLD (hyperlipidemia)     Hypertension     diagnosed age late 40's     IBS (irritable bowel syndrome)     Lyme disease     arthritis of " hands. Felt like flu- age early 30's- got it  after going to [place]    Sleep apnea     NO MACHINE      Past Surgical History:   Procedure Laterality Date    ABDOMINAL SURGERY      BLADDER SUSPENSION      BREAST SURGERY      CARPAL TUNNEL RELEASE Left 2023    Procedure: Left carpal tunnel release;  Surgeon: Roberto Cheung MD;  Location: [facility];  Service: Orthopedics;  Laterality: Left;     SECTION      CYSTOSCOPY      avoids greens . ? Interstitial cystitis    ESOPHAGOGASTRODUODENOSCOPY N/A 8/10/2023    Procedure: EGD (ESOPHAGOGASTRODUODENOSCOPY);  Surgeon: Gerard Hernandez MD;  Location: [facility];  Service: Endoscopy;  Laterality: N/A;    HYSTERECTOMY      followed by removal of ovaries  from scar tissue    INCISIONAL HERNIA REPAIR Right 2008    RLQ    JOINT REPLACEMENT      Left total knee replacement-[facility] -     KNEE SURGERY Left     TKR    MODIFIED RADICAL MASTECTOMY W/ AXILLARY LYMPH NODE DISSECTION Right 05/10/2005    w/free flap    NISSEN FUNDOPLICATION      REVISION OF KNEE ARTHROPLASTY Left 2024    Procedure: REVISION, ARTHROPLASTY, KNEE: LEFT;  Surgeon: Chris Estevez MD;  Location: [address];  Service: Orthopedics;  Laterality: Left;    ROBOT-ASSISTED LAPAROSCOPIC REPAIR OF VENTRAL HERNIA N/A 2021    Procedure: ROBOTIC REPAIR, HERNIA, VENTRAL;  Surgeon: John Johnson III, MD;  Location: [facility];  Service: General;  Laterality: N/A;    SIMPLE MASTECTOMY Left 05/10/2005    w/free flap    TONSILLECTOMY         Living Environment:  Pt lives with her daughter and 15 yo grandson in a [facility] with [facility]; uses a tub/shower with shower chair established, has a raised toilet at home due to baseline poor L knee flexion.    PLOF:  Prior to admission, patient was ambulatory without a device but does report increased unsteadiness past 2 months pre-op (has been holding onto walls/furniture at home). Performs self-care  independently, using raised toilet and shower chair. 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    DME:  Patient owns or has access to the following DME: raised toilet, shower chair    Upon discharge, patient will have assistance from daughter, grandson (but they work or go to school during the day time, home along during days).    Objective:     Patient found with: peripheral IV, perineural catheter, PureWick, L hinged knee brace    Pain:  Pain Rating 1:  (did not numerically rate today)  Location - Side 1: Left  Location - Orientation 1: generalized  Location 1: leg  Pain Addressed 1: Reposition, Distraction, Cessation of Activity  Pain Rating Post-Intervention 1:  (did not rate; reports no pain at rest, increased pain with attempts to mobilize)    Cognitive Exam:  Patient is oriented to Person, Place, Time, and Situation.  Patient follows 100% of single-step commands.    Sensation:   Some reported numbness and decreased sensation (to light touch) at lower L leg (shin area)    Lower Extremity Range of Motion:  Right Lower Extremity: WFL actively  Left Lower Extremity:  WFL hip and ankle; knee NT (locked in HKB, 10 deg flexion)    Lower Extremity Strength:  Right Lower Extremity: WFL  Left Lower Extremity: grossly 2/5 via MMT except ankle 3+/5    Functional Mobility:    Bed Mobility:  Supine to Sitting: Mod Assist of 2 person (PT guiding LLE and OT helping at trunk)  Scooting towards EOB in sitting: Contact-Guard Assist for managing LLE    Transfers:  Bed to Bedside Commode: Min Assist from bed to commode with no AD via stand pivot x 1 trial  Pt's hands on therapist's shoulders for UE support, stand and pivot to adjacent commode    Bedside Commode to Bed:  Min Assist from commode to bed with no AD via stand pivot x 1 trial  Pt's hands on therapist's shoulders for UE support, stand and pivot to adjacent edge of bed    Bed to Chair: CGA-min (A) from bed to recliner chair with  Rolling walker via stand pivot x 1 trial  She was able to stand from bed to rolling walker with CGA; however, very difficult to lift her own L foot from floor so PT at floor level holding L foot off ground when taking steps, OT at standing height providing CGA    Gait:  No formal steps today, she transfers from bed <> chair by pivoting on her RLE with walker but unable to take formal steps (or hops) today    Balance:  Static Sit: Supervision at EOB, requires L leg to be elevated on surface due to HKB    Static Stand:  SBA-CGA  with Rolling walker    Additional Therapeutic Activity/Exercises:     1. Updated patient on her post-op orthopedic precautions (LLE TTWB, HKB locked in 10 deg flex at all times), verbalized understanding. Pt initially flustered at difficulty mobilizing, urinated on her gown and brace in bed (purewick ineffective).    2. Bulk of session spent assisting patient in/out of bed over to bedside commode, providing min (A) stand pivot with pt's hands on therapist's shoulders for UE support, pt pivoting on her RLE. Successful void (no BM) on commode and assisted back to edge of bed.    3. Provided some demonstration of how to stand and pivot using a rolling walker. She was able to stand from bed to rolling walker with CGA; however, very difficult to lift her own L foot from floor so PT at floor level holding L foot off ground when taking steps, OT at standing height providing CGA.    4. Up in recliner chair comfortable with purewick re-applied at end of session. Patient presents with good participation and motivation to return to prior level of function with high intensity therapy. The patient demonstrates appropriate endurance, strength, and pain control to participate in up to 3 hours or 15 hrs of combined therapy post acute.    5. Discussed PT role, POC, and goals with patient; verbalized understanding.    AM-PAC 6 CLICK MOBILITY  Turning over in bed (including adjusting bedclothes, sheets and  blankets)?: 2  Sitting down on and standing up from a chair with arms (e.g., wheelchair, bedside commode, etc.): 3  Moving from lying on back to sitting on the side of the bed?: 2  Moving to and from a bed to a chair (including a wheelchair)?: 3  Need to walk in hospital room?: 2  Climbing 3-5 steps with a railing?: 1  Basic Mobility Total Score: 13    Patient was left reclined in bedside chair with all lines intact, call button in reach, PCT notified.    Clinical Decision Making for Evaluation Complexity:  1. Body System(s) Examination: 1-2  2. Clinical Presentation: Evolving  3. Evaluation Complexity: Low    GOALS:   Multidisciplinary Problems       Physical Therapy Goals          Problem: Physical Therapy    Goal Priority Disciplines Outcome Goal Variances Interventions   Physical Therapy Goal     PT, PT/OT      Description: Goals to be met by: 10/4/24     Patient will increase functional independence with mobility by performin. Supine to sit with Stand-by Assistance - Not met  2. Sit to stand transfer with Stand-by Assistance using RW - Not met  3. Bed to chair transfer with Stand-by Assistance using Rolling Walker - Not met  4. Gait  x 20 feet with Contact Guard Assistance using Rolling Walker - Not met  5. Ascend/Descend 6 inch curb step with Minimal Assistance using Rolling Walker - Not met  6. Stand for 3 minutes with Stand-by Assistance using Rolling Walker - Not met    Justification for Walker HME:  Patient demonstrates a mobility limitation that significantly impairs their ability to participate in one or more mobility related activities of daily living. Patient's mobility limitation cannot be sufficiently resolved with the use of a cane, but can be sufficiently resolved with the use of a rolling walker.The use of a rolling walker will considerably improve their ability to participate in MRADLs. Patient will use the walker on a regular basis at home.     Justification for Wheelchair HME:  Patient  has a mobility limitation that significantly impairs their ability to participate in one or more mobility related activities of daily living in customary locations in the home. The mobility limitation cannot be sufficiently resolved by the use of a cane or walker. The use of a manual wheelchair will greatly improve the patient's ability to participate in MRADLs. The patient will use the wheelchair on a regular basis at home. They have expressed their willingness to use a manual wheelchair in the home, and have a caregiver who is available and willing to assist with the wheelchair if needed.     Justification for Bedside Commode HME:  Patient has a mobility limitation that significantly impairs their ability to participate in one or more mobility related activities of daily living, including toileting. This deficit can be resolved by using a bedside commode. Patient demonstrates mobility limitations that will cause them to be confined to one room at home without bathroom access for up to 30 days. Using a bedside commode will greatly improve the patient's ability to participate in MRADLs.                      Lexx De La Cruz, PT  9/20/2024

## 2024-09-20 NOTE — CONSULTS
"[facility] - Surgery  Infectious Disease  Consult Note    Patient Name: Traci Freire  MRN: [id number]  Admission Date: 9/19/2024  Hospital Length of Stay: 1 days  Attending Physician: Chris Estevez MD  Primary Care Provider: Juliet Medina NP     Isolation Status: No active isolations    Patient information was obtained from patient and ER records.      Inpatient consult to Infectious Diseases  Consult performed by: Kaitlynn Hunter MD  Consult ordered by: Franko Anaya MD        Assessment/Plan:     ID  Infective arthritis of left knee  63F with h/o prior knee replacement admitted 9/19 for revision of L TKA.  Purulent fluid encountered and sent for culture (per surgeons 10-20cc of thick white fluid and some caseous looking tissue throughout the knee). Hardware explanted. Aerobe, anaerobe, fungal cultures pending. No systemic signs of infection. ID consulted for "Left tka in infection so explant "    Given intra-op finding of purulent fluid, agree with treating as prosthetic joint infection. Appreciate surgery help with removal of all infected hardware and sending culture. Unfortunately there is no growth to date in cultures to guide antibiotic treatment. Would  treat for the usual usual suspects: Staph, Strep, Cutibacterium.     Recommendations:   - picc line  - start daptomycin, ceftriaxone. Dapto done for ease of once daily dosing and  lower risk of kidney injury.   -   Duration of abx 4-6 weeks.   - Would then wait 2-4 weeks off of antibiotics then aspirate joint for cell count and culture to decide whether to proceed with reimplantation    Outpatient Antibiotic Therapy Plan:    Please send referral to [facility].    1) Infection: prosthetic knee infection    2) Discharge Antibiotics:    Intravenous antibiotics:  Daptomycin 700mg iv daily  Ceftriaxone 2gm iv daily    3) Therapy Duration:  4- 6 weeks    Estimated end date of IV antibiotics: 10/16 - 10/30    4) Outpatient " Weekly Labs:    Order the following labs to be drawn on Mondays:   CBC  CMP   CRP  CPK (when on Daptomycin)    5) Fax Lab Results to Infectious Diseases Provider: Dr hunter    [facility] Fax Number: [phone]    6) Outpatient Infectious Diseases Follow-up    Follow-up appointment will be arranged by the ID clinic and will be found in the patient's appointments tab.    Prior to discharge, please ensure the patient's follow-up has been scheduled.    If there is still no follow-up scheduled prior to discharge, please send an [employer] message to [facility] Clinical [place] or Call Infectious Diseases Dept.              Discussed assessment/plan with primary team    Thank you for your consult. I will follow-up with patient. Please contact us if you have any additional questions.    Kaitlynn Hunter MD  Infectious Disease  [facility] - Surgery    Subjective:     Principal Problem: Failed total knee arthroplasty    HPI: 63F with h/o prior knee replacement ~ 10  years ago admitted 9/19 for revision of L TKA. Pt reports over past 3-4 years her knee pain has progressed and has had trouble bending knee. Over past 6-12 months the pain has become a lot worse despite pain medications and steroid injections. Thought it was scar tissue from prior surgery. Says L knee has always seemed a little more swollen than R but denies any acute worsening of swelling or redness. Denies h/o bloodstream infections or uti or boils. Says she received antibiotics for lyme disease about 30 years ago. Generally healthy, denies immunosuppressive medications. Denies recent abx prior to admission. Reports some nausea with keflex and bactrim in the past, but currently tolerating cefazolin without issue. Works at [employer], stocking shelves    Admitted 9/19 for   S/p surgery 9/20- Revision left total knee arthroplasty with placement of articulating antibiotic spacer.    Purulent fluid encountered and sent for culture    Aerobe, anaerobe, fungal cultures  "pending      Afebrile, on vanc/cefazolin    ID consulted for "Left tka in infection so explant "       Past Medical History:   Diagnosis Date    Anxiety     Arthritis     Cancer breast    Right- Bilateral mastectomy- May 2005- had breast reconstruction- mother  of breast cancer    HLD (hyperlipidemia)     Hypertension     diagnosed age late 40's     IBS (irritable bowel syndrome)     Lyme disease     arthritis of hands. Felt like flu- age early 30's- got it  after going to [place]    Sleep apnea     NO MACHINE       Past Surgical History:   Procedure Laterality Date    ABDOMINAL SURGERY      BLADDER SUSPENSION      BREAST SURGERY      CARPAL TUNNEL RELEASE Left 2023    Procedure: Left carpal tunnel release;  Surgeon: Roberto Cheung MD;  Location: [facility];  Service: Orthopedics;  Laterality: Left;     SECTION      CYSTOSCOPY      avoids greens . ? Interstitial cystitis    ESOPHAGOGASTRODUODENOSCOPY N/A 8/10/2023    Procedure: EGD (ESOPHAGOGASTRODUODENOSCOPY);  Surgeon: Gerard Hernandez MD;  Location: [facility];  Service: Endoscopy;  Laterality: N/A;    HYSTERECTOMY      followed by removal of ovaries  from scar tissue    INCISIONAL HERNIA REPAIR Right 2008    RLQ    JOINT REPLACEMENT      Left [facility] knee replacement-[facility] -     KNEE SURGERY Left     TKR    MODIFIED RADICAL MASTECTOMY W/ AXILLARY LYMPH NODE DISSECTION Right 05/10/2005    w/free flap    NISSEN FUNDOPLICATION      REVISION OF KNEE ARTHROPLASTY Left 2024    Procedure: REVISION, ARTHROPLASTY, KNEE: LEFT;  Surgeon: Chris Estevez MD;  Location: [address];  Service: Orthopedics;  Laterality: Left;    ROBOT-ASSISTED LAPAROSCOPIC REPAIR OF VENTRAL HERNIA N/A 2021    Procedure: ROBOTIC REPAIR, HERNIA, VENTRAL;  Surgeon: John Johnson III, MD;  Location: [facility];  Service: General;  Laterality: N/A;    SIMPLE MASTECTOMY Left 05/10/2005    w/free flap    " TONSILLECTOMY         Review of patient's allergies indicates:   Allergen Reactions    Keflex [cephalexin]     Macrobid [nitrofurantoin monohyd/m-cryst]     Bactrim [sulfamethoxazole-trimethoprim] Nausea And Vomiting       Medications:  Medications Prior to Admission   Medication Sig    FLUoxetine 20 MG capsule Take 1 capsule (20 mg total) by mouth once daily.    hydroCHLOROthiazide (HYDRODIURIL) 25 MG tablet Take 1 tablet (25 mg total) by mouth once daily.    irbesartan (AVAPRO) 300 MG tablet Take 1 tablet (300 mg total) by mouth every evening.    oxyCODONE (ROXICODONE) 5 MG immediate release tablet Take 1-2 tablets by mouth every 4-6 hours as needed for pain    pantoprazole (PROTONIX) 40 MG tablet Take 1 tablet (40 mg total) by mouth 2 (two) times daily.    pantoprazole (PROTONIX) 40 MG tablet Take 1 tablet (40 mg total) by mouth once daily.    rosuvastatin (CRESTOR) 20 MG tablet Take 1 tablet (20 mg total) by mouth every evening.    zolpidem (AMBIEN) 10 mg Tab Take 1 tablet (10 mg total) by mouth every evening.    [DISCONTINUED] HYDROcodone-acetaminophen (NORCO)  mg per tablet Take 1 tablet by mouth every 6 (six) hours as needed for Pain.    acetaminophen (TYLENOL) 650 MG TbSR Take 1 tablet (650 mg total) by mouth every 8 (eight) hours.    aspirin (ECOTRIN) 81 MG EC tablet Take 1 tablet (81 mg total) by mouth 2 (two) times a day.    celecoxib (CELEBREX) 200 MG capsule Take 1 capsule (200 mg total) by mouth once daily.    mecobalamin (B12 ACTIVE ORAL) Take 1 tablet by mouth Daily.    methocarbamoL (ROBAXIN) 750 MG Tab Take 1 tablet (750 mg total) by mouth 4 (four) times daily as needed (for muscle spasms).    senna-docusate 8.6-50 mg (SENNA WITH DOCUSATE SODIUM) 8.6-50 mg per tablet Take 1 tablet by mouth once daily.     Antibiotics (From admission, onward)      Start     Stop Route Frequency Ordered    09/19/24 2100  mupirocin 2 % ointment 1 g         09/24/24 2059 Nasl 2 times daily 09/19/24 7833           Antifungals (From admission, onward)      None          Antivirals (From admission, onward)      None             Immunization History   Administered Date(s) Administered    COVID-19, MRNA, LN-S, PF (Pfizer) (Purple Cap) 2021, 08/15/2021    Influenza (FLUBLOK) - Quadrivalent - Recombinant - PF *Preferred* (egg allergy) 2020, 2022    Influenza - Quadrivalent - PF *Preferred* (6 months and older) 2020, 2023       Family History       Problem Relation (Age of Onset)    Breast cancer Daughter    Cancer Mother    Heart disease Father          Social History     Socioeconomic History    Marital status:    Tobacco Use    Smoking status: Former     Current packs/day: 0.00     Types: Cigarettes     Quit date: 3/26/2004     Years since quittin.5    Smokeless tobacco: Never    Tobacco comments:     quit - smoked for 10 years- 1 ppd   Substance and Sexual Activity    Alcohol use: No    Drug use: No    Sexual activity: Never     Social Determinants of Health     Financial Resource Strain: Low Risk  (2024)    Overall Financial Resource Strain (CARDIA)     Difficulty of Paying Living Expenses: Not hard at all   Food Insecurity: No Food Insecurity (2024)    Hunger Vital Sign     Worried About Running Out of Food in the Last Year: Never true     Ran Out of Food in the Last Year: Never true   Transportation Needs: No Transportation Needs (2024)    TRANSPORTATION NEEDS     Transportation : No   Physical Activity: Inactive (2024)    Exercise Vital Sign     Days of Exercise per Week: 0 days     Minutes of Exercise per Session: 0 min   Stress: No Stress Concern Present (2024)    [employer] [place] [employer] - Occupational Stress Questionnaire     Feeling of Stress : Not at all   Housing Stability: Low Risk  (2024)    Housing Stability Vital Sign     Unable to Pay for Housing in the Last Year: No     Homeless in the Last Year: No     Review of  Systems   Constitutional:  Negative for chills and fever.   Respiratory:  Negative for cough.    Cardiovascular:  Negative for chest pain.   Gastrointestinal:  Negative for abdominal distention.   Genitourinary:  Negative for frequency and urgency.   Musculoskeletal:  Positive for arthralgias. Negative for back pain.        Surgical dressings on knee   Skin:  Positive for wound.   Neurological:  Negative for weakness.     Objective:     Vital Signs (Most Recent):  Temp: 98.2 °F (36.8 °C) (09/20/24 1227)  Pulse: 82 (09/20/24 1227)  Resp: 18 (09/20/24 1227)  BP: (!) 142/82 (09/20/24 1227)  SpO2: 96 % (09/20/24 1227) Vital Signs (24h Range):  Temp:  [97.8 °F (36.6 °C)-98.6 °F (37 °C)] 98.2 °F (36.8 °C)  Pulse:  [] 82  Resp:  [12-24] 18  SpO2:  [91 %-100 %] 96 %  BP: (111-157)/(56-82) 142/82     Weight: 81.8 kg (180 lb 5.2 oz)  Body mass index is 34.07 kg/m².    CrCl cannot be calculated (Patient's most recent lab result is older than the maximum 7 days allowed.).     Physical Exam  Vitals and nursing note reviewed.   Constitutional:       Appearance: Normal appearance.   HENT:      Mouth/Throat:      Pharynx: No oropharyngeal exudate.   Cardiovascular:      Rate and Rhythm: Normal rate.      Heart sounds: Murmur heard.      Comments: Soft systolic murmur  Pulmonary:      Effort: Pulmonary effort is normal. No respiratory distress.      Breath sounds: Normal breath sounds.   Abdominal:      General: Bowel sounds are normal. There is no distension.      Palpations: Abdomen is soft.   Musculoskeletal:      Comments: Surgical dressings on L knee. Dressings c/d/i   Neurological:      General: No focal deficit present.      Mental Status: She is alert and oriented to person, place, and time.          Significant Labs: All pertinent labs within the past 24 hours have been reviewed.    Significant Imaging: I have reviewed all pertinent imaging results/findings within the past 24 hours.

## 2024-09-20 NOTE — CARE UPDATE
Removed patients PNC. Site c/d/I. Patients pain at at 6/10 and states that it is tolerable.     Pawel Wilde MD, PGY3  Department of Anesthesiology  [facility]

## 2024-09-20 NOTE — PLAN OF CARE
[facility] - Surgery    HOME HEALTH ORDERS  FACE TO FACE ENCOUNTER    Patient Name: Traci Freire  YOB: 1960    PCP: Juliet Medina NP   PCP Address: [facility] [address] / [facility] [address]  PCP Phone Number: [phone]  PCP Fax: [phone]    Encounter Date: 09/20/2024    Admit to Home Health    Diagnoses:  Active Hospital Problems    Diagnosis  POA    *Failed total knee arthroplasty Left, s/p explant and spacer placement 9/19/24 [T84.018A, Z96.659]  Not Applicable    Infective arthritis of left knee [M00.9]  Yes      Resolved Hospital Problems   No resolved problems to display.       Future Appointments   Date Time Provider Department Center   10/3/2024  9:00 AM Gregor Marino PA-C [facility] ORTHO [facility]   10/30/2024 10:15 AM Chris Estevez MD [facility] ORTHO [address]   12/11/2024 10:15 AM Chris Estevez MD [facility]           I have seen and examined this patient face to face today. My clinical findings that support the need for the home health skilled services and home bound status are the following:  Weakness/numbness causing balance and gait disturbance due to Joint Replacement and Surgery making it taxing to leave home.    Allergies:  Review of patient's allergies indicates:   Allergen Reactions    Macrobid [nitrofurantoin monohyd/m-cryst]     Bactrim [sulfamethoxazole-trimethoprim] Nausea And Vomiting       Diet: regular diet    Activities: Touch down weight bearing to the left leg, hinged knee brace locked in 10 degrees of flexion.     Nursing:   SN to complete comprehensive assessment including routine vital signs. Instruct on disease process and s/s of complications to report to MD. Follow specific home health arthoplasty protocol. Review/verify medication list sent home with the patient at time of discharge  and instruct patient/caregiver as needed. If coumadin ordered, coumadin clinic to manage INR with INR draws 2x per week with a goal to maintain INR  between 1.8 and 2.2. Frequency may be adjusted depending on start of care date.    Notify MD if SBP > 160 or < 90; DBP > 90 or < 50; HR > 120 or < 50; Temp > 101    Home Medical Equipment:  Walker, 3-1 bedside commode, transfer tub bench    CONSULTS:    Physical Therapy to evaluate and treat. Evaluate for home safety and equipment needs; Establish/upgrade home exercise program. Perform / instruct on therapeutic exercises, gait training, transfer training, and Range of Motion.      MISCELLANEOUS CARE:  HOME INFUSION THERAPY:   SN to perform Infusion Therapy/Central Line Care.  Review Central Line Care & Central Line Flush with patient.    Administer (drug and dose):     Outpatient Antibiotic Therapy Plan:     Please send referral to [facility].     1) Infection: prosthetic knee infection     2) Discharge Antibiotics:     Intravenous antibiotics:  Daptomycin 700mg iv daily  Ceftriaxone 2gm iv daily     3) Therapy Duration:  4- 6 weeks     Estimated end date of IV antibiotics: 10/16 - 10/30     4) Outpatient Weekly Labs:     Order the following labs to be drawn on Mondays:   CBC  CMP   CRP  CPK (when on Daptomycin)     5) Fax Lab Results to Infectious Diseases Provider: Dr gonsalez     [facility] Fax Number: [phone]     6) Outpatient Infectious Diseases Follow-up     Follow-up appointment will be arranged by the ID clinic and will be found in the patient's appointments tab.     Prior to discharge, please ensure the patient's follow-up has been scheduled.    If there is still no follow-up scheduled prior to discharge, please send an [employer] message to [facility] Clinical [place] or Call Infectious Diseases Dept.      Scrub the Hub: Prior to accessing the line, always perform a 30 second alcohol scrub  Each lumen of the central line is to be flushed at least daily with 10 mL Normal Saline and 3 mL Heparin flush (10 units/mL)  Skilled Nurse (SN) may draw blood from IV access  Blood Draw  Procedure:   - Aspirate at least 5 mL of blood   - Discard   - Obtain specimen   - Change injection cap   - Flush with 20 mL Normal Saline followed by a                 3-5 mL Heparin flush (10 units/mL)  Central :   - Sterile dressing changes are done weekly and as needed.   - Use chlor-hexadine scrub to cleanse site, apply Biopatch to insertion site,       apply securement device dressing   - Injection caps are changed weekly and after EVERY lab draw.   - If sterile gauze is under dressing to control oozing,                 dressing change must be performed every 24 hours until gauze is not needed.    WOUND CARE ORDERS  Keep dressings clean and dry until clinic visit       Medications: Review discharge medications with patient and family and provide education.      Current Discharge Medication List        CONTINUE these medications which have NOT CHANGED    Details   FLUoxetine 20 MG capsule Take 1 capsule (20 mg total) by mouth once daily.  Qty: 90 capsule, Refills: 1    Associated Diagnoses: Anxiety      hydroCHLOROthiazide (HYDRODIURIL) 25 MG tablet Take 1 tablet (25 mg total) by mouth once daily.  Qty: 90 tablet, Refills: 1    Comments: .  Associated Diagnoses: Edema, unspecified type      irbesartan (AVAPRO) 300 MG tablet Take 1 tablet (300 mg total) by mouth every evening.  Qty: 90 tablet, Refills: 3    Comments: .  Associated Diagnoses: Essential hypertension      oxyCODONE (ROXICODONE) 5 MG immediate release tablet Take 1-2 tablets by mouth every 4-6 hours as needed for pain  Qty: 50 tablet, Refills: 0    Comments: Quantity prescribed more than 7 day supply? No  Associated Diagnoses: History of revision of total replacement of left knee joint      !! pantoprazole (PROTONIX) 40 MG tablet Take 1 tablet (40 mg total) by mouth 2 (two) times daily.  Qty: 60 tablet, Refills: 11      !! pantoprazole (PROTONIX) 40 MG tablet Take 1 tablet (40 mg total) by mouth once daily.  Qty: 30 tablet, Refills:  0    Associated Diagnoses: History of revision of total replacement of left knee joint      rosuvastatin (CRESTOR) 20 MG tablet Take 1 tablet (20 mg total) by mouth every evening.  Qty: 90 tablet, Refills: 1    Associated Diagnoses: Hyperlipidemia, unspecified hyperlipidemia type      zolpidem (AMBIEN) 10 mg Tab Take 1 tablet (10 mg total) by mouth every evening.  Qty: 90 tablet, Refills: 1    Associated Diagnoses: Anxiety      acetaminophen (TYLENOL) 650 MG TbSR Take 1 tablet (650 mg total) by mouth every 8 (eight) hours.  Qty: 120 tablet, Refills: 0    Associated Diagnoses: History of revision of total replacement of left knee joint      aspirin (ECOTRIN) 81 MG EC tablet Take 1 tablet (81 mg total) by mouth 2 (two) times a day.  Qty: 60 tablet, Refills: 0    Comments: Please fill at bedside day of surgery (9/19).  Associated Diagnoses: History of revision of total replacement of left knee joint      celecoxib (CELEBREX) 200 MG capsule Take 1 capsule (200 mg total) by mouth once daily.  Qty: 30 capsule, Refills: 0    Associated Diagnoses: History of revision of total replacement of left knee joint      mecobalamin (B12 ACTIVE ORAL) Take 1 tablet by mouth Daily.      methocarbamoL (ROBAXIN) 750 MG Tab Take 1 tablet (750 mg total) by mouth 4 (four) times daily as needed (for muscle spasms).  Qty: 40 tablet, Refills: 0    Associated Diagnoses: History of revision of total replacement of left knee joint      senna-docusate 8.6-50 mg (SENNA WITH DOCUSATE SODIUM) 8.6-50 mg per tablet Take 1 tablet by mouth once daily.  Qty: 30 tablet, Refills: 0    Associated Diagnoses: History of revision of total replacement of left knee joint       !! - Potential duplicate medications found. Please discuss with provider.        STOP taking these medications       HYDROcodone-acetaminophen (NORCO)  mg per tablet Comments:   Reason for Stopping:               I certify that this patient is confined to her home and needs physical  therapy.

## 2024-09-20 NOTE — SUBJECTIVE & OBJECTIVE
"Principal Problem:Failed total knee arthroplasty    Principal Orthopedic Problem: S/p Explant TKA 9/19/24    Interval History: NAEON, patient stable, pain controlled. No acute complaints this morning. Dressings CDI. HKB in place.     Review of patient's allergies indicates:   Allergen Reactions    Keflex [cephalexin]     Macrobid [nitrofurantoin monohyd/m-cryst]     Bactrim [sulfamethoxazole-trimethoprim] Nausea And Vomiting       Current Facility-Administered Medications   Medication    0.9%  NaCl infusion    acetaminophen tablet 1,000 mg    aspirin EC tablet 81 mg    bisacodyL suppository 10 mg    famotidine tablet 20 mg    FLUoxetine capsule 20 mg    hydroCHLOROthiazide tablet 25 mg    losartan tablet 100 mg    methocarbamoL tablet 750 mg    morphine injection 2 mg    mupirocin 2 % ointment 1 g    naloxone 0.4 mg/mL injection 0.02 mg    ondansetron injection 4 mg    oxyCODONE immediate release tablet 5 mg    oxyCODONE immediate release tablet Tab 10 mg    polyethylene glycol packet 17 g    pregabalin capsule 75 mg    prochlorperazine injection Soln 5 mg    ropivacaine 0.2% Perineural Pump infusion 500 ML    senna-docusate 8.6-50 mg per tablet 1 tablet    zolpidem tablet 5 mg     Objective:     Vital Signs (Most Recent):  Temp: 98.6 °F (37 °C) (09/20/24 0424)  Pulse: 97 (09/20/24 0424)  Resp: 18 (09/20/24 0542)  BP: (!) 112/56 (09/20/24 0424)  SpO2: (!) 93 % (09/20/24 0424) Vital Signs (24h Range):  Temp:  [97.7 °F (36.5 °C)-98.6 °F (37 °C)] 98.6 °F (37 °C)  Pulse:  [] 97  Resp:  [12-24] 18  SpO2:  [91 %-100 %] 93 %  BP: (111-160)/(56-89) 112/56     Weight: 81.8 kg (180 lb 5.2 oz)  Height: 5' 1" (154.9 cm)  Body mass index is 34.07 kg/m².      Intake/Output Summary (Last 24 hours) at 9/20/2024 0641  Last data filed at 9/20/2024 0000  Gross per 24 hour   Intake 2720 ml   Output 950 ml   Net 1770 ml        Ortho/SPM Exam   AAOx4  NAD  Reg rate  No increased WOB    LLE:  Dressing c/d/i  Ice and HKB in place "   SILT T/SP/DP/[place]/Sa  Motor intact T/SP/DP  WWP extremities  FCDs in place and functioning    Significant Labs:   Recent Lab Results  (Last 5 results in the past 24 hours)        09/19/24  1504   09/19/24  1459   09/19/24  1453   09/19/24  1436   09/19/24  1222        WBC, Body Fluid       2  Comment: Reference ranges for body fluids not established.   Correlate clinically.           Lymphs, Fluid       100         Body Fluid Type       Joint Fluid, Left Knee         Aerobic Bacterial Culture No growth  [P]   No growth  [P]   No growth  [P]              No growth  [P]             Anaerobe Culture Culture in progress  [P]   Culture in progress  [P]   Culture in progress  [P]              Culture in progress  [P]             Fluid Appearance       Turbid         Fluid Color       Red         Group & Rh         A POS       INDIRECT MACHELLE         NEG       Specimen Outdate         09/22/2024 23:59                               [P] - Preliminary Result             All pertinent labs within the past 24 hours have been reviewed.    Significant Imaging: I have reviewed and interpreted all pertinent imaging results/findings.

## 2024-09-20 NOTE — PLAN OF CARE
Problem: Adult Inpatient Plan of Care  Goal: Plan of Care Review  Outcome: Progressing  Goal: Patient-Specific Goal (Individualized)  Outcome: Progressing  Goal: Absence of Hospital-Acquired Illness or Injury  Outcome: Progressing  Goal: Optimal Comfort and Wellbeing  Outcome: Progressing  Goal: Readiness for Transition of Care  Outcome: Progressing     Problem: Pain Acute  Goal: Optimal Pain Control and Function  Outcome: Progressing     Problem: Wound  Goal: Optimal Coping  Outcome: Progressing  Goal: Optimal Functional Ability  Outcome: Progressing  Goal: Absence of Infection Signs and Symptoms  Outcome: Progressing  Goal: Improved Oral Intake  Outcome: Progressing  Goal: Optimal Pain Control and Function  Outcome: Progressing  Goal: Skin Health and Integrity  Outcome: Progressing  Goal: Optimal Wound Healing  Outcome: Progressing     Problem: Infection  Goal: Absence of Infection Signs and Symptoms  Outcome: Progressing

## 2024-09-20 NOTE — NURSING
Patient arrived to unit via bed escorted by patient transport and daughter at BS. VSS, AAOX4. NADN. Tele in place, foot pumps applied, IS given, patient participated in return demonstration. Bed in lowest position, call light in reach along with personal items. Plan of care on-going.

## 2024-09-20 NOTE — PLAN OF CARE
Traci Freire is a 63 y.o. female admitted [place] [facility] [place] Failed total knee arthroplasty, underwent L TKA revision (space placement) with Dr. Estevez on . Traci Freire tolerated evaluation fair today. Updated patient on her post-op orthopedic precautions (LLE TTWB, HKB locked in 10 deg flex at all times), verbalized understanding. Pt initially flustered at difficulty mobilizing, urinated on her gown and brace in bed (purewick ineffective). Bulk of session spent assisting patient in/out of bed over to bedside commode, providing min (A) stand pivot with pt's hands on therapist's shoulders for UE support, pt pivoting on her RLE. Successful void (no BM) on commode and assisted back to edge of bed. Provided some demonstration of how to stand and pivot using a rolling walker. She was able to stand from bed to rolling walker with CGA; however, very difficult to lift her own L foot from floor so PT at floor level holding L foot off ground when taking steps, OT at standing height providing CGA. Up in recliner chair comfortable with purewick re-applied at end of session. Patient presents with good participation and motivation to return to prior level of function with high intensity therapy. The patient demonstrates appropriate endurance, strength, and pain control to participate in up to 3 hours or 15 hrs of combined therapy post acute. Discussed PT role, POC, and goals with patient; verbalized understanding. Traci Freire would benefit from acute PT services to promote mobility during this admission and improve return to PLOF.     *Left C&D rolling walker and bedside commode in room. This patient is safe to stand and pivot from bed <> commode <> recliner chair with walker and nsg assistance*    Problem: Physical Therapy  Goal: Physical Therapy Goal  Description: Goals to be met by: 10/4/24     Patient will increase functional independence with mobility by performin. Supine to sit with Stand-by Assistance -  Not met  2. Sit to stand transfer with Stand-by Assistance using RW - Not met  3. Bed to chair transfer with Stand-by Assistance using Rolling Walker - Not met  4. Gait  x 20 feet with Contact Guard Assistance using Rolling Walker - Not met  5. Ascend/Descend 6 inch curb step with Minimal Assistance using Rolling Walker - Not met  6. Stand for 3 minutes with Stand-by Assistance using Rolling Walker - Not met    Justification for Walker HME:  Patient demonstrates a mobility limitation that significantly impairs their ability to participate in one or more mobility related activities of daily living. Patient's mobility limitation cannot be sufficiently resolved with the use of a cane, but can be sufficiently resolved with the use of a rolling walker.The use of a rolling walker will considerably improve their ability to participate in MRADLs. Patient will use the walker on a regular basis at home.     Justification for Wheelchair HME:  Patient has a mobility limitation that significantly impairs their ability to participate in one or more mobility related activities of daily living in customary locations in the home. The mobility limitation cannot be sufficiently resolved by the use of a cane or walker. The use of a manual wheelchair will greatly improve the patient's ability to participate in MRADLs. The patient will use the wheelchair on a regular basis at home. They have expressed their willingness to use a manual wheelchair in the home, and have a caregiver who is available and willing to assist with the wheelchair if needed.     Justification for Bedside Commode HME:  Patient has a mobility limitation that significantly impairs their ability to participate in one or more mobility related activities of daily living, including toileting. This deficit can be resolved by using a bedside commode. Patient demonstrates mobility limitations that will cause them to be confined to one room at home without bathroom access for  up to 30 days. Using a bedside commode will greatly improve the patient's ability to participate in MRADLs.   Outcome: Heidi De La Cruz, PT  9/20/2024

## 2024-09-20 NOTE — ASSESSMENT & PLAN NOTE
"63F with h/o prior knee replacement admitted 9/19 for revision of L TKA.  Purulent fluid encountered and sent for culture (per surgeons 10-20cc of thick white fluid and some caseous looking tissue throughout the knee). Hardware explanted. Aerobe, anaerobe, fungal cultures pending. No systemic signs of infection. ID consulted for "Left tka in infection so explant "    Given intra-op finding of purulent fluid, agree with treating as prosthetic joint infection. Appreciate surgery help with removal of all infected hardware and sending culture. Unfortunately there is no growth to date in cultures to guide antibiotic treatment. Would  treat for the usual usual suspects: Staph, Strep, Cutibacterium.     Recommendations:   - picc line  - start daptomycin, ceftriaxone. Dapto done for ease of once daily dosing and  lower risk of kidney injury.   -   Duration of abx 4-6 weeks.   - Would then wait 2-4 weeks off of antibiotics then aspirate joint for cell count and culture to decide whether to proceed with reimplantation    Outpatient Antibiotic Therapy Plan:    Please send referral to [facility].    1) Infection: prosthetic knee infection    2) Discharge Antibiotics:    Intravenous antibiotics:  Daptomycin 700mg iv daily  Ceftriaxone 2gm iv daily    3) Therapy Duration:  4- 6 weeks    Estimated end date of IV antibiotics: 10/16 - 10/30    4) Outpatient Weekly Labs:    Order the following labs to be drawn on Mondays:   CBC  CMP   CRP  CPK (when on Daptomycin)    5) Fax Lab Results to Infectious Diseases Provider: Dr gonsalez    [facility] Fax Number: [phone]    6) Outpatient Infectious Diseases Follow-up    Follow-up appointment will be arranged by the ID clinic and will be found in the patient's appointments tab.    Prior to discharge, please ensure the patient's follow-up has been scheduled.    If there is still no follow-up scheduled prior to discharge, please send an EPIC message " to [facility] Clinical [facility] or Call Infectious Diseases Dept.

## 2024-09-20 NOTE — NURSING
Nurses Note -- 4 Eyes      9/20/2024   12:01 AM      Skin assessed during: Admit      [x] No Altered Skin Integrity Present    []Prevention Measures Documented      [] Yes- Altered Skin Integrity Present or Discovered   [] LDA Added if Not in Epic (Describe Wound)   [] New Altered Skin Integrity was Present on Admit and Documented in LDA   [] Wound Image Taken    Wound Care Consulted? No    Attending Nurse:   Elgin Rihc RN/Staff Member:   Ry LEWIS

## 2024-09-20 NOTE — CONSULTS
Double lumen PICC to left basilic vein.  40 cm in length, 34 cm arm circumference and 0 cm exposed.   Lot # [id number].

## 2024-09-20 NOTE — PROCEDURES
"Traci Freire is a 63 y.o. female patient.    Temp: 98 °F (36.7 °C) (09/20/24 1709)  Pulse: 75 (09/20/24 1709)  Resp: 18 (09/20/24 1709)  BP: 127/63 (09/20/24 1709)  SpO2: 95 % (09/20/24 1709)  Weight: 81.8 kg (180 lb 5.2 oz) (09/20/24 0000)  Height: 5' 1" (154.9 cm) (09/20/24 0000)    PICC  Date/Time: 9/20/2024 4:40 PM  Performed by: Rob Brewer RN  Assisting provider: Jeanie Cha RN  Consent Done: Yes  Time out: Immediately prior to procedure a time out was called to verify the correct patient, procedure, equipment, support staff and site/side marked as required  Indications: med administration  Anesthesia: local infiltration  Local anesthetic: lidocaine 1% without epinephrine  Anesthetic Total (mL): 3  Description of findings: PICC  Preparation: skin prepped with ChloraPrep  Skin prep agent dried: skin prep agent completely dried prior to procedure  Sterile barriers: all five maximum sterile barriers used - cap, mask, sterile gown, sterile gloves, and large sterile sheet  Hand hygiene: hand hygiene performed prior to central venous catheter insertion  Location details: left basilic  Catheter type: double lumen  Catheter size: 5 Fr  Catheter Length: 40cm    Ultrasound guidance: yes  Vessel Caliber: medium and patent, compressibility normal  Vascular Doppler: not done  Needle advanced into vessel with real time Ultrasound guidance.  Guidewire confirmed in vessel.  Image recorded and saved.  Sterile sheath used.  Number of attempts: 1  Post-procedure: blood return through all ports, chlorhexidine patch and sterile dressing applied  Technical procedures used: 3CG  Specimens: No  Implants: No  Assessment: placement verified by x-ray  Complications: none          Name Rob Brewer RN  9/20/2024    "

## 2024-09-20 NOTE — PT/OT/SLP EVAL
Occupational Therapy   Evaluation    Name: Traci Freire  MRN: [id number]  Admitting Diagnosis: Failed total knee arthroplasty  Recent Surgery: Procedure(s) (LRB):  REVISION, ARTHROPLASTY, KNEE: LEFT (Left) 1 Day Post-Op    Recommendations:     Discharge Recommendations: High Intensity Therapy  Discharge Equipment Recommendations:  walker, rolling, wheelchair, bedside commode, to be determined by next level of care  Barriers to discharge:  Decreased caregiver support    Justification for Walker HME   The mobility limitation cannot be sufficiently resolved by the use of a cane.   Patient's functional mobility deficit can be sufficiently resolved with the use of a walker.  Patient's mobility limitation significantly impairs their ability to participate in one of more activities of daily living.  The use of a walker will significantly improve the patients ability to participate in MRADLS and the patient will use it on regular basis in the home.     Patient has a mobility limitation that significantly impairs their ability to participate in one or more mobility related activities of daily living, including toileting. This deficit can be resolved by using a bedside commode. Patient demonstrates mobility limitations that will cause them to be confined to one room at home without bathroom access for up to 30 days. Using a bedside commode will greatly improve the patient's ability to participate in MRADLs.     Patient has a mobility limitation that significantly impairs their ability to participate in one or more mobility related activities of daily living in customary locations in the home. The mobility limitation cannot be sufficiently resolved by the use of a cane or walker. The use of a manual wheelchair will greatly improve the patient's ability to participate in MRADLs. The patient will use the wheelchair on a regular basis at home. They have expressed their willingness to use a manual wheelchair in the home, and have a  "caregiver who is available and willing to assist with the wheelchair if needed.   Assessment:   CO-TX with PT for pt safety and max participation with both disciplines.     Traci Freire is a 63 y.o. female with a medical diagnosis of Failed total knee arthroplasty.  She presents with urinary incontinence and knee pain. Performance deficits affecting function: weakness, impaired endurance, impaired self care skills, impaired functional mobility, gait instability, impaired balance, pain, decreased lower extremity function, decreased ROM, orthopedic precautions.  PTA, pt reports being Indp with ADLs and mobility. Upon eval, pt limited by knee pain.      Rehab Prognosis: Good; patient would benefit from acute skilled OT services to address these deficits and reach maximum level of function.       Plan:     Patient to be seen 4 x/week to address the above listed problems via self-care/home management, therapeutic activities, therapeutic exercises  Plan of Care Expires: 10/20/24  Plan of Care Reviewed with: patient    Subjective     Chief Complaint: "every time I stand I have to urinate"  Patient/Family Comments/goals: return home    Occupational Profile:  Living Environment: Lives with dtr and grandson, in [facility], 1 AISLINN and no hand rail, tub/shower in bathroom  Previous level of function: Indp  Roles and Routines: Works at [employer]; driving  Equipment Used at Home: none  Assistance upon Discharge: family    Pain/Comfort:  Pain Rating 1:  (not rated)  Location - Side 1: Left  Location - Orientation 1: generalized  Location 1: leg  Pain Addressed 1: Reposition, Distraction, Cessation of Activity    Patients cultural, spiritual, Evangelical conflicts given the current situation: no    Objective:     Communicated with: Nsmark prior to session.  Patient found supine with peripheral IV, perineural catheter, PureWick upon OT entry to room.    General Precautions: Standard, fall  Orthopedic Precautions: LLE toe touch weight " bearing  Braces: Hinged knee brace  Respiratory Status: Room air    Occupational Performance:    Bed Mobility:    Patient completed Supine to Sit with moderate assistance and 2 persons    Functional Mobility/Transfers:  Patient completed Sit <> Stand Transfer with min A x2 from EOB and BSC  with  hand-held assist   Patient completed Bed <> Chair Transfer using Stand Pivot technique with minimum assistance with rolling walker  Patient completed Toilet Transfer Stand Pivot technique with minimum assistance with  hand-held assist      Activities of Daily Living:  Upper Body Dressing: moderate assistance change gown  Lower Body Dressing: maximal assistance change socks  Toileting: contact guard assistance clothing mgmt    Cognitive/Visual Perceptual:  A&O x4    Physical Exam:  BUE WNL  Balance: Fair    AMPAC 6 Click ADL:  AMPAC Total Score: 16    Treatment & Education:  Pt educated on role and purpose of therapy  Pt educated on goal setting  Pt educated on benefits of OOB activity  Pt educated on self advocacy   Pt educated on WB precatuions    Patient left up in chair with all lines intact, call button in reach, and nsg notified    GOALS:   Multidisciplinary Problems       Occupational Therapy Goals          Problem: Occupational Therapy    Goal Priority Disciplines Outcome Interventions   Occupational Therapy Goal     OT, PT/OT Progressing    Description: Goals to be met by: 10/20/24     Patient will increase functional independence with ADLs by performing:    UE Dressing with Supervision.  Grooming while standing at sink with Stand-by Assistance.  Toileting from toilet with Supervision for hygiene and clothing management.   All functional transfers performed with SBA                         History:     Past Medical History:   Diagnosis Date    Anxiety     Arthritis     Cancer breast    Right- Bilateral mastectomy- May 2005- had breast reconstruction- mother  of breast cancer    HLD (hyperlipidemia)      Hypertension     diagnosed age late 40's     IBS (irritable bowel syndrome)     Lyme disease     arthritis of hands. Felt like flu- age early 30's- got it  after going to [place]    Sleep apnea     NO MACHINE         Past Surgical History:   Procedure Laterality Date    ABDOMINAL SURGERY      BLADDER SUSPENSION      BREAST SURGERY      CARPAL TUNNEL RELEASE Left 2023    Procedure: Left carpal tunnel release;  Surgeon: Roberto Cheung MD;  Location: [facility];  Service: Orthopedics;  Laterality: Left;     SECTION      CYSTOSCOPY      avoids greens . ? Interstitial cystitis    ESOPHAGOGASTRODUODENOSCOPY N/A 8/10/2023    Procedure: EGD (ESOPHAGOGASTRODUODENOSCOPY);  Surgeon: Gerard Hernandez MD;  Location: [facility];  Service: Endoscopy;  Laterality: N/A;    HYSTERECTOMY      followed by removal of ovaries  from scar tissue    INCISIONAL HERNIA REPAIR Right 2008    RLQ    JOINT REPLACEMENT      Left total knee replacement-[facility] -     KNEE SURGERY Left     TKR    MODIFIED RADICAL MASTECTOMY W/ AXILLARY LYMPH NODE DISSECTION Right 05/10/2005    w/free flap    NISSEN FUNDOPLICATION      REVISION OF KNEE ARTHROPLASTY Left 2024    Procedure: REVISION, ARTHROPLASTY, KNEE: LEFT;  Surgeon: Chris Estevez MD;  Location: [address];  Service: Orthopedics;  Laterality: Left;    ROBOT-ASSISTED LAPAROSCOPIC REPAIR OF VENTRAL HERNIA N/A 2021    Procedure: ROBOTIC REPAIR, HERNIA, VENTRAL;  Surgeon: John Johnson III, MD;  Location: [facility];  Service: General;  Laterality: N/A;    SIMPLE MASTECTOMY Left 05/10/2005    w/free flap    TONSILLECTOMY         Time Tracking:     OT Date of Treatment: 24  OT Start Time: 1136  OT Stop Time: 1217  OT Total Time (min): 41 min    Billable Minutes:Evaluation 18  Self Care/Home Management 2024

## 2024-09-20 NOTE — PLAN OF CARE
Problem: Occupational Therapy  Goal: Occupational Therapy Goal  Description: Goals to be met by: 10/20/24     Patient will increase functional independence with ADLs by performing:    UE Dressing with Supervision.  Grooming while standing at sink with Stand-by Assistance.  Toileting from toilet with Supervision for hygiene and clothing management.   All functional transfers performed with SBA    Outcome: Progressing

## 2024-09-20 NOTE — PLAN OF CARE
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 [T84.013A]    Admission Date: 9/19/2024  Expected Discharge Date: 9/22/2024         Payor: [employer] / Plan: BCBS ALL OUT OF STATE / Product Type: PPO /     Extended Emergency Contact Information  Primary Emergency Contact: Molly Drake  Address: [address] [place] [address] [place] [address] [facility]  Home Phone: [phone]  Mobile Phone: [phone]  Relation: Daughter  Secondary Emergency Contact: Faina Grover   [facility]  Mobile Phone: [phone]  Relation: Daughter    Discharge Plan A: Home with family         [facility] [address] [place] [address]  [address] [place] [address]  Phone: [phone] Fax: [phone]      Initial Assessment (most recent)       Adult Discharge Assessment - 09/20/24 1434          Discharge Assessment    Assessment Type Discharge Planning Assessment     Confirmed/corrected address, phone number and insurance Yes     Confirmed Demographics Correct on Facesheet     Source of Information patient     Does patient/caregiver understand observation status Yes     Communicated AMINAH with patient/caregiver Yes     People in Home child(renetta), adult     Do you expect to return to your current living situation? No     Do you have help at home or someone to help you manage your care at home? Yes     Who are your caregiver(s) and their phone number(s)? Molly Drake (daughter) [phone]     Prior to hospitilization cognitive status: Alert/Oriented     Current cognitive status: Alert/Oriented     Walking or Climbing Stairs Difficulty no     Equipment Currently Used at Home none     Readmission within 30 days? No     Patient currently being followed by outpatient case  management? No     Do you currently have service(s) that help you manage your care at home? No     Do you take prescription medications? Yes     Do you have prescription coverage? Yes     Do you have any problems affording any of your prescribed medications? No     Is the patient taking medications as prescribed? yes     Who is going to help you get home at discharge? Monika     How do you get to doctors appointments? family or friend will provide     Are you on dialysis? No     Do you take coumadin? No     Discharge Plan A Home with family        Physical Activity    On average, how many days per week do you engage in moderate to strenuous exercise (like a brisk walk)? 0 days     On average, how many minutes do you engage in exercise at this level? 0 min        Financial Resource Strain    How hard is it for you to pay for the very basics like food, housing, medical care, and heating? Not hard at all        Housing Stability    In the last 12 months, was there a time when you were not able to pay the mortgage or rent on time? No     At any time in the past 12 months, were you homeless or living in a shelter (including now)? No        Transportation Needs    Has the lack of transportation kept you from medical appointments, meetings, work or from getting things needed for daily living? No        Food Insecurity    Within the past 12 months, you worried that your food would run out before you got the money to buy more. Never true     Within the past 12 months, the food you bought just didn't last and you didn't have money to get more. Never true        Stress    Do you feel stress - tense, restless, nervous, or anxious, or unable to sleep at night because your mind is troubled all the time - these days? Not at all        Social Isolation    How often do you feel lonely or isolated from those around you?  Never        Alcohol Use    Q1: How often do you have a drink containing alcohol? Never     Q2: How many  drinks containing alcohol do you have on a typical day when you are drinking? Patient does not drink     Q3: How often do you have six or more drinks on one occasion? Never        Utilities    In the past 12 months has the electric, gas, oil, or water company threatened to shut off services in your home? No        Health Literacy    How often do you need to have someone help you when you read instructions, pamphlets, or other written material from your doctor or pharmacy? Never                   Spoke with patient at bedside to complete d/c planning assessment. Patient lives with her daughter Molly in a single story home without steps. Independent with ADL'S. No DME in Home. Verified PCP, Pharmacy and health insurance. Patient anticipated by team to d/c home on 9/21/24 with  and 6 weeks of IVAB. Ref[facility] to [facility]. Patient will have help at home from children. Discharge Plan A and Plan B have been determined by review of patient's clinical status, future medical and therapeutic needs, and coverage/benefits for post-acute care in coordination with multidisciplinary team members.      Xin NOEL  Case Management  [facility]  [phone]

## 2024-09-20 NOTE — PLAN OF CARE
09/20/24 1130   Post-Acute Status   Post-Acute Authorization Home Health;IV Infusion   Home Health Status Referrals Sent   IV Infusion Status Referral(s) sent   Discharge Plan   Discharge Plan A Home Health;Other     SW faxed referral to [facility] via [facility] for review. SW will follow up as needed.    Treva Armas LMSW  Case Management   [facility]   Ext. [phone]

## 2024-09-20 NOTE — PROGRESS NOTES
[facility] - Surgery  Orthopedics  Progress Note    Patient Name: Traci Freire  MRN: [id number]  Admission Date: 9/19/2024  Hospital Length of Stay: 1 days  Attending Provider: Chris Estevez MD  Primary Care Provider: Juliet Medina NP  Follow-up For: Procedure(s) (LRB):  REVISION, ARTHROPLASTY, KNEE: LEFT (Left)    Post-Operative Day: 1 Day Post-Op  Subjective:     Principal Problem:Failed total knee arthroplasty    Principal Orthopedic Problem: S/p Explant TKA 9/19/24    Interval History: NAEON, patient stable, pain controlled. No acute complaints this morning. Dressings CDI. HKB in place.     Review of patient's allergies indicates:   Allergen Reactions    Keflex [cephalexin]     Macrobid [nitrofurantoin monohyd/m-cryst]     Bactrim [sulfamethoxazole-trimethoprim] Nausea And Vomiting       Current Facility-Administered Medications   Medication    0.9%  NaCl infusion    acetaminophen tablet 1,000 mg    aspirin EC tablet 81 mg    bisacodyL suppository 10 mg    famotidine tablet 20 mg    FLUoxetine capsule 20 mg    hydroCHLOROthiazide tablet 25 mg    losartan tablet 100 mg    methocarbamoL tablet 750 mg    morphine injection 2 mg    mupirocin 2 % ointment 1 g    naloxone 0.4 mg/mL injection 0.02 mg    ondansetron injection 4 mg    oxyCODONE immediate release tablet 5 mg    oxyCODONE immediate release tablet Tab 10 mg    polyethylene glycol packet 17 g    pregabalin capsule 75 mg    prochlorperazine injection Soln 5 mg    ropivacaine 0.2% Perineural Pump infusion 500 ML    senna-docusate 8.6-50 mg per tablet 1 tablet    zolpidem tablet 5 mg     Objective:     Vital Signs (Most Recent):  Temp: 98.6 °F (37 °C) (09/20/24 0424)  Pulse: 97 (09/20/24 0424)  Resp: 18 (09/20/24 0542)  BP: (!) 112/56 (09/20/24 0424)  SpO2: (!) 93 % (09/20/24 0424) Vital Signs (24h Range):  Temp:  [97.7 °F (36.5 °C)-98.6 °F (37 °C)] 98.6 °F (37 °C)  Pulse:  [] 97  Resp:  [12-24] 18  SpO2:  [91 %-100 %] 93 %  BP: (111-160)/(56-89) 112/56  "    Weight: 81.8 kg (180 lb 5.2 oz)  Height: 5' 1" (154.9 cm)  Body mass index is 34.07 kg/m².      Intake/Output Summary (Last 24 hours) at 9/20/2024 0641  Last data filed at 9/20/2024 0000  Gross per 24 hour   Intake 2720 ml   Output 950 ml   Net 1770 ml        Ortho/SPM Exam   AAOx4  NAD  Reg rate  No increased WOB    LLE:  Dressing c/d/i  Ice and HKB in place   SILT T/SP/DP/Abarca/Sa  Motor intact T/SP/DP  WWP extremities  FCDs in place and functioning    Significant Labs:   Recent Lab Results  (Last 5 results in the past 24 hours)        09/19/24  1504   09/19/24  1459   09/19/24  1453   09/19/24  1436   09/19/24  1222        WBC, Body Fluid       2  Comment: Reference ranges for body fluids not established.   Correlate clinically.           Lymphs, Fluid       100         Body Fluid Type       Joint Fluid, Left Knee         Aerobic Bacterial Culture No growth  [P]   No growth  [P]   No growth  [P]              No growth  [P]             Anaerobe Culture Culture in progress  [P]   Culture in progress  [P]   Culture in progress  [P]              Culture in progress  [P]             Fluid Appearance       Turbid         Fluid Color       Red         Group & Rh         A POS       INDIRECT MACHELLE         NEG       Specimen Outdate         09/22/2024 23:59                               [P] - Preliminary Result             All pertinent labs within the past 24 hours have been reviewed.    Significant Imaging: I have reviewed and interpreted all pertinent imaging results/findings.  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 female is s/p L TKA explant and spacer on 9/19/2024    Surgical dressing C/D/I, ice in place  Pain control: multimodal, Anesthesia Surgical Home following  PT/OT: TTWB LLE, HKW locked 10 degrees flexion   DVT PPx: ASA 81 BID, FCDs at all times when not ambulating  Abx: postop Ancef  Drain: none  Aco: none    Dispo: " plan to dc pending pain control and PT progress            Franko Anaya MD  Orthopedics  [facility] - Surgery

## 2024-09-20 NOTE — CARE UPDATE
Post Operative Check     Traci Freire is a 63 y.o. female seen for post-operative evaluation.     PROCEDURES PERFORMED:  1.Left knee TKA explant and Abx spacer placement     Overall the patient reports doing well.  The patient reports appropriate postoperative soreness with moderately controlled overall pain.       PE:     AA&O x 4.  NAD  HEENT:  NCAT, sclera nonicteric  Lungs:  Respirations are equal and unlabored.  CV:  2+ bilateral upper and lower extremity pulses.  MSK: Dressings are clean and dry. LLE pulses are 2+, LLE has intact sensation distally, LLE has intact motor function.      Post op imaging Xray of the left knee reviewed with antibiotic spacer in place.       * Failed total knee arthroplasty Left, s/p explant and spacer placement 9/19/24  Traci Freire is a 63 y.o. female is s/p L TKA explant and spacer on 9/19/2024    Surgical dressing C/D/I, ice in place  Pain control: multimodal, Anesthesia Surgical Home following  PT/OT: TTWB LLE, HKB locked 10 degrees flexion   DVT PPx: ASA 81 BID, FCDs at all times when not ambulating  Abx: postop Ancef  Drain: none  Cao: none    Dispo: plan to dc pending pain control and PT progress

## 2024-09-20 NOTE — ANESTHESIA POST-OP PAIN MANAGEMENT
Acute Pain Service Progress Note    Traci Freire is a 63 y.o., female, [id number].    Surgery:  Revision left total knee arthroplasty     Post Op Day #: 1    Catheter type: perineural  Left Adductor Canal PNC     Infusion type: Ropivacaine 0.2%  4cc/3hr basal, paused     Problem List:    Active Hospital Problems    Diagnosis  POA    *Failed total knee arthroplasty Left, s/p explant and spacer placement 9/19/24 [T84.018A, Z96.659]  Not Applicable      Resolved Hospital Problems   No resolved problems to display.       Subjective:     General appearance of alert, oriented, no complaints   Pain with rest: 7    Numbers   Pain with movement: 8    Numbers   Side Effects    1. Pruritis No    2. Nausea No    3. Motor Blockade Yes, unable to lift left leg off the bed     4. Sedation No, 1=awake and alert    Objective:     Catheter site clean, dry, intact      Vitals   Vitals:    09/20/24 0735   BP: 111/64   Pulse: 92   Resp: 18   Temp: 36.6 °C (97.8 °F)        Labs    Admission on 09/19/2024   Component Date Value Ref Range Status    Group & Rh 09/19/2024 A POS   Final    Indirect Rain 09/19/2024 NEG   Final    Specimen Outdate 09/19/2024 09/22/2024 23:59   Final    Body Fluid Type 09/19/2024 Joint Fluid, Left Knee   Final    Fluid Appearance 09/19/2024 Turbid   Final    Fluid Color 09/19/2024 Red   Final    WBC, Body Fluid 09/19/2024 2  /cu mm Final    Lymphs, Fluid 09/19/2024 100  % Final    Anaerobic Culture 09/19/2024 Culture in progress   Preliminary    Aerobic Bacterial Culture 09/19/2024 No growth   Preliminary    Anaerobic Culture 09/19/2024 Culture in progress   Preliminary    Aerobic Bacterial Culture 09/19/2024 No growth   Preliminary    Anaerobic Culture 09/19/2024 Culture in progress   Preliminary    Aerobic Bacterial Culture 09/19/2024 No growth   Preliminary    Anaerobic Culture 09/19/2024 Culture in progress   Preliminary    Aerobic Bacterial Culture 09/19/2024 No growth   Preliminary        Meds   Current  Facility-Administered Medications   Medication Dose Route Frequency Provider Last Rate Last Admin    0.9%  NaCl infusion   Intravenous Continuous Gregor Marino PA-C 150 mL/hr at 09/19/24 2205 New Bag at 09/19/24 2205    acetaminophen tablet 1,000 mg  1,000 mg Oral Q6H Gregor Marino PA-C   1,000 mg at 09/20/24 0543    aspirin EC tablet 81 mg  81 mg Oral BID Gregor Marino PA-C   81 mg at 09/20/24 0826    bisacodyL suppository 10 mg  10 mg Rectal Q12H PRN Gregor Marino PA-C        celecoxib capsule 200 mg  200 mg Oral Daily Gregor Marino PA-C        famotidine tablet 20 mg  20 mg Oral BID Gregor Marino PA-C   20 mg at 09/20/24 0826    FLUoxetine capsule 20 mg  20 mg Oral Daily Franko Anaya MD   20 mg at 09/20/24 0826    hydroCHLOROthiazide tablet 25 mg  25 mg Oral Daily Franko Anaya MD   25 mg at 09/20/24 0826    losartan tablet 100 mg  100 mg Oral QHS Franko Anaya MD        methocarbamoL tablet 750 mg  750 mg Oral TID Gregor Marino PA-C   750 mg at 09/20/24 0826    mupirocin 2 % ointment 1 g  1 g Nasal BID Gregor Marino PA-C   1 g at 09/19/24 2108    naloxone 0.4 mg/mL injection 0.02 mg  0.02 mg Intravenous PRN Gregor Marino PA-C        ondansetron injection 4 mg  4 mg Intravenous Q8H PRN Gregor Marino PA-C        oxyCODONE immediate release tablet Tab 10 mg  10 mg Oral Q3H PRN Jovon Whitten MD        polyethylene glycol packet 17 g  17 g Oral Daily Gregor Marino PA-C        pregabalin capsule 75 mg  75 mg Oral QHS Gregor Marino PA-C   75 mg at 09/19/24 2004    prochlorperazine injection Soln 5 mg  5 mg Intravenous Q6H PRN Gregor Marino PA-C        senna-docusate 8.6-50 mg per tablet 1 tablet  1 tablet Oral BID Gregor Marino PA-C   1 tablet at 09/20/24 0826    zolpidem tablet 5 mg  5 mg Oral Nightly PRN Franko Anaya MD   5 mg at 09/19/24 6077        Anticoagulant dose: none currently ordered.     Assessment:  Traci Freire is a 63 year old  female post-op day 1 s/p revision left total knee arthroplasty. Patient says her pain is better controlled this morning, but her left leg is weak and she is unable to raise her leg off the bed.      Pain control adequate    Plan:     Modify present therapy to decrease side effects paused PNC with plan to remove PNC this afternoon if weakness improves.     1.) Paused Left adductor canal PNC infusion due to leg weakness    - Will follow up this afternoon, if weakness improved will remove PNC     2.) Multimodal Pain Regimen    - Tylenol 1000 mg q6h (x8 doses), then q8h   - Celebrex 200 mg daily    - Robaxin 750 mg TID    - Lyrica 75 mg qhs    - Oxycodone 10 mg q6h PRN     Case discussed with staff, Dr. Modi; final recommendations per attestation above.     Thank you for your consult and allowing us to participate in the care of this patient. We will continue to follow along. Please call the Acute Pain Service at [phone] or Anesthesia at [phone] if you have any questions or concerns.     Jovon Whitten MD   Anesthesiology - Acute Pain Service   [facility]

## 2024-09-20 NOTE — DISCHARGE INSTRUCTIONS
Remain touch down weight bearing on the left leg with the brace locked in 10 degrees of flexion  Take asprin twice daily for 1 month  Take medication as needed for pain control   Keep the yellow dressings dry, do not get them wet  The dressings will be removed in clinic at your first post operative visit

## 2024-09-21 VITALS
HEART RATE: 77 BPM | BODY MASS INDEX: 34.04 KG/M2 | DIASTOLIC BLOOD PRESSURE: 57 MMHG | TEMPERATURE: 98 F | HEIGHT: 61 IN | SYSTOLIC BLOOD PRESSURE: 106 MMHG | RESPIRATION RATE: 18 BRPM | OXYGEN SATURATION: 95 % | WEIGHT: 180.31 LBS

## 2024-09-21 PROCEDURE — 25000003 PHARM REV CODE 250

## 2024-09-21 PROCEDURE — 97116 GAIT TRAINING THERAPY: CPT | Mod: CQ

## 2024-09-21 PROCEDURE — 63600175 PHARM REV CODE 636 W HCPCS: Performed by: INTERNAL MEDICINE

## 2024-09-21 PROCEDURE — 97530 THERAPEUTIC ACTIVITIES: CPT | Mod: CQ

## 2024-09-21 PROCEDURE — 99900035 HC TECH TIME PER 15 MIN (STAT)

## 2024-09-21 PROCEDURE — 27000190 HC CPAP FULL FACE MASK W/VALVE

## 2024-09-21 PROCEDURE — 25000003 PHARM REV CODE 250: Performed by: INTERNAL MEDICINE

## 2024-09-21 PROCEDURE — 25000003 PHARM REV CODE 250: Performed by: ORTHOPAEDIC SURGERY

## 2024-09-21 PROCEDURE — 94761 N-INVAS EAR/PLS OXIMETRY MLT: CPT

## 2024-09-21 PROCEDURE — 94660 CPAP INITIATION&MGMT: CPT

## 2024-09-21 PROCEDURE — A4216 STERILE WATER/SALINE, 10 ML: HCPCS | Performed by: ORTHOPAEDIC SURGERY

## 2024-09-21 PROCEDURE — 97535 SELF CARE MNGMENT TRAINING: CPT

## 2024-09-21 PROCEDURE — 25000003 PHARM REV CODE 250: Performed by: STUDENT IN AN ORGANIZED HEALTH CARE EDUCATION/TRAINING PROGRAM

## 2024-09-21 RX ADMIN — MUPIROCIN 1 G: 20 OINTMENT TOPICAL at 08:09

## 2024-09-21 RX ADMIN — ACETAMINOPHEN 1000 MG: 325 TABLET ORAL at 05:09

## 2024-09-21 RX ADMIN — OXYCODONE HYDROCHLORIDE 10 MG: 10 TABLET ORAL at 12:09

## 2024-09-21 RX ADMIN — Medication 10 ML: at 12:09

## 2024-09-21 RX ADMIN — ACETAMINOPHEN 1000 MG: 325 TABLET ORAL at 12:09

## 2024-09-21 RX ADMIN — METHOCARBAMOL 750 MG: 750 TABLET ORAL at 08:09

## 2024-09-21 RX ADMIN — FLUOXETINE HYDROCHLORIDE 20 MG: 20 CAPSULE ORAL at 08:09

## 2024-09-21 RX ADMIN — FAMOTIDINE 20 MG: 20 TABLET ORAL at 08:09

## 2024-09-21 RX ADMIN — OXYCODONE HYDROCHLORIDE 10 MG: 10 TABLET ORAL at 01:09

## 2024-09-21 RX ADMIN — ASPIRIN 81 MG: 81 TABLET, COATED ORAL at 08:09

## 2024-09-21 RX ADMIN — CELECOXIB 200 MG: 200 CAPSULE ORAL at 08:09

## 2024-09-21 RX ADMIN — OXYCODONE HYDROCHLORIDE 10 MG: 10 TABLET ORAL at 05:09

## 2024-09-21 RX ADMIN — CEFTRIAXONE SODIUM 2 G: 2 INJECTION, POWDER, FOR SOLUTION INTRAMUSCULAR; INTRAVENOUS at 02:09

## 2024-09-21 RX ADMIN — SENNOSIDES AND DOCUSATE SODIUM 1 TABLET: 50; 8.6 TABLET ORAL at 08:09

## 2024-09-21 RX ADMIN — DAPTOMYCIN 700 MG: 350 INJECTION, POWDER, LYOPHILIZED, FOR SOLUTION INTRAVENOUS at 01:09

## 2024-09-21 RX ADMIN — POLYETHYLENE GLYCOL 3350 17 G: 17 POWDER, FOR SOLUTION ORAL at 08:09

## 2024-09-21 RX ADMIN — Medication 10 ML: at 05:09

## 2024-09-21 RX ADMIN — HYDROCHLOROTHIAZIDE 25 MG: 25 TABLET ORAL at 08:09

## 2024-09-21 NOTE — PROGRESS NOTES
The pt's daughter will transport her home at d/c. The sw stressed the importance of the pt going to her hsp f/cu's and taking her medications. The pt acknowledged understanding and states she will comply. The pt has no further questions or Case Management needs and is clear to d/c.     Future Appointments   Date Time Provider Department Center   10/3/2024  9:00 AM Gregor Marino PA-C [facility] ORTHO [address] [facility] [place]   10/24/2024 10:30 AM Marina Varghese MD [facility] ID [address]   10/30/2024 10:15 AM Chris Estevez MD [facility] [address] [place]   12/11/2024 10:15 AM Chris Estevez MD [facility] ORTHO [address] [facility] - Surgery  Discharge Final Note    Primary Care Provider: Juliet Medina NP    Expected Discharge Date: 9/21/2024    Final Discharge Note (most recent)       Final Note - 09/21/24 1310          Post-Acute Status    Post-Acute Authorization IV Infusion     IV Infusion Status Set-up Complete/Auth obtained                     Important Message from Medicare             Contact Info [facility]    [address] [id number]       Next Steps: Follow up    Instructions: The pt will be serviced by the infusion company listed above after d/c alexandria her IV medications.    [facility]   Specialty: Home Health Services, Home Therapy Services, Home Living Aide Services    [address] [place] [id number]   Phone: [phone]       Next Steps: Follow up    Instructions: The pt will be serviced by the [facility] agency listed above after d/c. The nurse will call the pt with an arrival time.    Gregor Marino PA-C   Specialty: Orthopedic Surgery    [address] [facility] [address]   Phone: [phone]       Next Steps: Follow up on 10/3/2024    Instructions: 9:00am    Marina Varghese MD   Specialty: Infectious Diseases    [address]  [place] [address]   Phone: [phone]       Next Steps: Follow up on 10/24/2024     Instructions: 10:30am

## 2024-09-21 NOTE — PLAN OF CARE
The sw received a secure chat from Raysa with [facility] stating her team still needs  to run benefits and she still needs to come educate her. She will let me know once all that is done. Raysa states they have a few patients ahead of her but should be here around 12 to educate. She states the pt will need to get both antibiotics before she leaves the hospital because they are scheduled for 16:30 and asked could those get moved up. The sw included the pt's nurse in the chat so she can be made aware. The sw spoke to the pt via phone and informed her of the info mentioned above. The pt's in agreement with the d/c plan.     10:00am The sw received a call from Mariam with [facility] stating they received the referral and they will start services tomorrow. The  nurse will contact the pt with an arrival time.      09/21/24 0882   Post-Acute Status   Post-Acute Authorization IV Infusion   IV Infusion Status Pending payor review/awaiting authorization (if required)   Discharge Plan   Discharge Plan A Home Health   Discharge Plan B Home Health

## 2024-09-21 NOTE — PROGRESS NOTES
The sw was approached by the pt's nurse stating the pt hasn't received her rw. The sw spoke to the pt who states she wants a w/c instead. The sw sent a secure chat to the dr to notify him of the info mentioned above so the dme can be ordered. The sw reached out to Amparo Galarza via chat with [facility] to notify her the pt will need a w/c with elevated leg rests.

## 2024-09-21 NOTE — PROGRESS NOTES
The sw spoke to the on call team with Ortho and asked them to place the order for the w/c with the elevated leg rest b/c the pt spoke to Amparo with [facility] and they will deliver the w/c to the pt's home Monday if the dr writes the order for it today. The dr wrote the order and Amparo states they will deliver it Monday. They will call the pt to arrange a deliver time. The sw informed the pt and her dtr who are in agreement with the d/c plan. The pt and her dtr state they will stop at the store to purchase a rw from [employer] or [employer] on the way home.

## 2024-09-21 NOTE — ASSESSMENT & PLAN NOTE
Traci Freire is a 63 y.o. female is s/p L TKA explant and spacer on 9/21/2024    Surgical dressing C/D/I, ice in place  Pain control: multimodal, Anesthesia Surgical Home following  PT/OT: TTWB LLE, HKW locked 10 degrees flexion   DVT PPx: ASA 81 BID, FCDs at all times when not ambulating  Abx: postop Dapto and Rocephin vis PICC per ID recs, Cx NGTD   Drain: none  Cao: none    Dispo: plan to dc home after infusion set up today

## 2024-09-21 NOTE — PT/OT/SLP PROGRESS
Physical Therapy Treatment    Patient Name:  Traci Freire   MRN:  [id number]    Recommendations:     Discharge Recommendations: High Intensity Therapy  Discharge Equipment Recommendations: walker, rolling, wheelchair, bedside commode  Barriers to discharge: Decreased caregiver support (no family home during day to provide assist/support)    Assessment:     Traci Freire is a 63 y.o. female admitted with a medical diagnosis of Failed total knee arthroplasty.  She presents with the following impairments/functional limitations: weakness, impaired endurance, impaired functional mobility, gait instability.  Pt was agreeable and tolerated session well. Pt demonstrated significantly improvement from last session. Pt was able to ambulate with RW while maintaining WB precautions, but distance limited by fatigue. Pt completed wheelchair mobility and management today with no issues. Pt is progressing well towards goals and Patient has demonstrated sufficient progression to warrant high intensity therapy evidenced by objectives noted below.     Rehab Prognosis: Good; patient would benefit from acute skilled PT services to address these deficits and reach maximum level of function.    Recent Surgery: Procedure(s) (LRB):  REVISION, ARTHROPLASTY, KNEE: LEFT (Left) 2 Days Post-Op    Plan:     During this hospitalization, patient to be seen daily to address the identified rehab impairments via gait training, therapeutic activities, therapeutic exercises, neuromuscular re-education and progress toward the following goals:    Plan of Care Expires:  10/20/24    Subjective     Chief Complaint: LLE pain   Patient/Family Comments/goals: to go home. Pt reports she plans on utilizing wheelchair when family not available during the day  Pain/Comfort:  Pain Rating 1: 2/10 (at rest)  Location - Side 1: Left  Location - Orientation 1: generalized  Location 1: leg  Pain Rating Post-Intervention 1: 3/10      Objective:     Communicated with nurse  prior to session.  Patient found HOB elevated with  (hinged knee brace and cold compression) upon PT entry to room.     General Precautions: Standard, fall  Orthopedic Precautions: LLE toe touch weight bearing  Braces: Hinged knee brace (locked in 10 deg flexion)  Respiratory Status: Room air     Functional Mobility:  Additional staff present: Rehab Tech  Bed Mobility:   Scooting to EOB: minimum assistance with supporting LLE.   Supine to Sit: minimum assistance; HOB elevated  Assisted with LLE management   Transfers:   Sit <> Stand Transfer: contact guard assistance with rolling walker  Cue to extend LLE prior to standing up and sitting down due to brace  Gait:  Pt hopped ~35 ft with contact guard assistance and rolling walker. Rehab tech following with wheelchair.   No rest break and no LOB  All lines remained intact throughout ambulation trial, gait belt utilized.  Gait Deviation(s): pt able to maintain LLE TTWB (maintain like NWB). Decrease aicha and decrease step length   Verbal/tactile cues for pacing as needed and small turns.   Wheelchair Propulsion:    Pt propelled Standard wheelchair x 40 feet on Level tile with  Bilateral upper extremity with Supervision or Set-up Assistance.   Pt able to completed turns and navigate without issues   Pt educated on how to lock and unlock wheelchair, raise/lower leg rest and swing LE away. Pt able to complete.     AM-PAC 6 CLICK MOBILITY  Turning over in bed (including adjusting bedclothes, sheets and blankets)?: 3  Sitting down on and standing up from a chair with arms (e.g., wheelchair, bedside commode, etc.): 3  Moving from lying on back to sitting on the side of the bed?: 3  Moving to and from a bed to a chair (including a wheelchair)?: 3  Need to walk in hospital room?: 3  Climbing 3-5 steps with a railing?: 1  Basic Mobility Total Score: 16       Treatment & Education:  Pt educated on positioning HKB  Patient educated on role of therapy, goals of session, and  benefits of out of bed mobility.   Instructed on use of call button and importance of calling nursing staff for assistance with mobility   Questions/concerns addressed within PTA scope of practice  Pt verbalized understanding.  Whiteboard Updated      Patient left up in chair with all lines intact, call button in reach, and daughter present..    GOALS:   Multidisciplinary Problems       Physical Therapy Goals          Problem: Physical Therapy    Goal Priority Disciplines Outcome Goal Variances Interventions   Physical Therapy Goal     PT, PT/OT Progressing     Description: Goals to be met by: 10/4/24     Patient will increase functional independence with mobility by performin. Supine to sit with Stand-by Assistance - Not met  2. Sit to stand transfer with Stand-by Assistance using RW - Not met  3. Bed to chair transfer with Stand-by Assistance using Rolling Walker - Not met  4. Gait  x 20 feet with Contact Guard Assistance using Rolling Walker - Not met  5. Ascend/Descend 6 inch curb step with Minimal Assistance using Rolling Walker - Not met  6. Stand for 3 minutes with Stand-by Assistance using Rolling Walker - Not met    7. Added on : Pt will propel wheelchair with BUE on level surfaces for 50 ft with SBA - Not met    Justification for Walker HME:  Patient demonstrates a mobility limitation that significantly impairs their ability to participate in one or more mobility related activities of daily living. Patient's mobility limitation cannot be sufficiently resolved with the use of a cane, but can be sufficiently resolved with the use of a rolling walker.The use of a rolling walker will considerably improve their ability to participate in MRADLs. Patient will use the walker on a regular basis at home.     Justification for Wheelchair HME:  Patient has a mobility limitation that significantly impairs their ability to participate in one or more mobility related activities of daily living in customary  locations in the home. The mobility limitation cannot be sufficiently resolved by the use of a cane or walker. The use of a manual wheelchair will greatly improve the patient's ability to participate in MRADLs. The patient will use the wheelchair on a regular basis at home. They have expressed their willingness to use a manual wheelchair in the home, and have a caregiver who is available and willing to assist with the wheelchair if needed.     Justification for Bedside Commode HME:  Patient has a mobility limitation that significantly impairs their ability to participate in one or more mobility related activities of daily living, including toileting. This deficit can be resolved by using a bedside commode. Patient demonstrates mobility limitations that will cause them to be confined to one room at home without bathroom access for up to 30 days. Using a bedside commode will greatly improve the patient's ability to participate in MRADLs.                        Time Tracking:     PT Received On: 09/21/24  PT Start Time: 1040     PT Stop Time: 1108  PT Total Time (min): 28 min     Billable Minutes: Gait Training 12 and Therapeutic Activity 16    Treatment Type: Treatment  PT/PTA: PTA     Number of PTA visits since last PT visit: 1 09/21/2024

## 2024-09-21 NOTE — PROGRESS NOTES
[facility] - Surgery  Orthopedics  Progress Note    Patient Name: Traci Freire  MRN: [id number]  Admission Date: 9/19/2024  Hospital Length of Stay: 2 days  Attending Provider: Chris Estevez MD  Primary Care Provider: Juliet Medina NP  Follow-up For: Procedure(s) (LRB):  REVISION, ARTHROPLASTY, KNEE: LEFT (Left)    Post-Operative Day: 2 Days Post-Op  Subjective:     Principal Problem:Failed total knee arthroplasty    Principal Orthopedic Problem: S/p Explant TKA 9/19/24    Interval History: NAEON, patient stable, pain controlled. No acute complaints this morning. Dressings CDI. HKB in place. Was able to ambulate to bedside commode last night. HH infusion orders placed and final ID recs received. Plan for Dc home today after infusion set up.     Review of patient's allergies indicates:   Allergen Reactions    Macrobid [nitrofurantoin monohyd/m-cryst]     Bactrim [sulfamethoxazole-trimethoprim] Nausea And Vomiting       Current Facility-Administered Medications   Medication    acetaminophen tablet 1,000 mg    aspirin EC tablet 81 mg    bisacodyL suppository 10 mg    cefTRIAXone (ROCEPHIN) 2 g in D5W 100 mL IVPB (MB+)    celecoxib capsule 200 mg    DAPTOmycin (CUBICIN) 700 mg in 0.9% NaCl SolP 50 mL IVPB    famotidine tablet 20 mg    FLUoxetine capsule 20 mg    hydroCHLOROthiazide tablet 25 mg    losartan tablet 100 mg    methocarbamoL tablet 750 mg    mupirocin 2 % ointment 1 g    naloxone 0.4 mg/mL injection 0.02 mg    ondansetron injection 4 mg    oxyCODONE immediate release tablet 5 mg    oxyCODONE immediate release tablet Tab 10 mg    polyethylene glycol packet 17 g    pregabalin capsule 75 mg    prochlorperazine injection Soln 5 mg    senna-docusate 8.6-50 mg per tablet 1 tablet    sodium chloride 0.9% flush 10 mL    And    sodium chloride 0.9% flush 10 mL    zolpidem tablet 5 mg     Objective:     Vital Signs (Most Recent):  Temp: 98.3 °F (36.8 °C) (09/21/24 0335)  Pulse: 84 (09/21/24 0415)  Resp: 18 (09/21/24  "0541)  BP: (!) 141/64 (09/21/24 0335)  SpO2: 95 % (09/21/24 0415) Vital Signs (24h Range):  Temp:  [97.8 °F (36.6 °C)-98.5 °F (36.9 °C)] 98.3 °F (36.8 °C)  Pulse:  [72-92] 84  Resp:  [16-18] 18  SpO2:  [94 %-98 %] 95 %  BP: (111-142)/(62-82) 141/64     Weight: 81.8 kg (180 lb 5.2 oz)  Height: 5' 1" (154.9 cm)  Body mass index is 34.07 kg/m².      Intake/Output Summary (Last 24 hours) at 9/21/2024 0714  Last data filed at 9/20/2024 1807  Gross per 24 hour   Intake 540 ml   Output --   Net 540 ml        Ortho/SPM Exam   AAOx4  NAD  Reg rate  No increased WOB    LLE:  Dressing c/d/i  Ice and HKB in place   SILT T/SP/DP/Abarca/Sa  Motor intact T/SP/DP  WWP extremities  FCDs in place and functioning    Significant Labs:   Recent Lab Results         09/20/24  1221                     All pertinent labs within the past 24 hours have been reviewed.    Significant Imaging: I have reviewed and interpreted all pertinent imaging results/findings.  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 female is s/p L TKA explant and spacer on 9/21/2024    Surgical dressing C/D/I, ice in place  Pain control: multimodal, Anesthesia [facility] following  PT/OT: TTWB LLE, HKW locked 10 degrees flexion   DVT PPx: ASA 81 BID, FCDs at all times when not ambulating  Abx: postop Dapto and Rocephin vis PICC per ID recs, Cx NGTD   Drain: none  Cao: none    Dispo: plan to dc home after infusion set up today             Franko Anaya MD  Orthopedics  [facility]    "

## 2024-09-21 NOTE — PT/OT/SLP PROGRESS
Occupational Therapy   Treatment    Name: Traci Freire  MRN: [id number]  Admitting Diagnosis:  Failed total knee arthroplasty  2 Days Post-Op    Recommendations:     Discharge Recommendations: High Intensity Therapy  Discharge Equipment Recommendations:  bedside commode, wheelchair, walker, rolling, bath bench, hip kit  Barriers to discharge:  Decreased caregiver support    Assessment:     Traci Freire is a 63 y.o. female with a medical diagnosis of Failed total knee arthroplasty.  Pt tolerated session well and without incident despite significant L knee pain.  She continues to require (A) with ADLs and mobility.   She presents with the following. Performance deficits affecting function are weakness, impaired endurance, impaired self care skills, impaired functional mobility, gait instability, impaired balance, pain, decreased lower extremity function, decreased ROM, orthopedic precautions.     Rehab Prognosis:  Good; patient would benefit from acute skilled OT services to address these deficits and reach maximum level of function.       Plan:     Patient to be seen 4 x/week to address the above listed problems via self-care/home management, therapeutic activities, therapeutic exercises  Plan of Care Expires: 10/20/24  Plan of Care Reviewed with: patient, daughter    Subjective     Chief Complaint: L knee pain  Patient/Family Comments/goals: to get stronger  Pain/Comfort:  Pain Rating 1: other (see comments) (not rated, increased with mobility)  Location - Side 1: Left  Location - Orientation 1: generalized  Location 1: knee  Pain Addressed 1: Cessation of Activity, Distraction, Reposition  Pain Rating Post-Intervention 1: other (see comments) (not rated but significant)    Objective:     Communicated with: nurse and PTA prior to session.  Patient found up in chair with peripheral IV, PICC line, perineural catheter with her daughter present upon OT entry to room.    General Precautions: Standard, fall    Orthopedic  Precautions:LLE toe touch weight bearing  Braces: Hinged knee brace (locked at 10 degrees flexion)  Respiratory Status: Room air     Occupational Performance:     Bed Mobility:    N/A due to pt's sitting in bedside chair at beginning and at end of session    Functional Mobility/Transfers:  Patient completed Sit <> Stand Transfer from bedside chair and from toilet x 1 trial each with contact guard assistance with rolling walker and with grab bar at the toilet  Patient completed Toilet Transfer Step Transfer technique with minimum assistance with rolling walker and grab bar with (A) to manage her LLE while descending  Functional Mobility: Pt ambulated ~15 ft x 2 trials to and from the bathroom with CGA with RW.  She was adherent to her WB precautions.   Pt required Min A to recline and lower her chair with (A) to manage her LLE.  Pt was able to push herself forward and backward in the chair with Min A for LLE management.     Activities of Daily Living:  Grooming: stand by assistance to perform oral care and to wash her face while standing at the sink with RW.  Setup assistance to wash her hands while seated in chair.   Lower Body Dressing: Pt attempted to practice with dressing stick to doff non-skid sock on her L foot, but pt was unable to lift her leg due to pain.  Therapist returned pt to reclined position with ice pack for pain relief.  Demonstrated how to use the dressing stick, shoe horn, sock aide, and reacher.  Educated pt and her daughter on hip kit also including long-handled sponge.  Pt and her daughter educated on threading the injured LLE 1st and unthreading it 2nd when donning/doffing underwear/shorts/pants.    Toileting: stand by assistance to perform hygiene while seated on the toilet.  Pt was able to urinate a little.  Min A to manage back gown during t/f.      [facility] 6 Click ADL: 17    Treatment & Education:  Pt and her daughter edu on role of OT, POC, her WB precautions, safety when performing self  care tasks, benefit of performing OOB activity, and safety when performing functional transfers and mobility.    - Self care tasks completed-- as noted above      Patient left up in chair with all lines intact, call button in reach, and her daughter present    GOALS:   Multidisciplinary Problems       Occupational Therapy Goals          Problem: Occupational Therapy    Goal Priority Disciplines Outcome Interventions   Occupational Therapy Goal     OT, PT/OT Progressing    Description: Goals to be met by: 10/20/24     Patient will increase functional independence with ADLs by performing:    UE Dressing with Supervision.  Grooming while standing at sink with Stand-by Assistance. - Met 9/21  Toileting from toilet with Supervision for hygiene and clothing management.   All functional transfers performed with SBA                         Time Tracking:     OT Date of Treatment: 09/21/24  OT Start Time: 1109  OT Stop Time: 1134  OT Total Time (min): 25 min    Billable Minutes:Self Care/Home Management 25 min    OT/RICHARDSON: OT          9/21/2024

## 2024-09-21 NOTE — PLAN OF CARE
The pt was already set up with [facility] and [facility]. The sw called [facility] [phone] and left a message with the answering service to have the on call person return the call to this sw b/c the pt's ready for d/c The sw faxed the pt's d/c orders to them via [email or web address]. The sw called [facility] [phone] to notify them of the pt's d/c and faxed the order to them via [email or web address].        09/21/24 0844   Post-Acute Status   Post-Acute Authorization Home Health;IV Infusion   Home Health Status Set-up Complete/Auth obtained   IV Infusion Status Set-up Complete/Auth obtained   Discharge Plan   Discharge Plan A Home Health   Discharge Plan B Home Health

## 2024-09-21 NOTE — PLAN OF CARE
Problem: Occupational Therapy  Goal: Occupational Therapy Goal  Description: Goals to be met by: 10/20/24     Patient will increase functional independence with ADLs by performing:    UE Dressing with Supervision.  Grooming while standing at sink with Stand-by Assistance. - Met 9/21  Toileting from toilet with Supervision for hygiene and clothing management.   All functional transfers performed with SBA    Outcome: Progressing     Continue OT POC.

## 2024-09-21 NOTE — PLAN OF CARE
Added new wheelchair propulsion goal to patient's plan of care. Patient to work with PTA today, if not progress with gait will need to work on wheelchair management/propulsion.      Problem: Physical Therapy  Goal: Physical Therapy Goal  Description: Goals to be met by: 10/4/24     Patient will increase functional independence with mobility by performin. Supine to sit with Stand-by Assistance - Not met  2. Sit to stand transfer with Stand-by Assistance using RW - Not met  3. Bed to chair transfer with Stand-by Assistance using Rolling Walker - Not met  4. Gait  x 20 feet with Contact Guard Assistance using Rolling Walker - Not met  5. Ascend/Descend 6 inch curb step with Minimal Assistance using Rolling Walker - Not met  6. Stand for 3 minutes with Stand-by Assistance using Rolling Walker - Not met    7. Added on : Pt will propel wheelchair with BUE on level surfaces for 50 ft with SBA - Not met    Justification for Walker HME:  Patient demonstrates a mobility limitation that significantly impairs their ability to participate in one or more mobility related activities of daily living. Patient's mobility limitation cannot be sufficiently resolved with the use of a cane, but can be sufficiently resolved with the use of a rolling walker.The use of a rolling walker will considerably improve their ability to participate in MRADLs. Patient will use the walker on a regular basis at home.     Justification for Wheelchair HME:  Patient has a mobility limitation that significantly impairs their ability to participate in one or more mobility related activities of daily living in customary locations in the home. The mobility limitation cannot be sufficiently resolved by the use of a cane or walker. The use of a manual wheelchair will greatly improve the patient's ability to participate in MRADLs. The patient will use the wheelchair on a regular basis at home. They have expressed their willingness to use a manual  wheelchair in the home, and have a caregiver who is available and willing to assist with the wheelchair if needed.     Justification for Bedside Commode HME:  Patient has a mobility limitation that significantly impairs their ability to participate in one or more mobility related activities of daily living, including toileting. This deficit can be resolved by using a bedside commode. Patient demonstrates mobility limitations that will cause them to be confined to one room at home without bathroom access for up to 30 days. Using a bedside commode will greatly improve the patient's ability to participate in MRADLs.   Outcome: Progressing    Lexx De La Cruz, PT  9/21/2024

## 2024-09-21 NOTE — SUBJECTIVE & OBJECTIVE
"Principal Problem:Failed total knee arthroplasty    Principal Orthopedic Problem: S/p Explant TKA 9/19/24    Interval History: NAEON, patient stable, pain controlled. No acute complaints this morning. Dressings CDI. HKB in place. Was able to ambulate to bedside commode last night. HH infusion orders placed and final ID recs received. Plan for Dc home today after infusion set up.     Review of patient's allergies indicates:   Allergen Reactions    Macrobid [nitrofurantoin monohyd/m-cryst]     Bactrim [sulfamethoxazole-trimethoprim] Nausea And Vomiting       Current Facility-Administered Medications   Medication    acetaminophen tablet 1,000 mg    aspirin EC tablet 81 mg    bisacodyL suppository 10 mg    cefTRIAXone (ROCEPHIN) 2 g in D5W 100 mL IVPB (MB+)    celecoxib capsule 200 mg    DAPTOmycin (CUBICIN) 700 mg in 0.9% NaCl SolP 50 mL IVPB    famotidine tablet 20 mg    FLUoxetine capsule 20 mg    hydroCHLOROthiazide tablet 25 mg    losartan tablet 100 mg    methocarbamoL tablet 750 mg    mupirocin 2 % ointment 1 g    naloxone 0.4 mg/mL injection 0.02 mg    ondansetron injection 4 mg    oxyCODONE immediate release tablet 5 mg    oxyCODONE immediate release tablet Tab 10 mg    polyethylene glycol packet 17 g    pregabalin capsule 75 mg    prochlorperazine injection Soln 5 mg    senna-docusate 8.6-50 mg per tablet 1 tablet    sodium chloride 0.9% flush 10 mL    And    sodium chloride 0.9% flush 10 mL    zolpidem tablet 5 mg     Objective:     Vital Signs (Most Recent):  Temp: 98.3 °F (36.8 °C) (09/21/24 0335)  Pulse: 84 (09/21/24 0415)  Resp: 18 (09/21/24 0541)  BP: (!) 141/64 (09/21/24 0335)  SpO2: 95 % (09/21/24 0415) Vital Signs (24h Range):  Temp:  [97.8 °F (36.6 °C)-98.5 °F (36.9 °C)] 98.3 °F (36.8 °C)  Pulse:  [72-92] 84  Resp:  [16-18] 18  SpO2:  [94 %-98 %] 95 %  BP: (111-142)/(62-82) 141/64     Weight: 81.8 kg (180 lb 5.2 oz)  Height: 5' 1" (154.9 cm)  Body mass index is 34.07 kg/m².      Intake/Output Summary " (Last 24 hours) at 9/21/2024 0714  Last data filed at 9/20/2024 1807  Gross per 24 hour   Intake 540 ml   Output --   Net 540 ml        Ortho/SPM Exam   AAOx4  NAD  Reg rate  No increased WOB    LLE:  Dressing c/d/i  Ice and HKB in place   SILT T/SP/DP/Abarca/Sa  Motor intact T/SP/DP  WWP extremities  FCDs in place and functioning    Significant Labs:   Recent Lab Results         09/20/24  1221                     All pertinent labs within the past 24 hours have been reviewed.    Significant Imaging: I have reviewed and interpreted all pertinent imaging results/findings.

## 2024-09-21 NOTE — PLAN OF CARE
The sw spoke to Raysa via secure chat and the pt has been taught how to administer the IV abx. The pt's medications will be delivered to her home later today.        09/21/24 1310   Post-Acute Status   Post-Acute Authorization IV Infusion   IV Infusion Status Set-up Complete/Auth obtained   Discharge Plan   Discharge Plan A Home Health   Discharge Plan B [facility]

## 2024-09-21 NOTE — PROGRESS NOTES
[facility] educator met with Patient and daughter discussed discharge plan for home IVABX. Traci Freire will dc home with family support. Patient will infuse medication via Elastomeric Pump. Patient and daughter educated on S.A.S.H procedure. S.A.S.H mat provided.  Patient education checklist reviewed and acknowledged by above person(s) above and are agreeable to discharge with home infusion plan of care. IV administration process using aspetic technique was reviewed with successful return demonstration. Patient feels comfortable with infusion. Patient will dc home with Daptomycin 700mg daily at 7:00pm and Ceftriaxone 2g daily at 7:30 pm IV  for estimated end of therapy date of 10-16 or 10-30. No Extension placed to double lumen picc  [facility] will follow patient  for weekly dressing changes and lab draws. Time allotted for questions. Patients nurse and case management team notified teaching has been completed.     Medication delivery will be made to home    Patient accepted to care by [facility] and report called to [facility]  Raysa Marie RN, BSN, Clinical Liaison  Office [phone]  Cell [phone]

## 2024-09-22 NOTE — DISCHARGE SUMMARY
[facility] - Surgery  Orthopedics  Discharge Summary      Patient Name: Traci Freire  MRN: [id number]  Admission Date: 9/19/2024  Hospital Length of Stay: 2 days  Discharge Date and Time: 9/21/2024  4:46 PM  Attending Physician: Shreya att. providers found   Discharging Provider: Franko Anaya MD  Primary Care Provider: Juliet Medina NP    HPI:   Traci Freire is a 63 y.o. female with history of Left knee pain. Pain is worse with activity and weight bearing.  Patient has experienced interference of activities of daily living due to decreased range of motion and an increase in joint pain and swelling.  Patient has failed non-operative treatment including NSAIDs, corticosteroid injections, viscosupplement injections, and activity modification.  Traci Freire currently ambulates independently.      Relevant medical conditions of significance in perioperative period:  HTN- on medication managed by PCP  HLD- on medication managed by PCP  FRANCIE- monitored by PCP       Procedure(s) (LRB):  REVISION, ARTHROPLASTY, KNEE: LEFT (Left)      Hospital Course:  On 9/19/2024, the patient arrived to the [facility] OR for proper pre-operative management.  Upon completion of pre-operative preparation, the patient was taken back to the operative theatre. Explant L TKA with spacer placement was performed without complication and the patient was transported to the post anesthesia care unit in stable condition.  After appropriate recovery from the anaesthetic agents used during the surgery, the patient was then transported to the hospital inpatient floor.  The interim of the hospital stay from arrival on the floor up to discharge has been uncomplicated. The patient has tolerated regular diet.  The patient's pain has been controlled using a multimodal approach. Currently, the patient's pain is well controlled on an oral regimen.  The patient has been voiding without difficulty.  The patient began participation in physical therapy after surgery and has  progressed throughout the entire hospital stay.  Currently, the patient's progress is sufficient to allow the them to be discharged to Home safely.  The patient agrees with this assessment and desires a discharge today.      Goals of Care Treatment Preferences:  Code Status: Full Code      Consults (From admission, onward)          Status Ordering Provider     Inpatient consult to PICC team (YOLETTE)  Once        Provider:  (Not yet assigned)    Completed BELLA MOORE     Inpatient consult to Infectious Diseases  Once        Provider:  (Not yet assigned)    Completed BELLA MOORE            Significant Diagnostic Studies: No pertinent studies.    Pending Diagnostic Studies:       Procedure Component Value Units Date/Time    Specimen to Pathology, Surgery Orthopedics [[id number]] Collected: 09/19/24 1636    Order Status: Sent Lab Status: In process Updated: 09/20/24 0904    Specimen: Tissue           Final Active Diagnoses:    Diagnosis Date Noted POA    PRINCIPAL PROBLEM:  Failed total knee arthroplasty Left, s/p explant and spacer placement 9/19/24 [T84.018A, Z96.659] 05/08/2014 Not Applicable    Infective arthritis of left knee [M00.9] 09/20/2024 Yes      Problems Resolved During this Admission:      Discharged Condition: good    Disposition: Home-Health Care [facility]    Follow Up:   Follow-up Information       [facility] Follow up.    Why: The pt will be serviced by the infusion company listed above after d/c alexandria her IV medications.  Contact information:  [address] [address]             [facility] Follow up.    Specialties: Home Health Services, Home Therapy Services, Home Living Aide Services  Why: The pt will be serviced by the home [facility] agency listed above after d/c. The nurse will call the pt with an arrival time.  Contact information:  [address] [facility] [address] [phone]             Gregor Marino PA-C  "Follow up on 10/3/2024.    Specialty: Orthopedic Surgery  Why: 9:00am  Contact information:  [address]  [facility] [address]  [phone]               Marina Varghese MD Follow up on 10/24/2024.    Specialty: Infectious Diseases  Why: 10:30am  Contact information:  [address]  [facility] [address]  [phone]               Dme, [facility] Follow up on 9/23/2024.    Specialties: DME Provider, Orthotics  Why: The pt will receive a w/c with the elevated leg rests from the agency listed above Monday.  Contact information:  [address]  [facility] [address]  [facility] [address]  [phone]                           Patient Instructions:      WHEELCHAIR FOR HOME USE     Order Specific Question Answer Comments   Hours in W/C per day: 4    Type of Wheelchair: Standard    Size(Width): 18"(STD adult)    Leg Support: Elevating leg rests    Lap Belt: Buckle    Accessories: Anti-tippers    Cushion: Basic    Reclining Back No    Height: 5' 1" (1.549 m)    Weight: 81.8 kg (180 lb 5.2 oz)    Does patient have medical equipment at home? none    Length of need (1-99 months): 12    Please check all that apply: Caregiver is capable and willing to operate wheelchair safely.      Medications:  Reconciled Home Medications:      Medication List        CONTINUE taking these medications      acetaminophen 650 MG Tbsr  Commonly known as: TYLENOL  Take 1 tablet (650 mg total) by mouth every 8 (eight) hours.     aspirin 81 MG EC tablet  Commonly known as: ECOTRIN  Take 1 tablet (81 mg total) by mouth 2 (two) times a day.     B12 ACTIVE ORAL  Take 1 tablet by mouth Daily.     celecoxib 200 MG capsule  Commonly known as: CeleBREX  Take 1 capsule (200 mg total) by mouth once daily.     FLUoxetine 20 MG capsule  Take 1 capsule (20 mg total) by mouth once daily.     hydroCHLOROthiazide 25 MG tablet  Commonly known as: HYDRODIURIL  Take 1 tablet (25 mg total) by mouth once daily.     irbesartan 300 MG tablet  Commonly known as: " AVAPRO  Take 1 tablet (300 mg total) by mouth every evening.     methocarbamoL 750 MG Tab  Commonly known as: ROBAXIN  Take 1 tablet (750 mg total) by mouth 4 (four) times daily as needed (for muscle spasms).     oxyCODONE 5 MG immediate release tablet  Commonly known as: ROXICODONE  Take 1-2 tablets by mouth every 4-6 hours as needed for pain     * pantoprazole 40 MG tablet  Commonly known as: PROTONIX  Take 1 tablet (40 mg total) by mouth 2 (two) times daily.     * pantoprazole 40 MG tablet  Commonly known as: PROTONIX  Take 1 tablet (40 mg total) by mouth once daily.     rosuvastatin 20 MG tablet  Commonly known as: CRESTOR  Take 1 tablet (20 mg total) by mouth every evening.     STOOL SOFTENER-LAXATIVE 8.6-50 mg per tablet  Generic drug: senna-docusate 8.6-50 mg  Take 1 tablet by mouth once daily.     zolpidem 10 mg Tab  Commonly known as: AMBIEN  Take 1 tablet (10 mg total) by mouth every evening.           * This list has 2 medication(s) that are the same as other medications prescribed for you. Read the directions carefully, and ask your doctor or other care provider to review them with you.                STOP taking these medications      HYDROcodone-acetaminophen  mg per tablet  Commonly known as: MARIA E Anaya MD  Orthopedics  [facility] - Surgery

## 2024-09-23 ENCOUNTER — TELEPHONE (OUTPATIENT)
Dept: FAMILY MEDICINE | Facility: CLINIC | Age: 64
End: 2024-09-23
Payer: COMMERCIAL

## 2024-09-23 ENCOUNTER — PATIENT OUTREACH (OUTPATIENT)
Dept: FAMILY MEDICINE | Facility: CLINIC | Age: 64
End: 2024-09-23
Payer: COMMERCIAL

## 2024-09-23 LAB
BACTERIA SPEC ANAEROBE CULT: NORMAL
FUNGUS SPEC CULT: NORMAL

## 2024-09-23 NOTE — PROGRESS NOTES
Discharge Information     Discharge Date:   9/22/2024    Primary Discharge Diagnosis:    Failed total knee arthroplasty         Discharge Summary:  Reviewed      Medication & Order Review     Were medication changes made or new medications added?   Yes    If so, has the patient filled the prescriptions?  Yes     Was Home Health ordered? Yes    If so, has Home Health contacted patient and/or initiated services?  Yes    Name of Home Health Agency? N/A    Durable Medical Equipment ordered?  N/A     If so, has the DME provider contacted patient and delivered equipment?  N/A    Follow Up               Any problems since discharge? No    How is the patient feeling since returning home?      Have you set up recommended follow up appointments?  (cardiology, surgery, etc.)    Schedule Hospital Follow-up appointment within 7-14 days (preferably 7).      Notes:  Spoke to pt and she was surprised at the complications. Has been scheduled with Joy. HH is coming and changing antibiotics             Becky Antony

## 2024-09-23 NOTE — TELEPHONE ENCOUNTER
----- Message from Remedios Campbell LPN sent at 9/23/2024  3:26 PM CDT -----  Regarding: HFU  Call patient - needs post-hospital phone call within 2 business days and hospital follow up visit scheduled within 7-14 days.

## 2024-09-24 ENCOUNTER — LAB VISIT (OUTPATIENT)
Dept: LAB | Facility: HOSPITAL | Age: 64
End: 2024-09-24
Attending: INTERNAL MEDICINE
Payer: COMMERCIAL

## 2024-09-24 ENCOUNTER — PATIENT MESSAGE (OUTPATIENT)
Dept: FAMILY MEDICINE | Facility: CLINIC | Age: 64
End: 2024-09-24
Payer: COMMERCIAL

## 2024-09-24 DIAGNOSIS — T84.013D BROKEN INTERNAL LEFT KNEE PROSTHESIS, SUBSEQUENT ENCOUNTER: Primary | ICD-10-CM

## 2024-09-24 LAB
ALBUMIN SERPL BCP-MCNC: 3 G/DL (ref 3.5–5.2)
ALP SERPL-CCNC: 81 U/L (ref 55–135)
ALT SERPL W/O P-5'-P-CCNC: 31 U/L (ref 10–44)
ANION GAP SERPL CALC-SCNC: 12 MMOL/L (ref 8–16)
AST SERPL-CCNC: 28 U/L (ref 10–40)
BASOPHILS # BLD AUTO: 0.03 K/UL (ref 0–0.2)
BASOPHILS NFR BLD: 0.3 % (ref 0–1.9)
BILIRUB SERPL-MCNC: 0.6 MG/DL (ref 0.1–1)
BUN SERPL-MCNC: 16 MG/DL (ref 8–23)
CALCIUM SERPL-MCNC: 9 MG/DL (ref 8.7–10.5)
CHLORIDE SERPL-SCNC: 104 MMOL/L (ref 95–110)
CK SERPL-CCNC: 138 U/L (ref 20–180)
CO2 SERPL-SCNC: 22 MMOL/L (ref 23–29)
CREAT SERPL-MCNC: 0.8 MG/DL (ref 0.5–1.4)
CRP SERPL-MCNC: 29 MG/L (ref 0–8.2)
DIFFERENTIAL METHOD BLD: ABNORMAL
EOSINOPHIL # BLD AUTO: 0.6 K/UL (ref 0–0.5)
EOSINOPHIL NFR BLD: 6.8 % (ref 0–8)
ERYTHROCYTE [DISTWIDTH] IN BLOOD BY AUTOMATED COUNT: 13.2 % (ref 11.5–14.5)
EST. GFR  (NO RACE VARIABLE): >60 ML/MIN/1.73 M^2
GLUCOSE SERPL-MCNC: 141 MG/DL (ref 70–110)
HCT VFR BLD AUTO: 28.5 % (ref 37–48.5)
HGB BLD-MCNC: 9.2 G/DL (ref 12–16)
IMM GRANULOCYTES # BLD AUTO: 0.03 K/UL (ref 0–0.04)
IMM GRANULOCYTES NFR BLD AUTO: 0.3 % (ref 0–0.5)
LYMPHOCYTES # BLD AUTO: 2.3 K/UL (ref 1–4.8)
LYMPHOCYTES NFR BLD: 25.8 % (ref 18–48)
MCH RBC QN AUTO: 29.5 PG (ref 27–31)
MCHC RBC AUTO-ENTMCNC: 32.3 G/DL (ref 32–36)
MCV RBC AUTO: 91 FL (ref 82–98)
MONOCYTES # BLD AUTO: 0.6 K/UL (ref 0.3–1)
MONOCYTES NFR BLD: 7.1 % (ref 4–15)
NEUTROPHILS # BLD AUTO: 5.4 K/UL (ref 1.8–7.7)
NEUTROPHILS NFR BLD: 59.7 % (ref 38–73)
NRBC BLD-RTO: 0 /100 WBC
PLATELET # BLD AUTO: 241 K/UL (ref 150–450)
PMV BLD AUTO: 9.6 FL (ref 9.2–12.9)
POTASSIUM SERPL-SCNC: 3.3 MMOL/L (ref 3.5–5.1)
PROT SERPL-MCNC: 6.5 G/DL (ref 6–8.4)
RBC # BLD AUTO: 3.12 M/UL (ref 4–5.4)
SODIUM SERPL-SCNC: 138 MMOL/L (ref 136–145)
WBC # BLD AUTO: 9.07 K/UL (ref 3.9–12.7)

## 2024-09-24 PROCEDURE — 85025 COMPLETE CBC W/AUTO DIFF WBC: CPT | Performed by: INTERNAL MEDICINE

## 2024-09-24 PROCEDURE — 86140 C-REACTIVE PROTEIN: CPT | Performed by: INTERNAL MEDICINE

## 2024-09-24 PROCEDURE — 82550 ASSAY OF CK (CPK): CPT | Performed by: INTERNAL MEDICINE

## 2024-09-24 PROCEDURE — 80053 COMPREHEN METABOLIC PANEL: CPT | Performed by: INTERNAL MEDICINE

## 2024-09-24 PROCEDURE — 36415 COLL VENOUS BLD VENIPUNCTURE: CPT | Performed by: INTERNAL MEDICINE

## 2024-09-25 LAB
FINAL PATHOLOGIC DIAGNOSIS: NORMAL
FROZEN SECTION DIAGNOSIS: NORMAL
FROZEN SECTION FOOTNOTE: NORMAL
GROSS: NORMAL
Lab: NORMAL
MICROSCOPIC EXAM: NORMAL

## 2024-09-26 LAB
BACTERIA SPEC AEROBE CULT: NO GROWTH
BACTERIA SPEC ANAEROBE CULT: NORMAL

## 2024-09-28 PROCEDURE — G0180 MD CERTIFICATION HHA PATIENT: HCPCS | Mod: ,,, | Performed by: ORTHOPAEDIC SURGERY

## 2024-09-30 ENCOUNTER — LAB VISIT (OUTPATIENT)
Dept: LAB | Facility: HOSPITAL | Age: 64
End: 2024-09-30
Attending: INTERNAL MEDICINE
Payer: COMMERCIAL

## 2024-09-30 DIAGNOSIS — T84.54XA INFECTION OF PROSTHETIC LEFT KNEE JOINT: Primary | ICD-10-CM

## 2024-09-30 LAB
ALBUMIN SERPL BCP-MCNC: 3 G/DL (ref 3.5–5.2)
ALP SERPL-CCNC: 89 U/L (ref 55–135)
ALT SERPL W/O P-5'-P-CCNC: 35 U/L (ref 10–44)
ANION GAP SERPL CALC-SCNC: 13 MMOL/L (ref 8–16)
AST SERPL-CCNC: 25 U/L (ref 10–40)
BASOPHILS # BLD AUTO: 0.04 K/UL (ref 0–0.2)
BASOPHILS NFR BLD: 0.4 % (ref 0–1.9)
BILIRUB SERPL-MCNC: 0.5 MG/DL (ref 0.1–1)
BUN SERPL-MCNC: 17 MG/DL (ref 8–23)
CALCIUM SERPL-MCNC: 9.4 MG/DL (ref 8.7–10.5)
CHLORIDE SERPL-SCNC: 103 MMOL/L (ref 95–110)
CK SERPL-CCNC: 79 U/L (ref 20–180)
CO2 SERPL-SCNC: 23 MMOL/L (ref 23–29)
CREAT SERPL-MCNC: 0.8 MG/DL (ref 0.5–1.4)
CRP SERPL-MCNC: 9.8 MG/L (ref 0–8.2)
DIFFERENTIAL METHOD BLD: ABNORMAL
EOSINOPHIL # BLD AUTO: 0.6 K/UL (ref 0–0.5)
EOSINOPHIL NFR BLD: 5.2 % (ref 0–8)
ERYTHROCYTE [DISTWIDTH] IN BLOOD BY AUTOMATED COUNT: 13.2 % (ref 11.5–14.5)
EST. GFR  (NO RACE VARIABLE): >60 ML/MIN/1.73 M^2
GLUCOSE SERPL-MCNC: 88 MG/DL (ref 70–110)
HCT VFR BLD AUTO: 27.5 % (ref 37–48.5)
HGB BLD-MCNC: 8.7 G/DL (ref 12–16)
IMM GRANULOCYTES # BLD AUTO: 0.04 K/UL (ref 0–0.04)
IMM GRANULOCYTES NFR BLD AUTO: 0.4 % (ref 0–0.5)
LYMPHOCYTES # BLD AUTO: 2.4 K/UL (ref 1–4.8)
LYMPHOCYTES NFR BLD: 23.1 % (ref 18–48)
MCH RBC QN AUTO: 29.4 PG (ref 27–31)
MCHC RBC AUTO-ENTMCNC: 31.6 G/DL (ref 32–36)
MCV RBC AUTO: 93 FL (ref 82–98)
MONOCYTES # BLD AUTO: 0.7 K/UL (ref 0.3–1)
MONOCYTES NFR BLD: 6.4 % (ref 4–15)
NEUTROPHILS # BLD AUTO: 6.8 K/UL (ref 1.8–7.7)
NEUTROPHILS NFR BLD: 64.5 % (ref 38–73)
NRBC BLD-RTO: 0 /100 WBC
PLATELET # BLD AUTO: 363 K/UL (ref 150–450)
PMV BLD AUTO: 9.1 FL (ref 9.2–12.9)
POTASSIUM SERPL-SCNC: 3.5 MMOL/L (ref 3.5–5.1)
PROT SERPL-MCNC: 6.5 G/DL (ref 6–8.4)
RBC # BLD AUTO: 2.96 M/UL (ref 4–5.4)
SODIUM SERPL-SCNC: 139 MMOL/L (ref 136–145)
WBC # BLD AUTO: 10.55 K/UL (ref 3.9–12.7)

## 2024-09-30 PROCEDURE — 36415 COLL VENOUS BLD VENIPUNCTURE: CPT | Performed by: INTERNAL MEDICINE

## 2024-09-30 PROCEDURE — 85025 COMPLETE CBC W/AUTO DIFF WBC: CPT | Performed by: INTERNAL MEDICINE

## 2024-09-30 PROCEDURE — 82550 ASSAY OF CK (CPK): CPT | Performed by: INTERNAL MEDICINE

## 2024-09-30 PROCEDURE — 86140 C-REACTIVE PROTEIN: CPT | Performed by: INTERNAL MEDICINE

## 2024-09-30 PROCEDURE — 80053 COMPREHEN METABOLIC PANEL: CPT | Performed by: INTERNAL MEDICINE

## 2024-10-03 ENCOUNTER — OFFICE VISIT (OUTPATIENT)
Dept: ORTHOPEDICS | Facility: CLINIC | Age: 64
End: 2024-10-03
Payer: COMMERCIAL

## 2024-10-03 VITALS — HEIGHT: 61 IN | WEIGHT: 180.31 LBS | BODY MASS INDEX: 34.04 KG/M2

## 2024-10-03 DIAGNOSIS — Z96.652 S/P REVISION OF TOTAL KNEE, LEFT: Primary | ICD-10-CM

## 2024-10-03 PROCEDURE — 99999 PR PBB SHADOW E&M-EST. PATIENT-LVL III: CPT | Mod: PBBFAC,,,

## 2024-10-03 PROCEDURE — 1160F RVW MEDS BY RX/DR IN RCRD: CPT | Mod: CPTII,S$GLB,,

## 2024-10-03 PROCEDURE — 99024 POSTOP FOLLOW-UP VISIT: CPT | Mod: S$GLB,,,

## 2024-10-03 PROCEDURE — 1159F MED LIST DOCD IN RCRD: CPT | Mod: CPTII,S$GLB,,

## 2024-10-03 PROCEDURE — 3066F NEPHROPATHY DOC TX: CPT | Mod: CPTII,S$GLB,,

## 2024-10-03 PROCEDURE — 4010F ACE/ARB THERAPY RXD/TAKEN: CPT | Mod: CPTII,S$GLB,,

## 2024-10-03 PROCEDURE — 3044F HG A1C LEVEL LT 7.0%: CPT | Mod: CPTII,S$GLB,,

## 2024-10-03 PROCEDURE — 3061F NEG MICROALBUMINURIA REV: CPT | Mod: CPTII,S$GLB,,

## 2024-10-03 RX ORDER — OXYCODONE HYDROCHLORIDE 5 MG/1
TABLET ORAL
Qty: 50 TABLET | Refills: 0 | Status: SHIPPED | OUTPATIENT
Start: 2024-10-03

## 2024-10-03 NOTE — PROGRESS NOTES
Traci Freire presents for initial post-operative visit following a left total knee arthroplasty revision performed by Dr. Estevez on 9/19/2024.     Exam:   Last menstrual period 05/09/1992.   Ambulating well with assistive device.  Incision is clean and dry without drainage or erythema.   ROM:  Not assessed during visit    Initial post-operative radiographs reviewed today revealing a well fixed and aligned prosthesis.    A/P:  2 weeks s/p left total knee arthroplasty revision    - The patient was advised to keep the incision clean and dry for the next 24 hours after which she may wash the area with antibacterial soap in the shower. Will not submerge until the incision is completely healed.   - Home health physical therapy ongoing  - Continue aspirin for 1 month post op  - Pain medication:  Refilled  - Reviewed antibiotic prophylaxis.  PICC line antibiotics.  - Follow up visit with Infectious Disease on 10/24/2024.  - Follow up in 4 weeks with Dr. Estevez. Pt will call clinic with problems/concerns.

## 2024-10-07 ENCOUNTER — LAB VISIT (OUTPATIENT)
Dept: LAB | Facility: HOSPITAL | Age: 64
End: 2024-10-07
Attending: INTERNAL MEDICINE
Payer: COMMERCIAL

## 2024-10-07 DIAGNOSIS — T84.54XA INFECTION OF PROSTHETIC LEFT KNEE JOINT: Primary | ICD-10-CM

## 2024-10-07 LAB
ALBUMIN SERPL BCP-MCNC: 3.1 G/DL (ref 3.5–5.2)
ALP SERPL-CCNC: 99 U/L (ref 55–135)
ALT SERPL W/O P-5'-P-CCNC: 19 U/L (ref 10–44)
ANION GAP SERPL CALC-SCNC: 13 MMOL/L (ref 8–16)
AST SERPL-CCNC: 20 U/L (ref 10–40)
BASOPHILS # BLD AUTO: 0.04 K/UL (ref 0–0.2)
BASOPHILS NFR BLD: 0.6 % (ref 0–1.9)
BILIRUB SERPL-MCNC: 0.4 MG/DL (ref 0.1–1)
BUN SERPL-MCNC: 16 MG/DL (ref 8–23)
CALCIUM SERPL-MCNC: 9.5 MG/DL (ref 8.7–10.5)
CHLORIDE SERPL-SCNC: 105 MMOL/L (ref 95–110)
CK SERPL-CCNC: 66 U/L (ref 20–180)
CO2 SERPL-SCNC: 21 MMOL/L (ref 23–29)
CREAT SERPL-MCNC: 0.9 MG/DL (ref 0.5–1.4)
CRP SERPL-MCNC: 4.6 MG/L (ref 0–8.2)
DIFFERENTIAL METHOD BLD: ABNORMAL
EOSINOPHIL # BLD AUTO: 0.6 K/UL (ref 0–0.5)
EOSINOPHIL NFR BLD: 7.6 % (ref 0–8)
ERYTHROCYTE [DISTWIDTH] IN BLOOD BY AUTOMATED COUNT: 13.3 % (ref 11.5–14.5)
EST. GFR  (NO RACE VARIABLE): >60 ML/MIN/1.73 M^2
GLUCOSE SERPL-MCNC: 123 MG/DL (ref 70–110)
HCT VFR BLD AUTO: 29.7 % (ref 37–48.5)
HGB BLD-MCNC: 9.7 G/DL (ref 12–16)
IMM GRANULOCYTES # BLD AUTO: 0.02 K/UL (ref 0–0.04)
IMM GRANULOCYTES NFR BLD AUTO: 0.3 % (ref 0–0.5)
LYMPHOCYTES # BLD AUTO: 1.6 K/UL (ref 1–4.8)
LYMPHOCYTES NFR BLD: 22.3 % (ref 18–48)
MCH RBC QN AUTO: 30 PG (ref 27–31)
MCHC RBC AUTO-ENTMCNC: 32.7 G/DL (ref 32–36)
MCV RBC AUTO: 92 FL (ref 82–98)
MONOCYTES # BLD AUTO: 0.4 K/UL (ref 0.3–1)
MONOCYTES NFR BLD: 5.9 % (ref 4–15)
NEUTROPHILS # BLD AUTO: 4.6 K/UL (ref 1.8–7.7)
NEUTROPHILS NFR BLD: 63.3 % (ref 38–73)
NRBC BLD-RTO: 0 /100 WBC
PLATELET # BLD AUTO: 344 K/UL (ref 150–450)
PMV BLD AUTO: 9.2 FL (ref 9.2–12.9)
POTASSIUM SERPL-SCNC: 3.7 MMOL/L (ref 3.5–5.1)
PROT SERPL-MCNC: 6.8 G/DL (ref 6–8.4)
RBC # BLD AUTO: 3.23 M/UL (ref 4–5.4)
SODIUM SERPL-SCNC: 139 MMOL/L (ref 136–145)
WBC # BLD AUTO: 7.23 K/UL (ref 3.9–12.7)

## 2024-10-07 PROCEDURE — 86140 C-REACTIVE PROTEIN: CPT | Performed by: INTERNAL MEDICINE

## 2024-10-07 PROCEDURE — 85025 COMPLETE CBC W/AUTO DIFF WBC: CPT | Performed by: INTERNAL MEDICINE

## 2024-10-07 PROCEDURE — 82550 ASSAY OF CK (CPK): CPT | Performed by: INTERNAL MEDICINE

## 2024-10-07 PROCEDURE — 80053 COMPREHEN METABOLIC PANEL: CPT | Performed by: INTERNAL MEDICINE

## 2024-10-07 PROCEDURE — 36415 COLL VENOUS BLD VENIPUNCTURE: CPT | Performed by: INTERNAL MEDICINE

## 2024-10-10 ENCOUNTER — EXTERNAL HOME HEALTH (OUTPATIENT)
Dept: HOME HEALTH SERVICES | Facility: HOSPITAL | Age: 64
End: 2024-10-10
Payer: COMMERCIAL

## 2024-10-12 DIAGNOSIS — E78.5 HYPERLIPIDEMIA, UNSPECIFIED HYPERLIPIDEMIA TYPE: ICD-10-CM

## 2024-10-14 ENCOUNTER — LAB VISIT (OUTPATIENT)
Dept: LAB | Facility: HOSPITAL | Age: 64
End: 2024-10-14
Attending: INTERNAL MEDICINE
Payer: COMMERCIAL

## 2024-10-14 DIAGNOSIS — T84.54XA INFECTION OF PROSTHETIC LEFT KNEE JOINT: Primary | ICD-10-CM

## 2024-10-14 LAB
ALBUMIN SERPL BCP-MCNC: 3.2 G/DL (ref 3.5–5.2)
ALP SERPL-CCNC: 90 U/L (ref 55–135)
ALT SERPL W/O P-5'-P-CCNC: 16 U/L (ref 10–44)
ANION GAP SERPL CALC-SCNC: 13 MMOL/L (ref 8–16)
AST SERPL-CCNC: 23 U/L (ref 10–40)
BASOPHILS # BLD AUTO: 0.03 K/UL (ref 0–0.2)
BASOPHILS NFR BLD: 0.5 % (ref 0–1.9)
BILIRUB SERPL-MCNC: 0.4 MG/DL (ref 0.1–1)
BUN SERPL-MCNC: 16 MG/DL (ref 8–23)
CALCIUM SERPL-MCNC: 9.6 MG/DL (ref 8.7–10.5)
CHLORIDE SERPL-SCNC: 105 MMOL/L (ref 95–110)
CK SERPL-CCNC: 68 U/L (ref 20–180)
CO2 SERPL-SCNC: 22 MMOL/L (ref 23–29)
CREAT SERPL-MCNC: 0.8 MG/DL (ref 0.5–1.4)
CRP SERPL-MCNC: 0.5 MG/L (ref 0–8.2)
DIFFERENTIAL METHOD BLD: ABNORMAL
EOSINOPHIL # BLD AUTO: 0.5 K/UL (ref 0–0.5)
EOSINOPHIL NFR BLD: 7.7 % (ref 0–8)
ERYTHROCYTE [DISTWIDTH] IN BLOOD BY AUTOMATED COUNT: 13.1 % (ref 11.5–14.5)
EST. GFR  (NO RACE VARIABLE): >60 ML/MIN/1.73 M^2
GLUCOSE SERPL-MCNC: 83 MG/DL (ref 70–110)
HCT VFR BLD AUTO: 31.7 % (ref 37–48.5)
HGB BLD-MCNC: 10.1 G/DL (ref 12–16)
IMM GRANULOCYTES # BLD AUTO: 0.01 K/UL (ref 0–0.04)
IMM GRANULOCYTES NFR BLD AUTO: 0.2 % (ref 0–0.5)
LYMPHOCYTES # BLD AUTO: 2.1 K/UL (ref 1–4.8)
LYMPHOCYTES NFR BLD: 30.9 % (ref 18–48)
MCH RBC QN AUTO: 28.9 PG (ref 27–31)
MCHC RBC AUTO-ENTMCNC: 31.9 G/DL (ref 32–36)
MCV RBC AUTO: 91 FL (ref 82–98)
MONOCYTES # BLD AUTO: 0.5 K/UL (ref 0.3–1)
MONOCYTES NFR BLD: 7.5 % (ref 4–15)
NEUTROPHILS # BLD AUTO: 3.5 K/UL (ref 1.8–7.7)
NEUTROPHILS NFR BLD: 53.2 % (ref 38–73)
NRBC BLD-RTO: 0 /100 WBC
PLATELET # BLD AUTO: 294 K/UL (ref 150–450)
PMV BLD AUTO: 9.8 FL (ref 9.2–12.9)
POTASSIUM SERPL-SCNC: 4.1 MMOL/L (ref 3.5–5.1)
PROT SERPL-MCNC: 6.7 G/DL (ref 6–8.4)
RBC # BLD AUTO: 3.49 M/UL (ref 4–5.4)
SODIUM SERPL-SCNC: 140 MMOL/L (ref 136–145)
WBC # BLD AUTO: 6.63 K/UL (ref 3.9–12.7)

## 2024-10-14 PROCEDURE — 80053 COMPREHEN METABOLIC PANEL: CPT | Performed by: INTERNAL MEDICINE

## 2024-10-14 PROCEDURE — 85025 COMPLETE CBC W/AUTO DIFF WBC: CPT | Performed by: INTERNAL MEDICINE

## 2024-10-14 PROCEDURE — 82550 ASSAY OF CK (CPK): CPT | Performed by: INTERNAL MEDICINE

## 2024-10-14 PROCEDURE — 86140 C-REACTIVE PROTEIN: CPT | Performed by: INTERNAL MEDICINE

## 2024-10-14 PROCEDURE — 36415 COLL VENOUS BLD VENIPUNCTURE: CPT | Performed by: INTERNAL MEDICINE

## 2024-10-14 RX ORDER — ROSUVASTATIN CALCIUM 20 MG/1
20 TABLET, COATED ORAL NIGHTLY
Qty: 90 TABLET | Refills: 1 | Status: SHIPPED | OUTPATIENT
Start: 2024-10-14

## 2024-10-17 DIAGNOSIS — Z96.652 S/P REVISION OF TOTAL KNEE, LEFT: Primary | ICD-10-CM

## 2024-10-21 ENCOUNTER — LAB VISIT (OUTPATIENT)
Dept: LAB | Facility: HOSPITAL | Age: 64
End: 2024-10-21
Attending: INTERNAL MEDICINE
Payer: COMMERCIAL

## 2024-10-21 DIAGNOSIS — T84.54XA INFECTION OF PROSTHETIC LEFT KNEE JOINT: Primary | ICD-10-CM

## 2024-10-21 LAB
ALBUMIN SERPL BCP-MCNC: 3.6 G/DL (ref 3.5–5.2)
ALP SERPL-CCNC: 98 U/L (ref 40–150)
ALT SERPL W/O P-5'-P-CCNC: 20 U/L (ref 10–44)
ANION GAP SERPL CALC-SCNC: 16 MMOL/L (ref 8–16)
AST SERPL-CCNC: 19 U/L (ref 10–40)
BASOPHILS # BLD AUTO: 0.04 K/UL (ref 0–0.2)
BASOPHILS NFR BLD: 0.5 % (ref 0–1.9)
BILIRUB SERPL-MCNC: 0.5 MG/DL (ref 0.1–1)
BUN SERPL-MCNC: 20 MG/DL (ref 8–23)
CALCIUM SERPL-MCNC: 9.7 MG/DL (ref 8.7–10.5)
CHLORIDE SERPL-SCNC: 104 MMOL/L (ref 95–110)
CK SERPL-CCNC: 88 U/L (ref 20–180)
CO2 SERPL-SCNC: 20 MMOL/L (ref 23–29)
CREAT SERPL-MCNC: 0.8 MG/DL (ref 0.5–1.4)
CRP SERPL-MCNC: 0.6 MG/L (ref 0–8.2)
DIFFERENTIAL METHOD BLD: ABNORMAL
EOSINOPHIL # BLD AUTO: 0.4 K/UL (ref 0–0.5)
EOSINOPHIL NFR BLD: 5 % (ref 0–8)
ERYTHROCYTE [DISTWIDTH] IN BLOOD BY AUTOMATED COUNT: 13.3 % (ref 11.5–14.5)
EST. GFR  (NO RACE VARIABLE): >60 ML/MIN/1.73 M^2
GLUCOSE SERPL-MCNC: 79 MG/DL (ref 70–110)
HCT VFR BLD AUTO: 34.3 % (ref 37–48.5)
HGB BLD-MCNC: 10.8 G/DL (ref 12–16)
IMM GRANULOCYTES # BLD AUTO: 0.02 K/UL (ref 0–0.04)
IMM GRANULOCYTES NFR BLD AUTO: 0.2 % (ref 0–0.5)
LYMPHOCYTES # BLD AUTO: 2.5 K/UL (ref 1–4.8)
LYMPHOCYTES NFR BLD: 29.4 % (ref 18–48)
MCH RBC QN AUTO: 29.3 PG (ref 27–31)
MCHC RBC AUTO-ENTMCNC: 31.5 G/DL (ref 32–36)
MCV RBC AUTO: 93 FL (ref 82–98)
MONOCYTES # BLD AUTO: 0.7 K/UL (ref 0.3–1)
MONOCYTES NFR BLD: 7.8 % (ref 4–15)
NEUTROPHILS # BLD AUTO: 4.9 K/UL (ref 1.8–7.7)
NEUTROPHILS NFR BLD: 57.1 % (ref 38–73)
NRBC BLD-RTO: 0 /100 WBC
PLATELET # BLD AUTO: 300 K/UL (ref 150–450)
PMV BLD AUTO: 9.8 FL (ref 9.2–12.9)
POTASSIUM SERPL-SCNC: 3.1 MMOL/L (ref 3.5–5.1)
POTASSIUM SERPL-SCNC: 3.1 MMOL/L (ref 3.5–5.1)
PROT SERPL-MCNC: 7 G/DL (ref 6–8.4)
RBC # BLD AUTO: 3.69 M/UL (ref 4–5.4)
SODIUM SERPL-SCNC: 140 MMOL/L (ref 136–145)
WBC # BLD AUTO: 8.6 K/UL (ref 3.9–12.7)

## 2024-10-21 PROCEDURE — 85025 COMPLETE CBC W/AUTO DIFF WBC: CPT | Performed by: INTERNAL MEDICINE

## 2024-10-21 PROCEDURE — 82550 ASSAY OF CK (CPK): CPT | Performed by: INTERNAL MEDICINE

## 2024-10-21 PROCEDURE — 86140 C-REACTIVE PROTEIN: CPT | Performed by: INTERNAL MEDICINE

## 2024-10-21 PROCEDURE — 36415 COLL VENOUS BLD VENIPUNCTURE: CPT | Performed by: INTERNAL MEDICINE

## 2024-10-21 PROCEDURE — 80053 COMPREHEN METABOLIC PANEL: CPT | Performed by: INTERNAL MEDICINE

## 2024-10-24 ENCOUNTER — OFFICE VISIT (OUTPATIENT)
Dept: INFECTIOUS DISEASES | Facility: CLINIC | Age: 64
End: 2024-10-24
Payer: COMMERCIAL

## 2024-10-24 VITALS
DIASTOLIC BLOOD PRESSURE: 67 MMHG | TEMPERATURE: 99 F | SYSTOLIC BLOOD PRESSURE: 105 MMHG | HEIGHT: 61 IN | HEART RATE: 84 BPM | BODY MASS INDEX: 33.68 KG/M2 | WEIGHT: 178.38 LBS

## 2024-10-24 DIAGNOSIS — T84.018S FAILURE OF TOTAL KNEE REPLACEMENT, SEQUELA: Primary | ICD-10-CM

## 2024-10-24 DIAGNOSIS — Z96.659 FAILURE OF TOTAL KNEE REPLACEMENT, SEQUELA: Primary | ICD-10-CM

## 2024-10-24 PROCEDURE — 99999 PR PBB SHADOW E&M-EST. PATIENT-LVL IV: CPT | Mod: PBBFAC,,, | Performed by: INTERNAL MEDICINE

## 2024-10-24 NOTE — PROGRESS NOTES
Subjective     Patient ID: Traci Freire is a 63 y.o. female.    Chief Complaint:Follow-up      History of Present Illness    Discharged: 9/21/24  Home health: [facility]  Infusion company: [facility]    Infection: prosthetic knee infection  Antibiotics: daptomycin 700mg iv daily and cefriaxone 2 gm IV dalily  EOC: 10.16-10.30    Has tolerated dapto/ceftriaxone well, inflammatory markers have normalized.  Will complete 6 weeks 10/31/24    Review of Systems   All other systems reviewed and are negative.       Objective   Physical Exam  Vitals and nursing note reviewed.            Assessment and Plan     No diagnosis found.    Plan:  EOC (6 weeks) 10/31/24  D/c PICC per  RN      Time: 40 minutes   50% of time spent on face-to-face counseling and coordination of care. Counseling included review of test results, diagnosis, and treatment plan with patient and/or family.

## 2024-10-25 ENCOUNTER — DOCUMENT SCAN (OUTPATIENT)
Dept: HOME HEALTH SERVICES | Facility: HOSPITAL | Age: 64
End: 2024-10-25
Payer: COMMERCIAL

## 2024-10-28 ENCOUNTER — LAB VISIT (OUTPATIENT)
Dept: LAB | Facility: HOSPITAL | Age: 64
End: 2024-10-28
Attending: INTERNAL MEDICINE
Payer: COMMERCIAL

## 2024-10-28 DIAGNOSIS — T84.54XA INFECTION OF PROSTHETIC LEFT KNEE JOINT: Primary | ICD-10-CM

## 2024-10-28 LAB
ALBUMIN SERPL BCP-MCNC: 3.6 G/DL (ref 3.5–5.2)
ALP SERPL-CCNC: 92 U/L (ref 40–150)
ALT SERPL W/O P-5'-P-CCNC: 21 U/L (ref 10–44)
ANION GAP SERPL CALC-SCNC: 11 MMOL/L (ref 8–16)
AST SERPL-CCNC: 20 U/L (ref 10–40)
BASOPHILS # BLD AUTO: 0.06 K/UL (ref 0–0.2)
BASOPHILS NFR BLD: 0.8 % (ref 0–1.9)
BILIRUB SERPL-MCNC: 0.4 MG/DL (ref 0.1–1)
BUN SERPL-MCNC: 15 MG/DL (ref 8–23)
CALCIUM SERPL-MCNC: 9.7 MG/DL (ref 8.7–10.5)
CHLORIDE SERPL-SCNC: 107 MMOL/L (ref 95–110)
CK SERPL-CCNC: 91 U/L (ref 20–180)
CO2 SERPL-SCNC: 24 MMOL/L (ref 23–29)
CREAT SERPL-MCNC: 0.8 MG/DL (ref 0.5–1.4)
CRP SERPL-MCNC: 0.4 MG/L (ref 0–8.2)
DIFFERENTIAL METHOD BLD: ABNORMAL
EOSINOPHIL # BLD AUTO: 0.5 K/UL (ref 0–0.5)
EOSINOPHIL NFR BLD: 5.7 % (ref 0–8)
ERYTHROCYTE [DISTWIDTH] IN BLOOD BY AUTOMATED COUNT: 13.2 % (ref 11.5–14.5)
EST. GFR  (NO RACE VARIABLE): >60 ML/MIN/1.73 M^2
GLUCOSE SERPL-MCNC: 93 MG/DL (ref 70–110)
HCT VFR BLD AUTO: 34.1 % (ref 37–48.5)
HGB BLD-MCNC: 10.9 G/DL (ref 12–16)
IMM GRANULOCYTES # BLD AUTO: 0.02 K/UL (ref 0–0.04)
IMM GRANULOCYTES NFR BLD AUTO: 0.3 % (ref 0–0.5)
LYMPHOCYTES # BLD AUTO: 2.4 K/UL (ref 1–4.8)
LYMPHOCYTES NFR BLD: 30.8 % (ref 18–48)
MCH RBC QN AUTO: 28.8 PG (ref 27–31)
MCHC RBC AUTO-ENTMCNC: 32 G/DL (ref 32–36)
MCV RBC AUTO: 90 FL (ref 82–98)
MONOCYTES # BLD AUTO: 0.6 K/UL (ref 0.3–1)
MONOCYTES NFR BLD: 7.7 % (ref 4–15)
NEUTROPHILS # BLD AUTO: 4.3 K/UL (ref 1.8–7.7)
NEUTROPHILS NFR BLD: 54.7 % (ref 38–73)
NRBC BLD-RTO: 0 /100 WBC
PLATELET # BLD AUTO: 281 K/UL (ref 150–450)
PMV BLD AUTO: 10 FL (ref 9.2–12.9)
POTASSIUM SERPL-SCNC: 3.7 MMOL/L (ref 3.5–5.1)
PROT SERPL-MCNC: 7 G/DL (ref 6–8.4)
RBC # BLD AUTO: 3.78 M/UL (ref 4–5.4)
SODIUM SERPL-SCNC: 142 MMOL/L (ref 136–145)
WBC # BLD AUTO: 7.92 K/UL (ref 3.9–12.7)

## 2024-10-28 PROCEDURE — 85025 COMPLETE CBC W/AUTO DIFF WBC: CPT | Performed by: INTERNAL MEDICINE

## 2024-10-28 PROCEDURE — 80053 COMPREHEN METABOLIC PANEL: CPT | Performed by: INTERNAL MEDICINE

## 2024-10-28 PROCEDURE — 36415 COLL VENOUS BLD VENIPUNCTURE: CPT | Performed by: INTERNAL MEDICINE

## 2024-10-28 PROCEDURE — 82550 ASSAY OF CK (CPK): CPT | Performed by: INTERNAL MEDICINE

## 2024-10-28 PROCEDURE — 86140 C-REACTIVE PROTEIN: CPT | Performed by: INTERNAL MEDICINE

## 2024-10-30 ENCOUNTER — OFFICE VISIT (OUTPATIENT)
Dept: ORTHOPEDICS | Facility: CLINIC | Age: 64
End: 2024-10-30
Payer: COMMERCIAL

## 2024-10-30 ENCOUNTER — HOSPITAL ENCOUNTER (OUTPATIENT)
Dept: RADIOLOGY | Facility: HOSPITAL | Age: 64
Discharge: HOME | End: 2024-10-30
Attending: ORTHOPAEDIC SURGERY
Payer: COMMERCIAL

## 2024-10-30 VITALS — BODY MASS INDEX: 33.7 KG/M2 | WEIGHT: 178.38 LBS

## 2024-10-30 DIAGNOSIS — Z96.652 S/P REVISION OF TOTAL KNEE, LEFT: ICD-10-CM

## 2024-10-30 DIAGNOSIS — T84.54XS INFECTION ASSOCIATED WITH INTERNAL LEFT KNEE PROSTHESIS, SEQUELA: ICD-10-CM

## 2024-10-30 DIAGNOSIS — Z96.652 S/P REVISION OF TOTAL KNEE, LEFT: Primary | ICD-10-CM

## 2024-10-30 PROCEDURE — 1159F MED LIST DOCD IN RCRD: CPT | Mod: CPTII,S$GLB,, | Performed by: ORTHOPAEDIC SURGERY

## 2024-10-30 PROCEDURE — 4010F ACE/ARB THERAPY RXD/TAKEN: CPT | Mod: CPTII,S$GLB,, | Performed by: ORTHOPAEDIC SURGERY

## 2024-10-30 PROCEDURE — 99024 POSTOP FOLLOW-UP VISIT: CPT | Mod: S$GLB,,, | Performed by: ORTHOPAEDIC SURGERY

## 2024-10-30 PROCEDURE — 3066F NEPHROPATHY DOC TX: CPT | Mod: CPTII,S$GLB,, | Performed by: ORTHOPAEDIC SURGERY

## 2024-10-30 PROCEDURE — 3061F NEG MICROALBUMINURIA REV: CPT | Mod: CPTII,S$GLB,, | Performed by: ORTHOPAEDIC SURGERY

## 2024-10-30 PROCEDURE — 3044F HG A1C LEVEL LT 7.0%: CPT | Mod: CPTII,S$GLB,, | Performed by: ORTHOPAEDIC SURGERY

## 2024-10-30 PROCEDURE — 73560 X-RAY EXAM OF KNEE 1 OR 2: CPT | Mod: 26,LT,, | Performed by: RADIOLOGY

## 2024-10-30 PROCEDURE — 99999 PR PBB SHADOW E&M-EST. PATIENT-LVL III: CPT | Mod: PBBFAC,,, | Performed by: ORTHOPAEDIC SURGERY

## 2024-10-30 PROCEDURE — 73560 X-RAY EXAM OF KNEE 1 OR 2: CPT | Mod: TC,LT

## 2024-11-01 DIAGNOSIS — M79.609 PAIN IN EXTREMITY, UNSPECIFIED EXTREMITY: ICD-10-CM

## 2024-11-01 DIAGNOSIS — Z01.818 PRE-OP TESTING: Primary | ICD-10-CM

## 2024-11-06 ENCOUNTER — PATIENT OUTREACH (OUTPATIENT)
Dept: ADMINISTRATIVE | Facility: HOSPITAL | Age: 64
End: 2024-11-06
Payer: COMMERCIAL

## 2024-11-13 DIAGNOSIS — F41.9 ANXIETY: ICD-10-CM

## 2024-11-14 ENCOUNTER — PATIENT MESSAGE (OUTPATIENT)
Dept: FAMILY MEDICINE | Facility: CLINIC | Age: 64
End: 2024-11-14
Payer: COMMERCIAL

## 2024-11-14 RX ORDER — ZOLPIDEM TARTRATE 10 MG/1
10 TABLET ORAL NIGHTLY
Qty: 90 TABLET | Refills: 1 | Status: SHIPPED | OUTPATIENT
Start: 2024-11-14

## 2024-11-18 PROBLEM — D64.9 NORMOCYTIC ANEMIA: Status: ACTIVE | Noted: 2024-11-18

## 2024-11-18 PROBLEM — Z96.652 S/P REVISION OF TOTAL KNEE, LEFT: Status: ACTIVE | Noted: 2024-11-18

## 2024-11-18 NOTE — ASSESSMENT & PLAN NOTE
Current BP  at goal today.    Taking: irbesartan/HCTZ      Lifestyle changes to reduce systolic BP:   exercise 30 minutes per day,  5 days per week or 150 minutes weekly; sodium reduction and avoidance of high salt foods such as processed meats, frozen meals and  fast foods.   Keeping a healthy weight/BMI can help with better BP control    BP acceptable for surgery. I recommend monitoring BP during perioperative period as uncontrolled pain can elevate blood pressure.

## 2024-11-18 NOTE — ASSESSMENT & PLAN NOTE
No loss of bladder or bowel control    Denies N/T to buttocks, perineum and inner surfaces of the thighs (saddle anesthesia)    Followed per outside  Pain Management - Dr. [facility]  Treated with Hydrocodone  and ESIs  Reports will be having back surgery after left knee surgery     Recent imaging: Xray L-Spine 8/4/23  FINDINGS:  Three views of lumbar spine show minor convex left thoracolumbar spine curvature.     4 mm anterolisthesis of L5 on S1 is evident, likely related to severe bilateral facet joint osteoarthrosis. L4-5 and L3-L4 facet joint osteoarthrosis also noted.     Moderate to severe disc degeneration occurs at T12-L1 and L1-L2, with mild disc degeneration at L3-L4, L4-L5, and L5-S1.     Sacroiliac joints are normal. Surgical clips project in pelvis. Arterial vascular calcifications are evident.     IMPRESSION:     1. Grade 1 anterolisthesis of L5 on S1 due to severe bilateral facet joint osteoarthrosis.  2. Additional multilevel lumbar disc degeneration and facet joint osteoarthrosis.

## 2024-11-19 ENCOUNTER — OFFICE VISIT (OUTPATIENT)
Dept: ORTHOPEDICS | Facility: CLINIC | Age: 64
End: 2024-11-19
Payer: COMMERCIAL

## 2024-11-19 ENCOUNTER — OFFICE VISIT (OUTPATIENT)
Dept: INTERNAL MEDICINE | Facility: CLINIC | Age: 64
End: 2024-11-19
Payer: COMMERCIAL

## 2024-11-19 ENCOUNTER — LAB VISIT (OUTPATIENT)
Dept: LAB | Facility: HOSPITAL | Age: 64
End: 2024-11-19
Payer: COMMERCIAL

## 2024-11-19 VITALS
BODY MASS INDEX: 33.61 KG/M2 | HEIGHT: 61 IN | TEMPERATURE: 99 F | DIASTOLIC BLOOD PRESSURE: 59 MMHG | WEIGHT: 178 LBS | SYSTOLIC BLOOD PRESSURE: 117 MMHG | HEART RATE: 86 BPM | OXYGEN SATURATION: 98 %

## 2024-11-19 DIAGNOSIS — I83.93 VARICOSE VEINS OF BOTH LOWER EXTREMITIES, UNSPECIFIED WHETHER COMPLICATED: ICD-10-CM

## 2024-11-19 DIAGNOSIS — F32.A DEPRESSION, UNSPECIFIED DEPRESSION TYPE: ICD-10-CM

## 2024-11-19 DIAGNOSIS — G47.33 OSA (OBSTRUCTIVE SLEEP APNEA): ICD-10-CM

## 2024-11-19 DIAGNOSIS — Z85.3 HX OF BREAST CANCER: ICD-10-CM

## 2024-11-19 DIAGNOSIS — I10 PRIMARY HYPERTENSION: ICD-10-CM

## 2024-11-19 DIAGNOSIS — M79.662 PAIN OF LEFT LOWER LEG: ICD-10-CM

## 2024-11-19 DIAGNOSIS — Z01.818 PREOPERATIVE EXAMINATION: Primary | ICD-10-CM

## 2024-11-19 DIAGNOSIS — R73.03 PREDIABETES: ICD-10-CM

## 2024-11-19 DIAGNOSIS — E78.5 HYPERLIPIDEMIA, UNSPECIFIED HYPERLIPIDEMIA TYPE: ICD-10-CM

## 2024-11-19 DIAGNOSIS — Z96.652 S/P REVISION OF TOTAL KNEE, LEFT: ICD-10-CM

## 2024-11-19 DIAGNOSIS — K76.0 FATTY LIVER: ICD-10-CM

## 2024-11-19 DIAGNOSIS — T84.50XA PROSTHETIC JOINT INFECTION: ICD-10-CM

## 2024-11-19 DIAGNOSIS — F41.9 ANXIETY: ICD-10-CM

## 2024-11-19 DIAGNOSIS — T84.50XD INFECTION OF PROSTHETIC JOINT, SUBSEQUENT ENCOUNTER: Primary | ICD-10-CM

## 2024-11-19 DIAGNOSIS — D64.9 NORMOCYTIC ANEMIA: ICD-10-CM

## 2024-11-19 DIAGNOSIS — M47.816 FACET ARTHROPATHY, LUMBAR: ICD-10-CM

## 2024-11-19 LAB
INR PPP: 0.9 (ref 0.8–1.2)
PROTHROMBIN TIME: 10.1 SEC (ref 9–12.5)

## 2024-11-19 PROCEDURE — 3061F NEG MICROALBUMINURIA REV: CPT | Mod: CPTII,S$GLB,, | Performed by: NURSE PRACTITIONER

## 2024-11-19 PROCEDURE — 1159F MED LIST DOCD IN RCRD: CPT | Mod: CPTII,S$GLB,, | Performed by: NURSE PRACTITIONER

## 2024-11-19 PROCEDURE — 99214 OFFICE O/P EST MOD 30 MIN: CPT | Mod: S$GLB,,, | Performed by: NURSE PRACTITIONER

## 2024-11-19 PROCEDURE — 3044F HG A1C LEVEL LT 7.0%: CPT | Mod: CPTII,S$GLB,, | Performed by: NURSE PRACTITIONER

## 2024-11-19 PROCEDURE — 3066F NEPHROPATHY DOC TX: CPT | Mod: CPTII,S$GLB,, | Performed by: NURSE PRACTITIONER

## 2024-11-19 PROCEDURE — 3074F SYST BP LT 130 MM HG: CPT | Mod: CPTII,S$GLB,, | Performed by: NURSE PRACTITIONER

## 2024-11-19 PROCEDURE — 99999 PR PBB SHADOW E&M-EST. PATIENT-LVL III: CPT | Mod: PBBFAC,,, | Performed by: NURSE PRACTITIONER

## 2024-11-19 PROCEDURE — 20610 DRAIN/INJ JOINT/BURSA W/O US: CPT | Mod: LT,S$GLB,, | Performed by: NURSE PRACTITIONER

## 2024-11-19 PROCEDURE — 85610 PROTHROMBIN TIME: CPT | Performed by: NURSE PRACTITIONER

## 2024-11-19 PROCEDURE — 3078F DIAST BP <80 MM HG: CPT | Mod: CPTII,S$GLB,, | Performed by: NURSE PRACTITIONER

## 2024-11-19 PROCEDURE — 4010F ACE/ARB THERAPY RXD/TAKEN: CPT | Mod: CPTII,S$GLB,, | Performed by: NURSE PRACTITIONER

## 2024-11-19 PROCEDURE — 36415 COLL VENOUS BLD VENIPUNCTURE: CPT | Performed by: NURSE PRACTITIONER

## 2024-11-19 PROCEDURE — 3008F BODY MASS INDEX DOCD: CPT | Mod: CPTII,S$GLB,, | Performed by: NURSE PRACTITIONER

## 2024-11-19 PROCEDURE — 1160F RVW MEDS BY RX/DR IN RCRD: CPT | Mod: CPTII,S$GLB,, | Performed by: NURSE PRACTITIONER

## 2024-11-19 PROCEDURE — 99214 OFFICE O/P EST MOD 30 MIN: CPT | Mod: 25,S$GLB,, | Performed by: NURSE PRACTITIONER

## 2024-11-19 RX ORDER — OXYCODONE HYDROCHLORIDE 5 MG/1
5 TABLET ORAL
OUTPATIENT
Start: 2024-11-19

## 2024-11-19 RX ORDER — AMOXICILLIN 250 MG
1 CAPSULE ORAL 2 TIMES DAILY
OUTPATIENT
Start: 2024-11-19

## 2024-11-19 RX ORDER — PROCHLORPERAZINE EDISYLATE 5 MG/ML
5 INJECTION INTRAMUSCULAR; INTRAVENOUS EVERY 6 HOURS PRN
OUTPATIENT
Start: 2024-11-19

## 2024-11-19 RX ORDER — FENTANYL CITRATE 50 UG/ML
100 INJECTION, SOLUTION INTRAMUSCULAR; INTRAVENOUS
OUTPATIENT
Start: 2024-11-19 | End: 2024-11-20

## 2024-11-19 RX ORDER — FENTANYL CITRATE 50 UG/ML
25 INJECTION, SOLUTION INTRAMUSCULAR; INTRAVENOUS EVERY 5 MIN PRN
OUTPATIENT
Start: 2024-11-19

## 2024-11-19 RX ORDER — SODIUM CHLORIDE 0.9 % (FLUSH) 0.9 %
10 SYRINGE (ML) INJECTION
OUTPATIENT
Start: 2024-11-19

## 2024-11-19 RX ORDER — PREGABALIN 75 MG/1
75 CAPSULE ORAL NIGHTLY
OUTPATIENT
Start: 2024-11-19

## 2024-11-19 RX ORDER — ACETAMINOPHEN 500 MG
1000 TABLET ORAL
OUTPATIENT
Start: 2024-11-19

## 2024-11-19 RX ORDER — CELECOXIB 200 MG/1
400 CAPSULE ORAL ONCE
OUTPATIENT
Start: 2024-11-19 | End: 2024-11-19

## 2024-11-19 RX ORDER — BISACODYL 10 MG/1
10 SUPPOSITORY RECTAL EVERY 12 HOURS PRN
OUTPATIENT
Start: 2024-11-19

## 2024-11-19 RX ORDER — POLYETHYLENE GLYCOL 3350 17 G/17G
17 POWDER, FOR SOLUTION ORAL DAILY
OUTPATIENT
Start: 2024-11-19

## 2024-11-19 RX ORDER — METHOCARBAMOL 750 MG/1
750 TABLET, FILM COATED ORAL 3 TIMES DAILY
OUTPATIENT
Start: 2024-11-19

## 2024-11-19 RX ORDER — SODIUM CHLORIDE 9 MG/ML
INJECTION, SOLUTION INTRAVENOUS
OUTPATIENT
Start: 2024-11-19

## 2024-11-19 RX ORDER — MUPIROCIN 20 MG/G
1 OINTMENT TOPICAL 2 TIMES DAILY
OUTPATIENT
Start: 2024-11-19 | End: 2024-11-24

## 2024-11-19 RX ORDER — PREGABALIN 75 MG/1
75 CAPSULE ORAL
OUTPATIENT
Start: 2024-11-19

## 2024-11-19 RX ORDER — SODIUM CHLORIDE 9 MG/ML
INJECTION, SOLUTION INTRAVENOUS CONTINUOUS
OUTPATIENT
Start: 2024-11-19 | End: 2024-11-20

## 2024-11-19 RX ORDER — MIDAZOLAM HYDROCHLORIDE 1 MG/ML
4 INJECTION, SOLUTION INTRAMUSCULAR; INTRAVENOUS
OUTPATIENT
Start: 2024-11-19 | End: 2024-11-20

## 2024-11-19 RX ORDER — MORPHINE SULFATE 2 MG/ML
2 INJECTION, SOLUTION INTRAMUSCULAR; INTRAVENOUS
OUTPATIENT
Start: 2024-11-19

## 2024-11-19 RX ORDER — OXYCODONE HYDROCHLORIDE 5 MG/1
10 TABLET ORAL
OUTPATIENT
Start: 2024-11-19

## 2024-11-19 RX ORDER — TALC
6 POWDER (GRAM) TOPICAL NIGHTLY PRN
OUTPATIENT
Start: 2024-11-19

## 2024-11-19 RX ORDER — MUPIROCIN 20 MG/G
1 OINTMENT TOPICAL
OUTPATIENT
Start: 2024-11-19

## 2024-11-19 RX ORDER — ASPIRIN 81 MG/1
81 TABLET ORAL 2 TIMES DAILY
OUTPATIENT
Start: 2024-11-19

## 2024-11-19 RX ORDER — CELECOXIB 200 MG/1
200 CAPSULE ORAL DAILY
OUTPATIENT
Start: 2024-11-19

## 2024-11-19 RX ORDER — ACETAMINOPHEN 500 MG
1000 TABLET ORAL EVERY 6 HOURS
OUTPATIENT
Start: 2024-11-19

## 2024-11-19 RX ORDER — NALOXONE HCL 0.4 MG/ML
0.02 VIAL (ML) INJECTION
OUTPATIENT
Start: 2024-11-19

## 2024-11-19 RX ORDER — ONDANSETRON HYDROCHLORIDE 2 MG/ML
4 INJECTION, SOLUTION INTRAVENOUS EVERY 8 HOURS PRN
OUTPATIENT
Start: 2024-11-19

## 2024-11-19 RX ORDER — FAMOTIDINE 20 MG/1
20 TABLET, FILM COATED ORAL 2 TIMES DAILY
OUTPATIENT
Start: 2024-11-19

## 2024-11-19 RX ORDER — LIDOCAINE HYDROCHLORIDE 10 MG/ML
1 INJECTION, SOLUTION EPIDURAL; INFILTRATION; INTRACAUDAL; PERINEURAL
OUTPATIENT
Start: 2024-11-19

## 2024-11-19 NOTE — PROGRESS NOTES
Traci Freire is a 63 y.o. year old here today for pre surgery optimization visit  in preparation for a Left total knee revision arthroplasty to be performed by Dr. Estevez on 12/5/24.  she was last seen and treated in the clinic on 10/30/2024. she will be medically optimized by the pre op center. There has been no significant change in medical status since last visit. No fever, chills, malaise, or unexplained weight change.      Allergies, Medications, past medical and surgical history reviewed.    Focused examination performed.    Patient did not request to see surgeon in clinic today. All questions answered. Patient encouraged to call with questions. Contact information given.     Pre, winnie, and post operative procedures and expectations discussed. Goals of successful surgery reviewed and include manageable pain levels, surgical site free of infection, medication management, and ambulation with PT/OT assistance. Healthy weight management discussed with patient and caregiver who were receptive to eduction of healthy diet and activity. No other necessary lifestyle changes identified. Educated patient about signs and symptoms of infection, medication management, anticoagulation therapy, risk of tobacco and alcohol use, and self-care to promote healing. Surgical guide given for future reference. Hibiclens given to patient with instructions. All questions were answered.     Traci Freire verbalized an understanding to the education and goals. Patient has displayed readiness to engage in care and is ready to proceed with surgery.  Patient reports her daughter is able and ready to provide assistance at home after discharge.    Surgical and blood consents signed.    DME will be arranged. The mobility limitation cannot be sufficiently resolved by the use of a cane. Patient's functional mobility deficit can be sufficiently resolved with the use of a (Rolling Walker or Walker). Patient's mobility limitation significantly  "impairs their ability to participate in one of more activities of daily living. The use of a (Rolling Walker or Walker) will significantly improve the patient's ability to participate in MRADLS and the patient will use it on regular basis in the home."     Traci Freire will contact us if there are any questions, concerns, or changes in medical status prior to surgery.       Aspiration of the left knee done and fluid sent for synovasure testing.        Knee Arthrocentesis with Injection Procedure Note    Pre-operative Diagnosis: Left knee degenerative arthritis    Post-operative Diagnosis: same    Indications: Left knee pain    Anesthesia: none    Procedure Details     Verbal consent was obtained for the procedure.An 18 gauge needle was inserted into the superior aspect of the joint from a lateral approach. 5 ml of clear yellow fluid was removed from the joint and sent to the lab for analysis. The needle was removed and the area cleansed and dressed.    Complications:  None; patient tolerated the procedure well.      Patient has discussed discharge planning with surgeon. Patient will be discharged to home following surgery.   patient will be scheduled with [facility].  Scheduled 12/9 at the [place] location.    30 minutes of time was spent on patient education, review of records, templating, H&P, , appointment scheduling and optimizing patient for surgery.      "

## 2024-11-19 NOTE — PROGRESS NOTES
[facility]  Progress Note    Patient Name: Traci Freire  MRN: [id number]  Date of Evaluation- 2024  PCP- Juliet Medina NP    Future cases for Traci Freire [[id number]]       Case ID Status Date Time Sher Procedure Provider Location    [id number] [facility] 2024 10:50  REVISION, ARTHROPLASTY, KNEE: LEFT: COMPLEX: STAGE 2 Chris Estevez MD [[id number]] [facility] OR 2ND FLR            HPI:  This is a 63 y.o. female  who presents today with son for a preoperative evaluation in preparation for left knee revision.  Surgery is indicated for revision of left total knee  .   I have seen this patient before in 2024 for preop eval before left knee surgery.  The history has been obtained by speaking with the patient and reviewing the electronic medical record and/or outside health information. Significant health conditions for the perioperative period are discussed below in assessment and plan.   Denies any new symptoms before surgery.       Subjective/ Objective:     Chief Complaint: Preoperative evaulation, perioperative medical management, and complication reduction plan.     Functional Capacity: limited mobility due to left knee pain. Was very active before surgery in 2024.      Anesthesia issues: None    Difficulty mouth opening: No    Steroid use in the last 12 months:  No    Dental Issues: None    Family anesthesia difficulty: None       Family Hx of Thrombosis: None    Past Medical History:   Diagnosis Date    Anxiety     Arthritis     Cancer breast    Right- Bilateral mastectomy- May 2005- had breast reconstruction- mother  of breast cancer    HLD (hyperlipidemia)     Hypertension     diagnosed age late 40's     IBS (irritable bowel syndrome)     Lyme disease     arthritis of hands. Felt like flu- age early 30's- got it  after going to [place]    Sleep apnea     NO MACHINE         Past Medical History Pertinent Negatives:   Diagnosis Date Noted    Asthma 2024    COPD  (chronic obstructive pulmonary disease) 2024    Coronary artery disease 2024    Deep vein thrombosis 2024    Diabetes mellitus, type 2 2024    Disorder of kidney and ureter 2024    Encounter for blood transfusion 2021    Myocardial infarction 2024    Pulmonary embolism 2024    Stroke 2024    Thyroid disease 2024         Past Surgical History:   Procedure Laterality Date    ABDOMINAL SURGERY      BLADDER SUSPENSION      BREAST SURGERY      CARPAL TUNNEL RELEASE Left 2023    Procedure: Left carpal tunnel release;  Surgeon: Roberto Cheung MD;  Location: [facility];  Service: Orthopedics;  Laterality: Left;     SECTION      CYSTOSCOPY      avoids greens . ? Interstitial cystitis    ESOPHAGOGASTRODUODENOSCOPY N/A 8/10/2023    Procedure: EGD (ESOPHAGOGASTRODUODENOSCOPY);  Surgeon: Gerard Hernandez MD;  Location: [facility];  Service: Endoscopy;  Laterality: N/A;    HYSTERECTOMY      followed by removal of ovaries  from scar tissue    INCISIONAL HERNIA REPAIR Right 2008    RLQ    [facility]     TKR    MODIFIED RADICAL MASTECTOMY W/ AXILLARY LYMPH NODE DISSECTION Right 05/10/2005    w/free flap    NISSEN FUNDOPLICATION      REVISION OF KNEE ARTHROPLASTY Left 2024    Procedure: REVISION, ARTHROPLASTY, KNEE: LEFT;  Surgeon: Chris Estevez MD;  Location: [address];  Service: Orthopedics;  Laterality: Left;    ROBOT-ASSISTED LAPAROSCOPIC REPAIR OF VENTRAL HERNIA N/A 2021    Procedure: ROBOTIC REPAIR, HERNIA, VENTRAL;  Surgeon: John Johnson III, MD;  Location: [facility];  Service: General;  Laterality: N/A;    SIMPLE MASTECTOMY Left 05/10/2005    w/free flap    TONSILLECTOMY         Review of Systems   Constitutional:  Negative for chills, fatigue, fever and unexpected weight change.   HENT:  Negative for  "congestion, hearing loss, rhinorrhea, sore throat, tinnitus and trouble swallowing.    Eyes:  Negative for visual disturbance.   Respiratory:  Negative for cough, chest tightness, shortness of breath and wheezing.    Cardiovascular:  Positive for leg swelling (bilateral). Negative for chest pain and palpitations.   Gastrointestinal:  Negative for constipation and diarrhea.        Denies Fatty liver, Hepatitis   Genitourinary:  Negative for decreased urine volume, difficulty urinating, dysuria, frequency, hematuria and urgency.   Musculoskeletal:  Positive for arthralgias (left knee) and back pain (lumbar). Negative for neck pain and neck stiffness.   Skin:  Negative for rash and wound.   Neurological:  Positive for numbness (right hand; mainly in ring and little fingers). Negative for dizziness, syncope, weakness and headaches.   Hematological:  Does not bruise/bleed easily.   Psychiatric/Behavioral:  Negative for sleep disturbance and suicidal ideas.               VITALS  Visit Vitals  BP (!) 117/59 (BP Location: Left arm, Patient Position: Sitting)   Pulse 86   Temp 98.9 °F (37.2 °C) (Oral)   Ht 5' 1" (1.549 m)   Wt 80.7 kg (178 lb)   LMP 05/09/1992   SpO2 98%   BMI 33.63 kg/m²          Physical Exam  Vitals reviewed.   Constitutional:       General: She is not in acute distress.     Appearance: She is well-developed. She is obese.   HENT:      Head: Normocephalic.      Nose: Nose normal.      Mouth/Throat:      Pharynx: No oropharyngeal exudate.   Eyes:      General:         Right eye: No discharge.         Left eye: No discharge.      Conjunctiva/sclera: Conjunctivae normal.      Pupils: Pupils are equal, round, and reactive to light.   Neck:      Thyroid: No thyromegaly.      Vascular: No carotid bruit or JVD.      Trachea: No tracheal deviation.   Cardiovascular:      Rate and Rhythm: Normal rate and regular rhythm.      Pulses:           Carotid pulses are 2+ on the right side and 2+ on the left side.       " Dorsalis pedis pulses are 2+ on the right side and 2+ on the left side.        Posterior tibial pulses are 2+ on the right side and 2+ on the left side.      Heart sounds: Murmur (heard best at the apex) heard.   Pulmonary:      Effort: Pulmonary effort is normal. No respiratory distress.      Breath sounds: Normal breath sounds. No stridor. No wheezing, rhonchi or rales.   Abdominal:      General: Bowel sounds are normal. There is no distension.      Palpations: Abdomen is soft.      Tenderness: There is no abdominal tenderness. There is no guarding.   Musculoskeletal:      Cervical back: Normal range of motion. No pain with movement.      Right lower leg: Edema (trace) present.      Left lower le+ Edema present.   Lymphadenopathy:      Cervical: No cervical adenopathy.   Skin:     General: Skin is warm and dry.      Capillary Refill: Capillary refill takes less than 2 seconds.      Findings: No erythema or rash.   Neurological:      Mental Status: She is alert and oriented to person, place, and time.   Psychiatric:         Behavior: Behavior normal. Behavior is cooperative.          Significant Labs:  Lab Results   Component Value Date    WBC 7.92 10/28/2024    HGB 10.9 (L) 10/28/2024    HCT 34.1 (L) 10/28/2024     10/28/2024    CHOL 189 2024    TRIG 193 (H) 2024    HDL 44 (L) 2024    ALT 21 10/28/2024    AST 20 10/28/2024     10/28/2024    K 3.7 10/28/2024     10/28/2024    CREATININE 0.8 10/28/2024    BUN 15 10/28/2024    CO2 24 10/28/2024    TSH 2.600 2023    INR 0.9 2024    HGBA1C 5.7 (H) 2024    MICROALBUR 1.2 2024           EKG:   Results for orders placed or performed during the hospital encounter of 24   EKG 12-lead    Collection Time: 24 10:25 AM   Result Value Ref Range    QRS Duration 78 ms    [facility] QTC Calculation 414 ms    Narrative    Test Reason : Z01.818,    Vent. Rate : 061 BPM     Atrial Rate : 061 BPM     P-R Int : 178 ms  "         QRS Dur : 078 ms      QT Int : 412 ms       P-R-T Axes : 050 -07 014 degrees     QTc Int : 414 ms    Normal sinus rhythm  Normal ECG  When compared with ECG of 09-AUG-2023 12:53,  No significant change was found  Confirmed by Dina Schuster MD ([id number]) on 8/26/2024 11:51:23 AM    Referred By: AVEL HURST           Confirmed By:Dina Schuster MD       2D ECHO:  TTE:  Results for orders placed or performed during the hospital encounter of 09/03/24   Echo   Result Value Ref Range    BSA 1.87 m2    LVOT stroke volume 101.61 cm3    LVIDd 3.78 3.5 - 6.0 cm    LV Systolic Volume 22.33 mL    LV Systolic Volume Index 12.4 mL/m2    LVIDs 2.50 2.1 - 4.0 cm    LV Diastolic Volume 61.20 mL    LV Diastolic Volume Index 34.00 mL/m2    Left Ventricular End Systolic Volume by Teichholz Method 22.33 mL    Left Ventricular End Diastolic Volume by Teichholz Method 61.20 mL    IVS 1.23 (A) 0.6 - 1.1 cm    LVOT diameter 2.05 cm    LVOT area 3.3 cm2    FS 34 28 - 44 %    Left Ventricle Relative Wall Thickness 0.63 cm    PW 1.19 (A) 0.6 - 1.1 cm    LV mass 153.95 g    LV Mass Index 86 g/m2    MV Peak E Cedrick 0.65 m/s    TDI LATERAL 0.06 m/s    TDI SEPTAL 0.05 m/s    E/E' ratio 11.82 m/s    MV Peak A Cedrick 1.03 m/s    TR Max Cedrick 2.37 m/s    E/A ratio 0.63     IVRT 71.36 msec    E wave deceleration time 259.75 msec    MV "A" wave duration [id number] msec    LV SEPTAL E/E' RATIO 13.00 m/s    LV LATERAL E/E' RATIO 10.83 m/s    LVOT peak cedrick 1.55 m/s    Left Ventricular Outflow Tract Mean Velocity 1.17 cm/s    Left Ventricular Outflow Tract Mean Gradient 5.92 mmHg    RV S' 9.83 cm/s    TAPSE 1.47 cm    RV/LV Ratio 0.56 cm    LA size 3.49 cm    Left Atrium Minor Axis 4.64 cm    Left Atrium Major Axis 4.80 cm    RA Major Axis 4.52 cm    AV mean gradient 25 mmHg    AV peak gradient 44 mmHg    Ao peak cedrick 3.30 m/s    Ao VTI 67.50 cm    LVOT peak VTI 30.80 cm    AV valve area 1.51 cm²    AV Velocity Ratio 0.47     AV index " (prosthetic) 0.46     BECCA by Velocity Ratio 1.55 cm²    MV mean gradient 2 mmHg    MV peak gradient 4 mmHg    MV stenosis pressure 1/2 time 86.90 ms    MV valve area p 1/2 method 2.53 cm2    MV valve area by continuity eq 4.15 cm2    MV VTI 24.5 cm    Triscuspid Valve Regurgitation Peak Gradient 22 mmHg    PV PEAK VELOCITY 1.22 m/s    PV peak gradient 6 mmHg    Pulmonary Valve Mean Velocity 0.87 m/s    Ao root annulus 2.76 cm    IVC diameter 1.77 cm    Mean e' 0.06 m/s    ZLVIDS -1.64     ZLVIDD -2.75     RVDD 2.13 cm    AORTIC VALVE CUSP SEPERATION 0.52 cm    TV resting pulmonary artery pressure 25 mmHg    RV TB RVSP 5 mmHg    Est. RA pres 3 mmHg    Narrative      Left Ventricle: The left ventricle is normal in size. Mildly increased   wall thickness. There is mild concentric hypertrophy. There is normal   systolic function with a visually estimated ejection fraction of 60 - 65%.   There is normal diastolic function.    Right Ventricle: Normal right ventricular cavity size. Wall thickness   is normal. Systolic function is normal.    Aortic Valve: There is mild aortic valve sclerosis.    IVC/SVC: Normal venous pressure at 3 mmHg.         Nuclear Stress Test 6/22/20  IMPRESSION:  1. Normal exam. No scintigraphic evidence of left ventricular  myocardial ischemia.  2. Normal left ventricular wall motion.  3. Calculated left ventricular ejection fraction of 70 %.     Electronically Signed by Jaciel Horan M.D. on 6/22/2020 11:06 AM  The EKG portion of this study is negative for ischemia.    The patient reported no chest pain during the stress test.    There were no arrhythmias during stress.    ECG Stress Nuclear portion of this study will be reported separately.            Active Cardiac Conditions: None      Revised Cardiac Risk Index   High -Risk Surgery  Intraperitoneal; Intrathoracic; suprainguinal vascular Yes- + 1 No- 0   History of Ischemic Heart Disease   (Hx of MI/positive exercise test/current chest pain due  to ischemia/use of nitrate therapy/EKG with pathological Q waves) Yes- + 1 No- 0   History of CHF  (Pulmonary edema/bilateral rales or S3 gallop/PND/CXR showing pulmonary vascular redistribution) Yes- + 1 No- 0   History of CVA   (Prior stroke or TIA) Yes- + 1 No- 0   Pre-operative treatment with insulin Yes- + 1 No- 0   Pre-operative creatinine > 2mg/dl Yes- + 1 No- 0   Total: 0      Risk Status:  Estimated risk of cardiac complications after non-cardiac surgery using the Revised Cardiac Risk Index for Preoperative risk is 3.9 %      ARISCAT (Canet) risk index: Low: 1.6% risk of post-op pulmonary complications; Intermediate: 13.3% risk of post-op pulmonary complications if surgery is > 3 hours.     American Society of Anesthesiologists Physical Status classification (ASA): 3                   Orders Placed This Encounter    Protime-INR           Assessment/Plan:     S/P revision of total knee, left  Scheduled with Dr. Estevez on 12/5/24 for  left knee revision.    Facet arthropathy, lumbar  No loss of bladder or bowel control    Denies N/T to buttocks, perineum and inner surfaces of the thighs (saddle anesthesia)    Followed per outside  Pain Management - [facility]  Treated with Hydrocodone  and ESIs  Reports will be having back surgery after left knee surgery     Recent imaging: Xray L-Spine 8/4/23  FINDINGS:  Three views of lumbar spine show minor convex left thoracolumbar spine curvature.     4 mm anterolisthesis of L5 on S1 is evident, likely related to severe bilateral facet joint osteoarthrosis. L4-5 and L3-L4 facet joint osteoarthrosis also noted.     Moderate to severe disc degeneration occurs at T12-L1 and L1-L2, with mild disc degeneration at L3-L4, L4-L5, and L5-S1.     Sacroiliac joints are normal. Surgical clips project in pelvis. Arterial vascular calcifications are evident.     IMPRESSION:     1. Grade 1 anterolisthesis of L5 on S1 due to severe bilateral facet joint  "osteoarthrosis.  2. Additional multilevel lumbar disc degeneration and facet joint osteoarthrosis.    Anxiety  Treated with:Prozac; controlled symptoms.     Depression  Treated with:   Prozac; controlled  Followed per PCP.  Denies suicidal/homicidal ideations    HTN (hypertension)  Current BP  at goal today.    Taking: irbesartan/HCTZ      Lifestyle changes to reduce systolic BP:   exercise 30 minutes per day,  5 days per week or 150 minutes weekly; sodium reduction and avoidance of high salt foods such as processed meats, frozen meals and  fast foods.   Keeping a healthy weight/BMI can help with better BP control    BP acceptable for surgery. I recommend monitoring BP during perioperative period as uncontrolled pain can elevate blood pressure.         HLD (hyperlipidemia)  Recommend weight loss, healthy diet (DASH/Mediterranean) and exercise.   Patient should exercise 30 minutes at least five times weekly once recovered from surgery.   Treated with: Crestor    Varicose veins of both lower extremities  Recommend compression stockings; increased risk of thrombosis.    Hx of breast cancer  Dx'd in 2005 to right breast  S/P bilateral mastectomy; right axillary lymph node dissection  No longer followed by Oncology    Prediabetes  A1c is 5.7  I recommend monitoring patient's glucose level during the perioperative period. Glucose may be elevated from stress hyperglycemia and may require insulin.       Fatty liver  Per CT abdomen/pelvis 9/3/24  Recent INR: 0.9  Platelet count: 281,000  LFTs normal  Denies alcohol use  Spleen size normal     No evidence of hepatic decompensation    FRANCIE (obstructive sleep apnea)  CPAP compliance: No. Reason for non-compliance: " I fight the machine."  Suggest weight loss, increased exercise.  Recommend caution with sedating medication that can cause respiratory depression.   Patients with untreated FRANCIE have an increased risk of stroke, HTN, and heart disease.    Normocytic anemia  Current " labs:  Hgb-   10.9    Hct-  34.1; possibly related to recent surgery in September 2024    Recommend monitoring during perioperative period.     BMI 33.0-33.9,adult  Lifestyle changes should be made by eating healthy, exercising at least 150 minutes weekly, and avoiding sedentary behavior.       Discussion/Management of Perioperative Care    Thromboembolic prophylaxis (VTE) Care: Risk factors for thrombosis include: obesity, surgical procedure, and varicose veins.  I recommend prophylaxis of thromboembolism with the use of compression stockings/pneumatic devices, and/or pharmacologic agents. The benefits should outweigh the risks for pharmacologic prophylaxis in the perioperative period. I also encourage early ambulation if not contraindicated during the post-operative period.    Risk factors for post-operative pulmonary complications include:HTN. To reduce the risk of pulmonary complications, prophylactic recommendations include: early ambulation and pain control.    Risk factors for renal complications include: HTN. To reduce the risk of postoperative renal complications, I recommend the patient maintain adequate hydration.  Avoid/reduce NSAIDS and MOSS-2 inhibitors use as well as IV contrast for renal protection.    I recommend the use of appropriate prophylactic antibiotics to reduce the risk of surgical site infections.    Delirium risk factors include decreased mobility. I recommend to avoid/reduce use of benzodiazepine use (not for patients who take on a regular basis), anticholinergics, Benadryl,  and agents that may cause postoperative serotonin syndrome.  Controlled pain can decrease the risk for postop delirium and since opioids are used for postoperative pain control, I suggest using the lowest dose for the shortest amount of time necessary for pain management.     The patient is at an increased risk for urinary retention due to : possible regional anesthesia. I recommend to avoid/decrease the use of  benzodiazepines, anticholinergics, and Benadryl in the perioperative period. I also recommend using opioids for the shortest period of time if possible.          This visit was focused on Preoperative evaluation, Perioperative Medical management, complication reduction plans. I suggest that the patient follows up with primary care or relevant sub specialists for ongoing health care.    I appreciate the opportunity to be involved in this patients care. Please feel free to contact me if there were any questions about this consultation.        I spent a total of 34 minutes on the day of the visit.  This includes face to face time and non-face to face time preparing to see the patient (e.g., review of tests), obtaining and/or reviewing separately obtained history, documenting clinical information in the electronic or other health record, independently interpreting results and communicating results to the patient/family/caregiver, or care coordinator.     Patient is optimized for surgery.       Jose Momin NP  Perioperative Medicine  [facility]

## 2024-11-19 NOTE — H&P
CC:  Left knee pain    Traci Freire is a 63 y.o. female with history of Left knee pain. She had a left knee revision in . She started to have pain again earlier this year and the prosthesis was found to be loose. She had explantation  with placement of a spacer. There were more than 10 wbc's per hpf, so she was treated for infection although cultures were all negative. She has been wearing a t-scope brace locked in extension since. Pain is worse with activity and weight bearing.  Patient has experienced interference of activities of daily living due to decreased range of motion and an increase in joint pain and swelling.  Traci Freire currently ambulates using assistive device .     Relevant medical conditions of significance in perioperative period:  HTN- on medication managed by pcp  HLD- on medication managed by pcp  Anxiety- on medication managed by pcp  H/o left knee revision arthroplasty     Given documented medical comorbidities including those listed above and based off of CMS criteria patient would meet inpatient admission status guidelines. This case has been approved as inpatient.     Past Medical History:   Diagnosis Date    Anxiety     Arthritis     Cancer breast    Right- Bilateral mastectomy- May 2005- had breast reconstruction- mother  of breast cancer    HLD (hyperlipidemia)     Hypertension     diagnosed age late 40's     IBS (irritable bowel syndrome)     Lyme disease     arthritis of hands. Felt like flu- age early 30's- got it  after going to [place]    Sleep apnea     NO MACHINE       Past Surgical History:   Procedure Laterality Date    ABDOMINAL SURGERY      BLADDER SUSPENSION      BREAST SURGERY      CARPAL TUNNEL RELEASE Left 2023    Procedure: Left carpal tunnel release;  Surgeon: Roberto Cheung MD;  Location: [facility] OR;  Service: Orthopedics;  Laterality: Left;     SECTION      CYSTOSCOPY      avoids greens . ? Interstitial cystitis     ESOPHAGOGASTRODUODENOSCOPY N/A 8/10/2023    Procedure: EGD (ESOPHAGOGASTRODUODENOSCOPY);  Surgeon: Gerard Hernandez MD;  Location: [facility];  Service: Endoscopy;  Laterality: N/A;    HYSTERECTOMY      followed by removal of ovaries  from scar tissue    INCISIONAL HERNIA REPAIR Right 2008    RLQ    JOINT REPLACEMENT      Left total knee replacement-[facility] -     KNEE SURGERY Left     TKR    MODIFIED RADICAL MASTECTOMY W/ AXILLARY LYMPH NODE DISSECTION Right 05/10/2005    w/free flap    NISSEN FUNDOPLICATION      REVISION OF KNEE ARTHROPLASTY Left 2024    Procedure: REVISION, ARTHROPLASTY, KNEE: LEFT;  Surgeon: Chris Estevez MD;  Location: [address];  Service: Orthopedics;  Laterality: Left;    ROBOT-ASSISTED LAPAROSCOPIC REPAIR OF VENTRAL HERNIA N/A 2021    Procedure: ROBOTIC REPAIR, HERNIA, VENTRAL;  Surgeon: John Johnson III, MD;  Location: [facility];  Service: General;  Laterality: N/A;    SIMPLE MASTECTOMY Left 05/10/2005    w/free flap    TONSILLECTOMY         Family History   Problem Relation Name Age of Onset    Cancer Mother           of breast cancer, also had gynecological cancer- had breast cancer that spread to the brain    Heart disease Father          in his 60's-  in his 70's    Breast cancer Daughter         Review of patient's allergies indicates:   Allergen Reactions    Macrobid [nitrofurantoin monohyd/m-cryst]     Bactrim [sulfamethoxazole-trimethoprim] Nausea And Vomiting         Current Outpatient Medications:     FLUoxetine 20 MG capsule, Take 1 capsule (20 mg total) by mouth once daily., Disp: 90 capsule, Rfl: 1    hydroCHLOROthiazide (HYDRODIURIL) 25 MG tablet, Take 1 tablet (25 mg total) by mouth once daily., Disp: 90 tablet, Rfl: 1    irbesartan (AVAPRO) 300 MG tablet, Take 1 tablet (300 mg total) by mouth every evening., Disp: 90 tablet, Rfl: 3    pantoprazole (PROTONIX) 40 MG tablet, Take 1 tablet (40 mg  total) by mouth 2 (two) times daily., Disp: 60 tablet, Rfl: 11    rosuvastatin (CRESTOR) 20 MG tablet, Take 1 tablet (20 mg total) by mouth every evening., Disp: 90 tablet, Rfl: 1    zolpidem (AMBIEN) 10 mg Tab, Take 1 tablet (10 mg total) by mouth every evening., Disp: 90 tablet, Rfl: 1    Review of Systems:  Constitutional: no fever or chills  Eyes: no visual changes  ENT: no nasal congestion or sore throat  Respiratory: no cough or shortness of breath  Cardiovascular: no chest pain or palpitations  Gastrointestinal: no nausea or vomiting, tolerating diet  Genitourinary: no hematuria or dysuria  Integument/Breast: no rash or pruritis  Hematologic/Lymphatic: no easy bruising or lymphadenopathy  Musculoskeletal: positive for knee pain  Neurological: no seizures or tremors  Behavioral/Psych: no auditory or visual hallucinations  Endocrine: no heat or cold intolerance    PE:  LMP 05/09/1992   General: Pleasant, cooperative, NAD   Gait: antalgic  HEENT: NCAT, sclera nonicteric   Lungs: Respirations clear bilaterally; equal and unlabored.   CV: S1S2; 2+ bilateral upper and lower extremity pulses.   Skin: Intact throughout with no rashes, erythema, or lesions  Extremities: No LE edema,  no erythema or warmth of the skin in either lower extremity.    Left knee exam:  Knee Range of Motion:currently in a brace locked in extension. pain with passive range of motion  Effusion:yes  Condition of skin:intact  Location of tenderness:Lateral joint line   Strength:limited by pain    Hip Examination: painless PROM of hip     Radiographs: Radiographs reveal advanced degenerative changes including subchondral cyst formation, subchondral sclerosis, osteophyte formation, joint space narrowing.     Diagnosis: Prosthetic joint infection, left knee    Plan: Left knee revision arthroplasty    Due to the serious nature of total joint infection and the high prevalence of community acquired MRSA, vancomycin will be used  perioperatively.

## 2024-11-19 NOTE — ASSESSMENT & PLAN NOTE
Current labs:  Hgb-   10.9    Hct-  34.1; possibly related to recent surgery in September 2024    Recommend monitoring during perioperative period.

## 2024-11-19 NOTE — HPI
This is a 63 y.o. female  who presents today with son for a preoperative evaluation in preparation for left knee revision.  Surgery is indicated for revision of left total knee  .   I have seen this patient before in August 2024 for preop eval before left knee surgery.  The history has been obtained by speaking with the patient and reviewing the electronic medical record and/or outside health information. Significant health conditions for the perioperative period are discussed below in assessment and plan.   Denies any new symptoms before surgery.

## 2024-11-19 NOTE — ASSESSMENT & PLAN NOTE
Per CT abdomen/pelvis 9/3/24  Recent INR: 0.9  Platelet count: 281,000  LFTs normal  Denies alcohol use  Spleen size normal     No evidence of hepatic decompensation

## 2024-11-19 NOTE — DISCHARGE INSTRUCTIONS
Your surgery has been scheduled for:_______12/5/24___________________________________    You should report to:  ____[facility], located on the [place] side of the first floor of the           [facility] ([phone])  __X__[facility], located on the [facility] side of the            Second floor of the [facility] [phone])  ____3rd Floor [facility] located on the [facility] side of the [facility] [phone]  ____[facility]: located at [address] [place] [address]. [address]     Please Note   Tell your doctor if you take Aspirin, products containing Aspirin, herbal medications  or blood thinners, such as Coumadin, Ticlid, or Plavix.  (Consult your provider regarding holding or stopping before surgery).  Arrange for someone to drive you home following surgery.  You will not be allowed to leave the surgical facility alone or drive yourself home following sedation and anesthesia.    Before Surgery  Stop taking all herbal medications, vitamins, and supplements 7 days prior to surgery  No Motrin/Advil (Ibuprofen) 7 days before surgery  No Aleve (Naproxen) 7 days before surgery   No Goody's/BC Powder 7 days before surgery  Refrain from drinking alcoholic beverages for 24 hours before and after surgery  Stop or limit smoking at least 24 hours prior to surgery  You may take Tylenol for pain    Night before Surgery  Do not eat or drink after midnight  Take a shower or bath (shower is recommended).  Bathe with Hibiclens soap or an antibacterial soap from the neck down.  If not supplied by your surgeon, hibiclens soap will need to be purchased over the counter in pharmacy.  Rinse soap off thoroughly.  Shampoo your hair with your regular shampoo    The Day of Surgery  Take another bath or shower with hibiclens or any antibacterial soap, to reduce the chance of infection.  Take heart  and blood pressure medications with a small sip of water, as advised by the perioperative team.  Do not take fluid pills  You may brush your teeth and rinse your mouth, but do not swallow any additional water.   Do not apply perfumes, powder, body lotions or deodorant on the day of surgery.  Nail polish should be removed.  Do not wear makeup or moisturizer  Wear comfortable clothes, such as a button front shirt and loose fitting pants.  Leave all jewelry, including body piercings, and valuables at home.    Bring any devices you will need after surgery such as crutches or canes.  If you have sleep apnea, please bring your CPAP machine  In the event that your physical condition changes including the onset of a cold or respiratory illness, or if you have to delay or cancel your surgery, please notify your surgeon.

## 2024-11-19 NOTE — H&P (VIEW-ONLY)
CC:  Left knee pain    Traci Freire is a 63 y.o. female with history of Left knee pain. She had a left knee revision in . She started to have pain again earlier this year and the prosthesis was found to be loose. She had explantation  with placement of a spacer. There were more than 10 wbc's per hpf, so she was treated for infection although cultures were all negative. She has been wearing a t-scope brace locked in extension since. Pain is worse with activity and weight bearing.  Patient has experienced interference of activities of daily living due to decreased range of motion and an increase in joint pain and swelling.  Traci Freire currently ambulates using assistive device .     Relevant medical conditions of significance in perioperative period:  HTN- on medication managed by pcp  HLD- on medication managed by pcp  Anxiety- on medication managed by pcp  H/o left knee revision arthroplasty     Given documented medical comorbidities including those listed above and based off of CMS criteria patient would meet inpatient admission status guidelines. This case has been approved as inpatient.     Past Medical History:   Diagnosis Date    Anxiety     Arthritis     Cancer breast    Right- Bilateral mastectomy- May 2005- had breast reconstruction- mother  of breast cancer    HLD (hyperlipidemia)     Hypertension     diagnosed age late 40's     IBS (irritable bowel syndrome)     Lyme disease     arthritis of hands. Felt like flu- age early 30's- got it  after going to [place]    Sleep apnea     NO MACHINE       Past Surgical History:   Procedure Laterality Date    ABDOMINAL SURGERY      BLADDER SUSPENSION      BREAST SURGERY      CARPAL TUNNEL RELEASE Left 2023    Procedure: Left carpal tunnel release;  Surgeon: Roberto Cheung MD;  Location: [facility] OR;  Service: Orthopedics;  Laterality: Left;     SECTION      CYSTOSCOPY      avoids greens . ? Interstitial cystitis     ESOPHAGOGASTRODUODENOSCOPY N/A 8/10/2023    Procedure: EGD (ESOPHAGOGASTRODUODENOSCOPY);  Surgeon: Gerard Hernandez MD;  Location: [facility];  Service: Endoscopy;  Laterality: N/A;    HYSTERECTOMY      followed by removal of ovaries  from scar tissue    INCISIONAL HERNIA REPAIR Right 2008    RLQ    JOINT REPLACEMENT      Left total knee replacement-[facility] -     KNEE SURGERY Left     TKR    MODIFIED RADICAL MASTECTOMY W/ AXILLARY LYMPH NODE DISSECTION Right 05/10/2005    w/free flap    NISSEN FUNDOPLICATION      REVISION OF KNEE ARTHROPLASTY Left 2024    Procedure: REVISION, ARTHROPLASTY, KNEE: LEFT;  Surgeon: Chris Estevez MD;  Location: [address];  Service: Orthopedics;  Laterality: Left;    ROBOT-ASSISTED LAPAROSCOPIC REPAIR OF VENTRAL HERNIA N/A 2021    Procedure: ROBOTIC REPAIR, HERNIA, VENTRAL;  Surgeon: John Johnson III, MD;  Location: [facility];  Service: General;  Laterality: N/A;    SIMPLE MASTECTOMY Left 05/10/2005    w/free flap    TONSILLECTOMY         Family History   Problem Relation Name Age of Onset    Cancer Mother           of breast cancer, also had gynecological cancer- had breast cancer that spread to the brain    Heart disease Father          in his 60's-  in his 70's    Breast cancer Daughter         Review of patient's allergies indicates:   Allergen Reactions    Macrobid [nitrofurantoin monohyd/m-cryst]     Bactrim [sulfamethoxazole-trimethoprim] Nausea And Vomiting         Current Outpatient Medications:     FLUoxetine 20 MG capsule, Take 1 capsule (20 mg total) by mouth once daily., Disp: 90 capsule, Rfl: 1    hydroCHLOROthiazide (HYDRODIURIL) 25 MG tablet, Take 1 tablet (25 mg total) by mouth once daily., Disp: 90 tablet, Rfl: 1    irbesartan (AVAPRO) 300 MG tablet, Take 1 tablet (300 mg total) by mouth every evening., Disp: 90 tablet, Rfl: 3    pantoprazole (PROTONIX) 40 MG tablet, Take 1 tablet (40 mg  total) by mouth 2 (two) times daily., Disp: 60 tablet, Rfl: 11    rosuvastatin (CRESTOR) 20 MG tablet, Take 1 tablet (20 mg total) by mouth every evening., Disp: 90 tablet, Rfl: 1    zolpidem (AMBIEN) 10 mg Tab, Take 1 tablet (10 mg total) by mouth every evening., Disp: 90 tablet, Rfl: 1    Review of Systems:  Constitutional: no fever or chills  Eyes: no visual changes  ENT: no nasal congestion or sore throat  Respiratory: no cough or shortness of breath  Cardiovascular: no chest pain or palpitations  Gastrointestinal: no nausea or vomiting, tolerating diet  Genitourinary: no hematuria or dysuria  Integument/Breast: no rash or pruritis  Hematologic/Lymphatic: no easy bruising or lymphadenopathy  Musculoskeletal: positive for knee pain  Neurological: no seizures or tremors  Behavioral/Psych: no auditory or visual hallucinations  Endocrine: no heat or cold intolerance    PE:  LMP 05/09/1992   General: Pleasant, cooperative, NAD   Gait: antalgic  HEENT: NCAT, sclera nonicteric   Lungs: Respirations clear bilaterally; equal and unlabored.   CV: S1S2; 2+ bilateral upper and lower extremity pulses.   Skin: Intact throughout with no rashes, erythema, or lesions  Extremities: No LE edema,  no erythema or warmth of the skin in either lower extremity.    Left knee exam:  Knee Range of Motion:currently in a brace locked in extension. pain with passive range of motion  Effusion:yes  Condition of skin:intact  Location of tenderness:Lateral joint line   Strength:limited by pain    Hip Examination: painless PROM of hip     Radiographs: Radiographs reveal advanced degenerative changes including subchondral cyst formation, subchondral sclerosis, osteophyte formation, joint space narrowing.     Diagnosis: Prosthetic joint infection, left knee    Plan: Left knee revision arthroplasty    Due to the serious nature of total joint infection and the high prevalence of community acquired MRSA, vancomycin will be used  perioperatively.

## 2024-11-19 NOTE — OUTPATIENT SUBJECTIVE & OBJECTIVE
Outpatient Subjective & Objective      Chief Complaint: Preoperative evaulation, perioperative medical management, and complication reduction plan.     Functional Capacity: limited mobility due to left knee pain. Was very active before surgery in 2024.      Anesthesia issues: None    Difficulty mouth opening: No    Steroid use in the last 12 months:  No    Dental Issues: None    Family anesthesia difficulty: None       Family Hx of Thrombosis: None    Past Medical History:   Diagnosis Date    Anxiety     Arthritis     Cancer breast    Right- Bilateral mastectomy- May 2005- had breast reconstruction- mother  of breast cancer    HLD (hyperlipidemia)     Hypertension     diagnosed age late 40's     IBS (irritable bowel syndrome)     Lyme disease     arthritis of hands. Felt like flu- age early 30s- got it  after going to [place]    Sleep apnea     NO MACHINE         Past Medical History Pertinent Negatives:   Diagnosis Date Noted    Asthma 2024    COPD (chronic obstructive pulmonary disease) 2024    Coronary artery disease 2024    Deep vein thrombosis 2024    Diabetes mellitus, type 2 2024    Disorder of kidney and ureter 2024    Encounter for blood transfusion 2021    Myocardial infarction 2024    Pulmonary embolism 2024    Stroke 2024    Thyroid disease 2024         Past Surgical History:   Procedure Laterality Date    ABDOMINAL SURGERY      BLADDER SUSPENSION      BREAST SURGERY      CARPAL TUNNEL RELEASE Left 2023    Procedure: Left carpal tunnel release;  Surgeon: Roberto Cheung MD;  Location: [facility];  Service: Orthopedics;  Laterality: Left;     SECTION      CYSTOSCOPY      avoids greens . ? Interstitial cystitis    ESOPHAGOGASTRODUODENOSCOPY N/A 8/10/2023    Procedure: EGD (ESOPHAGOGASTRODUODENOSCOPY);  Surgeon: Gerard Hernandez MD;  Location: [facility];  Service: Endoscopy;  Laterality: N/A;     HYSTERECTOMY  1995    followed by removal of ovaries 1996 from scar tissue    INCISIONAL HERNIA REPAIR Right 03/31/2008    RLQ    JOINT REPLACEMENT      Left total knee replacement-[facility] - July 17 th 2013    KNEE SURGERY Left 2014    TKR    MODIFIED RADICAL MASTECTOMY W/ AXILLARY LYMPH NODE DISSECTION Right 05/10/2005    w/free flap    NISSEN FUNDOPLICATION  2000    REVISION OF KNEE ARTHROPLASTY Left 9/19/2024    Procedure: REVISION, ARTHROPLASTY, KNEE: LEFT;  Surgeon: Chris Estevez MD;  Location: [facility] OR [address] [place] [address];  Service: Orthopedics;  Laterality: Left;    ROBOT-ASSISTED LAPAROSCOPIC REPAIR OF VENTRAL HERNIA N/A 11/5/2021    Procedure: ROBOTIC REPAIR, HERNIA, VENTRAL;  Surgeon: John Johnson III, MD;  Location: [facility];  Service: General;  Laterality: N/A;    SIMPLE MASTECTOMY Left 05/10/2005    w/free flap    TONSILLECTOMY         Review of Systems   Constitutional:  Negative for chills, fatigue, fever and unexpected weight change.   HENT:  Negative for congestion, hearing loss, rhinorrhea, sore throat, tinnitus and trouble swallowing.    Eyes:  Negative for visual disturbance.   Respiratory:  Negative for cough, chest tightness, shortness of breath and wheezing.    Cardiovascular:  Positive for leg swelling (bilateral). Negative for chest pain and palpitations.   Gastrointestinal:  Negative for constipation and diarrhea.        Denies Fatty liver, Hepatitis   Genitourinary:  Negative for decreased urine volume, difficulty urinating, dysuria, frequency, hematuria and urgency.   Musculoskeletal:  Positive for arthralgias (left knee) and back pain (lumbar). Negative for neck pain and neck stiffness.   Skin:  Negative for rash and wound.   Neurological:  Positive for numbness (right hand; mainly in ring and little fingers). Negative for dizziness, syncope, weakness and headaches.   Hematological:  Does not bruise/bleed easily.   Psychiatric/Behavioral:  Negative for sleep disturbance and  "suicidal ideas.               VITALS  Visit Vitals  BP (!) 117/59 (BP Location: Left arm, Patient Position: Sitting)   Pulse 86   Temp 98.9 °F (37.2 °C) (Oral)   Ht 5' 1" (1.549 m)   Wt 80.7 kg (178 lb)   LMP 1992   SpO2 98%   BMI 33.63 kg/m²          Physical Exam  Vitals reviewed.   Constitutional:       General: She is not in acute distress.     Appearance: She is well-developed. She is obese.   HENT:      Head: Normocephalic.      Nose: Nose normal.      Mouth/Throat:      Pharynx: No oropharyngeal exudate.   Eyes:      General:         Right eye: No discharge.         Left eye: No discharge.      Conjunctiva/sclera: Conjunctivae normal.      Pupils: Pupils are equal, round, and reactive to light.   Neck:      Thyroid: No thyromegaly.      Vascular: No carotid bruit or JVD.      Trachea: No tracheal deviation.   Cardiovascular:      Rate and Rhythm: Normal rate and regular rhythm.      Pulses:           Carotid pulses are 2+ on the right side and 2+ on the left side.       Dorsalis pedis pulses are 2+ on the right side and 2+ on the left side.        Posterior tibial pulses are 2+ on the right side and 2+ on the left side.      Heart sounds: Murmur (heard best at the apex) heard.   Pulmonary:      Effort: Pulmonary effort is normal. No respiratory distress.      Breath sounds: Normal breath sounds. No stridor. No wheezing, rhonchi or rales.   Abdominal:      General: Bowel sounds are normal. There is no distension.      Palpations: Abdomen is soft.      Tenderness: There is no abdominal tenderness. There is no guarding.   Musculoskeletal:      Cervical back: Normal range of motion. No pain with movement.      Right lower leg: Edema (trace) present.      Left lower le+ Edema present.   Lymphadenopathy:      Cervical: No cervical adenopathy.   Skin:     General: Skin is warm and dry.      Capillary Refill: Capillary refill takes less than 2 seconds.      Findings: No erythema or rash.   Neurological:     "  Mental Status: She is alert and oriented to person, place, and time.   Psychiatric:         Behavior: Behavior normal. Behavior is cooperative.          Significant Labs:  Lab Results   Component Value Date    WBC 7.92 10/28/2024    HGB 10.9 (L) 10/28/2024    HCT 34.1 (L) 10/28/2024     10/28/2024    CHOL 189 04/08/2024    TRIG 193 (H) 04/08/2024    HDL 44 (L) 04/08/2024    ALT 21 10/28/2024    AST 20 10/28/2024     10/28/2024    K 3.7 10/28/2024     10/28/2024    CREATININE 0.8 10/28/2024    BUN 15 10/28/2024    CO2 24 10/28/2024    TSH 2.600 08/09/2023    INR 0.9 11/19/2024    HGBA1C 5.7 (H) 08/26/2024    MICROALBUR 1.2 04/08/2024           EKG:   Results for orders placed or performed during the hospital encounter of 08/26/24   EKG 12-lead    Collection Time: 08/26/24 10:25 AM   Result Value Ref Range    QRS Duration 78 ms    OHS QTC Calculation 414 ms    Narrative    Test Reason : Z01.818,    Vent. Rate : 061 BPM     Atrial Rate : 061 BPM     P-R Int : 178 ms          QRS Dur : 078 ms      QT Int : 412 ms       P-R-T Axes : 050 -07 014 degrees     QTc Int : 414 ms    Normal sinus rhythm  Normal ECG  When compared with ECG of 09-AUG-2023 12:53,  No significant change was found  Confirmed by Dina Schuster MD ([id number]) on 8/26/2024 11:51:23 AM    Referred By: AVEL HURST           Confirmed By:Dina Schuster MD       2D ECHO:  TTE:  Results for orders placed or performed during the hospital encounter of 09/03/24   Echo   Result Value Ref Range    BSA 1.87 m2    LVOT stroke volume 101.61 cm3    LVIDd 3.78 3.5 - 6.0 cm    LV Systolic Volume 22.33 mL    LV Systolic Volume Index 12.4 mL/m2    LVIDs 2.50 2.1 - 4.0 cm    LV Diastolic Volume 61.20 mL    LV Diastolic Volume Index 34.00 mL/m2    Left Ventricular End Systolic Volume by Teichholz Method 22.33 mL    Left Ventricular End Diastolic Volume by Teichholz Method 61.20 mL    IVS 1.23 (A) 0.6 - 1.1 cm    LVOT diameter 2.05 cm    LVOT area 3.3  "cm2    FS 34 28 - 44 %    Left Ventricle Relative Wall Thickness 0.63 cm    PW 1.19 (A) 0.6 - 1.1 cm    LV mass 153.95 g    LV Mass Index 86 g/m2    MV Peak E Cedrick 0.65 m/s    TDI LATERAL 0.06 m/s    TDI SEPTAL 0.05 m/s    E/E' ratio 11.82 m/s    MV Peak A Cedrick 1.03 m/s    TR Max Cedrick 2.37 m/s    E/A ratio 0.63     IVRT 71.36 msec    E wave deceleration time 259.75 msec    MV "A" wave duration [id number] msec    LV SEPTAL E/E' RATIO 13.00 m/s    LV LATERAL E/E' RATIO 10.83 m/s    LVOT peak cedrick 1.55 m/s    Left Ventricular Outflow Tract Mean Velocity 1.17 cm/s    Left Ventricular Outflow Tract Mean Gradient 5.92 mmHg    RV S' 9.83 cm/s    TAPSE 1.47 cm    RV/LV Ratio 0.56 cm    LA size 3.49 cm    Left Atrium Minor Axis 4.64 cm    Left Atrium Major Axis 4.80 cm    RA Major Axis 4.52 cm    AV mean gradient 25 mmHg    AV peak gradient 44 mmHg    Ao peak cedrick 3.30 m/s    Ao VTI 67.50 cm    LVOT peak VTI 30.80 cm    AV valve area 1.51 cm²    AV Velocity Ratio 0.47     AV index (prosthetic) 0.46     BECCA by Velocity Ratio 1.55 cm²    MV mean gradient 2 mmHg    MV peak gradient 4 mmHg    MV stenosis pressure 1/2 time 86.90 ms    MV valve area p 1/2 method 2.53 cm2    MV valve area by continuity eq 4.15 cm2    MV VTI 24.5 cm    Triscuspid Valve Regurgitation Peak Gradient 22 mmHg    PV PEAK VELOCITY 1.22 m/s    PV peak gradient 6 mmHg    Pulmonary Valve Mean Velocity 0.87 m/s    Ao root annulus 2.76 cm    IVC diameter 1.77 cm    Mean e' 0.06 m/s    ZLVIDS -1.64     ZLVIDD -2.75     RVDD 2.13 cm    AORTIC VALVE CUSP SEPERATION 0.52 cm    TV resting pulmonary artery pressure 25 mmHg    RV TB RVSP 5 mmHg    Est. RA pres 3 mmHg    Narrative      Left Ventricle: The left ventricle is normal in size. Mildly increased   wall thickness. There is mild concentric hypertrophy. There is normal   systolic function with a visually estimated ejection fraction of 60 - 65%.   There is normal diastolic function.    Right Ventricle: Normal " right ventricular cavity size. Wall thickness   is normal. Systolic function is normal.    Aortic Valve: There is mild aortic valve sclerosis.    IVC/SVC: Normal venous pressure at 3 mmHg.         Nuclear Stress Test 6/22/20  IMPRESSION:  1. Normal exam. No scintigraphic evidence of left ventricular  myocardial ischemia.  2. Normal left ventricular wall motion.  3. Calculated left ventricular ejection fraction of 70 %.     Electronically Signed by Jaciel Horan M.D. on 6/22/2020 11:06 AM  The EKG portion of this study is negative for ischemia.    The patient reported no chest pain during the stress test.    There were no arrhythmias during stress.    ECG Stress Nuclear portion of this study will be reported separately.            Active Cardiac Conditions: None      Revised Cardiac Risk Index   High -Risk Surgery  Intraperitoneal; Intrathoracic; suprainguinal vascular Yes- + 1 No- 0   History of Ischemic Heart Disease   (Hx of MI/positive exercise test/current chest pain due to ischemia/use of nitrate therapy/EKG with pathological Q waves) Yes- + 1 No- 0   History of CHF  (Pulmonary edema/bilateral rales or S3 gallop/PND/CXR showing pulmonary vascular redistribution) Yes- + 1 No- 0   History of CVA   (Prior stroke or TIA) Yes- + 1 No- 0   Pre-operative treatment with insulin Yes- + 1 No- 0   Pre-operative creatinine > 2mg/dl Yes- + 1 No- 0   Total: 0      Risk Status:  Estimated risk of cardiac complications after non-cardiac surgery using the Revised Cardiac Risk Index for Preoperative risk is 3.9 %      ARISCAT (Canet) risk index: Low: 1.6% risk of post-op pulmonary complications; Intermediate: 13.3% risk of post-op pulmonary complications if surgery is > 3 hours.     American Society of Anesthesiologists Physical Status classification (ASA): 3             Outpatient Subjective & Objective

## 2024-11-21 ENCOUNTER — ANESTHESIA EVENT (OUTPATIENT)
Dept: SURGERY | Facility: HOSPITAL | Age: 64
End: 2024-11-21
Payer: COMMERCIAL

## 2024-12-03 DIAGNOSIS — Z96.652 S/P REVISION OF TOTAL KNEE, LEFT: Primary | ICD-10-CM

## 2024-12-03 RX ORDER — OXYCODONE HYDROCHLORIDE 5 MG/1
TABLET ORAL
Qty: 50 TABLET | Refills: 0 | Status: SHIPPED | OUTPATIENT
Start: 2024-12-03

## 2024-12-03 RX ORDER — METHOCARBAMOL 750 MG/1
750 TABLET, FILM COATED ORAL 4 TIMES DAILY PRN
Qty: 40 TABLET | Refills: 0 | Status: SHIPPED | OUTPATIENT
Start: 2024-12-03

## 2024-12-03 RX ORDER — AMOXICILLIN 250 MG
1 CAPSULE ORAL DAILY
Qty: 30 TABLET | Refills: 0 | Status: SHIPPED | OUTPATIENT
Start: 2024-12-03

## 2024-12-03 RX ORDER — PANTOPRAZOLE SODIUM 40 MG/1
40 TABLET, DELAYED RELEASE ORAL DAILY
Qty: 30 TABLET | Refills: 0 | Status: SHIPPED | OUTPATIENT
Start: 2024-12-03

## 2024-12-03 RX ORDER — ASPIRIN 81 MG/1
81 TABLET ORAL 2 TIMES DAILY
Qty: 60 TABLET | Refills: 0 | Status: SHIPPED | OUTPATIENT
Start: 2024-12-03 | End: 2025-01-02

## 2024-12-03 RX ORDER — DEXTROMETHORPHAN HYDROBROMIDE, GUAIFENESIN 5; 100 MG/5ML; MG/5ML
650 LIQUID ORAL EVERY 8 HOURS
Qty: 120 TABLET | Refills: 0 | Status: SHIPPED | OUTPATIENT
Start: 2024-12-03

## 2024-12-03 RX ORDER — CELECOXIB 200 MG/1
200 CAPSULE ORAL DAILY
Qty: 30 CAPSULE | Refills: 0 | Status: SHIPPED | OUTPATIENT
Start: 2024-12-03

## 2024-12-04 ENCOUNTER — TELEPHONE (OUTPATIENT)
Dept: ORTHOPEDICS | Facility: CLINIC | Age: 64
End: 2024-12-04
Payer: COMMERCIAL

## 2024-12-04 NOTE — ANESTHESIA PREPROCEDURE EVALUATION
[facility]  Anesthesia Pre-Operative Evaluation         Patient Name: Traci Freire  YOB: 1960  MRN: [id number]    SUBJECTIVE:     Pre-operative evaluation for Procedure(s) (LRB):  REVISION, ARTHROPLASTY, KNEE: LEFT: COMPLEX: STAGE 2 (Left)     12/04/2024    Traci Freire is a 63 y.o. female with breast cancer s/p mastectomy, anxiety, FRANCIE, anemia, HTN, obesity (BMI 34). Last knee revision done with CSE (5.5/10) and left adductor canal block.    Patient now presents for the above procedure(s).    Echo Summary  Results for orders placed during the hospital encounter of 09/03/24    Echo    Interpretation Summary    Left Ventricle: The left ventricle is normal in size. Mildly increased wall thickness. There is mild concentric hypertrophy. There is normal systolic function with a visually estimated ejection fraction of 60 - 65%. There is normal diastolic function.    Right Ventricle: Normal right ventricular cavity size. Wall thickness is normal. Systolic function is normal.    Aortic Valve: There is mild aortic valve sclerosis.    IVC/SVC: Normal venous pressure at 3 mmHg.       Prev airway:Intubation     Date/Time: 11/5/2021 7:43 AM  Performed by: Kira Vargas CRNA  Authorized by: Kira Vargas CRNA      Intubation:     Induction:  Intravenous    Intubated:  Postinduction    Mask Ventilation:  Easy with oral airway    Attempts:  1    Attempted By:  CRNA    Method of Intubation:  Video laryngoscopy    Blade:  Ching 3    Laryngeal View Grade: Grade I - full view of cords      Difficult Airway Encountered?: No      Complications:  None    Airway Device:  Oral endotracheal tube    Airway Device Size:  7.0    Style/Cuff Inflation:  Cuffed (inflated to minimal occlusive pressure)    Tube secured:  23    Secured at:  The lips    Placement Verified By:  Capnometry    Complicating Factors:  Obesity, short neck and oropharyngeal edema or fat    Findings Post-Intubation:  BS equal  bilateral and atraumatic/condition of teeth unchanged    LDA:   PICC Double Lumen 09/20/24 1640 left basilic (Active)   Number of days: 74         Patient Active Problem List   Diagnosis    Arthralgia of cervical spine    HTN (hypertension)    HLD (hyperlipidemia)    Obesity    Edema    Abnormal EKG    Hx of breast cancer    Dysphagia    Prediabetes    Anxiety    Failed total knee arthroplasty Left, s/p explant and spacer placement 9/19/24    Pre-op exam    Facet arthropathy, lumbar    Rib pain on left side    Dema hump    Insomnia    Failed total left knee replacement    H/O bilateral mastectomy    FRANCIE (obstructive sleep apnea)    Depression    BMI 33.0-33.9,adult    Suspected COVID-19 virus infection    Incisional hernia, without obstruction or gangrene    Gastroenteritis    Carpal tunnel syndrome on left    Varicose veins of both lower extremities    Chronic pain of left knee    Impaired range of motion of left knee    Impaired functional mobility and activity tolerance    Fatty liver    Infective arthritis of left knee    S/P revision of total knee, left    Normocytic anemia       Review of patient's allergies indicates:   Allergen Reactions    Macrobid [nitrofurantoin monohyd/m-cryst]     Bactrim [sulfamethoxazole-trimethoprim] Nausea And Vomiting       Current Inpatient Medications:      No current facility-administered medications on file prior to encounter.     Current Outpatient Medications on File Prior to Encounter   Medication Sig Dispense Refill    FLUoxetine 20 MG capsule Take 1 capsule (20 mg total) by mouth once daily. 90 capsule 1    hydroCHLOROthiazide (HYDRODIURIL) 25 MG tablet Take 1 tablet (25 mg total) by mouth once daily. 90 tablet 1    irbesartan (AVAPRO) 300 MG tablet Take 1 tablet (300 mg total) by mouth every evening. 90 tablet 3    pantoprazole (PROTONIX) 40 MG tablet Take 1 tablet (40 mg total) by mouth 2 (two) times daily. 60 tablet 11    rosuvastatin (CRESTOR) 20 MG tablet Take 1  tablet (20 mg total) by mouth every evening. 90 tablet 1       Past Surgical History:   Procedure Laterality Date    ABDOMINAL SURGERY      BLADDER SUSPENSION      BREAST SURGERY      CARPAL TUNNEL RELEASE Left 2023    Procedure: Left carpal tunnel release;  Surgeon: Roberto Cheung MD;  Location: [facility];  Service: Orthopedics;  Laterality: Left;     SECTION      CYSTOSCOPY      avoids greens . ? Interstitial cystitis    ESOPHAGOGASTRODUODENOSCOPY N/A 8/10/2023    Procedure: EGD (ESOPHAGOGASTRODUODENOSCOPY);  Surgeon: Gerard Hernandez MD;  Location: [facility];  Service: Endoscopy;  Laterality: N/A;    HYSTERECTOMY      followed by removal of ovaries  from scar tissue    INCISIONAL HERNIA REPAIR Right 2008    RLQ    [facility]EE [facility]GERY Left     TKR    MODIFIED RADICAL MASTECTOMY W/ AXILLARY LYMPH NODE DISSECTION Right 05/10/2005    w/free flap    NISSEN FUNDOPLICATION      REVISION OF KNEE ARTHROPLASTY Left 2024    Procedure: REVISION, ARTHROPLASTY, KNEE: LEFT;  Surgeon: Chris Estevez MD;  Location: [address];  Service: Orthopedics;  Laterality: Left;    ROBOT-ASSISTED LAPAROSCOPIC REPAIR OF VENTRAL HERNIA N/A 2021    Procedure: ROBOTIC REPAIR, HERNIA, VENTRAL;  Surgeon: John Johnson III, MD;  Location[facility];  Service: General;  Laterality: N/A;    SIMPLE MASTECTOMY Left 05/10/2005    w/free flap    TONSILLECTOMY         Social History:  Tobacco Use: Medium Risk (2024)    Patient History     Smoking Tobacco Use: Former     Smokeless Tobacco Use: Never     Passive Exposure: Not on file      Alcohol Use: Not At Risk (2024)    AUDIT-C     Frequency of Alcohol Consumption: Never     Average Number of Drinks: Patient does not drink     Frequency of Binge Drinking: Never        OBJECTIVE:     Vital Signs Range (Last 24H):         Significant Labs:  Lab  Results   Component Value Date    WBC 7.92 10/28/2024    HGB 10.9 (L) 10/28/2024    HCT 34.1 (L) 10/28/2024     10/28/2024    CHOL 189 04/08/2024    TRIG 193 (H) 04/08/2024    HDL 44 (L) 04/08/2024    ALT 21 10/28/2024    AST 20 10/28/2024     10/28/2024    K 3.7 10/28/2024     10/28/2024    CREATININE 0.8 10/28/2024    BUN 15 10/28/2024    CO2 24 10/28/2024    TSH 2.600 08/09/2023    INR 0.9 11/19/2024    HGBA1C 5.7 (H) 08/26/2024    MICROALBUR 1.2 04/08/2024       Diagnostic Studies: No relevant studies.    EKG:   Results for orders placed or performed during the hospital encounter of 08/26/24   EKG 12-lead    Collection Time: 08/26/24 10:25 AM   Result Value Ref Range    QRS Duration 78 ms    OHS QTC Calculation 414 ms    Narrative    Test Reason : Z01.818,    Vent. Rate : 061 BPM     Atrial Rate : 061 BPM     P-R Int : 178 ms          QRS Dur : 078 ms      QT Int : 412 ms       P-R-T Axes : 050 -07 014 degrees     QTc Int : 414 ms    Normal sinus rhythm  Normal ECG  When compared with ECG of 09-AUG-2023 12:53,  No significant change was found  Confirmed by Dina Schuster MD ([id number]) on 8/26/2024 11:51:23 AM    Referred By: AVEL HURST           Confirmed By:Dina Schuster MD       2D ECHO:  TTE:  Results for orders placed or performed during the hospital encounter of 09/03/24   Echo   Result Value Ref Range    BSA 1.87 m2    LVOT stroke volume 101.61 cm3    LVIDd 3.78 3.5 - 6.0 cm    LV Systolic Volume 22.33 mL    LV Systolic Volume Index 12.4 mL/m2    LVIDs 2.50 2.1 - 4.0 cm    LV Diastolic Volume 61.20 mL    LV Diastolic Volume Index 34.00 mL/m2    Left Ventricular End Systolic Volume by Teichholz Method 22.33 mL    Left Ventricular End Diastolic Volume by Teichholz Method 61.20 mL    IVS 1.23 (A) 0.6 - 1.1 cm    LVOT diameter 2.05 cm    LVOT area 3.3 cm2    FS 34 28 - 44 %    Left Ventricle Relative Wall Thickness 0.63 cm    PW 1.19 (A) 0.6 - 1.1 cm    LV mass 153.95 g    LV Mass Index  "86 g/m2    MV Peak E Cedrick 0.65 m/s    TDI LATERAL 0.06 m/s    TDI SEPTAL 0.05 m/s    E/E' ratio 11.82 m/s    MV Peak A Cedrick 1.03 m/s    TR Max Cedrick 2.37 m/s    E/A ratio 0.63     IVRT 71.36 msec    E wave deceleration time 259.75 msec    MV "A" wave duration [id number] msec    LV SEPTAL E/E' RATIO 13.00 m/s    LV LATERAL E/E' RATIO 10.83 m/s    LVOT peak cedrick 1.55 m/s    Left Ventricular Outflow Tract Mean Velocity 1.17 cm/s    Left Ventricular Outflow Tract Mean Gradient 5.92 mmHg    RV S' 9.83 cm/s    TAPSE 1.47 cm    RV/LV Ratio 0.56 cm    LA size 3.49 cm    Left Atrium Minor Axis 4.64 cm    Left Atrium Major Axis 4.80 cm    RA Major Axis 4.52 cm    AV mean gradient 25 mmHg    AV peak gradient 44 mmHg    Ao peak cedrick 3.30 m/s    Ao VTI 67.50 cm    LVOT peak VTI 30.80 cm    AV valve area 1.51 cm²    AV Velocity Ratio 0.47     AV index (prosthetic) 0.46     BECCA by Velocity Ratio 1.55 cm²    MV mean gradient 2 mmHg    MV peak gradient 4 mmHg    MV stenosis pressure 1/2 time 86.90 ms    MV valve area p 1/2 method 2.53 cm2    MV valve area by continuity eq 4.15 cm2    MV VTI 24.5 cm    Triscuspid Valve Regurgitation Peak Gradient 22 mmHg    PV PEAK VELOCITY 1.22 m/s    PV peak gradient 6 mmHg    Pulmonary Valve Mean Velocity 0.87 m/s    Ao root annulus 2.76 cm    IVC diameter 1.77 cm    Mean e' 0.06 m/s    ZLVIDS -1.64     ZLVIDD -2.75     RVDD 2.13 cm    AORTIC VALVE CUSP SEPERATION 0.52 cm    TV resting pulmonary artery pressure 25 mmHg    RV TB RVSP 5 mmHg    Est. RA pres 3 mmHg    Narrative      Left Ventricle: The left ventricle is normal in size. Mildly increased   wall thickness. There is mild concentric hypertrophy. There is normal   systolic function with a visually estimated ejection fraction of 60 - 65%.   There is normal diastolic function.    Right Ventricle: Normal right ventricular cavity size. Wall thickness   is normal. Systolic function is normal.    Aortic Valve: There is mild aortic valve " sclerosis.    IVC/SVC: Normal venous pressure at 3 mmHg.         VELIA:  No results found for this or any previous visit.    ASSESSMENT/PLAN:           Pre-op Assessment    I have reviewed the Patient Summary Reports.     I have reviewed the Nursing Notes. I have reviewed the NPO Status.   I have reviewed the Medications.     Review of Systems  Anesthesia Hx:  No problems with previous Anesthesia   History of prior surgery of interest to airway management or planning:          Denies Family Hx of Anesthesia complications.    Denies Personal Hx of Anesthesia complications.                    Cardiovascular:     Hypertension   Denies MI.  Denies CAD.                                          Pulmonary:        Sleep Apnea                Neurological:    Neuromuscular Disease,                                   Endocrine:        Obesity / BMI > 30  Psych:  Psychiatric History anxiety depression                   Anesthesia Plan  Type of Anesthesia, risks & benefits discussed:    Anesthesia Type: Gen ETT, Gen Supraglottic Airway, Regional, CSE, Spinal, Epidural, Gen Natural Airway  Intra-op Monitoring Plan: Standard ASA Monitors  Post Op Pain Control Plan: multimodal analgesia, IV/PO Opioids PRN, peripheral nerve block, epidural analgesia and intrathecal opioid  Induction:  IV and rapid sequence  Airway Plan: Direct and Video, Post-Induction  Informed Consent: Informed consent signed with the Patient and all parties understand the risks and agree with anesthesia plan.  All questions answered.   ASA Score: 3  Day of Surgery Review of History & Physical: H&P Update referred to the surgeon/provider.    Ready For Surgery From Anesthesia Perspective.     .

## 2024-12-04 NOTE — TELEPHONE ENCOUNTER
I called the patient today regarding surgery on 12/05/2024 with Dr. Chris Estevez. I informed the patient that her surgery will be at  [facility] ([address] [place] [id number]). I informed the patient they must arrive at 5:00am and their surgery will start at 7:00am.     Per the [facility] COVID-19 Pre-Procedural Testing Policy (administered 10/26/2022), patients do not need tested for COVID-19 regardless of vaccination status.    I reminded the patient that they cannot eat or drink after midnight, the night before surgery.      I reminded the patient to be careful of their skin over the next few days to make sure they do not get any cuts, scratches or scrapes.    The patient that they have a walker, bedside commode and shower chair from a previous surgery and do not need another order for them.    The patient verbalized understanding and has no further questions.

## 2024-12-05 ENCOUNTER — ANESTHESIA (OUTPATIENT)
Dept: SURGERY | Facility: HOSPITAL | Age: 64
End: 2024-12-05
Payer: COMMERCIAL

## 2024-12-05 ENCOUNTER — HOSPITAL ENCOUNTER (INPATIENT)
Facility: HOSPITAL | Age: 64
LOS: 2 days | Discharge: HOME | DRG: 467 | End: 2024-12-07
Attending: ORTHOPAEDIC SURGERY | Admitting: ORTHOPAEDIC SURGERY
Payer: COMMERCIAL

## 2024-12-05 DIAGNOSIS — Z01.818 PRE-OP TESTING: ICD-10-CM

## 2024-12-05 DIAGNOSIS — M79.609 PAIN IN EXTREMITY, UNSPECIFIED EXTREMITY: ICD-10-CM

## 2024-12-05 DIAGNOSIS — T84.50XA PROSTHETIC JOINT INFECTION: ICD-10-CM

## 2024-12-05 DIAGNOSIS — T84.50XD INFECTION OF PROSTHETIC JOINT, SUBSEQUENT ENCOUNTER: ICD-10-CM

## 2024-12-05 LAB
ABO + RH BLD: NORMAL
BLD GP AB SCN CELLS X3 SERPL QL: NORMAL
INR PPP: 0.9 (ref 0.8–1.2)
PROTHROMBIN TIME: 10.1 SEC (ref 9–12.5)
SPECIMEN OUTDATE: NORMAL

## 2024-12-05 PROCEDURE — 27000190 HC CPAP FULL FACE MASK W/VALVE

## 2024-12-05 PROCEDURE — 37000009 HC ANESTHESIA EA ADD 15 MINS: Performed by: ORTHOPAEDIC SURGERY

## 2024-12-05 PROCEDURE — 25000003 PHARM REV CODE 250: Performed by: NURSE PRACTITIONER

## 2024-12-05 PROCEDURE — 36000710: Performed by: ORTHOPAEDIC SURGERY

## 2024-12-05 PROCEDURE — 87070 CULTURE OTHR SPECIMN AEROBIC: CPT | Performed by: ORTHOPAEDIC SURGERY

## 2024-12-05 PROCEDURE — 94660 CPAP INITIATION&MGMT: CPT

## 2024-12-05 PROCEDURE — C1776 JOINT DEVICE (IMPLANTABLE): HCPCS | Performed by: ORTHOPAEDIC SURGERY

## 2024-12-05 PROCEDURE — 85610 PROTHROMBIN TIME: CPT | Performed by: ANESTHESIOLOGY

## 2024-12-05 PROCEDURE — 64448 NJX AA&/STRD FEM NRV NFS IMG: CPT

## 2024-12-05 PROCEDURE — 86900 BLOOD TYPING SEROLOGIC ABO: CPT | Performed by: ANESTHESIOLOGY

## 2024-12-05 PROCEDURE — 97165 OT EVAL LOW COMPLEX 30 MIN: CPT

## 2024-12-05 PROCEDURE — 88305 TISSUE EXAM BY PATHOLOGIST: CPT | Performed by: PATHOLOGY

## 2024-12-05 PROCEDURE — 36000711: Performed by: ORTHOPAEDIC SURGERY

## 2024-12-05 PROCEDURE — 97116 GAIT TRAINING THERAPY: CPT

## 2024-12-05 PROCEDURE — 71000016 HC POSTOP RECOV ADDL HR: Performed by: ORTHOPAEDIC SURGERY

## 2024-12-05 PROCEDURE — 63600175 PHARM REV CODE 636 W HCPCS: Performed by: NURSE PRACTITIONER

## 2024-12-05 PROCEDURE — C1713 ANCHOR/SCREW BN/BN,TIS/BN: HCPCS | Performed by: ORTHOPAEDIC SURGERY

## 2024-12-05 PROCEDURE — 99900035 HC TECH TIME PER 15 MIN (STAT)

## 2024-12-05 PROCEDURE — 37000008 HC ANESTHESIA 1ST 15 MINUTES: Performed by: ORTHOPAEDIC SURGERY

## 2024-12-05 PROCEDURE — 63600175 PHARM REV CODE 636 W HCPCS

## 2024-12-05 PROCEDURE — 25000003 PHARM REV CODE 250

## 2024-12-05 PROCEDURE — 87206 SMEAR FLUORESCENT/ACID STAI: CPT | Performed by: ORTHOPAEDIC SURGERY

## 2024-12-05 PROCEDURE — 0SRD0J9 REPLACEMENT OF LEFT KNEE JOINT WITH SYNTHETIC SUBSTITUTE, CEMENTED, OPEN APPROACH: ICD-10-PCS | Performed by: ORTHOPAEDIC SURGERY

## 2024-12-05 PROCEDURE — 97605 NEG PRS WND THER DME<=50SQCM: CPT | Mod: ,,, | Performed by: ORTHOPAEDIC SURGERY

## 2024-12-05 PROCEDURE — 27201423 OPTIME MED/SURG SUP & DEVICES STERILE SUPPLY: Performed by: ORTHOPAEDIC SURGERY

## 2024-12-05 PROCEDURE — 63600175 PHARM REV CODE 636 W HCPCS: Performed by: STUDENT IN AN ORGANIZED HEALTH CARE EDUCATION/TRAINING PROGRAM

## 2024-12-05 PROCEDURE — 97530 THERAPEUTIC ACTIVITIES: CPT

## 2024-12-05 PROCEDURE — 87102 FUNGUS ISOLATION CULTURE: CPT | Performed by: ORTHOPAEDIC SURGERY

## 2024-12-05 PROCEDURE — 94761 N-INVAS EAR/PLS OXIMETRY MLT: CPT

## 2024-12-05 PROCEDURE — 0SPD0JZ REMOVAL OF SYNTHETIC SUBSTITUTE FROM LEFT KNEE JOINT, OPEN APPROACH: ICD-10-PCS | Performed by: ORTHOPAEDIC SURGERY

## 2024-12-05 PROCEDURE — 0SPD0EZ REMOVAL OF ARTICULATING SPACER FROM LEFT KNEE JOINT, OPEN APPROACH: ICD-10-PCS | Performed by: ORTHOPAEDIC SURGERY

## 2024-12-05 PROCEDURE — 97161 PT EVAL LOW COMPLEX 20 MIN: CPT

## 2024-12-05 PROCEDURE — 87116 MYCOBACTERIA CULTURE: CPT | Performed by: ORTHOPAEDIC SURGERY

## 2024-12-05 PROCEDURE — 21400001 HC TELEMETRY ROOM

## 2024-12-05 PROCEDURE — 71000033 HC RECOVERY, INTIAL HOUR: Performed by: ORTHOPAEDIC SURGERY

## 2024-12-05 PROCEDURE — 27487 REVISE/REPLACE KNEE JOINT: CPT | Mod: 58,22,LT, | Performed by: ORTHOPAEDIC SURGERY

## 2024-12-05 PROCEDURE — 87075 CULTR BACTERIA EXCEPT BLOOD: CPT | Performed by: ORTHOPAEDIC SURGERY

## 2024-12-05 PROCEDURE — 71000015 HC POSTOP RECOV 1ST HR: Performed by: ORTHOPAEDIC SURGERY

## 2024-12-05 DEVICE — TOTAL STABILIZER FEMORAL COMPONENT
Type: IMPLANTABLE DEVICE | Site: KNEE | Status: FUNCTIONAL
Brand: TRIATHLON

## 2024-12-05 DEVICE — CEMENT BONE SMPLX HV GENTMYCN: Type: IMPLANTABLE DEVICE | Site: KNEE | Status: FUNCTIONAL

## 2024-12-05 DEVICE — IMPLANTABLE DEVICE: Type: IMPLANTABLE DEVICE | Site: KNEE | Status: FUNCTIONAL

## 2024-12-05 DEVICE — REVISION TIBIAL BASEPLATE
Type: IMPLANTABLE DEVICE | Site: KNEE | Status: FUNCTIONAL
Brand: TRIATHLON

## 2024-12-05 DEVICE — UNIVERSAL CEMENT RESTRICTOR
Type: IMPLANTABLE DEVICE | Site: KNEE | Status: FUNCTIONAL
Brand: RESTRICTOR

## 2024-12-05 DEVICE — FEMORAL POSTERIOR AUGMENT
Type: IMPLANTABLE DEVICE | Site: KNEE | Status: FUNCTIONAL
Brand: TRIATHLON

## 2024-12-05 DEVICE — CEMENTED STEM
Type: IMPLANTABLE DEVICE | Site: KNEE | Status: FUNCTIONAL
Brand: TRIATHLON

## 2024-12-05 RX ORDER — PHENYLEPHRINE HCL IN 0.9% NACL 1 MG/10 ML
SYRINGE (ML) INTRAVENOUS
Status: DISCONTINUED | OUTPATIENT
Start: 2024-12-05 | End: 2024-12-05

## 2024-12-05 RX ORDER — SODIUM CHLORIDE 0.9 % (FLUSH) 0.9 %
10 SYRINGE (ML) INJECTION
Status: DISCONTINUED | OUTPATIENT
Start: 2024-12-05 | End: 2024-12-05 | Stop reason: HOSPADM

## 2024-12-05 RX ORDER — TRANEXAMIC ACID 100 MG/ML
INJECTION, SOLUTION INTRAVENOUS
Status: DISCONTINUED | OUTPATIENT
Start: 2024-12-05 | End: 2024-12-05

## 2024-12-05 RX ORDER — ROPIVACAINE HYDROCHLORIDE 5 MG/ML
INJECTION, SOLUTION EPIDURAL; INFILTRATION; PERINEURAL
Status: COMPLETED | OUTPATIENT
Start: 2024-12-05 | End: 2024-12-05

## 2024-12-05 RX ORDER — CEFAZOLIN 2 G/1
2 INJECTION, POWDER, FOR SOLUTION INTRAMUSCULAR; INTRAVENOUS
Status: COMPLETED | OUTPATIENT
Start: 2024-12-05 | End: 2024-12-05

## 2024-12-05 RX ORDER — PROCHLORPERAZINE EDISYLATE 5 MG/ML
5 INJECTION INTRAMUSCULAR; INTRAVENOUS EVERY 6 HOURS PRN
Status: DISCONTINUED | OUTPATIENT
Start: 2024-12-05 | End: 2024-12-07 | Stop reason: HOSPADM

## 2024-12-05 RX ORDER — LIDOCAINE HYDROCHLORIDE 10 MG/ML
1 INJECTION, SOLUTION EPIDURAL; INFILTRATION; INTRACAUDAL; PERINEURAL
Status: COMPLETED | OUTPATIENT
Start: 2024-12-05 | End: 2024-12-05

## 2024-12-05 RX ORDER — MIDAZOLAM HYDROCHLORIDE 1 MG/ML
.5-4 INJECTION, SOLUTION INTRAMUSCULAR; INTRAVENOUS
Status: DISCONTINUED | OUTPATIENT
Start: 2024-12-05 | End: 2024-12-05

## 2024-12-05 RX ORDER — ROPIVACAINE HYDROCHLORIDE 2 MG/ML
INJECTION, SOLUTION EPIDURAL; INFILTRATION; PERINEURAL CONTINUOUS
Status: DISCONTINUED | OUTPATIENT
Start: 2024-12-05 | End: 2024-12-05

## 2024-12-05 RX ORDER — ACETAMINOPHEN 500 MG
1000 TABLET ORAL
Status: COMPLETED | OUTPATIENT
Start: 2024-12-05 | End: 2024-12-05

## 2024-12-05 RX ORDER — MORPHINE SULFATE 2 MG/ML
2 INJECTION, SOLUTION INTRAMUSCULAR; INTRAVENOUS
Status: DISCONTINUED | OUTPATIENT
Start: 2024-12-05 | End: 2024-12-05

## 2024-12-05 RX ORDER — PREGABALIN 75 MG/1
75 CAPSULE ORAL NIGHTLY
Status: DISCONTINUED | OUTPATIENT
Start: 2024-12-05 | End: 2024-12-07 | Stop reason: HOSPADM

## 2024-12-05 RX ORDER — FENTANYL CITRATE 50 UG/ML
INJECTION, SOLUTION INTRAMUSCULAR; INTRAVENOUS
Status: DISCONTINUED | OUTPATIENT
Start: 2024-12-05 | End: 2024-12-05

## 2024-12-05 RX ORDER — ACETAMINOPHEN 500 MG
1000 TABLET ORAL
Status: DISCONTINUED | OUTPATIENT
Start: 2024-12-05 | End: 2024-12-05

## 2024-12-05 RX ORDER — HYDROMORPHONE HYDROCHLORIDE 1 MG/ML
0.2 INJECTION, SOLUTION INTRAMUSCULAR; INTRAVENOUS; SUBCUTANEOUS EVERY 5 MIN PRN
Status: DISCONTINUED | OUTPATIENT
Start: 2024-12-05 | End: 2024-12-05 | Stop reason: HOSPADM

## 2024-12-05 RX ORDER — ACETAMINOPHEN 500 MG
1000 TABLET ORAL EVERY 6 HOURS
Status: DISCONTINUED | OUTPATIENT
Start: 2024-12-05 | End: 2024-12-05

## 2024-12-05 RX ORDER — LIDOCAINE HYDROCHLORIDE 20 MG/ML
INJECTION INTRAVENOUS
Status: DISCONTINUED | OUTPATIENT
Start: 2024-12-05 | End: 2024-12-05

## 2024-12-05 RX ORDER — ONDANSETRON HYDROCHLORIDE 2 MG/ML
4 INJECTION, SOLUTION INTRAVENOUS EVERY 12 HOURS PRN
Status: DISCONTINUED | OUTPATIENT
Start: 2024-12-05 | End: 2024-12-05

## 2024-12-05 RX ORDER — ACETAMINOPHEN 500 MG
1000 TABLET ORAL EVERY 8 HOURS
Status: DISCONTINUED | OUTPATIENT
Start: 2024-12-07 | End: 2024-12-07 | Stop reason: HOSPADM

## 2024-12-05 RX ORDER — ROPIVACAINE HYDROCHLORIDE 2 MG/ML
0.5 INJECTION, SOLUTION EPIDURAL; INFILTRATION; PERINEURAL CONTINUOUS
Status: DISCONTINUED | OUTPATIENT
Start: 2024-12-05 | End: 2024-12-05

## 2024-12-05 RX ORDER — MIDAZOLAM HYDROCHLORIDE 1 MG/ML
4 INJECTION, SOLUTION INTRAMUSCULAR; INTRAVENOUS
Status: DISCONTINUED | OUTPATIENT
Start: 2024-12-05 | End: 2024-12-05

## 2024-12-05 RX ORDER — ROCURONIUM BROMIDE 10 MG/ML
INJECTION, SOLUTION INTRAVENOUS
Status: DISCONTINUED | OUTPATIENT
Start: 2024-12-05 | End: 2024-12-05

## 2024-12-05 RX ORDER — AMOXICILLIN 250 MG
1 CAPSULE ORAL 2 TIMES DAILY
Status: DISCONTINUED | OUTPATIENT
Start: 2024-12-05 | End: 2024-12-07 | Stop reason: HOSPADM

## 2024-12-05 RX ORDER — ROPIVACAINE HYDROCHLORIDE 2 MG/ML
INJECTION, SOLUTION EPIDURAL; INFILTRATION; PERINEURAL CONTINUOUS
Status: DISCONTINUED | OUTPATIENT
Start: 2024-12-05 | End: 2024-12-07 | Stop reason: HOSPADM

## 2024-12-05 RX ORDER — OXYCODONE HYDROCHLORIDE 10 MG/1
10 TABLET ORAL EVERY 4 HOURS PRN
Status: DISCONTINUED | OUTPATIENT
Start: 2024-12-05 | End: 2024-12-06

## 2024-12-05 RX ORDER — METHOCARBAMOL 750 MG/1
750 TABLET, FILM COATED ORAL 3 TIMES DAILY
Status: DISCONTINUED | OUTPATIENT
Start: 2024-12-05 | End: 2024-12-05

## 2024-12-05 RX ORDER — LOSARTAN POTASSIUM 50 MG/1
100 TABLET ORAL DAILY
Status: DISCONTINUED | OUTPATIENT
Start: 2024-12-05 | End: 2024-12-07 | Stop reason: HOSPADM

## 2024-12-05 RX ORDER — PREGABALIN 75 MG/1
75 CAPSULE ORAL
Status: COMPLETED | OUTPATIENT
Start: 2024-12-05 | End: 2024-12-05

## 2024-12-05 RX ORDER — OXYCODONE HYDROCHLORIDE 10 MG/1
10 TABLET ORAL
Status: DISCONTINUED | OUTPATIENT
Start: 2024-12-05 | End: 2024-12-05

## 2024-12-05 RX ORDER — CELECOXIB 200 MG/1
200 CAPSULE ORAL DAILY
Status: DISCONTINUED | OUTPATIENT
Start: 2024-12-05 | End: 2024-12-05

## 2024-12-05 RX ORDER — PROPOFOL 10 MG/ML
VIAL (ML) INTRAVENOUS
Status: DISCONTINUED | OUTPATIENT
Start: 2024-12-05 | End: 2024-12-05

## 2024-12-05 RX ORDER — ONDANSETRON HYDROCHLORIDE 2 MG/ML
4 INJECTION, SOLUTION INTRAVENOUS EVERY 8 HOURS PRN
Status: DISCONTINUED | OUTPATIENT
Start: 2024-12-05 | End: 2024-12-07 | Stop reason: HOSPADM

## 2024-12-05 RX ORDER — HALOPERIDOL 5 MG/ML
0.5 INJECTION INTRAMUSCULAR EVERY 10 MIN PRN
Status: DISCONTINUED | OUTPATIENT
Start: 2024-12-05 | End: 2024-12-05 | Stop reason: HOSPADM

## 2024-12-05 RX ORDER — FENTANYL CITRATE 50 UG/ML
25-200 INJECTION, SOLUTION INTRAMUSCULAR; INTRAVENOUS
Status: DISCONTINUED | OUTPATIENT
Start: 2024-12-05 | End: 2024-12-05

## 2024-12-05 RX ORDER — MUPIROCIN 20 MG/G
1 OINTMENT TOPICAL
Status: COMPLETED | OUTPATIENT
Start: 2024-12-05 | End: 2024-12-05

## 2024-12-05 RX ORDER — FAMOTIDINE 20 MG/1
20 TABLET, FILM COATED ORAL 2 TIMES DAILY
Status: DISCONTINUED | OUTPATIENT
Start: 2024-12-05 | End: 2024-12-07 | Stop reason: HOSPADM

## 2024-12-05 RX ORDER — KETAMINE HCL IN 0.9 % NACL 50 MG/5 ML
SYRINGE (ML) INTRAVENOUS
Status: DISCONTINUED | OUTPATIENT
Start: 2024-12-05 | End: 2024-12-05

## 2024-12-05 RX ORDER — GLUCAGON 1 MG
1 KIT INJECTION
Status: DISCONTINUED | OUTPATIENT
Start: 2024-12-05 | End: 2024-12-05 | Stop reason: HOSPADM

## 2024-12-05 RX ORDER — OXYCODONE HYDROCHLORIDE 5 MG/1
5 TABLET ORAL EVERY 4 HOURS PRN
Status: DISCONTINUED | OUTPATIENT
Start: 2024-12-05 | End: 2024-12-06

## 2024-12-05 RX ORDER — TALC
6 POWDER (GRAM) TOPICAL NIGHTLY PRN
Status: DISCONTINUED | OUTPATIENT
Start: 2024-12-05 | End: 2024-12-05 | Stop reason: HOSPADM

## 2024-12-05 RX ORDER — CELECOXIB 200 MG/1
200 CAPSULE ORAL DAILY
Status: DISCONTINUED | OUTPATIENT
Start: 2024-12-06 | End: 2024-12-07 | Stop reason: HOSPADM

## 2024-12-05 RX ORDER — OXYCODONE HYDROCHLORIDE 5 MG/1
5 TABLET ORAL
Status: DISCONTINUED | OUTPATIENT
Start: 2024-12-05 | End: 2024-12-05

## 2024-12-05 RX ORDER — DEXMEDETOMIDINE HYDROCHLORIDE 100 UG/ML
INJECTION, SOLUTION INTRAVENOUS
Status: DISCONTINUED | OUTPATIENT
Start: 2024-12-05 | End: 2024-12-05

## 2024-12-05 RX ORDER — FLUOXETINE HYDROCHLORIDE 20 MG/1
20 CAPSULE ORAL DAILY
Status: DISCONTINUED | OUTPATIENT
Start: 2024-12-06 | End: 2024-12-07 | Stop reason: HOSPADM

## 2024-12-05 RX ORDER — ZOLPIDEM TARTRATE 5 MG/1
5 TABLET ORAL NIGHTLY PRN
Status: DISCONTINUED | OUTPATIENT
Start: 2024-12-05 | End: 2024-12-07 | Stop reason: HOSPADM

## 2024-12-05 RX ORDER — SODIUM CHLORIDE 9 MG/ML
INJECTION, SOLUTION INTRAVENOUS CONTINUOUS
Status: ACTIVE | OUTPATIENT
Start: 2024-12-05 | End: 2024-12-06

## 2024-12-05 RX ORDER — ASPIRIN 81 MG/1
81 TABLET ORAL 2 TIMES DAILY
Status: DISCONTINUED | OUTPATIENT
Start: 2024-12-05 | End: 2024-12-07 | Stop reason: HOSPADM

## 2024-12-05 RX ORDER — ACETAMINOPHEN 500 MG
1000 TABLET ORAL EVERY 6 HOURS
Status: COMPLETED | OUTPATIENT
Start: 2024-12-05 | End: 2024-12-07

## 2024-12-05 RX ORDER — CELECOXIB 200 MG/1
400 CAPSULE ORAL ONCE
Status: COMPLETED | OUTPATIENT
Start: 2024-12-05 | End: 2024-12-05

## 2024-12-05 RX ORDER — BISACODYL 10 MG/1
10 SUPPOSITORY RECTAL EVERY 12 HOURS PRN
Status: DISCONTINUED | OUTPATIENT
Start: 2024-12-05 | End: 2024-12-07 | Stop reason: HOSPADM

## 2024-12-05 RX ORDER — POLYETHYLENE GLYCOL 3350 17 G/17G
17 POWDER, FOR SOLUTION ORAL DAILY
Status: DISCONTINUED | OUTPATIENT
Start: 2024-12-05 | End: 2024-12-07 | Stop reason: HOSPADM

## 2024-12-05 RX ORDER — ONDANSETRON HYDROCHLORIDE 2 MG/ML
INJECTION, SOLUTION INTRAVENOUS
Status: DISCONTINUED | OUTPATIENT
Start: 2024-12-05 | End: 2024-12-05

## 2024-12-05 RX ORDER — METHOCARBAMOL 750 MG/1
750 TABLET, FILM COATED ORAL EVERY 8 HOURS
Status: DISCONTINUED | OUTPATIENT
Start: 2024-12-05 | End: 2024-12-07 | Stop reason: HOSPADM

## 2024-12-05 RX ORDER — FENTANYL CITRATE 50 UG/ML
100 INJECTION, SOLUTION INTRAMUSCULAR; INTRAVENOUS
Status: DISCONTINUED | OUTPATIENT
Start: 2024-12-05 | End: 2024-12-05

## 2024-12-05 RX ORDER — NALOXONE HCL 0.4 MG/ML
0.02 VIAL (ML) INJECTION
Status: DISCONTINUED | OUTPATIENT
Start: 2024-12-05 | End: 2024-12-07 | Stop reason: HOSPADM

## 2024-12-05 RX ORDER — ATORVASTATIN CALCIUM 40 MG/1
80 TABLET, FILM COATED ORAL NIGHTLY
Status: DISCONTINUED | OUTPATIENT
Start: 2024-12-05 | End: 2024-12-07 | Stop reason: HOSPADM

## 2024-12-05 RX ORDER — FENTANYL CITRATE 50 UG/ML
25 INJECTION, SOLUTION INTRAMUSCULAR; INTRAVENOUS EVERY 5 MIN PRN
Status: DISCONTINUED | OUTPATIENT
Start: 2024-12-05 | End: 2024-12-05 | Stop reason: HOSPADM

## 2024-12-05 RX ORDER — DEXAMETHASONE SODIUM PHOSPHATE 4 MG/ML
INJECTION, SOLUTION INTRA-ARTICULAR; INTRALESIONAL; INTRAMUSCULAR; INTRAVENOUS; SOFT TISSUE
Status: DISCONTINUED | OUTPATIENT
Start: 2024-12-05 | End: 2024-12-05

## 2024-12-05 RX ORDER — HYDROCHLOROTHIAZIDE 25 MG/1
25 TABLET ORAL DAILY
Status: DISCONTINUED | OUTPATIENT
Start: 2024-12-06 | End: 2024-12-07 | Stop reason: HOSPADM

## 2024-12-05 RX ORDER — SODIUM CHLORIDE 9 MG/ML
INJECTION, SOLUTION INTRAVENOUS
Status: DISCONTINUED | OUTPATIENT
Start: 2024-12-05 | End: 2024-12-05 | Stop reason: HOSPADM

## 2024-12-05 RX ORDER — MUPIROCIN 20 MG/G
1 OINTMENT TOPICAL 2 TIMES DAILY
Status: DISCONTINUED | OUTPATIENT
Start: 2024-12-05 | End: 2024-12-07 | Stop reason: HOSPADM

## 2024-12-05 RX ADMIN — Medication 100 MCG: at 11:12

## 2024-12-05 RX ADMIN — DEXAMETHASONE SODIUM PHOSPHATE 8 MG: 4 INJECTION, SOLUTION INTRAMUSCULAR; INTRAVENOUS at 07:12

## 2024-12-05 RX ADMIN — ACETAMINOPHEN 1000 MG: 500 TABLET ORAL at 01:12

## 2024-12-05 RX ADMIN — SODIUM CHLORIDE, SODIUM GLUCONATE, SODIUM ACETATE, POTASSIUM CHLORIDE, MAGNESIUM CHLORIDE, SODIUM PHOSPHATE, DIBASIC, AND POTASSIUM PHOSPHATE: .53; .5; .37; .037; .03; .012; .00082 INJECTION, SOLUTION INTRAVENOUS at 09:12

## 2024-12-05 RX ADMIN — MIDAZOLAM 2 MG: 1 INJECTION INTRAMUSCULAR; INTRAVENOUS at 06:12

## 2024-12-05 RX ADMIN — ZOLPIDEM TARTRATE 5 MG: 5 TABLET ORAL at 09:12

## 2024-12-05 RX ADMIN — ROCURONIUM BROMIDE 20 MG: 10 INJECTION, SOLUTION INTRAVENOUS at 08:12

## 2024-12-05 RX ADMIN — SENNOSIDES AND DOCUSATE SODIUM 1 TABLET: 50; 8.6 TABLET ORAL at 09:12

## 2024-12-05 RX ADMIN — TRANEXAMIC ACID 1000 MG: 100 INJECTION INTRAVENOUS at 10:12

## 2024-12-05 RX ADMIN — METHOCARBAMOL 750 MG: 750 TABLET ORAL at 09:12

## 2024-12-05 RX ADMIN — MUPIROCIN 1 G: 20 OINTMENT TOPICAL at 09:12

## 2024-12-05 RX ADMIN — Medication 10 MG: at 08:12

## 2024-12-05 RX ADMIN — PREGABALIN 75 MG: 75 CAPSULE ORAL at 09:12

## 2024-12-05 RX ADMIN — ROPIVACAINE HYDROCHLORIDE 10 ML: 5 INJECTION EPIDURAL; INFILTRATION; PERINEURAL at 06:12

## 2024-12-05 RX ADMIN — TRANEXAMIC ACID 1000 MG: 100 INJECTION INTRAVENOUS at 08:12

## 2024-12-05 RX ADMIN — OXYCODONE HYDROCHLORIDE 10 MG: 10 TABLET ORAL at 01:12

## 2024-12-05 RX ADMIN — MUPIROCIN 1 G: 20 OINTMENT TOPICAL at 06:12

## 2024-12-05 RX ADMIN — Medication 10 MG: at 09:12

## 2024-12-05 RX ADMIN — PREGABALIN 75 MG: 75 CAPSULE ORAL at 06:12

## 2024-12-05 RX ADMIN — PROPOFOL 20 MG: 10 INJECTION, EMULSION INTRAVENOUS at 10:12

## 2024-12-05 RX ADMIN — Medication 100 MCG: at 07:12

## 2024-12-05 RX ADMIN — Medication: at 11:12

## 2024-12-05 RX ADMIN — METHOCARBAMOL 750 MG: 750 TABLET ORAL at 01:12

## 2024-12-05 RX ADMIN — PROPOFOL 150 MG: 10 INJECTION, EMULSION INTRAVENOUS at 07:12

## 2024-12-05 RX ADMIN — Medication 200 MCG: at 07:12

## 2024-12-05 RX ADMIN — POLYETHYLENE GLYCOL 3350 17 G: 17 POWDER, FOR SOLUTION ORAL at 01:12

## 2024-12-05 RX ADMIN — FENTANYL CITRATE 100 MCG: 50 INJECTION, SOLUTION INTRAMUSCULAR; INTRAVENOUS at 07:12

## 2024-12-05 RX ADMIN — ROCURONIUM BROMIDE 50 MG: 10 INJECTION, SOLUTION INTRAVENOUS at 07:12

## 2024-12-05 RX ADMIN — ACETAMINOPHEN 1000 MG: 500 TABLET ORAL at 06:12

## 2024-12-05 RX ADMIN — HYDROMORPHONE HYDROCHLORIDE 0.2 MG: 1 INJECTION, SOLUTION INTRAMUSCULAR; INTRAVENOUS; SUBCUTANEOUS at 01:12

## 2024-12-05 RX ADMIN — CEFAZOLIN 2 G: 2 INJECTION, POWDER, FOR SOLUTION INTRAMUSCULAR; INTRAVENOUS at 07:12

## 2024-12-05 RX ADMIN — ATORVASTATIN CALCIUM 80 MG: 40 TABLET, FILM COATED ORAL at 09:12

## 2024-12-05 RX ADMIN — SODIUM CHLORIDE: 9 INJECTION, SOLUTION INTRAVENOUS at 12:12

## 2024-12-05 RX ADMIN — FAMOTIDINE 20 MG: 20 TABLET ORAL at 01:12

## 2024-12-05 RX ADMIN — CELECOXIB 400 MG: 200 CAPSULE ORAL at 06:12

## 2024-12-05 RX ADMIN — DEXMEDETOMIDINE 8 MCG: 200 INJECTION, SOLUTION INTRAVENOUS at 11:12

## 2024-12-05 RX ADMIN — ONDANSETRON 4 MG: 2 INJECTION INTRAMUSCULAR; INTRAVENOUS at 10:12

## 2024-12-05 RX ADMIN — Medication 200 MCG: at 11:12

## 2024-12-05 RX ADMIN — FENTANYL CITRATE 50 MCG: 50 INJECTION, SOLUTION INTRAMUSCULAR; INTRAVENOUS at 06:12

## 2024-12-05 RX ADMIN — OXYCODONE HYDROCHLORIDE 10 MG: 10 TABLET ORAL at 06:12

## 2024-12-05 RX ADMIN — CEFAZOLIN 2 G: 2 INJECTION, POWDER, FOR SOLUTION INTRAMUSCULAR; INTRAVENOUS at 04:12

## 2024-12-05 RX ADMIN — PROPOFOL 30 MG: 10 INJECTION, EMULSION INTRAVENOUS at 07:12

## 2024-12-05 RX ADMIN — SUGAMMADEX 200 MG: 100 INJECTION, SOLUTION INTRAVENOUS at 11:12

## 2024-12-05 RX ADMIN — OXYCODONE HYDROCHLORIDE 10 MG: 10 TABLET ORAL at 09:12

## 2024-12-05 RX ADMIN — SENNOSIDES AND DOCUSATE SODIUM 1 TABLET: 50; 8.6 TABLET ORAL at 01:12

## 2024-12-05 RX ADMIN — CEFAZOLIN 2 G: 2 INJECTION, POWDER, FOR SOLUTION INTRAMUSCULAR; INTRAVENOUS at 11:12

## 2024-12-05 RX ADMIN — SODIUM CHLORIDE, SODIUM GLUCONATE, SODIUM ACETATE, POTASSIUM CHLORIDE, MAGNESIUM CHLORIDE, SODIUM PHOSPHATE, DIBASIC, AND POTASSIUM PHOSPHATE: .53; .5; .37; .037; .03; .012; .00082 INJECTION, SOLUTION INTRAVENOUS at 07:12

## 2024-12-05 RX ADMIN — VANCOMYCIN HYDROCHLORIDE 1000 MG: 1 INJECTION, POWDER, LYOPHILIZED, FOR SOLUTION INTRAVENOUS at 06:12

## 2024-12-05 RX ADMIN — ASPIRIN 81 MG: 81 TABLET, COATED ORAL at 09:12

## 2024-12-05 RX ADMIN — Medication 20 MG: at 07:12

## 2024-12-05 RX ADMIN — LIDOCAINE HYDROCHLORIDE 2 MG: 10 INJECTION, SOLUTION EPIDURAL; INFILTRATION; INTRACAUDAL; PERINEURAL at 06:12

## 2024-12-05 RX ADMIN — FAMOTIDINE 20 MG: 20 TABLET ORAL at 09:12

## 2024-12-05 RX ADMIN — ROCURONIUM BROMIDE 20 MG: 10 INJECTION, SOLUTION INTRAVENOUS at 09:12

## 2024-12-05 RX ADMIN — LIDOCAINE HYDROCHLORIDE 100 MG: 20 INJECTION INTRAVENOUS at 07:12

## 2024-12-05 NOTE — ANESTHESIA PROCEDURE NOTES
Intubation    Date/Time: 12/5/2024 7:43 AM    Performed by: Osvaldo Ortiz MD  Authorized by: Manuel Patricia MD    Intubation:     Induction:  Intravenous    Intubated:  Postinduction    Mask Ventilation:  Easy with oral airway    Attempts:  1    Attempted By:  Resident anesthesiologist    Method of Intubation:  Video laryngoscopy    Blade:  Ching 3    Laryngeal View Grade: Grade I - full view of cords      Difficult Airway Encountered?: No      Complications:  None    Airway Device:  Oral endotracheal tube    Airway Device Size:  7.0    Style/Cuff Inflation:  Cuffed (inflated to minimal occlusive pressure)    Inflation Amount (mL):  8    Tube secured:  21    Secured at:  The lips    Placement Verified By:  Capnometry    Complicating Factors:  Anterior larynx and obesity    Findings Post-Intubation:  BS equal bilateral and atraumatic/condition of teeth unchanged

## 2024-12-05 NOTE — OP NOTE
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   PROCEDURES PERFORMED:  Complex revision left total knee arthroplasty with medial and lateral advancement flap closure and placement of vacuum assisted closure device modifier 22 (due to the significant amount of bone loss and the quality of the soft tissue this was an extremely complex case.  It required increased preoperative planning complex intraoperative decision making and was technically difficult.  This case took longer than a typical revision of this sort.  It classifies as a modifier 22)  SURGEON: Chris Estevez M.D.   ASSISTANT:  Fredis kapadia M.D. (RES).   ANESTHESIA:  General/regional.   SPECIMEN:  Soft tissue for culture  FINDINGS:  No gross evidence of persistent infection there was anterior bone loss and poor soft tissue quality distally.  FLUID:  2 L  BLOOD LOSS:  300 cc  COMPLICATIONS: None.   COUNTS: Correct.   DISPOSITION: Recovery Room, stable.   IMPLANTS:    Implant Name Type Inv. Item Serial No.  Lot No. LRB No. Used Action   CEMENT BONE SMPLX HV GENTMYCN - [id number]  CEMENT BONE SMPLX HV GENTMYCN  MAURA [employer] EZEQUIEL. [id number] Left 1 Implanted   CEMENT BONE SMPLX HV GENTMYCN - [id number]  CEMENT BONE SMPLX HV GENTMYCN  MAURA [employer] EZEQUIEL. [id number] Left 2 Implanted   CEMENT BONE SMPLX HV GENTMYCN - [id number]  CEMENT BONE SMPLX HV GENTMYCN  MAURA [employer] EZEQUIEL. [id number] Left 1 Implanted   PIN PINABALL HEADLESS - [id number]  PIN PINABALL HEADLESS  MAURA [employer] EZEQUIEL. [id number] Left 1 Implanted and Explanted   PLUG BONE - [id number]  PLUG BONE  MAURA [employer] EZEQUIEL. [id number] Left 2 Implanted   CEMENT BONE SMPLX HV GENTMYCN - [id number]  CEMENT BONE SMPLX HV GENTMYCN   MAURA [employer] EZEQUIEL. [id number] Left 1 Implanted   BASEPLATE TRIATHLON REV TIB 3 - [id number]  BASEPLATE TRIATHLON REV TIB 3  MAURA [employer] EZEQUIEL. [id number] Left 1 Implanted   STEM KNEE TRITHLON JEYSON 12X50 - [id number]  STEM KNEE TRITHLON JEYSON 12X50  MAURA SALES EZEQUIEL. [id number] Left 1 Implanted   Triathlon Revision Tibial Augment MARI 3 THKNS 10mm    MAURA [id number] Left 1 Implanted   Triathlon Revision Tibial Augment MARI 3 THKNS 10mm    MAURA [id number] Left 1 Implanted   SPACER POST FEMORAL SZ3 5MM - [id number]  SPACER POST FEMORAL SZ3 5MM  MAURA [employer] EZEQUIEL. [id number] Left 1 Implanted   SPACER POST FEMORAL SZ3 5MM - [id number]  SPACER POST FEMORAL SZ3 5MM  MAURA [employer] EZEQUIEL. [id number] Left 1 Implanted   COMP FEM TOTAL STABILIZED SZ 3 - [id number]  COMP FEM TOTAL STABILIZED SZ 3  MAURA [employer] EZEQUIEL. [id number] Left 1 Implanted   Triathlon Tritanium Central Femoral Cone Augment MARI 3&4  LFT    MAURA  Left 1 Implanted   Triathlon Total Knee Cemented Stem LUIZ 12mm LNTH 150mm    MAURA [id number] Left 1 Implanted   Triathlon Femoral Distal Augment - Left MARI #3 THKNS 10mm    MAURA [id number] Left 1 Implanted   Triathlon Femoral Distal Augment - Left    MAURA [id number] Left 1 Implanted   Triathlon Revision Insert X3 MARI #3 THKNS 13mm    MAURA [id number] Left 1 Implanted      INDICATIONS FOR PROCEDURE:  Ms. Freire is a 63-year-old female who had undergone removal of revision total knee components for what turned out to be a culture negative infection.  She received a course of antibiotics and had repeat infection workup completed.  It was decided to proceed with reimplantation.  She had extensive bone loss.  Treatment options were explained to her and she was aware of the complex nature of this procedure.  Risks and benefits were explained.  We decided to proceed with stage II reimplantation.  She was aware of treatment options as well as risks and  benefits.  PROCEDURE IN DETAIL:  After appropriate consent was obtained the patient was brought to the operating room.  Neuraxial anesthesia was unsuccessful.  She did receive a block/catheter prior.  General endotracheal anesthesia was then induced.  Cast padding and tourniquet applied to the proximal left thigh and the left lower extremity was prepped and draped in the usual sterile fashion.  A time-out was called.  All team members participated.  Concerns were expressed as they arose during the case. She did receive a g of tranexamic acid prior to incision and another g at closure. The limb was elevated  tourniquet was inflated.  Her prior incision was utilized.  It was extended slightly proximally.  A retinacular layer was developed although she was fairly well scarred down and it was very thin tissue medially.  We did perform a quadriceps snip.  We then reestablished the medial and lateral gutters.  Soft tissue was sent for pathology and culture.  Once adequate exposure was obtained we were confident there was no undue tension on the extensor mechanism the femoral spacer along with a dowel was removed.  She did have an area of anterior bone loss were prior window had been made an attempt to remove a femoral stem.  Once the femoral spacer was removed the tibial spacer was removed.  There was really no bone loss occurred with removal of the spacers.  We then decided to prepare the tibia 1st.  We reamed the tibia to accommodate a 12 x 50 stem.  We then reamed for a size C cone.  We sized the tibia and found to be a size 3 we made a cleanup cut.  We then prepared the tibial surface.  We had good bone in the tibial surface however due to the amount of bone loss from prior surgery as we augmented with a 10 mm augments medially and laterally.  We assembled the tibial trial.  We inserted this.  I marked the rotation and attention was then turned to the femur.  We reamed the femur to accommodate a a 12 x 150 stem.  I  believe this would provide enough fixation proximally that we will not need to do a distal femoral replacement.  She did have anterior femoral bone loss but the remainder of the femur was intact and with cone augmentation I believe this would be a solid construct.  We then made our distal cut.  She would need to 15 mm augments medially and 10 laterally.  We marked the trans epicondylar axis and sized the femur.  The femur was a size 3.  There would be with some of the femur off the bone anteriorly but that was due to the anterior bone loss.  A 2 would not have provided adequate sizing.  Once the anterior-posterior chamfer and box cuts were made we reamed for the cone.  It was a size 3 cone.  We then constructed our trial and with a 13 mm PS insert we had excellent flexion-extension gap balance.  There was no instability at full extension 30° of flexion mid flexion full flexion.  There was a shell of patella remaining.  We denervated this.  There was not enough bone to put a new insert in.  The patella tracked well through a range of motion.  At this point trial components were removed.  The bone was prepared for cementing through pulsatile lavage and drying.  Tibia was addressed 1st.  The tibial cement plug was placed followed by the tibial cone we then used a cement gun to place cement tibial canal and cemented the tibial component in place.  Following this we address the femur.  We placed the femoral cement plug then the femoral cone.  Using a cement gun we inserted the cement and then cemented the femoral component in place we used a trial insert to hold this in extension while the cement dried.  Care was taken to place both the femoral and tibial component in the proper amount of rotation.  We irrigated with a dilute Betadine solution.  We waited for the tibial cement to dry before cementing the femur.  We removed excess cement.  We then inserted the TLS insert with the locking post.  The knee was brought into  extension was once again irrigated the incision was then closed.  The proximal part of the arthrotomy and quadriceps snip were easily closed.  There was a defect soft tissue medially and using advancement flaps both medially and laterally and reapproximated this it was best we could although there was a small defect medially on the medial portion of the tibia.  The remaining incision was closed 0 Vicryl 2-0 Vicryl staples and an incisional wound VAC. she was then placed in a sterile dressing and a T scope brace locked in extension.  She may weight bear as tolerated but needs to be locked in extension for 6 weeks.  She tolerated the procedure well and there were no known complications.  She was eventually awakened extubated brought to recovery room in stable condition.

## 2024-12-05 NOTE — PT/OT/SLP EVAL
"Physical Therapy Evaluation/co eval with OT    Patient Name:  Traci Freire   MRN:  [id number]    Recommendations:     Discharge Recommendations:  (TBD)   Discharge Equipment Recommendations: hip kit   Barriers to discharge: Inaccessible home and Decreased caregiver support daughter  works during the day and grandson goes to school    Assessment:     Traci Freire is a 63 y.o. female admitted with a medical diagnosis of S/P revision of total knee, left.  She presents with the following impairments/functional limitations: weakness, impaired functional mobility, gait instability, decreased lower extremity function, orthopedic precautions, pain, decreased ROM . Pt is unsafe with functional mobility at this time due to pt requires moderate assist for bed mobility, minimal assist for transfers, and minimal assist for gait due to pain, weakness, and instability. Pt is motivated to progress with functional mobility.     Rehab Prognosis: Good; patient would benefit from acute skilled PT services to address these deficits and reach maximum level of function.    Recent Surgery: Procedure(s) (LRB):  REVISION, ARTHROPLASTY, KNEE (Left) Day of Surgery    Plan:     During this hospitalization, patient to be seen daily to address the identified rehab impairments via gait training, therapeutic activities, therapeutic exercises, neuromuscular re-education and progress toward the following goals:    Plan of Care Expires:  01/04/25    Subjective   "I was walking with TTWB before"    Pain/Comfort:  Pain Rating 1: 2/10  Location - Side 1: Left  Location - Orientation 1: generalized  Location 1: knee  Pain Addressed 1: Pre-medicate for activity, Reposition, Cessation of Activity  Pain Rating Post-Intervention 1: 2/10    Patients cultural, spiritual, [employer]beterian conflicts given the current situation: no    Living Environment:  Pt lives with her daughter and grandson in a 1 story home with 1 AISLINN  Prior to admission, patients level of function " was independent with RW with gait and daughter assisted with ADLs.  Equipment used at home: bath bench, walker, rolling, wheelchair, grab bar. Upon discharge, patient will have assistance from daughter and grandson when home.    Objective:     Communicated with nurse prior to session.  Patient found HOB elevated with blood pressure cuff, telemetry, pulse ox (continuous), perineural catheter, peripheral IV, wound vac, FCD  upon PT entry to room.    General Precautions: Standard, fall  Orthopedic Precautions:LLE weight bearing as tolerated (with HKB locked in ext)   Braces: Hinged knee brace (locked in ext)  Respiratory Status: Room air    Exams:  Cognitive Exam:  Patient is oriented to Person, Place, and Time  Sensation:    -       Intact  light/touch B LE  RLE ROM: WFL  RLE Strength: WFL except hip flex 4-/5  LLE ROM: WFL except knee NT due to pt in a HKB locked in ext  LLE Strength: Deficits: hip flex 2+/5; knee NT due to HKB; ankle DF 4-/5    Functional Mobility:  Bed Mobility:     Supine to Sit: moderate assistance  Sit to Supine: moderate assistance  Transfers:     Sit to Stand:  minimum assistance with rolling walker  Gait: 3 sidesteps with 2 steps forward/3 steps backwards and then 2 sidesteps with RW with WBAT L LE in HKB locked in ext with minimal assist. Pt performed gait with decreased step length, step to gait, and at slow pace.    AM-PAC 6 CLICK MOBILITY  Total Score:17     Treatment & Education:  Pt sat on the EOB ~ 5 min with CGA due to c/o dizziness upon sitting.   Co-treat with OT due to medical complexity of pt and need for skilled hands for safe intervention.   Patient left HOB elevated with all lines intact, call button in reach, nurse notified, and nurse present.    GOALS:   Multidisciplinary Problems       Physical Therapy Goals          Problem: Physical Therapy    Goal Priority Disciplines Outcome Interventions   Physical Therapy Goal     PT, PT/OT Progressing    Description: PT goals until  24    1. Pt supine to sit with SBA-not met  2. Pt sit to supine with SBA-not met  3. Pt sit to stand with RW with SBA-not met  4. Pt to perform gait 100ft with RW with HKB locked in extwith SBA.-not met  5. Pt to go up/down curb step with RW with WBAT L LE with HKB locked in ext with SBA.-not met  6. Pt to perform B LE exs in sitting or supine x 15 reps to strengthen B LE to improve functional mobility.-not met                         History:     Past Medical History:   Diagnosis Date    Anxiety     Arthritis     Cancer breast    Right- Bilateral mastectomy- May 2005- had breast reconstruction- mother  of breast cancer    HLD (hyperlipidemia)     Hypertension     diagnosed age late 40's     IBS (irritable bowel syndrome)     Lyme disease     arthritis of hands. Felt like flu- age early 30's- got it  after going to [place]    Sleep apnea     NO MACHINE       Past Surgical History:   Procedure Laterality Date    ABDOMINAL SURGERY      BLADDER SUSPENSION      BREAST SURGERY      CARPAL TUNNEL RELEASE Left 2023    Procedure: Left carpal tunnel release;  Surgeon: Roberto Cheung MD;  Location: [facility];  Service: Orthopedics;  Laterality: Left;     SECTION      CYSTOSCOPY      avoids greens . ? Interstitial cystitis    ESOPHAGOGASTRODUODENOSCOPY N/A 8/10/2023    Procedure: EGD (ESOPHAGOGASTRODUODENOSCOPY);  Surgeon: Gerard Hernandez MD;  Location: [facility];  Service: Endoscopy;  Laterality: N/A;    HYSTERECTOMY      followed by removal of ovaries  from scar tissue    INCISIONAL HERNIA REPAIR Right 2008    RLQ    JOINT REPLACEMENT      Left total knee replacement-[facility] -     KNEE SURGERY Left 2014    TKR    MODIFIED RADICAL MASTECTOMY W/ AXILLARY LYMPH NODE DISSECTION Right 05/10/2005    w/free flap    NISSEN FUNDOPLICATION      REVISION OF KNEE ARTHROPLASTY Left 2024    Procedure: REVISION, ARTHROPLASTY, KNEE: LEFT;  Surgeon:  Chris Estevez MD;  Location: [address];  Service: Orthopedics;  Laterality: Left;    ROBOT-ASSISTED LAPAROSCOPIC REPAIR OF VENTRAL HERNIA N/A 11/5/2021    Procedure: ROBOTIC REPAIR, HERNIA, VENTRAL;  Surgeon: John Johnson III, MD;  Location: [facility];  Service: General;  Laterality: N/A;    SIMPLE MASTECTOMY Left 05/10/2005    w/free flap    TONSILLECTOMY         Time Tracking:     PT Received On: 12/05/24  PT Start Time: 1426     PT Stop Time: 1450  PT Total Time (min): 24 min     Billable Minutes: Evaluation 14 and Gait Training 10      12/05/2024

## 2024-12-05 NOTE — ANESTHESIA PROCEDURE NOTES
Left adductor perineural catheter    Patient location during procedure: pre-op   Block not for primary anesthetic.  Reason for block: at surgeon's request and post-op pain management   Post-op Pain Location: Left knee   Start time: 12/5/2024 6:37 AM  Timeout: 12/5/2024 6:35 AM   End time: 12/5/2024 6:44 AM    Staffing  Authorizing Provider: Manuel Patricia MD  Performing Provider: Damian Farias DO    Staffing  Performed by: Damian Farias DO  Authorized by: Manuel Patricia MD    Preanesthetic Checklist  Completed: patient identified, IV checked, site marked, risks and benefits discussed, surgical consent, monitors and equipment checked, pre-op evaluation and timeout performed  Peripheral Block  Patient position: supine  Prep: ChloraPrep and site prepped and draped  Patient monitoring: heart rate, cardiac monitor, continuous pulse ox, continuous capnometry and frequent blood pressure checks  Block type: adductor canal  Laterality: left  Injection technique: continuous  Needle  Needle type: Tuohy   Needle gauge: 17 G  Needle length: 3.5 in  Needle localization: anatomical landmarks and ultrasound guidance  Catheter type: spring wound  Catheter size: 19 G  Test dose: lidocaine 1.5% with Epi 1-to-200,000 and negative   -ultrasound image captured on disc.  Assessment  Injection assessment: negative aspiration, negative parasthesia and local visualized surrounding nerve  Paresthesia pain: none  Heart rate change: no  Slow fractionated injection: yes  Pain Tolerance: no complaints and comfortable throughout block  Medications:    Medications: ropivacaine (NAROPIN) injection 0.5% - Perineural   10 mL - 12/5/2024 6:44:00 AM    Additional Notes  VSS.  [facility] RN monitoring vitals throughout procedure.  Patient tolerated procedure well. Under ultrasound guidance local visualized winnie-neurally. Catheter then threaded and confirmed via ultrasound, test dose given and negative. Patient tolerated well.

## 2024-12-05 NOTE — PLAN OF CARE
Problem: Occupational Therapy  Goal: Occupational Therapy Goal  Description: Goals to be met by: 1/5/25     Patient will increase functional independence with ADLs by performing:    LE Dressing with Minimal Assistance and AE prn.  Grooming while standing at sink with Stand-by Assistance.  Toileting from toilet with Contact Guard Assistance for hygiene and clothing management.   Supine to sit with [place].  Toilet transfer to toilet with Contact Guard Assistance and RW.    Outcome: Progressing

## 2024-12-05 NOTE — PLAN OF CARE
Problem: Physical Therapy  Goal: Physical Therapy Goal  Description: PT goals until 12/20/24    1. Pt supine to sit with SBA-not met  2. Pt sit to supine with SBA-not met  3. Pt sit to stand with RW with SBA-not met  4. Pt to perform gait 100ft with RW with HKB locked in extwith SBA.-not met  5. Pt to go up/down curb step with RW with WBAT L LE with HKB locked in ext with SBA.-not met  6. Pt to perform B LE exs in sitting or supine x 15 reps to strengthen B LE to improve functional mobility.-not met    Outcome: Progressing   Pt's goals set and pt will benefit from skilled PT services to work towards improved functional mobility including: bed mobility, transfers, up/down step, and gait. Madeleine Wahl PT  12/5/2024

## 2024-12-05 NOTE — ANESTHESIA PROCEDURE NOTES
Epidural    Patient location during procedure: OR   Reason for block: primary anesthetic   Reason for block: at surgeon's request  Diagnosis: S/P revision of total knee, left [Z96.652]   Start time: 12/5/2024 7:16 AM  Timeout: 12/5/2024 7:16 AM  End time: 12/5/2024 7:30 AM    Staffing  Performing Provider: Osvaldo Ortiz MD  Authorizing Provider: Manuel Patricia MD    Staffing  Performed by: Osvaldo Ortiz MD  Authorized by: Manuel Patricia MD        Preanesthetic Checklist  Completed: patient identified, IV checked, site marked, risks and benefits discussed, surgical consent, monitors and equipment checked, pre-op evaluation, timeout performed, anesthesia consent given, hand hygiene performed and patient being monitored  Preparation  Patient position: sitting  Prep: ChloraPrep  Patient monitoring: Pulse Ox and Blood Pressure  Reason for block: primary anesthetic   Epidural  Skin Anesthetic: lidocaine 1%  Skin Wheal: 3 mL  Administration type: single shot  Approach: midline  Interspace: L3-4    Injection technique: ESPINOZA air  Needle and Epidural Catheter  Needle type: Tuohy   Needle gauge: 17  Needle length: 3.5 inches  Insertion Attempts: Unsuccessful  Needle localization: anatomical landmarks  Additional Notes  Attempted by both resident and staff. Loss of resistance at about 5.5cm but no CSF return on dural puncture.

## 2024-12-05 NOTE — NURSING TRANSFER
Nursing Transfer Note      12/5/2024   2:43 PM    Nurse giving handoff:claudia moralez  Nurse receiving handoff:claudia hall    Reason patient is being transferred: per md order    Transfer To: 553    Transfer via bed    Transfer with cardiac monitoring    Transported by pt escort    Order for Tele Monitor? Yes    Additional Lines: wound vac/perineural    Medicines sent: ropivacaine    Any special needs or follow-up needed: immobilizer at all times    Patient belongings transferred with patient: Yes    Chart send with patient: Yes    Notified: daughter

## 2024-12-05 NOTE — NURSING
Patient arrived to room 553 via hospital bed, family present at bedside, VS taken and documented, admission documentation completed, SCD's applied, instructed on IS use, oriented to room and equipment, allowed time for questions, all questions answered, no further concerns voiced at this time, safety measures in place, call bell with in reach, instructed to call with any needs, verbalized understanding, continue current plan of care.

## 2024-12-05 NOTE — ASSESSMENT & PLAN NOTE
Traci Freire is a 63 y.o. female s/p L TKA revision on 12/5/24. Doing well this morning.    Plan:  Pain control: multimodal  PT/OT: WBAT LLE in T-scope locked in extension. No knee range of motion for 6 weeks.  DVT PPx: ASA 81 mg BID, SCDs at all times when not ambulating  Abx: postop Ancef x 24 hours. 6 months doxycycline post-op.  Dressing: Prevena    Dispo: pending PT/OT progress today  Follow up: 1 week for wound VAC removal

## 2024-12-05 NOTE — TRANSFER OF CARE
"Anesthesia Transfer of Care Note    Patient: Traci Freire    Procedure(s) Performed: Procedure(s) (LRB):  REVISION, ARTHROPLASTY, KNEE (Left)    Patient location: PACU    Anesthesia Type: general and regional    Transport from OR: Transported from OR on 6-10 L/min O2 by face mask with adequate spontaneous ventilation    Post pain: adequate analgesia    Post assessment: no apparent anesthetic complications and tolerated procedure well    Post vital signs: stable    Level of consciousness: awake    Nausea/Vomiting: no nausea/vomiting    Complications: none    Transfer of care protocol was followed      Last vitals: Visit Vitals  /66 (BP Location: Right arm, Patient Position: Lying)   Pulse 93   Temp 36.7 °C (98.1 °F) (Temporal)   Resp 18   Ht 5' 1" (1.549 m)   Wt 81.6 kg (180 lb)   LMP 05/09/1992   SpO2 99%   Breastfeeding No   BMI 34.01 kg/m²     "

## 2024-12-05 NOTE — PROGRESS NOTES
PT/OT consult completed. Report called to SYLVIE Poe.  Daughter updated. POC reviewed and understanding verbalized

## 2024-12-05 NOTE — PT/OT/SLP EVAL
Occupational Therapy   Co Evaluation & Treatment    Name: Traci Freire  MRN: [id number]  Admitting Diagnosis: S/P revision of total knee, left  Recent Surgery: Procedure(s) (LRB):  REVISION, ARTHROPLASTY, KNEE (Left) Day of Surgery    Recommendations:     Discharge Recommendations:  (tbd)  Discharge Equipment Recommendations:  hip kit  Barriers to discharge:  Decreased caregiver support (during business hours)    Assessment:     Traci Freire is a 63 y.o. female with a medical diagnosis of S/P revision of total knee, left.  She presents with decreased self-care and functional mobility 2/2 AMS. Performance deficits affecting function: weakness, impaired functional mobility, gait instability, pain, impaired balance, impaired self care skills, decreased lower extremity function, impaired cardiopulmonary response to activity.  This date pt seen post surgical procedure with appropriate alertness and orientation and good participation in OT evaluation. Pt requiring increased assistance this date for self-care and mobility activities at this time. Pt found with knee extension brace on and locked at initiation of session, remaining donned with pt educated on LLE precautions (WBAT). Pt limited by increased c/o dizziness with mobility activities, though VSS. Due to increased assistance, pt would continue to benefit from skilled OT services in the acute care setting.     Co-evaluation completed due to patient medical instability and to ensure patient safety.       Rehab Prognosis: Good; patient would benefit from acute skilled OT services to address these deficits and reach maximum level of function.       Plan:     Patient to be seen 4 x/week to address the above listed problems via self-care/home management, therapeutic activities, therapeutic exercises  Plan of Care Expires: 01/05/25  Plan of Care Reviewed with: patient    Subjective     Chief Complaint: Just a little pain but I've had medicine  Patient/Family Comments/goals:  increase independence    Occupational Profile:  Living Environment: pt lives with daughter and grandchildren in [facility], threshold to enter, t/s with bath bench  Previous level of function: assistance with bathing, able to dress self; RW of w/c for mobility 2/2 LLE weightbearing precautions  Roles and Routines: Worked as a supply harleen prior to previous procedure, 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  Patient found HOB elevated with blood pressure cuff, pulse ox (continuous), telemetry, perineural catheter, wound vac, peripheral IV upon OT entry to room.    General Precautions: Standard, fall  Orthopedic Precautions: LLE weight bearing as tolerated (knee extension brace (no knee ROM))  Braces:  (knee extension brace)  Respiratory Status: Room air    Occupational Performance:    Bed Mobility:    Patient completed Scooting/Bridging with minimum assistance  Patient completed Supine to Sit with moderate assistance at trunk and LLE  Patient completed Sit to Supine with moderate assistance at Les  Pt seated EOB with SBA to CGA    Functional Mobility/Transfers:  Patient completed Sit <> Stand Transfer with minimum assistance  with  rolling walker   Functional Mobility: Pt completing 3 L lateral and 4 forward/backward steps with minimal assistance and RW to increase pt's independence with self-care and household mobility    Activities of Daily Living:  Feeding: set up assistance to drink water    Cognitive/Visual Perceptual:  Cognitive/Psychosocial Skills:     -       Oriented  to: Person, Place, Time, and Situation   -       Follows Commands/attention:Follows two-step commands  -       Communication: clear/fluent  -       Memory: No Deficits noted  -       Safety awareness/insight to disability: intact   Visual/Perceptual:      -Intact      Physical Exam:  Balance:    -       intact sitting balance, fair dynamic standing balance  Sensation:    -       Intact  Upper Extremity Range of Motion:     -       Right Upper Extremity: WNL  -       Left Upper Extremity: WNL  Upper Extremity Strength:    -       Right Upper Extremity: WNL  -       Left Upper Extremity: WNL   Strength:    -       Right Upper Extremity: WNL  -       Left Upper Extremity: WNL  Fine Motor Coordination:    -       Intact  Gross motor coordination:   WFL    AMPAC 6 Click ADL:  AMPAC Total Score: 18    Treatment & Education:  Pt seen for OT evaluation and therapeutic activities this date to increase pt's independence  Pt educated on OT roles, POC, call button for assistance  Pt educated on LLE knee extension brace wear schedule and weight bearing precautions (WBAT)    Patient left HOB elevated with all lines intact, bed alarm on, and nursing present    GOALS:   Multidisciplinary Problems       Occupational Therapy Goals          Problem: Occupational Therapy    Goal Priority Disciplines Outcome Interventions   Occupational Therapy Goal     OT, PT/OT Progressing    Description: Goals to be met by: 1/5/25     Patient will increase functional independence with ADLs by performing:    LE Dressing with Minimal Assistance and AE prn.  Grooming while standing at sink with Stand-by Assistance.  Toileting from toilet with Contact Guard Assistance for hygiene and clothing management.   Supine to sit with [place].  Toilet transfer to toilet with Contact Guard Assistance and RW.                         History:     Past Medical History:   Diagnosis Date    Anxiety     Arthritis     Cancer breast    Right- Bilateral mastectomy-  May 2005- had breast reconstruction- mother  of breast cancer    HLD (hyperlipidemia)     Hypertension     diagnosed age late 40's     IBS (irritable bowel syndrome)     Lyme disease     arthritis of hands. Felt like flu- age early 30's- got it  after going to [place]    Sleep apnea     NO MACHINE         Past Surgical History:   Procedure Laterality Date    ABDOMINAL SURGERY      BLADDER SUSPENSION      BREAST SURGERY      CARPAL TUNNEL RELEASE Left 2023    Procedure: Left carpal tunnel release;  Surgeon: Roberto Cheung MD;  Location: [facility];  Service: Orthopedics;  Laterality: Left;     SECTION      CYSTOSCOPY      avoids greens . ? Interstitial cystitis    ESOPHAGOGASTRODUODENOSCOPY N/A 8/10/2023    Procedure: EGD (ESOPHAGOGASTRODUODENOSCOPY);  Surgeon: Gerard Hernandez MD;  Location: [facility];  Service: Endoscopy;  Laterality: N/A;    HYSTERECTOMY      followed by removal of ovaries  from scar tissue    INCISIONAL HERNIA REPAIR Right 2008    RLQ    JOINT REPLACEMENT      [place]     KNEE SURGERY Left     TKR    MODIFIED RADICAL MASTECTOMY W/ AXILLARY LYMPH NODE DISSECTION Right 05/10/2005    w/free flap    NISSEN FUNDOPLICATION      REVISION OF KNEE ARTHROPLASTY Left 2024    Procedure: REVISION, ARTHROPLASTY, KNEE: LEFT;  Surgeon: Chris Estevez MD;  Location: [address];  Service: Orthopedics;  Laterality: Left;    ROBOT-ASSISTED LAPAROSCOPIC REPAIR OF VENTRAL HERNIA N/A 2021    Procedure: ROBOTIC REPAIR, HERNIA, VENTRAL;  Surgeon: John Johnson III, MD;  Location: [facility];  Service: General;  Laterality: N/A;    SIMPLE MASTECTOMY Left 05/10/2005    w/free flap    TONSILLECTOMY         Time Tracking:     OT Date of Treatment: 24  OT Start Time: 1426  OT Stop Time: 1450  OT Total Time (min): 24 min    Billable Minutes:Evaluation 10  Therapeutic Activity 14    2024

## 2024-12-06 LAB
ACID FAST MOD KINY STN SPEC: NORMAL
HCT VFR BLD AUTO: 27.9 % (ref 37–48.5)
HGB BLD-MCNC: 8.6 G/DL (ref 12–16)
MYCOBACTERIUM SPEC QL CULT: NORMAL

## 2024-12-06 PROCEDURE — 99900035 HC TECH TIME PER 15 MIN (STAT)

## 2024-12-06 PROCEDURE — 85018 HEMOGLOBIN: CPT

## 2024-12-06 PROCEDURE — 85014 HEMATOCRIT: CPT

## 2024-12-06 PROCEDURE — 97116 GAIT TRAINING THERAPY: CPT

## 2024-12-06 PROCEDURE — 25000003 PHARM REV CODE 250

## 2024-12-06 PROCEDURE — 97535 SELF CARE MNGMENT TRAINING: CPT

## 2024-12-06 PROCEDURE — 25000003 PHARM REV CODE 250: Performed by: NURSE PRACTITIONER

## 2024-12-06 PROCEDURE — 21400001 HC TELEMETRY ROOM

## 2024-12-06 PROCEDURE — 36415 COLL VENOUS BLD VENIPUNCTURE: CPT

## 2024-12-06 PROCEDURE — 94761 N-INVAS EAR/PLS OXIMETRY MLT: CPT

## 2024-12-06 PROCEDURE — 94660 CPAP INITIATION&MGMT: CPT

## 2024-12-06 RX ORDER — DOXYCYCLINE HYCLATE 100 MG
100 TABLET ORAL EVERY 12 HOURS
Status: DISCONTINUED | OUTPATIENT
Start: 2024-12-06 | End: 2024-12-07 | Stop reason: HOSPADM

## 2024-12-06 RX ORDER — TRAMADOL HYDROCHLORIDE 50 MG/1
50 TABLET ORAL EVERY 4 HOURS PRN
Status: DISCONTINUED | OUTPATIENT
Start: 2024-12-06 | End: 2024-12-07 | Stop reason: HOSPADM

## 2024-12-06 RX ORDER — OXYCODONE HYDROCHLORIDE 5 MG/1
5 TABLET ORAL EVERY 4 HOURS PRN
Status: DISCONTINUED | OUTPATIENT
Start: 2024-12-06 | End: 2024-12-06

## 2024-12-06 RX ORDER — DOXYCYCLINE HYCLATE 100 MG
100 TABLET ORAL EVERY 12 HOURS
Qty: 60 TABLET | Refills: 5 | Status: SHIPPED | OUTPATIENT
Start: 2024-12-06 | End: 2025-06-04

## 2024-12-06 RX ADMIN — PREGABALIN 75 MG: 75 CAPSULE ORAL at 09:12

## 2024-12-06 RX ADMIN — ACETAMINOPHEN 1000 MG: 500 TABLET ORAL at 01:12

## 2024-12-06 RX ADMIN — DOXYCYCLINE HYCLATE 100 MG: 100 TABLET, COATED ORAL at 09:12

## 2024-12-06 RX ADMIN — METHOCARBAMOL 750 MG: 750 TABLET ORAL at 01:12

## 2024-12-06 RX ADMIN — HYDROCHLOROTHIAZIDE 25 MG: 25 TABLET ORAL at 08:12

## 2024-12-06 RX ADMIN — METHOCARBAMOL 750 MG: 750 TABLET ORAL at 06:12

## 2024-12-06 RX ADMIN — ASPIRIN 81 MG: 81 TABLET, COATED ORAL at 09:12

## 2024-12-06 RX ADMIN — LOSARTAN POTASSIUM 100 MG: 50 TABLET, FILM COATED ORAL at 08:12

## 2024-12-06 RX ADMIN — SENNOSIDES AND DOCUSATE SODIUM 1 TABLET: 50; 8.6 TABLET ORAL at 09:12

## 2024-12-06 RX ADMIN — ASPIRIN 81 MG: 81 TABLET, COATED ORAL at 08:12

## 2024-12-06 RX ADMIN — ZOLPIDEM TARTRATE 5 MG: 5 TABLET ORAL at 09:12

## 2024-12-06 RX ADMIN — CELECOXIB 200 MG: 200 CAPSULE ORAL at 08:12

## 2024-12-06 RX ADMIN — TRAMADOL HYDROCHLORIDE 50 MG: 50 TABLET, COATED ORAL at 05:12

## 2024-12-06 RX ADMIN — METHOCARBAMOL 750 MG: 750 TABLET ORAL at 10:12

## 2024-12-06 RX ADMIN — MUPIROCIN 1 G: 20 OINTMENT TOPICAL at 08:12

## 2024-12-06 RX ADMIN — FLUOXETINE HYDROCHLORIDE 20 MG: 20 CAPSULE ORAL at 08:12

## 2024-12-06 RX ADMIN — MUPIROCIN 1 G: 20 OINTMENT TOPICAL at 09:12

## 2024-12-06 RX ADMIN — FAMOTIDINE 20 MG: 20 TABLET ORAL at 09:12

## 2024-12-06 RX ADMIN — FAMOTIDINE 20 MG: 20 TABLET ORAL at 08:12

## 2024-12-06 RX ADMIN — ACETAMINOPHEN 1000 MG: 500 TABLET ORAL at 06:12

## 2024-12-06 RX ADMIN — ATORVASTATIN CALCIUM 80 MG: 40 TABLET, FILM COATED ORAL at 09:12

## 2024-12-06 RX ADMIN — TRAMADOL HYDROCHLORIDE 50 MG: 50 TABLET, COATED ORAL at 10:12

## 2024-12-06 RX ADMIN — ACETAMINOPHEN 1000 MG: 500 TABLET ORAL at 12:12

## 2024-12-06 RX ADMIN — ACETAMINOPHEN 1000 MG: 500 TABLET ORAL at 05:12

## 2024-12-06 RX ADMIN — OXYCODONE HYDROCHLORIDE 10 MG: 10 TABLET ORAL at 06:12

## 2024-12-06 NOTE — PT/OT/SLP PROGRESS
Occupational Therapy  Treatment    Name: Traci Freire  MRN: [id number]  Admitting Diagnosis:  S/P revision of total knee, left  1 Day Post-Op    Recommendations:     Discharge Recommendations: Low Intensity Therapy  Discharge Equipment Recommendations: hip kit  Barriers to discharge: Decreased caregiver support    Assessment:     Traci Freire is a 63 y.o. female with a medical diagnosis of S/P revision of total knee, left. She presents with the following performance deficits affecting function are impaired endurance, impaired self care skills, impaired functional mobility, gait instability, impaired balance, decreased lower extremity function, pain, decreased ROM, orthopedic precautions. Pt agreeable to participate in therapy session this AM. Pt making progress with established OT goals, meeting several goals during today's tx session. Goals reviewed and revised to reflect pt's improved performance. Pt currently requires SBA with ADLs and SBA for transfers/functional mobility using RW. Pt would benefit from continued skilled acute OT services in order to maximize IND with ADLs and functional mobility to ensure safe return to PLOF in the least restrictive environment. OT recommending low intensity therapy once pt is medically appropriate for d/c.    Rehab Prognosis: Good; patient would benefit from acute skilled OT services to address these deficits and reach maximum level of function.       Plan:     Patient to be seen 4 x/week to address the above listed problems via self-care/home management, therapeutic activities, therapeutic exercises, neuromuscular re-education  Plan of Care Expires: 01/05/25  Plan of Care Reviewed with: patient    Subjective     Chief Complaint: Mild pain in L knee and shin area  Patient/Family Comments/goals: Return home  Pain/Comfort:  Pain Rating 1: 6/10  Location - Side 1: Left  Location - Orientation 1: generalized  Location 1: knee (and shin)  Pain Addressed 1: Pre-medicate for activity,  Reposition, Distraction  Pain Rating Post-Intervention 1:  (unrated)    Objective:     Communicated with: RN prior to session. Patient cleared to participate in therapy at this time. Patient found up in chair with telemetry, wound vac, perineural catheter, FCD upon OT entry to room. No visitors present in room upon OT entry.    General Precautions: Standard, fall    Orthopedic Precautions: LLE weight bearing as tolerated (with HKB locked in extension)  Braces: Hinged knee brace (locked in extension)  Respiratory Status: Room air     Occupational Performance:     Bed Mobility:    Not performed at this time due to patient sitting up in bedside chair at beginning and end of treatment session.     Functional Mobility/Transfers:  Patient completed Sit <> Stand Transfer with stand by assistance with rolling walker   Patient completed Toilet Transfer Step Transfer technique with stand by assistance with rolling walker  Functional Mobility: Patient participated in in-room mobility to simulate home distances, ambulating to/from the bathroom with stand by assistance and rolling walker. No LOB or SOB noted.    Activities of Daily Living:  Feeding: independent with lunch meal tray  Grooming: stand by assistance to perform hand hygiene standing at sink  Toileting: stand by assistance to perform clothing management and hygiene at toilet    [facility] 6 Click ADL: 20    Treatment & Education:  Patient educated on:   Role of OT, OT POC, and discharge planning.  Safe transfer techniques and proper body mechanics for fall prevention and improved independence with functional transfers.  Importance of OOB activities to increase endurance and tolerance for increased participation in daily ADLs.  LLE WBAT with HKB locked in extension precaution.  Utilizing the call bell to request for assistance with all functional mobility to ensure safety during hospital stay.  Whiteboard updated.    Patient verbalized understanding and all questions were  addressed within the scope of OT.     Patient left up in chair with all lines intact, call button in reach, RN notified, and no visitors present.    GOALS:   Multidisciplinary Problems       Occupational Therapy Goals          Problem: Occupational Therapy    Goal Priority Disciplines Outcome Interventions   Occupational Therapy Goal     OT, PT/OT Progressing    Description: Goals to be met by: 1/5/25     Patient will increase functional independence with ADLs by performing:    LE Dressing with Minimal Assistance and AE prn.  Grooming while standing at sink with Stand-by Assistance. (Met on 12/6/2024)  Grooming while standing at sink with Supervision.  Toileting from toilet with Contact Guard Assistance for hygiene and clothing management. (Met on 12/6/2024)  Toileting from toilet with Supervision for hygiene and clothing management.  Supine to sit with [place].  Toilet transfer to toilet with Contact Guard Assistance and RW. (Met on 12/6/2024)  Toilet transfer to toilet with Supervision and RW.                         Time Tracking:     OT Date of Treatment: 12/06/24  OT Start Time: 1130  OT Stop Time: 1143  OT Total Time (min): 13 min    Billable Minutes: Self Care/Home Management 13    OT/RICHARDSON: OT     Number of RICHARDSON visits since last OT visit: 0    12/6/2024

## 2024-12-06 NOTE — ANESTHESIA POSTPROCEDURE EVALUATION
Anesthesia Post Evaluation    Patient: Traci Freire    Procedure(s) Performed: Procedure(s) (LRB):  REVISION, ARTHROPLASTY, KNEE (Left)    Final Anesthesia Type: general      Patient location during evaluation: PACU  Patient participation: Yes- Able to Participate  Level of consciousness: awake and alert  Post-procedure vital signs: reviewed and stable  Pain management: adequate  Airway patency: patent    PONV status at discharge: No PONV  Anesthetic complications: no      Cardiovascular status: hemodynamically stable  Hydration status: euvolemic  Follow-up not needed.              Vitals Value Taken Time   /61 12/06/24 0430   Temp 36.7 °C (98.1 °F) 12/06/24 0430   Pulse 99 12/06/24 0430   Resp 18 12/06/24 0609   SpO2 94 % 12/06/24 0430         Event Time   Out of Recovery 12:30:00         Pain/Denise Score: Pain Rating Prior to Med Admin: 0 (12/6/2024  6:09 AM)  Pain Rating Post Med Admin: 1 (12/6/2024  1:40 AM)  Denise Score: 10 (12/5/2024  1:45 PM)

## 2024-12-06 NOTE — PLAN OF CARE
Problem: Adult Inpatient Plan of Care  Goal: Plan of Care Review  Outcome: Progressing  Goal: Patient-Specific Goal (Individualized)  Outcome: Progressing  Goal: Absence of Hospital-Acquired Illness or Injury  Outcome: Progressing  Goal: Optimal Comfort and Wellbeing  Outcome: Progressing  Goal: Readiness for Transition of Care  Outcome: Progressing   Problem: Infection  Goal: Absence of Infection Signs and Symptoms  Outcome: Progressing   Problem: Skin Injury Risk Increased  Goal: Skin Health and Integrity  Outcome: Progressing     Pt resting in bed comfortably. Knee immobilizer in place to LLE. Fall precautions maintained, no falls noted. Call light within reach, bed locked and in lowest position. Non-skid socks on while out of bed. Patient instructed to call for assistance. Skin integrity maintained as patient is independent with frequent repositioning. PNC and wound vac in place to surgical leg. No complaints or concerns at this time. Progressing towards goals. Plan of care ongoing.

## 2024-12-06 NOTE — ANESTHESIA POST-OP PAIN MANAGEMENT
Acute Pain Service Progress Note    Traci Freire is a 63 y.o., female, [id number].    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       Subjective:     General appearance of alert, oriented, no complaints   Pain with rest: 2    Numbers   Pain with movement: 6    Numbers   Side Effects    1. Pruritis No    2. Nausea No    3. Motor Blockade No, 0=Ability to raise lower extremities off bed    4. Sedation No, 1=awake and alert    Objective:     Catheter site clean, dry, intact         Vitals   Vitals:    12/06/24 0755   BP: (!) 138/58   Pulse: 98   Resp: 18   Temp: 36.8 °C (98.2 °F)        Labs    Admission on 12/05/2024   Component Date Value Ref Range Status    Group & Rh 12/05/2024 A POS   Final    Indirect Rain 12/05/2024 NEG   Final    Specimen Outdate 12/05/2024 12/08/2024 23:59   Final    Prothrombin Time 12/05/2024 10.1  9.0 - 12.5 sec Final    INR 12/05/2024 0.9  0.8 - 1.2 Final    Anaerobic Culture 12/05/2024 Culture in progress   Preliminary    Hemoglobin 12/06/2024 8.6 (L)  12.0 - 16.0 g/dL Final    Hematocrit 12/06/2024 27.9 (L)  37.0 - 48.5 % Final        Meds   Current Facility-Administered Medications   Medication Dose Route Frequency Provider Last Rate Last Admin    0.9% NaCl infusion   Intravenous Continuous Bharti Thomas  mL/hr at 12/05/24 1430 Rate Verify at 12/05/24 1430    acetaminophen tablet 1,000 mg  1,000 mg Oral Q6H Roberth Liz MD   1,000 mg at 12/06/24 0609    Followed by    [START ON 12/7/2024] acetaminophen tablet 1,000 mg  1,000 mg Oral Q8H Roberth Liz MD         aspirin EC tablet 81 mg  81 mg Oral BID Bharti Thomas NP   81 mg at 12/05/24 2140    atorvastatin tablet 80 mg  80 mg Oral QHS Fredis Means MD   80 mg at 12/05/24 2140    bisacodyL suppository 10 mg  10 mg Rectal Q12H PRN Bharti Thomas NP        celecoxib capsule 200 mg  200 mg Oral Daily Roberth Liz MD        dextrose 10% bolus 125 mL 125 mL  12.5 g Intravenous PRN Osvaldo Ortiz MD        dextrose 10% bolus 250 mL 250 mL  25 g Intravenous PRN Osvaldo Ortiz MD        famotidine tablet 20 mg  20 mg Oral BID Bharti Thomas NP   20 mg at 12/05/24 2140    FLUoxetine capsule 20 mg  20 mg Oral Daily Fredis Means MD        hydroCHLOROthiazide tablet 25 mg  25 mg Oral Daily rFedis Means MD        losartan tablet 100 mg  100 mg Oral Daily Fredis Means MD        methocarbamoL tablet 750 mg  750 mg Oral Q8H Roberth Liz MD   750 mg at 12/06/24 0609    mupirocin 2 % ointment 1 g  1 g Nasal BID Bharti Thomas NP   1 g at 12/05/24 2130    naloxone 0.4 mg/mL injection 0.02 mg  0.02 mg Intravenous PRN Bharti Thomas NP        ondansetron injection 4 mg  4 mg Intravenous Q8H PRN Bharti Thomas NP        oxyCODONE immediate release tablet 5 mg  5 mg Oral Q4H PRN Roberth Liz MD        polyethylene glycol packet 17 g  17 g Oral Daily Bharti Thomas NP   17 g at 12/05/24 1323    pregabalin capsule 75 mg  75 mg Oral QHS Bharti Thomas NP   75 mg at 12/05/24 2140    prochlorperazine injection Soln 5 mg  5 mg Intravenous Q6H PRN Bharti Thomas NP        ropivacaine 0.2% Perineural Pump infusion 500 ML   Perineural Continuous Andra Palomares MD   New Bag at 12/05/24 1157    senna-docusate 8.6-50 mg per tablet 1 tablet  1 tablet Oral BID Bharti Thomas NP   1 tablet at 12/05/24 2100    zolpidem tablet 5 mg  5 mg Oral Nightly PRN Castro Castaneda MD   5 mg at 12/05/24 6900         Assessment:  Traci Freire is a 62 yo female who is s/p left knee arthroplasty revision  on 12/5/24 with Dr. Estevez. Patient doing well this morning on evaluation. Reports increased pain overnight, but states that she did not press her patient demand bolus that often. Encouraged patient to press button.     Plan:  Continue L adductor PNC at current rate   Continue multimodal pain regimen (tylenol, robaxin, celebrex, lyrica)  Discontinue oxycodone 5 mg q4h PRN   Tramadol 50 mg q4h PRN for breakthrough pain   Rest of care per primary team       Case discussed with staff, final recommendations per attestation above.     Thank you for the consult and allowing us to participate in the care of this patient. We will continue to follow along. Please call Acute Pain Service at [phone] or Anesthesia at [phone] if you have any further questions or concerns.    Roberth Liz MD, PGY4/CA3  Department of Anesthesiology  [facility]  12/06/2024

## 2024-12-06 NOTE — PROGRESS NOTES
[facility] - Surgery  Orthopedics  Progress Note    Patient Name: Traci Freire  MRN: [id number]  Admission Date: 12/5/2024  Hospital Length of Stay: 1 days  Attending Provider: Chris Estevez MD  Primary Care Provider: Juliet Medina NP  Follow-up For: Procedure(s) (LRB):  REVISION, ARTHROPLASTY, KNEE (Left)    Post-Operative Day: 1 Day Post-Op  Subjective:     Principal Problem:S/P revision of total knee, left    Principal Orthopedic Problem: same, s/p revision L TKA on 12/5/24    Interval History: No acute events overnight. Vitals within normal limits. Pain well controlled. Patient ambulated yesterday with therapy and was able to take a few steps. Denies numbness, tingling, and weakness. Voiding spontaneously. No other acute complaints.    Review of patient's allergies indicates:   Allergen Reactions    Macrobid [nitrofurantoin monohyd/m-cryst]     Bactrim [sulfamethoxazole-trimethoprim] Nausea And Vomiting       Current Facility-Administered Medications   Medication    0.9% NaCl infusion    acetaminophen tablet 1,000 mg    Followed by    [START ON 12/7/2024] acetaminophen tablet 1,000 mg    aspirin EC tablet 81 mg    atorvastatin tablet 80 mg    bisacodyL suppository 10 mg    celecoxib capsule 200 mg    dextrose 10% bolus 125 mL 125 mL    dextrose 10% bolus 250 mL 250 mL    famotidine tablet 20 mg    FLUoxetine capsule 20 mg    hydroCHLOROthiazide tablet 25 mg    losartan tablet 100 mg    methocarbamoL tablet 750 mg    mupirocin 2 % ointment 1 g    naloxone 0.4 mg/mL injection 0.02 mg    ondansetron injection 4 mg    oxyCODONE immediate release tablet 5 mg    polyethylene glycol packet 17 g    pregabalin capsule 75 mg    prochlorperazine injection Soln 5 mg    ropivacaine 0.2% Perineural Pump infusion 500 ML    senna-docusate 8.6-50 mg per tablet 1 tablet    zolpidem tablet 5 mg     Objective:     Vital Signs (Most Recent):  Temp: 98.2 °F (36.8 °C) (12/06/24 0755)  Pulse: 98 (12/06/24 0755)  Resp: 18 (12/06/24  "0755)  BP: (!) 138/58 (12/06/24 0755)  SpO2: (!) 94 % (12/06/24 0755) Vital Signs (24h Range):  Temp:  [97.4 °F (36.3 °C)-98.3 °F (36.8 °C)] 98.2 °F (36.8 °C)  Pulse:  [] 98  Resp:  [10-20] 18  SpO2:  [93 %-98 %] 94 %  BP: (109-169)/(52-66) 138/58     Weight: 82.3 kg (181 lb 7 oz)  Height: 5' 1" (154.9 cm)  Body mass index is 34.28 kg/m².      Intake/Output Summary (Last 24 hours) at 12/6/2024 0854  Last data filed at 12/6/2024 0425  Gross per 24 hour   Intake 2043.37 ml   Output 700 ml   Net 1343.37 ml        Ortho/SPM Exam  Gen: NAD, sitting comfortably in bed  CV: regular rate  Resp: non-labored respirations    LLE:  Dressing clean, dry, and intact with 50 mL bloody output in the wound VAC canister  SILT Sa/Abarca/DP/SP/T  Motor intact EHL/FHL/TA/Gastroc  2+ DP, 2+ PT       Significant Labs: All pertinent labs within the past 24 hours have been reviewed.    Significant Imaging: I have reviewed and interpreted all pertinent imaging results/findings.  Assessment/Plan:     * S/P revision of total knee, left  Traci Freire is a 63 y.o. female s/p L TKA revision on 12/5/24. Doing well this morning.    Plan:  Pain control: multimodal  PT/OT: WBAT LLE in T-scope locked in extension. No knee range of motion for 6 weeks.  DVT PPx: ASA 81 mg BID, SCDs at all times when not ambulating  Abx: postop Ancef x 24 hours. 6 months doxycycline post-op.  Dressing: Prevena  Labs: hemoglobin 8.6 from 10.9 one month ago    Dispo: pending PT/OT progress today  Follow up: 1 week for wound VAC removal            Fredis Means MD  Orthopedics  [facility]    "

## 2024-12-06 NOTE — SUBJECTIVE & OBJECTIVE
"Principal Problem:S/P revision of total knee, left    Principal Orthopedic Problem: same, s/p revision L TKA on 12/5/24    Interval History: No acute events overnight. Vitals within normal limits. Pain well controlled. Patient ambulated yesterday with therapy and was able to take a few steps. Denies numbness, tingling, and weakness. Voiding spontaneously. No other acute complaints.    Review of patient's allergies indicates:   Allergen Reactions    Macrobid [nitrofurantoin monohyd/m-cryst]     Bactrim [sulfamethoxazole-trimethoprim] Nausea And Vomiting       Current Facility-Administered Medications   Medication    0.9% NaCl infusion    acetaminophen tablet 1,000 mg    Followed by    [START ON 12/7/2024] acetaminophen tablet 1,000 mg    aspirin EC tablet 81 mg    atorvastatin tablet 80 mg    bisacodyL suppository 10 mg    celecoxib capsule 200 mg    dextrose 10% bolus 125 mL 125 mL    dextrose 10% bolus 250 mL 250 mL    famotidine tablet 20 mg    FLUoxetine capsule 20 mg    hydroCHLOROthiazide tablet 25 mg    losartan tablet 100 mg    methocarbamoL tablet 750 mg    mupirocin 2 % ointment 1 g    naloxone 0.4 mg/mL injection 0.02 mg    ondansetron injection 4 mg    oxyCODONE immediate release tablet 5 mg    polyethylene glycol packet 17 g    pregabalin capsule 75 mg    prochlorperazine injection Soln 5 mg    ropivacaine 0.2% Perineural Pump infusion 500 ML    senna-docusate 8.6-50 mg per tablet 1 tablet    zolpidem tablet 5 mg     Objective:     Vital Signs (Most Recent):  Temp: 98.2 °F (36.8 °C) (12/06/24 0755)  Pulse: 98 (12/06/24 0755)  Resp: 18 (12/06/24 0755)  BP: (!) 138/58 (12/06/24 0755)  SpO2: (!) 94 % (12/06/24 0755) Vital Signs (24h Range):  Temp:  [97.4 °F (36.3 °C)-98.3 °F (36.8 °C)] 98.2 °F (36.8 °C)  Pulse:  [] 98  Resp:  [10-20] 18  SpO2:  [93 %-98 %] 94 %  BP: (109-169)/(52-66) 138/58     Weight: 82.3 kg (181 lb 7 oz)  Height: 5' 1" (154.9 cm)  Body mass index is 34.28 kg/m².      Intake/Output " Summary (Last 24 hours) at 12/6/2024 0854  Last data filed at 12/6/2024 0425  Gross per 24 hour   Intake 2043.37 ml   Output 700 ml   Net 1343.37 ml        Ortho/SPM Exam  Gen: NAD, sitting comfortably in bed  CV: regular rate  Resp: non-labored respirations    LLE:  Dressing clean, dry, and intact with 50 mL bloody output in the wound VAC canister  SILT Sa/Abarca/DP/SP/T  Motor intact EHL/FHL/TA/Gastroc  2+ DP, 2+ PT       Significant Labs: All pertinent labs within the past 24 hours have been reviewed.    Significant Imaging: I have reviewed and interpreted all pertinent imaging results/findings.

## 2024-12-06 NOTE — PLAN OF CARE
Problem: Occupational Therapy  Goal: Occupational Therapy Goal  Description: Goals to be met by: 1/5/25     Patient will increase functional independence with ADLs by performing:    LE Dressing with Minimal Assistance and AE prn.  Grooming while standing at sink with Stand-by Assistance. (Met on 12/6/2024)  Grooming while standing at sink with Supervision.  Toileting from toilet with Contact Guard Assistance for hygiene and clothing management. (Met on 12/6/2024)  Toileting from toilet with Supervision for hygiene and clothing management.  Supine to sit with [employer].  Toilet transfer to toilet with Contact Guard Assistance and RW. (Met on 12/6/2024)  Toilet transfer to toilet with Supervision and RW.    Outcome: Progressing

## 2024-12-06 NOTE — PLAN OF CARE
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]    Admission Date: 12/5/2024  Expected Discharge Date: 12/7/2024    Transition of Care Barriers: None    Payor: BLUE CROSS BLUE ensembli / Plan: BCBS ALL OUT OF STATE / Product Type: PPO /     Extended Emergency Contact Information  Primary Emergency Contact: Molly Drake  Address: [address] [place] [address] [place] [address] [facility] Jovie Desiree  Home Phone: [phone]  Mobile Phone: [phone]  Relation: Daughter  Secondary Emergency Contact: Faina Grover   [facility]  Mobile Phone: [phone]  Relation: Daughter    Discharge Plan A: Home with family  Discharge Plan B: Home with family, Home Health      [facility] [address] [place] - [address]  [address] [place] [address]  Phone: [phone] Fax: [phone]      Initial Assessment (most recent)       Adult Discharge Assessment - 12/06/24 1447          Discharge Assessment    Assessment Type Discharge Planning Assessment     Confirmed/corrected address, phone number and insurance Yes     Confirmed Demographics Correct on Facesheet     Source of Information patient     If unable to respond/provide information was family/caregiver contacted? Yes     Communicated AMINAH with patient/caregiver Yes     People in Home child(renetta), adult     Do you expect to return to your current living situation? Yes     Do you have help at home or someone to help you manage your care at home? Yes     Prior to hospitilization cognitive status: Alert/Oriented     Current cognitive status: Alert/Oriented     Walking or Climbing Stairs Difficulty no     Dressing/Bathing Difficulty no     Home Layout Able to live on 1st floor     Equipment Currently Used at Home none     Readmission within 30 days? No     Patient currently being  followed by outpatient case management? No     Do you currently have service(s) that help you manage your care at home? No     Do you take prescription medications? Yes     Do you have prescription coverage? Yes     Do you have any problems affording any of your prescribed medications? No     Is the patient taking medications as prescribed? yes     How do you get to doctors appointments? family or friend will provide     Are you on dialysis? No     Do you take coumadin? No     Discharge Plan A Home with family     Discharge Plan B Home with family;Home Health     DME Needed Upon Discharge  walker, rolling;bedside commode;shower chair     Discharge Plan discussed with: Patient     Transition of Care Barriers None                    SW completed discharge planning assessment with the patient at bedside. SW verified demographic information listed on the pt.'s Face sheet. Pt reports living with her daughter (Molly) whom she plans to helpmanage her care upon discharge. Pt plans to start outpatient therapy upon d/c . Pt reports having a good support system upon d/c.SW is following this Pt for DC planning needs. There are no identified needs at this time.    Pt denied need for Hip-Kit.    Discharge Plan A and Plan B have been determined by review of patient's clinical status, future medical and therapeutic needs, and coverage/benefits for post-acute care in coordination with multidisciplinary team members.    Treva Armas LCSW  Case Management   [facility]   Ext. [phone]

## 2024-12-06 NOTE — PT/OT/SLP PROGRESS
"Physical Therapy Treatment    Patient Name:  Traci Freire   MRN:  [id number]    Recommendations:     Discharge Recommendations: Low Intensity Therapy  Discharge Equipment Recommendations: hip kit  Barriers to discharge: Inaccessible home and Decreased caregiver support 1 AISLINN    Assessment:     Traci Freire is a 63 y.o. female admitted with a medical diagnosis of S/P revision of total knee, left.  She presents with the following impairments/functional limitations: pain, decreased lower extremity function, orthopedic precautions . Pt requires minimal assist for bed mobility, supervision for transfers, and SBA for gait due to pain in her L LE. Pt is motivated to progress with functional mobility.     Rehab Prognosis: Good; patient would benefit from acute skilled PT services to address these deficits and reach maximum level of function.    Recent Surgery: Procedure(s) (LRB):  REVISION, ARTHROPLASTY, KNEE (Left) 1 Day Post-Op    Plan:     During this hospitalization, patient to be seen daily to address the identified rehab impairments via gait training, therapeutic activities, therapeutic exercises, neuromuscular re-education and progress toward the following goals:    Plan of Care Expires:  01/04/25    Subjective   "I am feeling a little better today"  Pain/Comfort:  Pain Rating 1: 6/10  Location - Side 1: Left  Location - Orientation 1: generalized  Location 1: thigh  Pain Addressed 1: Reposition, Cessation of Activity  Pain Rating Post-Intervention 1: 6/10      Objective:     Communicated with nurse prior to session.  Patient found up in chair with telemetry, wound vac, perineural catheter, FCD upon PT entry to room.     General Precautions: Standard, fall  Orthopedic Precautions: LLE weight bearing as tolerated (with HKB locked in ext)  Braces: Hinged knee brace (locked in ext)  Respiratory Status: Room air     Functional Mobility:  Bed Mobility:     Sit to Supine: minimum assistance  Transfers:     Sit to Stand:  " supervision with rolling walker  Gait: 100ft with RW with SBA. Pt performed gait with decreased step length, step to gait, and at slow pace.    AM-PAC 6 CLICK MOBILITY  Turning over in bed (including adjusting bedclothes, sheets and blankets)?: 3  Sitting down on and standing up from a chair with arms (e.g., wheelchair, bedside commode, etc.): 4  Moving from lying on back to sitting on the side of the bed?: 3  Moving to and from a bed to a chair (including a wheelchair)?: 3  Need to walk in hospital room?: 3  Climbing 3-5 steps with a railing?: 3  Basic Mobility Total Score: 19     Patient left HOB elevated with all lines intact, call button in reach, and nurse notified..    GOALS:   Multidisciplinary Problems       Physical Therapy Goals          Problem: Physical Therapy    Goal Priority Disciplines Outcome Interventions   Physical Therapy Goal     PT, PT/OT Progressing    Description: PT goals until 12/20/24    1. Pt supine to sit with SBA-not met  2. Pt sit to supine with SBA-not met  3. Pt sit to stand with RW with SBA- met 12/6/24  4. Pt to perform gait 100ft with RW with HKB locked in extwith SBA.- met 12/6/24  Revised goal: gait 150ft with RW with HKB locked in ext with mod independent-not met  5. Pt to go up/down curb step with RW with WBAT L LE with HKB locked in ext with SBA.-not met  6. Pt to perform B LE exs in sitting or supine x 15 reps to strengthen B LE to improve functional mobility.-not met                         Time Tracking:     PT Received On: 12/06/24  PT Start Time: 1147     PT Stop Time: 1205  PT Total Time (min): 18 min     Billable Minutes: Gait Training 18    Treatment Type: Treatment  PT/PTA: PT           12/06/2024

## 2024-12-06 NOTE — ADDENDUM NOTE
Addendum  created 12/06/24 1152 by Manuel Patricia MD    Charge Capture section accepted, Cosign clinical note with attestation

## 2024-12-06 NOTE — PROGRESS NOTES
Pt sitting up in chair, amenable to education regarding CADD pump for home use. Daughter will be here later. Video/brochure provided for both patient and daughter to reference. Pt is anticipating discharge tomorrow. Site CDI.  Educated regarding continued pain management, fall risk, signs of complications, continued monitoring, as well as discontinuing catheter on 12/10.  Understanding verbalized.

## 2024-12-06 NOTE — PLAN OF CARE
Problem: Physical Therapy  Goal: Physical Therapy Goal  Description: PT goals until 12/20/24    1. Pt supine to sit with SBA-not met  2. Pt sit to supine with SBA-not met  3. Pt sit to stand with RW with SBA- met 12/6/24  4. Pt to perform gait 100ft with RW with HKB locked in extwith SBA.- met 12/6/24  Revised goal: gait 150ft with RW with HKB locked in ext with mod independent-not met  5. Pt to go up/down curb step with RW with WBAT L LE with HKB locked in ext with SBA.-not met  6. Pt to perform B LE exs in sitting or supine x 15 reps to strengthen B LE to improve functional mobility.-not met  Outcome: Progressing   Pt's goals revised as appropriate and pt will continue to benefit from skilled PT services to work towards improved functional mobility including: bed mobility, transfers, up/down step, and gait. Madeleine Wahl PT  12/6/2024

## 2024-12-07 VITALS
OXYGEN SATURATION: 97 % | HEIGHT: 61 IN | HEART RATE: 82 BPM | SYSTOLIC BLOOD PRESSURE: 103 MMHG | WEIGHT: 181.44 LBS | BODY MASS INDEX: 34.26 KG/M2 | TEMPERATURE: 99 F | DIASTOLIC BLOOD PRESSURE: 51 MMHG | RESPIRATION RATE: 16 BRPM

## 2024-12-07 PROCEDURE — 25000003 PHARM REV CODE 250

## 2024-12-07 PROCEDURE — 97116 GAIT TRAINING THERAPY: CPT | Mod: CQ

## 2024-12-07 PROCEDURE — 97530 THERAPEUTIC ACTIVITIES: CPT | Mod: CO

## 2024-12-07 PROCEDURE — 97535 SELF CARE MNGMENT TRAINING: CPT | Mod: CO

## 2024-12-07 PROCEDURE — 25000003 PHARM REV CODE 250: Performed by: NURSE PRACTITIONER

## 2024-12-07 RX ADMIN — ASPIRIN 81 MG: 81 TABLET, COATED ORAL at 08:12

## 2024-12-07 RX ADMIN — METHOCARBAMOL 750 MG: 750 TABLET ORAL at 05:12

## 2024-12-07 RX ADMIN — SENNOSIDES AND DOCUSATE SODIUM 1 TABLET: 50; 8.6 TABLET ORAL at 08:12

## 2024-12-07 RX ADMIN — FLUOXETINE HYDROCHLORIDE 20 MG: 20 CAPSULE ORAL at 08:12

## 2024-12-07 RX ADMIN — POLYETHYLENE GLYCOL 3350 17 G: 17 POWDER, FOR SOLUTION ORAL at 08:12

## 2024-12-07 RX ADMIN — TRAMADOL HYDROCHLORIDE 50 MG: 50 TABLET, COATED ORAL at 05:12

## 2024-12-07 RX ADMIN — MUPIROCIN 1 G: 20 OINTMENT TOPICAL at 08:12

## 2024-12-07 RX ADMIN — HYDROCHLOROTHIAZIDE 25 MG: 25 TABLET ORAL at 08:12

## 2024-12-07 RX ADMIN — ACETAMINOPHEN 1000 MG: 500 TABLET ORAL at 05:12

## 2024-12-07 RX ADMIN — LOSARTAN POTASSIUM 100 MG: 50 TABLET, FILM COATED ORAL at 08:12

## 2024-12-07 RX ADMIN — ACETAMINOPHEN 1000 MG: 500 TABLET ORAL at 12:12

## 2024-12-07 RX ADMIN — FAMOTIDINE 20 MG: 20 TABLET ORAL at 08:12

## 2024-12-07 RX ADMIN — DOXYCYCLINE HYCLATE 100 MG: 100 TABLET, COATED ORAL at 08:12

## 2024-12-07 RX ADMIN — CELECOXIB 200 MG: 200 CAPSULE ORAL at 08:12

## 2024-12-07 NOTE — PROGRESS NOTES
[facility] - [facility]  Orthopedics  Progress Note    Patient Name: Traci Freire  MRN: [id number]  Admission Date: 12/5/2024  Hospital Length of Stay: 2 days  Attending Provider: Chris Estevez MD  Primary Care Provider: Juliet Medina NP  Follow-up For: Procedure(s) (LRB):  REVISION, ARTHROPLASTY, KNEE (Left)    Post-Operative Day: 2 Days Post-Op  Subjective:     Principal Problem:S/P revision of total knee, left    Principal Orthopedic Problem: same, s/p revision L TKA on 12/5/24    Interval History: No acute events overnight. Vitals within normal limits. Pain well controlled. Patient ambulated well with therapy yesterday. Denies numbness, tingling, and weakness. Voiding spontaneously. No other acute complaints.    Review of patient's allergies indicates:   Allergen Reactions    Macrobid [nitrofurantoin monohyd/m-cryst]     Bactrim [sulfamethoxazole-trimethoprim] Nausea And Vomiting       Current Facility-Administered Medications   Medication    acetaminophen tablet 1,000 mg    aspirin EC tablet 81 mg    atorvastatin tablet 80 mg    bisacodyL suppository 10 mg    celecoxib capsule 200 mg    dextrose 10% bolus 125 mL 125 mL    dextrose 10% bolus 250 mL 250 mL    doxycycline tablet 100 mg    famotidine tablet 20 mg    FLUoxetine capsule 20 mg    hydroCHLOROthiazide tablet 25 mg    losartan tablet 100 mg    methocarbamoL tablet 750 mg    mupirocin 2 % ointment 1 g    naloxone 0.4 mg/mL injection 0.02 mg    ondansetron injection 4 mg    polyethylene glycol packet 17 g    pregabalin capsule 75 mg    prochlorperazine injection Soln 5 mg    ropivacaine 0.2% Perineural Pump infusion 500 ML    senna-docusate 8.6-50 mg per tablet 1 tablet    traMADoL tablet 50 mg    zolpidem tablet 5 mg     Objective:     Vital Signs (Most Recent):  Temp: 98.1 °F (36.7 °C) (12/07/24 0358)  Pulse: 91 (12/07/24 0608)  Resp: 18 (12/07/24 0542)  BP: 110/60 (12/07/24 0358)  SpO2: 96 % (12/07/24 0358) Vital Signs (24h Range):  Temp:  [98.1 °F (36.7  "°C)-98.4 °F (36.9 °C)] 98.1 °F (36.7 °C)  Pulse:  [81-98] 91  Resp:  [16-19] 18  SpO2:  [94 %-97 %] 96 %  BP: (101-138)/(54-60) 110/60     Weight: 82.3 kg (181 lb 7 oz)  Height: 5' 1" (154.9 cm)  Body mass index is 34.28 kg/m².      Intake/Output Summary (Last 24 hours) at 12/7/2024 0637  Last data filed at 12/7/2024 0543  Gross per 24 hour   Intake 1260 ml   Output 50 ml   Net 1210 ml        Ortho/SPM Exam  Gen: NAD, sitting comfortably in bed  CV: regular rate  Resp: non-labored respirations    LLE:  Dressing clean, dry, and intact with bloody output in the wound VAC canister similar to yesterday  SILT Sa/Abarca/DP/SP/T  Motor intact EHL/FHL/TA/Gastroc  2+ DP, 2+ PT       Significant Labs: All pertinent labs within the past 24 hours have been reviewed.    Significant Imaging: I have reviewed and interpreted all pertinent imaging results/findings.  Assessment/Plan:     * S/P revision of total knee, left  Traci Freire is a 64 y.o. female s/p L TKA revision on 12/5/24. Doing well this morning.    Plan:  Pain control: multimodal  PT/OT: WBAT LLE in T-scope locked in extension. No knee range of motion for 6 weeks.  DVT PPx: ASA 81 mg BID, SCDs at all times when not ambulating  Abx: postop Ancef x 24 hours. 6 months doxycycline post-op.  Dressing: Prevena    Dispo: home today  Follow up: 1 week for wound VAC removal            Fredis Means MD  Orthopedics  [facility] - Surgery    "

## 2024-12-07 NOTE — ANESTHESIA POST-OP PAIN MANAGEMENT
Acute Pain Service Progress Note    Traci Freire is a 64 y.o., female, [id number].    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       Subjective:     General appearance of alert, oriented, no complaints   Pain with rest: 1    Numbers   Pain with movement: 5    Numbers   Side Effects    1. Pruritis No    2. Nausea No    3. Motor Blockade No, 0=Ability to raise lower extremities off bed    4. Sedation No, 1=awake and alert    Objective:     Catheter site clean, dry, intact         Vitals   Vitals:    12/07/24 0608   BP:    Pulse: 91   Resp:    Temp:         Labs    Admission on 12/05/2024   Component Date Value Ref Range Status    Group & Rh 12/05/2024 A POS   Final    Indirect Rain 12/05/2024 NEG   Final    Specimen Outdate 12/05/2024 12/08/2024 23:59   Final    Prothrombin Time 12/05/2024 10.1  9.0 - 12.5 sec Final    INR 12/05/2024 0.9  0.8 - 1.2 Final    Anaerobic Culture 12/05/2024 Culture in progress   Preliminary    AFB Culture & Smear 12/05/2024 Culture in progress   Preliminary    AFB CULTURE STAIN 12/05/2024 No acid fast bacilli seen.   Final    Hemoglobin 12/06/2024 8.6 (L)  12.0 - 16.0 g/dL Final    Hematocrit 12/06/2024 27.9 (L)  37.0 - 48.5 % Final        Meds   Current Facility-Administered Medications   Medication Dose Route Frequency Provider Last Rate Last Admin    acetaminophen tablet 1,000 mg  1,000 mg Oral Q8H Roberth Liz MD        aspirin EC tablet 81 mg  81 mg Oral BID Bharti Thomas NP   81 mg at 12/06/24 2100    atorvastatin tablet 80 mg  80 mg Oral QHS Fredis Means MD   80 mg  at 12/06/24 2130    bisacodyL suppository 10 mg  10 mg Rectal Q12H PRN Bharti Thomas NP        celecoxib capsule 200 mg  200 mg Oral Daily Roberth Liz MD   200 mg at 12/06/24 0847    dextrose 10% bolus 125 mL 125 mL  12.5 g Intravenous PRN Osvaldo Ortiz MD        dextrose 10% bolus 250 mL 250 mL  25 g Intravenous PRN Osvaldo Ortiz MD        doxycycline tablet 100 mg  100 mg Oral Q12H Fredis Means MD   100 mg at 12/06/24 2130    famotidine tablet 20 mg  20 mg Oral BID Bharti Thomas NP   20 mg at 12/06/24 2130    FLUoxetine capsule 20 mg  20 mg Oral Daily Fredis Means MD   20 mg at 12/06/24 0847    hydroCHLOROthiazide tablet 25 mg  25 mg Oral Daily Fredis Means MD   25 mg at 12/06/24 0847    losartan tablet 100 mg  100 mg Oral Daily Fredis Means MD   100 mg at 12/06/24 0847    methocarbamoL tablet 750 mg  750 mg Oral Q8H Roberth Liz MD   750 mg at 12/07/24 0542    mupirocin 2 % ointment 1 g  1 g Nasal BID Bharti Thomas NP   1 g at 12/06/24 2100    naloxone 0.4 mg/mL injection 0.02 mg  0.02 mg Intravenous PRN Bharti Thomas NP        ondansetron injection 4 mg  4 mg Intravenous Q8H PRN Bharti Thomas NP        polyethylene glycol packet 17 g  17 g Oral Daily Bharti Thomas NP   17 g at 12/05/24 1323    pregabalin capsule 75 mg  75 mg Oral QHS Bharti Thomas NP   75 mg at 12/06/24 2130    prochlorperazine injection Soln 5 mg  5 mg Intravenous Q6H PRN Bharti Thomas NP        ropivacaine 0.2% Perineural Pump infusion 500 ML   Perineural Continuous Andra Palomares MD   New Bag at 12/05/24 1157    senna-docusate 8.6-50 mg per tablet 1 tablet  1 tablet Oral BID Bharti Thomas NP   1 tablet at 12/06/24 2130    traMADoL tablet 50 mg  50 mg Oral Q4H PRN Roberth Liz MD   50 mg at 12/07/24 0542    zolpidem tablet 5 mg  5 mg Oral Nightly PRN Castro Castaneda MD   5 mg at 12/06/24 4302         Assessment:  Traci Freire is a 64 yo female who is s/p left knee  arthroplasty revision on 12/5/24 with Dr. Estevez. Patient doing well this morning on evaluation. Patient states pain is well controlled with scheduled and PRN meds. Plan to d/c home today per primary team.     Plan:  Continue L adductor PNC at current rate. Discharge teaching provided    Continue multimodal pain regimen (tylenol, robaxin, celebrex, lyrica)  Tramadol 50 mg q4h PRN for breakthrough pain   Rest of care per primary team       Case discussed with staff, final recommendations per attestation above.     Thank you for the consult and allowing us to participate in the care of this patient. We will continue to follow along. Please call Acute Pain Service at [phone] or Anesthesia at [phone] if you have any further questions or concerns.    Roberth Liz MD, PGY4/CA3  Department of Anesthesiology  [facility]  12/07/2024

## 2024-12-07 NOTE — PLAN OF CARE
Problem: Adult Inpatient Plan of Care  Goal: Plan of Care Review  12/7/2024 0231 by Elgin Cohen LPN  Outcome: Progressing  12/7/2024 0139 by Elgin Cohen LPN  Outcome: Progressing  Goal: Patient-Specific Goal (Individualized)  12/7/2024 0231 by Elgin Cohen LPN  Outcome: Progressing  12/7/2024 0139 by Elgin Cohen LPN  Outcome: Progressing  Goal: Absence of Hospital-Acquired Illness or Injury  12/7/2024 0231 by Elgin Cohen LPN  Outcome: Progressing  12/7/2024 0139 by Elgin Cohen LPN  Outcome: Progressing  Goal: Optimal Comfort and Wellbeing  12/7/2024 0231 by Elgin Cohen LPN  Outcome: Progressing  12/7/2024 0139 by Elgin Cohen LPN  Outcome: Progressing  Goal: Readiness for Transition of Care  12/7/2024 0231 by Elgin Cohen LPN  Outcome: Progressing  12/7/2024 0139 by Elgin Cohen LPN  Outcome: Progressing

## 2024-12-07 NOTE — PT/OT/SLP PROGRESS
Occupational Therapy   Treatment  A client care conference was completed by the OTR and the ZAYAS prior to treatment by the OTR to discuss the patient's POC and current status.     Name: Traci Freire  MRN: [id number]  Admitting Diagnosis:  S/P revision of total knee, left  2 Days Post-Op    Recommendations:     Discharge Recommendations: Low Intensity Therapy  Discharge Equipment Recommendations:  hip kit  Barriers to discharge:  Decreased caregiver support    Assessment:     Traci Freire is a 64 y.o. female with a medical diagnosis of S/P revision of total knee, left.  She presents with the following performance deficits affecting function are weakness, impaired functional mobility, decreased safety awareness, pain, decreased coordination, gait instability, impaired endurance, impaired self care skills, decreased ROM, decreased lower extremity function, orthopedic precautions. Pt very pleasant demonstrated good efforts this tx session. No LOB with functional mobility and transfers with AD this date while maintaining  WBAT L LE with hinged knee brace locked in extension. Due to pt's ortho precautions and limited ROM of L LE, she demonstrated decreased independence with LB dressing this date, but was able to complete with assistance. Pt independently performed UB dressing task and completed grooming tasks in stance with AD this date.     Rehab Prognosis:  Good; patient would benefit from acute skilled OT services to address these deficits and reach maximum level of function.       Plan:     Patient to be seen 4 x/week to address the above listed problems via self-care/home management, therapeutic activities, therapeutic exercises  Plan of Care Expires: 01/05/25  Plan of Care Reviewed with: patient    Subjective     Chief Complaint: L LE pain  Patient/Family Comments/goals: Pt reports she is going home today. Pt agreeable to tx session this date.   Pain/Comfort:  Pain Rating 1: 5/10  Location - Side 1: Left  Location -  Orientation 1: generalized  Location 1: knee  Pain Addressed 1: Reposition, Distraction    Objective:     Communicated with: nurse, Yoon ERAZO,  prior to session.  Patient found supine with wound vac, perineural catheter upon OT entry to room.    General Precautions: Standard, fall    Orthopedic Precautions:LLE weight bearing as tolerated (with hinged knee brace locked in extension)  Braces: Hinged knee brace (with hinged knee brace locked in extension)  Respiratory Status: Room air     Occupational Performance:     Bed Mobility:    Patient completed Scooting/Bridging with stand by assistance  Patient completed Supine to Sit with stand by assistance  with HOB elevated and use of bedrail  Pt completed static sitting EOB ~6-8 minutes with SBA.  Pt scooted posteriorly in chair with use of  B UE and SBA    Functional Mobility/Transfers:  Patient completed Sit <> Stand Transfer from EOB with contact guard assistance  with  rolling walker  x 2 trials   Functional Mobility: Pt performed in room mobility with RW SBA (~16 feet + ~16 feet) to simulate household/community distances to improve safety, endurance, static/dynamic standing balance, and be able to perform occupations of choice.   Pt completed bedside chair transfer with RW via ambulated steps and SBA.      Activities of Daily Living:  Grooming: stand by assistance in stance with RW at sink side in pt's bathroom while completing brushing teeth and wash face with wash cloth  Upper Body Dressing: supervision and via setup A as pt completed seated EOB   Lower Body Dressing: total assistance to rajat personal pajama pants and undergarments  on L LE due to hinged knee brace and R LE while seated EOB, then improved to Mod A to pull up around hips in stance.      [facility] 6 Click ADL: 20    Treatment & Education:  Pt performed bed mobility, functional mobility/transfers, and ADLs as documented above.     Pt educated and instructed on the following:  OT POC  Role of ZAYAS  Use of  call bell for all assistance  Hip Kit to safely maximize her independence with ADL performance  Orthopedic precaution: L LE WBAT with hinged knee brace locked in extension  Addressed questions and /or concerns within ZAYAS scope of practice  Pt verbalized understanding     Patient left up in chair with all lines intact, call button in reach, nurse present, and pt appears in NAD.     GOALS:   Multidisciplinary Problems       Occupational Therapy Goals          Problem: Occupational Therapy    Goal Priority Disciplines Outcome Interventions   Occupational Therapy Goal     OT, PT/OT Progressing    Description: Goals to be met by: 1/5/25     Patient will increase functional independence with ADLs by performing:    LE Dressing with Minimal Assistance and AE prn.  Grooming while standing at sink with Stand-by Assistance. (Met on 12/6/2024)  Grooming while standing at sink with Supervision.  Toileting from toilet with Contact Guard Assistance for hygiene and clothing management. (Met on 12/6/2024)  Toileting from toilet with Supervision for hygiene and clothing management.  Supine to sit with Mower.  Toilet transfer to toilet with Contact Guard Assistance and RW. (Met on 12/6/2024)  Toilet transfer to toilet with Supervision and RW.                         Time Tracking:     OT Date of Treatment: 12/07/24  OT Start Time: 0816  OT Stop Time: 0840  OT Total Time (min): 24 min    Billable Minutes:Therapeutic Activity 12  Therapeutic Exercise 12    OT/RICHARDSON: RICHARDSON     Number of RICHARDSON visits since last OT visit: 1    12/7/2024

## 2024-12-07 NOTE — ASSESSMENT & PLAN NOTE
Traci Freire is a 64 y.o. female s/p L TKA revision on 12/5/24. Doing well this morning.    Plan:  Pain control: multimodal  PT/OT: WBAT LLE in T-scope locked in extension. No knee range of motion for 6 weeks.  DVT PPx: ASA 81 mg BID, SCDs at all times when not ambulating  Abx: postop Ancef x 24 hours. 6 months doxycycline post-op.  Dressing: Prevena    Dispo: home today  Follow up: 1 week for wound VAC removal

## 2024-12-07 NOTE — PT/OT/SLP PROGRESS
Physical Therapy Treatment    Patient Name:  Traci Freire   MRN:  [id number]    Recommendations:     Discharge Recommendations: Low Intensity Therapy  Discharge Equipment Recommendations: hip kit  Barriers to discharge: None    Assessment:     Traci Freire is a 64 y.o. female admitted with a medical diagnosis of S/P revision of total knee, left.  She presents with the following impairments/functional limitations: weakness, decreased lower extremity function, pain, decreased ROM, orthopedic precautions.    Rehab Prognosis: Good; patient would benefit from acute skilled PT services to address these deficits and reach maximum level of function.    Recent Surgery: Procedure(s) (LRB):  REVISION, ARTHROPLASTY, KNEE (Left) 2 Days Post-Op    Plan:     During this hospitalization, patient to be seen daily to address the identified rehab impairments via gait training, therapeutic activities, therapeutic exercises, neuromuscular re-education and progress toward the following goals:    Plan of Care Expires:  01/04/25    Subjective     Chief Complaint: pain  Patient/Family Comments/goals: to go home   Pain/Comfort:  Pain Rating 1: 5/10  Location - Side 1: Left  Location 1: knee      Objective:     Communicated with RN prior to session.  Patient found up in chair with wound vac, perineural catheter upon PT entry to room.     General Precautions: Standard, fall  Orthopedic Precautions: LLE weight bearing as tolerated (with HKB locked in ext)  Braces: Hinged knee brace (locked in ext)  Respiratory Status: Room air     Functional Mobility:  Transfers:     Sit to Stand:  supervision with rolling walker  Gait: 100 feet with RW SBA       AM-PAC 6 CLICK MOBILITY  Turning over in bed (including adjusting bedclothes, sheets and blankets)?: 4  Sitting down on and standing up from a chair with arms (e.g., wheelchair, bedside commode, etc.): 4  Moving from lying on back to sitting on the side of the bed?: 4  Moving to and from a bed to a chair  (including a wheelchair)?: 4  Need to walk in hospital room?: 3  Climbing 3-5 steps with a railing?: 3  Basic Mobility Total Score: 22       Treatment & Education:  Education provided on brace, AD usage, home safety and role of PT.  Bedside table in front of patient and area set up for function, convenience, and safety. RN aware of patient's mobility needs and status. Questions/concerns addressed within PTA scope of practice; patient  with no further questions. Time was provided for active listening, discussion of health disposition, and discussion of safe discharge.    Patient left up in chair with all lines intact and call button in reach..    GOALS:   Multidisciplinary Problems       Physical Therapy Goals          Problem: Physical Therapy    Goal Priority Disciplines Outcome Interventions   Physical Therapy Goal     PT, PT/OT Progressing    Description: PT goals until 12/20/24    1. Pt supine to sit with SBA-not met  2. Pt sit to supine with SBA-not met  3. Pt sit to stand with RW with SBA- met 12/6/24  4. Pt to perform gait 100ft with RW with HKB locked in extwith SBA.- met 12/6/24  Revised goal: gait 150ft with RW with HKB locked in ext with mod independent-not met  5. Pt to go up/down curb step with RW with WBAT L LE with HKB locked in ext with SBA.-not met  6. Pt to perform B LE exs in sitting or supine x 15 reps to strengthen B LE to improve functional mobility.-not met                         Time Tracking:     PT Received On: 12/07/24  PT Start Time: 0940     PT Stop Time: 1004  PT Total Time (min): 24 min     Billable Minutes: Gait Training 24    Treatment Type: Treatment  PT/PTA: PTA     Number of PTA visits since last PT visit: 1     12/07/2024

## 2024-12-07 NOTE — ADDENDUM NOTE
Addendum  created 12/07/24 0749 by Rekha Coley MD    Charge Capture section accepted, Cosign clinical note with attestation

## 2024-12-07 NOTE — PLAN OF CARE
Problem: Adult Inpatient Plan of Care  Goal: Plan of Care Review  Outcome: Met  Goal: Patient-Specific Goal (Individualized)  Outcome: Met  Goal: Absence of Hospital-Acquired Illness or Injury  Outcome: Met  Goal: Optimal Comfort and Wellbeing  Outcome: Met  Goal: Readiness for Transition of Care  Outcome: Met     Problem: Infection  Goal: Absence of Infection Signs and Symptoms  Outcome: Met     Problem: Wound  Goal: Optimal Coping  Outcome: Met  Goal: Optimal Functional Ability  Outcome: Met  Goal: Absence of Infection Signs and Symptoms  Outcome: Met  Goal: Improved Oral Intake  Outcome: Met  Goal: Optimal Pain Control and Function  Outcome: Met  Goal: Skin Health and Integrity  Outcome: Met  Goal: Optimal Wound Healing  Outcome: Met     Problem: Pain Acute  Goal: Optimal Pain Control and Function  Outcome: Met     Problem: Skin Injury Risk Increased  Goal: Skin Health and Integrity  Outcome: Met     Problem: Fall Injury Risk  Goal: Absence of Fall and Fall-Related Injury  Outcome: Met   Patient is awake and alert, denies excess pain or discomfort at present. LLE dressing CDI with immobilizer and Preveena wound vac in place. LFA PIV removed with tip intact. Patient has received discharge medications. Discharge instructions provided to patient and daughter, all questions asked and answered, no concerns identified. Patient is discharged home with daughter via personal car. No distress.

## 2024-12-07 NOTE — SUBJECTIVE & OBJECTIVE
"Principal Problem:S/P revision of total knee, left    Principal Orthopedic Problem: same, s/p revision L TKA on 12/5/24    Interval History: No acute events overnight. Vitals within normal limits. Pain well controlled. Patient ambulated well with therapy yesterday. Denies numbness, tingling, and weakness. Voiding spontaneously. No other acute complaints.    Review of patient's allergies indicates:   Allergen Reactions    Macrobid [nitrofurantoin monohyd/m-cryst]     Bactrim [sulfamethoxazole-trimethoprim] Nausea And Vomiting       Current Facility-Administered Medications   Medication    acetaminophen tablet 1,000 mg    aspirin EC tablet 81 mg    atorvastatin tablet 80 mg    bisacodyL suppository 10 mg    celecoxib capsule 200 mg    dextrose 10% bolus 125 mL 125 mL    dextrose 10% bolus 250 mL 250 mL    doxycycline tablet 100 mg    famotidine tablet 20 mg    FLUoxetine capsule 20 mg    hydroCHLOROthiazide tablet 25 mg    losartan tablet 100 mg    methocarbamoL tablet 750 mg    mupirocin 2 % ointment 1 g    naloxone 0.4 mg/mL injection 0.02 mg    ondansetron injection 4 mg    polyethylene glycol packet 17 g    pregabalin capsule 75 mg    prochlorperazine injection Soln 5 mg    ropivacaine 0.2% Perineural Pump infusion 500 ML    senna-docusate 8.6-50 mg per tablet 1 tablet    traMADoL tablet 50 mg    zolpidem tablet 5 mg     Objective:     Vital Signs (Most Recent):  Temp: 98.1 °F (36.7 °C) (12/07/24 0358)  Pulse: 91 (12/07/24 0608)  Resp: 18 (12/07/24 0542)  BP: 110/60 (12/07/24 0358)  SpO2: 96 % (12/07/24 0358) Vital Signs (24h Range):  Temp:  [98.1 °F (36.7 °C)-98.4 °F (36.9 °C)] 98.1 °F (36.7 °C)  Pulse:  [81-98] 91  Resp:  [16-19] 18  SpO2:  [94 %-97 %] 96 %  BP: (101-138)/(54-60) 110/60     Weight: 82.3 kg (181 lb 7 oz)  Height: 5' 1" (154.9 cm)  Body mass index is 34.28 kg/m².      Intake/Output Summary (Last 24 hours) at 12/7/2024 0637  Last data filed at 12/7/2024 0543  Gross per 24 hour   Intake 1260 ml "   Output 50 ml   Net 1210 ml        Ortho/SPM Exam  Gen: NAD, sitting comfortably in bed  CV: regular rate  Resp: non-labored respirations    LLE:  Dressing clean, dry, and intact with bloody output in the wound VAC canister similar to yesterday  SILT Sa/Abarca/DP/SP/T  Motor intact EHL/FHL/TA/Gastroc  2+ DP, 2+ PT       Significant Labs: All pertinent labs within the past 24 hours have been reviewed.    Significant Imaging: I have reviewed and interpreted all pertinent imaging results/findings.

## 2024-12-08 NOTE — PLAN OF CARE
[facility]  Discharge Final Note    Primary Care Provider: Juliet Medina NP    Expected Discharge Date: 12/7/2024    Final Discharge Note (most recent)       Final Note - 12/07/24 1855          Final Note    Assessment Type Final Discharge Note (P)      Anticipated Discharge Disposition Home or Self Care (P)      Hospital Resources/Appts/Education Provided Provided patient/caregiver with written discharge plan information;Provided education on problems/symptoms using teachback;Appointments scheduled and added to AVS (P)         Post-Acute Status    Post-Acute Authorization HME (P)      HME Status Patient declined/refused (P)    Rolling Walker & Bedside Commode    Discharge Delays None known at this time (P)                      Important Message from Medicare             Pt. discharged home with Self-Care. Pt. Has a rolling walker at home and declined bedside commode.     Margarita Phillips LMSW

## 2024-12-08 NOTE — DISCHARGE SUMMARY
[facility]  Orthopedics  Discharge Summary      Patient Name: Traci Freire  MRN: [id number]  Admission Date: 12/5/2024  Hospital Length of Stay: 2 days  Discharge Date and Time: 12/7/2024  1:34 PM  Attending Physician: Chris Estevez MD   Discharging Provider: Fredis Means MD  Primary Care Provider: Juliet Medina NP    HPI: Traci Freire is a 63 y.o. female with history of Left knee pain. She had a left knee revision in 2014. She started to have pain again earlier this year and the prosthesis was found to be loose. She had explantation 9/19 with placement of a spacer. There were more than 10 wbc's per hpf, so she was treated for infection although cultures were all negative. She has been wearing a t-scope brace locked in extension since. Pain is worse with activity and weight bearing.  Patient has experienced interference of activities of daily living due to decreased range of motion and an increase in joint pain and swelling.  Traci Freire currently ambulates using assistive device .      Relevant medical conditions of significance in perioperative period:  HTN- on medication managed by pcp  HLD- on medication managed by pcp  Anxiety- on medication managed by pcp  H/o left knee revision arthroplasty    Procedure(s) (LRB):  REVISION, ARTHROPLASTY, KNEE (Left)      Hospital Course: On 12/05/2024, the patient arrived to the [facility] for pre-operative management.  Upon completion of the pre-operative preparation, the patient was taken back to the operative theatre. The above procedure was performed without complication and the patient was transported to the post anesthesia care unit in stable condition.  After appropriate recovery from the anaesthetic agents used during the surgery, the patient was then transported to the inpatient floor.  The interim of the hospital stay from arrival on the floor up to discharge has been uncomplicated. The patient has tolerated regular diet.  The  patient's pain has been controlled using a multimodal approach. Currently, the patient's pain is well controlled on an oral regimen.  The patient has been voiding without difficulty.  The patient began participation in physical therapy after surgery and has progressed throughout the entire hospital stay.  Currently, the patient's progress is sufficient to allow the them to be discharged to home safely.  The patient agrees with this assessment and desires a discharge today.        Significant Diagnostic Studies: No pertinent studies.    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            CONTINUE taking these medications      acetaminophen 650 MG Tbsr  Commonly known as: TYLENOL  Take 1 tablet (650 mg total) by mouth every 8 (eight) hours.     aspirin 81 MG EC tablet  Commonly known as: ECOTRIN  Take 1 tablet (81 mg total) by mouth 2 (two) times a day.     celecoxib 200 MG capsule  Commonly known as: CeleBREX  Take 1 capsule (200 mg total) by mouth once daily.     FLUoxetine 20 MG capsule  Take 1 capsule (20 mg total) by mouth once daily.     hydroCHLOROthiazide 25 MG tablet  Commonly known as: HYDRODIURIL  Take 1 tablet (25 mg total) by  mouth once daily.     irbesartan 300 MG tablet  Commonly known as: AVAPRO  Take 1 tablet (300 mg total) by mouth every evening.     methocarbamoL 750 MG Tab  Commonly known as: ROBAXIN  Take 1 tablet (750 mg total) by mouth 4 (four) times daily as needed (for muscle spasms).     oxyCODONE 5 MG immediate release tablet  Commonly known as: ROXICODONE  Take 1-2 tabs every 4-6 hours as needed for pain     * pantoprazole 40 MG tablet  Commonly known as: PROTONIX  Take 1 tablet (40 mg total) by mouth 2 (two) times daily.     * pantoprazole 40 MG tablet  Commonly known as: PROTONIX  Take 1 tablet (40 mg total) by mouth once daily.     rosuvastatin 20 MG tablet  Commonly known as: CRESTOR  Take 1 tablet (20 mg total) by mouth every evening.     senna-docusate 8.6-50 mg 8.6-50 mg per tablet  Commonly known as: SENNA WITH DOCUSATE SODIUM  Take 1 tablet by mouth once daily.     zolpidem 10 mg Tab  Commonly known as: AMBIEN  Take 1 tablet (10 mg total) by mouth every evening.           * This list has 2 medication(s) that are the same as other medications prescribed for you. Read the directions carefully, and ask your doctor or other care provider to review them with you.                  Fredis Means MD  Orthopedics  [facility] - Surgery

## 2024-12-09 ENCOUNTER — OFFICE VISIT (OUTPATIENT)
Dept: ORTHOPEDICS | Facility: CLINIC | Age: 64
End: 2024-12-09
Payer: COMMERCIAL

## 2024-12-09 ENCOUNTER — TELEPHONE (OUTPATIENT)
Dept: FAMILY MEDICINE | Facility: CLINIC | Age: 64
End: 2024-12-09
Payer: COMMERCIAL

## 2024-12-09 DIAGNOSIS — Z96.652 S/P REVISION OF TOTAL KNEE, LEFT: Primary | ICD-10-CM

## 2024-12-09 LAB
BACTERIA SPEC AEROBE CULT: NO GROWTH
FINAL PATHOLOGIC DIAGNOSIS: NORMAL
FUNGUS SPEC CULT: NORMAL
GROSS: NORMAL
Lab: NORMAL

## 2024-12-09 PROCEDURE — 99024 POSTOP FOLLOW-UP VISIT: CPT | Mod: 95,,,

## 2024-12-09 PROCEDURE — 1160F RVW MEDS BY RX/DR IN RCRD: CPT | Mod: CPTII,95,,

## 2024-12-09 PROCEDURE — 3061F NEG MICROALBUMINURIA REV: CPT | Mod: CPTII,95,,

## 2024-12-09 PROCEDURE — 3044F HG A1C LEVEL LT 7.0%: CPT | Mod: CPTII,95,,

## 2024-12-09 PROCEDURE — 3066F NEPHROPATHY DOC TX: CPT | Mod: CPTII,95,,

## 2024-12-09 PROCEDURE — 4010F ACE/ARB THERAPY RXD/TAKEN: CPT | Mod: CPTII,95,,

## 2024-12-09 PROCEDURE — 1159F MED LIST DOCD IN RCRD: CPT | Mod: CPTII,95,,

## 2024-12-09 NOTE — PROGRESS NOTES
I called the patient today regarding her  surgery with Dr. Estevez. The patient had a left revision TKA on 12/5/2024.     Pain Scale: 5 / 10    Any issues with Fever: No.    Any issues with medications (specifically DVT prophylaxis): No.    Pain medication: yes;  Celebrex : yes  Protonix : yes  Resume home meds : Yes    Any issues with:   Bowel movements: No.  Passing sheldon: Yes  Urination: Yes:     Completing at home exercises: Yes    Any concerns regarding their dressing/bandage:  No.    Patient confirmed knee brace in place, locked in extension for 6 weeks post op.     Any other concerns: No.    Blue Bracelet : yes    She will follow up later this week for removal of wound vac.     The patient was informed that if they have any urgent issues with their bandage, medications or any other health concerns regarding their surgery to call the 24/7 Orthopedic Post-op Hot Line at [phone]. The patient was reminded that if they have any chest pain or shortness of breath to call 911 or go to the ER.    The patient verbalized understanding and does not have any other questions

## 2024-12-09 NOTE — TELEPHONE ENCOUNTER
----- Message from Nurse Whittaker sent at 12/9/2024 12:42 PM CST -----  Call patient - needs post-hospital phone call within 2 business days and hospital follow up visit scheduled within 7-14 days.

## 2024-12-12 ENCOUNTER — OFFICE VISIT (OUTPATIENT)
Dept: ORTHOPEDICS | Facility: CLINIC | Age: 64
End: 2024-12-12
Payer: COMMERCIAL

## 2024-12-12 DIAGNOSIS — Z96.652 S/P REVISION OF TOTAL KNEE, LEFT: Primary | ICD-10-CM

## 2024-12-12 LAB — BACTERIA SPEC ANAEROBE CULT: NORMAL

## 2024-12-12 PROCEDURE — 3061F NEG MICROALBUMINURIA REV: CPT | Mod: CPTII,S$GLB,,

## 2024-12-12 PROCEDURE — 1159F MED LIST DOCD IN RCRD: CPT | Mod: CPTII,S$GLB,,

## 2024-12-12 PROCEDURE — 99024 POSTOP FOLLOW-UP VISIT: CPT | Mod: S$GLB,,,

## 2024-12-12 PROCEDURE — 99999 PR PBB SHADOW E&M-EST. PATIENT-LVL III: CPT | Mod: PBBFAC,,,

## 2024-12-12 PROCEDURE — 3066F NEPHROPATHY DOC TX: CPT | Mod: CPTII,S$GLB,,

## 2024-12-12 PROCEDURE — 3044F HG A1C LEVEL LT 7.0%: CPT | Mod: CPTII,S$GLB,,

## 2024-12-12 PROCEDURE — 1160F RVW MEDS BY RX/DR IN RCRD: CPT | Mod: CPTII,S$GLB,,

## 2024-12-12 PROCEDURE — 4010F ACE/ARB THERAPY RXD/TAKEN: CPT | Mod: CPTII,S$GLB,,

## 2024-12-12 RX ORDER — ONDANSETRON HYDROCHLORIDE 8 MG/1
8 TABLET, FILM COATED ORAL EVERY 6 HOURS PRN
Qty: 12 TABLET | Refills: 0 | Status: SHIPPED | OUTPATIENT
Start: 2024-12-12

## 2024-12-12 NOTE — PROGRESS NOTES
Traci Freire presents for initial post-operative visit following a revision left total knee arthroplasty performed by Dr. Estevez on 12/5/2024. She presents today for wound vac removal and incision check.     Exam:   Last menstrual period 05/09/1992.   Ambulating well with assistive device.  Incision is clean and dry without drainage or erythema.   ROM:TSCOPE brace locked in extension    Initial post-operative radiographs reviewed today revealing a well fixed and aligned prosthesis.    A/P:  1 week s/p revision left total knee arthroplasty    - The patient was advised to keep the incision clean and dry for the next 24 hours after which she may wash the area with antibacterial soap in the shower. Will not submerge until the incision is completely healed.   - Continue ASA 81 mg BID for 1 month post op  - Pain medication: none refilled. Zofran for nausea.  - Reviewed antibiotic prophylaxis   - Follow up in 1 week with myself for initial post operative evaluation and incision check. Pt will call clinic with problems/concerns.

## 2024-12-18 ENCOUNTER — PATIENT MESSAGE (OUTPATIENT)
Dept: ORTHOPEDICS | Facility: CLINIC | Age: 64
End: 2024-12-18
Payer: COMMERCIAL

## 2024-12-20 ENCOUNTER — OFFICE VISIT (OUTPATIENT)
Dept: ORTHOPEDICS | Facility: CLINIC | Age: 64
End: 2024-12-20
Payer: COMMERCIAL

## 2024-12-20 VITALS — BODY MASS INDEX: 34.13 KG/M2 | HEIGHT: 61 IN | WEIGHT: 180.75 LBS

## 2024-12-20 DIAGNOSIS — Z96.652 S/P REVISION OF TOTAL KNEE, LEFT: Primary | ICD-10-CM

## 2024-12-20 PROCEDURE — 99999 PR PBB SHADOW E&M-EST. PATIENT-LVL III: CPT | Mod: PBBFAC,,,

## 2024-12-20 NOTE — PROGRESS NOTES
"Traci Freire presents for post-operative visit following a revision left total knee arthroplasty performed by Dr. Estevez on 12/5/2024. She presents today for staple removal and incision check.     Exam:   Height 5' 1" (1.549 m), weight 82 kg (180 lb 12.4 oz), last menstrual period 05/09/1992.   Ambulating well with assistive device.  Incision is clean and dry without drainage or erythema.   ROM:TSCOPE brace locked in extension    Initial post-operative radiographs reviewed today revealing a well fixed and aligned prosthesis.    A/P:  2 week s/p revision left total knee arthroplasty    - Staples removed in clinic today. Mild bloody drainage from distal aspect of incision.   - Dressing applied with foam aquacel and ACE wrap.     - The patient was advised to keep the incision clean and dry for the next 24 hours after which she may wash the area with antibacterial soap in the shower. Will not submerge until the incision is completely healed.   - Continue ASA 81 mg BID for 1 month post op  - Pain medication: none refilled. Zofran for nausea.  - Reviewed antibiotic prophylaxis   - Follow up in 4 weeks with Dr. Estevez for further post operative evaluation and incision check. Pt will call clinic with problems/concerns.        "

## 2024-12-27 ENCOUNTER — PATIENT MESSAGE (OUTPATIENT)
Dept: ORTHOPEDICS | Facility: CLINIC | Age: 64
End: 2024-12-27
Payer: COMMERCIAL

## 2025-01-09 DIAGNOSIS — Z96.652 S/P REVISION OF TOTAL KNEE, LEFT: Primary | ICD-10-CM

## 2025-01-13 ENCOUNTER — HOSPITAL ENCOUNTER (OUTPATIENT)
Dept: RADIOLOGY | Facility: HOSPITAL | Age: 65
Discharge: HOME | End: 2025-01-13
Attending: ORTHOPAEDIC SURGERY
Payer: COMMERCIAL

## 2025-01-13 ENCOUNTER — OFFICE VISIT (OUTPATIENT)
Dept: ORTHOPEDICS | Facility: CLINIC | Age: 65
End: 2025-01-13
Payer: COMMERCIAL

## 2025-01-13 VITALS — BODY MASS INDEX: 34.13 KG/M2 | HEIGHT: 61 IN | WEIGHT: 180.75 LBS

## 2025-01-13 DIAGNOSIS — Z96.652 S/P REVISION OF TOTAL KNEE, LEFT: ICD-10-CM

## 2025-01-13 PROCEDURE — 1159F MED LIST DOCD IN RCRD: CPT | Mod: CPTII,S$GLB,, | Performed by: ORTHOPAEDIC SURGERY

## 2025-01-13 PROCEDURE — 99024 POSTOP FOLLOW-UP VISIT: CPT | Mod: S$GLB,,, | Performed by: ORTHOPAEDIC SURGERY

## 2025-01-13 PROCEDURE — 73560 X-RAY EXAM OF KNEE 1 OR 2: CPT | Mod: TC,LT

## 2025-01-13 PROCEDURE — 99999 PR PBB SHADOW E&M-EST. PATIENT-LVL III: CPT | Mod: PBBFAC,,, | Performed by: ORTHOPAEDIC SURGERY

## 2025-01-13 PROCEDURE — 73560 X-RAY EXAM OF KNEE 1 OR 2: CPT | Mod: 26,LT,, | Performed by: INTERNAL MEDICINE

## 2025-01-13 RX ORDER — OXYCODONE HYDROCHLORIDE 5 MG/1
TABLET ORAL
Qty: 50 TABLET | Refills: 0 | Status: SHIPPED | OUTPATIENT
Start: 2025-01-13

## 2025-01-13 RX ORDER — NALOXONE HYDROCHLORIDE 4 MG/.1ML
SPRAY NASAL
Qty: 1 EACH | Refills: 11 | Status: SHIPPED | OUTPATIENT
Start: 2025-01-13

## 2025-01-13 RX ORDER — DOXYCYCLINE HYCLATE 100 MG
100 TABLET ORAL EVERY 12 HOURS
Qty: 60 TABLET | Refills: 5 | Status: SHIPPED | OUTPATIENT
Start: 2025-01-13 | End: 2025-07-12

## 2025-01-13 NOTE — PROGRESS NOTES
Traci Freire is in for 6 week follow up for a  left complex revision  TKA.  She is doing  well.  Mild pain in the knee.   She has been wearing the brace and is ready to start PT  Exam demonstrates  A well developed female in no distress.  Alert and oriented.  Mood and affect are appropriate.    Knee incision is well healed.  ROM is 0-60.  The patella tracks well and there is no instability. The extremity is neurovascularly intact.    Xrays demonstrate a well fixed and positioned prosthesis.         No data to display                     No data to display                     No data to display                    8/26/2024     7:40 AM   Knee Society and Function Score   FINDINGS - KNEE SOCIETY SCORE 28   FINDINGS - KNEE SOCIETY FUNCTION SCORE 50            No data to display                Imp:Doing well  Ready to start PT.  No passive flexion  Renew pain med      F/u in 6 weeks with xrays and PROMS

## 2025-01-16 ENCOUNTER — CLINICAL SUPPORT (OUTPATIENT)
Dept: REHABILITATION | Facility: HOSPITAL | Age: 65
End: 2025-01-16
Attending: ORTHOPAEDIC SURGERY
Payer: COMMERCIAL

## 2025-01-16 DIAGNOSIS — M25.662 DECREASED RANGE OF MOTION (ROM) OF LEFT KNEE: Primary | ICD-10-CM

## 2025-01-16 DIAGNOSIS — M62.81 WEAKNESS OF LEFT QUADRICEPS MUSCLE: ICD-10-CM

## 2025-01-16 DIAGNOSIS — Z96.652 S/P REVISION OF TOTAL KNEE, LEFT: ICD-10-CM

## 2025-01-16 PROCEDURE — 97161 PT EVAL LOW COMPLEX 20 MIN: CPT | Mod: PN

## 2025-01-16 PROCEDURE — 97530 THERAPEUTIC ACTIVITIES: CPT | Mod: PN

## 2025-01-16 PROCEDURE — 97140 MANUAL THERAPY 1/> REGIONS: CPT | Mod: PN

## 2025-01-16 NOTE — PATIENT INSTRUCTIONS
HOME EXERCISE PROGRAM  Created by Mike Fajardo  Jan 16th, 2025  View videos at [email or web address]        Heel Prop    Sit with leg propped (using a large towel, books, rollers, etc), relax letting the leg straighten into extension.  *Can also assist by placing your hand just above the knee and gently pressing down toward the floor. Repeat 3 Times   Hold 2 Minutes   Complete 2 Sets   Perform 4 Times a Day          KNEE FLEXION STRETCH - SELF ASSISTED    While seated in a chair, use your unaffected leg to bend your affected knee until a stretch is felt. Repeat 10 Times   Hold 5 Seconds   Complete 3 Sets   Perform 4 Times a Day          LONG ARC QUAD - LAQ - KNEE EXTENSION    Start in a seated position with your knee bent as shown, slowly straighten your knee as you raise your foot upwards as shown. Return to starting position and repeat.    Video # [id number] Repeat 10 Times   Hold 3 Seconds   Complete 3 Sets   Perform 4 Times a Day          Straight Leg Raise    While lying or sitting, raise up your leg with a straight knee. Keep the opposite knee bent with the foot planted to the ground. Repeat 10 Times   Complete 3 Sets   Perform 4 Times a Day

## 2025-01-18 PROBLEM — M25.662 DECREASED RANGE OF MOTION (ROM) OF LEFT KNEE: Status: ACTIVE | Noted: 2025-01-18

## 2025-01-18 PROBLEM — M62.81 WEAKNESS OF LEFT QUADRICEPS MUSCLE: Status: ACTIVE | Noted: 2025-01-18

## 2025-01-18 NOTE — PLAN OF CARE
[facility]   Physical Therapy Initial Evaluation      Name: Traci Freire  [facility] Number: [id number]    Therapy Diagnosis:   Encounter Diagnoses   Name Primary?    S/P revision of total knee, left     Decreased range of motion (ROM) of left knee Yes    Weakness of left quadriceps muscle         Physician: Chris Estevez MD    Physician Orders: PT Eval and Treat   Medical Diagnosis from Referral: Z96.652 (ICD-10-CM) - S/P revision of total knee, left   Evaluation Date: 1/16/2025  Authorization Period Expiration: 12/31/2025  Plan of Care Expiration: 02/27/2025  Progress Note Due: 02/15/2025  Date of Surgery: 12/05/2024  Visit # / Visits authorized: 1/ 1   FOTO: 1/ 3    Precautions: Standard     Time In: 10:00  Time Out: 10:45  Total Billable Time: 45 minutes    Subjective     Date of onset: 12/05/2024    History of current condition - Traci reports: She was getting a knee revision, had an infection and they cleaned It out, put a soace in her knee and did a revision a few weeks. She did not have a lot of motion prior to the procedure and feels she is doing better with motion.     Falls: none     Imaging: X-ray: FINDINGS:  Extensive surgical changes are again noted with total knee replacement with an articulated prosthesis.  The hardware is intact.  Surrounding osseous structures have a satisfactory appearance.    Prior Therapy: yes  Social History:  lives with their family  Occupation: [employer]; pharmacy stock   Prior Level of Function: Independent, limited with ambulation and minor tripping due to lack of knee range of motion   Current Level of Function: improved walking tolerance, limited ability to transfer due to leg pain    Pain:  Current 0/10, worst 7/10, best 0/10   Location: left knee    Description: Aching and tired  Aggravating Factors: Sitting, Standing, and Flexing  Easing Factors: rest    Patients goals: Not walk like a penguin.      Medical History:   Past Medical History:    Diagnosis Date    Anxiety     Arthritis     Cancer breast    Right- Bilateral mastectomy- May 2005- had breast reconstruction- mother  of breast cancer    HLD (hyperlipidemia)     Hypertension     diagnosed age late 40's     IBS (irritable bowel syndrome)     Lyme disease     arthritis of hands. Felt like flu- age early 30's- got it  after going to [place]    Sleep apnea     NO MACHINE       Surgical History:   Traci Freire  has a past surgical history that includes Breast surgery; Hysterectomy (); Cystoscopy (); Abdominal surgery; Knee surgery (Left, ); Joint replacement;  section; Nissen fundoplication (); Modified radical mastectomy w/ axillary lymph node dissection (Right, 05/10/2005); Tonsillectomy; Simple mastectomy (Left, 05/10/2005); Incisional hernia repair (Right, 2008); Bladder suspension; Robot-assisted laparoscopic repair of ventral hernia (N/A, 2021); Esophagogastroduodenoscopy (N/A, 8/10/2023); Carpal tunnel release (Left, 2023); Revision of knee arthroplasty (Left, 2024); and Revision of knee arthroplasty (Left, 2024).    Medications:   Traci has a current medication list which includes the following prescription(s): acetaminophen, aspirin, celecoxib, doxycycline, fluoxetine, hydrochlorothiazide, irbesartan, methocarbamol, naloxone, ondansetron, oxycodone, pantoprazole, pantoprazole, rosuvastatin, senna-docusate 8.6-50 mg, and zolpidem.    Allergies:   Review of patient's allergies indicates:   Allergen Reactions    Macrobid [nitrofurantoin monohyd/m-cryst]     Bactrim [sulfamethoxazole-trimethoprim] Nausea And Vomiting        Objective      Observation: patient ambulation with limited knee flexion in swing phase and lateral knee during swing    Posture: as above     Functional Tests:  Gait: decreased knee flexion     Knee Passive Range of Motion:    Comments   Left* 1055 degrees 63 degrees following manual therapy    Right  0108  degrees      Quad Set: good     Joint Mobility: decreased patellar motion      Palpation: no tenderness to palpation noted     Sensation: intact to light touch in BLE    Intake Outcome Measure for FOTO Knee Survey    Therapist reviewed FOTO scores for Traci Freire on 1/16/2025.   FOTO report - see Media section or FOTO account episode details.    Intake Score: 39%         Treatment     Total Treatment time (time-based codes) separate from Evaluation: 23 minutes     Traci received the treatments listed below:      manual therapy techniques: Joint mobilizations were applied to the: Left knee for 11 minutes, including:  Tibial distraction  Tibial AP grade I-III with inferior patellar mobilization    therapeutic activities to improve functional performance for 12  minutes, including:  POC education  HEP education:  Heel prop x1 minute  Straight leg raise x10  LAQ x10  Self knee flexion stretch 3, 10 second holds     Patient Education and Home Exercises     Education provided:   - HEP education  - POC education    Written Home Exercises Provided: Yes. Exercises were reviewed and Traci was able to demonstrate them prior to the end of the session.  Traci demonstrated good  understanding of the education provided. See EMR under Patient Instructions for exercises provided during therapy sessions.    Assessment     Traci is a 64 y.o. female referred to outpatient Physical Therapy with a medical diagnosis of Z96.652 (ICD-10-CM) - S/P revision of total knee, left. Patient presents with signs and symptoms consistent with the medical diagnosis. Traci presents with decreased quadriceps strength and knee range of motion. It is noted that prior to her revision she demonstrated 40 degrees of knee flexion, at this time she exceeds that by nearly 10 degrees. Her current range of motion limitations may not improve excessively due to her limited range prior. She overall makes a good candidate for skilled Physical Therapy to improve her  knee range of motion and strength, allowing her to return to her PLOF and complete her work related task/leisurely task.    Patient prognosis is Good.   Patient will benefit from skilled outpatient Physical Therapy to address the deficits stated above and in the chart below, provide patient /family education, and to maximize patientt's level of independence.     Plan of care discussed with patient: Yes  Patient's spiritual, cultural and educational needs considered and patient is agreeable to the plan of care and goals as stated below:     Anticipated Barriers for therapy: none noted    Medical Necessity is demonstrated by the following  History  Co-morbidities and personal factors that may impact the plan of care [x] LOW: no personal factors / co-morbidities  [] MODERATE: 1-2 personal factors / co-morbidities  [] HIGH: 3+ personal factors / co-morbidities    Moderate / High Support Documentation:   Co-morbidities affecting plan of care:     Personal Factors:   no deficits     Examination  Body Structures and Functions, activity limitations and participation restrictions that may impact the plan of care [x] LOW: addressing 1-2 elements  [] MODERATE: 3+ elements  [] HIGH: 4+ elements (please support below)    Moderate / High Support Documentation:      Clinical Presentation [x] LOW: stable  [] MODERATE: Evolving  [] HIGH: Unstable     Decision Making/ Complexity Score: low       Goals:  Short Term Goals: 3 weeks. Pt agrees with goals set.  Pt will demonstrate independence and compliance with initial HEP to improve independence and symptom management.   Pt will report Left knee pain </= 0/10 with sitting for 60 minutes to demonstrate improved condition and ability to complete her ADLs and leisurely task.    Pt will demonstrate quadriceps and motor control by completing 30 straight leg raises with no knee lag.   Pt will improve Left knee flexion ROM to 80 degrees to improve tolerance for ambulation and transfers.       Long Term Goals: 6 weeks. Pt agrees with goals set.   Pt will demonstrate independence and compliance with final HEP to continue managing symptoms and developing mobility, motor control and strength.    Pt will improve FOTO score to </= 39% limited to demonstrate improved functional mobility.    Pt will report knee pain </= 0/10  with ambulation, transfers and squatting to demonstrate improved condition and ability to complete her ADLs, self care and work related task.    Pt will complete 13 sit to stands in the 30 second sit to stand test to demonstrate adequate knee range of motion, endurance and strength.  Pt will improve Left knee flexion ROM >/= 90 degrees of flexion to improve her tolerance to sitting and transfers.     Pt goal: Not walk like a penguin.    Plan     Plan of care Certification: 1/16/2025 to 02/27/2025.    Outpatient Physical Therapy 2 times weekly for 6 weeks to include the following interventions: Electrical Stimulation NMES, Gait Training, Manual Therapy, Moist Heat/ Ice, Neuromuscular Re-ed, Patient Education, Self Care, Therapeutic Activities, and Therapeutic Exercise.     Mike Fajardo, PT, DPT        Physician's Signature: _________________________________________ Date: ________________

## 2025-01-27 ENCOUNTER — CLINICAL SUPPORT (OUTPATIENT)
Dept: REHABILITATION | Facility: HOSPITAL | Age: 65
End: 2025-01-27
Payer: COMMERCIAL

## 2025-01-27 DIAGNOSIS — M25.662 DECREASED RANGE OF MOTION (ROM) OF LEFT KNEE: Primary | ICD-10-CM

## 2025-01-27 DIAGNOSIS — M62.81 WEAKNESS OF LEFT QUADRICEPS MUSCLE: ICD-10-CM

## 2025-01-27 PROCEDURE — 97140 MANUAL THERAPY 1/> REGIONS: CPT | Mod: PN,CQ

## 2025-01-27 PROCEDURE — 97112 NEUROMUSCULAR REEDUCATION: CPT | Mod: PN,CQ

## 2025-01-27 PROCEDURE — 97110 THERAPEUTIC EXERCISES: CPT | Mod: PN,CQ

## 2025-01-27 PROCEDURE — 97530 THERAPEUTIC ACTIVITIES: CPT | Mod: PN,CQ

## 2025-01-27 NOTE — PROGRESS NOTES
[facility]   Physical Therapy Treatment Note      Name: Traci Freire  Clinic Number: [id number]    Therapy Diagnosis:   Encounter Diagnoses   Name Primary?    Decreased range of motion (ROM) of left knee Yes    Weakness of left quadriceps muscle      Physician: Chris Estevez MD    Visit Date: 1/27/2025    Physician Orders: PT Eval and Treat   Medical Diagnosis from Referral: Z96.652 (ICD-10-CM) - S/P revision of total knee, left   Evaluation Date: 1/16/2025  Authorization Period Expiration: 12/31/2025  Plan of Care Expiration: 02/27/2025  Progress Note Due: 02/15/2025  Date of Surgery: 12/05/2024  Visit # / Visits authorized: 1/ 20  FOTO: 1/ 3     Precautions: Standard      Time In: 1300  Time Out: 1400  Total Billable Time: 55 minutes    PTA Visit #: 1/5       Subjective     Patient reports: No complaints with HEP. Minimal pain upon arrival.   She was compliant with home exercise program.  Response to previous treatment: first visit post initial evaluation   Functional change: first visit post initial evaluation     Pain: 3/10  Location: left knee      Objective      Objective Measures updated at progress report unless specified.     Left knee AROM 0/84    Treatment     Traci received the treatments listed below:      therapeutic exercises to develop strength, endurance, ROM, and flexibility for 10 minutes including:    Heel slides with HOB elevated 4 pounds ankle weight x 5 minutes 5 seconds holds   Standing knee flexion stretch 5 seconds holds x 30 repetitions     manual therapy techniques: Joint mobilizations were applied to the: Left knee for 10 minutes, including:    Patellar mobs in all directions   Proxmial tibal mobs to improve   Seated distraction to improve knee flexion     neuromuscular re-education activities to improve: Balance, Coordination, Kinesthetic, and Sense for 15 minutes. The following activities were included:    Quad sets 5 seconds holds x 30 repetitions    SAQ 3 x 10 repetitions   LAQ 3 x 10 repetitions     therapeutic activities to improve functional performance for 15  minutes, including:    Sit to stands x 10 repetitions   Step ups with 6 inch step 2 x 10 repetitions   Nu-step x 6 minutes to improve knee flexion and activity tolerance    gait training to improve functional mobility and safety for 5  minutes, including:    Cueing to improve knee flexion during swing through phase    Patient Education and Home Exercises       Education provided:   - Home Exercise Program     Written Home Exercises Provided: Yes. Exercises were reviewed and Traci was able to demonstrate them prior to the end of the session.  Traci demonstrated good  understanding of the education provided. See Electronic Medical Record under Patient Instructions for exercises provided during therapy sessions    Assessment     Traci noted with good tolerance to treatment. Treatment focused on developing quad strength and knee range of motion. She required cueing as expected and responded well to instruction with improvements noted. She displayed improved knee flexion range of motion this date after manual to 84 degrees. Will continue to benefit from skilled Physical Therapy to maximize range of motion and strength gains as able.     Traci Is progressing well towards her goals.   Patient prognosis is Good.     Patient will continue to benefit from skilled outpatient physical therapy to address the deficits listed in the problem list box on initial evaluation, provide pt/family education and to maximize pt's level of independence in the home and community environment.     Patient's spiritual, cultural and educational needs considered and pt agreeable to plan of care and goals.     Anticipated barriers to physical therapy: none noted    Goals:     Short Term Goals: 3 weeks. Pt agrees with goals set.  Pt will demonstrate independence and compliance with initial HEP to improve independence and symptom  management.   Pt will report Left knee pain </= 0/10 with sitting for 60 minutes to demonstrate improved condition and ability to complete her ADLs and leisurely task.    Pt will demonstrate quadriceps and motor control by completing 30 straight leg raises with no knee lag.   Pt will improve Left knee flexion ROM to 80 degrees to improve tolerance for ambulation and transfers.       Long Term Goals: 6 weeks. Pt agrees with goals set.   Pt will demonstrate independence and compliance with final HEP to continue managing symptoms and developing mobility, motor control and strength.    Pt will improve FOTO score to </= 39% limited to demonstrate improved functional mobility.    Pt will report knee pain </= 0/10  with ambulation, transfers and squatting to demonstrate improved condition and ability to complete her ADLs, self care and work related task.    Pt will complete 13 sit to stands in the 30 second sit to stand test to demonstrate adequate knee range of motion, endurance and strength.  Pt will improve Left knee flexion ROM >/= 90 degrees of flexion to improve her tolerance to sitting and transfers.     Pt goal: Not walk like a penguin.      Plan     Progress as per POC.     Madeleine Fraser, PTA

## 2025-01-30 ENCOUNTER — CLINICAL SUPPORT (OUTPATIENT)
Dept: REHABILITATION | Facility: HOSPITAL | Age: 65
End: 2025-01-30
Payer: COMMERCIAL

## 2025-01-30 DIAGNOSIS — M25.662 DECREASED RANGE OF MOTION (ROM) OF LEFT KNEE: Primary | ICD-10-CM

## 2025-01-30 DIAGNOSIS — M62.81 WEAKNESS OF LEFT QUADRICEPS MUSCLE: ICD-10-CM

## 2025-01-30 PROCEDURE — 97110 THERAPEUTIC EXERCISES: CPT | Mod: PN

## 2025-01-30 PROCEDURE — 97530 THERAPEUTIC ACTIVITIES: CPT | Mod: PN

## 2025-01-30 PROCEDURE — 97112 NEUROMUSCULAR REEDUCATION: CPT | Mod: PN

## 2025-01-30 PROCEDURE — 97140 MANUAL THERAPY 1/> REGIONS: CPT | Mod: PN

## 2025-01-30 NOTE — PROGRESS NOTES
[facility]   Physical Therapy Treatment Note      Name: Traci TAMEZ [facility] Number: [id number]    Therapy Diagnosis:   Encounter Diagnoses   Name Primary?    Decreased range of motion (ROM) of left knee Yes    Weakness of left quadriceps muscle        Physician: Chris Estevez MD    Visit Date: 1/30/2025    Physician Orders: PT Eval and Treat   Medical Diagnosis from Referral: Z96.652 (ICD-10-CM) - S/P revision of total knee, left   Evaluation Date: 1/16/2025  Authorization Period Expiration: 12/31/2025  Plan of Care Expiration: 02/27/2025  Progress Note Due: 02/15/2025  Date of Surgery: 12/05/2024  Visit # / Visits authorized: 2/ 20  FOTO: 1/ 3     Precautions: Standard      Time In: 1300  Time Out: 1352  Total Billable Time: 52 minutes    PTA Visit #: 0/5       Subjective     Patient reports: She is doing well, she was sore following the last session.   She was compliant with home exercise program.  Response to previous treatment: first visit post initial evaluation   Functional change: first visit post initial evaluation     Pain: 3/10  Location: left knee      Objective      Objective Measures updated at progress report unless specified.     Left knee active range of motion: 83  Left knee passive range of motion: 90     Treatment     Traci received the treatments listed below:      therapeutic exercises to develop strength, endurance, ROM, and flexibility for 10 minutes including:    Heel slides with HOB elevated 4 pounds ankle weight with strap over pressure x 5 minutes 5 seconds holds   Standing knee flexion stretch 5 seconds holds x 30 repetitions     manual therapy techniques: Joint mobilizations were applied to the: Left knee for 10 minutes, including:    Patellar mobs in all directions   Proxmial tibal mobs to improve   Seated distraction to improve knee flexion     neuromuscular re-education activities to improve: Balance, Coordination, Kinesthetic, and Sense for 12  minutes. The following activities were included:    Quad sets 5 seconds holds x 30 repetitions    LAQ 3# 3 x 10 repetitions   Straight leg raise, 2 x 10     therapeutic activities to improve functional performance for 15  minutes, including:    Sit to stands x 10 repetitions   Step ups with 6 inch step 2 x 10 repetitions   Nu-step x 6 minutes to improve knee flexion and activity tolerance    gait training to improve functional mobility and safety for 5  minutes, including:    Cueing to improve knee flexion during swing through phase    Patient Education and Home Exercises       Education provided:   - Home Exercise Program     Written Home Exercises Provided: Yes. Exercises were reviewed and Traci was able to demonstrate them prior to the end of the session.  Traci demonstrated good  understanding of the education provided. See Electronic Medical Record under Patient Instructions for exercises provided during therapy sessions    Assessment     Traci is doing well at this time; her knee range of motion was improved to 90 degrees (passively with overpressure) She is doing well at this time and have improved ambulation quality. Therapy will continue to focus on developing her strength and range of motion.     Traci Is progressing well towards her goals.   Patient prognosis is Good.     Patient will continue to benefit from skilled outpatient physical therapy to address the deficits listed in the problem list box on initial evaluation, provide pt/family education and to maximize pt's level of independence in the home and community environment.     Patient's spiritual, cultural and educational needs considered and pt agreeable to plan of care and goals.     Anticipated barriers to physical therapy: none noted    Goals:     Short Term Goals: 3 weeks. Pt agrees with goals set.  Pt will demonstrate independence and compliance with initial HEP to improve independence and symptom management.   Pt will report Left knee pain  </= 0/10 with sitting for 60 minutes to demonstrate improved condition and ability to complete her ADLs and leisurely task.    Pt will demonstrate quadriceps and motor control by completing 30 straight leg raises with no knee lag.   Pt will improve Left knee flexion ROM to 80 degrees to improve tolerance for ambulation and transfers.       Long Term Goals: 6 weeks. Pt agrees with goals set.   Pt will demonstrate independence and compliance with final HEP to continue managing symptoms and developing mobility, motor control and strength.    Pt will improve FOTO score to </= 39% limited to demonstrate improved functional mobility.    Pt will report knee pain </= 0/10  with ambulation, transfers and squatting to demonstrate improved condition and ability to complete her ADLs, self care and work related task.    Pt will complete 13 sit to stands in the 30 second sit to stand test to demonstrate adequate knee range of motion, endurance and strength.  Pt will improve Left knee flexion ROM >/= 90 degrees of flexion to improve her tolerance to sitting and transfers.     Pt goal: Not walk like a penguin.      Plan     Progress as per POC.     Mike Fajardo, PT, DPT

## 2025-02-03 ENCOUNTER — CLINICAL SUPPORT (OUTPATIENT)
Dept: REHABILITATION | Facility: HOSPITAL | Age: 65
End: 2025-02-03
Payer: COMMERCIAL

## 2025-02-03 DIAGNOSIS — M25.662 IMPAIRED RANGE OF MOTION OF LEFT KNEE: ICD-10-CM

## 2025-02-03 DIAGNOSIS — Z74.09 IMPAIRED FUNCTIONAL MOBILITY AND ACTIVITY TOLERANCE: ICD-10-CM

## 2025-02-03 DIAGNOSIS — G89.29 CHRONIC PAIN OF LEFT KNEE: Primary | ICD-10-CM

## 2025-02-03 DIAGNOSIS — M25.562 CHRONIC PAIN OF LEFT KNEE: Primary | ICD-10-CM

## 2025-02-03 PROCEDURE — 97530 THERAPEUTIC ACTIVITIES: CPT | Mod: PN,CQ

## 2025-02-03 PROCEDURE — 97112 NEUROMUSCULAR REEDUCATION: CPT | Mod: PN,CQ

## 2025-02-03 PROCEDURE — 97140 MANUAL THERAPY 1/> REGIONS: CPT | Mod: PN,CQ

## 2025-02-03 PROCEDURE — 97110 THERAPEUTIC EXERCISES: CPT | Mod: PN,CQ

## 2025-02-06 ENCOUNTER — CLINICAL SUPPORT (OUTPATIENT)
Dept: REHABILITATION | Facility: HOSPITAL | Age: 65
End: 2025-02-06
Payer: COMMERCIAL

## 2025-02-06 DIAGNOSIS — M62.81 WEAKNESS OF LEFT QUADRICEPS MUSCLE: ICD-10-CM

## 2025-02-06 DIAGNOSIS — M25.662 DECREASED RANGE OF MOTION (ROM) OF LEFT KNEE: Primary | ICD-10-CM

## 2025-02-06 PROCEDURE — 97140 MANUAL THERAPY 1/> REGIONS: CPT | Mod: PN

## 2025-02-06 PROCEDURE — 97530 THERAPEUTIC ACTIVITIES: CPT | Mod: PN

## 2025-02-06 PROCEDURE — 97110 THERAPEUTIC EXERCISES: CPT | Mod: PN

## 2025-02-06 PROCEDURE — 97112 NEUROMUSCULAR REEDUCATION: CPT | Mod: PN

## 2025-02-06 NOTE — PROGRESS NOTES
[facility]   Physical Therapy Treatment Note      Name: Traci TAMEZ [facility] Number: [id number]    Therapy Diagnosis:   Encounter Diagnoses   Name Primary?    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     She was compliant with home exercise program.  Response to previous treatment: as noted  Functional change: as noted     Pain: 3/10  Location: left knee      Objective      Objective Measures updated at progress report unless specified.     Left knee active range of motion: 83  Left knee passive range of motion: 90     Treatment     Traci received the treatments listed below:      therapeutic exercises to develop strength, endurance, ROM, and flexibility for 10 minutes including:    Heel slides with HOB elevated 4 pounds ankle weight with strap over pressure x 5 minutes 5 seconds holds   Standing knee flexion stretch 5 seconds holds x 30 repetitions     manual therapy techniques: Joint mobilizations were applied to the: Left knee for 10 minutes, including:    Patellar mobs in all directions   Proxmial tibal mobs to improve   Seated distraction to improve knee flexion     neuromuscular re-education activities to improve: Balance, Coordination, Kinesthetic, and Sense for 12 minutes. The following activities were included:    Quad sets 5  seconds holds x 30 repetitions    LAQ 5# 4 x 8 repetitions   Straight leg raise, 2 x 10 1#    therapeutic activities to improve functional performance for 25  minutes, including:    Heel prop 3 minutes   Elevated heel slides, 3 x 10   Sit to stands x 3 x8 repetitions   Double leg, leg press 20#, 3 x 8   Step ups with 6 inch step 2 x 10 repetitions   Nu-step x 6 minutes to improve knee flexion and activity tolerance    gait training to improve functional mobility and safety for 00  minutes, including:    Cueing to improve knee flexion during swing through phase    Patient Education and Home Exercises       Education provided:   - Home Exercise Program     Written Home Exercises Provided: Yes. Exercises were reviewed and Traci was able to demonstrate them prior to the end of the session.  Traci demonstrated good  understanding of the education provided. See Electronic Medical Record under Patient Instructions for exercises provided during therapy sessions    Assessment     Traci continues to do well in therapy; treatment has focused on developing on her range of motion and progressively developing her quadriceps strength. She continues to respond well to long duration stretching of her knee. At this time concern would circulate around whether or not Traci's Knee range of motion will plateau soon considering she only had 30 degrees of flexion pre-op. Therapy will monitor and advance as tolerated.     Traci Is progressing well towards her goals.   Patient prognosis is Good.     Patient will continue to benefit from skilled outpatient physical therapy to address the deficits listed in the problem list box on initial evaluation, provide pt/family education and to maximize pt's level of independence in the home and community environment.     Patient's spiritual, cultural and educational needs considered and pt agreeable to plan of care and goals.     Anticipated barriers to physical therapy: none noted    Goals:     Short  Term Goals: 3 weeks. Pt agrees with goals set.  Pt will demonstrate independence and compliance with initial HEP to improve independence and symptom management.   Pt will report Left knee pain </= 0/10 with sitting for 60 minutes to demonstrate improved condition and ability to complete her ADLs and leisurely task.    Pt will demonstrate quadriceps and motor control by completing 30 straight leg raises with no knee lag.   Pt will improve Left knee flexion ROM to 80 degrees to improve tolerance for ambulation and transfers.       Long Term Goals: 6 weeks. Pt agrees with goals set.   Pt will demonstrate independence and compliance with final HEP to continue managing symptoms and developing mobility, motor control and strength.    Pt will improve FOTO score to </= 39% limited to demonstrate improved functional mobility.    Pt will report knee pain </= 0/10  with ambulation, transfers and squatting to demonstrate improved condition and ability to complete her ADLs, self care and work related task.    Pt will complete 13 sit to stands in the 30 second sit to stand test to demonstrate adequate knee range of motion, endurance and strength.  Pt will improve Left knee flexion ROM >/= 90 degrees of flexion to improve her tolerance to sitting and transfers.     Pt goal: Not walk like a penguin.      Plan     Progress as per POC.     Mike Fajardo, PT, DPT

## 2025-02-10 ENCOUNTER — CLINICAL SUPPORT (OUTPATIENT)
Dept: REHABILITATION | Facility: HOSPITAL | Age: 65
End: 2025-02-10
Payer: COMMERCIAL

## 2025-02-10 DIAGNOSIS — M62.81 WEAKNESS OF LEFT QUADRICEPS MUSCLE: ICD-10-CM

## 2025-02-10 DIAGNOSIS — M25.662 DECREASED RANGE OF MOTION (ROM) OF LEFT KNEE: Primary | ICD-10-CM

## 2025-02-10 PROCEDURE — 97140 MANUAL THERAPY 1/> REGIONS: CPT | Mod: PN,CQ

## 2025-02-10 PROCEDURE — 97110 THERAPEUTIC EXERCISES: CPT | Mod: PN,CQ

## 2025-02-10 PROCEDURE — 97112 NEUROMUSCULAR REEDUCATION: CPT | Mod: PN,CQ

## 2025-02-10 PROCEDURE — 97530 THERAPEUTIC ACTIVITIES: CPT | Mod: PN,CQ

## 2025-02-10 NOTE — PROGRESS NOTES
"[facility]   Physical Therapy Treatment Note      Name: Traci TAMEZ Freire  Clinic Number: [id number]    Therapy Diagnosis:   Encounter Diagnoses   Name Primary?    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         PTA Visit #: 1/5     Subjective     Patient reports: "I am making it."      She was compliant with home exercise program.  Response to previous treatment: as noted  Functional change: as noted     Pain: 3/10  Location: left knee      Objective      Objective Measures updated at progress report unless specified.     Left knee active range of motion: 83  Left knee passive range of motion: 90     Treatment     Traci received the treatments listed below:      therapeutic exercises to develop strength, endurance, ROM, and flexibility for 10 minutes including:    Heel slides with HOB elevated 5 pounds ankle weight with strap over pressure x 5 minutes 5 seconds holds   Standing knee flexion stretch 5 seconds holds x 30 repetitions     manual therapy techniques: Joint mobilizations were applied to the: Left knee for 10 minutes, including:    Patellar mobs in all directions   Proxmial tibal mobs to improve   Seated distraction to improve knee flexion     neuromuscular re-education activities to improve: Balance, Coordination, Kinesthetic, and " Sense for 12 minutes. The following activities were included:    Quad sets 5 seconds holds x 30 repetitions    LAQ 5# 4 x 8 repetitions   Straight leg raise, 2 x 10 1#    therapeutic activities to improve functional performance for 25  minutes, including:    Heel prop 3 minutes   Sit to stands x 3 x8 repetitions   Double leg, leg press 20#, 3 x 8   Step ups with 6 inch step 2 x 10 repetitions   Nu-step x 6 minutes to improve knee flexion and activity tolerance    gait training to improve functional mobility and safety for 00  minutes, including:    Cueing to improve knee flexion during swing through phase    Patient Education and Home Exercises       Education provided:   - Home Exercise Program     Written Home Exercises Provided: Yes. Exercises were reviewed and Traci was able to demonstrate them prior to the end of the session.  Traci demonstrated good  understanding of the education provided. See Electronic Medical Record under Patient Instructions for exercises provided during therapy sessions    Assessment     Traci notes with good tolerance to treatment. Continued focus on improving knee flexion range of motion and strength as tolerable. Traci will continue to benefit from skilled Physical Therapy to maximize gains to allow her to return to all ADLs without pain or limitations.    Traci Is progressing well towards her goals.   Patient prognosis is Good.     Patient will continue to benefit from skilled outpatient physical therapy to address the deficits listed in the problem list box on initial evaluation, provide pt/family education and to maximize pt's level of independence in the home and community environment.     Patient's spiritual, cultural and educational needs considered and pt agreeable to plan of care and goals.     Anticipated barriers to physical therapy: none noted    Goals:     Short Term Goals: 3 weeks. Pt agrees with goals set.  Pt will demonstrate independence and compliance with initial HEP  to improve independence and symptom management.   Pt will report Left knee pain </= 0/10 with sitting for 60 minutes to demonstrate improved condition and ability to complete her ADLs and leisurely task.    Pt will demonstrate quadriceps and motor control by completing 30 straight leg raises with no knee lag.   Pt will improve Left knee flexion ROM to 80 degrees to improve tolerance for ambulation and transfers.       Long Term Goals: 6 weeks. Pt agrees with goals set.   Pt will demonstrate independence and compliance with final HEP to continue managing symptoms and developing mobility, motor control and strength.    Pt will improve FOTO score to </= 39% limited to demonstrate improved functional mobility.    Pt will report knee pain </= 0/10  with ambulation, transfers and squatting to demonstrate improved condition and ability to complete her ADLs, self care and work related task.    Pt will complete 13 sit to stands in the 30 second sit to stand test to demonstrate adequate knee range of motion, endurance and strength.  Pt will improve Left knee flexion ROM >/= 90 degrees of flexion to improve her tolerance to sitting and transfers.     Pt goal: Not walk like a penguin.      Plan     Progress as per POC.     Madeleine Fraser, PTA

## 2025-02-11 DIAGNOSIS — F41.9 ANXIETY: ICD-10-CM

## 2025-02-12 ENCOUNTER — PATIENT MESSAGE (OUTPATIENT)
Dept: FAMILY MEDICINE | Facility: CLINIC | Age: 65
End: 2025-02-12
Payer: COMMERCIAL

## 2025-02-12 DIAGNOSIS — M54.9 BACK PAIN, UNSPECIFIED BACK LOCATION, UNSPECIFIED BACK PAIN LATERALITY, UNSPECIFIED CHRONICITY: Primary | ICD-10-CM

## 2025-02-12 RX ORDER — ZOLPIDEM TARTRATE 10 MG/1
10 TABLET ORAL NIGHTLY
Qty: 90 TABLET | Refills: 1 | Status: SHIPPED | OUTPATIENT
Start: 2025-02-12

## 2025-02-13 ENCOUNTER — CLINICAL SUPPORT (OUTPATIENT)
Dept: REHABILITATION | Facility: HOSPITAL | Age: 65
End: 2025-02-13
Payer: COMMERCIAL

## 2025-02-13 DIAGNOSIS — M62.81 WEAKNESS OF LEFT QUADRICEPS MUSCLE: ICD-10-CM

## 2025-02-13 DIAGNOSIS — M25.662 DECREASED RANGE OF MOTION (ROM) OF LEFT KNEE: Primary | ICD-10-CM

## 2025-02-13 PROCEDURE — 97140 MANUAL THERAPY 1/> REGIONS: CPT | Mod: PN

## 2025-02-13 PROCEDURE — 97112 NEUROMUSCULAR REEDUCATION: CPT | Mod: PN

## 2025-02-13 PROCEDURE — 97530 THERAPEUTIC ACTIVITIES: CPT | Mod: PN

## 2025-02-13 PROCEDURE — 97110 THERAPEUTIC EXERCISES: CPT | Mod: PN

## 2025-02-13 NOTE — PROGRESS NOTES
[facility]   Physical Therapy Treatment Note      Name: Traci TAMEZ [facility] Number: [id number]    Therapy Diagnosis:   Encounter Diagnoses   Name Primary?    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     PTA Visit #: 0/5     Subjective     Patient reports: I have a visit to see neurosurgery soon.     She was compliant with home exercise program.  Response to previous treatment: as noted  Functional change: as noted     Pain: 3/10  Location: left knee      Objective      Objective Measures updated at progress report unless specified.     Left knee active range of motion: 83  Left knee passive range of motion: 90     Treatment     Traci received the treatments listed below:      therapeutic exercises to develop strength, endurance, ROM, and flexibility for 10 minutes including:    LLLD knee flexion stretch with 3#, 9 minutes (supine with towel around popliteal region of knee)    manual therapy techniques: Joint mobilizations were applied to the: Left knee for 10 minutes, including:    Patellar mobs in all directions   Proxmial tibal mobs to improve   Seated distraction to improve knee flexion     neuromuscular re-education activities to improve: Balance, Coordination, Kinesthetic, and Sense for 14 minutes. The following activities were  included:    Quad sets 5 seconds holds x 30 repetitions    LAQ 5# 4 x 8 repetitions   Straight leg raise, 2 x 10 1#    therapeutic activities to improve functional performance for 23  minutes, including:    Heel prop 3 minutes   Sit to stands from lowest hi-lo 3 x8 repetitions   Double leg shuttle 50#, 3 x 8   Single leg shuttle 25#, 3 x 8     gait training to improve functional mobility and safety for 00  minutes, including:    Cueing to improve knee flexion during swing through phase    Patient Education and Home Exercises       Education provided:   - Home Exercise Program     Written Home Exercises Provided: Yes. Exercises were reviewed and Traci was able to demonstrate them prior to the end of the session.  Traci demonstrated good  understanding of the education provided. See Electronic Medical Record under Patient Instructions for exercises provided during therapy sessions    Assessment     Traci has demonstrated fairly consistent knee flexion range of motion at this time. Therapy continues to focus on developing her knee flexion range of motion and quadriceps strength. She stated that her knee felt looser at the conclusion of Physical Therapy. Treatment will advance as tolerated.     Traci Is progressing well towards her goals.   Patient prognosis is Good.     Patient will continue to benefit from skilled outpatient physical therapy to address the deficits listed in the problem list box on initial evaluation, provide pt/family education and to maximize pt's level of independence in the home and community environment.     Patient's spiritual, cultural and educational needs considered and pt agreeable to plan of care and goals.     Anticipated barriers to physical therapy: none noted    Goals:     Short Term Goals: 3 weeks. Pt agrees with goals set.  Pt will demonstrate independence and compliance with initial HEP to improve independence and symptom management.   Pt will report Left knee pain </= 0/10 with  sitting for 60 minutes to demonstrate improved condition and ability to complete her ADLs and leisurely task.    Pt will demonstrate quadriceps and motor control by completing 30 straight leg raises with no knee lag.   Pt will improve Left knee flexion ROM to 80 degrees to improve tolerance for ambulation and transfers.       Long Term Goals: 6 weeks. Pt agrees with goals set.   Pt will demonstrate independence and compliance with final HEP to continue managing symptoms and developing mobility, motor control and strength.    Pt will improve FOTO score to </= 39% limited to demonstrate improved functional mobility.    Pt will report knee pain </= 0/10  with ambulation, transfers and squatting to demonstrate improved condition and ability to complete her ADLs, self care and work related task.    Pt will complete 13 sit to stands in the 30 second sit to stand test to demonstrate adequate knee range of motion, endurance and strength.  Pt will improve Left knee flexion ROM >/= 90 degrees of flexion to improve her tolerance to sitting and transfers.     Pt goal: Not walk like a penguin.      Plan     Progress as per POC.     Mike Fajardo, PT, DPT

## 2025-02-17 ENCOUNTER — TELEPHONE (OUTPATIENT)
Dept: NEUROSURGERY | Facility: CLINIC | Age: 65
End: 2025-02-17
Payer: COMMERCIAL

## 2025-02-17 ENCOUNTER — CLINICAL SUPPORT (OUTPATIENT)
Dept: REHABILITATION | Facility: HOSPITAL | Age: 65
End: 2025-02-17
Payer: COMMERCIAL

## 2025-02-17 DIAGNOSIS — M62.81 WEAKNESS OF LEFT QUADRICEPS MUSCLE: ICD-10-CM

## 2025-02-17 DIAGNOSIS — M25.662 DECREASED RANGE OF MOTION (ROM) OF LEFT KNEE: Primary | ICD-10-CM

## 2025-02-17 PROCEDURE — 97530 THERAPEUTIC ACTIVITIES: CPT | Mod: PN,CQ

## 2025-02-17 PROCEDURE — 97112 NEUROMUSCULAR REEDUCATION: CPT | Mod: PN,CQ

## 2025-02-17 PROCEDURE — 97110 THERAPEUTIC EXERCISES: CPT | Mod: PN,CQ

## 2025-02-17 PROCEDURE — 97140 MANUAL THERAPY 1/> REGIONS: CPT | Mod: PN,CQ

## 2025-02-17 NOTE — TELEPHONE ENCOUNTER
----- Message from Hannah sent at 2/14/2025  2:35 PM CST -----  Regarding: Pt called has referral wld like to sche an appt adv Pt that MRI may be needed  Contact: [phone]  Name of Who is Calling:SERINA FELIX [[id number]]        What is the request in detail:Pt called has referral wld like to sche an appt adv Pt that MRI may be needed. Please advise         Can the clinic reply by [facility]:No        What Number to Call Back if not in [facility]: Telephone Information:  Mobile          [phone]

## 2025-02-17 NOTE — TELEPHONE ENCOUNTER
[facility] [place] received, awaiting MRI and any additional records.    Jenna Olivera, RN, CMSRN  RN Navigator  [facility]   [phone]  Fax [phone]

## 2025-02-17 NOTE — PROGRESS NOTES
[facility]   Physical Therapy Treatment Note      Name: Traci Freire  Clinic Number: [id number]    Therapy Diagnosis:   Encounter Diagnoses   Name Primary?    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     PTA Visit #: 1/5     Subjective     Patient reports: her knee has been really hurting since last Monday. Thinks it might be coming from her back.     She was compliant with home exercise program.  Response to previous treatment: as noted  Functional change: as noted     Pain: 3/10  Location: left knee      Objective      Objective Measures updated at progress report unless specified.     Left knee active range of motion: 83  Left knee passive range of motion: 90     Treatment     Traci received the treatments listed below:      therapeutic exercises to develop strength, endurance, ROM, and flexibility for 10 minutes including:    LLLD knee flexion stretch with 3#, 9 minutes (supine with towel around popliteal region of knee)    manual therapy techniques: Joint mobilizations were applied to the: Left knee for 10 minutes, including:    Patellar mobs in all directions   Proxmial tibal mobs to improve   Seated distraction to improve knee flexion     neuromuscular re-education activities to improve: Balance, Coordination, Kinesthetic, and  Sense for 14 minutes. The following activities were included:    Quad sets 5 seconds holds x 30 repetitions    LAQ 5# 4 x 8 repetitions   Straight leg raise, 2 x 10 1#    therapeutic activities to improve functional performance for 23  minutes, including:    Heel prop 3 minutes   Sit to stands from lowest hi-lo 3 x8 repetitions   Double leg shuttle 50#, 3 x 8   Single leg shuttle 25#, 3 x 8     gait training to improve functional mobility and safety for 00  minutes, including:    Cueing to improve knee flexion during swing through phase    Patient Education and Home Exercises       Education provided:   - Home Exercise Program     Written Home Exercises Provided: Yes. Exercises were reviewed and Traci was able to demonstrate them prior to the end of the session.  Traci demonstrated good  understanding of the education provided. See Electronic Medical Record under Patient Instructions for exercises provided during therapy sessions    Assessment     Traci noted with good tolerance to treatment. Continued focus on developing knee range of motion and quad strength to allow her to return to recreational activities without limitations.     Traci Is progressing well towards her goals.   Patient prognosis is Good.     Patient will continue to benefit from skilled outpatient physical therapy to address the deficits listed in the problem list box on initial evaluation, provide pt/family education and to maximize pt's level of independence in the home and community environment.     Patient's spiritual, cultural and educational needs considered and pt agreeable to plan of care and goals.     Anticipated barriers to physical therapy: none noted    Goals:     Short Term Goals: 3 weeks. Pt agrees with goals set.  Pt will demonstrate independence and compliance with initial HEP to improve independence and symptom management.   Pt will report Left knee pain </= 0/10 with sitting for 60 minutes to demonstrate improved condition and  ability to complete her ADLs and leisurely task.    Pt will demonstrate quadriceps and motor control by completing 30 straight leg raises with no knee lag.   Pt will improve Left knee flexion ROM to 80 degrees to improve tolerance for ambulation and transfers.       Long Term Goals: 6 weeks. Pt agrees with goals set.   Pt will demonstrate independence and compliance with final HEP to continue managing symptoms and developing mobility, motor control and strength.    Pt will improve FOTO score to </= 39% limited to demonstrate improved functional mobility.    Pt will report knee pain </= 0/10  with ambulation, transfers and squatting to demonstrate improved condition and ability to complete her ADLs, self care and work related task.    Pt will complete 13 sit to stands in the 30 second sit to stand test to demonstrate adequate knee range of motion, endurance and strength.  Pt will improve Left knee flexion ROM >/= 90 degrees of flexion to improve her tolerance to sitting and transfers.     Pt goal: Not walk like a penguin.      Plan     Progress as per POC.     Madeleine Fraser, PTA

## 2025-02-17 NOTE — TELEPHONE ENCOUNTER
P/c to pt.  She has had an mri in the past year at [facility]. She works for [employer]. Discussed with zelalem garcia.  Gave pt. [employer] number to call to start the referral process [phone].  she will contact them today.

## 2025-02-18 ENCOUNTER — TELEPHONE (OUTPATIENT)
Dept: FAMILY MEDICINE | Facility: CLINIC | Age: 65
End: 2025-02-18
Payer: COMMERCIAL

## 2025-02-18 DIAGNOSIS — Z79.899 HIGH RISK MEDICATION USE: ICD-10-CM

## 2025-02-18 DIAGNOSIS — Z00.00 ROUTINE GENERAL MEDICAL EXAMINATION AT A HEALTH CARE FACILITY: ICD-10-CM

## 2025-02-18 DIAGNOSIS — I10 PRIMARY HYPERTENSION: ICD-10-CM

## 2025-02-18 DIAGNOSIS — Z79.899 ENCOUNTER FOR LONG-TERM (CURRENT) USE OF MEDICATIONS: ICD-10-CM

## 2025-02-18 DIAGNOSIS — E78.5 HYPERLIPIDEMIA, UNSPECIFIED HYPERLIPIDEMIA TYPE: Primary | ICD-10-CM

## 2025-02-20 ENCOUNTER — CLINICAL SUPPORT (OUTPATIENT)
Dept: REHABILITATION | Facility: HOSPITAL | Age: 65
End: 2025-02-20
Payer: COMMERCIAL

## 2025-02-20 DIAGNOSIS — M25.662 DECREASED RANGE OF MOTION (ROM) OF LEFT KNEE: Primary | ICD-10-CM

## 2025-02-20 DIAGNOSIS — M62.81 WEAKNESS OF LEFT QUADRICEPS MUSCLE: ICD-10-CM

## 2025-02-20 PROCEDURE — 97112 NEUROMUSCULAR REEDUCATION: CPT | Mod: PN

## 2025-02-20 PROCEDURE — 97530 THERAPEUTIC ACTIVITIES: CPT | Mod: PN

## 2025-02-20 PROCEDURE — 97116 GAIT TRAINING THERAPY: CPT | Mod: PN

## 2025-02-20 PROCEDURE — 97140 MANUAL THERAPY 1/> REGIONS: CPT | Mod: PN

## 2025-02-20 NOTE — PROGRESS NOTES
[facility]   Physical Therapy Treatment Note      Name: Traci Freire  Clinic Number: [id number]    Therapy Diagnosis:   Encounter Diagnoses   Name Primary?    Decreased range of motion (ROM) of left knee Yes    Weakness of left quadriceps muscle        Physician: Chris Estevez MD    Visit Date: 2/20/2025    Physician Orders: PT Eval and Treat   Medical Diagnosis from Referral: Z96.652 (ICD-10-CM) - S/P revision of total knee, left   Evaluation Date: 1/16/2025  Authorization Period Expiration: 12/31/2025  Plan of Care Expiration: 02/27/2025 extended to 03/21/2025  Progress Note Due: 02/15/2025  Date of Surgery: 12/05/2024  Visit # / Visits authorized: 8/ 20  FOTO: 1/ 3     Precautions: Standard      Time In: 1300  Time Out: 1358  Total Billable Time: 58 minutes  Physical Therapy technician assisted with treatment under direct supervision of treating therapist as needed.     PTA Visit #: 0/5     Subjective     Patient reports: her knee has been really hurting since last Monday. Thinks it might be coming from her back.     She was compliant with home exercise program.  Response to previous treatment: as noted  Functional change: as noted     Pain: 3/10  Location: left knee      Objective      Objective Measures updated at progress report unless specified.     Left knee active range of motion: 83  Left knee passive range of motion: 90     Treatment     Traci received the treatments listed below:      therapeutic exercises to develop strength, endurance, ROM, and flexibility for 00 minutes including:    LLLD knee flexion stretch with 3#, 9 minutes (supine with towel around popliteal region of knee)    manual therapy techniques: Joint mobilizations were applied to the: Left knee for 10 minutes, including:    Patellar mobs in all directions   Proxmial tibal mobs to improve   Seated distraction to improve knee flexion     neuromuscular re-education activities to improve: Balance,  Coordination, Kinesthetic, and Sense for 14 minutes. The following activities were included:    Quad sets 5 seconds holds x 30 repetitions    LAQ 5# 4 x 8 repetitions   Straight leg raise, 2 x 10 1#    therapeutic activities to improve functional performance for 24  minutes, including:    Heel prop 3 minutes   Elevated heel slides with 5# weight and strap, x3 minutes  Standing knee stretch kneeling, x3 minutes   Sit to stands from lowest hi-lo 3 x8 repetitions   Double leg shuttle 50#, 3 x 8   Single leg shuttle 25#, 3 x 8     gait training to improve functional mobility and safety for 10  minutes, including:    Stepping over hurdles, (5), to improve heel contact and foot follow through.  Ambulating 20 yds, emphasis on improving gait speed to decrease swaying.     Patient Education and Home Exercises       Education provided:   - Home Exercise Program     Written Home Exercises Provided: Yes. Exercises were reviewed and Traci was able to demonstrate them prior to the end of the session.  Traci demonstrated good  understanding of the education provided. See Electronic Medical Record under Patient Instructions for exercises provided during therapy sessions    Assessment     Traci's knee range of motion is able to get to 90 degrees with a firm end feel. She states her motion is much improved; dynasplint and outcomes will be discussed with her MD in order to better improve her range of motion/ provide a prognosis.     Traci Is progressing well towards her goals.   Patient prognosis is Good.     Patient will continue to benefit from skilled outpatient physical therapy to address the deficits listed in the problem list box on initial evaluation, provide pt/family education and to maximize pt's level of independence in the home and community environment.     Patient's spiritual, cultural and educational needs considered and pt agreeable to plan of care and goals.     Anticipated barriers to physical therapy: none  noted    Goals:     Short Term Goals: 3 weeks. Pt agrees with goals set.  Pt will demonstrate independence and compliance with initial HEP to improve independence and symptom management.   Pt will report Left knee pain </= 0/10 with sitting for 60 minutes to demonstrate improved condition and ability to complete her ADLs and leisurely task.    Pt will demonstrate quadriceps and motor control by completing 30 straight leg raises with no knee lag.   Pt will improve Left knee flexion ROM to 80 degrees to improve tolerance for ambulation and transfers.       Long Term Goals: 6 weeks. Pt agrees with goals set.   Pt will demonstrate independence and compliance with final HEP to continue managing symptoms and developing mobility, motor control and strength.    Pt will improve FOTO score to </= 39% limited to demonstrate improved functional mobility.    Pt will report knee pain </= 0/10  with ambulation, transfers and squatting to demonstrate improved condition and ability to complete her ADLs, self care and work related task.    Pt will complete 13 sit to stands in the 30 second sit to stand test to demonstrate adequate knee range of motion, endurance and strength.  Pt will improve Left knee flexion ROM >/= 90 degrees of flexion to improve her tolerance to sitting and transfers.     Pt goal: Not walk like a penguin.      Plan     Reasons for Recertification of Therapy:   Traci has limited knee flexion which is altering her ability to ambulate, transfer and complete her leisurely task.     Updated Certification Period: 2/20/2025 to 03/21/2025  Recommended Treatment Plan: 1-2 times per week for 4 weeks: Electrical Stimulation NMES, Gait Training, Manual Therapy, Moist Heat/ Ice, Neuromuscular Re-ed, Patient Education, Self Care, Therapeutic Activities, and Therapeutic Exercise  Other Recommendations: Develop knee range of motion, especially in flexion, to improve ability to ambulate and complete ADLs/self care.     Mike  Scotty, PT, DPT  2/20/2025      I CERTIFY THE NEED FOR THESE SERVICES FURNISHED UNDER THIS PLAN OF TREATMENT AND WHILE UNDER MY CARE    Physician's comments:        Physician's Signature: ___________________________________________________

## 2025-02-24 ENCOUNTER — CLINICAL SUPPORT (OUTPATIENT)
Dept: REHABILITATION | Facility: HOSPITAL | Age: 65
End: 2025-02-24
Payer: COMMERCIAL

## 2025-02-24 DIAGNOSIS — M62.81 WEAKNESS OF LEFT QUADRICEPS MUSCLE: ICD-10-CM

## 2025-02-24 DIAGNOSIS — M25.662 DECREASED RANGE OF MOTION (ROM) OF LEFT KNEE: Primary | ICD-10-CM

## 2025-02-24 PROCEDURE — 97530 THERAPEUTIC ACTIVITIES: CPT | Mod: PN,CQ

## 2025-02-24 PROCEDURE — 97116 GAIT TRAINING THERAPY: CPT | Mod: PN,CQ

## 2025-02-24 PROCEDURE — 97112 NEUROMUSCULAR REEDUCATION: CPT | Mod: PN,CQ

## 2025-02-24 NOTE — PROGRESS NOTES
[facility]   Physical Therapy Treatment Note      Name: Traci Freire  Clinic Number: [id number]    Therapy Diagnosis:   No diagnosis found.      Physician: Chris Estevez MD    Visit Date: 2/24/2025    Physician Orders: PT Eval and Treat   Medical Diagnosis from Referral: Z96.652 (ICD-10-CM) - S/P revision of total knee, left   Evaluation Date: 1/16/2025  Authorization Period Expiration: 12/31/2025  Plan of Care Expiration: 02/27/2025 extended to 03/21/2025  Progress Note Due: 02/15/2025  Date of Surgery: 12/05/2024  Visit # / Visits authorized: 9/ 20  FOTO: 1/ 3     Precautions: Standard      Time In: 1300  Time Out: 1358  Total Billable Time: 58 minutes    PTA Visit #: 1/5     Subjective     Patient reports: Was able to do some gardening over the weekend. Her left knee held up great. Back and left lower extremity bothered her some.     She was compliant with home exercise program.  Response to previous treatment: as noted  Functional change: as noted     Pain: 3/10  Location: left knee      Objective      Objective Measures updated at progress report unless specified.     Left knee active range of motion: 87  Left knee passive range of motion: 91     Treatment     Traci received the treatments listed below:      therapeutic exercises to develop strength, endurance, ROM, and flexibility for 00 minutes including:    LLLD knee flexion stretch with 3#, 9 minutes (supine with towel around popliteal region of knee)    manual therapy techniques: Joint mobilizations were applied to the: Left knee for 10 minutes, including:    Patellar mobs in all directions   Proxmial tibal mobs to improve   Seated distraction to improve knee flexion     neuromuscular re-education activities to improve: Balance, Coordination, Kinesthetic, and Sense for 14 minutes. The following activities were included:    Quad sets 5 seconds holds x 30 repetitions    LAQ 5# 4 x 8 repetitions   Straight leg raise, 2 x 10  1#    therapeutic activities to improve functional performance for 24  minutes, including:    Heel prop 3 minutes   Elevated heel slides with 5# weight and strap, x3 minutes  Standing knee stretch kneeling, x3 minutes   Sit to stands from lowest hi-lo 3 x8 repetitions   Double leg shuttle 50#, 4 x 8   Single leg shuttle 25#, 4 x 8   Nu-step x 10 minutes     gait training to improve functional mobility and safety for 10  minutes, including:    Stepping over hurdles, (5), to improve heel contact and foot follow through.  Ambulating 20 yds, emphasis on improving gait speed to decrease swaying.     Patient Education and Home Exercises       Education provided:   - Home Exercise Program     Written Home Exercises Provided: Yes. Exercises were reviewed and Traci was able to demonstrate them prior to the end of the session.  Traci demonstrated good  understanding of the education provided. See Electronic Medical Record under Patient Instructions for exercises provided during therapy sessions    Assessment     Traci noted with good tolerance to treatment. Continued focus on improving L knee range of motion to allow her to return to activities of daily living's without limitations. Due to see her Ortho doctor on Wednesday. Will progress as able.     Traci Is progressing well towards her goals.   Patient prognosis is Good.     Patient will continue to benefit from skilled outpatient physical therapy to address the deficits listed in the problem list box on initial evaluation, provide pt/family education and to maximize pt's level of independence in the home and community environment.     Patient's spiritual, cultural and educational needs considered and pt agreeable to plan of care and goals.     Anticipated barriers to physical therapy: none noted    Goals:     Short Term Goals: 3 weeks. Pt agrees with goals set.  Pt will demonstrate independence and compliance with initial HEP to improve independence and symptom management.    Pt will report Left knee pain </= 0/10 with sitting for 60 minutes to demonstrate improved condition and ability to complete her ADLs and leisurely task.    Pt will demonstrate quadriceps and motor control by completing 30 straight leg raises with no knee lag.   Pt will improve Left knee flexion ROM to 80 degrees to improve tolerance for ambulation and transfers.       Long Term Goals: 6 weeks. Pt agrees with goals set.   Pt will demonstrate independence and compliance with final HEP to continue managing symptoms and developing mobility, motor control and strength.    Pt will improve FOTO score to </= 39% limited to demonstrate improved functional mobility.    Pt will report knee pain </= 0/10  with ambulation, transfers and squatting to demonstrate improved condition and ability to complete her ADLs, self care and work related task.    Pt will complete 13 sit to stands in the 30 second sit to stand test to demonstrate adequate knee range of motion, endurance and strength.  Pt will improve Left knee flexion ROM >/= 90 degrees of flexion to improve her tolerance to sitting and transfers.     Pt goal: Not walk like a penguin.      Plan     Reasons for Recertification of Therapy:   Traci has limited knee flexion which is altering her ability to ambulate, transfer and complete her leisurely task.     Updated Certification Period: 2/24/2025 to 03/21/2025  Recommended Treatment Plan: 1-2 times per week for 4 weeks: Electrical Stimulation NMES, Gait Training, Manual Therapy, Moist Heat/ Ice, Neuromuscular Re-ed, Patient Education, Self Care, Therapeutic Activities, and Therapeutic Exercise  Other Recommendations: Develop knee range of motion, especially in flexion, to improve ability to ambulate and complete ADLs/self care.     Madeleine Fraser, PTA  2/24/2025

## 2025-02-26 ENCOUNTER — OFFICE VISIT (OUTPATIENT)
Dept: ORTHOPEDICS | Facility: CLINIC | Age: 65
End: 2025-02-26
Payer: COMMERCIAL

## 2025-02-26 ENCOUNTER — HOSPITAL ENCOUNTER (OUTPATIENT)
Dept: RADIOLOGY | Facility: HOSPITAL | Age: 65
Discharge: HOME | End: 2025-02-26
Attending: ORTHOPAEDIC SURGERY
Payer: COMMERCIAL

## 2025-02-26 VITALS — BODY MASS INDEX: 35.5 KG/M2 | WEIGHT: 188.06 LBS | HEIGHT: 61 IN

## 2025-02-26 DIAGNOSIS — Z96.652 S/P REVISION OF TOTAL KNEE, LEFT: ICD-10-CM

## 2025-02-26 DIAGNOSIS — Z96.652 S/P REVISION OF TOTAL KNEE, LEFT: Primary | ICD-10-CM

## 2025-02-26 DIAGNOSIS — T84.50XD INFECTION OF PROSTHETIC JOINT, SUBSEQUENT ENCOUNTER: ICD-10-CM

## 2025-02-26 PROCEDURE — 73562 X-RAY EXAM OF KNEE 3: CPT | Mod: TC,LT

## 2025-02-26 PROCEDURE — 99024 POSTOP FOLLOW-UP VISIT: CPT | Mod: S$GLB,COE,, | Performed by: ORTHOPAEDIC SURGERY

## 2025-02-26 PROCEDURE — 1159F MED LIST DOCD IN RCRD: CPT | Mod: CPTII,S$GLB,COE, | Performed by: ORTHOPAEDIC SURGERY

## 2025-02-26 PROCEDURE — 99999 PR PBB SHADOW E&M-EST. PATIENT-LVL III: CPT | Mod: PBBFAC,COE,, | Performed by: ORTHOPAEDIC SURGERY

## 2025-02-26 NOTE — PROGRESS NOTES
Traci Freire is in for 3 month follow up for a a complex revision left two stage TKA.  She is doing pretty well.  Mild pain in the knee.  She has resumed some activities of daily living. She has been it PT  Exam demonstrates  A well developed female in no distress.  Alert and oriented.  Mood and affect are appropriate.    Knee incision is well healed.  ROM is 0-100.  The patella tracks well and there is no instability. The extremity is neurovascularly intact.    Xrays demonstrate a well fixed and positioned prosthesis.         No data to display                     No data to display                     No data to display                    8/26/2024     7:40 AM   Knee Society and Function Score   FINDINGS - KNEE SOCIETY SCORE 28   FINDINGS - KNEE SOCIETY FUNCTION SCORE 50            No data to display                Imp:Progressing  Continue PT    F/u in 6 weeks.  No need to xray unless you are having pain

## 2025-02-27 ENCOUNTER — CLINICAL SUPPORT (OUTPATIENT)
Dept: REHABILITATION | Facility: HOSPITAL | Age: 65
End: 2025-02-27
Payer: COMMERCIAL

## 2025-02-27 DIAGNOSIS — M62.81 WEAKNESS OF LEFT QUADRICEPS MUSCLE: ICD-10-CM

## 2025-02-27 DIAGNOSIS — M25.662 DECREASED RANGE OF MOTION (ROM) OF LEFT KNEE: Primary | ICD-10-CM

## 2025-02-27 PROCEDURE — 97530 THERAPEUTIC ACTIVITIES: CPT | Mod: PN

## 2025-02-27 PROCEDURE — 97112 NEUROMUSCULAR REEDUCATION: CPT | Mod: PN

## 2025-02-27 PROCEDURE — 97140 MANUAL THERAPY 1/> REGIONS: CPT | Mod: PN

## 2025-02-27 NOTE — PROGRESS NOTES
[facility]   Physical Therapy Treatment Note      Name: Traci TAMEZ [facility] Number: [id number]    Therapy Diagnosis:   Encounter Diagnoses   Name Primary?    Decreased range of motion (ROM) of left knee Yes    Weakness of left quadriceps muscle      Physician: Chris Estevez MD    Visit Date: 2/27/2025    Physician Orders: PT Eval and Treat   Medical Diagnosis from Referral: Z96.652 (ICD-10-CM) - S/P revision of total knee, left   Evaluation Date: 1/16/2025  Authorization Period Expiration: 12/31/2025  Plan of Care Expiration: 02/27/2025 extended to 03/21/2025  Progress Note Due: 02/15/2025  Date of Surgery: 12/05/2024  Visit # / Visits authorized: 10/ 20  FOTO: 1/ 3     Precautions: Standard      Time In: 1300  Time Out: 1348  Total Billable Time: 48 minutes    PTA Visit #: 0/5     Subjective     Patient reports: She is actually having a good day, her back and knee aren't bothering very much at all.    She was compliant with home exercise program.  Response to previous treatment: as noted  Functional change: as noted     Pain: 3/10  Location: left knee      Objective      Objective Measures updated at progress report unless specified.     Left knee active range of motion: 87  Left knee passive range of motion: 91     Treatment     Traci received the treatments listed below:      therapeutic exercises to develop strength, endurance, ROM, and flexibility for 00 minutes including:    LLLD knee flexion stretch with 3#, 9 minutes (supine with towel around popliteal region of knee)    manual therapy techniques: Joint mobilizations were applied to the: Left knee for 12 minutes, including:    Patellar mobs in all directions   Tibial AP grade III-IV   Seated distraction to improve knee flexion   Distal scar mobilization     neuromuscular re-education activities to improve: Balance, Coordination, Kinesthetic, and Sense for 12 minutes. The following activities were included:    LAQ 5# 4 x 8  repetitions   Straight leg raise, 2 x 10 1# B    therapeutic activities to improve functional performance for 24  minutes, including:    Heel prop 3 minutes   Elevated heel slides with 5# weight and strap, x3 minutes  Standing knee stretch kneeling, x3 minutes   Sit to stands from lowest hi-lo 3 x8 repetitions   Double leg shuttle 50#, 4 x 8, emphasizing knee flexion  Single leg shuttle 25#, 4 x 8, emphasizing knee flexion  Nu-step x 10 minutes     gait training to improve functional mobility and safety for 00  minutes, including:    Stepping over hurdles, (5), to improve heel contact and foot follow through.  Ambulating 20 yds, emphasis on improving gait speed to decrease swaying.     Patient Education and Home Exercises       Education provided:   - Home Exercise Program     Written Home Exercises Provided: Yes. Exercises were reviewed and Traci was able to demonstrate them prior to the end of the session.  Traci demonstrated good  understanding of the education provided. See Electronic Medical Record under Patient Instructions for exercises provided during therapy sessions    Assessment     Traci is doing well at this time. Her knee range of motion is appearing to plateau at about 90 degrees, while she does have a capsular end feel. Therapy will explore the options of a dynasplint and reach out to her MD. Treatment focused on developing her knee flexion and overall knee strength, she responded well to all interventions.     Traci Is progressing well towards her goals.   Patient prognosis is Good.     Patient will continue to benefit from skilled outpatient physical therapy to address the deficits listed in the problem list box on initial evaluation, provide pt/family education and to maximize pt's level of independence in the home and community environment.     Patient's spiritual, cultural and educational needs considered and pt agreeable to plan of care and goals.     Anticipated barriers to physical therapy:  none noted    Goals:     Short Term Goals: 3 weeks. Pt agrees with goals set.  Pt will demonstrate independence and compliance with initial HEP to improve independence and symptom management.   Pt will report Left knee pain </= 0/10 with sitting for 60 minutes to demonstrate improved condition and ability to complete her ADLs and leisurely task.    Pt will demonstrate quadriceps and motor control by completing 30 straight leg raises with no knee lag.   Pt will improve Left knee flexion ROM to 80 degrees to improve tolerance for ambulation and transfers.       Long Term Goals: 6 weeks. Pt agrees with goals set.   Pt will demonstrate independence and compliance with final HEP to continue managing symptoms and developing mobility, motor control and strength.    Pt will improve FOTO score to </= 39% limited to demonstrate improved functional mobility.    Pt will report knee pain </= 0/10  with ambulation, transfers and squatting to demonstrate improved condition and ability to complete her ADLs, self care and work related task.    Pt will complete 13 sit to stands in the 30 second sit to stand test to demonstrate adequate knee range of motion, endurance and strength.  Pt will improve Left knee flexion ROM >/= 90 degrees of flexion to improve her tolerance to sitting and transfers.     Pt goal: Not walk like a penguin.      Plan     Reasons for Recertification of Therapy:   Traci has limited knee flexion which is altering her ability to ambulate, transfer and complete her leisurely task.     Updated Certification Period: 2/27/2025 to 03/21/2025  Recommended Treatment Plan: 1-2 times per week for 4 weeks: Electrical Stimulation NMES, Gait Training, Manual Therapy, Moist Heat/ Ice, Neuromuscular Re-ed, Patient Education, Self Care, Therapeutic Activities, and Therapeutic Exercise  Other Recommendations: Develop knee range of motion, especially in flexion, to improve ability to ambulate and complete ADLs/self care.      Mike Fajardo, PT, DPT  2/27/2025

## 2025-03-03 ENCOUNTER — CLINICAL SUPPORT (OUTPATIENT)
Dept: REHABILITATION | Facility: HOSPITAL | Age: 65
End: 2025-03-03
Payer: COMMERCIAL

## 2025-03-03 DIAGNOSIS — M62.81 WEAKNESS OF LEFT QUADRICEPS MUSCLE: ICD-10-CM

## 2025-03-03 DIAGNOSIS — M25.662 DECREASED RANGE OF MOTION (ROM) OF LEFT KNEE: Primary | ICD-10-CM

## 2025-03-03 PROCEDURE — 97140 MANUAL THERAPY 1/> REGIONS: CPT | Mod: PN

## 2025-03-03 PROCEDURE — 97530 THERAPEUTIC ACTIVITIES: CPT | Mod: PN

## 2025-03-03 PROCEDURE — 97112 NEUROMUSCULAR REEDUCATION: CPT | Mod: PN

## 2025-03-03 NOTE — PROGRESS NOTES
[facility]   Physical Therapy Treatment Note      Name: Traci TAMEZ [facility] Number: [id number]    Therapy Diagnosis:   Encounter Diagnoses   Name Primary?    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     She was compliant with home exercise program.  Response to previous treatment: as noted  Functional change: as noted     Pain: 3/10  Location: left knee      Objective      Objective Measures updated at progress report unless specified.     Left knee active range of motion: 87  Left knee passive range of motion: 92    Treatment     Traci received the treatments listed below:      therapeutic exercises to develop strength, endurance, ROM, and flexibility for 00 minutes including:    LLLD knee flexion stretch with 3#, 9 minutes (supine with towel around popliteal region of knee)    manual therapy techniques: Joint mobilizations were applied to the: Left knee for 12 minutes, including:    Patellar mobs in all directions   Tibial AP grade III-IV   Seated distraction to improve knee flexion   Distal scar mobilization     neuromuscular re-education activities to improve: Balance, Coordination, Kinesthetic, and Sense for 12 minutes. The following activities were included:    LAQ 5# 4 x 8 repetitions   Straight leg raise, 2 x 10 1#  B    therapeutic activities to improve functional performance for 31  minutes, including:    Heel prop 3 minutes   Elevated heel slides with 5# weight and strap, x3 minutes  Standing knee stretch kneeling, x3 minutes   Double leg shuttle 50#, 4 x 8, emphasizing knee flexion  Single leg shuttle 25#, 4 x 8, emphasizing knee flexion  Education on POC and Prognosis     gait training to improve functional mobility and safety for 00  minutes, including:    Stepping over hurdles, (5), to improve heel contact and foot follow through.  Ambulating 20 yds, emphasis on improving gait speed to decrease swaying.     Patient Education and Home Exercises       Education provided:   - Home Exercise Program     Written Home Exercises Provided: Yes. Exercises were reviewed and Traci was able to demonstrate them prior to the end of the session.  Traci demonstrated good  understanding of the education provided. See Electronic Medical Record under Patient Instructions for exercises provided during therapy sessions    Assessment     Traci's knee range of motion is approaching a plateau at this time. She is able to ambulate and complete her ADLs, self care and leisurely task with no issues. Therapy will continue to focus on developing her comfort within her range of motion and prepare her for D/C.    Traci Is progressing well towards her goals.   Patient prognosis is Good.     Patient will continue to benefit from skilled outpatient physical therapy to address the deficits listed in the problem list box on initial evaluation, provide pt/family education and to maximize pt's level of independence in the home and community environment.     Patient's spiritual, cultural and educational needs considered and pt agreeable to plan of care and goals.     Anticipated barriers to physical therapy: none noted    Goals:     Short Term Goals: 3 weeks. Pt agrees with goals set.  Pt will demonstrate independence and compliance with initial HEP to improve  independence and symptom management.   Pt will report Left knee pain </= 0/10 with sitting for 60 minutes to demonstrate improved condition and ability to complete her ADLs and leisurely task.    Pt will demonstrate quadriceps and motor control by completing 30 straight leg raises with no knee lag.   Pt will improve Left knee flexion ROM to 80 degrees to improve tolerance for ambulation and transfers.       Long Term Goals: 6 weeks. Pt agrees with goals set.   Pt will demonstrate independence and compliance with final HEP to continue managing symptoms and developing mobility, motor control and strength.    Pt will improve FOTO score to </= 39% limited to demonstrate improved functional mobility.    Pt will report knee pain </= 0/10  with ambulation, transfers and squatting to demonstrate improved condition and ability to complete her ADLs, self care and work related task.    Pt will complete 13 sit to stands in the 30 second sit to stand test to demonstrate adequate knee range of motion, endurance and strength.  Pt will improve Left knee flexion ROM >/= 90 degrees of flexion to improve her tolerance to sitting and transfers.     Pt goal: Not walk like a penguin.      Plan     Reasons for Recertification of Therapy:   Traci has limited knee flexion which is altering her ability to ambulate, transfer and complete her leisurely task.     Updated Certification Period: 3/3/2025 to 03/21/2025  Recommended Treatment Plan: 1-2 times per week for 4 weeks: Electrical Stimulation NMES, Gait Training, Manual Therapy, Moist Heat/ Ice, Neuromuscular Re-ed, Patient Education, Self Care, Therapeutic Activities, and Therapeutic Exercise  Other Recommendations: Develop knee range of motion, especially in flexion, to improve ability to ambulate and complete ADLs/self care.     Mike Fajardo, PT, DPT  3/3/2025

## 2025-03-06 ENCOUNTER — TELEPHONE (OUTPATIENT)
Dept: ORTHOPEDICS | Facility: CLINIC | Age: 65
End: 2025-03-06
Payer: COMMERCIAL

## 2025-03-06 ENCOUNTER — PATIENT MESSAGE (OUTPATIENT)
Dept: ORTHOPEDICS | Facility: CLINIC | Age: 65
End: 2025-03-06
Payer: COMMERCIAL

## 2025-03-06 ENCOUNTER — CLINICAL SUPPORT (OUTPATIENT)
Dept: REHABILITATION | Facility: HOSPITAL | Age: 65
End: 2025-03-06
Payer: COMMERCIAL

## 2025-03-06 DIAGNOSIS — M25.662 DECREASED RANGE OF MOTION (ROM) OF LEFT KNEE: Primary | ICD-10-CM

## 2025-03-06 DIAGNOSIS — M62.81 WEAKNESS OF LEFT QUADRICEPS MUSCLE: ICD-10-CM

## 2025-03-06 PROCEDURE — 97140 MANUAL THERAPY 1/> REGIONS: CPT | Mod: PN,CQ

## 2025-03-06 PROCEDURE — 97530 THERAPEUTIC ACTIVITIES: CPT | Mod: PN,CQ

## 2025-03-06 PROCEDURE — 97112 NEUROMUSCULAR REEDUCATION: CPT | Mod: PN,CQ

## 2025-03-06 NOTE — PROGRESS NOTES
[facility] OUTPATIENT THERAPY AND WELLNESS   Physical Therapy Treatment Note      Name: Traci TAMEZ [facility] Number: [id number]    Therapy Diagnosis:   Encounter Diagnoses   Name Primary?    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     She was compliant with home exercise program.  Response to previous treatment: as noted  Functional change: as noted     Pain: 3/10  Location: left knee      Objective      Objective Measures updated at progress report unless specified.     Left knee active range of motion: 87  Left knee passive range of motion: 92    Treatment     Traci received the treatments listed below:      therapeutic exercises to develop strength, endurance, ROM, and flexibility for 00 minutes including:    LLLD knee flexion stretch with 3#, 9 minutes (supine with towel around popliteal region of knee)    manual therapy techniques: Joint mobilizations were applied to the: Left knee for 10 minutes, including:    Patellar mobs in all directions   Tibial AP grade III-IV   Seated distraction to improve knee flexion   Distal scar mobilization     neuromuscular re-education activities to improve: Balance, Coordination, Kinesthetic, and Sense for 10 minutes. The following activities were included:    LAQ 5# 4 x 8 repetitions   Straight leg raise, 2 x 10 1#  B    therapeutic activities to improve functional performance for 38  minutes, including:    Heel prop 3 minutes   Elevated heel slides with 5# weight and strap, x5 minutes  Standing knee stretch kneeling, x3 minutes     Double leg shuttle 50#, 4 x 8, emphasizing knee flexion stretch  Single leg shuttle 25#, 4 x 8, emphasizing knee flexion stretch  Nu-step x 10 minutes   Education on POC and Prognosis     gait training to improve functional mobility and safety for 00  minutes, including:    Stepping over hurdles, (5), to improve heel contact and foot follow through.  Ambulating 20 yds, emphasis on improving gait speed to decrease swaying.     Patient Education and Home Exercises       Education provided:   - Home Exercise Program     Written Home Exercises Provided: Yes. Exercises were reviewed and Traci was able to demonstrate them prior to the end of the session.  Traci demonstrated good  understanding of the education provided. See Electronic Medical Record under Patient Instructions for exercises provided during therapy sessions    Assessment     Continued focus on improving knee flexion range of motion through various prolonged static stretches. No changes in range of motion this date. Will continue to progress as able.     Traci Is progressing well towards her goals.   Patient prognosis is Good.     Patient will continue to benefit from skilled outpatient physical therapy to address the deficits listed in the problem list box on initial evaluation, provide pt/family education and to maximize pt's level of independence in the home and community environment.     Patient's spiritual, cultural and educational needs considered and pt agreeable to plan of care and goals.     Anticipated barriers to physical therapy: none noted    Goals:     Short Term Goals: 3 weeks. Pt agrees with goals set.  Pt will demonstrate independence and compliance with initial HEP to improve independence and symptom management.   Pt will  report Left knee pain </= 0/10 with sitting for 60 minutes to demonstrate improved condition and ability to complete her ADLs and leisurely task.    Pt will demonstrate quadriceps and motor control by completing 30 straight leg raises with no knee lag.   Pt will improve Left knee flexion ROM to 80 degrees to improve tolerance for ambulation and transfers.       Long Term Goals: 6 weeks. Pt agrees with goals set.   Pt will demonstrate independence and compliance with final HEP to continue managing symptoms and developing mobility, motor control and strength.    Pt will improve FOTO score to </= 39% limited to demonstrate improved functional mobility.    Pt will report knee pain </= 0/10  with ambulation, transfers and squatting to demonstrate improved condition and ability to complete her ADLs, self care and work related task.    Pt will complete 13 sit to stands in the 30 second sit to stand test to demonstrate adequate knee range of motion, endurance and strength.  Pt will improve Left knee flexion ROM >/= 90 degrees of flexion to improve her tolerance to sitting and transfers.     Pt goal: Not walk like a penguin.      Plan     Reasons for Recertification of Therapy:   Traci has limited knee flexion which is altering her ability to ambulate, transfer and complete her leisurely task.     Updated Certification Period: 3/6/2025 to 03/21/2025  Recommended Treatment Plan: 1-2 times per week for 4 weeks: Electrical Stimulation NMES, Gait Training, Manual Therapy, Moist Heat/ Ice, Neuromuscular Re-ed, Patient Education, Self Care, Therapeutic Activities, and Therapeutic Exercise  Other Recommendations: Develop knee range of motion, especially in flexion, to improve ability to ambulate and complete ADLs/self care.     Madeleine Fraser, PTA  3/6/2025

## 2025-03-06 NOTE — TELEPHONE ENCOUNTER
Called pt and LVM to call me back to get her FMLA paperwork sent over.     ----- Message -----  From: Ronda Dave  Sent: 3/5/2025  11:58 AM CST  To: Zenaida STEINBERG Staff    Name of Caller: Nature of Call: requesting a call back Best Call Back Number: [phone] Additional Information:SERINA FELIX calling regarding Patient Advice for wanting to speak to Berkley about some documents for her disability. Please call back to assist

## 2025-03-10 ENCOUNTER — CLINICAL SUPPORT (OUTPATIENT)
Dept: REHABILITATION | Facility: HOSPITAL | Age: 65
End: 2025-03-10
Payer: COMMERCIAL

## 2025-03-10 DIAGNOSIS — M62.81 WEAKNESS OF LEFT QUADRICEPS MUSCLE: ICD-10-CM

## 2025-03-10 DIAGNOSIS — M25.662 DECREASED RANGE OF MOTION (ROM) OF LEFT KNEE: Primary | ICD-10-CM

## 2025-03-10 PROCEDURE — 97530 THERAPEUTIC ACTIVITIES: CPT | Mod: PN,CQ

## 2025-03-10 PROCEDURE — 97112 NEUROMUSCULAR REEDUCATION: CPT | Mod: PN,CQ

## 2025-03-10 PROCEDURE — 97140 MANUAL THERAPY 1/> REGIONS: CPT | Mod: PN,CQ

## 2025-03-10 NOTE — PROGRESS NOTES
[facility]   Physical Therapy Treatment Note      Name: Traci Freire  Clinic Number: [id number]    Therapy Diagnosis:   No diagnosis found.      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     She was compliant with home exercise program.  Response to previous treatment: as noted  Functional change: as noted     Pain: 3/10  Location: left knee      Objective      Objective Measures updated at progress report unless specified.     3/10/2025  Left knee active range of motion: 88  Left knee passive range of motion: 93    Treatment     Traci received the treatments listed below:      therapeutic exercises to develop strength, endurance, ROM, and flexibility for 00 minutes including:    LLLD knee flexion stretch with 3#, 9 minutes (supine with towel around popliteal region of knee)    manual therapy techniques: Joint mobilizations were applied to the: Left knee for 10 minutes, including:    Patellar mobs in all directions   Tibial AP grade III-IV   Seated distraction to improve knee flexion   Distal scar mobilization     neuromuscular re-education activities to improve: Balance, Coordination, Kinesthetic, and Sense for 10 minutes. The following activities were included:    LAQ 5# 4 x 8 repetitions   Straight leg raise, 2 x 10 1# B    therapeutic activities to improve functional performance for 38  minutes, including:    Heel  prop 3 minutes   Elevated heel slides with 5# weight and strap, x5 minutes  Standing knee stretch kneeling, x3 minutes     Double leg shuttle 50#, 4 x 8, emphasizing knee flexion stretch  Single leg shuttle 25#, 4 x 8, emphasizing knee flexion stretch  Nu-step x 10 minutes   Education on POC and Prognosis     gait training to improve functional mobility and safety for 00  minutes, including:    Stepping over hurdles, (5), to improve heel contact and foot follow through.  Ambulating 20 yds, emphasis on improving gait speed to decrease swaying.     Patient Education and Home Exercises       Education provided:   - Home Exercise Program     Written Home Exercises Provided: Yes. Exercises were reviewed and Traci was able to demonstrate them prior to the end of the session.  Traci demonstrated good  understanding of the education provided. See Electronic Medical Record under Patient Instructions for exercises provided during therapy sessions    Assessment     Traci notes with good tolerance to treatment. Encouraged knee flexion stretch at the bottom of shuttle during each rep for weighted knee flexion stretch. Will continue to progress as able.     Traci Is progressing well towards her goals.   Patient prognosis is Good.     Patient will continue to benefit from skilled outpatient physical therapy to address the deficits listed in the problem list box on initial evaluation, provide pt/family education and to maximize pt's level of independence in the home and community environment.     Patient's spiritual, cultural and educational needs considered and pt agreeable to plan of care and goals.     Anticipated barriers to physical therapy: none noted    Goals:     Short Term Goals: 3 weeks. Pt agrees with goals set.  Pt will demonstrate independence and compliance with initial HEP to improve independence and symptom management.   Pt will report Left knee pain </= 0/10 with sitting for 60 minutes to demonstrate improved  condition and ability to complete her ADLs and leisurely task.    Pt will demonstrate quadriceps and motor control by completing 30 straight leg raises with no knee lag.   Pt will improve Left knee flexion ROM to 80 degrees to improve tolerance for ambulation and transfers.       Long Term Goals: 6 weeks. Pt agrees with goals set.   Pt will demonstrate independence and compliance with final HEP to continue managing symptoms and developing mobility, motor control and strength.    Pt will improve FOTO score to </= 39% limited to demonstrate improved functional mobility.    Pt will report knee pain </= 0/10  with ambulation, transfers and squatting to demonstrate improved condition and ability to complete her ADLs, self care and work related task.    Pt will complete 13 sit to stands in the 30 second sit to stand test to demonstrate adequate knee range of motion, endurance and strength.  Pt will improve Left knee flexion ROM >/= 90 degrees of flexion to improve her tolerance to sitting and transfers.     Pt goal: Not walk like a penguin.      Plan     Reasons for Recertification of Therapy:   Traci has limited knee flexion which is altering her ability to ambulate, transfer and complete her leisurely task.     Updated Certification Period: 3/10/2025 to 03/21/2025  Recommended Treatment Plan: 1-2 times per week for 4 weeks: Electrical Stimulation NMES, Gait Training, Manual Therapy, Moist Heat/ Ice, Neuromuscular Re-ed, Patient Education, Self Care, Therapeutic Activities, and Therapeutic Exercise  Other Recommendations: Develop knee range of motion, especially in flexion, to improve ability to ambulate and complete ADLs/self care.     Madeleine Fraser, PTA  3/10/2025

## 2025-03-11 ENCOUNTER — TELEPHONE (OUTPATIENT)
Dept: NEUROSURGERY | Facility: CLINIC | Age: 65
End: 2025-03-11
Payer: COMMERCIAL

## 2025-03-11 ENCOUNTER — OFFICE VISIT (OUTPATIENT)
Dept: FAMILY MEDICINE | Facility: CLINIC | Age: 65
End: 2025-03-11
Payer: COMMERCIAL

## 2025-03-11 VITALS
HEART RATE: 87 BPM | BODY MASS INDEX: 36.63 KG/M2 | WEIGHT: 194 LBS | DIASTOLIC BLOOD PRESSURE: 66 MMHG | SYSTOLIC BLOOD PRESSURE: 136 MMHG | HEIGHT: 61 IN | OXYGEN SATURATION: 99 %

## 2025-03-11 DIAGNOSIS — Z85.3 HISTORY OF BREAST CANCER: ICD-10-CM

## 2025-03-11 DIAGNOSIS — K21.9 GASTROESOPHAGEAL REFLUX DISEASE, UNSPECIFIED WHETHER ESOPHAGITIS PRESENT: ICD-10-CM

## 2025-03-11 DIAGNOSIS — M54.9 BACK PAIN, UNSPECIFIED BACK LOCATION, UNSPECIFIED BACK PAIN LATERALITY, UNSPECIFIED CHRONICITY: ICD-10-CM

## 2025-03-11 DIAGNOSIS — G47.30 SLEEP APNEA, UNSPECIFIED TYPE: ICD-10-CM

## 2025-03-11 DIAGNOSIS — F41.9 ANXIETY: ICD-10-CM

## 2025-03-11 DIAGNOSIS — D64.9 ANEMIA, UNSPECIFIED TYPE: ICD-10-CM

## 2025-03-11 DIAGNOSIS — E78.5 HYPERLIPIDEMIA, UNSPECIFIED HYPERLIPIDEMIA TYPE: ICD-10-CM

## 2025-03-11 DIAGNOSIS — E66.9 OBESITY, UNSPECIFIED CLASS, UNSPECIFIED OBESITY TYPE, UNSPECIFIED WHETHER SERIOUS COMORBIDITY PRESENT: ICD-10-CM

## 2025-03-11 DIAGNOSIS — E66.9 OBESITY, UNSPECIFIED CLASS, UNSPECIFIED OBESITY TYPE, UNSPECIFIED WHETHER SERIOUS COMORBIDITY PRESENT: Primary | ICD-10-CM

## 2025-03-11 DIAGNOSIS — I10 PRIMARY HYPERTENSION: Primary | ICD-10-CM

## 2025-03-11 PROCEDURE — 3078F DIAST BP <80 MM HG: CPT | Mod: CPTII,S$GLB,COE, | Performed by: NURSE PRACTITIONER

## 2025-03-11 PROCEDURE — 1160F RVW MEDS BY RX/DR IN RCRD: CPT | Mod: CPTII,S$GLB,COE, | Performed by: NURSE PRACTITIONER

## 2025-03-11 PROCEDURE — 3066F NEPHROPATHY DOC TX: CPT | Mod: CPTII,S$GLB,COE, | Performed by: NURSE PRACTITIONER

## 2025-03-11 PROCEDURE — 3008F BODY MASS INDEX DOCD: CPT | Mod: CPTII,S$GLB,COE, | Performed by: NURSE PRACTITIONER

## 2025-03-11 PROCEDURE — 3061F NEG MICROALBUMINURIA REV: CPT | Mod: CPTII,S$GLB,COE, | Performed by: NURSE PRACTITIONER

## 2025-03-11 PROCEDURE — 99214 OFFICE O/P EST MOD 30 MIN: CPT | Mod: S$GLB,COE,, | Performed by: NURSE PRACTITIONER

## 2025-03-11 PROCEDURE — 1159F MED LIST DOCD IN RCRD: CPT | Mod: CPTII,S$GLB,COE, | Performed by: NURSE PRACTITIONER

## 2025-03-11 PROCEDURE — 3075F SYST BP GE 130 - 139MM HG: CPT | Mod: CPTII,S$GLB,COE, | Performed by: NURSE PRACTITIONER

## 2025-03-11 PROCEDURE — 4010F ACE/ARB THERAPY RXD/TAKEN: CPT | Mod: CPTII,S$GLB,COE, | Performed by: NURSE PRACTITIONER

## 2025-03-11 RX ORDER — TIRZEPATIDE 2.5 MG/.5ML
2.5 INJECTION, SOLUTION SUBCUTANEOUS
Qty: 2 ML | Refills: 2 | Status: SHIPPED | OUTPATIENT
Start: 2025-03-11

## 2025-03-11 RX ORDER — PHENTERMINE HYDROCHLORIDE 37.5 MG/1
37.5 CAPSULE ORAL EVERY MORNING
Qty: 30 CAPSULE | Refills: 0 | Status: SHIPPED | OUTPATIENT
Start: 2025-03-11 | End: 2025-04-10

## 2025-03-11 NOTE — TELEPHONE ENCOUNTER
----- Message from Juliet Medina NP sent at 3/11/2025  3:17 PM CDT -----  Phentermine 37.5mg tablets set up lina dr. moreira

## 2025-03-11 NOTE — TELEPHONE ENCOUNTER
"Spoke to patient on: 3/11/25- Lives in [place]    Patient works at [employer]- [employer]  Are you currently working? : No out on leave for knee surgery (from 12/24 with dr. Estevez)  Are you on workers comp?No   Are you involved in any ligation at this time? No   Do you have HSA insurance? No                Have support to help with care and appointments: Yes   Travel Caregiver:         What is your chief complaint? pain in back- worse in legs  can't stand up straight and walk  legs "feel like they are going blow off"    Pain -right leg- radiates down towards middle of shin/ankle - down to feet  leg leg- pain radiates    back-25 % pain  leg-75% both legs    How long has it been going on? 2-3 years   has fallen and tripped bc of knee in the past  2-3 years ago went to [facility] and sitting on plane- I could barely walk-  also rode some roller coasters at a time- back was so painful and got worse    Have you had pain like this before?No     Have you sought treatment for this condition in the past?Yes   If yes, what treatments did you then?  If yes, when did those stop working?    Where is the pain the worst? legs    Does the pain radiate? down legs    Where else do you hurt? had knee surgery revision on left knee 12/24 with Dr. Estevez    Is the pain constant or does it go away to a 0/10 at times even if the pain comes right back? 6    What makes the pain worse? standing     What makes the pain better or decreases the pain (which position is least uncomfortable)? sometimes sit in a recliner and elevate legs    Any other symptoms? legs are definately weaker- have to lean on the buggy    Any bowel or bladder incontinence? no  Or changes?    What treatments have you tried for your current pain, and did they help?   Physical therapy? just doing physical therapy for knee   Chiropractor? no   Injections? What kinds -YES-   6/2024- Bilateral L1-2 MBB x2 80% relief  7/3/24- RFA DAISY L1-2 L5-S1 50% relief in legs- nothing " "in back  Bilateral L1-2 facet joint radiofrequency ablation     Prior back surgery? No   Medication- see med list      BMI: 35  Ht: 5'1"  Weight: 188 pounds    AIC: 5.7 (2024)    Social Hx:    Tobacco Use: Medium Risk (3/11/2025)    Patient History     Smoking Tobacco Use: Former     Smokeless Tobacco Use: Never     Passive Exposure: Not on file                    Allergies:   Review of patient's allergies indicates:   Allergen Reactions    Macrobid [nitrofurantoin monohyd/m-cryst]     Bactrim [sulfamethoxazole-trimethoprim] Nausea And Vomiting         Medication list:   Medications Ordered Prior to Encounter[1]      Health Hx:  Past Medical History:   Diagnosis Date    Anxiety     Arthritis     Cancer breast    Right- Bilateral mastectomy- May 2005- had breast reconstruction- mother  of breast cancer    HLD (hyperlipidemia)     Hypertension     diagnosed age late 40's     IBS (irritable bowel syndrome)     Lyme disease     arthritis of hands. Felt like flu- age early 30's- got it  after going to [place]    Sleep apnea     NO MACHINE         Surgical Hx:   Past Surgical History:   Procedure Laterality Date    ABDOMINAL SURGERY      BLADDER SUSPENSION      BREAST SURGERY      CARPAL TUNNEL RELEASE Left 2023    Procedure: Left carpal tunnel release;  Surgeon: Roberto Cheung MD;  Location: [facility];  Service: Orthopedics;  Laterality: Left;     SECTION      CYSTOSCOPY      avoids greens . ? Interstitial cystitis    ESOPHAGOGASTRODUODENOSCOPY N/A 8/10/2023    Procedure: EGD (ESOPHAGOGASTRODUODENOSCOPY);  Surgeon: Gerard Hernandez MD;  Location: [facility];  Service: Endoscopy;  Laterality: N/A;    HYSTERECTOMY      followed by removal of ovaries  from scar tissue    INCISIONAL HERNIA REPAIR Right 2008    RLQ    JOINT REPLACEMENT      Left total knee replacement-[facility] -     KNEE SURGERY Left 2014    TKR    MODIFIED RADICAL MASTECTOMY W/ " AXILLARY LYMPH NODE DISSECTION Right 05/10/2005    w/free flap    NISSEN FUNDOPLICATION  2000    REVISION OF KNEE ARTHROPLASTY Left 9/19/2024    Procedure: REVISION, ARTHROPLASTY, KNEE: LEFT;  Surgeon: Chris Estevez MD;  [facility];  Service: Orthopedics;  Laterality: Left;    REVISION OF KNEE ARTHROPLASTY Left 12/5/2024    Procedure: REVISION, ARTHROPLASTY, KNEE;  Surgeon: Chris Estevez MD;  Location: [facility] OR 2ND FLR;  Service: Orthopedics;  Laterality: Left;    ROBOT-ASSISTED LAPAROSCOPIC REPAIR OF VENTRAL HERNIA N/A 11/5/2021    Procedure: ROBOTIC REPAIR, HERNIA, VENTRAL;  Surgeon: John Johnson III, MD;  Location: [facility];  Service: General;  Laterality: N/A;    SIMPLE MASTECTOMY Left 05/10/2005    w/free flap    TONSILLECTOMY           Can you take one flight of stairs: yes go slow    Ever been diagnosed with sleep apnea Yes    CPAPNo    Images Received:  MRI Yes    Type: Lumbar 7/24  Xray No   Type:  EMG   yes 3/2024 (see report)         Surgery recommended: Yes Lumbar fusion- from Dr. Brendan Lopez- YES INTERESTED IN SURGERY    PCP Information:   Juliet Medina NP- declined- PATIENT AGREEABLE TO FOLLOWUP DIRECTLY WITH DR. ENGEL AND HIS TEAM      Spoke with pt, reviewed history, discussed timeframe and expectation regarding the [facility] program, discussed logging in to the [facility] portal and to expect link to complete online seminar, confirmed PCP. Explained I would be sending imaging, chart review and spine screen assessment to spine surgeon, then to Dr. Tapia to see if any additional orders are needed other than CXR and non bundled labs and I would reach back out when this information if obtained.  Explained that initial trip is an evaluation visit and if surgery needed and determined a surgery date would be determined with surgeon and patient. From there, I would send preop orders to PCP for them to complete at home.       Discussed surgery requiremernts of BMi less  than 40, AIC less than 8 and smoking cessation 6 weeks prior to surgery with a required cotine test 2 weeks prior to surgery. Patient verbalized understanding.      Patient verbalized understanding of the above and instructed to reach out with any questions or concerns. Direct phone # given and explained the use of patient portal communication.    Discussed initial evaluation dates. Patient will come on 3/31 (no hotel needed). will submit plan of care to [facility] and work on appointments.    Jenna Olivera, RN, CMSRN  RN Navigator  [facility]   [phone]  Fax [phone]                   [1]   Current Outpatient Medications on File Prior to Visit   Medication Sig Dispense Refill    doxycycline (VIBRA-TABS) 100 MG tablet Take 1 tablet (100 mg total) by mouth every 12 (twelve) hours. 60 tablet 5    FLUoxetine 20 MG capsule Take 1 capsule (20 mg total) by mouth once daily. 90 capsule 1    hydroCHLOROthiazide (HYDRODIURIL) 25 MG tablet Take 1 tablet (25 mg total) by mouth once daily. 90 tablet 1    irbesartan (AVAPRO) 300 MG tablet Take 1 tablet (300 mg total) by mouth every evening. 90 tablet 3    ondansetron (ZOFRAN) 8 MG tablet Take 1 tablet (8 mg total) by mouth every 6 (six) hours as needed for Nausea. 12 tablet 0    oxyCODONE (ROXICODONE) 5 MG immediate release tablet Take 1-2 tabs every 4-6 hours as needed for pain 50 tablet 0    pantoprazole (PROTONIX) 40 MG tablet Take 1 tablet (40 mg total) by mouth 2 (two) times daily. 60 tablet 11    pantoprazole (PROTONIX) 40 MG tablet Take 1 tablet (40 mg total) by mouth once daily. 30 tablet 0    rosuvastatin (CRESTOR) 20 MG tablet Take 1 tablet (20 mg total) by mouth every evening. 90 tablet 1    zolpidem (AMBIEN) 10 mg Tab Take 1 tablet (10 mg total) by mouth every evening. 90 tablet 1    acetaminophen (TYLENOL) 650 MG TbSR Take 1 tablet (650 mg total) by mouth every 8 (eight) hours. (Patient not taking: Reported on 3/11/2025)  120 tablet 0    naloxone (NARCAN) 4 mg/actuation Spry 4mg by nasal route as needed for opioid overdose; may repeat every 2-3 minutes in alternating nostrils until medical help arrives. Call 911 (Patient not taking: Reported on 3/11/2025) 1 each 11     No current facility-administered medications on file prior to visit.

## 2025-03-12 ENCOUNTER — TELEPHONE (OUTPATIENT)
Dept: FAMILY MEDICINE | Facility: CLINIC | Age: 65
End: 2025-03-12
Payer: COMMERCIAL

## 2025-03-12 LAB
BASOPHILS # BLD AUTO: 28 CELLS/UL (ref 0–200)
BASOPHILS NFR BLD AUTO: 0.4 %
EOSINOPHIL # BLD AUTO: 200 CELLS/UL (ref 15–500)
EOSINOPHIL NFR BLD AUTO: 2.9 %
ERYTHROCYTE [DISTWIDTH] IN BLOOD BY AUTOMATED COUNT: 14 % (ref 11–15)
FERRITIN SERPL-MCNC: 10 NG/ML (ref 16–288)
FOLATE SERPL-MCNC: 9.2 NG/ML
HCT VFR BLD AUTO: 33.4 % (ref 35–45)
HGB BLD-MCNC: 10.1 G/DL (ref 11.7–15.5)
IRON SATN MFR SERPL: 7 % (CALC) (ref 16–45)
IRON SERPL-MCNC: 33 MCG/DL (ref 45–160)
LYMPHOCYTES # BLD AUTO: 2519 CELLS/UL (ref 850–3900)
LYMPHOCYTES NFR BLD AUTO: 36.5 %
MCH RBC QN AUTO: 26 PG (ref 27–33)
MCHC RBC AUTO-ENTMCNC: 30.2 G/DL (ref 32–36)
MCV RBC AUTO: 86.1 FL (ref 80–100)
MONOCYTES # BLD AUTO: 476 CELLS/UL (ref 200–950)
MONOCYTES NFR BLD AUTO: 6.9 %
NEUTROPHILS # BLD AUTO: 3678 CELLS/UL (ref 1500–7800)
NEUTROPHILS NFR BLD AUTO: 53.3 %
PLATELET # BLD AUTO: 296 THOUSAND/UL (ref 140–400)
PMV BLD REES-ECKER: 10.3 FL (ref 7.5–12.5)
RBC # BLD AUTO: 3.88 MILLION/UL (ref 3.8–5.1)
TIBC SERPL-MCNC: 472 MCG/DL (CALC) (ref 250–450)
WBC # BLD AUTO: 6.9 THOUSAND/UL (ref 3.8–10.8)

## 2025-03-13 ENCOUNTER — CLINICAL SUPPORT (OUTPATIENT)
Dept: REHABILITATION | Facility: HOSPITAL | Age: 65
End: 2025-03-13
Payer: COMMERCIAL

## 2025-03-13 DIAGNOSIS — M62.81 WEAKNESS OF LEFT QUADRICEPS MUSCLE: ICD-10-CM

## 2025-03-13 DIAGNOSIS — R60.9 EDEMA, UNSPECIFIED TYPE: ICD-10-CM

## 2025-03-13 DIAGNOSIS — M25.662 DECREASED RANGE OF MOTION (ROM) OF LEFT KNEE: Primary | ICD-10-CM

## 2025-03-13 PROCEDURE — 97530 THERAPEUTIC ACTIVITIES: CPT | Mod: PN,CQ

## 2025-03-13 PROCEDURE — 97140 MANUAL THERAPY 1/> REGIONS: CPT | Mod: PN,CQ

## 2025-03-13 PROCEDURE — 97112 NEUROMUSCULAR REEDUCATION: CPT | Mod: PN,CQ

## 2025-03-13 RX ORDER — HYDROCHLOROTHIAZIDE 25 MG/1
25 TABLET ORAL DAILY
Qty: 90 TABLET | Refills: 1 | Status: SHIPPED | OUTPATIENT
Start: 2025-03-13

## 2025-03-13 NOTE — PROGRESS NOTES
[facility]   Physical Therapy Treatment Note      Name: Traci TAMEZ [facility] Number: [id number]    Therapy Diagnosis:   Encounter Diagnoses   Name Primary?    Decreased range of motion (ROM) of left knee Yes    Weakness of left quadriceps muscle          Physician: Chris Estevez MD    Visit Date: 3/13/2025    Physician Orders: PT Eval and Treat   Medical Diagnosis from Referral: Z96.652 (ICD-10-CM) - S/P revision of total knee, left   Evaluation Date: 1/16/2025  Authorization Period Expiration: 12/31/2025  Plan of Care Expiration: 02/27/2025 extended to 03/21/2025  Progress Note Due: 02/15/2025  Date of Surgery: 12/05/2024  Visit # / Visits authorized: 14/ 20  FOTO: 1/ 3     Precautions: Standard      Time In: 1300  Time Out: 1400  Total Billable Time: 58 minutes    PTA Visit #: 3/5     Subjective     Patient reports: doing well.     She was compliant with home exercise program.  Response to previous treatment: as noted  Functional change: as noted     Pain: 3/10  Location: left knee      Objective      Objective Measures updated at progress report unless specified.     3/13/2025  Left knee active range of motion: 92  Left knee passive range of motion: NT    Treatment     Traci received the treatments listed below:      therapeutic exercises to develop strength, endurance, ROM, and flexibility for 00 minutes including:    LLLD knee flexion stretch with 3#, 9 minutes (supine with towel around popliteal region of knee)    manual therapy techniques: Joint mobilizations were applied to the: Left knee for 10 minutes, including:    Patellar mobs in all directions   Tibial AP grade III-IV   Seated distraction to improve knee flexion   Distal scar mobilization     neuromuscular re-education activities to improve: Balance, Coordination, Kinesthetic, and Sense for 10 minutes. The following activities were included:    LAQ 5# 4 x 8 repetitions   Straight leg raise, 2 x 10 1# B    therapeutic  activities to improve functional performance for 38  minutes, including:    Heel prop 3 minutes   Elevated heel slides with 5# weight and strap, x5 minutes  Standing knee stretch kneeling, x3 minutes   Kneeling on lowest soft box knee flexion stretch     Double leg shuttle 50#, 4 x 8, emphasizing knee flexion stretch  Single leg shuttle 25#, 4 x 8, emphasizing knee flexion stretch  Nu-step x 10 minutes   Education on POC and Prognosis     gait training to improve functional mobility and safety for 00  minutes, including:    Stepping over hurdles, (5), to improve heel contact and foot follow through.  Ambulating 20 yds, emphasis on improving gait speed to decrease swaying.     Patient Education and Home Exercises       Education provided:   - Home Exercise Program     Written Home Exercises Provided: Yes. Exercises were reviewed and Traci was able to demonstrate them prior to the end of the session.  Traci demonstrated good  understanding of the education provided. See Electronic Medical Record under Patient Instructions for exercises provided during therapy sessions    Assessment     Able to incorporate body weight knee flexion stretch today. She was also able to achieve 92 degrees of knee flexion on her own but with great effort. Will continue to progress range of motion as able.     Traci Is progressing well towards her goals.   Patient prognosis is Good.     Patient will continue to benefit from skilled outpatient physical therapy to address the deficits listed in the problem list box on initial evaluation, provide pt/family education and to maximize pt's level of independence in the home and community environment.     Patient's spiritual, cultural and educational needs considered and pt agreeable to plan of care and goals.     Anticipated barriers to physical therapy: none noted    Goals:     Short Term Goals: 3 weeks. Pt agrees with goals set.  Pt will demonstrate independence and compliance with initial HEP to  improve independence and symptom management.   Pt will report Left knee pain </= 0/10 with sitting for 60 minutes to demonstrate improved condition and ability to complete her ADLs and leisurely task.    Pt will demonstrate quadriceps and motor control by completing 30 straight leg raises with no knee lag.   Pt will improve Left knee flexion ROM to 80 degrees to improve tolerance for ambulation and transfers.       Long Term Goals: 6 weeks. Pt agrees with goals set.   Pt will demonstrate independence and compliance with final HEP to continue managing symptoms and developing mobility, motor control and strength.    Pt will improve FOTO score to </= 39% limited to demonstrate improved functional mobility.    Pt will report knee pain </= 0/10  with ambulation, transfers and squatting to demonstrate improved condition and ability to complete her ADLs, self care and work related task.    Pt will complete 13 sit to stands in the 30 second sit to stand test to demonstrate adequate knee range of motion, endurance and strength.  Pt will improve Left knee flexion ROM >/= 90 degrees of flexion to improve her tolerance to sitting and transfers.     Pt goal: Not walk like a penguin.      Plan     Reasons for Recertification of Therapy:   Traci has limited knee flexion which is altering her ability to ambulate, transfer and complete her leisurely task.     Updated Certification Period: 3/13/2025 to 03/21/2025  Recommended Treatment Plan: 1-2 times per week for 4 weeks: Electrical Stimulation NMES, Gait Training, Manual Therapy, Moist Heat/ Ice, Neuromuscular Re-ed, Patient Education, Self Care, Therapeutic Activities, and Therapeutic Exercise  Other Recommendations: Develop knee range of motion, especially in flexion, to improve ability to ambulate and complete ADLs/self care.     Madeleine Fraser, PTA  3/13/2025

## 2025-03-17 ENCOUNTER — CLINICAL SUPPORT (OUTPATIENT)
Dept: REHABILITATION | Facility: HOSPITAL | Age: 65
End: 2025-03-17
Payer: COMMERCIAL

## 2025-03-17 DIAGNOSIS — M62.81 WEAKNESS OF LEFT QUADRICEPS MUSCLE: ICD-10-CM

## 2025-03-17 DIAGNOSIS — M25.662 DECREASED RANGE OF MOTION (ROM) OF LEFT KNEE: Primary | ICD-10-CM

## 2025-03-17 PROCEDURE — 97530 THERAPEUTIC ACTIVITIES: CPT | Mod: PN,CQ

## 2025-03-17 PROCEDURE — 97112 NEUROMUSCULAR REEDUCATION: CPT | Mod: PN,CQ

## 2025-03-17 PROCEDURE — 97140 MANUAL THERAPY 1/> REGIONS: CPT | Mod: PN,CQ

## 2025-03-17 NOTE — PROGRESS NOTES
"[facility] OUTPAT[facility]ERAP[facility]   Physical Therapy Treatment Note      Name: Traci Freire  [facility] Number: [id number]    Therapy Diagnosis:   Encounter Diagnoses   Name Primary?    Decreased range of motion (ROM) of left knee Yes    Weakness of left quadriceps muscle          Physician: Chris Estevez MD    Visit Date: 3/17/2025    Physician Orders: PT Eval and Treat   Medical Diagnosis from Referral: Z96.652 (ICD-10-CM) - S/P revision of total knee, left   Evaluation Date: 1/16/2025  Authorization Period Expiration: 12/31/2025  Plan of Care Expiration: 02/27/2025 extended to 03/21/2025  Progress Note Due: 02/15/2025  Date of Surgery: 12/05/2024  Visit # / Visits authorized: 15/ 20  FOTO: 1/ 3     Precautions: Standard      Time In: 1200  Time Out: 1300  Total Billable Time: 58 minutes    PTA Visit #: 4/5     Subjective     Patient reports: "knee feels stiff today like it doesn't want to bend as much." Also arrives with complaints of back pain due to having her grand daughter all weekend. Reports her back will cause her legs to feel "sore".     She was compliant with home exercise program.  Response to previous treatment: as noted  Functional change: as noted     Pain: 3/10  Location: left knee      Objective      Objective Measures updated at progress report unless specified.     3/17/2025  Left knee active range of motion: 88  Left knee passive range of motion: NT    Treatment     Traci received the treatments listed below:      therapeutic exercises to develop strength, endurance, ROM, and flexibility for 00 minutes including:    LLLD knee flexion stretch with 3#, 9 minutes (supine with towel around popliteal region of knee)    manual therapy techniques: Joint mobilizations were applied to the: Left knee for 10 minutes, including:    Patellar mobs in all directions   Tibial AP grade III-IV   Seated distraction to improve knee flexion   Distal scar mobilization     neuromuscular re-education activities " to improve: Balance, Coordination, Kinesthetic, and Sense for 10 minutes. The following activities were included:    LAQ 5# 4 x 8 repetitions   Straight leg raise, 2 x 10 1# B    therapeutic activities to improve functional performance for 38  minutes, including:    Heel prop 3 minutes   Elevated heel slides with 5# weight and strap, x5 minutes  Standing knee stretch kneeling, x3 minutes   Kneeling on lowest soft box knee flexion stretch     Double leg shuttle 50#, 4 x 8, emphasizing knee flexion stretch  Single leg shuttle 25#, 4 x 8, emphasizing knee flexion stretch  Nu-step x 10 minutes   Education on POC and Prognosis     gait training to improve functional mobility and safety for 00  minutes, including:    Stepping over hurdles, (5), to improve heel contact and foot follow through.  Ambulating 20 yds, emphasis on improving gait speed to decrease swaying.     Patient Education and Home Exercises       Education provided:   - Home Exercise Program     Written Home Exercises Provided: Yes. Exercises were reviewed and Traci was able to demonstrate them prior to the end of the session.  Traci demonstrated good  understanding of the education provided. See Electronic Medical Record under Patient Instructions for exercises provided during therapy sessions    Assessment     Traci notes with good tolerance to treatment. Continued focus on improving knee flexion. Decreased knee range of motion today possibly due to subjective reporting. Will progress as able.     Traci Is progressing well towards her goals.   Patient prognosis is Good.     Patient will continue to benefit from skilled outpatient physical therapy to address the deficits listed in the problem list box on initial evaluation, provide pt/family education and to maximize pt's level of independence in the home and community environment.     Patient's spiritual, cultural and educational needs considered and pt agreeable to plan of care and goals.     Anticipated  barriers to physical therapy: none noted    Goals:     Short Term Goals: 3 weeks. Pt agrees with goals set.  Pt will demonstrate independence and compliance with initial HEP to improve independence and symptom management.   Pt will report Left knee pain </= 0/10 with sitting for 60 minutes to demonstrate improved condition and ability to complete her ADLs and leisurely task.    Pt will demonstrate quadriceps and motor control by completing 30 straight leg raises with no knee lag.   Pt will improve Left knee flexion ROM to 80 degrees to improve tolerance for ambulation and transfers.       Long Term Goals: 6 weeks. Pt agrees with goals set.   Pt will demonstrate independence and compliance with final HEP to continue managing symptoms and developing mobility, motor control and strength.    Pt will improve FOTO score to </= 39% limited to demonstrate improved functional mobility.    Pt will report knee pain </= 0/10  with ambulation, transfers and squatting to demonstrate improved condition and ability to complete her ADLs, self care and work related task.    Pt will complete 13 sit to stands in the 30 second sit to stand test to demonstrate adequate knee range of motion, endurance and strength.  Pt will improve Left knee flexion ROM >/= 90 degrees of flexion to improve her tolerance to sitting and transfers.     Pt goal: Not walk like a penguin.      Plan     Reasons for Recertification of Therapy:   Traci has limited knee flexion which is altering her ability to ambulate, transfer and complete her leisurely task.     Updated Certification Period: 3/17/2025 to 03/21/2025  Recommended Treatment Plan: 1-2 times per week for 4 weeks: Electrical Stimulation NMES, Gait Training, Manual Therapy, Moist Heat/ Ice, Neuromuscular Re-ed, Patient Education, Self Care, Therapeutic Activities, and Therapeutic Exercise  Other Recommendations: Develop knee range of motion, especially in flexion, to improve ability to ambulate and  complete ADLs/self care.     Madeleine Fraser, PTA  3/17/2025

## 2025-03-18 ENCOUNTER — TELEPHONE (OUTPATIENT)
Dept: FAMILY MEDICINE | Facility: CLINIC | Age: 65
End: 2025-03-18

## 2025-03-18 ENCOUNTER — OFFICE VISIT (OUTPATIENT)
Dept: FAMILY MEDICINE | Facility: CLINIC | Age: 65
End: 2025-03-18
Payer: COMMERCIAL

## 2025-03-18 ENCOUNTER — PATIENT MESSAGE (OUTPATIENT)
Dept: FAMILY MEDICINE | Facility: CLINIC | Age: 65
End: 2025-03-18
Payer: COMMERCIAL

## 2025-03-18 VITALS
BODY MASS INDEX: 36.06 KG/M2 | DIASTOLIC BLOOD PRESSURE: 66 MMHG | HEIGHT: 61 IN | SYSTOLIC BLOOD PRESSURE: 120 MMHG | HEART RATE: 80 BPM | WEIGHT: 191 LBS | OXYGEN SATURATION: 96 %

## 2025-03-18 DIAGNOSIS — F41.9 ANXIETY: ICD-10-CM

## 2025-03-18 DIAGNOSIS — D50.8 OTHER IRON DEFICIENCY ANEMIA: Primary | ICD-10-CM

## 2025-03-18 DIAGNOSIS — K11.21 ACUTE PAROTITIS: Primary | ICD-10-CM

## 2025-03-18 DIAGNOSIS — Z96.652 S/P REVISION OF TOTAL KNEE, LEFT: ICD-10-CM

## 2025-03-18 PROCEDURE — 3078F DIAST BP <80 MM HG: CPT | Mod: CPTII,S$GLB,COE,

## 2025-03-18 PROCEDURE — 4010F ACE/ARB THERAPY RXD/TAKEN: CPT | Mod: CPTII,S$GLB,COE,

## 2025-03-18 PROCEDURE — 3066F NEPHROPATHY DOC TX: CPT | Mod: CPTII,S$GLB,COE,

## 2025-03-18 PROCEDURE — 3008F BODY MASS INDEX DOCD: CPT | Mod: CPTII,S$GLB,COE,

## 2025-03-18 PROCEDURE — 3061F NEG MICROALBUMINURIA REV: CPT | Mod: CPTII,S$GLB,COE,

## 2025-03-18 PROCEDURE — 99213 OFFICE O/P EST LOW 20 MIN: CPT | Mod: S$GLB,COE,,

## 2025-03-18 PROCEDURE — 3074F SYST BP LT 130 MM HG: CPT | Mod: CPTII,S$GLB,COE,

## 2025-03-18 RX ORDER — FLUOXETINE HYDROCHLORIDE 20 MG/1
20 CAPSULE ORAL DAILY
Qty: 90 CAPSULE | Refills: 1 | Status: SHIPPED | OUTPATIENT
Start: 2025-03-18

## 2025-03-18 RX ORDER — AMOXICILLIN AND CLAVULANATE POTASSIUM 875; 125 MG/1; MG/1
1 TABLET, FILM COATED ORAL EVERY 12 HOURS
Qty: 20 TABLET | Refills: 0 | Status: SHIPPED | OUTPATIENT
Start: 2025-03-18 | End: 2025-03-28

## 2025-03-18 NOTE — PATIENT INSTRUCTIONS
Take antibiotics with food and increase fluid intake.  Call the clinic if symptoms worsen, new symptoms develop or if  you do not feel any better after completion of antibiotics. Take all medication as prescribed. May take a probiotic over the counter, of choice, to avoid side effects like diarrhea and yeast infections. I will not send preventative Diflucan. Call if you develop symptoms of a yeast infection. Call if you develop severe, watery diarrhea within one month of taking antibiotics. If you do not take all the medication as prescribed, you run the risk of developing resistance to this medication and reducing or eliminating its effectiveness in the future.

## 2025-03-18 NOTE — TELEPHONE ENCOUNTER
----- Message from PHYLLIS Orellana sent at 3/18/2025  3:04 PM CDT -----  I discussed results of iron labs with patient at visit today. She reports she cannot tolerate oral iron replacement. Send message to Juliet to see about IV iron.

## 2025-03-18 NOTE — Clinical Note
I discussed results of iron labs with patient at visit today. She reports she cannot tolerate oral iron replacement. Send message to Juliet to see about IV iron.

## 2025-03-19 RX ORDER — PANTOPRAZOLE SODIUM 40 MG/1
40 TABLET, DELAYED RELEASE ORAL DAILY
Qty: 90 TABLET | Refills: 1 | Status: SHIPPED | OUTPATIENT
Start: 2025-03-19

## 2025-03-20 ENCOUNTER — PATIENT MESSAGE (OUTPATIENT)
Dept: FAMILY MEDICINE | Facility: CLINIC | Age: 65
End: 2025-03-20
Payer: COMMERCIAL

## 2025-03-20 ENCOUNTER — CLINICAL SUPPORT (OUTPATIENT)
Dept: REHABILITATION | Facility: HOSPITAL | Age: 65
End: 2025-03-20
Payer: COMMERCIAL

## 2025-03-20 DIAGNOSIS — M25.662 DECREASED RANGE OF MOTION (ROM) OF LEFT KNEE: Primary | ICD-10-CM

## 2025-03-20 DIAGNOSIS — M62.81 WEAKNESS OF LEFT QUADRICEPS MUSCLE: ICD-10-CM

## 2025-03-20 PROBLEM — D50.9 IRON DEFICIENCY ANEMIA: Status: ACTIVE | Noted: 2025-03-20

## 2025-03-20 PROCEDURE — 97140 MANUAL THERAPY 1/> REGIONS: CPT | Mod: PN

## 2025-03-20 PROCEDURE — 97112 NEUROMUSCULAR REEDUCATION: CPT | Mod: PN

## 2025-03-20 PROCEDURE — 97530 THERAPEUTIC ACTIVITIES: CPT | Mod: PN

## 2025-03-20 NOTE — TELEPHONE ENCOUNTER
Iron deficiency anemia needs to be on problem list. Unless you think Normocytic anemia will cover it.

## 2025-03-20 NOTE — PROGRESS NOTES
[facility]   Physical Therapy Treatment Note      Name: Traci TAMEZ [facility] Number: [id number]    Therapy Diagnosis:   Encounter Diagnoses   Name Primary?    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     She was compliant with home exercise program.  Response to previous treatment: as noted  Functional change: as noted     Pain: 3/10  Location: left knee      Objective      Objective Measures updated at progress report unless specified.     3/23/2025  Left knee active range of motion: 88  Left knee passive range of motion: NT    Treatment     Traci received the treatments listed below:      therapeutic exercises to develop strength, endurance, ROM, and flexibility for 08 minutes including:    LLLD knee flexion stretch with 5#, 7 minutes (supine with towel around popliteal region of knee)    manual therapy techniques: Joint mobilizations were applied to the: Left knee for 10 minutes, including:    Patellar mobs in all directions   Tibial AP grade III-IV   Seated distraction to improve knee flexion   Distal scar mobilization     neuromuscular re-education activities to improve: Balance, Coordination, Kinesthetic, and Sense for 10 minutes. The following activities were included:    LAQ 5# 4 x 8 repetitions    Straight leg raise, 2 x 10 1# B    therapeutic activities to improve functional performance for 27  minutes, including:    Heel prop 3 minutes   Elevated heel slides with 5# weight and strap, x5 minutes  Standing knee stretch kneeling, x3 minutes   Kneeling on lowest soft box knee flexion stretch     Double leg shuttle 50#, 4 x 8, emphasizing knee flexion stretch  Single leg shuttle 25#, 4 x 8, emphasizing knee flexion stretch  Nu-step x 10 minutes   Education on POC and Prognosis     gait training to improve functional mobility and safety for 00  minutes, including:    Stepping over hurdles, (5), to improve heel contact and foot follow through.  Ambulating 20 yds, emphasis on improving gait speed to decrease swaying.     Patient Education and Home Exercises       Education provided:   - Home Exercise Program     Written Home Exercises Provided: Yes. Exercises were reviewed and Traci was able to demonstrate them prior to the end of the session.  Traci demonstrated good  understanding of the education provided. See Electronic Medical Record under Patient Instructions for exercises provided during therapy sessions    Assessment     Traci completed therapy with minimal complications; Treatment continues to focus on developing her knee range of motion, albeit she is approaching a plateau in her range of motion. She responded well to LLLD holds and manual therapy to improve her end range knee flexion. Treatment will begin preparing her for D/C and expanding her HEP in lieu of the plateau she is approaching.     Traci Is progressing well towards her goals.   Patient prognosis is Good.     Patient will continue to benefit from skilled outpatient physical therapy to address the deficits listed in the problem list box on initial evaluation, provide pt/family education and to maximize pt's level of independence in the home and community environment.     Patient's spiritual, cultural and educational needs considered and pt  agreeable to plan of care and goals.     Anticipated barriers to physical therapy: none noted    Goals:     Short Term Goals: 3 weeks. Pt agrees with goals set.  Pt will demonstrate independence and compliance with initial HEP to improve independence and symptom management.   Pt will report Left knee pain </= 0/10 with sitting for 60 minutes to demonstrate improved condition and ability to complete her ADLs and leisurely task.    Pt will demonstrate quadriceps and motor control by completing 30 straight leg raises with no knee lag.   Pt will improve Left knee flexion ROM to 80 degrees to improve tolerance for ambulation and transfers.       Long Term Goals: 6 weeks. Pt agrees with goals set.   Pt will demonstrate independence and compliance with final HEP to continue managing symptoms and developing mobility, motor control and strength.    Pt will improve FOTO score to </= 39% limited to demonstrate improved functional mobility.    Pt will report knee pain </= 0/10  with ambulation, transfers and squatting to demonstrate improved condition and ability to complete her ADLs, self care and work related task.    Pt will complete 13 sit to stands in the 30 second sit to stand test to demonstrate adequate knee range of motion, endurance and strength.  Pt will improve Left knee flexion ROM >/= 90 degrees of flexion to improve her tolerance to sitting and transfers.     Pt goal: Not walk like a penguin.      Plan     Reasons for Recertification of Therapy:   Traci has limited knee flexion which is altering her ability to ambulate, transfer and complete her leisurely task.     Updated Certification Period: 3/20/2025 to 04/10/2025  Recommended Treatment Plan: 1-2 times per week for 4 weeks: Electrical Stimulation NMES, Gait Training, Manual Therapy, Moist Heat/ Ice, Neuromuscular Re-ed, Patient Education, Self Care, Therapeutic Activities, and Therapeutic Exercise  Other Recommendations: Develop knee range of motion,  especially in flexion, to improve ability to ambulate and complete ADLs/self care.     Mike Fajardo, PT, DPT  3/20/2025

## 2025-03-24 ENCOUNTER — TELEPHONE (OUTPATIENT)
Dept: FAMILY MEDICINE | Facility: CLINIC | Age: 65
End: 2025-03-24
Payer: COMMERCIAL

## 2025-03-24 ENCOUNTER — RESULTS FOLLOW-UP (OUTPATIENT)
Dept: FAMILY MEDICINE | Facility: CLINIC | Age: 65
End: 2025-03-24
Payer: COMMERCIAL

## 2025-03-24 RX ORDER — HEPARIN 100 UNIT/ML
500 SYRINGE INTRAVENOUS
OUTPATIENT
Start: 2025-03-24

## 2025-03-24 RX ORDER — DIPHENHYDRAMINE HYDROCHLORIDE 50 MG/ML
50 INJECTION, SOLUTION INTRAMUSCULAR; INTRAVENOUS ONCE AS NEEDED
OUTPATIENT
Start: 2025-03-24

## 2025-03-24 RX ORDER — EPINEPHRINE 0.3 MG/.3ML
0.3 INJECTION SUBCUTANEOUS ONCE AS NEEDED
OUTPATIENT
Start: 2025-03-24

## 2025-03-24 RX ORDER — SODIUM CHLORIDE 0.9 % (FLUSH) 0.9 %
10 SYRINGE (ML) INJECTION
OUTPATIENT
Start: 2025-03-24

## 2025-03-24 RX ORDER — SODIUM FERRIC GLUCONATE COMPLEX IN SUCROSE 12.5 MG/ML
125 INJECTION INTRAVENOUS
OUTPATIENT
Start: 2025-03-24

## 2025-03-24 NOTE — TELEPHONE ENCOUNTER
----- Message from Tricia sent at 3/24/2025 12:37 PM CDT -----  Good afternoon, Working on patient's current iron therapy plan. The current therapy plan has multiple iron medications within the referral. Can this please update referral to the iron infusion the patient is needing. Thanks, Tricia Lenz RN

## 2025-03-24 NOTE — PROGRESS NOTES
SUBJECTIVE:    Patient ID: Traci Freire is a 64 y.o. female.    Chief Complaint: Facial Swelling (No bottles//Pt here for facial swelling on the left side of her face by her ear that she noticed about 2 hours ago. Denies allergies, injury and is taking her medication//JL)    HPI  History of Present Illness    CHIEF COMPLAINT:  Traci presents today with facial swelling of the parotid gland.    HISTORY OF PRESENT ILLNESS:  She reports parotid gland swelling with onset today at approximately 11am. The area is warm to touch with associated sinus pressure. She denies significant pain or tenderness.    MEDICAL HISTORY:  She has a history of stomach ulcers and intolerance to oral iron supplementation. She denies any prior consultation with a hematologist. She reports hair breakage.    SURGICAL HISTORY:  She has undergone knee replacement surgery.    ALLERGIES:  She has allergies to Macrobin and Bactrim.      ROS:  General: -fever, -chills, +fatigue, -weight gain, -weight loss  Eyes: -vision changes, -redness, -discharge  ENT: -ear pain, -nasal congestion, -sore throat  Cardiovascular: -chest pain, -palpitations, -lower extremity edema  Respiratory: -cough, -shortness of breath  Gastrointestinal: -abdominal pain, +nausea, -vomiting, -diarrhea, -constipation, -blood in stool  Genitourinary: -dysuria, -hematuria, -frequency  Musculoskeletal: -joint pain, -muscle pain  Skin: -rash, -lesion, +brittle hair  Neurological: -headache, -dizziness, -numbness, -tingling  Psychiatric: -anxiety, -depression, -sleep difficulty  Head: +facial swelling, +sinus pressure         Office Visit on 03/11/2025   Component Date Value Ref Range Status    Ferritin 03/11/2025 10 (L)  16 - 288 ng/mL Final    WBC 03/11/2025 6.9  3.8 - 10.8 Thousand/uL Final    RBC 03/11/2025 3.88  3.80 - 5.10 Million/uL Final    Hemoglobin 03/11/2025 10.1 (L)  11.7 - 15.5 g/dL Final    Hematocrit 03/11/2025 33.4 (L)  35.0 - 45.0 % Final    MCV 03/11/2025 86.1  80.0 -  100.0 fL Final    MCH 03/11/2025 26.0 (L)  27.0 - 33.0 pg Final    MCHC 03/11/2025 30.2 (L)  32.0 - 36.0 g/dL Final    RDW 03/11/2025 14.0  11.0 - 15.0 % Final    Platelets 03/11/2025 296  140 - 400 Thousand/uL Final    MPV 03/11/2025 10.3  7.5 - 12.5 fL Final    Neutrophils, Abs 03/11/2025 3,678  1,500 - 7,800 cells/uL Final    Lymph # 03/11/2025 2,519  850 - 3,900 cells/uL Final    Mono # 03/11/2025 476  200 - 950 cells/uL Final    Eos # 03/11/2025 200  15 - 500 cells/uL Final    Baso # 03/11/2025 28  0 - 200 cells/uL Final    Neutrophils Relative 03/11/2025 53.3  % Final    Lymph % 03/11/2025 36.5  % Final    Mono % 03/11/2025 6.9  % Final    Eosinophil % 03/11/2025 2.9  % Final    Basophil % 03/11/2025 0.4  % Final    Iron 03/11/2025 33 (L)  45 - 160 mcg/dL Final    TIBC 03/11/2025 472 (H)  250 - 450 mcg/dL (calc) Final    Iron Saturation 03/11/2025 7 (L)  16 - 45 % (calc) Final    Folate 03/11/2025 9.2  ng/mL Final   Telephone on 02/18/2025   Component Date Value Ref Range Status    WBC 03/08/2025 5.6  3.8 - 10.8 Thousand/uL Final    RBC 03/08/2025 3.99  3.80 - 5.10 Million/uL Final    Hemoglobin 03/08/2025 9.8 (L)  11.7 - 15.5 g/dL Final    Hematocrit 03/08/2025 33.3 (L)  35.0 - 45.0 % Final    MCV 03/08/2025 83.5  80.0 - 100.0 fL Final    MCH 03/08/2025 24.6 (L)  27.0 - 33.0 pg Final    MCHC 03/08/2025 29.4 (L)  32.0 - 36.0 g/dL Final    RDW 03/08/2025 13.7  11.0 - 15.0 % Final    Platelets 03/08/2025 286  140 - 400 Thousand/uL Final    MPV 03/08/2025 9.9  7.5 - 12.5 fL Final    Neutrophils, Abs 03/08/2025 2,593  1,500 - 7,800 cells/uL Final    Lymph # 03/08/2025 2,274  850 - 3,900 cells/uL Final    Mono # 03/08/2025 487  200 - 950 cells/uL Final    Eos # 03/08/2025 196  15 - 500 cells/uL Final    Baso # 03/08/2025 50  0 - 200 cells/uL Final    Neutrophils Relative 03/08/2025 46.3  % Final    Lymph % 03/08/2025 40.6  % Final    Mono % 03/08/2025 8.7  % Final    Eosinophil % 03/08/2025 3.5  % Final    Basophil  % 03/08/2025 0.9  % Final    Glucose 03/08/2025 108 (H)  65 - 99 mg/dL Final    BUN 03/08/2025 18  7 - 25 mg/dL Final    Creatinine 03/08/2025 0.73  0.50 - 1.05 mg/dL Final    eGFR 03/08/2025 92  > OR = 60 mL/min/1.73m2 Final    BUN/Creatinine Ratio 03/08/2025 SEE NOTE:  6 - 22 (calc) Final    Sodium 03/08/2025 140  135 - 146 mmol/L Final    Potassium 03/08/2025 3.8  3.5 - 5.3 mmol/L Final    Chloride 03/08/2025 108  98 - 110 mmol/L Final    CO2 03/08/2025 25  20 - 32 mmol/L Final    Calcium 03/08/2025 8.9  8.6 - 10.4 mg/dL Final    Total Protein 03/08/2025 6.4  6.1 - 8.1 g/dL Final    Albumin 03/08/2025 3.8  3.6 - 5.1 g/dL Final    Globulin, Total 03/08/2025 2.6  1.9 - 3.7 g/dL (calc) Final    Albumin/Globulin Ratio 03/08/2025 1.5  1.0 - 2.5 (calc) Final    Total Bilirubin 03/08/2025 0.4  0.2 - 1.2 mg/dL Final    Alkaline Phosphatase 03/08/2025 113  37 - 153 U/L Final    AST 03/08/2025 18  10 - 35 U/L Final    ALT 03/08/2025 14  6 - 29 U/L Final    Cholesterol 03/08/2025 161  <200 mg/dL Final    HDL 03/08/2025 44 (L)  > OR = 50 mg/dL Final    Triglycerides 03/08/2025 173 (H)  <150 mg/dL Final    LDL Cholesterol 03/08/2025 90  mg/dL (calc) Final    HDL/Cholesterol Ratio 03/08/2025 3.7  <5.0 (calc) Final    Non HDL Chol. (LDL+VLDL) 03/08/2025 117  <130 mg/dL (calc) Final    Creatinine, Urine 03/08/2025 270  20 - 275 mg/dL Final    Microalb, Ur 03/08/2025 2.5  See Note: mg/dL Final    Microalb/Creat Ratio 03/08/2025 9  <30 mg/g creat Final    TSH w/reflex to FT4 03/08/2025 2.23  0.40 - 4.50 mIU/L Final    Color, UA 03/08/2025 YELLOW  YELLOW Final    Appearance, UA 03/08/2025 CLEAR  CLEAR Final    Specific Gravity, UA 03/08/2025 1.031  1.001 - 1.035 Final    pH, UA 03/08/2025 6.0  5.0 - 8.0 Final    Glucose, UA 03/08/2025 NEGATIVE  NEGATIVE Final    Bilirubin, UA 03/08/2025 NEGATIVE  NEGATIVE Final    Ketones, UA 03/08/2025 NEGATIVE  NEGATIVE Final    Occult Blood UA 03/08/2025 NEGATIVE  NEGATIVE Final    Protein, UA  03/08/2025 1+ (A)  NEGATIVE Final    Nitrite, UA 03/08/2025 NEGATIVE  NEGATIVE Final    Leukocytes, UA 03/08/2025 1+ (A)  NEGATIVE Final    WBC Casts, UA 03/08/2025 NONE SEEN  < OR = 5 /HPF Final    RBC Casts, UA 03/08/2025 0-2  < OR = 2 /HPF Final    Squam Epithel, UA 03/08/2025 20-40 (A)  < OR = 5 /HPF Final    Bacteria, UA 03/08/2025 NONE SEEN  NONE SEEN /HPF Final    Hyaline Casts, UA 03/08/2025 NONE SEEN  NONE SEEN /LPF Final    Service Cmt: 03/08/2025 SEE COMMENT   Final    Reflexive Urine Culture 03/08/2025 SEE COMMENT   Final    Urine Culture, Routine 03/08/2025 SEE COMMENT   Final   Admission on 12/05/2024, Discharged on 12/07/2024   Component Date Value Ref Range Status    Group & Rh 12/05/2024 A POS   Final    Indirect Rain 12/05/2024 NEG   Final    Specimen Outdate 12/05/2024 12/08/2024 23:59   Final    Prothrombin Time 12/05/2024 10.1  9.0 - 12.5 sec Final    INR 12/05/2024 0.9  0.8 - 1.2 Final    Anaerobic Culture 12/05/2024 No anaerobes isolated   Final    Aerobic Bacterial Culture 12/05/2024 No growth   Final    AFB Culture & Smear 12/05/2024 No growth after 6 weeks.   Final    AFB CULTURE STAIN 12/05/2024 No acid fast bacilli seen.   Final    Fungus (Mycology) Culture 12/05/2024 No fungus isolated after 4 weeks   Final    Final Pathologic Diagnosis 12/05/2024    Final                    Value:SPECIMENS FROM LEFT KNEE:  MUSCLE WITH ASSOCIATED DENSE NONINFLAMED FIBROUS TISSUE      Gross 12/05/2024    Final                    Value:Pathology ID# [id number]  Pathology MRN # [id number]  Hospital/Clinic label MRN# [id number]  CSN:  [id number]      Received in formalin labeled with the patient's name, medical record number, and &quot;left knee&quot; is fragment of tan-yellow rubbery to firm fibrotendinous tissue and adipose tissue, measuring 6.0 x 3.0 x 1.5 cm.  Serially sectioned and   representative sections are submitted as  follows:    [id number]  [id number]  [id number]  [id number]    HITESH Dimas, MSCLRA      Disclaimer 12/05/2024 Unless the case is a 'gross only' or additional testing only, the final diagnosis for each specimen is based on a microscopic examination of appropriate tissue sections.   Final    Hemoglobin 12/06/2024 8.6 (L)  12.0 - 16.0 g/dL Final    Hematocrit 12/06/2024 27.9 (L)  37.0 - 48.5 % Final   Lab Visit on 11/19/2024   Component Date Value Ref Range Status    Prothrombin Time 11/19/2024 10.1  9.0 - 12.5 sec Final    INR 11/19/2024 0.9  0.8 - 1.2 Final   Patient Outreach on 11/06/2024   Component Date Value Ref Range Status    CRC Recommendation External 07/30/2021 Repeat colonoscopy in 10 years   Final   Lab Visit on 10/28/2024   Component Date Value Ref Range Status    WBC 10/28/2024 7.92  3.90 - 12.70 K/uL Final    RBC 10/28/2024 3.78 (L)  4.00 - 5.40 M/uL Final    Hemoglobin 10/28/2024 10.9 (L)  12.0 - 16.0 g/dL Final    Hematocrit 10/28/2024 34.1 (L)  37.0 - 48.5 % Final    MCV 10/28/2024 90  82 - 98 fL Final    MCH 10/28/2024 28.8  27.0 - 31.0 pg Final    MCHC 10/28/2024 32.0  32.0 - 36.0 g/dL Final    RDW 10/28/2024 13.2  11.5 - 14.5 % Final    Platelets 10/28/2024 281  150 - 450 K/uL Final    MPV 10/28/2024 10.0  9.2 - 12.9 fL Final    Immature Granulocytes 10/28/2024 0.3  0.0 - 0.5 % Final    Gran # (ANC) 10/28/2024 4.3  1.8 - 7.7 K/uL Final    Immature Grans (Abs) 10/28/2024 0.02  0.00 - 0.04 K/uL Final    Lymph # 10/28/2024 2.4  1.0 - 4.8 K/uL Final    Mono # 10/28/2024 0.6  0.3 - 1.0 K/uL Final    Eos # 10/28/2024 0.5  0.0 - 0.5 K/uL Final    Baso # 10/28/2024 0.06  0.00 - 0.20 K/uL Final    nRBC 10/28/2024 0  0 /100 WBC Final    Gran % 10/28/2024 54.7  38.0 - 73.0 % Final    Lymph % 10/28/2024 30.8  18.0 - 48.0 % Final    Mono % 10/28/2024 7.7  4.0 - 15.0 % Final    Eosinophil % 10/28/2024 5.7  0.0 - 8.0 % Final    Basophil % 10/28/2024 0.8  0.0 - 1.9 % Final     Differential Method 10/28/2024 Automated   Final    Sodium 10/28/2024 142  136 - 145 mmol/L Final    Potassium 10/28/2024 3.7  3.5 - 5.1 mmol/L Final    Chloride 10/28/2024 107  95 - 110 mmol/L Final    CO2 10/28/2024 24  23 - 29 mmol/L Final    Glucose 10/28/2024 93  70 - 110 mg/dL Final    BUN 10/28/2024 15  8 - 23 mg/dL Final    Creatinine 10/28/2024 0.8  0.5 - 1.4 mg/dL Final    Calcium 10/28/2024 9.7  8.7 - 10.5 mg/dL Final    Total Protein 10/28/2024 7.0  6.0 - 8.4 g/dL Final    Albumin 10/28/2024 3.6  3.5 - 5.2 g/dL Final    Total Bilirubin 10/28/2024 0.4  0.1 - 1.0 mg/dL Final    Alkaline Phosphatase 10/28/2024 92  40 - 150 U/L Final    AST 10/28/2024 20  10 - 40 U/L Final    ALT 10/28/2024 21  10 - 44 U/L Final    eGFR 10/28/2024 >60  >60 mL/min/1.73 m^2 Final    Anion Gap 10/28/2024 11  8 - 16 mmol/L Final    CPK 10/28/2024 91  20 - 180 U/L Final    CRP 10/28/2024 0.4  0.0 - 8.2 mg/L Final   Lab Visit on 10/21/2024   Component Date Value Ref Range Status    WBC 10/21/2024 8.60  3.90 - 12.70 K/uL Final    RBC 10/21/2024 3.69 (L)  4.00 - 5.40 M/uL Final    Hemoglobin 10/21/2024 10.8 (L)  12.0 - 16.0 g/dL Final    Hematocrit 10/21/2024 34.3 (L)  37.0 - 48.5 % Final    MCV 10/21/2024 93  82 - 98 fL Final    MCH 10/21/2024 29.3  27.0 - 31.0 pg Final    MCHC 10/21/2024 31.5 (L)  32.0 - 36.0 g/dL Final    RDW 10/21/2024 13.3  11.5 - 14.5 % Final    Platelets 10/21/2024 300  150 - 450 K/uL Final    MPV 10/21/2024 9.8  9.2 - 12.9 fL Final    Immature Granulocytes 10/21/2024 0.2  0.0 - 0.5 % Final    Gran # (ANC) 10/21/2024 4.9  1.8 - 7.7 K/uL Final    Immature Grans (Abs) 10/21/2024 0.02  0.00 - 0.04 K/uL Final    Lymph # 10/21/2024 2.5  1.0 - 4.8 K/uL Final    Mono # 10/21/2024 0.7  0.3 - 1.0 K/uL Final    Eos # 10/21/2024 0.4  0.0 - 0.5 K/uL Final    Baso # 10/21/2024 0.04  0.00 - 0.20 K/uL Final    nRBC 10/21/2024 0  0 /100 WBC Final    Gran % 10/21/2024 57.1  38.0 - 73.0 % Final    Lymph % 10/21/2024 29.4  18.0  - 48.0 % Final    Mono % 10/21/2024 7.8  4.0 - 15.0 % Final    Eosinophil % 10/21/2024 5.0  0.0 - 8.0 % Final    Basophil % 10/21/2024 0.5  0.0 - 1.9 % Final    Differential Method 10/21/2024 Automated   Final    Sodium 10/21/2024 140  136 - 145 mmol/L Final    Potassium 10/21/2024 3.1 (L)  3.5 - 5.1 mmol/L Final    Chloride 10/21/2024 104  95 - 110 mmol/L Final    CO2 10/21/2024 20 (L)  23 - 29 mmol/L Final    Glucose 10/21/2024 79  70 - 110 mg/dL Final    BUN 10/21/2024 20  8 - 23 mg/dL Final    Creatinine 10/21/2024 0.8  0.5 - 1.4 mg/dL Final    Calcium 10/21/2024 9.7  8.7 - 10.5 mg/dL Final    Total Protein 10/21/2024 7.0  6.0 - 8.4 g/dL Final    Albumin 10/21/2024 3.6  3.5 - 5.2 g/dL Final    Total Bilirubin 10/21/2024 0.5  0.1 - 1.0 mg/dL Final    Alkaline Phosphatase 10/21/2024 98  40 - 150 U/L Final    AST 10/21/2024 19  10 - 40 U/L Final    ALT 10/21/2024 20  10 - 44 U/L Final    eGFR 10/21/2024 >60  >60 mL/min/1.73 m^2 Final    Anion Gap 10/21/2024 16  8 - 16 mmol/L Final    Potassium 10/21/2024 3.1 (L)  3.5 - 5.1 mmol/L Final    CRP 10/21/2024 0.6  0.0 - 8.2 mg/L Final    CPK 10/21/2024 88  20 - 180 U/L Final   Lab Visit on 10/14/2024   Component Date Value Ref Range Status    WBC 10/14/2024 6.63  3.90 - 12.70 K/uL Final    RBC 10/14/2024 3.49 (L)  4.00 - 5.40 M/uL Final    Hemoglobin 10/14/2024 10.1 (L)  12.0 - 16.0 g/dL Final    Hematocrit 10/14/2024 31.7 (L)  37.0 - 48.5 % Final    MCV 10/14/2024 91  82 - 98 fL Final    MCH 10/14/2024 28.9  27.0 - 31.0 pg Final    MCHC 10/14/2024 31.9 (L)  32.0 - 36.0 g/dL Final    RDW 10/14/2024 13.1  11.5 - 14.5 % Final    Platelets 10/14/2024 294  150 - 450 K/uL Final    MPV 10/14/2024 9.8  9.2 - 12.9 fL Final    Immature Granulocytes 10/14/2024 0.2  0.0 - 0.5 % Final    Gran # (ANC) 10/14/2024 3.5  1.8 - 7.7 K/uL Final    Immature Grans (Abs) 10/14/2024 0.01  0.00 - 0.04 K/uL Final    Lymph # 10/14/2024 2.1  1.0 - 4.8 K/uL Final    Mono # 10/14/2024 0.5  0.3 - 1.0  K/uL Final    Eos # 10/14/2024 0.5  0.0 - 0.5 K/uL Final    Baso # 10/14/2024 0.03  0.00 - 0.20 K/uL Final    nRBC 10/14/2024 0  0 /100 WBC Final    Gran % 10/14/2024 53.2  38.0 - 73.0 % Final    Lymph % 10/14/2024 30.9  18.0 - 48.0 % Final    Mono % 10/14/2024 7.5  4.0 - 15.0 % Final    Eosinophil % 10/14/2024 7.7  0.0 - 8.0 % Final    Basophil % 10/14/2024 0.5  0.0 - 1.9 % Final    Differential Method 10/14/2024 Automated   Final    Sodium 10/14/2024 140  136 - 145 mmol/L Final    Potassium 10/14/2024 4.1  3.5 - 5.1 mmol/L Final    Chloride 10/14/2024 105  95 - 110 mmol/L Final    CO2 10/14/2024 22 (L)  23 - 29 mmol/L Final    Glucose 10/14/2024 83  70 - 110 mg/dL Final    BUN 10/14/2024 16  8 - 23 mg/dL Final    Creatinine 10/14/2024 0.8  0.5 - 1.4 mg/dL Final    Calcium 10/14/2024 9.6  8.7 - 10.5 mg/dL Final    Total Protein 10/14/2024 6.7  6.0 - 8.4 g/dL Final    Albumin 10/14/2024 3.2 (L)  3.5 - 5.2 g/dL Final    Total Bilirubin 10/14/2024 0.4  0.1 - 1.0 mg/dL Final    Alkaline Phosphatase 10/14/2024 90  55 - 135 U/L Final    AST 10/14/2024 23  10 - 40 U/L Final    ALT 10/14/2024 16  10 - 44 U/L Final    eGFR 10/14/2024 >60  >60 mL/min/1.73 m^2 Final    Anion Gap 10/14/2024 13  8 - 16 mmol/L Final    CPK 10/14/2024 68  20 - 180 U/L Final    CRP 10/14/2024 0.5  0.0 - 8.2 mg/L Final   Lab Visit on 10/07/2024   Component Date Value Ref Range Status    WBC 10/07/2024 7.23  3.90 - 12.70 K/uL Final    RBC 10/07/2024 3.23 (L)  4.00 - 5.40 M/uL Final    Hemoglobin 10/07/2024 9.7 (L)  12.0 - 16.0 g/dL Final    Hematocrit 10/07/2024 29.7 (L)  37.0 - 48.5 % Final    MCV 10/07/2024 92  82 - 98 fL Final    MCH 10/07/2024 30.0  27.0 - 31.0 pg Final    MCHC 10/07/2024 32.7  32.0 - 36.0 g/dL Final    RDW 10/07/2024 13.3  11.5 - 14.5 % Final    Platelets 10/07/2024 344  150 - 450 K/uL Final    MPV 10/07/2024 9.2  9.2 - 12.9 fL Final    Immature Granulocytes 10/07/2024 0.3  0.0 - 0.5 % Final    Gran # (ANC) 10/07/2024 4.6  1.8  - 7.7 K/uL Final    Immature Grans (Abs) 10/07/2024 0.02  0.00 - 0.04 K/uL Final    Lymph # 10/07/2024 1.6  1.0 - 4.8 K/uL Final    Mono # 10/07/2024 0.4  0.3 - 1.0 K/uL Final    Eos # 10/07/2024 0.6 (H)  0.0 - 0.5 K/uL Final    Baso # 10/07/2024 0.04  0.00 - 0.20 K/uL Final    nRBC 10/07/2024 0  0 /100 WBC Final    Gran % 10/07/2024 63.3  38.0 - 73.0 % Final    Lymph % 10/07/2024 22.3  18.0 - 48.0 % Final    Mono % 10/07/2024 5.9  4.0 - 15.0 % Final    Eosinophil % 10/07/2024 7.6  0.0 - 8.0 % Final    Basophil % 10/07/2024 0.6  0.0 - 1.9 % Final    Differential Method 10/07/2024 Automated   Final    Sodium 10/07/2024 139  136 - 145 mmol/L Final    Potassium 10/07/2024 3.7  3.5 - 5.1 mmol/L Final    Chloride 10/07/2024 105  95 - 110 mmol/L Final    CO2 10/07/2024 21 (L)  23 - 29 mmol/L Final    Glucose 10/07/2024 123 (H)  70 - 110 mg/dL Final    BUN 10/07/2024 16  8 - 23 mg/dL Final    Creatinine 10/07/2024 0.9  0.5 - 1.4 mg/dL Final    Calcium 10/07/2024 9.5  8.7 - 10.5 mg/dL Final    Total Protein 10/07/2024 6.8  6.0 - 8.4 g/dL Final    Albumin 10/07/2024 3.1 (L)  3.5 - 5.2 g/dL Final    Total Bilirubin 10/07/2024 0.4  0.1 - 1.0 mg/dL Final    Alkaline Phosphatase 10/07/2024 99  55 - 135 U/L Final    AST 10/07/2024 20  10 - 40 U/L Final    ALT 10/07/2024 19  10 - 44 U/L Final    eGFR 10/07/2024 >60  >60 mL/min/1.73 m^2 Final    Anion Gap 10/07/2024 13  8 - 16 mmol/L Final    CPK 10/07/2024 66  20 - 180 U/L Final    CRP 10/07/2024 4.6  0.0 - 8.2 mg/L Final   Lab Visit on 09/30/2024   Component Date Value Ref Range Status    WBC 09/30/2024 10.55  3.90 - 12.70 K/uL Final    RBC 09/30/2024 2.96 (L)  4.00 - 5.40 M/uL Final    Hemoglobin 09/30/2024 8.7 (L)  12.0 - 16.0 g/dL Final    Hematocrit 09/30/2024 27.5 (L)  37.0 - 48.5 % Final    MCV 09/30/2024 93  82 - 98 fL Final    MCH 09/30/2024 29.4  27.0 - 31.0 pg Final    MCHC 09/30/2024 31.6 (L)  32.0 - 36.0 g/dL Final    RDW 09/30/2024 13.2  11.5 - 14.5 % Final     Platelets 2024 363  150 - 450 K/uL Final    MPV 2024 9.1 (L)  9.2 - 12.9 fL Final    Immature Granulocytes 2024 0.4  0.0 - 0.5 % Final    Gran # (ANC) 2024 6.8  1.8 - 7.7 K/uL Final    Immature Grans (Abs) 2024 0.04  0.00 - 0.04 K/uL Final    Lymph # 2024 2.4  1.0 - 4.8 K/uL Final    Mono # 2024 0.7  0.3 - 1.0 K/uL Final    Eos # 2024 0.6 (H)  0.0 - 0.5 K/uL Final    Baso # 2024 0.04  0.00 - 0.20 K/uL Final    nRBC 2024 0  0 /100 WBC Final    Gran % 2024 64.5  38.0 - 73.0 % Final    Lymph % 2024 23.1  18.0 - 48.0 % Final    Mono % 2024 6.4  4.0 - 15.0 % Final    Eosinophil % 2024 5.2  0.0 - 8.0 % Final    Basophil % 2024 0.4  0.0 - 1.9 % Final    Differential Method 2024 Automated   Final    Sodium 2024 139  136 - 145 mmol/L Final    Potassium 2024 3.5  3.5 - 5.1 mmol/L Final    Chloride 2024 103  95 - 110 mmol/L Final    CO2 2024 23  23 - 29 mmol/L Final    Glucose 2024 88  70 - 110 mg/dL Final    BUN 2024 17  8 - 23 mg/dL Final    Creatinine 2024 0.8  0.5 - 1.4 mg/dL Final    Calcium 2024 9.4  8.7 - 10.5 mg/dL Final    Total Protein 2024 6.5  6.0 - 8.4 g/dL Final    Albumin 2024 3.0 (L)  3.5 - 5.2 g/dL Final    Total Bilirubin 2024 0.5  0.1 - 1.0 mg/dL Final    Alkaline Phosphatase 2024 89  55 - 135 U/L Final    AST 2024 25  10 - 40 U/L Final    ALT 2024 35  10 - 44 U/L Final    eGFR 2024 >60  >60 mL/min/1.73 m^2 Final    Anion Gap 2024 13  8 - 16 mmol/L Final    CPK 2024 79  20 - 180 U/L Final    CRP 2024 9.8 (H)  0.0 - 8.2 mg/L Final   There may be more visits with results that are not included.       Past Medical History:   Diagnosis Date    Anxiety     Arthritis     Cancer breast    Right- Bilateral mastectomy- May 2005- had breast reconstruction- mother  of breast cancer    HLD (hyperlipidemia)      Hypertension     diagnosed age late 40's     IBS (irritable bowel syndrome)     Lyme disease     arthritis of hands. Felt like flu- age early 30's- got it  after going to [place]    Sleep apnea     NO MACHINE     Social History[1]  Past Surgical History:   Procedure Laterality Date    ABDOMINAL SURGERY      BLADDER SUSPENSION      BREAST SURGERY      CARPAL TUNNEL RELEASE Left 2023    Procedure: Left carpal tunnel release;  Surgeon: Roberto Cheung MD;  Location: [facility] OR;  Service: Orthopedics;  Laterality: Left;     SECTION      CYSTOSCOPY      avoids greens . ? Interstitial cystitis    ESOPHAGOGASTRODUODENOSCOPY N/A 8/10/2023    Procedure: EGD (ESOPHAGOGASTRODUODENOSCOPY);  Surgeon: Gerard Hernandez MD;  Location: [facility];  Service: Endoscopy;  Laterality: N/A;    HYSTERECTOMY      followed by removal of ovaries  from scar tissue    INCISIONAL HERNIA REPAIR Right 2008    RLQ    JOINT REPLACEMENT      Left total knee replacement-[facility] -     KNEE SURGERY Left     TKR    MODIFIED RADICAL MASTECTOMY W/ AXILLARY LYMPH NODE DISSECTION Right 05/10/2005    w/free flap    NISSEN FUNDOPLICATION      REVISION OF KNEE ARTHROPLASTY Left 2024    Procedure: REVISION, ARTHROPLASTY, KNEE: LEFT;  Surgeon: Chris Estevez MD;  Location: [address];  Service: Orthopedics;  Laterality: Left;    REVISION OF KNEE ARTHROPLASTY Left 2024    Procedure: REVISION, ARTHROPLASTY, KNEE;  Surgeon: Chris Estevez MD;  Location: [address];  Service: Orthopedics;  Laterality: Left;    ROBOT-ASSISTED LAPAROSCOPIC REPAIR OF VENTRAL HERNIA N/A 2021    Procedure: ROBOTIC REPAIR, HERNIA, VENTRAL;  Surgeon: John Johnson III, MD;  Location: [facility];  Service: General;  Laterality: N/A;    SIMPLE MASTECTOMY Left 05/10/2005    w/free flap    TONSILLECTOMY       Family History   Problem Relation Name Age of Onset    Cancer Mother Shital  "Meenakshi          of breast cancer, also had gynecological cancer- had breast cancer that spread to the brain    Heart disease Father Apolinar Trejo         in his 60's-  in his 70's    Breast cancer Daughter      Cancer Daughter Molly Drake        The 10-year CVD risk score (Jennie, et al., 2008) is: 7.9%    Values used to calculate the score:      Age: 64 years      Sex: Female      Diabetic: No      Tobacco smoker: No      Systolic Blood Pressure: 120 mmHg      Is BP treated: Yes      HDL Cholesterol: 44 mg/dL      Total Cholesterol: 161 mg/dL    All of your core healthy metrics are met.      Review of patient's allergies indicates:   Allergen Reactions    Macrobid [nitrofurantoin monohyd/m-cryst]     Bactrim [sulfamethoxazole-trimethoprim] Nausea And Vomiting     Current Medications[2]    Review of Systems        Objective:      Vitals:    25 1417   BP: 120/66   Pulse: 80   SpO2: 96%   Weight: 86.6 kg (191 lb)   Height: 5' 1" (1.549 m)     Physical Exam  Physical Exam    Constitutional: In no acute distress. Normal appearance. Well-developed. Not ill-appearing.  HENT: Normocephalic. Atraumatic. External ears normal bilaterally. Oropharynx clear. Right ear canal is erythematous. Left side of face along jaw is swollen.  Eyes: No scleral icterus.   Cardiovascular: Normal rate and regular rhythm.   Pulmonary: Pulmonary effort is normal. Normal breath sounds.  Skin: Warm. Dry. Capillary refill takes less than 2 seconds.  Mouth: Swelling on the inside of the cheek. Bite jose daniel on the inside of the cheek.           Assessment:       1. Acute parotitis         Plan:       Acute parotitis  -     amoxicillin-clavulanate 875-125mg (AUGMENTIN) 875-125 mg per tablet; Take 1 tablet by mouth every 12 (twelve) hours. for 10 days  Dispense: 20 tablet; Refill: 0      Assessment & Plan    IMPRESSION:  - Diagnosed sialadenitis based on presentation and physical exam.  - Noted slight redness in ear canal, " potentially contributing to inflammation.  - Identified iron deficiency anemia based on recent lab results, with low hemoglobin, hematocrit, ferritin, and iron saturation.  - Considered IV iron therapy due to history of intolerance to oral iron supplements.  - Deferred to  [facility] for further management of iron deficiency anemia.    ACUTE SIALOADENITIS:  - Observed swelling in the parotid gland area, with the cheek warm and swollen on the inside.  - Diagnosed the condition as sialadenitis (inflammation of the salivary gland).  - Explained the location and function of salivary glands in the face to the patient.  - Prescribed Augmentin for 10 days to treat sialadenitis.  - Recommend warm compresses for comfort and ibuprofen for swelling, unless contraindicated.  - Prescribed acetaminophen as needed for comfort.  - Instructed the patient to contact the office if sialadenitis symptoms worsen, become painful, or do not improve within a few days of starting antibiotics.  - Observed that the patient's cheek was swollen on the inside and appeared to have been bitten, possibly due to the swelling from sialadenitis.    IRON DEFICIENCY ANEMIA:  - Assessed that the patient has iron deficiency anemia based on recent labs results.  - Noted low hemoglobin, hematocrit, ferritin, iron saturation, and iron levels, with high iron binding capacity and normal folate.  - Discussed the potential link between iron deficiency and symptoms such as fatigue and hair breakage.  - Suggested discussing IV iron treatment with Dr. Chung due to patient's history of stomach ulcers and difficulty tolerating oral iron.  - Advised the patient to follow up with Dr. Chung as needed for management of iron deficiency anemia.  - Instructed the patient to await call from Dr. Chung's nurse regarding potential IV iron therapy.    OTITIS EXTERNA (RIGHT EAR):  - Examined both ears and found the right ear canal to be slightly erythematous.    PERSONAL HISTORY OF PEPTIC  ULCER DISEASE:  - Noted the patient's history of stomach ulcers when discussing iron supplementation options.    GENERAL RECOMMENDATIONS:  - Recommend probiotic to prevent antibiotic-associated diarrhea or yeast infections.  - Educated on the purpose of probiotics in preventing antibiotic-associated diarrhea and yeast infections.  - Started OTC probiotic supplement (recommended Culturelle or store brand equivalent) to be taken concurrently with antibiotic.         Follow up if symptoms worsen or fail to improve.        This note was generated with the assistance of ambient listening technology. Verbal consent was obtained by the patient and accompanying visitor(s) for the recording of patient appointment to facilitate this note. I attest to having reviewed and edited the generated note for accuracy, though some syntax or spelling errors may persist. Please contact the author of this note for any clarification.      3/23/2025 Madeleine Bunch         [1]   Social History  Socioeconomic History    Marital status:    Tobacco Use    Smoking status: Former     Current packs/day: 0.00     Types: Cigarettes     Quit date: 3/26/2004     Years since quittin.0    Smokeless tobacco: Never    Tobacco comments:     quit - smoked for 10 years- 1 ppd   Substance and Sexual Activity    Alcohol use: No    Drug use: No    Sexual activity: Never     Social Drivers of Health     Financial Resource Strain: Low Risk  (2024)    Overall Financial Resource Strain (CARDIA)     Difficulty of Paying Living Expenses: Not hard at all   Food Insecurity: No Food Insecurity (2024)    Hunger Vital Sign     Worried About Running Out of Food in the Last Year: Never true     Ran Out of Food in the Last Year: Never true   Transportation Needs: No Transportation Needs (2024)    TRANSPORTATION NEEDS     Transportation : No   Physical Activity: Inactive (2024)    Exercise Vital Sign     Days of Exercise per Week: 0 days      Minutes of Exercise per Session: 0 min   Stress: No Stress Concern Present (12/6/2024)    [employer] [place] - Occupational Stress Questionnaire     Feeling of Stress : Not at all   Housing Stability: Unknown (12/6/2024)    Housing Stability Vital Sign     Unable to Pay for Housing in the Last Year: No     Homeless in the Last Year: No   [2]   Current Outpatient Medications:     doxycycline (VIBRA-TABS) 100 MG tablet, Take 1 tablet (100 mg total) by mouth every 12 (twelve) hours., Disp: 60 tablet, Rfl: 5    FLUoxetine 20 MG capsule, Take 1 capsule (20 mg total) by mouth once daily., Disp: 90 capsule, Rfl: 1    hydroCHLOROthiazide (HYDRODIURIL) 25 MG tablet, Take 1 tablet (25 mg total) by mouth once daily., Disp: 90 tablet, Rfl: 1    irbesartan (AVAPRO) 300 MG tablet, Take 1 tablet (300 mg total) by mouth every evening., Disp: 90 tablet, Rfl: 3    ondansetron (ZOFRAN) 8 MG tablet, Take 1 tablet (8 mg total) by mouth every 6 (six) hours as needed for Nausea., Disp: 12 tablet, Rfl: 0    oxyCODONE (ROXICODONE) 5 MG immediate release tablet, Take 1-2 tabs every 4-6 hours as needed for pain, Disp: 50 tablet, Rfl: 0    phentermine 37.5 MG capsule, Take 1 capsule (37.5 mg total) by mouth every morning., Disp: 30 capsule, Rfl: 0    rosuvastatin (CRESTOR) 20 MG tablet, Take 1 tablet (20 mg total) by mouth every evening., Disp: 90 tablet, Rfl: 1    tirzepatide, weight loss, (ZEPBOUND) 2.5 mg/0.5 mL PnIj, Inject 2.5 mg into the skin every 7 days., Disp: 2 mL, Rfl: 2    zolpidem (AMBIEN) 10 mg Tab, Take 1 tablet (10 mg total) by mouth every evening., Disp: 90 tablet, Rfl: 1    acetaminophen (TYLENOL) 650 MG TbSR, Take 1 tablet (650 mg total) by mouth every 8 (eight) hours. (Patient not taking: Reported on 3/18/2025), Disp: 120 tablet, Rfl: 0    amoxicillin-clavulanate 875-125mg (AUGMENTIN) 875-125 mg per tablet, Take 1 tablet by mouth every 12 (twelve) hours. for 10 days, Disp: 20 tablet, Rfl: 0     naloxone (NARCAN) 4 mg/actuation Spry, 4mg by nasal route as needed for opioid overdose; may repeat every 2-3 minutes in alternating nostrils until medical help arrives. Call 911 (Patient not taking: Reported on 3/18/2025), Disp: 1 each, Rfl: 11    pantoprazole (PROTONIX) 40 MG tablet, Take 1 tablet (40 mg total) by mouth once daily., Disp: 90 tablet, Rfl: 1

## 2025-03-26 ENCOUNTER — CLINICAL SUPPORT (OUTPATIENT)
Dept: REHABILITATION | Facility: HOSPITAL | Age: 65
End: 2025-03-26
Payer: COMMERCIAL

## 2025-03-26 DIAGNOSIS — M25.662 DECREASED RANGE OF MOTION (ROM) OF LEFT KNEE: Primary | ICD-10-CM

## 2025-03-26 DIAGNOSIS — M62.81 WEAKNESS OF LEFT QUADRICEPS MUSCLE: ICD-10-CM

## 2025-03-26 PROCEDURE — 97140 MANUAL THERAPY 1/> REGIONS: CPT | Mod: PN

## 2025-03-26 PROCEDURE — 97530 THERAPEUTIC ACTIVITIES: CPT | Mod: PN

## 2025-03-26 NOTE — PATIENT INSTRUCTIONS
HOME EXERCISE PROGRAM  Created by Mike Fajardo  Mar 26th, 2025  View videos at [email or web address]        seated knee flexion stretch    wrap strap around ankle pull on strap slding heel back Repeat 15 Times   Hold 15 Seconds   Complete 1 Set   Perform 3 Times a Day          KNEE FLEXION STRETCH - CHAIR LUNGE    Start by standing in front of a chair. Place the foot of your affected knee on the chair seat and then bend that knee until a stretch is felt.    Hold, return to starting position and repeat.    Video # [id number] Repeat 10 Times   Hold 15 Seconds   Complete 2 Sets   Perform 2 Times a Day          GERIATRIC - SIT TO STAND    Sit near the front edge of a chair.    Next, reach forward with your arms and lean forward at your waist as you press down with your legs and rise up to a standing position. As you rise to standing, lower your arms by your side. Stand tall.    Then, return to sitting on the chair by leaning forward as you raise up your arms. Repeat.    NOTE: If you are unable to stand, try pushing on your thighs with your arms or use a chair that has arm rests to press down on or hold a bathroom or kitchen sink to assist in pulling yourself up.          Video # [id number] Repeat 8 Times   Hold 1 Second   Complete 3 Sets   Perform 3 Times a Week          Bridges    -Start by lying supine on table or floor.  -Knees bent and feet flat down.  -Press up through the hips and lift the butt off of the table or floor.  -Squeeze the glutes; engage the core.  -Slowly lower hips and glutes back down.    -Condition and Functionality: Tones and strengthens glute muscles and core muscles; bed mobility  -Muscles: Hip flexors, core, hamstrings, glutes. Repeat 10 Times   Hold 2 Seconds   Complete 3 Sets   Perform 3 Times a Week          Straight Leg Raise    While lying or sitting, raise up your leg with a straight knee. Keep the opposite knee bent with the foot planted to the ground. Repeat 10 Times   Hold 5 Seconds    Complete 3 Sets   Perform 3 Times a Week

## 2025-03-26 NOTE — PROGRESS NOTES
[facility]   Physical Therapy Treatment Note      Name: Traci TAMEZ [facility] Number: [id number]    Therapy Diagnosis:   Encounter Diagnoses   Name Primary?    Decreased range of motion (ROM) of left knee Yes    Weakness of left quadriceps muscle      Physician: Chris Estevez MD    Visit Date: 3/26/2025    Physician Orders: PT Eval and Treat   Medical Diagnosis from Referral: Z96.652 (ICD-10-CM) - S/P revision of total knee, left   Evaluation Date: 1/16/2025  Authorization Period Expiration: 12/31/2025  Plan of Care Expiration: 02/27/2025 extended to 04/10/2025  Progress Note Due: 02/15/2025  Date of Surgery: 12/05/2024  Visit # / Visits authorized: 17/ 20  FOTO: 1/ 3     Precautions: Standard      Time In: 100  Time Out: 153  Total Billable Time: 53 minutes    PTA Visit #: 0/5     Subjective     Patient reports: She is doing well, she is getting ready for her back MD visit.    She was compliant with home exercise program.  Response to previous treatment: as noted  Functional change: as noted     Pain: 3/10  Location: left knee      Objective      Objective Measures updated at progress report unless specified.     3/26/2025  Left knee passive range of motion: 97    Treatment     Traci received the treatments listed below:      therapeutic exercises to develop strength, endurance, ROM, and flexibility for 00 minutes including:    manual therapy techniques: Joint mobilizations were applied to the: Left knee for 15 minutes, including:    Patellar mobs in all directions   Tibial AP grade III-IV   Seated distraction to improve knee flexion     neuromuscular re-education activities to improve: Balance, Coordination, Kinesthetic, and Sense for 00 minutes. The following activities were included:    LAQ 5# 4 x 8 repetitions   Straight leg raise, 2 x 10 1# B    therapeutic activities to improve functional performance for 38  minutes, including:    Knee flexion stretch using white rope, 3 x 45  seconds,   Sit to stands from lowest Hi-lo table, 3 x 10   Standing knee stretch kneeling on 8 in step, 5 x 15 seconds   Double leg shuttle 50#, 4 x 8, emphasizing knee flexion stretch  Single leg shuttle 25#, 4 x 8, emphasizing knee flexion stretch  Final HEP education      Patient Education and Home Exercises       Education provided:   - Home Exercise Program     Written Home Exercises Provided: Yes. Exercises were reviewed and Traci was able to demonstrate them prior to the end of the session.  rTaci demonstrated good  understanding of the education provided. See Electronic Medical Record under Patient Instructions for exercises provided during therapy sessions    Assessment     Traci completed therapy with no complications. She reported to therapy with much improved knee range of motion. She was able to get to 97 degrees of flexion (passive range of motion with overpressure). She reported increased soreness, but improved range of motion. Traci has made good progress at this time and is returning to her PLOF, albeit she is limited at times due to her back. Treatment will prepare for D/C at this time and educate her on further management of her symptoms.     Traci Is progressing well towards her goals.   Patient prognosis is Good.     Patient will continue to benefit from skilled outpatient physical therapy to address the deficits listed in the problem list box on initial evaluation, provide pt/family education and to maximize pt's level of independence in the home and community environment.     Patient's spiritual, cultural and educational needs considered and pt agreeable to plan of care and goals.     Anticipated barriers to physical therapy: none noted    Goals:     Short Term Goals: 3 weeks. Pt agrees with goals set.  Pt will demonstrate independence and compliance with initial HEP to improve independence and symptom management.   Pt will report Left knee pain </= 0/10 with sitting for 60 minutes to  demonstrate improved condition and ability to complete her ADLs and leisurely task.    Pt will demonstrate quadriceps and motor control by completing 30 straight leg raises with no knee lag.   Pt will improve Left knee flexion ROM to 80 degrees to improve tolerance for ambulation and transfers.       Long Term Goals: 6 weeks. Pt agrees with goals set.   Pt will demonstrate independence and compliance with final HEP to continue managing symptoms and developing mobility, motor control and strength.    Pt will improve FOTO score to </= 39% limited to demonstrate improved functional mobility.    Pt will report knee pain </= 0/10  with ambulation, transfers and squatting to demonstrate improved condition and ability to complete her ADLs, self care and work related task.    Pt will complete 13 sit to stands in the 30 second sit to stand test to demonstrate adequate knee range of motion, endurance and strength.  Pt will improve Left knee flexion ROM >/= 90 degrees of flexion to improve her tolerance to sitting and transfers.     Pt goal: Not walk like a penguin.      Plan     Reasons for Recertification of Therapy:   Traci has limited knee flexion which is altering her ability to ambulate, transfer and complete her leisurely task.     Updated Certification Period: 3/26/2025 to 04/10/2025  Recommended Treatment Plan: 1-2 times per week for 4 weeks: Electrical Stimulation NMES, Gait Training, Manual Therapy, Moist Heat/ Ice, Neuromuscular Re-ed, Patient Education, Self Care, Therapeutic Activities, and Therapeutic Exercise  Other Recommendations: Develop knee range of motion, especially in flexion, to improve ability to ambulate and complete ADLs/self care.     Mike Fajardo, PT, DPT  3/26/2025

## 2025-03-31 ENCOUNTER — OFFICE VISIT (OUTPATIENT)
Dept: NEUROSURGERY | Facility: CLINIC | Age: 65
End: 2025-03-31
Payer: COMMERCIAL

## 2025-03-31 ENCOUNTER — HOSPITAL ENCOUNTER (OUTPATIENT)
Dept: RADIOLOGY | Facility: HOSPITAL | Age: 65
Discharge: HOME | End: 2025-03-31
Attending: NEUROLOGICAL SURGERY
Payer: COMMERCIAL

## 2025-03-31 ENCOUNTER — CLINICAL SUPPORT (OUTPATIENT)
Dept: REHABILITATION | Facility: HOSPITAL | Age: 65
End: 2025-03-31
Payer: COMMERCIAL

## 2025-03-31 VITALS
BODY MASS INDEX: 35.3 KG/M2 | WEIGHT: 187 LBS | SYSTOLIC BLOOD PRESSURE: 116 MMHG | RESPIRATION RATE: 19 BRPM | DIASTOLIC BLOOD PRESSURE: 74 MMHG | TEMPERATURE: 98 F | HEIGHT: 61 IN | HEART RATE: 80 BPM

## 2025-03-31 DIAGNOSIS — M54.16 LUMBAR RADICULOPATHY: Primary | ICD-10-CM

## 2025-03-31 DIAGNOSIS — M53.86 DECREASED RANGE OF MOTION OF LUMBAR SPINE: ICD-10-CM

## 2025-03-31 DIAGNOSIS — M50.30 DDD (DEGENERATIVE DISC DISEASE), CERVICAL: ICD-10-CM

## 2025-03-31 DIAGNOSIS — M51.369 DEGENERATION OF INTERVERTEBRAL DISC OF LUMBAR REGION, UNSPECIFIED WHETHER PAIN PRESENT: ICD-10-CM

## 2025-03-31 DIAGNOSIS — Z74.09 IMPAIRED FUNCTIONAL MOBILITY AND ACTIVITY TOLERANCE: Primary | ICD-10-CM

## 2025-03-31 DIAGNOSIS — Z01.89 PHYSICAL THERAPY EVALUATION, INITIAL: ICD-10-CM

## 2025-03-31 DIAGNOSIS — M41.9 SCOLIOSIS, UNSPECIFIED SCOLIOSIS TYPE, UNSPECIFIED SPINAL REGION: ICD-10-CM

## 2025-03-31 PROCEDURE — 3008F BODY MASS INDEX DOCD: CPT | Mod: CPTII,S$GLB,COE, | Performed by: NEUROLOGICAL SURGERY

## 2025-03-31 PROCEDURE — 72082 X-RAY EXAM ENTIRE SPI 2/3 VW: CPT | Mod: TC

## 2025-03-31 PROCEDURE — 4010F ACE/ARB THERAPY RXD/TAKEN: CPT | Mod: CPTII,S$GLB,COE, | Performed by: NEUROLOGICAL SURGERY

## 2025-03-31 PROCEDURE — 99204 OFFICE O/P NEW MOD 45 MIN: CPT | Mod: S$GLB,COE,, | Performed by: NEUROLOGICAL SURGERY

## 2025-03-31 PROCEDURE — 72082 X-RAY EXAM ENTIRE SPI 2/3 VW: CPT | Mod: 26,59,COE, | Performed by: RADIOLOGY

## 2025-03-31 PROCEDURE — 72110 X-RAY EXAM L-2 SPINE 4/>VWS: CPT | Mod: TC

## 2025-03-31 PROCEDURE — 3066F NEPHROPATHY DOC TX: CPT | Mod: CPTII,S$GLB,COE, | Performed by: NEUROLOGICAL SURGERY

## 2025-03-31 PROCEDURE — 99999 PR PBB SHADOW E&M-EST. PATIENT-LVL IV: CPT | Mod: PBBFAC,COE,, | Performed by: NEUROLOGICAL SURGERY

## 2025-03-31 PROCEDURE — 72050 X-RAY EXAM NECK SPINE 4/5VWS: CPT | Mod: 26,COE,, | Performed by: RADIOLOGY

## 2025-03-31 PROCEDURE — 97162 PT EVAL MOD COMPLEX 30 MIN: CPT

## 2025-03-31 PROCEDURE — 1159F MED LIST DOCD IN RCRD: CPT | Mod: CPTII,S$GLB,COE, | Performed by: NEUROLOGICAL SURGERY

## 2025-03-31 PROCEDURE — 3078F DIAST BP <80 MM HG: CPT | Mod: CPTII,S$GLB,COE, | Performed by: NEUROLOGICAL SURGERY

## 2025-03-31 PROCEDURE — 3061F NEG MICROALBUMINURIA REV: CPT | Mod: CPTII,S$GLB,COE, | Performed by: NEUROLOGICAL SURGERY

## 2025-03-31 PROCEDURE — 3074F SYST BP LT 130 MM HG: CPT | Mod: CPTII,S$GLB,COE, | Performed by: NEUROLOGICAL SURGERY

## 2025-03-31 PROCEDURE — 72050 X-RAY EXAM NECK SPINE 4/5VWS: CPT | Mod: TC

## 2025-03-31 NOTE — PROGRESS NOTES
"Dear Juliet Medina NP,    Thank you for referring this patient to my clinic. The full details of my evaluation will also be forthcoming to you by letter.    CHIEF COMPLAINT:  Leg pain    I, Joanie Collins, attest that this documentation has been prepared under the direction and in the presence of Pawel Reddy MD.    HPI:  Traci Freire is a 64 y.o. female with HLD and HTN, who is referred to me by Juliet Medina NP, for evaluation of leg, back, and neck pain. She had an L1-2 medial branch block with 80% symptom relief in 2024 and two subsequent radiofrequency ablations with minimal pain relief at L1-2. She reports that the pain is mostly in the lower part of her legs, across her low back, and a "bulge" in the back of her neck. She says that her legs have gone out on her before, but they don't really every feel numb. She denies any pain wrapping into her groin area. She states that the pain is exacerbated when sitting for too long or when transitioning from sitting to standing. With regards to her neck, the pain is localized to the center of her neck. She says she had bilateral arm pain as well, preventing her from sleeping on her side comfortably. She has to constantly flips sides because whichever arm she's laying on gets sore.     Patient denies any other complaints at this time.    Review of patient's allergies indicates:   Allergen Reactions    Macrobid [nitrofurantoin monohyd/m-cryst]     Bactrim [sulfamethoxazole-trimethoprim] Nausea And Vomiting       Past Medical History:   Diagnosis Date    Anxiety     Arthritis     Cancer breast    Right- Bilateral mastectomy- May 2005- had breast reconstruction- mother  of breast cancer    HLD (hyperlipidemia)     Hypertension     diagnosed age late 40's     IBS (irritable bowel syndrome)     Lyme disease     arthritis of hands. Felt like flu- age early 30's- got it  after going to [place]    Sleep apnea     NO MACHINE     Past Surgical History:   Procedure " Laterality Date    ABDOMINAL SURGERY      BLADDER SUSPENSION      BREAST SURGERY      CARPAL TUNNEL RELEASE Left 2023    Procedure: Left carpal tunnel release;  Surgeon: Roberto Cheung MD;  Location: [facility] OR;  Service: Orthopedics;  Laterality: Left;     SECTION      CYSTOSCOPY      avoids greens . ? Interstitial cystitis    ESOPHAGOGASTRODUODENOSCOPY N/A 8/10/2023    Procedure: EGD (ESOPHAGOGASTRODUODENOSCOPY);  Surgeon: Gerard Hernandez MD;  Location: [facility];  Service: Endoscopy;  Laterality: N/A;    HYSTERECTOMY      followed by removal of ovaries  from scar tissue    INCISIONAL HERNIA REPAIR Right 2008    RLQ    JOINT REPLACEMENT      Left total knee replacement-[facility] -     KNEE SURGERY Left     TKR    MODIFIED RADICAL MASTECTOMY W/ AXILLARY LYMPH NODE DISSECTION Right 05/10/2005    w/free flap    NISSEN FUNDOPLICATION      REVISION OF KNEE ARTHROPLASTY Left 2024    Procedure: REVISION, ARTHROPLASTY, KNEE: LEFT;  Surgeon: Chris Estevez MD;  Location: [address];  Service: Orthopedics;  Laterality: Left;    REVISION OF KNEE ARTHROPLASTY Left 2024    Procedure: REVISION, ARTHROPLASTY, KNEE;  Surgeon: Chris Estevez MD;  Location: [facility] OR [address] [place] [address];  Service: Orthopedics;  Laterality: Left;    ROBOT-ASSISTED LAPAROSCOPIC REPAIR OF VENTRAL HERNIA N/A 2021    Procedure: ROBOTIC REPAIR, HERNIA, VENTRAL;  Surgeon: John Johnson III, MD;  Location: [facility];  Service: General;  Laterality: N/A;    SIMPLE MASTECTOMY Left 05/10/2005    w/free flap    TONSILLECTOMY       Family History   Problem Relation Name Age of Onset    Cancer Mother Shital Arrieta          of breast cancer, also had gynecological cancer- had breast cancer that spread to the brain    Heart disease Father Apolinar Trejo         in his 60's-  in his 70's    Breast cancer Daughter      Cancer Daughter Molly Drake      Social  History[1]     Review of Systems   Musculoskeletal:  Positive for back pain and neck pain.        Bilateral leg pain.        OBJECTIVE:   Vital Signs:       Physical Exam:    Vital signs: All nursing notes and vital signs reviewed -- afebrile, vital signs stable.  Constitutional: Patient sitting comfortably in chair. Appears well developed and well nourished.  Skin: Exposed areas are intact without abnormal markings, rashes or other lesions.  HEENT: Normocephalic. Normal conjunctivae.  Cardiovascular: Normal rate and regular rhythm.  Respiratory: Chest wall rises and falls symmetrically, without signs of respiratory distress.  Abdomen: Soft and non-tender.  Extremities: Warm and without edema. Calves supple, non-tender.  Psych/Behavior: Normal affect.    Neurological:    Mental status: Alert and oriented. Conversational and appropriate.       Cranial Nerves: VFF to confrontation. PERRL. EOMI without nystagmus. Facial STLT normal and symmetric. Strong, symmetric muscles of mastication. Facial strength full and symmetric. Hearing equal bilaterally to finger rub. Palate and uvula rise and fall normally in midline. Shoulder shrug 5/5 strength. Tongue midline.     Motor:    Upper:  Deltoids Triceps Biceps WE WF     R 5/5 5/5 5/5 5/5 5/5 5/5    L 5/5 5/5 5/5 5/5 5/5 5/5      Lower:  HF KE KF DF PF EHL    R 5/5 5/5 5/5 5/5 5/5 5/5    L 5/5 5/5 5/5 5/5 5/5 5/5     Sensory: Intact sensation to light touch in all extremities. Romberg negative.    Reflexes:          DTR: 2+ symmetrically throughout.     Amador's: Negative.     Babinski's: Negative.     Clonus: Negative.    Cerebellar: Finger-to-nose and rapid alternating movements normal. Gait stable, fluid.    Spine:    Posture: Head well aligned over pelvis in front and side views.  No focal or global spinal deformity visible on inspection. Shoulders and hips even. No obvious leg length discrepancy. No scapula winging.    Bending: Full ROM with forward, back and lateral  bending. No rib prominence with forward bend.    Cervical:      ROM: Full with flexion, extension, lateral rotation and ear-to-shoulder bend.      Midline TTP: Negative.     Spurling's test: Negative.     Lhermitte's: Negative.    Thoracic:     Midline TTP: Negative    Lumbar:     Midline TTP: Negative     Straight Leg Test: Negative     Crossed Straight Leg Test: Negative     Sciatic notch tenderness: Negative.    Other:     SI joint TTP: Negative.     Greater trochanter TTP: Negative.     Tenderness with external/internal hip rotation: Negative.    Diagnostic Results:  All imaging was independently reviewed by me.      MRI lumbar spine, dated 7/22/2024:  1. L1-2 moderate central stenosis.   2. L5-S1 spondylolisthesis without central or foraminal stenosis.     MRI cervical spine, dated 8/31/2023:  1. Loss of cervical lordosis.   2. C5-6 mild central disc herniation with mild bilateral neuroforaminal stenosis.   3. C6-7 mild central disc herniation.     Flex/Ex X-ray lumbar spine, dated 3/31/2025:  1. No dynamic instability.     Flex/Ex X-ray cervical spine, dated 3/31/2025:  1. No dynamic instability.     Scoliosis standing AP and Lateral X-ray, dated 3/31/2025:  1. No global sagittal or coronal plane imbalance.     ASSESSMENT/PLAN:     Traci Freire has lumbar radicular pain that starts in her back but radiates down her legs past her knee in unclear dermatomal distribution on both legs, back pain and neck pain.  MRI CL and XR CL and scoli were reviewed today.  EMG was reviewed from 2024.  She does not have a tinel at the fibular head.    The patient understands and agrees with the plan of care. All questions were answered.     1. Obtain updated EMG   2. Begin physical therapy   3. Discuss neuropathic medication with her PCP   4. Discuss cortisol level testing with PCP due to soft tissue pad at cervical thoracic junction.    I, Dr. Pawel Reddy personally performed the services described in this documentation. All  medical record entries made by the scribe, Joanie Collins, were at my direction and in my presence. I have reviewed the chart and agree that the record reflects my personal performance and is accurate and complete.      Pawel Reddy M.D.  Department of Neurosurgery  [facility]            [1]   Social History  Tobacco Use    Smoking status: Former     Current packs/day: 0.00     Types: Cigarettes     Quit date: 3/26/2004     Years since quittin.0    Smokeless tobacco: Never    Tobacco comments:     quit - smoked for 10 years- 1 ppd   Substance Use Topics    Alcohol use: No    Drug use: No

## 2025-03-31 NOTE — PROGRESS NOTES
"  [facility]- PHYSICAL THERAPY EVALUATION - [facility] [place]     Name: Traci Freire  Clinic Number: [id number]    Therapy Diagnosis:   Encounter Diagnosis   Name Primary?    Impaired functional mobility and activity tolerance Yes     Physician: Carrillo, Provider    Physician Orders: PT Eval and Treat   Medical Diagnosis from Referral: M54.9 (ICD-10-CM) - Back pain, unspecified back location, unspecified back pain laterality, unspecified chronicity   Evaluation Date: 3/31/2025  Authorization Period Expiration:   Plan of Care Expiration: 1 visit for [facility]  Visit # / Visits authorized:     Time In: 1:00 PM  Time Out: 2:00 PM  Total Billable Time: 60 minutes    Precautions: Standard and HTN    Pattern of pain determined: 3, 5      Subjective   Date of onset: worse over past 6 months, history of back pain for 2-3 years  History of current condition - Traci reports: low back and bilateral leg pain that has worsened over past six months ago.  She reports leg pain is more significant than back pain, describing leg pain as intense pressure that feels like her "legs are going to pop off".  She has a hard time staying in any one position for a long period of time without increase in pain.  Pt had complex revision left two stage TKA with surgeries in September and December, still going to physical therapy for her knee but that therapy can increase her back pain.       Medical History:   Past Medical History:   Diagnosis Date    Anxiety     Arthritis     Cancer breast    Right- Bilateral mastectomy- May 2005- had breast reconstruction- mother  of breast cancer    HLD (hyperlipidemia)     Hypertension     diagnosed age late 40's     IBS (irritable bowel syndrome)     Lyme disease     arthritis of hands. Felt like flu- age early 30's- got it  after going to [place]    Sleep apnea     NO MACHINE       Surgical History:   Traci Freire  has a past surgical history that includes Breast surgery; Hysterectomy (); Cystoscopy " (); Abdominal surgery; Knee surgery (Left, ); Joint replacement;  section; Nissen fundoplication (); Modified radical mastectomy w/ axillary lymph node dissection (Right, 05/10/2005); Tonsillectomy; Simple mastectomy (Left, 05/10/2005); Incisional hernia repair (Right, 2008); Bladder suspension; Robot-assisted laparoscopic repair of ventral hernia (N/A, 2021); Esophagogastroduodenoscopy (N/A, 8/10/2023); Carpal tunnel release (Left, 2023); Revision of knee arthroplasty (Left, 2024); and Revision of knee arthroplasty (Left, 2024).    Medications:   Traci has a current medication list which includes the following prescription(s): acetaminophen, doxycycline, fluoxetine, hydrochlorothiazide, irbesartan, naloxone, ondansetron, oxycodone, pantoprazole, phentermine, rosuvastatin, zepbound, and zolpidem.    Allergies:   Review of patient's allergies indicates:   Allergen Reactions    Macrobid [nitrofurantoin monohyd/m-cryst]     Bactrim [sulfamethoxazole-trimethoprim] Nausea And Vomiting        Imaging, MRI studies:   MRI lumbar spine, dated 2024:  1. L1-2 moderate central stenosis.   2. L5-S1 spondylolisthesis without central or foraminal stenosis.      MRI cervical spine, dated 2023:  1. Loss of cervical lordosis.   2. C5-6 mild central disc herniation with mild bilateral neuroforaminal stenosis.   3. C6-7 mild central disc herniation.      Flex/Ex X-ray lumbar spine, dated 3/31/2025:  1. No dynamic instability.      Flex/Ex X-ray cervical spine, dated 3/31/2025:  1. No dynamic instability.      Scoliosis standing AP and Lateral X-ray, dated 3/31/2025:  1. No global sagittal or coronal plane imbalance.     Prior Therapy: yes  Prior Treatment: L1-2 medial branch block with 80% symptom relief in 2024 and two subsequent radiofrequency ablations with minimal pain relief at L1-2.   Social History:  lives with their family  Occupation: works at [employer], stocks  shelves overnight.  Has not worked since September  Leisure: nothing at this time due to knee      Prior Level of Function: Independent with ADLs with more time to complete, often used shopping cart for support prior to left knee revision  Current Level of Function: independent with basic ADLs, difficulty with household tasks due to back and leg pain, difficulty standing to cook  DME owned/used: has SW, cane.  Ambulates without AD at this time.        Pain:  Current 5/10, worst 10/10, best 5/10   Location: bilateral back  and legs   Description: Aching and Throbbing, pressure in legs  Aggravating Factors: Sitting, Standing, and Walking  Easing Factors:  changing position  Disturbed Sleep: yes        Pattern of pain questions:  1.  Where is your pain the worst? legs  2.  Is your pain constant or intermittent? constant  3.  Does bending forward make your typical pain worse? no  4.  Since the start of your back pain, has there been a change in your bowel or bladder? no  5.  What can't you do now that you use to be able to do? Vacuum, standin       Pts goals: decrease pain in legs, walk better      Red Flag Screening:   Cough  Sneeze  Strain: (--)  Bladder/ bowel: (--)  Falls: (--)  Night pain: (--)  Unexplained weight loss: (--)  General health: good    OBJECTIVE     Postural examination/scapula alignment: Rounded shoulder, Head forward, Increased kyphosis, and Decreased lordosis  Sitting: poor  Standing: poor  Correction of posture: better with lumbar roll initially, pain increased in back after a few minutes sitting with roll in place    MOVEMENT LOSS    ROM Loss   Flexion minimal loss and decreased curve reversal   Extension moderate loss   Side bending Right minimal loss   Side bending Left minimal loss   Rotation Right minimal loss   Rotation Left minimal loss     Bilateral hip range of motion limited into both internal and external rotation, hip extension to neutral.    Left knee active flexion 83 degrees  "    Lower Extremity Strength  Right LE  Left LE    Hip flexion: 4/5 Hip flexion: 4/5   Hip extension:  4-/5 Hip extension: 4-/5   Hip abduction: 4/5 Hip abduction: 4/5   Hip adduction:  4+/5 Hip adduction:  4+/5   Hip Internal rotation   4/5 Hip Internal rotation 4/5   Knee Flexion 4+/5 Knee Flexion 4-/5   Knee Extension 4+/5 Knee Extension 4-/5   Ankle dorsiflexion: 4+/5 Ankle dorsiflexion: 4+/5   Ankle plantarflexion: 4/5 Ankle plantarflexion: 4/5       GAIT:  Assistive Device used: none  Level of Assistance: independent  Patient displays the following gait deviations:  decreased step length, decreased base of support, decreased weight shift, antalgic gait, and decreased left heel strike .     Special Tests:   Test Name  Test Result   Prone Instability Test NT   SI Joint Provocation Test (--)   Straight Leg Raise (--)   Neural Tension Test (--)   Crossed Straight Leg Raise (--)   Walking on toes Able   Walking on heels  Able    Additional Tests        NEUROLOGICAL SCREENING     Sensory deficit: intact to light touch    Reflexes:    Left Right   Patella Tendon 1+ 1+   Achilles Tendon 2+ 2+   Babinski  NT NT   Clonus (--) (--)     REPEATED TEST MOVEMENTS:  Repeated Flexion in Standing no effect   Repeated Extension in Standing worse   Repeated Flexion in lying no effect   Repeated Extension in lying  worse       STATIC TESTS   Sitting slouched  no effect   Sitting erect no effect   Standing slouched no effect   Standing erect  no effect   Lying prone in extension  worse   Long sitting   worse         Treatment   Treatment Time In: 1345  Treatment Time Out: 1400  Total Treatment time separate from Evaluation: 15 minutes      Traci received therapeutic exercises to develop/improve posture, lumbarl ROM, strength and muscular endurance for 15 minutes including the following exercises:   LTR x 10  Piriformis stretch 20" x 3  Hooklying hip ER stretch 20" x 2  hams    Written Home Exercises Provided: yes.  Exercises were " reviewed and Traci was able to demonstrate them prior to the end of the session.  Traci demonstrated good  understanding of the education provided.     See EMR under Patient Instructions for exercises provided 3/31/2025.      Education provided:   - Patient received education regarding proper posture and body mechanics.  Patient was given [facility] handout which discusses  back education, care specifically for posture seated, standing, lifting correctly, components of exercise, tips for back pain management, positioning and  importance of sleep.   Information on lumbar rolls provided.  - Cortney roll tried, and recommended       Assessment   Traci is a 64 y.o. female referred to [facility] with a medical diagnosis of M54.9 (ICD-10-CM) - Back pain, unspecified back location, unspecified back pain laterality, unspecified chronicity . Pt presents with chronic low bank and bilateral lower extremity pain, lower extremity pain more dominant than back pain, decreased trunk ROM, decreased left knee flexion, decreased bilateral hip range of motion, poor posture, decreased trunk strength, decreased lower extremity strength, gait and balance impairments.  Pt's leg pain did not change with repetitive movements, did not change with neural testing during evaluation.  She is currently attending physical therapy after lcomplex revision left two stage TKA.  but would benefit from continued physical therapy intervention focused on back and radicular pain.       Pain Pattern: 3,5       Pt prognosis is Fair.     Patient was seen in therapy as part of [facility]  Back Excellence Program.    Patient was given back care educational information verbally and in writing.    The patient was given a home exercise program to continue with independently and was able to perform exercises in the clinic by the end of the treatment session today.      Plan of care discussed with patient: Yes  Pt's spiritual, cultural and educational needs  considered and patient is agreeable to the plan of care and goals as stated below:     Anticipated Barriers for therapy: decreased left knee flexion, chronicity of impairments    PT Evaluation Completed? Yes    Medical necessity is demonstrated by the following problem list.    Pt presents with the following impairments:     History  Co-morbidities and personal factors that may impact the plan of care Co-morbidities:   anxiety, high BMI, history of cancer, HTN, and previous knee surgury    Personal Factors:   no deficits     high   Examination  Body Structures and Functions, activity limitations and participation restrictions that may impact the plan of care Body Regions:   back  lower extremities  trunk    Body Systems:    ROM  strength  balance  gait  transfers    Participation Restrictions:   Patient seen 1 visit as part of [facility] program    Activity limitations:   Learning and applying knowledge  no deficits    General Tasks and Commands  no deficits    Communication  no deficits    Mobility  lifting and carrying objects  walking  driving (bike, car, motorcycle)    Self care  no deficits    Domestic Life  shopping  cooking  doing house work (cleaning house, washing dishes, laundry)  assisting others    Interactions/Relationships  no deficits    Life Areas  no deficits    Community and Social Life  community life  recreation and leisure         moderate   Clinical Presentation evolving clinical presentation with changing clinical characteristics moderate   Decision Making/ Complexity Score: moderate       GOALS: Pt is in agreement with the following goals.    Short term goals:    Provide educational information to patient regarding back care education for posture, lifting, sleeping, activities of daily living  Provide a home exercise program that the patient is able to perform independently to continue to work on functional goals  Provide a walking program for continued health and wellness    Plan   Outpatient  "physical therapy 2x week for 1-2 weeks if patient is available to participate to include the following:   - Patient education  - Therapeutic exercise  - Manual therapy  - Therapeutic activity       Therapist: Noemi Greenfield, PT  4/1/2025    "I certify the need for these services furnished under this plan of treatment and while under my care."    ____________________________________  Physician/Referring Practitioner    _______________  Date of Signature          "

## 2025-04-01 ENCOUNTER — TELEPHONE (OUTPATIENT)
Dept: ORTHOPEDICS | Facility: CLINIC | Age: 65
End: 2025-04-01
Payer: COMMERCIAL

## 2025-04-01 PROBLEM — M53.86 DECREASED RANGE OF MOTION OF LUMBAR SPINE: Status: ACTIVE | Noted: 2025-04-01

## 2025-04-01 NOTE — TELEPHONE ENCOUNTER
Spoke with pt and advised that Dr. Reddy has ordered an EMG for her and I tried to schedule in [place], but they had no providers there doing the emg. I advised that I spoke with [facility], and someone from that location is going to be giving her a call to get it scheduled. I asked that she call me back once she has a date, so I can set up either virtual or in office follow up with Dr. Reddy to go over results.

## 2025-04-02 ENCOUNTER — TELEPHONE (OUTPATIENT)
Dept: ORTHOPEDICS | Facility: CLINIC | Age: 65
End: 2025-04-02
Payer: COMMERCIAL

## 2025-04-02 ENCOUNTER — CLINICAL SUPPORT (OUTPATIENT)
Dept: REHABILITATION | Facility: HOSPITAL | Age: 65
End: 2025-04-02
Payer: COMMERCIAL

## 2025-04-02 ENCOUNTER — TELEPHONE (OUTPATIENT)
Dept: FAMILY MEDICINE | Facility: CLINIC | Age: 65
End: 2025-04-02
Payer: COMMERCIAL

## 2025-04-02 DIAGNOSIS — M25.662 DECREASED RANGE OF MOTION (ROM) OF LEFT KNEE: Primary | ICD-10-CM

## 2025-04-02 DIAGNOSIS — M62.81 WEAKNESS OF LEFT QUADRICEPS MUSCLE: ICD-10-CM

## 2025-04-02 PROCEDURE — 97112 NEUROMUSCULAR REEDUCATION: CPT | Mod: PN

## 2025-04-02 PROCEDURE — 97530 THERAPEUTIC ACTIVITIES: CPT | Mod: PN

## 2025-04-02 PROCEDURE — 97140 MANUAL THERAPY 1/> REGIONS: CPT | Mod: PN

## 2025-04-02 NOTE — TELEPHONE ENCOUNTER
New plan pended.     Also sent Aarti a picture of how the new order looks.   I can not order individual plan anymore

## 2025-04-02 NOTE — PROGRESS NOTES
[facility]   Physical Therapy Treatment Note      Name: Traci Freire  Clinic Number: [id number]    Therapy Diagnosis:   Encounter Diagnoses   Name Primary?    Decreased range of motion (ROM) of left knee Yes    Weakness of left quadriceps muscle        Physician: Chris Estevez MD    Visit Date: 4/2/2025    Physician Orders: PT Eval and Treat   Medical Diagnosis from Referral: Z96.652 (ICD-10-CM) - S/P revision of total knee, left   Evaluation Date: 1/16/2025  Authorization Period Expiration: 12/31/2025  Plan of Care Expiration: 02/27/2025 extended to 04/10/2025  Progress Note Due: 02/15/2025  Date of Surgery: 12/05/2024  Visit # / Visits authorized: 18/ 20  FOTO: 1/ 3     Precautions: Standard      Time In: 100  Time Out: 153  Total Billable Time: 53 minutes  SPTA assisted with treatment under supervision of DPT    PTA Visit #: 0/5     Subjective     Patient reports: She did her Physical Therapy visit and spine MD visit the other day and it went well. She would like to do therapy here.     She was compliant with home exercise program.  Response to previous treatment: as noted  Functional change: as noted     Pain: 3/10  Location: left knee      Objective      Objective Measures updated at progress report unless specified.     3/26/2025  Left knee passive range of motion: 97    Treatment     Traci received the treatments listed below:      therapeutic exercises to develop strength, endurance, ROM, and flexibility for 00 minutes including:    manual therapy techniques: Joint mobilizations were applied to the: Left knee for 12 minutes, including:    Patellar mobs in all directions   Tibial AP grade III-IV   Seated distraction to improve knee flexion     neuromuscular re-education activities to improve: Balance, Coordination, Kinesthetic, and Sense for 12 minutes. The following activities were included:    LAQ 5# 4 x 8 repetitions   Straight leg raise, 2 x 10 1# B    therapeutic  activities to improve functional performance for 29  minutes, including:    Knee flexion stretch using white rope, 3 x 45 seconds,   Sit to stands from lowest Hi-lo table, 3 x 10   Standing knee stretch kneeling on 8 in step, 5 x 15 seconds   Double leg shuttle 50#, 4 x 8, emphasizing knee flexion stretch  Single leg shuttle 25#, 4 x 8, emphasizing knee flexion stretch  Final HEP education      Patient Education and Home Exercises       Education provided:   - Home Exercise Program     Written Home Exercises Provided: Yes. Exercises were reviewed and Traci was able to demonstrate them prior to the end of the session.  Traci demonstrated good  understanding of the education provided. See Electronic Medical Record under Patient Instructions for exercises provided during therapy sessions    Assessment     Traci continues to do well at this time, in regards to her knee. Treatment has remained consistent in developing her range of motion and strength. She continues to respond well to all LLLD holds and has demonstrated consistent knee flexion. She is agreeable to D/C for her knee at this time and treatment will look to conclude next visit.     Traci Is progressing well towards her goals.   Patient prognosis is Good.     Patient will continue to benefit from skilled outpatient physical therapy to address the deficits listed in the problem list box on initial evaluation, provide pt/family education and to maximize pt's level of independence in the home and community environment.     Patient's spiritual, cultural and educational needs considered and pt agreeable to plan of care and goals.     Anticipated barriers to physical therapy: none noted    Goals:     Short Term Goals: 3 weeks. Pt agrees with goals set.  Pt will demonstrate independence and compliance with initial HEP to improve independence and symptom management.   Pt will report Left knee pain </= 0/10 with sitting for 60 minutes to demonstrate improved condition  and ability to complete her ADLs and leisurely task.    Pt will demonstrate quadriceps and motor control by completing 30 straight leg raises with no knee lag.   Pt will improve Left knee flexion ROM to 80 degrees to improve tolerance for ambulation and transfers.       Long Term Goals: 6 weeks. Pt agrees with goals set.   Pt will demonstrate independence and compliance with final HEP to continue managing symptoms and developing mobility, motor control and strength.    Pt will improve FOTO score to </= 39% limited to demonstrate improved functional mobility.    Pt will report knee pain </= 0/10  with ambulation, transfers and squatting to demonstrate improved condition and ability to complete her ADLs, self care and work related task.    Pt will complete 13 sit to stands in the 30 second sit to stand test to demonstrate adequate knee range of motion, endurance and strength.  Pt will improve Left knee flexion ROM >/= 90 degrees of flexion to improve her tolerance to sitting and transfers.     Pt goal: Not walk like a penguin.      Plan     Reasons for Recertification of Therapy:   Traci has limited knee flexion which is altering her ability to ambulate, transfer and complete her leisurely task.     Updated Certification Period: 4/2/2025 to 04/10/2025  Recommended Treatment Plan: 1-2 times per week for 4 weeks: Electrical Stimulation NMES, Gait Training, Manual Therapy, Moist Heat/ Ice, Neuromuscular Re-ed, Patient Education, Self Care, Therapeutic Activities, and Therapeutic Exercise  Other Recommendations: Develop knee range of motion, especially in flexion, to improve ability to ambulate and complete ADLs/self care.     Mike Fajardo, PT, DPT  4/2/2025

## 2025-04-02 NOTE — TELEPHONE ENCOUNTER
----- Message from Tricia sent at 4/2/2025 11:50 AM CDT -----  Good Morning, Just wanted to follow up on this request. Thanks, Tricia Lenz RN  ----- Message -----  From: Tricia Lenz  Sent: 3/24/2025  12:41 PM CDT  To: Juliet Deleon    Good afternoon, Working on patient's current iron therapy plan. The current therapy plan has multiple iron medications within the referral. Can this please update referral to the iron infusion the patient is needing. Thanks, Tricia Lenz, RN

## 2025-04-02 NOTE — TELEPHONE ENCOUNTER
Clinicals sent to [place] only as no pcp involved, including virtual PT form- all appts linked, reminders placed, bundle resolved and closed.

## 2025-04-02 NOTE — PATIENT INSTRUCTIONS
WALKING PROGRAM      An apple a day keeps the Doctor away, could be replaced with, A walk a day keeps the Doctor away!  What is not to love about one of the simplest things you can do for your health?   Walking, as a form of exercise, has been shown to have numerous positive benefits.   Walking is also free, can be done anytime and anywhere, needs no special skill or equipment, and can be done at whatever level of fitness you are currently at.      Research has shown that the benefits of walking for at least 30 minutes a day may be effective to:  Improve blood pressure and blood sugar levels  Improve blood lipid profile  Improve memory and concentration  Improve sleep habits  Enhance mental wellbeing, improving mood and reducing depression  Reduce the risk of coronary heart disease ( the number 1 killer in the [place])  Reduce the risk of osteoporosis  Reduce the risk of breast and colon cancer  Reduce the risk of non-insulin dependent (type 2) diabetes  Reduce body weight and lower the risk of obesity      You need a few essentials before you hit the road.    Walking shoes.   Ensure you have lightweight, breathable, and supportive footwear.      Clothing.  Dress for comfort and the weather.  Wear layers when it is colder.  Remember sunscreen.  Water.  Take frequent sips of water when while you walk.  Ensure you drink before and after your walk.            GETTING STARTED:   Just start walking for 10 minutes, 4 times a week.  If this is very easy for you, start walking 20 minutes, 4 times a week.  Thats it!?  Yes, thats it!  Just walk out the door.  Watch your posture.  Walk tall.   Pretend you are being pulled up by a rope attached to the crown of your head.  Your shoulders should be down, back and relaxed.  Tighten your abdominal muscles and buttock, and fall into a natural stride.  Feel the natural swing of your arms in opposition to your leg swing.  Let the heel of each foot  gently touch the ground and feel the toes of each foot leave the ground last.  Be sure to drink plenty of water before, during, and after walking.   Incorporate a warm up and cool down into your routine.  Start your walk at a slow warm up pace, then walk desired length of time.  End your walk with slower cool down.  Any stretches that your therapist has recommended for you may be done before and after your walk to prevent injury.  The hardest part of starting a fitness program can be developing the habit.  Walking regularly will help (a minimum of 3 days a week is a good goal).  Developing a routine, walking partners, and keeping a journal are ways to help keep your motivation up.  Wearing a pedometer or using an carolyn that records your steps, are motivational as well, and shown by research to increase your activity level.    PROGRESSING:  Once you are walking 4 times a week on a regular basis, you can progress your program by adding 5 minutes to each of your walks that week.   Set a time goal to achieve.   Every week add 5 minutes to your walk.   If your goal is 40 minutes, add 5 minutes weekly until you are comfortably walking 40 minutes 4 times a week.  Once you have achieved your time goal, you can further progress your program by increasing the speed at which you walk.  Dont forget to warm up and cool down as you start walking at a brisker pace.

## 2025-04-04 ENCOUNTER — PATIENT MESSAGE (OUTPATIENT)
Dept: ORTHOPEDICS | Facility: CLINIC | Age: 65
End: 2025-04-04
Payer: COMMERCIAL

## 2025-04-07 ENCOUNTER — TELEPHONE (OUTPATIENT)
Dept: ORTHOPEDICS | Facility: CLINIC | Age: 65
End: 2025-04-07
Payer: COMMERCIAL

## 2025-04-07 ENCOUNTER — TELEPHONE (OUTPATIENT)
Dept: NEUROLOGY | Facility: CLINIC | Age: 65
End: 2025-04-07
Payer: COMMERCIAL

## 2025-04-07 NOTE — TELEPHONE ENCOUNTER
Spoke with scheduling and they are going to put a message through to [facility] neurology to advise that no one has reached out to pt to get her emg schjeduled.

## 2025-04-07 NOTE — TELEPHONE ENCOUNTER
Spoke to the pt, appt scheduled on 5/23/25 at 0830 for an EMG.  Informed no p[powders or lotions for the procedure.   Date, time and location discussed.

## 2025-04-07 NOTE — TELEPHONE ENCOUNTER
----- Message from Figueroa sent at 4/7/2025  9:01 AM CDT -----  Type:  Patient Returning CallWho Called:ptWho Left Message for Patient:Does the patient know what this is regarding?:EMG SCHEDULINGWould the patient rather a call back or a response via [facility]? callBest Call Back Number: [phone] Information: pt states she still has not received a call to schedule her EMG and would like a call back asap. Thank you

## 2025-04-08 RX ORDER — HEPARIN 100 UNIT/ML
500 SYRINGE INTRAVENOUS
Status: CANCELLED | OUTPATIENT
Start: 2025-04-08

## 2025-04-08 RX ORDER — SODIUM CHLORIDE 0.9 % (FLUSH) 0.9 %
10 SYRINGE (ML) INJECTION
Status: CANCELLED | OUTPATIENT
Start: 2025-04-08

## 2025-04-08 RX ORDER — EPINEPHRINE 0.3 MG/.3ML
0.3 INJECTION SUBCUTANEOUS ONCE AS NEEDED
Status: CANCELLED | OUTPATIENT
Start: 2025-04-08

## 2025-04-09 ENCOUNTER — OFFICE VISIT (OUTPATIENT)
Dept: ORTHOPEDICS | Facility: CLINIC | Age: 65
End: 2025-04-09
Payer: COMMERCIAL

## 2025-04-09 VITALS — BODY MASS INDEX: 35.26 KG/M2 | HEIGHT: 61 IN | WEIGHT: 186.75 LBS

## 2025-04-09 DIAGNOSIS — Z96.652 S/P REVISION OF TOTAL KNEE, LEFT: Primary | ICD-10-CM

## 2025-04-09 DIAGNOSIS — T84.50XD INFECTION OF PROSTHETIC JOINT, SUBSEQUENT ENCOUNTER: ICD-10-CM

## 2025-04-09 PROCEDURE — 3066F NEPHROPATHY DOC TX: CPT | Mod: CPTII,S$GLB,COE, | Performed by: ORTHOPAEDIC SURGERY

## 2025-04-09 PROCEDURE — 3008F BODY MASS INDEX DOCD: CPT | Mod: CPTII,S$GLB,COE, | Performed by: ORTHOPAEDIC SURGERY

## 2025-04-09 PROCEDURE — 3061F NEG MICROALBUMINURIA REV: CPT | Mod: CPTII,S$GLB,COE, | Performed by: ORTHOPAEDIC SURGERY

## 2025-04-09 PROCEDURE — 1159F MED LIST DOCD IN RCRD: CPT | Mod: CPTII,S$GLB,COE, | Performed by: ORTHOPAEDIC SURGERY

## 2025-04-09 PROCEDURE — 99999 PR PBB SHADOW E&M-EST. PATIENT-LVL III: CPT | Mod: PBBFAC,COE,, | Performed by: ORTHOPAEDIC SURGERY

## 2025-04-09 PROCEDURE — 99213 OFFICE O/P EST LOW 20 MIN: CPT | Mod: S$GLB,COE,, | Performed by: ORTHOPAEDIC SURGERY

## 2025-04-09 PROCEDURE — 4010F ACE/ARB THERAPY RXD/TAKEN: CPT | Mod: CPTII,S$GLB,COE, | Performed by: ORTHOPAEDIC SURGERY

## 2025-04-09 RX ORDER — AMOXICILLIN 500 MG/1
TABLET, FILM COATED ORAL
Qty: 4 TABLET | Refills: 3 | Status: SHIPPED | OUTPATIENT
Start: 2025-04-09

## 2025-04-09 NOTE — PROGRESS NOTES
Traci Freire is in for 4 month follow up for a  left 2 stage revision TKA.  She is doing fairly well.  Mild pain in the knee.  She has been in PT  Exam demonstrates  A well developed female in no distress.  Alert and oriented.  Mood and affect are appropriate.    Knee incision is well healed.  ROM is 0-90.  The patella tracks well and there is no instability. The extremity is neurovascularly intact.    Xrays demonstrate a well fixed and positioned prosthesis.         No data to display                     No data to display                     No data to display                    8/26/2024     7:40 AM   Knee Society and Function Score   FINDINGS - KNEE SOCIETY SCORE 28   FINDINGS - KNEE SOCIETY FUNCTION SCORE 50            No data to display                Imp:Doing well    Continue doxy.  Amoxil for dentist appt.  F/u in 6 weeks with xrays

## 2025-04-10 ENCOUNTER — CLINICAL SUPPORT (OUTPATIENT)
Dept: REHABILITATION | Facility: HOSPITAL | Age: 65
End: 2025-04-10
Payer: COMMERCIAL

## 2025-04-10 ENCOUNTER — INFUSION (OUTPATIENT)
Dept: INFUSION THERAPY | Facility: HOSPITAL | Age: 65
End: 2025-04-10
Attending: NURSE PRACTITIONER
Payer: COMMERCIAL

## 2025-04-10 VITALS
TEMPERATURE: 98 F | HEART RATE: 80 BPM | DIASTOLIC BLOOD PRESSURE: 76 MMHG | SYSTOLIC BLOOD PRESSURE: 122 MMHG | OXYGEN SATURATION: 97 %

## 2025-04-10 DIAGNOSIS — M62.81 WEAKNESS OF LEFT QUADRICEPS MUSCLE: ICD-10-CM

## 2025-04-10 DIAGNOSIS — M25.662 DECREASED RANGE OF MOTION (ROM) OF LEFT KNEE: Primary | ICD-10-CM

## 2025-04-10 DIAGNOSIS — D50.8 OTHER IRON DEFICIENCY ANEMIA: Primary | ICD-10-CM

## 2025-04-10 PROCEDURE — 96374 THER/PROPH/DIAG INJ IV PUSH: CPT

## 2025-04-10 PROCEDURE — 63600175 PHARM REV CODE 636 W HCPCS: Mod: JZ,TB | Performed by: NURSE PRACTITIONER

## 2025-04-10 PROCEDURE — 97530 THERAPEUTIC ACTIVITIES: CPT | Mod: PN

## 2025-04-10 PROCEDURE — 97140 MANUAL THERAPY 1/> REGIONS: CPT | Mod: PN

## 2025-04-10 PROCEDURE — 97112 NEUROMUSCULAR REEDUCATION: CPT | Mod: PN

## 2025-04-10 RX ORDER — SODIUM CHLORIDE 0.9 % (FLUSH) 0.9 %
10 SYRINGE (ML) INJECTION
Status: DISCONTINUED | OUTPATIENT
Start: 2025-04-10 | End: 2025-04-10 | Stop reason: HOSPADM

## 2025-04-10 RX ORDER — SODIUM CHLORIDE 0.9 % (FLUSH) 0.9 %
10 SYRINGE (ML) INJECTION
OUTPATIENT
Start: 2025-04-17

## 2025-04-10 RX ORDER — EPINEPHRINE 0.3 MG/.3ML
0.3 INJECTION SUBCUTANEOUS ONCE AS NEEDED
OUTPATIENT
Start: 2025-04-17

## 2025-04-10 RX ORDER — HEPARIN 100 UNIT/ML
500 SYRINGE INTRAVENOUS
OUTPATIENT
Start: 2025-04-17

## 2025-04-10 RX ORDER — HEPARIN 100 UNIT/ML
500 SYRINGE INTRAVENOUS
Status: DISCONTINUED | OUTPATIENT
Start: 2025-04-10 | End: 2025-04-10 | Stop reason: HOSPADM

## 2025-04-10 RX ADMIN — FERRIC CARBOXYMALTOSE INJECTION 750 MG: 50 INJECTION, SOLUTION INTRAVENOUS at 11:04

## 2025-04-10 NOTE — PROGRESS NOTES
[facility]   Physical Therapy Discharge Note      Name: Traci Freire  [facility] Number: [id number]    Therapy Diagnosis:   Encounter Diagnoses   Name Primary?    Decreased range of motion (ROM) of left knee Yes    Weakness of left quadriceps muscle          Physician: Chris Estevez MD    Visit Date: 4/10/2025    Physician Orders: PT Eval and Treat   Medical Diagnosis from Referral: Z96.652 (ICD-10-CM) - S/P revision of total knee, left   Evaluation Date: 1/16/2025  Authorization Period Expiration: 12/31/2025  Plan of Care Expiration: 02/27/2025 extended to 04/10/2025  Progress Note Due: 02/15/2025  Date of Surgery: 12/05/2024  Visit # / Visits authorized: 19/ 20  FOTO: 3/ 3     Precautions: Standard      Time In: 300  Time Out: 356  Total Billable Time: 56 minutes    PTA Visit #: 0/5     Subjective     Patient reports: She is okay with being D/C at this time.     She was compliant with home exercise program.  Response to previous treatment: as noted  Functional change: as noted     Pain: 3/10  Location: left knee      Objective      Objective Measures updated at progress report unless specified.     3/26/2025  Left knee passive range of motion: 97    Treatment     Traci received the treatments listed below:      therapeutic exercises to develop strength, endurance, ROM, and flexibility for 00 minutes including:    manual therapy techniques: Joint mobilizations were applied to the: Left knee for 13 minutes, including:    Patellar mobs in all directions   Tibial AP grade III-IV   Seated knee distraction to improve knee flexion     neuromuscular re-education activities to improve: Balance, Coordination, Kinesthetic, and Sense for 12 minutes. The following activities were included:    LAQ 5# 4 x 8 repetitions   Straight leg raise, 2 x 10 1# B  Elevated heel slides with 3# ankle, with strap overpressure, x3 minutes     therapeutic activities to improve functional performance for 31  minutes,  including:    Nustep, 10 minutes   Knee extension machine, 3 x8, 20#   Knee flexion stretch using white rope, x3  minutes    Sit to stands from lowest Hi-lo table, 3 x 10   Double leg shuttle 50#, 4 x 8, emphasizing knee flexion stretch  Single leg shuttle 25#, 4 x 8, emphasizing knee flexion stretch  Final HEP education    Patient Education and Home Exercises       Education provided:   - Home Exercise Program     Written Home Exercises Provided: Yes. Exercises were reviewed and Traci was able to demonstrate them prior to the end of the session.  Traci demonstrated good  understanding of the education provided. See Electronic Medical Record under Patient Instructions for exercises provided during therapy sessions    Discharge Summary     Date of Last visit: 04/10/2025  Total Visits Received: 19        Assessment      At this time Traci has improved knee range of motion and improved ability to complete her ADLs, self care and leisurely task. She is approaching a plateau with her rehab as he knee range of motion has been consistently around 94-97 degrees. She can continue to get stronger and her range of motion can continue to improve, but she will need to adhere to her HEP. At this time she is D/C from outpatient Physical Therapy with a final HEP and education to follow up with her MD as needed.     Goals:   Short Term Goals: 3 weeks. Pt agrees with goals set.  Pt will demonstrate independence and compliance with initial HEP to improve independence and symptom management. MET  Pt will report Left knee pain </= 0/10 with sitting for 60 minutes to demonstrate improved condition and ability to complete her ADLs and leisurely task.  MET  Pt will demonstrate quadriceps and motor control by completing 30 straight leg raises with no knee lag. MET  Pt will improve Left knee flexion ROM to 80 degrees to improve tolerance for ambulation and transfers.  MET     Long Term Goals: 6 weeks. Pt agrees with goals set.   Pt will  demonstrate independence and compliance with final HEP to continue managing symptoms and developing mobility, motor control and strength.  MET  Pt will improve FOTO score to </= 39% limited to demonstrate improved functional mobility.  MET  Pt will report knee pain </= 0/10  with ambulation, transfers and squatting to demonstrate improved condition and ability to complete her ADLs, self care and work related task.  MET  Pt will complete 13 sit to stands in the 30 second sit to stand test to demonstrate adequate knee range of motion, endurance and strength. PROGRESSING  Pt will improve Left knee flexion ROM >/= 90 degrees of flexion to improve her tolerance to sitting and transfers.   MET  Pt goal: Not walk like a penguin.  PROGRESSING    Discharge reason: Patient has reached the maximum rehab potential for the present time    Plan   This patient is discharged from Physical Therapy and is to cont with their HEP and MD follow up PRN.    Mike Fajardo, PT, DPT

## 2025-04-10 NOTE — PLAN OF CARE
Problem: Adult Inpatient Plan of Care  Goal: Plan of Care Review  4/10/2025 1120 by Amanda Ricardo RN  Outcome: Met  4/10/2025 1120 by Amanda Ricardo RN  Outcome: Progressing  Goal: Patient-Specific Goal (Individualized)  4/10/2025 1120 by Amanda Ricardo RN  Outcome: Met  4/10/2025 1120 by Amanda Ricardo RN  Outcome: Progressing  Goal: Absence of Hospital-Acquired Illness or Injury  4/10/2025 1120 by Amanda Ricardo RN  Outcome: Met  4/10/2025 1120 by Amanda Ricardo RN  Outcome: Progressing  Goal: Optimal Comfort and Wellbeing  4/10/2025 1120 by Amanda Ricardo RN  Outcome: Met  4/10/2025 1120 by Amanda Ricardo RN  Outcome: Progressing  Goal: Readiness for Transition of Care  4/10/2025 1120 by Amanda Ricardo RN  Outcome: Met  4/10/2025 1120 by Amanda Ricardo RN  Outcome: Progressing

## 2025-04-11 NOTE — PROGRESS NOTES
4/11 Reports that everything went well infusion. No complaints. Everyone was professional and caring.

## 2025-04-16 ENCOUNTER — TELEPHONE (OUTPATIENT)
Dept: ORTHOPEDICS | Facility: CLINIC | Age: 65
End: 2025-04-16
Payer: COMMERCIAL

## 2025-04-16 ENCOUNTER — INFUSION (OUTPATIENT)
Dept: INFUSION THERAPY | Facility: HOSPITAL | Age: 65
End: 2025-04-16
Attending: NURSE PRACTITIONER
Payer: COMMERCIAL

## 2025-04-16 VITALS
BODY MASS INDEX: 35.37 KG/M2 | DIASTOLIC BLOOD PRESSURE: 93 MMHG | SYSTOLIC BLOOD PRESSURE: 106 MMHG | WEIGHT: 187.19 LBS | TEMPERATURE: 98 F | RESPIRATION RATE: 18 BRPM | OXYGEN SATURATION: 97 % | HEART RATE: 78 BPM

## 2025-04-16 DIAGNOSIS — D50.8 OTHER IRON DEFICIENCY ANEMIA: Primary | ICD-10-CM

## 2025-04-16 PROCEDURE — 96374 THER/PROPH/DIAG INJ IV PUSH: CPT

## 2025-04-16 PROCEDURE — 63600175 PHARM REV CODE 636 W HCPCS: Mod: JZ,TB | Performed by: NURSE PRACTITIONER

## 2025-04-16 RX ORDER — SODIUM CHLORIDE 0.9 % (FLUSH) 0.9 %
10 SYRINGE (ML) INJECTION
Status: DISCONTINUED | OUTPATIENT
Start: 2025-04-16 | End: 2025-04-16 | Stop reason: HOSPADM

## 2025-04-16 RX ORDER — EPINEPHRINE 0.3 MG/.3ML
0.3 INJECTION SUBCUTANEOUS ONCE AS NEEDED
OUTPATIENT
Start: 2025-04-16

## 2025-04-16 RX ORDER — EPINEPHRINE 0.3 MG/.3ML
0.3 INJECTION SUBCUTANEOUS ONCE AS NEEDED
Status: DISCONTINUED | OUTPATIENT
Start: 2025-04-16 | End: 2025-04-16 | Stop reason: HOSPADM

## 2025-04-16 RX ORDER — SODIUM CHLORIDE 0.9 % (FLUSH) 0.9 %
10 SYRINGE (ML) INJECTION
OUTPATIENT
Start: 2025-04-16

## 2025-04-16 RX ORDER — HEPARIN 100 UNIT/ML
500 SYRINGE INTRAVENOUS
Status: DISCONTINUED | OUTPATIENT
Start: 2025-04-16 | End: 2025-04-16 | Stop reason: HOSPADM

## 2025-04-16 RX ORDER — HEPARIN 100 UNIT/ML
500 SYRINGE INTRAVENOUS
OUTPATIENT
Start: 2025-04-16

## 2025-04-16 RX ADMIN — FERRIC CARBOXYMALTOSE INJECTION 750 MG: 50 INJECTION, SOLUTION INTRAVENOUS at 11:04

## 2025-04-16 NOTE — TELEPHONE ENCOUNTER
Called pt and discussed how  would like th paperwork filled out from [facility]. Patient stated the following requests:   She would like the following parts filled out:     Check the box for Sedentary   Add the specific restriction that she 'cannot lift and walk/carry an item (over 10lbs) at the same time'   She would like the estimated RTW date to be 5/20/25 (1 day after she has her f/u with Dr. Estevez - if she is not cleared then we will update her forms at this time) ]  Told patient that I would update the provider and the disability desk with those requests and when the paperwork is signed I would call her and let her know. Pt verbalized understanding and has no further questions.

## 2025-04-25 ENCOUNTER — TELEPHONE (OUTPATIENT)
Dept: SURGERY | Facility: CLINIC | Age: 65
End: 2025-04-25
Payer: COMMERCIAL

## 2025-04-25 ENCOUNTER — PATIENT MESSAGE (OUTPATIENT)
Dept: SURGERY | Facility: CLINIC | Age: 65
End: 2025-04-25
Payer: COMMERCIAL

## 2025-04-25 NOTE — TELEPHONE ENCOUNTER
----- Message from Trisha sent at 4/25/2025 11:49 AM CDT -----  Name of Who is Calling: SERINA FELIX [[id number]]What is the request in detail: pt is requesting if Dr Johnson specializes in the removal of Lymphoma (located  in the back and neck region) please advise Can the clinic reply by [facility]: NoWhat Number to Call Back [facility] not [facility]: Telephone Information:Mobile          [phone]

## 2025-04-28 DIAGNOSIS — E78.5 HYPERLIPIDEMIA, UNSPECIFIED HYPERLIPIDEMIA TYPE: ICD-10-CM

## 2025-04-28 RX ORDER — ROSUVASTATIN CALCIUM 20 MG/1
20 TABLET, COATED ORAL NIGHTLY
Qty: 90 TABLET | Refills: 1 | Status: SHIPPED | OUTPATIENT
Start: 2025-04-28

## 2025-05-13 ENCOUNTER — OFFICE VISIT (OUTPATIENT)
Dept: SURGERY | Facility: CLINIC | Age: 65
End: 2025-05-13
Payer: COMMERCIAL

## 2025-05-13 VITALS
BODY MASS INDEX: 35.3 KG/M2 | DIASTOLIC BLOOD PRESSURE: 62 MMHG | WEIGHT: 187 LBS | TEMPERATURE: 98 F | HEART RATE: 85 BPM | HEIGHT: 61 IN | SYSTOLIC BLOOD PRESSURE: 111 MMHG

## 2025-05-13 DIAGNOSIS — M54.2 NECK PAIN: ICD-10-CM

## 2025-05-13 DIAGNOSIS — E65 HYPERTROPHY OF FAT PAD: Primary | ICD-10-CM

## 2025-05-13 PROCEDURE — 99999 PR PBB SHADOW E&M-EST. PATIENT-LVL IV: CPT | Mod: PBBFAC,COE,, | Performed by: SURGERY

## 2025-05-13 PROCEDURE — 3008F BODY MASS INDEX DOCD: CPT | Mod: CPTII,S$GLB,COE, | Performed by: SURGERY

## 2025-05-13 PROCEDURE — 3078F DIAST BP <80 MM HG: CPT | Mod: CPTII,S$GLB,COE, | Performed by: SURGERY

## 2025-05-13 PROCEDURE — 3074F SYST BP LT 130 MM HG: CPT | Mod: CPTII,S$GLB,COE, | Performed by: SURGERY

## 2025-05-13 PROCEDURE — 3061F NEG MICROALBUMINURIA REV: CPT | Mod: CPTII,S$GLB,COE, | Performed by: SURGERY

## 2025-05-13 PROCEDURE — 4010F ACE/ARB THERAPY RXD/TAKEN: CPT | Mod: CPTII,S$GLB,COE, | Performed by: SURGERY

## 2025-05-13 PROCEDURE — 99214 OFFICE O/P EST MOD 30 MIN: CPT | Mod: S$GLB,COE,, | Performed by: SURGERY

## 2025-05-13 PROCEDURE — 3066F NEPHROPATHY DOC TX: CPT | Mod: CPTII,S$GLB,COE, | Performed by: SURGERY

## 2025-05-13 NOTE — PROGRESS NOTES
Subjective:       Patient ID: Traci Freire is a 64 y.o. female.    Chief Complaint: lipoma      HPI:  64 year old female presents to the office with a possible upper back lipoma. Noticed enlarging fatty tissue upper back. She has upper back pain with radiation to both shoulders and arms worse on the left arm. History of disc disease at all levels.      Past Medical History:   Diagnosis Date    Anxiety     Arthritis     Cancer breast    Right- Bilateral mastectomy- May 2005- had breast reconstruction- mother  of breast cancer    HLD (hyperlipidemia)     Hypertension     diagnosed age late 40's     IBS (irritable bowel syndrome)     Lyme disease     arthritis of hands. Felt like flu- age early 30's- got it  after going to [place]    Sleep apnea     NO MACHINE     Past Surgical History:   Procedure Laterality Date    ABDOMINAL SURGERY      BLADDER SUSPENSION      BREAST SURGERY      CARPAL TUNNEL RELEASE Left 2023    Procedure: Left carpal tunnel release;  Surgeon: Roberto Cheung MD;  Location: [facility];  Service: Orthopedics;  Laterality: Left;     SECTION      CYSTOSCOPY      avoids greens . ? Interstitial cystitis    ESOPHAGOGASTRODUODENOSCOPY N/A 8/10/2023    Procedure: EGD (ESOPHAGOGASTRODUODENOSCOPY);  Surgeon: Gerard Hernandez MD;  Location: [facility];  Service: Endoscopy;  Laterality: N/A;    HYSTERECTOMY      followed by removal of ovaries  from scar tissue    INCISIONAL HERNIA REPAIR Right 2008    RLQ    JOINT REPLACEMENT      Left total knee replacement-[facility] -     KNEE SURGERY Left     TKR    MODIFIED RADICAL MASTECTOMY W/ AXILLARY LYMPH NODE DISSECTION Right 05/10/2005    w/free flap    NISSEN FUNDOPLICATION      REVISION OF KNEE ARTHROPLASTY Left 2024    Procedure: REVISION, ARTHROPLASTY, KNEE: LEFT;  Surgeon: Chris Estevez MD;  Location: [address];  Service: Orthopedics;  Laterality: Left;    REVISION OF  KNEE ARTHROPLASTY Left 12/5/2024    Procedure: REVISION, ARTHROPLASTY, KNEE;  Surgeon: Chris Estevez MD;  Location: [facility] OR [address] [place] [address];  Service: Orthopedics;  Laterality: Left;    ROBOT-ASSISTED LAPAROSCOPIC REPAIR OF VENTRAL HERNIA N/A 11/5/2021    Procedure: ROBOTIC REPAIR, HERNIA, VENTRAL;  Surgeon: John Johnson III, MD;  Location: [facility] OR;  Service: General;  Laterality: N/A;    SIMPLE MASTECTOMY Left 05/10/2005    w/free flap    TONSILLECTOMY       Review of patient's allergies indicates:   Allergen Reactions    Macrobid [nitrofurantoin monohyd/m-cryst]     Bactrim [sulfamethoxazole-trimethoprim] Nausea And Vomiting     Medication List with Changes/Refills   Current Medications    ACETAMINOPHEN (TYLENOL) 650 MG TBSR    Take 1 tablet (650 mg total) by mouth every 8 (eight) hours.    AMOXICILLIN (AMOXIL) 500 MG TAB    Take 4  pills one hour prior to appt    DOXYCYCLINE (VIBRA-TABS) 100 MG TABLET    Take 1 tablet (100 mg total) by mouth every 12 (twelve) hours.    FLUOXETINE 20 MG CAPSULE    Take 1 capsule (20 mg total) by mouth once daily.    HYDROCHLOROTHIAZIDE (HYDRODIURIL) 25 MG TABLET    Take 1 tablet (25 mg total) by mouth once daily.    IRBESARTAN (AVAPRO) 300 MG TABLET    Take 1 tablet (300 mg total) by mouth every evening.    NALOXONE (NARCAN) 4 MG/ACTUATION SPRY    4mg by nasal route as needed for opioid overdose; may repeat every 2-3 minutes in alternating nostrils until medical help arrives. Call 911    ONDANSETRON (ZOFRAN) 8 MG TABLET    Take 1 tablet (8 mg total) by mouth every 6 (six) hours as needed for Nausea.    OXYCODONE (ROXICODONE) 5 MG IMMEDIATE RELEASE TABLET    Take 1-2 tabs every 4-6 hours as needed for pain    PANTOPRAZOLE (PROTONIX) 40 MG TABLET    Take 1 tablet (40 mg total) by mouth once daily.    ROSUVASTATIN (CRESTOR) 20 MG TABLET    Take 1 tablet (20 mg total) by mouth every evening.    TIRZEPATIDE, WEIGHT LOSS, (ZEPBOUND) 2.5 MG/0.5 ML PNIJ    Inject 2.5 mg into the skin  every 7 days.    ZOLPIDEM (AMBIEN) 10 MG TAB    Take 1 tablet (10 mg total) by mouth every evening.     Family History   Problem Relation Name Age of Onset    Cancer Mother Shital Arrieta          of breast cancer, also had gynecological cancer- had breast cancer that spread to the brain    Heart disease Father Apolinar Trejo         in his 60's-  in his 70's    Breast cancer Daughter      Cancer Daughter Molly Drake      Social History     Socioeconomic History    Marital status:    Tobacco Use    Smoking status: Former     Current packs/day: 0.00     Types: Cigarettes     Quit date: 3/26/2004     Years since quittin.1    Smokeless tobacco: Never    Tobacco comments:     quit - smoked for 10 years- 1 ppd   Substance and Sexual Activity    Alcohol use: No    Drug use: No    Sexual activity: Never     Social Drivers of Health     Financial Resource Strain: Low Risk  (2024)    Overall Financial Resource Strain (CARDIA)     Difficulty of Paying Living Expenses: Not hard at all   Food Insecurity: No Food Insecurity (2024)    Hunger Vital Sign     Worried About Running Out of Food in the Last Year: Never true     Ran Out of Food in the Last Year: Never true   Transportation Needs: No Transportation Needs (2024)    TRANSPORTATION NEEDS     Transportation : No   Physical Activity: Inactive (2024)    Exercise Vital Sign     Days of Exercise per Week: 0 days     Minutes of Exercise per Session: 0 min   Stress: No Stress Concern Present (2024)    [employer] [place] [employer] - Occupational Stress Questionnaire     Feeling of Stress : Not at all   Housing Stability: Unknown (2024)    Housing Stability Vital Sign     Unable to Pay for Housing in the Last Year: No     Homeless in the Last Year: No         Review of Systems   Constitutional:  Negative for appetite change, chills, fever and unexpected weight change.   HENT:  Negative for hearing loss,  rhinorrhea, sore throat and voice change.    Eyes:  Negative for photophobia and visual disturbance.   Respiratory:  Negative for cough, choking and shortness of breath.    Cardiovascular:  Negative for chest pain, palpitations and leg swelling.   Gastrointestinal:  Negative for abdominal pain, blood in stool, constipation, diarrhea, nausea and vomiting.   Endocrine: Negative for cold intolerance, heat intolerance, polydipsia and polyuria.   Musculoskeletal:  Negative for arthralgias, back pain, joint swelling and neck stiffness.   Skin:  Negative for color change, pallor and rash.   Neurological:  Negative for dizziness, seizures, syncope and headaches.   Hematological:  Negative for adenopathy. Does not bruise/bleed easily.   Psychiatric/Behavioral:  Negative for agitation, behavioral problems and confusion.        Objective:      Physical Exam  Pulmonary:      Effort: No tachypnea, bradypnea or respiratory distress.   Abdominal:      General: There is no distension.      Palpations: Abdomen is soft.   Neurological:      Mental Status: She is alert and oriented to person, place, and time.         Assessment/Plan:   Traci was seen today for lipoma.    Diagnoses and all orders for this visit:    Hypertrophy of fat pad    Neck pain          Pateitn with prominent fatty pad upper back in the midline. I did not appreciate defined borders. Suspect it is prominetn fat pad but will investigate further. Robert go over her MRI's with [employer] [facility]. Scans from 2023 so may not be helpful. Will order US vs other MRI if that's the case.     I discussed the proposed procedures with the patient including risks, benefits, indications, alternatives and special concerns.  The patient appears to understand and agrees to go ahead with surgery.  I have made no promises, warranties or verbal agreements beyond what was discussed above.    No follow-ups on file.

## 2025-05-14 DIAGNOSIS — Z96.652 S/P REVISION OF TOTAL KNEE, LEFT: Primary | ICD-10-CM

## 2025-05-19 ENCOUNTER — OFFICE VISIT (OUTPATIENT)
Dept: ORTHOPEDICS | Facility: CLINIC | Age: 65
End: 2025-05-19
Payer: COMMERCIAL

## 2025-05-19 ENCOUNTER — TELEPHONE (OUTPATIENT)
Dept: NEUROSURGERY | Facility: CLINIC | Age: 65
End: 2025-05-19
Payer: COMMERCIAL

## 2025-05-19 ENCOUNTER — TELEPHONE (OUTPATIENT)
Dept: ORTHOPEDICS | Facility: CLINIC | Age: 65
End: 2025-05-19

## 2025-05-19 ENCOUNTER — HOSPITAL ENCOUNTER (OUTPATIENT)
Dept: RADIOLOGY | Facility: HOSPITAL | Age: 65
Discharge: HOME | End: 2025-05-19
Attending: ORTHOPAEDIC SURGERY
Payer: COMMERCIAL

## 2025-05-19 VITALS — WEIGHT: 180.88 LBS | HEIGHT: 61 IN | BODY MASS INDEX: 34.15 KG/M2

## 2025-05-19 DIAGNOSIS — Z96.652 S/P REVISION OF TOTAL KNEE, LEFT: Primary | ICD-10-CM

## 2025-05-19 DIAGNOSIS — Z96.652 S/P REVISION OF TOTAL KNEE, LEFT: ICD-10-CM

## 2025-05-19 DIAGNOSIS — T84.50XD INFECTION OF PROSTHETIC JOINT, SUBSEQUENT ENCOUNTER: ICD-10-CM

## 2025-05-19 PROCEDURE — 3066F NEPHROPATHY DOC TX: CPT | Mod: CPTII,S$GLB,COE, | Performed by: ORTHOPAEDIC SURGERY

## 2025-05-19 PROCEDURE — 99999 PR PBB SHADOW E&M-EST. PATIENT-LVL III: CPT | Mod: PBBFAC,COE,, | Performed by: ORTHOPAEDIC SURGERY

## 2025-05-19 PROCEDURE — 73562 X-RAY EXAM OF KNEE 3: CPT | Mod: 26,LT,COE, | Performed by: RADIOLOGY

## 2025-05-19 PROCEDURE — 3008F BODY MASS INDEX DOCD: CPT | Mod: CPTII,S$GLB,COE, | Performed by: ORTHOPAEDIC SURGERY

## 2025-05-19 PROCEDURE — 73560 X-RAY EXAM OF KNEE 1 OR 2: CPT | Mod: 26,59,RT,COE | Performed by: RADIOLOGY

## 2025-05-19 PROCEDURE — 4010F ACE/ARB THERAPY RXD/TAKEN: CPT | Mod: CPTII,S$GLB,COE, | Performed by: ORTHOPAEDIC SURGERY

## 2025-05-19 PROCEDURE — 73560 X-RAY EXAM OF KNEE 1 OR 2: CPT | Mod: TC,RT

## 2025-05-19 PROCEDURE — 1159F MED LIST DOCD IN RCRD: CPT | Mod: CPTII,S$GLB,COE, | Performed by: ORTHOPAEDIC SURGERY

## 2025-05-19 PROCEDURE — 99213 OFFICE O/P EST LOW 20 MIN: CPT | Mod: S$GLB,COE,, | Performed by: ORTHOPAEDIC SURGERY

## 2025-05-19 PROCEDURE — 3061F NEG MICROALBUMINURIA REV: CPT | Mod: CPTII,S$GLB,COE, | Performed by: ORTHOPAEDIC SURGERY

## 2025-05-19 NOTE — TELEPHONE ENCOUNTER
----- Message from Mary Kay sent at 5/19/2025  3:11 PM CDT -----  Regarding: STAFF IS CALLING REGARDING PT'S RESTRICITONS FORM  Contact: pt  Release point is calling regarding pt's release form [phone] contact [phone] Fax is Reference number is [id number]  ----- Message -----  From: Mary Kay Montaño  Sent: 5/19/2025   3:17 PM CDT  To: [facility] Staff  Subject: STAFF IS CALLING REGARDING PT'S RESTRICITONS#    Release point is calling regarding pt's release form [phone] contact [phone] Fax is Reference number is [id number]

## 2025-05-19 NOTE — TELEPHONE ENCOUNTER
Received email on 5/13 from formerly [facility] regarding virtual PT    Hello,     Traci Freire, 1960     [facility] notified  that her virtual PT case was being closed d/t disengagement.  If Ms. Freire reaches out to [facility] they will gladly reopen her case.     Thank you!     Natacha Moon, VANCEN, RN    Patient Centered Programs  [facility]

## 2025-05-19 NOTE — PROGRESS NOTES
Traci Freire is in for 6 month follow up for a  left  complex 2 stage revision TKA.  She is doing  well.  Mild pain in the knee.  She has resumed activities of daily living. She has been active  Exam demonstrates  A well developed female in no distress.  Alert and oriented.  Mood and affect are appropriate.    Knee incision is well healed.  ROM is 0-90.  The patella tracks well and there is no instability. The extremity is neurovascularly intact.    Xrays demonstrate a well fixed and positioned prosthesis.         No data to display                     No data to display                     No data to display                    8/26/2024     7:40 AM   Knee Society and Function Score   FINDINGS - KNEE SOCIETY SCORE 28   FINDINGS - KNEE SOCIETY FUNCTION SCORE 50            No data to display                Imp:Doing well  Unable to return to her former job de to her current limitations.    F/u in 3 months with xrays

## 2025-05-19 NOTE — TELEPHONE ENCOUNTER
Called release point and M for them to call back regarding pt' restrictions.     ----- Message from Mary Kay sent at 5/19/2025  3:11 PM CDT -----  Regarding: STAFF IS CALLING REGARDING PT'S RESTRICITONS FORM  Contact: pt  Release point is calling regarding pt's release form [phone] contact [phone] Fax is Reference number is [id number]  ----- Message -----  From: Mary Kay Montaño  Sent: 5/19/2025   3:17 PM CDT  To: [facility] Staff  Subject: STAFF IS CALLING REGARDING PT'S RESTRICITONS#    Release point is calling regarding pt's release form [phone] contact [phone] Fax is Reference number is [id number]

## 2025-05-23 ENCOUNTER — OFFICE VISIT (OUTPATIENT)
Dept: PHYSICAL MEDICINE AND REHAB | Facility: CLINIC | Age: 65
End: 2025-05-23
Payer: COMMERCIAL

## 2025-05-23 DIAGNOSIS — M79.671 BILATERAL LEG AND FOOT PAIN: ICD-10-CM

## 2025-05-23 DIAGNOSIS — M54.41 CHRONIC BILATERAL LOW BACK PAIN WITH BILATERAL SCIATICA: ICD-10-CM

## 2025-05-23 DIAGNOSIS — M54.42 CHRONIC BILATERAL LOW BACK PAIN WITH BILATERAL SCIATICA: ICD-10-CM

## 2025-05-23 DIAGNOSIS — M79.605 BILATERAL LEG AND FOOT PAIN: ICD-10-CM

## 2025-05-23 DIAGNOSIS — M79.672 BILATERAL LEG AND FOOT PAIN: ICD-10-CM

## 2025-05-23 DIAGNOSIS — G89.29 CHRONIC BILATERAL LOW BACK PAIN WITH BILATERAL SCIATICA: ICD-10-CM

## 2025-05-23 DIAGNOSIS — M79.604 BILATERAL LEG AND FOOT PAIN: ICD-10-CM

## 2025-05-23 PROCEDURE — 99999 PR PBB SHADOW E&M-EST. PATIENT-LVL II: CPT | Mod: PBBFAC,COE,, | Performed by: PHYSICAL MEDICINE & REHABILITATION

## 2025-05-23 NOTE — PROGRESS NOTES
[facility]  [address] [place] [address]             Full Name: Traci Freire Gender: Female  MRN: [id number] YOB: 1960  History: Patient complaining of bilateral lower extremity pain, from the knees to the feet.  Low back pain that radiates down the right lower extremity,    Hx x4 left knee surgeries.        Visit Date: 5/23/2025 8:39 AM  Age: 64 Years  Examining Physician: Violet Castellano DO   Referring Physician: Isaiah De León MD  Height: 5 feet 1 inch      Sensory NCS      Nerve / Sites Rec. Site Onset Lat Peak Lat NP Amp PP Amp Segments Distance Velocity     ms ms µV µV  cm m/s   L Sural - Ankle (Calf)      Calf Ankle 2.67 3.56 10.8 3.3 Calf - Ankle 14 53   R Sural - Ankle (Calf)      Calf Ankle 2.71 3.65 9.1 3.6 Calf - Ankle 14 52   L Superficial peroneal - Ankle      Lat leg Ankle 2.79 3.65 17.5 22.9 Lat leg - Ankle 14 50   R Superficial peroneal - Ankle      Lat leg Ankle 2.79 3.52 19.5 13.8 Lat leg - Ankle 14 50       Motor NCS      Nerve / Sites Muscle Latency Amplitude Amp % Duration Segments Distance Lat Diff Velocity     ms mV % ms  cm ms m/s   L Peroneal - EDB      Ankle EDB 4.40 6.1 100 4.63 Ankle - EDB 8        Fib head EDB 10.73 5.1 84 5.31 Fib head - Ankle 31.5 6.33 50      Pop fossa EDB 12.81 4.9 80.9 5.29 Pop fossa - Fib head 10 2.08 48   R Peroneal - EDB      Ankle EDB 5.98 1.1 100 4.00 Ankle - EDB 8        Fib head EDB 12.42 1.1 105 4.90 Fib head - Ankle 28 6.44 43      Pop fossa EDB 14.73 1.0 91.1 4.85 Pop fossa - Fib head 10 2.31 43      7 TA           L Tibial - AH      Ankle AH 3.79 3.2 100 5.71 Ankle - AH 8        Pop fossa AH 12.21 2.3 71.1 6.46 Pop fossa - Ankle 33 8.42 39   R Tibial - AH      Ankle AH 4.00 4.4 100 5.62 Ankle - AH 8        Pop fossa AH 12.19 3.4 77.7 5.85 Pop fossa - Ankle 34 8.19 42   R Peroneal - Tib Ant      Fib Head Tib Ant 2.71 4.2 100 9.27 Fib Head - Tib Ant         Pop fossa Tib Ant 4.33 4.1 98.3 9.42 Pop fossa - Fib Head 10 1.62  62       EMG Summary Table     Spontaneous MUAP Recruitment   Muscle IA Fib PSW Fasc CRD Amp Dur. Poly Pattern   L. Quadriceps N None None None None N N None N   L. Tibialis anterior N None None None None N N None N   L. Peroneus longus N None None None None N N None N   L. Gastrocnemius (Medial head) N None None None None N N None N   L. Gluteus medius N None None None None N N None N   R. Quadriceps N None None None None N N None N   R. Tibialis anterior N None None None None N N None N   R. Peroneus longus N None None None None N N None N   R. Gastrocnemius (Medial head) N None None None None N N None N   R. Gluteus medius N None None None None N N None N       Summary    The motor conduction test was performed on 5 nerve(s). The results were normal in 3 nerve(s): L Peroneal - EDB, L Tibial - AH, R Tibial - AH. Findings were unremarkable in 1 nerve(s): R Peroneal - Tib Ant. Results outside the specified normal range were found in 1 nerve(s), as follows:  In the R Peroneal - EDB study  the peak amplitude result was reduced for Ankle stimulation    The sensory conduction test was normal in all 4 of the tested nerves: L Sural - Ankle (Calf), R Sural - Ankle (Calf), L Superficial peroneal - Ankle, R Superficial peroneal - Ankle.    The needle EMG study was normal in all 10 tested muscles: L. Quadriceps, L. Tibialis anterior, L. Peroneus longus, L. Gastrocnemius (Medial head), L. Gluteus medius, R. Quadriceps, R. Tibialis anterior, R. Peroneus longus, R. Gastrocnemius (Medial head), R. Gluteus medius.       Impression:  Normal study.  No electrophysiologic evidence of bilateral lumbosacral radiculopathy/plexopathy, bilateral tibial mononeuropathy, bilateral peroneal mononeuropathy, or peripheral neuropathy in the lower extremities.    ------------------------------  Violet Castellano, DO

## 2025-06-02 DIAGNOSIS — R60.9 EDEMA, UNSPECIFIED TYPE: ICD-10-CM

## 2025-06-02 DIAGNOSIS — I10 ESSENTIAL HYPERTENSION: ICD-10-CM

## 2025-06-03 ENCOUNTER — TELEPHONE (OUTPATIENT)
Dept: NEUROSURGERY | Facility: CLINIC | Age: 65
End: 2025-06-03
Payer: COMMERCIAL

## 2025-06-03 RX ORDER — IRBESARTAN 300 MG/1
300 TABLET ORAL NIGHTLY
Qty: 90 TABLET | Refills: 3 | Status: SHIPPED | OUTPATIENT
Start: 2025-06-03

## 2025-06-03 RX ORDER — HYDROCHLOROTHIAZIDE 25 MG/1
25 TABLET ORAL DAILY
Qty: 90 TABLET | Refills: 1 | Status: SHIPPED | OUTPATIENT
Start: 2025-06-03

## 2025-06-05 ENCOUNTER — OFFICE VISIT (OUTPATIENT)
Dept: OTOLARYNGOLOGY | Facility: CLINIC | Age: 65
End: 2025-06-05
Payer: COMMERCIAL

## 2025-06-05 VITALS
DIASTOLIC BLOOD PRESSURE: 61 MMHG | SYSTOLIC BLOOD PRESSURE: 111 MMHG | WEIGHT: 179.44 LBS | HEIGHT: 61 IN | BODY MASS INDEX: 33.88 KG/M2 | HEART RATE: 83 BPM

## 2025-06-05 DIAGNOSIS — H60.531 ACUTE CONTACT OTITIS EXTERNA, RIGHT: Primary | ICD-10-CM

## 2025-06-05 DIAGNOSIS — K14.0 ULCERATION, TONGUE TRAUMATIC: ICD-10-CM

## 2025-06-05 DIAGNOSIS — K13.21 LEUKOPLAKIA OF TONGUE: ICD-10-CM

## 2025-06-05 PROCEDURE — 4010F ACE/ARB THERAPY RXD/TAKEN: CPT | Mod: CPTII,S$GLB,COE, | Performed by: OTOLARYNGOLOGY

## 2025-06-05 PROCEDURE — 3008F BODY MASS INDEX DOCD: CPT | Mod: CPTII,S$GLB,COE, | Performed by: OTOLARYNGOLOGY

## 2025-06-05 PROCEDURE — 99999 PR PBB SHADOW E&M-EST. PATIENT-LVL IV: CPT | Mod: PBBFAC,COE,, | Performed by: OTOLARYNGOLOGY

## 2025-06-05 PROCEDURE — 3066F NEPHROPATHY DOC TX: CPT | Mod: CPTII,S$GLB,COE, | Performed by: OTOLARYNGOLOGY

## 2025-06-05 PROCEDURE — 1160F RVW MEDS BY RX/DR IN RCRD: CPT | Mod: CPTII,S$GLB,COE, | Performed by: OTOLARYNGOLOGY

## 2025-06-05 PROCEDURE — 1159F MED LIST DOCD IN RCRD: CPT | Mod: CPTII,S$GLB,COE, | Performed by: OTOLARYNGOLOGY

## 2025-06-05 PROCEDURE — 99204 OFFICE O/P NEW MOD 45 MIN: CPT | Mod: S$GLB,COE,, | Performed by: OTOLARYNGOLOGY

## 2025-06-05 PROCEDURE — 3074F SYST BP LT 130 MM HG: CPT | Mod: CPTII,S$GLB,COE, | Performed by: OTOLARYNGOLOGY

## 2025-06-05 PROCEDURE — 3061F NEG MICROALBUMINURIA REV: CPT | Mod: CPTII,S$GLB,COE, | Performed by: OTOLARYNGOLOGY

## 2025-06-05 PROCEDURE — 3078F DIAST BP <80 MM HG: CPT | Mod: CPTII,S$GLB,COE, | Performed by: OTOLARYNGOLOGY

## 2025-06-05 RX ORDER — MINERAL OIL 1000 MG/ML
LIQUID TOPICAL
Start: 2025-06-05

## 2025-06-05 RX ORDER — FLUOCINOLONE ACETONIDE 0.11 MG/ML
OIL AURICULAR (OTIC)
Qty: 20 ML | Refills: 0 | Status: SHIPPED | OUTPATIENT
Start: 2025-06-05

## 2025-06-18 ENCOUNTER — HOSPITAL ENCOUNTER (OUTPATIENT)
Dept: RADIOLOGY | Facility: HOSPITAL | Age: 65
Discharge: HOME | End: 2025-06-18
Attending: SURGERY
Payer: COMMERCIAL

## 2025-06-18 DIAGNOSIS — E65 HYPERTROPHY OF FAT PAD: ICD-10-CM

## 2025-06-18 PROCEDURE — 76536 US EXAM OF HEAD AND NECK: CPT | Mod: TC

## 2025-06-18 PROCEDURE — 76536 US EXAM OF HEAD AND NECK: CPT | Mod: 26,,, | Performed by: RADIOLOGY

## 2025-06-23 ENCOUNTER — OFFICE VISIT (OUTPATIENT)
Dept: NEUROSURGERY | Facility: CLINIC | Age: 65
End: 2025-06-23
Payer: COMMERCIAL

## 2025-06-23 DIAGNOSIS — M54.50 DORSALGIA OF LUMBAR REGION: Primary | ICD-10-CM

## 2025-06-23 PROCEDURE — 4010F ACE/ARB THERAPY RXD/TAKEN: CPT | Mod: CPTII,S$GLB,COE, | Performed by: NEUROLOGICAL SURGERY

## 2025-06-23 PROCEDURE — 1159F MED LIST DOCD IN RCRD: CPT | Mod: CPTII,S$GLB,COE, | Performed by: NEUROLOGICAL SURGERY

## 2025-06-23 PROCEDURE — 99999 PR PBB SHADOW E&M-EST. PATIENT-LVL III: CPT | Mod: PBBFAC,COE,, | Performed by: NEUROLOGICAL SURGERY

## 2025-06-23 PROCEDURE — 99214 OFFICE O/P EST MOD 30 MIN: CPT | Mod: S$GLB,COE,, | Performed by: NEUROLOGICAL SURGERY

## 2025-06-23 PROCEDURE — 3066F NEPHROPATHY DOC TX: CPT | Mod: CPTII,S$GLB,COE, | Performed by: NEUROLOGICAL SURGERY

## 2025-06-23 PROCEDURE — 3061F NEG MICROALBUMINURIA REV: CPT | Mod: CPTII,S$GLB,COE, | Performed by: NEUROLOGICAL SURGERY

## 2025-06-23 NOTE — PROGRESS NOTES
"CHIEF COMPLAINT:  Follow up after EMG     I, Joanie Collins, attest that this documentation has been prepared under the direction and in the presence of Pawel Reddy MD.    HPI from 3/31/2025:  Traci Freire is a 64 y.o. female with HLD and HTN, who is referred to me by Juliet Medina NP, for evaluation of leg, back, and neck pain. She had an L1-2 medial branch block with 80% symptom relief in 2024 and two subsequent radiofrequency ablations with minimal pain relief at L1-2. She reports that the pain is mostly in the lower part of her legs, across her low back, and a "bulge" in the back of her neck. She says that her legs have gone out on her before, but they don't really every feel numb. She denies any pain wrapping into her groin area. She states that the pain is exacerbated when sitting for too long or when transitioning from sitting to standing. With regards to her neck, the pain is localized to the center of her neck. She says she had bilateral arm pain as well, preventing her from sleeping on her side comfortably. She has to constantly flips sides because whichever arm she's laying on gets sore.     Interval Hx:   Today the patient reports that any bending over or picking things up exacerbates her neck and back pain. She states that staying upright seems to prevent her from being in pain. Her general surgeon has suggested removal of the lipoma on her neck, but she states he wanted to hear a neurosurgical opinion first.     Patient denies any other complaints at this time.    Review of patient's allergies indicates:   Allergen Reactions    Macrobid [nitrofurantoin monohyd/m-cryst]     Bactrim [sulfamethoxazole-trimethoprim] Nausea And Vomiting       Past Medical History:   Diagnosis Date    Anxiety     Arthritis     Cancer breast    Right- Bilateral mastectomy- May 2005- had breast reconstruction- mother  of breast cancer    HLD (hyperlipidemia)     Hypertension     diagnosed age late 40's     IBS " (irritable bowel syndrome)     Lyme disease     arthritis of hands. Felt like flu- age early 30's- got it  after going to [place]    Sleep apnea     NO MACHINE     Past Surgical History:   Procedure Laterality Date    ABDOMINAL SURGERY      BLADDER SUSPENSION      BREAST SURGERY      CARPAL TUNNEL RELEASE Left 2023    Procedure: Left carpal tunnel release;  Surgeon: Roberto Cheung MD;  Location: [facility];  Service: Orthopedics;  Laterality: Left;     SECTION      CYSTOSCOPY      avoids greens . ? Interstitial cystitis    ESOPHAGOGASTRODUODENOSCOPY N/A 8/10/2023    Procedure: EGD (ESOPHAGOGASTRODUODENOSCOPY);  Surgeon: Gerard Hernandez MD;  Location: [facility];  Service: Endoscopy;  Laterality: N/A;    HYSTERECTOMY      followed by removal of ovaries  from scar tissue    INCISIONAL HERNIA REPAIR Right 2008    RLQ    JOINT REPLACEMENT      Left total knee replacement-[facility] -     KNEE SURGERY Left     TKR    MODIFIED RADICAL MASTECTOMY W/ AXILLARY LYMPH NODE DISSECTION Right 05/10/2005    w/free flap    NISSEN FUNDOPLICATION      REVISION OF KNEE ARTHROPLASTY Left 2024    Procedure: REVISION, ARTHROPLASTY, KNEE: LEFT;  Surgeon: Chris Estevez MD;  Location: [facility] OR [address] [place] [address];  Service: Orthopedics;  Laterality: Left;    REVISION OF KNEE ARTHROPLASTY Left 2024    Procedure: REVISION, ARTHROPLASTY, KNEE;  Surgeon: Chris Estevez MD;  Location: [facility] OR [address] [place] [address];  Service: Orthopedics;  Laterality: Left;    ROBOT-ASSISTED LAPAROSCOPIC REPAIR OF VENTRAL HERNIA N/A 2021    Procedure: ROBOTIC REPAIR, HERNIA, VENTRAL;  Surgeon: John Johnson III, MD;  Location: [facility];  Service: General;  Laterality: N/A;    SIMPLE MASTECTOMY Left 05/10/2005    w/free flap    TONSILLECTOMY       Family History   Problem Relation Name Age of Onset    Cancer Mother Shital Arrieta          of breast cancer, also had gynecological cancer-  had breast cancer that spread to the brain    Heart disease Father Apolinar Trejo         in his 60's-  in his 70's    Breast cancer Daughter      Cancer Daughter Molly Drake      Social History[1]     Review of Systems   Musculoskeletal:  Positive for back pain and neck pain.   All other systems reviewed and are negative.      OBJECTIVE:   Vital Signs:       Physical Exam:    Vital signs: All nursing notes and vital signs reviewed -- afebrile, vital signs stable.  Constitutional: Patient sitting comfortably in chair. Appears well developed and well nourished.  Skin: Exposed areas are intact without abnormal markings, rashes or other lesions.  HEENT: Normocephalic. Normal conjunctivae.  Cardiovascular: Normal rate and regular rhythm.  Respiratory: Chest wall rises and falls symmetrically, without signs of respiratory distress.  Abdomen: Soft and non-tender.  Extremities: Warm and without edema. Calves supple, non-tender.  Psych/Behavior: Normal affect.    Neurological:    Mental status: Alert and oriented. Conversational and appropriate.       Cranial Nerves: VFF to confrontation. PERRL. EOMI without nystagmus. Facial STLT normal and symmetric. Strong, symmetric muscles of mastication. Facial strength full and symmetric. Hearing equal bilaterally to finger rub. Palate and uvula rise and fall normally in midline. Shoulder shrug 5/5 strength. Tongue midline.     Motor:    Upper:  Deltoids Triceps Biceps WE WF     R 5/5 5/5 5/5 5/5 5/5 5/5    L 5/5 5/5 5/5 5/5 5/5 5/5      Lower:  HF KE KF DF PF EHL    R 5/5 5/5 5/5 5/5 5/5 5/5    L 5/5 5/5 5/5 5/5 5/5 5/5     Sensory: Intact sensation to light touch in all extremities. Romberg negative.    Reflexes:          DTR: 2+ symmetrically throughout.     Amador's: Negative.     Babinski's: Negative.     Clonus: Negative.    Cerebellar: Finger-to-nose and rapid alternating movements normal. Gait stable, fluid.    Spine:    Posture: Head well aligned over pelvis in  front and side views.  No focal or global spinal deformity visible on inspection. Shoulders and hips even. No obvious leg length discrepancy. No scapula winging.    Bending: Full ROM with forward, back and lateral bending. No rib prominence with forward bend.    Cervical:      ROM: Full with flexion, extension, lateral rotation and ear-to-shoulder bend.      Midline TTP: Negative.     Spurling's test: Negative.     Lhermitte's: Negative.    Thoracic:     Midline TTP: Negative    Lumbar:     Midline TTP: Negative     Straight Leg Test: Negative     Crossed Straight Leg Test: Negative     Sciatic notch tenderness: Negative.    Other:     SI joint TTP: Negative.     Greater trochanter TTP: Negative.     Tenderness with external/internal hip rotation: Negative.    Diagnostic Results:  All imaging was independently reviewed by me.    EMG/Nerve conduction study, dated 2025:  1. Normal study.    ASSESSMENT/PLAN:     Traci Freire has chronic low back pain and neck pain centered on her soft tissue lipoma. Her imaging shows no surgical indications and her EMG was normal. I recommend PT.    The patient understands and agrees with the plan of care. All questions were answered.     1. PT for low back pain    I, Dr. Pawel Reddy personally performed the services described in this documentation. All medical record entries made by the scribe, Joanie Collins, were at my direction and in my presence. I have reviewed the chart and agree that the record reflects my personal performance and is accurate and complete.      Pawel Reddy M.D.  Department of Neurosurgery  [facility]            [1]   Social History  Tobacco Use    Smoking status: Former     Current packs/day: 0.00     Types: Cigarettes     Quit date: 3/26/2004     Years since quittin.2    Smokeless tobacco: Never    Tobacco comments:     quit - smoked for 10 years- 1 ppd   Substance Use Topics    Alcohol use: No    Drug use: No

## 2025-06-25 ENCOUNTER — TELEPHONE (OUTPATIENT)
Dept: SURGERY | Facility: CLINIC | Age: 65
End: 2025-06-25
Payer: COMMERCIAL

## 2025-06-25 NOTE — TELEPHONE ENCOUNTER
Copied from CRM #[id number]. Topic: General Inquiry - Patient Advice  >> Jun 25, 2025 10:00 AM Hannah wrote:  Type: Needs Medical Advice         Who Called:pt  Best Call Back Number:[phone]  Additional Information: Requesting a call back regarding  pt is asking for the office to call her. Pt is still needing to know what her test results are from her US and asking what will be next step. Pt went and seen Dr Davis and he recommended to continue to see  Dr Johnson  Please Advise- Thank you

## 2025-07-01 ENCOUNTER — PATIENT MESSAGE (OUTPATIENT)
Dept: SURGERY | Facility: CLINIC | Age: 65
End: 2025-07-01
Payer: COMMERCIAL

## 2025-07-22 ENCOUNTER — PATIENT MESSAGE (OUTPATIENT)
Dept: SURGERY | Facility: CLINIC | Age: 65
End: 2025-07-22
Payer: COMMERCIAL

## 2025-07-24 ENCOUNTER — PATIENT MESSAGE (OUTPATIENT)
Dept: FAMILY MEDICINE | Facility: CLINIC | Age: 65
End: 2025-07-24

## 2025-07-29 ENCOUNTER — OFFICE VISIT (OUTPATIENT)
Dept: FAMILY MEDICINE | Facility: CLINIC | Age: 65
End: 2025-07-29
Payer: COMMERCIAL

## 2025-07-29 VITALS
DIASTOLIC BLOOD PRESSURE: 60 MMHG | HEIGHT: 61 IN | OXYGEN SATURATION: 97 % | SYSTOLIC BLOOD PRESSURE: 128 MMHG | BODY MASS INDEX: 33.27 KG/M2 | HEART RATE: 84 BPM | WEIGHT: 176.19 LBS

## 2025-07-29 DIAGNOSIS — E78.5 HYPERLIPIDEMIA, UNSPECIFIED HYPERLIPIDEMIA TYPE: ICD-10-CM

## 2025-07-29 DIAGNOSIS — I10 ESSENTIAL HYPERTENSION: ICD-10-CM

## 2025-07-29 DIAGNOSIS — G47.00 INSOMNIA, UNSPECIFIED TYPE: ICD-10-CM

## 2025-07-29 DIAGNOSIS — R60.9 EDEMA, UNSPECIFIED TYPE: Primary | ICD-10-CM

## 2025-07-29 DIAGNOSIS — D50.8 OTHER IRON DEFICIENCY ANEMIA: ICD-10-CM

## 2025-07-29 DIAGNOSIS — R07.9 CHEST PAIN, UNSPECIFIED TYPE: ICD-10-CM

## 2025-07-29 DIAGNOSIS — Z85.3 HX OF BREAST CANCER: ICD-10-CM

## 2025-07-29 DIAGNOSIS — E66.9 OBESITY, UNSPECIFIED CLASS, UNSPECIFIED OBESITY TYPE, UNSPECIFIED WHETHER SERIOUS COMORBIDITY PRESENT: ICD-10-CM

## 2025-07-29 DIAGNOSIS — F41.9 ANXIETY: ICD-10-CM

## 2025-07-29 DIAGNOSIS — D17.9 LIPOMA, UNSPECIFIED SITE: ICD-10-CM

## 2025-07-29 DIAGNOSIS — Z13.6 ENCOUNTER FOR SCREENING FOR CARDIOVASCULAR DISORDERS: ICD-10-CM

## 2025-07-29 PROCEDURE — 99214 OFFICE O/P EST MOD 30 MIN: CPT | Mod: S$GLB,,, | Performed by: NURSE PRACTITIONER

## 2025-07-30 LAB — TSH SERPL-ACNC: 1.4 MIU/L (ref 0.4–4.5)

## 2025-07-31 ENCOUNTER — OFFICE VISIT (OUTPATIENT)
Dept: SURGERY | Facility: CLINIC | Age: 65
End: 2025-07-31
Payer: COMMERCIAL

## 2025-07-31 VITALS
BODY MASS INDEX: 33.42 KG/M2 | HEART RATE: 94 BPM | HEIGHT: 61 IN | SYSTOLIC BLOOD PRESSURE: 117 MMHG | WEIGHT: 177 LBS | DIASTOLIC BLOOD PRESSURE: 79 MMHG | TEMPERATURE: 98 F

## 2025-07-31 DIAGNOSIS — D17.1 LIPOMA OF BACK: Primary | ICD-10-CM

## 2025-07-31 PROCEDURE — 1159F MED LIST DOCD IN RCRD: CPT | Mod: CPTII,S$GLB,COE, | Performed by: SURGERY

## 2025-07-31 PROCEDURE — 3074F SYST BP LT 130 MM HG: CPT | Mod: CPTII,S$GLB,COE, | Performed by: SURGERY

## 2025-07-31 PROCEDURE — 3078F DIAST BP <80 MM HG: CPT | Mod: CPTII,S$GLB,COE, | Performed by: SURGERY

## 2025-07-31 PROCEDURE — 3066F NEPHROPATHY DOC TX: CPT | Mod: CPTII,S$GLB,COE, | Performed by: SURGERY

## 2025-07-31 PROCEDURE — 99999 PR PBB SHADOW E&M-EST. PATIENT-LVL V: CPT | Mod: PBBFAC,COE,, | Performed by: SURGERY

## 2025-07-31 PROCEDURE — 3061F NEG MICROALBUMINURIA REV: CPT | Mod: CPTII,S$GLB,COE, | Performed by: SURGERY

## 2025-07-31 PROCEDURE — 4010F ACE/ARB THERAPY RXD/TAKEN: CPT | Mod: CPTII,S$GLB,COE, | Performed by: SURGERY

## 2025-07-31 PROCEDURE — 99214 OFFICE O/P EST MOD 30 MIN: CPT | Mod: S$GLB,COE,, | Performed by: SURGERY

## 2025-07-31 PROCEDURE — 3008F BODY MASS INDEX DOCD: CPT | Mod: CPTII,S$GLB,COE, | Performed by: SURGERY

## 2025-07-31 PROCEDURE — 1160F RVW MEDS BY RX/DR IN RCRD: CPT | Mod: CPTII,S$GLB,COE, | Performed by: SURGERY

## 2025-07-31 RX ORDER — CEFAZOLIN SODIUM 2 G/50ML
2 SOLUTION INTRAVENOUS
OUTPATIENT
Start: 2025-07-31

## 2025-07-31 NOTE — PROGRESS NOTES
Subjective:       Patient ID: Traci Freire is a 64 y.o. female.    Chief Complaint: Lump      HPI:  64 year old female presents to the office with a possible upper back lipoma. Noticed enlarging fatty tissue upper back. She has upper back pain with radiation to both shoulders and arms worse on the left arm. History of disc disease at all levels. US showed no obvious discrete lipoma or mass.    Past Medical History:   Diagnosis Date    Anxiety     Arthritis     Cancer breast    Right- Bilateral mastectomy- May 2005- had breast reconstruction- mother  of breast cancer    HLD (hyperlipidemia)     Hypertension     diagnosed age late 40's     IBS (irritable bowel syndrome)     Lyme disease     arthritis of hands. Felt like flu- age early 30's- got it  after going to [place]    Sleep apnea     NO MACHINE     Past Surgical History:   Procedure Laterality Date    ABDOMINAL SURGERY      BLADDER SUSPENSION      BREAST SURGERY      CARPAL TUNNEL RELEASE Left 2023    Procedure: Left carpal tunnel release;  Surgeon: Roberto Cheung MD;  Location: [facility];  Service: Orthopedics;  Laterality: Left;     SECTION      CYSTOSCOPY      avoids greens . ? Interstitial cystitis    ESOPHAGOGASTRODUODENOSCOPY N/A 8/10/2023    Procedure: EGD (ESOPHAGOGASTRODUODENOSCOPY);  Surgeon: Gerard Hernandez MD;  Location: [facility];  Service: Endoscopy;  Laterality: N/A;    HYSTERECTOMY      followed by removal of ovaries  from scar tissue    INCISIONAL HERNIA REPAIR Right 2008    RLQ    JOINT REPLACEMENT      Left total knee replacement-[facility] -     KNEE SURGERY Left     TKR    MODIFIED RADICAL MASTECTOMY W/ AXILLARY LYMPH NODE DISSECTION Right 05/10/2005    w/free flap    NISSEN FUNDOPLICATION      REVISION OF KNEE ARTHROPLASTY Left 2024    Procedure: REVISION, ARTHROPLASTY, KNEE: LEFT;  Surgeon: Chris Estevez MD;  Location: [address];  Service:  Orthopedics;  Laterality: Left;    REVISION OF KNEE ARTHROPLASTY Left 2024    Procedure: REVISION, ARTHROPLASTY, KNEE;  Surgeon: Chris Estevez MD;  Location: [facility] OR [address] [place] [address];  Service: Orthopedics;  Laterality: Left;    ROBOT-ASSISTED LAPAROSCOPIC REPAIR OF VENTRAL HERNIA N/A 2021    Procedure: ROBOTIC REPAIR, HERNIA, VENTRAL;  Surgeon: John Johnson III, MD;  Location: [facility];  Service: General;  Laterality: N/A;    SIMPLE MASTECTOMY Left 05/10/2005    w/free flap    TONSILLECTOMY       Review of patient's allergies indicates:   Allergen Reactions    Macrobid [nitrofurantoin monohyd/m-cryst]     Bactrim [sulfamethoxazole-trimethoprim] Nausea And Vomiting     Medication List with Changes/Refills   Current Medications    FLUOCINOLONE ACETONIDE OIL 0.01 % DROP    3-4 drops to the affected ear(s) twice daily no more than a week at a time as needed for itching and scaling    FLUOXETINE 20 MG CAPSULE    Take 1 capsule (20 mg total) by mouth once daily.    HYDROCHLOROTHIAZIDE (HYDRODIURIL) 25 MG TABLET    Take 1 tablet (25 mg total) by mouth once daily.    IRBESARTAN (AVAPRO) 300 MG TABLET    Take 1 tablet (300 mg total) by mouth every evening.    MINERAL OIL TOP (MINERAL OIL LIGHT) OIL    Apply 4-5 drops BOTH ear canals with a dropper nightly    PANTOPRAZOLE (PROTONIX) 40 MG TABLET    Take 1 tablet (40 mg total) by mouth once daily.    ROSUVASTATIN (CRESTOR) 20 MG TABLET    Take 1 tablet (20 mg total) by mouth every evening.    ZOLPIDEM (AMBIEN) 10 MG TAB    Take 1 tablet (10 mg total) by mouth every evening.     Family History   Problem Relation Name Age of Onset    Cancer Mother Shital Arrieta          of breast cancer, also had gynecological cancer- had breast cancer that spread to the brain    Heart disease Father Apolinar Trejo         in his 60's-  in his 70's    Breast cancer Daughter      Cancer Daughter Molly Drake      Social History     Socioeconomic History    Marital status:     Tobacco Use    Smoking status: Former     Current packs/day: 0.00     Types: Cigarettes     Quit date: 3/26/2004     Years since quittin.3    Smokeless tobacco: Never    Tobacco comments:     quit 2004- smoked for 10 years- 1 ppd   Substance and Sexual Activity    Alcohol use: No    Drug use: No    Sexual activity: Never     Social Drivers of Health     Financial Resource Strain: Low Risk  (2024)    Overall Financial Resource Strain (CARDIA)     Difficulty of Paying Living Expenses: Not hard at all   Food Insecurity: No Food Insecurity (2024)    Hunger Vital Sign     Worried About Running Out of Food in the Last Year: Never true     Ran Out of Food in the Last Year: Never true   Transportation Needs: No Transportation Needs (2024)    TRANSPORTATION NEEDS     Transportation : No   Physical Activity: Inactive (2024)    Exercise Vital Sign     Days of Exercise per Week: 0 days     Minutes of Exercise per Session: 0 min   Stress: No Stress Concern Present (2024)    [employer] - Occupational Stress Questionnaire     Feeling of Stress : Not at all   Housing Stability: Unknown (2024)    Housing Stability Vital Sign     Unable to Pay for Housing in the Last Year: No     Homeless in the Last Year: No         Review of Systems   Constitutional:  Negative for appetite change, chills, fever and unexpected weight change.   HENT:  Negative for hearing loss, rhinorrhea, sore throat and voice change.    Eyes:  Negative for photophobia and visual disturbance.   Respiratory:  Negative for cough, choking and shortness of breath.    Cardiovascular:  Negative for chest pain, palpitations and leg swelling.   Gastrointestinal:  Negative for abdominal pain, blood in stool, constipation, diarrhea, nausea and vomiting.   Endocrine: Negative for cold intolerance, heat intolerance, polydipsia and polyuria.   Musculoskeletal:  Positive for back pain and neck pain  (chronic). Negative for arthralgias, joint swelling and neck stiffness.   Skin:  Negative for color change, pallor and rash.   Neurological:  Negative for dizziness, seizures, syncope and headaches.   Hematological:  Negative for adenopathy. Does not bruise/bleed easily.   Psychiatric/Behavioral:  Negative for agitation, behavioral problems and confusion.        Objective:      Physical Exam  Constitutional:       General: She is not in acute distress.     Appearance: She is morbidly obese.   Pulmonary:      Effort: Pulmonary effort is normal. No tachypnea, bradypnea or respiratory distress.   Abdominal:      General: There is no distension.      Palpations: Abdomen is soft.   Skin:            Comments: The fat pad is prominent - possible lipoma. There borders difficult to define on exam due to habitus but there is a mobile large somewhat defined area within the fat pad - majority of it    Neurological:      Mental Status: She is alert and oriented to person, place, and time.   Psychiatric:         Behavior: Behavior is cooperative.         Assessment/Plan:   Lipoma of back  -     Case Request Operating Room: EXCISION, LIPOMA  -     Basic metabolic panel; Future; Expected date: 07/31/2025  -     CBC auto differential; Future; Expected date: 07/31/2025  -     EKG 12-lead; Future  -     X-Ray Chest PA And Lateral; Future; Expected date: 07/31/2025    Other orders  -     Vital signs; Standing  -     Insert peripheral IV; Standing  -     Diet NPO; Standing  -     IP VTE LOW RISK PATIENT; Standing  -     Place sequential compression device; Standing  -     cefazolin (ANCEF) 2 gram in dextrose 5% 50 mL IVPB (premix)  -     Pulse Oximetry Q4H; Standing  -     Full code; Standing  -     Place in Outpatient; Standing    August 11 - plan on excision in the OR. I discussed with her that I would have low expectation that excision would relieve her neck pain but would not truly know until excised.       I discussed the proposed  procedures with the patient including risks, benefits, indications, alternatives and special concerns.  The patient appears to understand and agrees to go ahead with surgery.  I have made no promises, warranties or verbal agreements beyond what was discussed above.    No follow-ups on file.

## 2025-08-05 ENCOUNTER — PATIENT MESSAGE (OUTPATIENT)
Dept: FAMILY MEDICINE | Facility: CLINIC | Age: 65
End: 2025-08-05
Payer: COMMERCIAL

## 2025-08-05 DIAGNOSIS — R07.81 RIB PAIN: Primary | ICD-10-CM

## 2025-08-05 NOTE — TELEPHONE ENCOUNTER
Ok for phentermine  Were all labs done? Only tsh resulted. Can you find out more info regarding fall? Where is pain? Etc?

## 2025-08-06 NOTE — TELEPHONE ENCOUNTER
I need you to check on other labs. She has no clue what was drawn. I will order rib xray of the left side.

## 2025-08-07 ENCOUNTER — PATIENT MESSAGE (OUTPATIENT)
Dept: FAMILY MEDICINE | Facility: CLINIC | Age: 65
End: 2025-08-07
Payer: COMMERCIAL

## 2025-08-07 ENCOUNTER — TELEPHONE (OUTPATIENT)
Dept: FAMILY MEDICINE | Facility: CLINIC | Age: 65
End: 2025-08-07
Payer: COMMERCIAL

## 2025-08-07 DIAGNOSIS — E66.9 OBESITY, UNSPECIFIED CLASS, UNSPECIFIED OBESITY TYPE, UNSPECIFIED WHETHER SERIOUS COMORBIDITY PRESENT: Primary | ICD-10-CM

## 2025-08-07 RX ORDER — PHENTERMINE HYDROCHLORIDE 37.5 MG/1
37.5 TABLET ORAL
Qty: 30 TABLET | Refills: 0 | Status: SHIPPED | OUTPATIENT
Start: 2025-08-07 | End: 2025-09-06

## 2025-08-07 NOTE — TELEPHONE ENCOUNTER
----- Message from Kobe sent at 8/7/2025  8:32 AM CDT -----  Regarding: Nuclear Stress Test  Good morning!  This nuclear stress test order that is placed for this patient is incorrect. Please correct with the following order:    [id number], then click on the test. Fill out the information and select Pharm, medication Regadenoson.Then scroll down to the bottom and click on Nuclear Stress test, and fill out that part as well, select Pharm, medication,Regadenoson. Then add the dx codes and sign the order. Once you're finished you should have the following 2 parts:    Nuclear Stress Test   NM Myocardial Perfusion Spect Multi Pharmacologic    Thank you,  Kobe

## 2025-08-07 NOTE — PROGRESS NOTES
"  SUBJECTIVE:    Patient ID: Traci Freire is a 64 y.o. female.    Chief Complaint: Follow-up (No bottles//Pt is here for a follow up and medication refills//KE)      History of Present Illness    CHIEF COMPLAINT:  Traci presents today for follow-up    LIPOMA:  She reports significant discomfort from a neck lipoma that has increased in size and appears to move upward. The lipoma causes substantial pain and compression, significantly limiting neck mobility with difficulty turning her head and checking surroundings while driving. She experiences neck stiffness upon waking and restricted movement that impairs daily activities. She has a scheduled appointment with Dr. Guardado this Thursday for intervention and hopes to return to work by mid-September.    HAIR LOSS:  She reports significant hair loss that began prior to discovery of iron deficiency. She describes extensive hair falling out and notes that iron supplementation has not improved the condition.    MUSCULOSKELETAL:  She reports ongoing back pain managed with naproxen and experiences variable leg symptoms with good and bad days. Cracking her right hip provides relief from back and leg pressure. She has received conflicting medical advice regarding potential back surgery, with some providers initially recommending surgery and others now advising against surgical intervention. She has known hip arthritis previously confirmed by x-ray.    CARDIOVASCULAR:  She reports palpitations with two recent episodes occurring at night, describing her heart as "pounding." She cannot recall the exact timing of the last episode. She denies frequent or ongoing daytime palpitations and reports no associated chest pain, shortness of breath, or dizziness. An echocardiogram was recently performed, but she expresses frustration that no healthcare provider has communicated the results directly. She recalls that a heart murmur was detected during a previous medical evaluation prior to " knee surgery.    FAMILY STRESS:  She reports significant family-related stress from multiple challenging situations. Her oldest daughter delivered a premature baby at 29 weeks gestation, with the infant reportedly doing well. Her brother is currently in the ICU in [place], [place], with severe heart complications, having only half a functioning heart since previous surgery. She expresses urgency about wanting to visit her brother. Her son experienced additional emotional distress due to a coworker being killed at work, which has significantly increased her paranoia about her son's job safety. She describes her stress level as dramatically elevated, particularly after this incident, and acknowledges the emotional weight of these concurrent family challenges.      ROS:  General: -fever, -chills, -fatigue, -weight gain, -weight loss  Eyes: -vision changes, -redness, -discharge  ENT: -ear pain, -nasal congestion, -sore throat  Cardiovascular: -chest pain, +palpitations, -lower extremity edema  Respiratory: -cough, -shortness of breath  Gastrointestinal: -abdominal pain, -nausea, -vomiting, -diarrhea, -constipation, -blood in stool  Genitourinary: -dysuria, -hematuria, -frequency  Musculoskeletal: -joint pain, -muscle pain, +limb pain, +neck pain, +neck stiffness  Skin: -rash, -lesion, +abnormal hair loss  Neurological: -headache, -dizziness, -numbness, -tingling  Psychiatric: +anxiety, -depression, -sleep difficulty, +paranoia              Office Visit on 07/29/2025   Component Date Value Ref Range Status    TSH w/reflex to FT4 07/29/2025 1.40  0.40 - 4.50 mIU/L Final   Office Visit on 03/11/2025   Component Date Value Ref Range Status    Ferritin 03/11/2025 10 (L)  16 - 288 ng/mL Final    WBC 03/11/2025 6.9  3.8 - 10.8 Thousand/uL Final    RBC 03/11/2025 3.88  3.80 - 5.10 Million/uL Final    Hemoglobin 03/11/2025 10.1 (L)  11.7 - 15.5 g/dL Final    Hematocrit 03/11/2025 33.4 (L)  35.0 - 45.0 % Final    MCV 03/11/2025  86.1  80.0 - 100.0 fL Final    MCH 03/11/2025 26.0 (L)  27.0 - 33.0 pg Final    MCHC 03/11/2025 30.2 (L)  32.0 - 36.0 g/dL Final    RDW 03/11/2025 14.0  11.0 - 15.0 % Final    Platelets 03/11/2025 296  140 - 400 Thousand/uL Final    MPV 03/11/2025 10.3  7.5 - 12.5 fL Final    Neutrophils, Abs 03/11/2025 3,678  1,500 - 7,800 cells/uL Final    Lymph # 03/11/2025 2,519  850 - 3,900 cells/uL Final    Mono # 03/11/2025 476  200 - 950 cells/uL Final    Eos # 03/11/2025 200  15 - 500 cells/uL Final    Baso # 03/11/2025 28  0 - 200 cells/uL Final    Neutrophils Relative 03/11/2025 53.3  % Final    Lymph % 03/11/2025 36.5  % Final    Mono % 03/11/2025 6.9  % Final    Eosinophil % 03/11/2025 2.9  % Final    Basophil % 03/11/2025 0.4  % Final    Iron 03/11/2025 33 (L)  45 - 160 mcg/dL Final    TIBC 03/11/2025 472 (H)  250 - 450 mcg/dL (calc) Final    Iron Saturation 03/11/2025 7 (L)  16 - 45 % (calc) Final    Folate 03/11/2025 9.2  ng/mL Final   Telephone on 02/18/2025   Component Date Value Ref Range Status    WBC 03/08/2025 5.6  3.8 - 10.8 Thousand/uL Final    RBC 03/08/2025 3.99  3.80 - 5.10 Million/uL Final    Hemoglobin 03/08/2025 9.8 (L)  11.7 - 15.5 g/dL Final    Hematocrit 03/08/2025 33.3 (L)  35.0 - 45.0 % Final    MCV 03/08/2025 83.5  80.0 - 100.0 fL Final    MCH 03/08/2025 24.6 (L)  27.0 - 33.0 pg Final    MCHC 03/08/2025 29.4 (L)  32.0 - 36.0 g/dL Final    RDW 03/08/2025 13.7  11.0 - 15.0 % Final    Platelets 03/08/2025 286  140 - 400 Thousand/uL Final    MPV 03/08/2025 9.9  7.5 - 12.5 fL Final    Neutrophils, Abs 03/08/2025 2,593  1,500 - 7,800 cells/uL Final    Lymph # 03/08/2025 2,274  850 - 3,900 cells/uL Final    Mono # 03/08/2025 487  200 - 950 cells/uL Final    Eos # 03/08/2025 196  15 - 500 cells/uL Final    Baso # 03/08/2025 50  0 - 200 cells/uL Final    Neutrophils Relative 03/08/2025 46.3  % Final    Lymph % 03/08/2025 40.6  % Final    Mono % 03/08/2025 8.7  % Final    Eosinophil % 03/08/2025 3.5  %  Final    Basophil % 03/08/2025 0.9  % Final    Glucose 03/08/2025 108 (H)  65 - 99 mg/dL Final    BUN 03/08/2025 18  7 - 25 mg/dL Final    Creatinine 03/08/2025 0.73  0.50 - 1.05 mg/dL Final    eGFR 03/08/2025 92  > OR = 60 mL/min/1.73m2 Final    BUN/Creatinine Ratio 03/08/2025 SEE NOTE:  6 - 22 (calc) Final    Sodium 03/08/2025 140  135 - 146 mmol/L Final    Potassium 03/08/2025 3.8  3.5 - 5.3 mmol/L Final    Chloride 03/08/2025 108  98 - 110 mmol/L Final    CO2 03/08/2025 25  20 - 32 mmol/L Final    Calcium 03/08/2025 8.9  8.6 - 10.4 mg/dL Final    Total Protein 03/08/2025 6.4  6.1 - 8.1 g/dL Final    Albumin 03/08/2025 3.8  3.6 - 5.1 g/dL Final    Globulin, Total 03/08/2025 2.6  1.9 - 3.7 g/dL (calc) Final    Albumin/Globulin Ratio 03/08/2025 1.5  1.0 - 2.5 (calc) Final    Total Bilirubin 03/08/2025 0.4  0.2 - 1.2 mg/dL Final    Alkaline Phosphatase 03/08/2025 113  37 - 153 U/L Final    AST 03/08/2025 18  10 - 35 U/L Final    ALT 03/08/2025 14  6 - 29 U/L Final    Cholesterol 03/08/2025 161  <200 mg/dL Final    HDL 03/08/2025 44 (L)  > OR = 50 mg/dL Final    Triglycerides 03/08/2025 173 (H)  <150 mg/dL Final    LDL Cholesterol 03/08/2025 90  mg/dL (calc) Final    HDL/Cholesterol Ratio 03/08/2025 3.7  <5.0 (calc) Final    Non HDL Chol. (LDL+VLDL) 03/08/2025 117  <130 mg/dL (calc) Final    Creatinine, Urine 03/08/2025 270  20 - 275 mg/dL Final    Microalb, Ur 03/08/2025 2.5  See Note: mg/dL Final    Microalb/Creat Ratio 03/08/2025 9  <30 mg/g creat Final    TSH w/reflex to FT4 03/08/2025 2.23  0.40 - 4.50 mIU/L Final    Color, UA 03/08/2025 YELLOW  YELLOW Final    Appearance, UA 03/08/2025 CLEAR  CLEAR Final    Specific Gravity, UA 03/08/2025 1.031  1.001 - 1.035 Final    pH, UA 03/08/2025 6.0  5.0 - 8.0 Final    Glucose, UA 03/08/2025 NEGATIVE  NEGATIVE Final    Bilirubin, UA 03/08/2025 NEGATIVE  NEGATIVE Final    Ketones, UA 03/08/2025 NEGATIVE  NEGATIVE Final    Occult Blood UA 03/08/2025 NEGATIVE  NEGATIVE  Final    Protein, UA 2025 1+ (A)  NEGATIVE Final    Nitrite, UA 2025 NEGATIVE  NEGATIVE Final    Leukocytes, UA 2025 1+ (A)  NEGATIVE Final    WBC Casts, UA 2025 NONE SEEN  < OR = 5 /HPF Final    RBC Casts, UA 2025 0-2  < OR = 2 /HPF Final    Squam Epithel, UA 2025 20-40 (A)  < OR = 5 /HPF Final    Bacteria, UA 2025 NONE SEEN  NONE SEEN /HPF Final    Hyaline Casts, UA 2025 NONE SEEN  NONE SEEN /LPF Final    Service Cmt: 2025 SEE COMMENT   Final    Reflexive Urine Culture 2025 SEE COMMENT   Final    Urine Culture, Routine 2025 SEE COMMENT   Final       Past Medical History:   Diagnosis Date    Anxiety     Arthritis     Cancer breast    Right- Bilateral mastectomy- May 2005- had breast reconstruction- mother  of breast cancer    HLD (hyperlipidemia)     Hypertension     diagnosed age late 40's     IBS (irritable bowel syndrome)     Lyme disease     arthritis of hands. Felt like flu- age early 30's- got it  after going to [place]    Sleep apnea     NO MACHINE     Past Surgical History:   Procedure Laterality Date    ABDOMINAL SURGERY      BLADDER SUSPENSION      BREAST SURGERY      CARPAL TUNNEL RELEASE Left 2023    Procedure: Left carpal tunnel release;  Surgeon: Roberto Cheung MD;  Location: [facility];  Service: Orthopedics;  Laterality: Left;     SECTION      CYSTOSCOPY      avoids greens . ? Interstitial cystitis    ESOPHAGOGASTRODUODENOSCOPY N/A 8/10/2023    Procedure: EGD (ESOPHAGOGASTRODUODENOSCOPY);  Surgeon: Gerard Hernandez MD;  [facility]: [facility];  Service: Endoscopy;  Laterality: N/A;    HYSTERECTOMY      followed by removal of ovaries  from scar tissue    INCISIONAL HERNIA REPAIR Right 2008    RLQ    JOINT REPLACEMENT      Left [facility] knee [facility] -     KNEE SURGERY Left     TKR    MODIFIED RADICAL MASTECTOMY W/ AXILLARY LYMPH NODE DISSECTION Right  05/10/2005    w/free flap    NISSEN FUNDOPLICATION      REVISION OF KNEE ARTHROPLASTY Left 2024    Procedure: REVISION, ARTHROPLASTY, KNEE: LEFT;  Surgeon: Chris Estevez MD;  Location: [facility] OR [address] [place] [address];  Service: Orthopedics;  Laterality: Left;    REVISION OF KNEE ARTHROPLASTY Left 2024    Procedure: REVISION, ARTHROPLASTY, KNEE;  Surgeon: Chris Estevez MD;  [facility];  Service: Orthopedics;  Laterality: Left;    ROBOT-ASSISTED LAPAROSCOPIC REPAIR OF VENTRAL HERNIA N/A 2021    Procedure: ROBOTIC REPAIR, HERNIA, VENTRAL;  Surgeon: John Johnson III, MD;  Location: [facility];  Service: General;  Laterality: N/A;    SIMPLE MASTECTOMY Left 05/10/2005    w/free flap    TONSILLECTOMY       Family History   Problem Relation Name Age of Onset    Cancer Mother Shital Arrieta          of breast cancer, also had gynecological cancer- had breast cancer that spread to the brain    Heart disease Father Apolinar Trejo         in his 60's-  in his 70's    Breast cancer Daughter      Cancer Daughter Molly Drake        All of your core healthy metrics are met.      The 10-year CVD risk score (MALLORIE'Agostino, et al., 2008) is: 7.3%    Values used to calculate the score:      Age: 64 years      Sex: Female      Diabetic: No      Tobacco smoker: No      Systolic Blood Pressure: 117 mmHg      Is BP treated: Yes      HDL Cholesterol: 44 mg/dL      Total Cholesterol: 161 mg/dL     Marital Status:   Alcohol History:  reports no history of alcohol use.  Tobacco History:  reports that she quit smoking about 21 years ago. Her smoking use included cigarettes. She has never used smokeless tobacco.  Drug History:  reports no history of drug use.    Health Maintenance Topics with due status: Not Due       Topic Last Completion Date    Colorectal Cancer Screening 2021    Hemoglobin A1c (Prediabetes) 2024    Lipid Panel 2025    Influenza Vaccine 2025  "    Immunization History   Administered Date(s) Administered    COVID-19, MRNA, LN-S, PF (Pfizer) (Purple Cap) 07/25/2021, 08/15/2021    Influenza (FLUBLOK) - Quadrivalent - Recombinant - PF *Preferred* (egg allergy) 12/19/2020, 12/06/2022    Influenza - Quadrivalent - PF *Preferred* (6 months and older) 01/11/2020, 11/07/2023       Review of patient's allergies indicates:   Allergen Reactions    Macrobid [nitrofurantoin monohyd/m-cryst]     Bactrim [sulfamethoxazole-trimethoprim] Nausea And Vomiting     Current Medications[1]        Objective:      Vitals:    07/29/25 1149   BP: 128/60   Pulse: 84   SpO2: 97%   Weight: 79.9 kg (176 lb 3.2 oz)   Height: 5' 1" (1.549 m)       Physical Exam  Vitals and nursing note reviewed.   Constitutional:       General: She is not in acute distress.     Appearance: Normal appearance. She is well-developed. She is obese.   HENT:      Head: Normocephalic.      Right Ear: External ear normal.      Left Ear: External ear normal.   Eyes:      Conjunctiva/sclera: Conjunctivae normal.      Pupils: Pupils are equal, round, and reactive to light.   Neck:      Vascular: No JVD.   Cardiovascular:      Rate and Rhythm: Normal rate and regular rhythm.      Heart sounds: No murmur heard.  Pulmonary:      Effort: Pulmonary effort is normal.      Breath sounds: Normal breath sounds.   Abdominal:      General: Bowel sounds are normal.      Palpations: Abdomen is soft.   Musculoskeletal:         General: No deformity. Normal range of motion.      Cervical back: Normal range of motion and neck supple.   Lymphadenopathy:      Cervical: No cervical adenopathy.   Skin:     General: Skin is warm and dry.      Findings: No rash.   Neurological:      Mental Status: She is alert and oriented to person, place, and time.      Gait: Gait normal.   Psychiatric:         Speech: Speech normal.         Behavior: Behavior normal.          Assessment:       1. Edema, unspecified type    2. Essential hypertension  "   3. Hyperlipidemia, unspecified hyperlipidemia type    4. Other iron deficiency anemia    5. Hx of breast cancer    6. Anxiety    7. Lipoma, unspecified site    8. Insomnia, unspecified type    9. Obesity, unspecified class, unspecified obesity type, unspecified whether serious comorbidity present    10. Encounter for screening for cardiovascular disorders    11. Chest pain, unspecified type           Assessment & Plan    - Reviewed echocardiogram results: left ventricle normal, EF great, slight hardening of aortic valve consistent with age.    LIPOMA OF HEAD, FACE AND NECK:  - Evaluated lipoma causing significant pain, discomfort, and compression, with recent growth and upward migration affecting neck movement and daily activities.  -     CERVICALGIA (NECK PAIN):  - Traci experiences neck pain and stiffness, sometimes waking up with a major creak in the neck.  - Pain is exacerbated by certain movements, causing significant discomfort and limiting mobility.    TELOGEN EFFLUVIUM (HAIR LOSS):  - Traci reports significant hair loss that preceded iron supplementation and has not improved with current treatment.  - Ordered labs to check iron levels, thyroid function, and complete blood count to evaluate hair loss etiology.    IRON DEFICIENCY ANEMIA:  - Traci concerned about iron levels and effectiveness of supplementation, as it has not improved hair loss condition, indicating possible ineffective treatment for anemia.  - Ordered bloodwork to check iron levels, thyroid function, and complete blood count.  - Instructed patient to complete ordered bloodwork today if possible.    LOW BACK PAIN:  - Traci experiences fluctuating back pain, relieved by cracking right hip.  - Not recommended for surgery by back specialist.  - Recommend physical therapy for pain management.    OSTEOARTHRITIS OF RIGHT HIP:  - Right hip arthritis confirmed by x-ray, affecting back pain relief.  - Recommend physical therapy for hip arthritis  management.    PALPITATIONS AND CARDIAC EVALUATION:  - Traci reports two recent episodes of palpitations, with one episode lasting through the night.  - Previous echocardiogram showed normal left ventricle and EF with mild aortic valve sclerosis and a heart murmur.  - Ordered nuclear stress test to further evaluate heart condition given family history of heart disease.  - Scheduled follow-up to arrange nuclear stress test for September.    FAMILY-RELATED STRESS:  - Traci reports feeling overwhelmed by family stress, including daughter's engagement and financial problems, affecting mental health.    FAMILY HISTORY OF HEART DISEASE:  - Traci has significant family history of heart disease, including brother with severe cardiac condition.  - This history was considered in cardiac evaluation and decision to order nuclear stress test.        Plan:       1. Edema, unspecified type  Comments:  hctz. elevate. compression socks  Orders:  -     CBC Auto Differential; Future; Expected date: 07/29/2025  -     Ferritin; Future; Expected date: 07/29/2025  -     Comprehensive Metabolic Panel; Future; Expected date: 07/29/2025  -     TSH w/reflex to FT4; Future; Expected date: 07/29/2025    2. Essential hypertension  Comments:  bp controlled  Orders:  -     CBC Auto Differential; Future; Expected date: 07/29/2025  -     Ferritin; Future; Expected date: 07/29/2025  -     Comprehensive Metabolic Panel; Future; Expected date: 07/29/2025  -     TSH w/reflex to FT4; Future; Expected date: 07/29/2025    3. Hyperlipidemia, unspecified hyperlipidemia type  Comments:  crestor  Orders:  -     NM Myocardial Perfusion Spect Multi Pharmacologic; Future; Expected date: 09/07/2025  -     Treadmill Stress Test; Future; Expected date: 09/07/2025    4. Other iron deficiency anemia  Comments:  labs  Orders:  -     CBC Auto Differential; Future; Expected date: 07/29/2025  -     Ferritin; Future; Expected date: 07/29/2025  -     Comprehensive Metabolic  Panel; Future; Expected date: 07/29/2025  -     TSH w/reflex to FT4; Future; Expected date: 07/29/2025    5. Hx of breast cancer  Overview:  Had bilateral mastectomy  Right axillary lymph node dissection      6. Anxiety  Comments:  prozac    7. Lipoma, unspecified site  Comments:  keep appointment with dr. sands as scheduled    8. Insomnia, unspecified type  Comments:  ambien    9. Obesity, unspecified class, unspecified obesity type, unspecified whether serious comorbidity present  Comments:  phentermine per dr. moreira    10. Encounter for screening for cardiovascular disorders  -     NM Myocardial Perfusion Spect Multi Pharmacologic; Future; Expected date: 09/07/2025    11. Chest pain, unspecified type  -     NM Myocardial Perfusion Spect Multi Pharmacologic; Future; Expected date: 09/07/2025  -     Treadmill Stress Test; Future; Expected date: 09/07/2025      Follow up in about 4 weeks (around 8/26/2025) for medication management.          Counseled on age and gender appropriate medical preventative services, including cancer screenings, immunizations, overall nutritional health, need for a consistent exercise regimen and an overall push towards maintaining a vigorous and active lifestyle.      This note was generated with the assistance of ambient listening technology. Verbal consent was obtained by the patient and accompanying visitor(s) for the recording of patient appointment to facilitate this note. I attest to having reviewed and edited the generated note for accuracy, though some syntax or spelling errors may persist. Please contact the author of this note for any clarification.       8/7/2025 Juliet Medina NP         [1]   Current Outpatient Medications:     fluocinolone acetonide oiL 0.01 % Drop, 3-4 drops to the affected ear(s) twice daily no more than a week at a time as needed for itching and scaling, Disp: 20 mL, Rfl: 0    FLUoxetine 20 MG capsule, Take 1 capsule (20 mg total) by mouth once daily.,  Disp: 90 capsule, Rfl: 1    hydroCHLOROthiazide (HYDRODIURIL) 25 MG tablet, Take 1 tablet (25 mg total) by mouth once daily., Disp: 90 tablet, Rfl: 1    irbesartan (AVAPRO) 300 MG tablet, Take 1 tablet (300 mg total) by mouth every evening., Disp: 90 tablet, Rfl: 3    mineral oil TOP (MINERAL OIL LIGHT) Oil, Apply 4-5 drops BOTH ear canals with a dropper nightly, Disp: , Rfl:     pantoprazole (PROTONIX) 40 MG tablet, Take 1 tablet (40 mg total) by mouth once daily., Disp: 90 tablet, Rfl: 1    rosuvastatin (CRESTOR) 20 MG tablet, Take 1 tablet (20 mg total) by mouth every evening., Disp: 90 tablet, Rfl: 1    zolpidem (AMBIEN) 10 mg Tab, Take 1 tablet (10 mg total) by mouth every evening., Disp: 90 tablet, Rfl: 1

## 2025-08-08 ENCOUNTER — HOSPITAL ENCOUNTER (OUTPATIENT)
Dept: RADIOLOGY | Facility: HOSPITAL | Age: 65
Discharge: HOME | End: 2025-08-08
Attending: NURSE PRACTITIONER
Payer: COMMERCIAL

## 2025-08-08 ENCOUNTER — HOSPITAL ENCOUNTER (OUTPATIENT)
Dept: RADIOLOGY | Facility: HOSPITAL | Age: 65
Discharge: HOME | End: 2025-08-08
Attending: SURGERY
Payer: COMMERCIAL

## 2025-08-08 ENCOUNTER — HOSPITAL ENCOUNTER (OUTPATIENT)
Dept: PREADMISSION TESTING | Facility: HOSPITAL | Age: 65
Discharge: HOME | End: 2025-08-08
Attending: SURGERY
Payer: COMMERCIAL

## 2025-08-08 VITALS
RESPIRATION RATE: 18 BRPM | WEIGHT: 178.56 LBS | SYSTOLIC BLOOD PRESSURE: 106 MMHG | OXYGEN SATURATION: 96 % | HEART RATE: 83 BPM | DIASTOLIC BLOOD PRESSURE: 7 MMHG | BODY MASS INDEX: 33.71 KG/M2 | HEIGHT: 61 IN | TEMPERATURE: 99 F

## 2025-08-08 DIAGNOSIS — R07.81 RIB PAIN: ICD-10-CM

## 2025-08-08 DIAGNOSIS — D17.1 LIPOMA OF BACK: ICD-10-CM

## 2025-08-08 PROCEDURE — 71046 X-RAY EXAM CHEST 2 VIEWS: CPT | Mod: TC

## 2025-08-08 PROCEDURE — 71046 X-RAY EXAM CHEST 2 VIEWS: CPT | Mod: 26,,, | Performed by: RADIOLOGY

## 2025-08-08 PROCEDURE — 71100 X-RAY EXAM RIBS UNI 2 VIEWS: CPT | Mod: 26,LT,, | Performed by: RADIOLOGY

## 2025-08-08 PROCEDURE — 71100 X-RAY EXAM RIBS UNI 2 VIEWS: CPT | Mod: TC,LT

## 2025-08-08 PROCEDURE — 93010 ELECTROCARDIOGRAM REPORT: CPT | Mod: ,,, | Performed by: GENERAL PRACTICE

## 2025-08-08 PROCEDURE — 93005 ELECTROCARDIOGRAM TRACING: CPT | Performed by: GENERAL PRACTICE

## 2025-08-08 NOTE — DISCHARGE INSTRUCTIONS
To confirm, Your doctor has instructed you that surgery is scheduled for: Monday, August 11, 2025    Pre-Op will call the afternoon prior to surgery between 4:00 and 6:00 PM with the final arrival time.    Please report to [facility] [place] via [facility] entrance. Check in at registration desk.    DO NOT EAT AFTER MIDNIGHT BEFORE SURGERY. OK TO HAVE CLEAR LIQUIDS UP TO 2 HOURS PRIOR TO ARRIVAL.    CLEAR LIQUIDS INCLUDE: WATER, GATORADE, CLEAR JUICE (NO PULP), BLACK COFFEE AND TEA.    HOLD IRBESARTAN EVENING DOSE PRIOR TO SURGERY      PLEASE NOTE:  The surgery schedule has many variables which may affect the time of your surgery case.  Family members should be available if your surgery time changes.  Plan to be here the day of your procedure between 4-6 hours.      DO NOT TAKE THESE MEDICATIONS 5-7 DAYS PRIOR to your procedure or per your surgeon's request: ASPIRIN, ALEVE, ADVIL, IBUPROFEN,  MARY SELTZER, BC , FISH OIL , VITAMIN E, HERBALS  (May take Tylenol)      ONLY if you are prescribed any types of blood thinners such as:  Aspirin, Coumadin, Plavix, Pradaxa, Xarelto, Aggrenox, Effient, Eliquis, Savasya, Brilinta, or any other, ask your surgeon whether you should stop taking them and how long before surgery you should stop.  You may also need to verify with the prescribing physician if it is ok to stop your medication.                                                        IMPORTANT INSTRUCTIONS      Shower the night before AND the morning of your procedure with a Chlorhexidine wash such as Hibiclens or Dial antibacterial soap from the neck down. Do not apply any deodorants, lotions or powders after each shower.  Do not get it on your face or in your eyes.  You may use your own shampoo and face wash. This helps your skin to be as bacteria free as possible.  DO NOT remove hair from the surgery site.  Do not shave the incision site unless you are given specific instructions to do so.    Sleep in a bed  with clean sheets.  Do not sleep with a pet in the bed.   If you wear contact lenses, dentures, hearing aids or glasses, bring a container to put them in during surgery and give to a family member for safe keeping.    Please leave all jewelry, piercing's and valuables at home.     Make arrangements in advance for transportation home by a responsible adult.      You must make arrangements for transportation, TAXI'S, UBER'S OR LYFTS ARE NOT ALLOWED.        If you have any questions about these instructions, call Pre-Op Admit  Nursing at [phone] or the Pre-Op Day Surgery Unit at [phone].

## 2025-08-09 DIAGNOSIS — F41.9 ANXIETY: ICD-10-CM

## 2025-08-09 LAB
OHS QRS DURATION: 82 MS
OHS QTC CALCULATION: 441 MS

## 2025-08-10 ENCOUNTER — PATIENT MESSAGE (OUTPATIENT)
Dept: ORTHOPEDICS | Facility: CLINIC | Age: 65
End: 2025-08-10
Payer: COMMERCIAL

## 2025-08-10 ENCOUNTER — ANESTHESIA EVENT (OUTPATIENT)
Dept: SURGERY | Facility: HOSPITAL | Age: 65
End: 2025-08-10
Payer: COMMERCIAL

## 2025-08-11 ENCOUNTER — ANESTHESIA (OUTPATIENT)
Dept: SURGERY | Facility: HOSPITAL | Age: 65
End: 2025-08-11
Payer: COMMERCIAL

## 2025-08-11 ENCOUNTER — HOSPITAL ENCOUNTER (OUTPATIENT)
Facility: HOSPITAL | Age: 65
Discharge: HOME | End: 2025-08-11
Attending: SURGERY | Admitting: SURGERY
Payer: COMMERCIAL

## 2025-08-11 VITALS
HEIGHT: 61 IN | HEART RATE: 80 BPM | SYSTOLIC BLOOD PRESSURE: 130 MMHG | TEMPERATURE: 98 F | DIASTOLIC BLOOD PRESSURE: 62 MMHG | BODY MASS INDEX: 33.71 KG/M2 | OXYGEN SATURATION: 98 % | RESPIRATION RATE: 18 BRPM | WEIGHT: 178.56 LBS

## 2025-08-11 DIAGNOSIS — D17.1 LIPOMA OF BACK: ICD-10-CM

## 2025-08-11 PROCEDURE — 21931 EXC BACK LES SC 3 CM/>: CPT | Mod: ,,, | Performed by: SURGERY

## 2025-08-11 PROCEDURE — 63600175 PHARM REV CODE 636 W HCPCS: Performed by: SURGERY

## 2025-08-11 PROCEDURE — 36000706: Performed by: SURGERY

## 2025-08-11 PROCEDURE — 37000008 HC ANESTHESIA 1ST 15 MINUTES: Performed by: SURGERY

## 2025-08-11 PROCEDURE — 27201423 OPTIME MED/SURG SUP & DEVICES STERILE SUPPLY: Performed by: SURGERY

## 2025-08-11 PROCEDURE — 71000015 HC POSTOP RECOV 1ST HR: Performed by: SURGERY

## 2025-08-11 PROCEDURE — 36000707: Performed by: SURGERY

## 2025-08-11 PROCEDURE — 25000003 PHARM REV CODE 250: Performed by: ANESTHESIOLOGY

## 2025-08-11 PROCEDURE — 63600175 PHARM REV CODE 636 W HCPCS

## 2025-08-11 PROCEDURE — 37000009 HC ANESTHESIA EA ADD 15 MINS: Performed by: SURGERY

## 2025-08-11 PROCEDURE — 71000033 HC RECOVERY, INTIAL HOUR: Performed by: SURGERY

## 2025-08-11 PROCEDURE — 25000003 PHARM REV CODE 250

## 2025-08-11 RX ORDER — FENTANYL CITRATE 50 UG/ML
INJECTION, SOLUTION INTRAMUSCULAR; INTRAVENOUS
Status: DISCONTINUED | OUTPATIENT
Start: 2025-08-11 | End: 2025-08-11

## 2025-08-11 RX ORDER — SCOPOLAMINE 1 MG/3D
1 PATCH, EXTENDED RELEASE TRANSDERMAL ONCE
Status: DISCONTINUED | OUTPATIENT
Start: 2025-08-11 | End: 2025-08-11 | Stop reason: HOSPADM

## 2025-08-11 RX ORDER — FAMOTIDINE 10 MG/ML
INJECTION, SOLUTION INTRAVENOUS
Status: DISCONTINUED | OUTPATIENT
Start: 2025-08-11 | End: 2025-08-11

## 2025-08-11 RX ORDER — GLUCAGON 1 MG
1 KIT INJECTION
Status: DISCONTINUED | OUTPATIENT
Start: 2025-08-11 | End: 2025-08-11 | Stop reason: HOSPADM

## 2025-08-11 RX ORDER — PHENYLEPHRINE HYDROCHLORIDE 10 MG/ML
INJECTION INTRAVENOUS
Status: DISCONTINUED | OUTPATIENT
Start: 2025-08-11 | End: 2025-08-11

## 2025-08-11 RX ORDER — DEXMEDETOMIDINE HYDROCHLORIDE 100 UG/ML
INJECTION, SOLUTION INTRAVENOUS
Status: DISCONTINUED | OUTPATIENT
Start: 2025-08-11 | End: 2025-08-11

## 2025-08-11 RX ORDER — MIDAZOLAM HYDROCHLORIDE 1 MG/ML
INJECTION INTRAMUSCULAR; INTRAVENOUS
Status: DISCONTINUED | OUTPATIENT
Start: 2025-08-11 | End: 2025-08-11

## 2025-08-11 RX ORDER — DIPHENHYDRAMINE HYDROCHLORIDE 50 MG/ML
12.5 INJECTION, SOLUTION INTRAMUSCULAR; INTRAVENOUS
Status: DISCONTINUED | OUTPATIENT
Start: 2025-08-11 | End: 2025-08-11 | Stop reason: HOSPADM

## 2025-08-11 RX ORDER — ZOLPIDEM TARTRATE 10 MG/1
10 TABLET ORAL NIGHTLY
Qty: 90 TABLET | Refills: 1 | Status: SHIPPED | OUTPATIENT
Start: 2025-08-11

## 2025-08-11 RX ORDER — HYDROCODONE BITARTRATE AND ACETAMINOPHEN 5; 325 MG/1; MG/1
1 TABLET ORAL EVERY 6 HOURS PRN
Qty: 25 TABLET | Refills: 0 | Status: SHIPPED | OUTPATIENT
Start: 2025-08-11

## 2025-08-11 RX ORDER — ACETAMINOPHEN 10 MG/ML
INJECTION, SOLUTION INTRAVENOUS
Status: DISCONTINUED | OUTPATIENT
Start: 2025-08-11 | End: 2025-08-11

## 2025-08-11 RX ORDER — BUPIVACAINE HYDROCHLORIDE 2.5 MG/ML
INJECTION, SOLUTION EPIDURAL; INFILTRATION; INTRACAUDAL; PERINEURAL
Status: DISCONTINUED | OUTPATIENT
Start: 2025-08-11 | End: 2025-08-11 | Stop reason: HOSPADM

## 2025-08-11 RX ORDER — PROPOFOL 10 MG/ML
VIAL (ML) INTRAVENOUS
Status: DISCONTINUED | OUTPATIENT
Start: 2025-08-11 | End: 2025-08-11

## 2025-08-11 RX ORDER — HYDROMORPHONE HYDROCHLORIDE 1 MG/ML
0.2 INJECTION, SOLUTION INTRAMUSCULAR; INTRAVENOUS; SUBCUTANEOUS EVERY 5 MIN PRN
Status: DISCONTINUED | OUTPATIENT
Start: 2025-08-11 | End: 2025-08-11 | Stop reason: HOSPADM

## 2025-08-11 RX ORDER — OXYCODONE HYDROCHLORIDE 5 MG/1
5 TABLET ORAL
Status: DISCONTINUED | OUTPATIENT
Start: 2025-08-11 | End: 2025-08-11 | Stop reason: HOSPADM

## 2025-08-11 RX ORDER — ONDANSETRON HYDROCHLORIDE 2 MG/ML
4 INJECTION, SOLUTION INTRAVENOUS DAILY PRN
Status: DISCONTINUED | OUTPATIENT
Start: 2025-08-11 | End: 2025-08-11 | Stop reason: HOSPADM

## 2025-08-11 RX ORDER — CEFAZOLIN 2 G/1
2 INJECTION, POWDER, FOR SOLUTION INTRAMUSCULAR; INTRAVENOUS
Status: COMPLETED | OUTPATIENT
Start: 2025-08-11 | End: 2025-08-11

## 2025-08-11 RX ORDER — ROCURONIUM BROMIDE 10 MG/ML
INJECTION, SOLUTION INTRAVENOUS
Status: DISCONTINUED | OUTPATIENT
Start: 2025-08-11 | End: 2025-08-11

## 2025-08-11 RX ORDER — LIDOCAINE HYDROCHLORIDE 20 MG/ML
INJECTION, SOLUTION EPIDURAL; INFILTRATION; INTRACAUDAL; PERINEURAL
Status: DISCONTINUED | OUTPATIENT
Start: 2025-08-11 | End: 2025-08-11

## 2025-08-11 RX ORDER — ONDANSETRON HYDROCHLORIDE 2 MG/ML
INJECTION, SOLUTION INTRAVENOUS
Status: DISCONTINUED | OUTPATIENT
Start: 2025-08-11 | End: 2025-08-11

## 2025-08-11 RX ORDER — BUPIVACAINE 13.3 MG/ML
INJECTION, SUSPENSION, LIPOSOMAL INFILTRATION
Status: DISCONTINUED | OUTPATIENT
Start: 2025-08-11 | End: 2025-08-11 | Stop reason: HOSPADM

## 2025-08-11 RX ORDER — DEXAMETHASONE SODIUM PHOSPHATE 4 MG/ML
INJECTION, SOLUTION INTRA-ARTICULAR; INTRALESIONAL; INTRAMUSCULAR; INTRAVENOUS; SOFT TISSUE
Status: DISCONTINUED | OUTPATIENT
Start: 2025-08-11 | End: 2025-08-11

## 2025-08-11 RX ADMIN — DEXMEDETOMIDINE HYDROCHLORIDE 6 MCG: 100 INJECTION, SOLUTION INTRAVENOUS at 08:08

## 2025-08-11 RX ADMIN — SUGAMMADEX 200 MG: 100 INJECTION, SOLUTION INTRAVENOUS at 09:08

## 2025-08-11 RX ADMIN — PROPOFOL 150 MG: 10 INJECTION, EMULSION INTRAVENOUS at 07:08

## 2025-08-11 RX ADMIN — SCOPOLAMINE 1 PATCH: 1.5 PATCH, EXTENDED RELEASE TRANSDERMAL at 06:08

## 2025-08-11 RX ADMIN — FENTANYL CITRATE 100 MCG: 50 INJECTION, SOLUTION INTRAMUSCULAR; INTRAVENOUS at 07:08

## 2025-08-11 RX ADMIN — ACETAMINOPHEN 1000 MG: 10 INJECTION, SOLUTION INTRAVENOUS at 08:08

## 2025-08-11 RX ADMIN — PROPOFOL 50 MG: 10 INJECTION, EMULSION INTRAVENOUS at 08:08

## 2025-08-11 RX ADMIN — DEXAMETHASONE SODIUM PHOSPHATE 4 MG: 4 INJECTION, SOLUTION INTRAMUSCULAR; INTRAVENOUS at 07:08

## 2025-08-11 RX ADMIN — CEFAZOLIN 2 G: 2 INJECTION, POWDER, FOR SOLUTION INTRAMUSCULAR; INTRAVENOUS at 07:08

## 2025-08-11 RX ADMIN — SODIUM CHLORIDE, SODIUM LACTATE, POTASSIUM CHLORIDE, AND CALCIUM CHLORIDE: .6; .31; .03; .02 INJECTION, SOLUTION INTRAVENOUS at 07:08

## 2025-08-11 RX ADMIN — FENTANYL CITRATE 50 MCG: 50 INJECTION, SOLUTION INTRAMUSCULAR; INTRAVENOUS at 08:08

## 2025-08-11 RX ADMIN — MIDAZOLAM HYDROCHLORIDE 2 MG: 1 INJECTION, SOLUTION INTRAMUSCULAR; INTRAVENOUS at 07:08

## 2025-08-11 RX ADMIN — SODIUM CHLORIDE, SODIUM LACTATE, POTASSIUM CHLORIDE, AND CALCIUM CHLORIDE: .6; .31; .03; .02 INJECTION, SOLUTION INTRAVENOUS at 08:08

## 2025-08-11 RX ADMIN — ROCURONIUM BROMIDE 40 MG: 10 INJECTION, SOLUTION INTRAVENOUS at 07:08

## 2025-08-11 RX ADMIN — ONDANSETRON 4 MG: 2 INJECTION INTRAMUSCULAR; INTRAVENOUS at 07:08

## 2025-08-11 RX ADMIN — LIDOCAINE HYDROCHLORIDE 80 MG: 20 INJECTION, SOLUTION INTRAVENOUS at 07:08

## 2025-08-11 RX ADMIN — FAMOTIDINE 20 MG: 10 INJECTION, SOLUTION INTRAVENOUS at 07:08

## 2025-08-11 RX ADMIN — PHENYLEPHRINE HYDROCHLORIDE 100 MCG: 10 INJECTION INTRAVENOUS at 08:08

## 2025-08-13 ENCOUNTER — PATIENT MESSAGE (OUTPATIENT)
Dept: ORTHOPEDICS | Facility: CLINIC | Age: 65
End: 2025-08-13
Payer: COMMERCIAL

## 2025-08-13 DIAGNOSIS — Z96.652 S/P REVISION OF TOTAL KNEE, LEFT: Primary | ICD-10-CM

## 2025-08-18 ENCOUNTER — HOSPITAL ENCOUNTER (OUTPATIENT)
Dept: RADIOLOGY | Facility: HOSPITAL | Age: 65
Discharge: HOME | End: 2025-08-18
Attending: ORTHOPAEDIC SURGERY
Payer: COMMERCIAL

## 2025-08-18 ENCOUNTER — OFFICE VISIT (OUTPATIENT)
Dept: ORTHOPEDICS | Facility: CLINIC | Age: 65
End: 2025-08-18
Payer: COMMERCIAL

## 2025-08-18 ENCOUNTER — PATIENT MESSAGE (OUTPATIENT)
Dept: SURGERY | Facility: CLINIC | Age: 65
End: 2025-08-18
Payer: COMMERCIAL

## 2025-08-18 VITALS — WEIGHT: 178.56 LBS | HEIGHT: 61 IN | BODY MASS INDEX: 33.71 KG/M2

## 2025-08-18 DIAGNOSIS — Z96.652 S/P REVISION OF TOTAL KNEE, LEFT: Primary | ICD-10-CM

## 2025-08-18 DIAGNOSIS — M17.11 PRIMARY OSTEOARTHRITIS OF RIGHT KNEE: Primary | ICD-10-CM

## 2025-08-18 DIAGNOSIS — Z96.652 S/P REVISION OF TOTAL KNEE, LEFT: ICD-10-CM

## 2025-08-18 DIAGNOSIS — M17.11 PRIMARY OSTEOARTHRITIS OF RIGHT KNEE: ICD-10-CM

## 2025-08-18 PROCEDURE — 99214 OFFICE O/P EST MOD 30 MIN: CPT | Mod: S$GLB,,, | Performed by: ORTHOPAEDIC SURGERY

## 2025-08-18 PROCEDURE — 73562 X-RAY EXAM OF KNEE 3: CPT | Mod: TC,LT

## 2025-08-18 PROCEDURE — 99999 PR PBB SHADOW E&M-EST. PATIENT-LVL III: CPT | Mod: PBBFAC,COE,, | Performed by: ORTHOPAEDIC SURGERY

## 2025-08-21 ENCOUNTER — OFFICE VISIT (OUTPATIENT)
Dept: SURGERY | Facility: CLINIC | Age: 65
End: 2025-08-21
Payer: COMMERCIAL

## 2025-08-21 VITALS — TEMPERATURE: 98 F | SYSTOLIC BLOOD PRESSURE: 110 MMHG | DIASTOLIC BLOOD PRESSURE: 69 MMHG | HEART RATE: 87 BPM

## 2025-08-21 DIAGNOSIS — D17.1 LIPOMA OF BACK: Primary | ICD-10-CM

## 2025-08-21 PROCEDURE — 3061F NEG MICROALBUMINURIA REV: CPT | Mod: CPTII,S$GLB,COE, | Performed by: SURGERY

## 2025-08-21 PROCEDURE — 3078F DIAST BP <80 MM HG: CPT | Mod: CPTII,S$GLB,COE, | Performed by: SURGERY

## 2025-08-21 PROCEDURE — 4010F ACE/ARB THERAPY RXD/TAKEN: CPT | Mod: CPTII,S$GLB,COE, | Performed by: SURGERY

## 2025-08-21 PROCEDURE — 3074F SYST BP LT 130 MM HG: CPT | Mod: CPTII,S$GLB,COE, | Performed by: SURGERY

## 2025-08-21 PROCEDURE — 99024 POSTOP FOLLOW-UP VISIT: CPT | Mod: S$GLB,COE,, | Performed by: SURGERY

## 2025-08-21 PROCEDURE — 99999 PR PBB SHADOW E&M-EST. PATIENT-LVL III: CPT | Mod: PBBFAC,COE,, | Performed by: SURGERY

## 2025-08-21 PROCEDURE — 1160F RVW MEDS BY RX/DR IN RCRD: CPT | Mod: CPTII,S$GLB,COE, | Performed by: SURGERY

## 2025-08-21 PROCEDURE — 1159F MED LIST DOCD IN RCRD: CPT | Mod: CPTII,S$GLB,COE, | Performed by: SURGERY

## 2025-08-21 PROCEDURE — 3066F NEPHROPATHY DOC TX: CPT | Mod: CPTII,S$GLB,COE, | Performed by: SURGERY

## 2025-08-26 ENCOUNTER — OFFICE VISIT (OUTPATIENT)
Dept: ORTHOPEDICS | Facility: CLINIC | Age: 65
End: 2025-08-26
Payer: COMMERCIAL

## 2025-08-26 DIAGNOSIS — M17.11 PRIMARY OSTEOARTHRITIS OF RIGHT KNEE: Primary | ICD-10-CM

## 2025-08-26 PROCEDURE — 20610 DRAIN/INJ JOINT/BURSA W/O US: CPT | Mod: RT,S$GLB,COE,

## 2025-08-26 PROCEDURE — 99999 PR PBB SHADOW E&M-EST. PATIENT-LVL III: CPT | Mod: PBBFAC,COE,,

## 2025-08-26 PROCEDURE — 99499 UNLISTED E&M SERVICE: CPT | Mod: S$GLB,COE,,

## 2025-09-02 ENCOUNTER — OFFICE VISIT (OUTPATIENT)
Dept: ORTHOPEDICS | Facility: CLINIC | Age: 65
End: 2025-09-02
Payer: COMMERCIAL

## 2025-09-02 VITALS — WEIGHT: 178.13 LBS | BODY MASS INDEX: 33.66 KG/M2

## 2025-09-02 DIAGNOSIS — M17.11 PRIMARY OSTEOARTHRITIS OF RIGHT KNEE: Primary | ICD-10-CM

## 2025-09-02 PROCEDURE — 99999 PR PBB SHADOW E&M-EST. PATIENT-LVL III: CPT | Mod: PBBFAC,COE,, | Performed by: PHYSICIAN ASSISTANT

## 2025-09-03 DIAGNOSIS — R60.9 EDEMA, UNSPECIFIED TYPE: ICD-10-CM

## 2025-09-03 DIAGNOSIS — Z96.652 S/P REVISION OF TOTAL KNEE, LEFT: ICD-10-CM

## 2025-09-03 DIAGNOSIS — I10 ESSENTIAL HYPERTENSION: ICD-10-CM

## 2025-09-04 RX ORDER — IRBESARTAN 300 MG/1
300 TABLET ORAL NIGHTLY
Qty: 90 TABLET | Refills: 3 | Status: SHIPPED | OUTPATIENT
Start: 2025-09-04

## 2025-09-04 RX ORDER — PANTOPRAZOLE SODIUM 40 MG/1
40 TABLET, DELAYED RELEASE ORAL DAILY
Qty: 90 TABLET | Refills: 1 | Status: SHIPPED | OUTPATIENT
Start: 2025-09-04

## 2025-09-04 RX ORDER — HYDROCHLOROTHIAZIDE 25 MG/1
25 TABLET ORAL DAILY
Qty: 90 TABLET | Refills: 1 | Status: SHIPPED | OUTPATIENT
Start: 2025-09-04

## (undated) DEVICE — PIN PINABALL HEADLESS
Type: IMPLANTABLE DEVICE | Site: KNEE | Status: NON-FUNCTIONAL
Removed: 2024-12-05

## (undated) DEVICE — GLOVE BIOGEL PI MICRO SZ 8

## (undated) DEVICE — NDL 27G X 1 1/4

## (undated) DEVICE — COVER LIGHT HANDLE 80/CA

## (undated) DEVICE — SUT V-LOC 180 2-0 GS-22 9IN

## (undated) DEVICE — GLOVE SENSICARE PI MICRO 7.5

## (undated) DEVICE — UNDERGLOVES BIOGEL PI SIZE 8

## (undated) DEVICE — TAPE SURG DURAPORE 2 X10YD

## (undated) DEVICE — HOOD T7 W/ PEEL AWAY LENS

## (undated) DEVICE — GLOVE SURG ULTRA TOUCH 7

## (undated) DEVICE — STRAP OR TABLE 5IN X 72IN

## (undated) DEVICE — DRAPE THREE-QTR REINF 53X77IN

## (undated) DEVICE — BLADE SAG 13.0 X1.27X90

## (undated) DEVICE — SUT MCRYL PLUS 4-0 PS2 27IN

## (undated) DEVICE — DRAPE INCISE IOBAN 2 23X33IN

## (undated) DEVICE — SUT VICRYL PLUS 0 CT1 18IN

## (undated) DEVICE — BLADE SAGITTAL 18 X 1.27 X 90M

## (undated) DEVICE — GLOVE SENSICARE PI GRN 8

## (undated) DEVICE — TROCAR ENDO Z THREAD KII 5X100

## (undated) DEVICE — KIT TOTAL KNEE TKOFG OMC

## (undated) DEVICE — DRAPE ARM DAVINCI XI

## (undated) DEVICE — SUT ETHILON 4-0 PS2 18 BLK

## (undated) DEVICE — SYR 10CC LUER LOCK

## (undated) DEVICE — CORD BIPOLAR 12 FOOT

## (undated) DEVICE — NDL ECLIPSE SAFETY 23G 1.5IN

## (undated) DEVICE — SOL NACL IRR 1000ML BTL

## (undated) DEVICE — Device

## (undated) DEVICE — BRUSH INTRAMEDULLARY

## (undated) DEVICE — SOL IRR NACL .9% 3000ML

## (undated) DEVICE — DRAPE T EXTRM SURG 121X128X90

## (undated) DEVICE — PUMP COLD THERAPY

## (undated) DEVICE — KIT PREVENA PLUS

## (undated) DEVICE — CONTAINER SPECIMEN OR STER 4OZ

## (undated) DEVICE — KIT IRR SUCTION HND PIECE

## (undated) DEVICE — TIP IRR FEM CANAL CO-AXIAL

## (undated) DEVICE — PULSAVAC ZIMMER

## (undated) DEVICE — NDL PNEUMO INSUFFLATI 120MM

## (undated) DEVICE — SUT MONOCRYL 4-0 PS-2

## (undated) DEVICE — DRAPE STERI INSTRUMENT 1018

## (undated) DEVICE — PAD COLD THERAPY KNEE WRAP ON

## (undated) DEVICE — TOURNIQUET SB QC DP 34X4IN

## (undated) DEVICE — GOWN ECLIPSE REINF LVL4 TWL LG

## (undated) DEVICE — BLADE RECIP DS OFST 70X1X12.5

## (undated) DEVICE — SET CEMENT (SCULP)

## (undated) DEVICE — PACK CUSTOM UNIV BASIN SLI

## (undated) DEVICE — CANISTER INFOV.A.C WOUND 500ML

## (undated) DEVICE — TRAY CATH 1-LYR URIMTR 16FR

## (undated) DEVICE — TUBING SUC UNIV W/CONN 12FT

## (undated) DEVICE — DRAPE INCISE IOBAN 2 23X17IN

## (undated) DEVICE — DRAPE STERI-DRAPE 1000 17X11IN

## (undated) DEVICE — BAND RUBBER STERILE 1/4X3.5IN

## (undated) DEVICE — DRAPE ABDOMINAL TIBURON 14X11

## (undated) DEVICE — BLADE SURG #15 CARBON STEEL

## (undated) DEVICE — DRAPE HAND STERILE

## (undated) DEVICE — BRACE KNEE T SCOPE PREMIER

## (undated) DEVICE — STAPLER SKIN PROXIMATE WIDE

## (undated) DEVICE — PACK MINOR SMH

## (undated) DEVICE — SET TUBE PNEUMOCLEAR SE HI FLO

## (undated) DEVICE — PACK BASIC

## (undated) DEVICE — SEE MEDLINE ITEM 157117

## (undated) DEVICE — TOURNIQUET SB QC DP 18X4IN

## (undated) DEVICE — SOL WATER STRL IRR 1000ML

## (undated) DEVICE — BANDAGE ELAS SOFTWRAP ST 3X5YD

## (undated) DEVICE — PAD CAST SPECIALIST STRL 3

## (undated) DEVICE — GOWN SMARTGOWN LVL4 X-LONG XL

## (undated) DEVICE — GLOVE SURG ULTRA TOUCH 7.5

## (undated) DEVICE — ADHESIVE DERMABOND ADVANCED

## (undated) DEVICE — DRAPE U SPLIT SHEET 54X76IN

## (undated) DEVICE — APPLICATOR CHLORAPREP ORN 26ML

## (undated) DEVICE — KIT SAHARA DRAPE DRAW/LIFT

## (undated) DEVICE — SOL ELECTROLUBE ANTI-STIC

## (undated) DEVICE — COVER TIP CURVED SCISSORS XI

## (undated) DEVICE — DRAPE HALF SURGICAL 40X58IN

## (undated) DEVICE — SUT VICRYL+ 1 CT1 18IN

## (undated) DEVICE — SUT VICRYL PLUS 2-0 CT1 18

## (undated) DEVICE — TOWEL OR DISP STRL BLUE 4/PK

## (undated) DEVICE — CLOSURE SKIN STERI STRIP 1/2X4

## (undated) DEVICE — SOL 9P NACL IRR PIC IL

## (undated) DEVICE — SUT VLOC 1 12IN 1/2 CIR GS-21

## (undated) DEVICE — PENCIL SMK EVAC CONNECTOR 10FT

## (undated) DEVICE — SUT VICRYL 4-0 8-18 SH

## (undated) DEVICE — BAG TISSUE RETRIEVAL 5MM

## (undated) DEVICE — DRESSING AQUACEL AG ADV 3.5X12

## (undated) DEVICE — SEE MEDLINE ITEM 146292

## (undated) DEVICE — FORCEP STRAIGHT DISP

## (undated) DEVICE — BOWL STERILE LARGE 32OZ

## (undated) DEVICE — ELECTRODE REM PLYHSV RETURN 9

## (undated) DEVICE — CHLORAPREP 10.5 ML APPLICATOR

## (undated) DEVICE — SYR 50ML CATH TIP

## (undated) DEVICE — DRAPE COLUMN DAVINCI XI

## (undated) DEVICE — APPLICATOR CHLORAPREP CLR 10.5

## (undated) DEVICE — OBTURATOR BLADELESS 8MM XI CLR

## (undated) DEVICE — UNDERGLOVES BIOGEL PI SZ 7 LF

## (undated) DEVICE — ELECTRODE MEGADYNE RETURN DUAL

## (undated) DEVICE — SUT BONE WAX 2.5 GRMS 12/BX

## (undated) DEVICE — BOWL CEMENT

## (undated) DEVICE — SLEEVE SCD EXPRESS KNEE MEDIUM

## (undated) DEVICE — DRESSING XEROFORM FOIL PK 1X8

## (undated) DEVICE — COVER CAMERA OPERATING ROOM

## (undated) DEVICE — GLOVE PROTEXIS LTX MICRO 8

## (undated) DEVICE — NDL SAFETY 21G X 1 1/2 ECLPSE

## (undated) DEVICE — SOL CLEARIFY VISUALIZATION LAP

## (undated) DEVICE — ALCOHOL 70% ISOP RUBBING 4OZ

## (undated) DEVICE — SEAL UNIVERSAL 5MM-8MM XI

## (undated) DEVICE — BANDAGE ESMARK LATEX FREE 4INX

## (undated) DEVICE — GLOVE PROTEXIS LTX MICRO  7.5

## (undated) DEVICE — GAUZE SPONGE 4X4 12PLY